# Patient Record
Sex: MALE | Race: WHITE | NOT HISPANIC OR LATINO | Employment: OTHER | URBAN - METROPOLITAN AREA
[De-identification: names, ages, dates, MRNs, and addresses within clinical notes are randomized per-mention and may not be internally consistent; named-entity substitution may affect disease eponyms.]

---

## 2017-08-09 ENCOUNTER — THERAPY VISIT (OUTPATIENT)
Dept: PHYSICAL THERAPY | Facility: CLINIC | Age: 52
End: 2017-08-09
Payer: COMMERCIAL

## 2017-08-09 DIAGNOSIS — E11.610 CHARCOT FOOT DUE TO DIABETES MELLITUS (H): Primary | ICD-10-CM

## 2017-08-09 PROCEDURE — 97110 THERAPEUTIC EXERCISES: CPT | Mod: GP | Performed by: PHYSICAL THERAPIST

## 2017-08-09 PROCEDURE — 97161 PT EVAL LOW COMPLEX 20 MIN: CPT | Mod: GP | Performed by: PHYSICAL THERAPIST

## 2017-08-09 NOTE — LETTER
[facility] ATHLETIC [facility] PHYSICAL THERAPY  [address]  [facility] [address]  [phone]    2017    Re: Nehemiah Magallon   :   1965  MRN:  [id number]   REFERRING PHYSICIAN:   Bryon Starr    [facility] ATHLETIC [facility] PHYSICAL THERAPY  Date of Initial Evaluation:  17  Visits:  Rxs Used: 1  Reason for Referral:  Charcot foot due to diabetes mellitus (H)    EVALUATION SUMMARY    [facility] Initial Evaluation      Subjective:    Patient is a 52 year old male presenting with rehab right ankle/foot hpi. The history is provided by the patient.   Nehemiah Magallon is a 52 year old male with a right foot condition.  Condition occurred with:  Insidious onset.  Condition occurred: at home.  This is a chronic condition  Patient report waking on 2016 when he woke up he had significant swelling of the right foot and the arch had fallen.  He went to the ER and was told his foot was not broken and he was given crutches for walking. He saw another MD 3 weeks later and he was diagnosed with Charcot foot and was in serial casting for 6 weeks with casts changed weekly and using a cane or crutches for short walks. He was then placed in a CAM boot x 3 months.  He was then placed in a custom CAM orthosis which he has been in since  and he walks with or without a cane. He describes significant spine issues since using it due to the height discrepancy. Chief complaints are of right foot pain with standing and walking and lower leg weakness and atrophy due to prolonged immobilization. Standing and walking limited to 5-10 minutes in custom CAM with or without cane and patient is unable to drive due to CAM.  Patient reports pain:  Longitudinal arch.  Pain is described as stabbing and is intermittent and reported as 6/10.  Associated symptoms:  Loss of strength, numbness and loss of motion/stiffness. Pain is the same all the  time.  Symptoms are exacerbated by walking, weight bearing, standing, descending stairs and ascending stairs and relieved by rest and bracing/immobilizing.  Since onset symptoms are unchanged.  Special tests:  X-ray.  General health as reported by patient is fair.  Pertinent medical history includes:  Rheumatoid arthritis, cancer, diabetes, high blood pressure, thyroid problems and sleep disorder/apnea.  Medical allergies: no.  Other surgeries include:  Cancer surgery (thryoid cancer).  Current medications:  Thyroid medication, sleep medication and pain medication.  Current occupation is disabled.  Primary job tasks include:  Prolonged sitting.                 Objective:    Standing Alignment:    Ankle/Foot:  Pes planus R    Gait:    Gait Type:  Antalgic   Assistive Devices:  None and CAM  Deviations:  General Deviations:  Emi decr and stride length decr    Re: Nehemiah Magallon   :   1965      Ankle/Foot Evaluation  ROM:    AROM:    Dorsiflexion:  Left:   15  Right:   5  Plantarflexion:  Left:  40    Right:  20  Inversion:  Left:  45     Right:  20  Eversion:  25     Right:  20  Strength:    Dorsiflexion:  Left: 5/5     Pain:   Right: 3+/5   Pain:  Plantarflexion: Left: 5/5   Pain:   Right: 3-/5  Pain:  Inversion:Left: 5/5  Pain:     Right: 3+/5  Pain:  Eversion:Left: 5/5  Pain:  Right: 3+/5  Pain:  LIGAMENT TESTING: not assessed  SPECIAL TESTS: not assessed  PALPATION:   Right ankle tenderness present at:   medial calcaneal  EDEMA: normal  MOBILITY TESTING:   Tib-Fib Distal Right: hypomobile  Talocrural Right: hypomobile  Subtalar Right: hypomobile  Midtarsal Right: hypomobile    Assessment/Plan:      Patient is a 52 year old male with right side ankle complaints.    Patient has the following significant findings with corresponding treatment plan.                Diagnosis 1:  Right Charcot foot with weakness    Pain -  home program  Decreased ROM/flexibility - therapeutic exercise  Decreased strength -  therapeutic exercise and therapeutic activities  Impaired gait - gait training  Impaired muscle performance - neuro re-education  Decreased function - therapeutic activities    Therapy Evaluation Codes:   1) History comprised of:   Personal factors that impact the plan of care:      None.    Comorbidity factors that impact the plan of care are:      Cancer, Diabetes, High blood pressure, Numbness/tingling, Rheumatoid arthritis, Sleep disorder/apnea and Weakness.     Medications impacting care: Pain, Sleep and thyroid.  2) Examination of Body Systems comprised of:   Body structures and functions that impact the plan of care:      Ankle.   Activity limitations that impact the plan of care are:      Stairs, Standing and Walking.  3) Clinical presentation characteristics are:   Stable/Uncomplicated.  4) Decision-Making    Low complexity using standardized patient assessment instrument and/or measureable assessment of functional outcome.  Cumulative Therapy Evaluation is: Low complexity.  Re: Nehemiah Magallon   :   1965          Previous and current functional limitations:  (See Goal Flow Sheet for this information)    Short term and Long term goals: (See Goal Flow Sheet for this information)     Communication ability:  Patient appears to be able to clearly communicate and understand verbal and written communication and follow directions correctly.  Treatment Explanation - The following has been discussed with the patient:   RX ordered/plan of care  Anticipated outcomes  Possible risks and side effects  This patient would benefit from PT intervention to resume normal activities.   Rehab potential is good.    Frequency:  1 X week, once daily  Duration:  for 8 weeks  Discharge Plan:  Achieve all LTG.  Independent in home treatment program.  Reach maximal therapeutic benefit.    Thank you for your referral.    INQUIRIES  Therapist: Manisha Camarillo, PT  [facility] [place] PHYSICAL THERAPY  [id number] [address]  Phone: [phone]  Fax: [phone]

## 2017-08-09 NOTE — PROGRESS NOTES
Addendum: Patient failed to schedule and return for any follow up appointments after 8/9/17 and will be discharged with a minimal home exercise program.       [facility] Initial Evaluation      Subjective:    Patient is a 52 year old male presenting with rehab right ankle/foot hpi. The history is provided by the patient.   Nehemiah Magallon is a 52 year old male with a right foot condition.  Condition occurred with:  Insidious onset.  Condition occurred: at home.  This is a chronic condition  Patient report waking on October 17th, 2016 when he woke up he had significant swelling of the right foot and the arch had fallen.  He went to the ER and was told his foot was not broken and he was given crutches for walking. He saw another MD 3 weeks later and he was diagnosed with Charcot foot and was in serial casting for 6 weeks with casts changed weekly and using a cane or crutches for short walks. He was then placed in a CAM boot x 3 months.  He was then placed in a custom CAM orthosis which he has been in since March 5th and he walks with or without a cane. He describes significant spine issues since using it due to the height discrepancy. Chief complaints are of right foot pain with standing and walking and lower leg weakness and atrophy due to prolonged immobilization. Standing and walking limited to 5-10 minutes in custom CAM with or without cane and patient is unable to drive due to CAM. .    Patient reports pain:  Longitudinal arch.    Pain is described as stabbing and is intermittent and reported as 6/10.  Associated symptoms:  Loss of strength, numbness and loss of motion/stiffness. Pain is the same all the time.  Symptoms are exacerbated by walking, weight bearing, standing, descending stairs and ascending stairs and relieved by rest and bracing/immobilizing.  Since onset symptoms are unchanged.  Special tests:  X-ray.      General health as reported by patient is fair.  Pertinent medical history  includes:  Rheumatoid arthritis, cancer, diabetes, high blood pressure, thyroid problems and sleep disorder/apnea.  Medical allergies: no.  Other surgeries include:  Cancer surgery (thryoid cancer).  Current medications:  Thyroid medication, sleep medication and pain medication.  Current occupation is disabled.    Primary job tasks include:  Prolonged sitting.                                Objective:    Standing Alignment:                Ankle/Foot:  Pes planus R    Gait:    Gait Type:  Antalgic   Assistive Devices:  None and CAM  Deviations:  General Deviations:  Emi decr and stride length decr          Ankle/Foot Evaluation  ROM:    AROM:    Dorsiflexion:  Left:   15  Right:   5  Plantarflexion:  Left:  40    Right:  20  Inversion:  Left:  45     Right:  20  Eversion:  25     Right:  20        Strength:    Dorsiflexion:  Left: 5/5     Pain:   Right: 3+/5   Pain:  Plantarflexion: Left: 5/5   Pain:   Right: 3-/5  Pain:  Inversion:Left: 5/5  Pain:     Right: 3+/5  Pain:  Eversion:Left: 5/5  Pain:  Right: 3+/5  Pain:                  LIGAMENT TESTING: not assessed              SPECIAL TESTS: not assessed    PALPATION:     Right ankle tenderness present at:   medial calcaneal  EDEMA: normal          MOBILITY TESTING:     Tib-Fib Distal Right: hypomobile  Talocrural Right: hypomobile  Subtalar Right: hypomobile  Midtarsal Right: hypomobile                                                      General     ROS    Assessment/Plan:      Patient is a 52 year old male with right side ankle complaints.    Patient has the following significant findings with corresponding treatment plan.                Diagnosis 1:  Right Charcot foot with weakness    Pain -  home program  Decreased ROM/flexibility - therapeutic exercise  Decreased strength - therapeutic exercise and therapeutic activities  Impaired gait - gait training  Impaired muscle performance - neuro re-education  Decreased function - therapeutic activities    Therapy  Evaluation Codes:   1) History comprised of:   Personal factors that impact the plan of care:      None.    Comorbidity factors that impact the plan of care are:      Cancer, Diabetes, High blood pressure, Numbness/tingling, Rheumatoid arthritis, Sleep disorder/apnea and Weakness.     Medications impacting care: Pain, Sleep and thyroid.  2) Examination of Body Systems comprised of:   Body structures and functions that impact the plan of care:      Ankle.   Activity limitations that impact the plan of care are:      Stairs, Standing and Walking.  3) Clinical presentation characteristics are:   Stable/Uncomplicated.  4) Decision-Making    Low complexity using standardized patient assessment instrument and/or measureable assessment of functional outcome.  Cumulative Therapy Evaluation is: Low complexity.    Previous and current functional limitations:  (See Goal Flow Sheet for this information)    Short term and Long term goals: (See Goal Flow Sheet for this information)     Communication ability:  Patient appears to be able to clearly communicate and understand verbal and written communication and follow directions correctly.  Treatment Explanation - The following has been discussed with the patient:   RX ordered/plan of care  Anticipated outcomes  Possible risks and side effects  This patient would benefit from PT intervention to resume normal activities.   Rehab potential is good.    Frequency:  1 X week, once daily  Duration:  for 8 weeks  Discharge Plan:  Achieve all LTG.  Independent in home treatment program.  Reach maximal therapeutic benefit.    Please refer to the daily flowsheet for treatment today, total treatment time and time spent performing 1:1 timed codes.

## 2017-08-09 NOTE — MR AVS SNAPSHOT
After Visit Summary   8/9/2017    Nehemiah Magallon    MRN: [id number]           Patient Information     Date Of Birth          1965        Visit Information        Provider Department      8/9/2017 12:10 PM Manisha Camarillo PT [facility] Athletic [facility] Physical [facility]        Today's Diagnoses     Charcot foot due to diabetes mellitus (H)    -  1       Follow-ups after your visit        Who to contact     If you have questions or need follow up information about today's clinic visit or your schedule please contact [facility] ATHLETIC [facility] PHYSICAL [facility] directly at [phone].  Normal or non-critical lab and imaging results will be communicated to you by [employer], letter or phone within 4 business days after the clinic has received the results. If you do not hear from us within 7 days, please contact the clinic through [employer] or phone. If you have a critical or abnormal lab result, we will notify you by phone as soon as possible.  Submit refill requests through [employer] or call your pharmacy and they will forward the refill request to us. Please allow 3 business days for your refill to be completed.          Additional Information About Your Visit        MyChart Information     [employer] gives you secure access to your electronic health record. If you see a primary care provider, you can also send messages to your care team and make appointments. If you have questions, please call your primary care clinic.  If you do not have a primary care provider, please call [phone] and they will assist you.        Care EveryWhere ID     This is your Care EveryWhere ID. This could be used by other organizations to access your [facility] medical records  [id number]         Blood Pressure from Last 3 Encounters:   03/06/15 (!) 135/96   10/14/14 138/89    Weight from Last 3 Encounters:   03/06/15 91.6 kg (202 lb)   10/14/14 94.8 kg (209 lb)              We Performed the  Following     HC PT EVAL, LOW COMPLEXITY     KALIE INITIAL EVAL REPORT     THERAPEUTIC EXERCISES        Primary Care Provider Office Phone # Fax #    Codie Grimm [phone] [id number]  [facility] [id number]        Equal Access to Services     COURTNEYSANTY TORY : Hadcalvin yarbrough eliseosofie Sochristaali, waaxda luqadaha, qaybta kaalmada adeegyada, waxwilfredo baylee julietteinez wiggins virgenjan shepherd. So [facility] [phone].    ATENCIÓN: Si habla español, tiene a sawyer disposición servicios gratuitos de asistencia lingüística. Llame al [phone].    We comply with applicable federal civil rights laws and [place] laws. We do not discriminate on the basis of race, color, national origin, age, disability sex, sexual orientation or gender identity.            Thank you!     Thank you for choosing [place] [facility] PHYSICAL THERAPY  for your care. Our goal is always to provide you with excellent care. Hearing back from our patients is one way we can continue to improve our services. Please take a few minutes to complete the written survey that you may receive in the mail after your visit with us. Thank you!             Your Updated Medication List - Protect others around you: Learn how to safely use, store and throw away your medicines at www.disposemymeds.org.          This list is accurate as of: 8/9/17  1:28 PM.  Always use your most recent med list.                   Brand Name Dispense Instructions for use Diagnosis    lidocaine 5 % Patch    LIDODERM    90 patch    Apply up to 3 patches to painful area at once for up to 12 h within a 24 h period.  Remove after 12 hours.    Chronic pain syndrome       nabumetone 500 MG tablet    RELAFEN    90 tablet    Take 1-2 tablets (500-1,000 mg) by mouth 2 times daily as needed for moderate pain    Rib pain on left side       NEURONTIN 300 MG capsule   Generic drug:  gabapentin      Take 300 mg by mouth 3 times daily        oxyCODONE-acetaminophen 5-325  MG per tablet    PERCOCET     Take by mouth every 4 hours as needed for moderate to severe pain        SYNTHROID 200 MCG tablet   Generic drug:  levothyroxine      Take by mouth daily

## 2017-08-09 NOTE — LETTER
[facility] ATHLETIC [facility] PHYSICAL THERAPY  [address]  [facility] [address]  [phone]    2017    Re: Nehemiah Magallon   :   1965  MRN:  [id number]   REFERRING PHYSICIAN:   Bryon Starr    [facility]  Date of Initial Evaluation:  17  Visits:  Rxs Used: 1  Reason for Referral:  Charcot foot due to diabetes mellitus (H)    EVALUATION SUMMARY    Addendum: Patient failed to schedule and return for any follow up appointments after 17 and will be discharged with a minimal home exercise program.     [facility]    Subjective:    Patient is a 52 year old male presenting with rehab right ankle/foot hpi. The history is provided by the patient.   Nehemiah Magallon is a 52 year old male with a right foot condition.  Condition occurred with:  Insidious onset.  Condition occurred: at home.  This is a chronic condition  Patient report waking on 2016 when he woke up he had significant swelling of the right foot and the arch had fallen.  He went to the ER and was told his foot was not broken and he was given crutches for walking. He saw another MD 3 weeks later and he was diagnosed with Charcot foot and was in serial casting for 6 weeks with casts changed weekly and using a cane or crutches for short walks. He was then placed in a CAM boot x 3 months.  He was then placed in a custom CAM orthosis which he has been in since  and he walks with or without a cane. He describes significant spine issues since using it due to the height discrepancy. Chief complaints are of right foot pain with standing and walking and lower leg weakness and atrophy due to prolonged immobilization. Standing and walking limited to 5-10 minutes in custom CAM with or without cane and patient is unable to drive due to CAM. .    Patient reports pain:  Longitudinal arch.    Pain is  described as stabbing and is intermittent and reported as 6/10.  Associated symptoms:  Loss of strength, numbness and loss of motion/stiffness. Pain is the same all the time.  Symptoms are exacerbated by walking, weight bearing, standing, descending stairs and ascending stairs and relieved by rest and bracing/immobilizing.  Since onset symptoms are unchanged.  Special tests:  X-ray.      General health as reported by patient is fair.  Pertinent medical history includes:  Rheumatoid arthritis, cancer, diabetes, high blood pressure, thyroid problems and sleep disorder/apnea.  Medical allergies: no.  Other surgeries include:  Cancer surgery (thryoid cancer).  Current medications:  Thyroid medication, sleep medication and pain medication.  Current occupation is disabled.    Primary job tasks include:  Prolonged sitting.    Objective:    Standing Alignment:  Ankle/Foot:  Pes planus R  Gait:    Gait Type:  Antalgic   Assistive Devices:  None and CAM  Deviations:  General Deviations:  Emi decr and stride length decr  Re: Nehemiah Magallon   :   1965    Ankle/Foot Evaluation  ROM:    AROM:    Dorsiflexion:  Left:   15  Right:   5  Plantarflexion:  Left:  40    Right:  20  Inversion:  Left:  45     Right:  20  Eversion:  25     Right:  20  Strength:    Dorsiflexion:  Left: 5/5     Pain:   Right: 3+/5   Pain:  Plantarflexion: Left: 5/5   Pain:   Right: 3-/5  Pain:  Inversion:Left: 5/5  Pain:     Right: 3+/5  Pain:  Eversion:Left: 5/5  Pain:  Right: 3+/5  Pain:  LIGAMENT TESTING: not assessed  SPECIAL TESTS: not assessed  PALPATION:   Right ankle tenderness present at:   medial calcaneal  EDEMA: normal  MOBILITY TESTING:   Tib-Fib Distal Right: hypomobile  Talocrural Right: hypomobile  Subtalar Right: hypomobile  Midtarsal Right: hypomobile    Assessment/Plan:      Patient is a 52 year old male with right side ankle complaints.    Patient has the following significant findings with corresponding treatment plan.                 Diagnosis 1:  Right Charcot foot with weakness    Pain -  home program  Decreased ROM/flexibility - therapeutic exercise  Decreased strength - therapeutic exercise and therapeutic activities  Impaired gait - gait training  Impaired muscle performance - neuro re-education  Decreased function - therapeutic activities    Therapy Evaluation Codes:   1) History comprised of:   Personal factors that impact the plan of care:      None.    Comorbidity factors that impact the plan of care are:      Cancer, Diabetes, High blood pressure, Numbness/tingling, Rheumatoid arthritis, Sleep disorder/apnea and Weakness.     Medications impacting care: Pain, Sleep and thyroid.  2) Examination of Body Systems comprised of:   Body structures and functions that impact the plan of care:      Ankle.   Activity limitations that impact the plan of care are:      Stairs, Standing and Walking.  3) Clinical presentation characteristics are:   Stable/Uncomplicated.  4) Decision-Making    Low complexity using standardized patient assessment instrument and/or measureable assessment of functional outcome.  Cumulative Therapy Evaluation is: Low complexity.    Re: Nehemiah Magallon   :   1965          Previous and current functional limitations:  (See Goal Flow Sheet for this information)    Short term and Long term goals: (See Goal Flow Sheet for this information)   Communication ability:  Patient appears to be able to clearly communicate and understand verbal and written communication and follow directions correctly.  Treatment Explanation - The following has been discussed with the patient:   RX ordered/plan of care  Anticipated outcomes  Possible risks and side effects  This patient would benefit from PT intervention to resume normal activities.   Rehab potential is good.    Frequency:  1 X week, once daily  Duration:  for 8 weeks  Discharge Plan:  Achieve all LTG.  Independent in home treatment program.  Reach maximal therapeutic  benefit.    Thank you for your referral.    INQUIRIES  Therapist: Manisha Camarillo PT  [facility] [place] PH[facility]ICAL [facility]ERAP[facility]  [address] [address] [address]  Phone: [phone]  Fax: [phone]

## 2017-09-15 PROBLEM — E11.610 CHARCOT FOOT DUE TO DIABETES MELLITUS (H): Status: RESOLVED | Noted: 2017-08-09 | Resolved: 2017-09-15

## 2019-03-07 ENCOUNTER — TRANSFERRED RECORDS (OUTPATIENT)
Dept: HEALTH INFORMATION MANAGEMENT | Facility: CLINIC | Age: 54
End: 2019-03-07

## 2019-03-07 LAB — EJECTION FRACTION: NORMAL %

## 2019-10-01 ENCOUNTER — HEALTH MAINTENANCE LETTER (OUTPATIENT)
Age: 54
End: 2019-10-01

## 2020-03-22 ENCOUNTER — HEALTH MAINTENANCE LETTER (OUTPATIENT)
Age: 55
End: 2020-03-22

## 2020-07-30 ENCOUNTER — TRANSFERRED RECORDS (OUTPATIENT)
Dept: HEALTH INFORMATION MANAGEMENT | Facility: CLINIC | Age: 55
End: 2020-07-30

## 2020-07-30 ENCOUNTER — MEDICAL CORRESPONDENCE (OUTPATIENT)
Dept: HEALTH INFORMATION MANAGEMENT | Facility: CLINIC | Age: 55
End: 2020-07-30

## 2020-08-04 ENCOUNTER — MEDICAL CORRESPONDENCE (OUTPATIENT)
Dept: HEALTH INFORMATION MANAGEMENT | Facility: CLINIC | Age: 55
End: 2020-08-04

## 2020-08-25 ENCOUNTER — TELEPHONE (OUTPATIENT)
Dept: OPHTHALMOLOGY | Facility: CLINIC | Age: 55
End: 2020-08-25

## 2020-08-25 DIAGNOSIS — M14.679: Primary | ICD-10-CM

## 2020-08-25 NOTE — TELEPHONE ENCOUNTER
[facility] Call Center    Phone Message    May a detailed message be left on voicemail: yes     Reason for Call: Other: Sybil from the [facility] called per the Pt regarding a referral that was sent over for the MFERG testing. I saw the messages about the forms in the chart and tried calling the back line to see if the Pt ever got clarification if he could get scheduled. Sybil stated that the Pt would like to be scheduled first available. Please advise, thank you     Action Taken: Message routed to:  [facility] EYE    Travel Screening: Not Applicable

## 2020-10-15 DIAGNOSIS — H35.54 OCCULT MACULAR DYSTROPHY: ICD-10-CM

## 2020-10-15 DIAGNOSIS — H35.89 OTHER SPECIFIED RETINAL DISORDERS: Primary | ICD-10-CM

## 2020-10-22 ENCOUNTER — APPOINTMENT (OUTPATIENT)
Dept: OPHTHALMOLOGY | Facility: CLINIC | Age: 55
End: 2020-10-22
Attending: OPHTHALMOLOGY
Payer: COMMERCIAL

## 2020-10-22 DIAGNOSIS — H35.54 OCCULT MACULAR DYSTROPHY: ICD-10-CM

## 2020-10-22 DIAGNOSIS — H35.89 OTHER SPECIFIED RETINAL DISORDERS: Primary | ICD-10-CM

## 2020-10-22 PROCEDURE — 92083 EXTENDED VISUAL FIELD XM: CPT

## 2020-10-22 PROCEDURE — 0509T PATTERN ERG W/I&R: CPT

## 2020-10-22 PROCEDURE — 999N000103 HC STATISTIC NO CHARGE FACILITY FEE

## 2020-10-22 PROCEDURE — 92274 MULTIFOCAL ERG W/I&R: CPT

## 2020-10-22 ASSESSMENT — VISUAL ACUITY
OD_SC: 20/400
OS_SC: 20/200
METHOD: SNELLEN - LINEAR
OD_PH_SC: 20/300
OS_PH_SC: 20/125

## 2020-10-22 NOTE — LETTER
"10/22/2020    STANDARD pERG REPORT    Referring:  Claudio Clifton MD/[facility]     RE: Nehemiah Magallon  MRN: [id number]  : 1965                 pERG Date:  10/22/2020    CLINICAL HISTORY: Referred by Dr. Claudio Clifton/[facility] for OVF+GVF+pERG+mfERG for macular ischemia vs diabetic retinopathy/neuropathy vs occult macular dystrophy.  Last eye exam with Dr. Clifton 20:  Pt reports vision continues to darken right eye.  Continued difficulty with transitioning from light and dark environments.  +Type II DM w/H/O PDR and recurrent VH right eye.  H/O papillary thyroid carcinoma, S/P total thyroidectomy 2017.   Patient reports significant history of neuropathy elsewhere in extremities and is concerned that visual changes may represent diabetic process similar to neuropathy.  Reviewed with patient pathophysiology of eye changes in diabetes including ischemic diabetic maculopathy.  Discussed benefits, drawbacks, and alternatives of electroretinogram testing, including potential for confounding from history of panretinal photocoagulation and baseline abnormality in the setting of diabetic eye disease.  If no actionable findings on ERG testing, may pursue CT Head/Orbit with and without contrast - no neurological findings or motility deficits at this time.   Pt states vision right eye is like \"looking through a dark veil\".  H/O hemorrhage right eye that took 1.5 yrs to clear and now has left a stain that affects vision.  +Limited mobility d/t to peripheral neruopathy but able to mobilize w/arm crutch.  Arrives w/.  Next f/u at [facility] next month -.    IMPRESSION:     Likely normal PERG.    Visual Acuity without glasses:     Right Eye: 20/400, PH 20/300       Left Eye: 20/200, PH 20/125                         ALL AVERAGED    Data for Pattern ERG Right Eye  Amplitude ( v) Left Eye    Patient Normal Patient   P50 Amplitude 0.8(3.17) not avail 1.09(3.62)   N95  Amplitude -3.33(-5.1) not avail -3.29(-6.33)   N95 : " P50 Ratio 4.16(1.61) not avail 3.02(1.75)   --- = residual to non-measurable  Parentheses = @50cm, otherwise data is @100cm        INTERPRETATION:  This pattern electroretinogram (PERG) was performed according to ISCEV standards using the [employer] E3 system and DTL fiber-recording electrodes. No dilation necessary for the PERG. The patient tolerated the testing well. The patient had good fixation and no difficulty with blinking. The waveforms are fairly reproducible and well formed. The responses were symmetric between both eyes    CONCLUSION: This represents a likely normal PERG. This pattern electroretinogram (PERG) provides an objective measure of central retinal function. The PERG contains two main components; a positive respose at approximately 50ms (P50), and a larger negative response at approximately 95ms (N95).  Since PERG is considered an intermediate step between ffERG and VEP, it should not be interpreted alone. Both macular dysfunction and optic nerve disease can give abnormalities in the VEP, and the PERG thus facilitates more meaningful VEP interpretation.  Clinical and electrophysiology correlation with visual evoked potential and ffERG is needed.            STANDARD OVF and GVF REPORT               OVF and GVF Date:  10/22/2020    IMPRESSION: 1. Abnormal visual field     2. Constriction of the visual field with mid peripheral scotomas    INTERPRETATION:  Goldmann visual field (GVF):    Right Eye:   Fixation: Good  Blind spot: There is enlargement of the blind spot to III4e  Findings: The peripheral isopters extended horizontally 70 degrees and vertically 90 degrees.  There are areas of decreased sensitivity paracentrally with mid-peripheral inferior and sup scotomas.    Left eye:  Fixation: Good  Blind spot: There is enlargement of the blind spot to III4e  Findings: The peripheral isopters extended horizontally 70 degrees and vertically 80 degrees.  There are areas of decreased sensitivity  paracentrally and mid-peripherally without  significant scotomas.    LVF also demonstrated significant constriction of the visual fields both eyes  right eye> left eye.               STANDARD mfERG REPORT                mfERG Date:  10/22/2020    IMPRESSION:  Likely normal multifocal electroretinogram (ERG)    INTERPRETATION:   A 103-hexagon stimulus pattern multifocal ERG was done in both eyes using the Veris system.  The multifocal ERG was essentially normal in both eyes.  In particular, in both eyes the waveform tracings showed normal morphology and good signal-to-noise ratio.  The amplitude plot showed a well-defined foveal peak.    The amplitudes themselves were within normal limits. The implicit times were essentially normal,  though slightly increased throughout the macula.  The ring ratios were normal in both eyes.        RECOMMENDATIONS:  Clinical correlation recommended.          Thank you for the opportunity to provide electrophysiologic services for this patient.  Please do not hesitate to call if there should be any questions regarding these results.    Sincerely,    Galina Saavedra MD     Retina Service   Department of Ophthalmology and Visual Neurosciences   [facility]  Phone: [phone]   Fax: [phone]

## 2020-10-22 NOTE — PROGRESS NOTES
10/22/2020    STANDARD OVF and GVF REPORT    Referring: Dr. Claudio Clifton/[facility]     RE: Nehemiah Magallon  MRN: [id number]  : 1965                 OVF and GVF Date:  10/22/2020    IMPRESSION: 1. abnormal visual field     2. Constriction of the visual field with mid peripheral scotomas      Visual Acuity Right Eye : 20/400, PH 20/300      w/o gls    Visual Acuity Left Eye : 20/200, PH 20/125      w/o gls    Interpretation  goldmann visual field (GVF):    Right eye:   Fixation: good  Blind spot: there is enlargement of the blind spot to III4e  Findings: The peripheral isopters extended horizontally 70 degrees and vertically 90 degrees.  There are areas of decreased sensitivity paracentrally with mid-peripheral inferior and sup scotomas.    Left eye:  Fixation: good  Blind spot: there is enlargement of the blind spot to III4e  Findings: The peripheral isopters extended horizontally 70 degrees and vertically 80 degrees.  There are areas of decreased sensitivity paracentrally and mid-peripherally without  significant scotomas.    LVF also demonstrated Significant constriction of the visual fields both eyes  right eye> left eye     Sincerely,    Galina Saavedra MD  .  Retina Service   Department of Ophthalmology and Visual Neurosciences   [facility]  Phone: [phone]   Fax: [phone]

## 2020-10-22 NOTE — PROGRESS NOTES
"pERG to be interpreted.  Dr. Saavedra to interpret all.    10/22/2020    STANDARD pERG REPORT    Referring: Dr. Claudio Clifton/[facility]     RE: Nehemiah Magallon  MRN: [id number]  : 1965                 pERG Date:  10/22/2020    CLINICAL HISTORY: Referred by Dr. Claudio Clifton/[facility] for OVF+GVF+pERG+mfERG for macular ischemia vs diabetic retinopathy/neuropathy vs occult macular dystrophy.  OLGA LIDIA with Dr. Clifton 20:  Pt reports vision continues to darken right eye.  Continued difficulty with transitioning from light and dark environments.  +Type II DM w/H/O PDR and recurrent VH right eye.  H/O papillary thyroid carcinoma, S/P total thyroidectomy .   Patient reports significant history of neuropathy elsewhere in extremities and is concerned that visual changes may represent diabetic process similar to neuropathy.  Reviewed with patient pathophysiology of eye changes in diabetes including ischemic diabetic maculopathy.  Discussed benefits, drawbacks, and alternatives of electroretinogram testing, including potential for confounding from history of panretinal photocoagulation and baseline abnormality in the setting of diabetic eye disease.  If no actionable findings on ERG testing, may pursue CT Head/Orbit with and without contrast - no neurological findings or motility deficits at this time.   Pt states vision right eye is like \"looking through a dark veil\".  H/O hemorrhage right eye that took 1.5 yrs to clear and now has left a stain that affects vision.  +Limited mobility d/t to peripheral neruopathy but able to mobilize w/arm crutch.  Arrives w/.  Next f/u at [facility] next month .    IMPRESSION:  likely normal PERG.    Visual Acuity Right Eye : 20/400, PH 20/300      w/o gls    Visual Acuity Left Eye : 20/200, PH 20/125      w/o gls                     ALL AVERAGED    Data for Pattern ERG Right Eye  Amplitude ( v) Left Eye    Patient Normal Patient   P50 Amplitude 0.8(3.17) not avail 1.09(3.62) "   N95  Amplitude -3.33(-5.1) not avail -3.29(-6.33)   N95 : P50 Ratio 4.16(1.61) not avail 3.02(1.75)   --- = residual to non-measurable  Parentheses = @50cm, otherwise data is @100cm        INTERPRETATION:    This  pattern electroretinogram (PERG) was performed according to ISCEV standards using [employer] E3 system and DTL fiber recording electrodes. No dilation necessary for the PERG. The patient tolerated the testing well. The patient had good fixation and no difficulty with blinking. The waveforms are fairly reproducible and well formed. The responses were symmetric between both eyes    In conclusion: this represents a likely normal PERG.  The pattern electroretinogram (PERG) provides an objective measure of central retinal function. The PERG contains two main components, a positive respose at approximately 50ms (P50) and a larger negative response at approximately 95ms (N95).   Since PERG is considered an intermediate step between ffERG and VEP, it should not be interpreted alone. Both macular dysfunction and optic nerve disease can give abnormalities in the VEP, and the PERG thus facilitates more meaningful VEP interpretation.  Clinical and electrophysiology correlation with visual evoked potential and ffERG is needed.    Dear Elodia,  thank you for the opportunity to provide electrophysiologic services for this patient.  Please do not hesitate to call if there should be any questions regarding these results.

## 2020-10-22 NOTE — PROGRESS NOTES
"10/22/2020    STANDARD mfERG REPORT    Referring: Dr. Claudio Clifton/[facility]     RE: Nehemiah Magallon  MRN: [id number]  : 1965                 mfERG Date:  10/22/2020    CLINICAL HISTORY: Referred by Dr. Claudio Clifton/[facility] for OVF+GVF+pERG+mfERG for macular ischemia vs diabetic retinopathy/neuropathy vs occult macular dystrophy.  OLGA LIDIA with Dr. Clifton 20:  Pt reports vision continues to darken right eye.  Continued difficulty with transitioning from light and dark environments.  +Type II DM w/H/O PDR and recurrent VH right eye.  H/O papillary thyroid carcinoma, S/P total thyroidectomy 2017.   Patient reports significant history of neuropathy elsewhere in extremities and is concerned that visual changes may represent diabetic process similar to neuropathy.  Reviewed with patient pathophysiology of eye changes in diabetes including ischemic diabetic maculopathy.  Discussed benefits, drawbacks, and alternatives of electroretinogram testing, including potential for confounding from history of panretinal photocoagulation and baseline abnormality in the setting of diabetic eye disease.  If no actionable findings on ERG testing, may pursue CT Head/Orbit with and without contrast - no neurological findings or motility deficits at this time.   Pt states vision right eye is like \"looking through a dark veil\".  H/O hemorrhage right eye that took 1.5 yrs to clear and now has left a stain that affects vision.  +Limited mobility d/t to peripheral neruopathy but able to mobilize w/arm crutch.  Arrives w/.  Next f/u at [facility] next month -.    IMPRESSION: 1.  Likely normal multifocal electroretinogram (ERG)    Visual Acuity Right Eye : 20/400, PH 20/300      w/o gls    Visual Acuity Left Eye : 20/200, PH 20/125      w/o gls    INTERPRETATION:     A 103-hexagon stimulus pattern multifocal ERG was done in both eyes using the Veris system.    The multifocal ERG was essentially normal in both eyes.    In particular, " in both eyes the waveform tracings showed normal morphology and good signal-to-noise ratio.  The amplitude plot showed a well-defined foveal peak.    The amplitudes themselves were within normal limits.   The implicit times were essentially normal,   though slightly increased throughout the macula.    The ring ratios were normal in both eyes.        RECOMMENDATIONS:  Clinical correlation recommended.          Galina Saavedra MD  .  Retina Service   Department of Ophthalmology and Visual Neurosciences   [facility]  Phone: [phone]   Fax: [phone]

## 2020-10-22 NOTE — NURSING NOTE
"Referred by Dr. Claudio Clifton/[facility] for OVF+GVF+pERG+mfERG for macular ischemia vs diabetic retinopathy/neuropathy vs occult macular dystrophy.  OLGA LIDIA w/Dr. Clifton 07-30-20:  Pt reports vision continues to darken right eye.  Continued difficulty with transitioning from light and dark environments.  +Type II DM w/H/O PDR and recurrent VH right eye.  H/O papillary thyroid carcinoma, S/P total thyroidectomy 2017.   Patient reports significant history of neuropathy elsewhere in extremities and is concerned that visual changes may represent diabetic process similar to neuropathy.  Reviewed with patient pathophysiology of eye changes in diabetes including ischemic diabetic maculopathy.  Discussed benefits, drawbacks, and alternatives of electroretinogram testing, including potential for confounding from history of panretinal photocoagulation and baseline abnormality in the setting of diabetic eye disease.  If no actionable findings on ERG testing, may pursue CT Head/Orbit with and without contrast - no neurological findings or motility deficits at this time.   Pt states vision right eye is like \"looking through a dark veil\".  H/O hemorrhage right eye that took 1.5 yrs to clear and now has left a stain that affects vision.  +Limited mobility d/t to peripheral neruopathy but able to mobilize w/arm crutch.  Arrives w/.  Next f/u at [facility] next month .    "

## 2021-01-15 ENCOUNTER — HEALTH MAINTENANCE LETTER (OUTPATIENT)
Age: 56
End: 2021-01-15

## 2021-02-19 ENCOUNTER — TRANSFERRED RECORDS (OUTPATIENT)
Dept: HEALTH INFORMATION MANAGEMENT | Facility: CLINIC | Age: 56
End: 2021-02-19

## 2021-02-19 LAB — RETINOPATHY: NORMAL

## 2021-02-22 ENCOUNTER — MEDICAL CORRESPONDENCE (OUTPATIENT)
Dept: HEALTH INFORMATION MANAGEMENT | Facility: CLINIC | Age: 56
End: 2021-02-22

## 2021-02-24 ENCOUNTER — TRANSCRIBE ORDERS (OUTPATIENT)
Dept: OTHER | Age: 56
End: 2021-02-24

## 2021-02-24 DIAGNOSIS — H53.30 UNSPECIFIED DISORDER OF BINOCULAR VISION: Primary | ICD-10-CM

## 2021-03-01 NOTE — TELEPHONE ENCOUNTER
FUTURE VISIT INFORMATION      FUTURE VISIT INFORMATION:    Date: 4.7.21    Time: 9:00 AM    Location: [facility]  REFERRAL INFORMATION:    Referring provider:  Dr Jonny Clifton    Referring providers clinic:  [facility]    Reason for visit/diagnosis: New, ref'd by Dr Jacqueline Lynch-[facility], binocular vision    RECORDS REQUESTED FROM:       Clinic name Comments Records Status Imaging Status   [facility] 2.19.21 Dr Jonny Clifton  2.11.21 Scanned                                    Action 3.15.21 8:35 AM [facility]   Action Taken Requested records from -[id number]

## 2021-04-07 ENCOUNTER — OFFICE VISIT (OUTPATIENT)
Dept: OPHTHALMOLOGY | Facility: CLINIC | Age: 56
End: 2021-04-07
Payer: COMMERCIAL

## 2021-04-07 ENCOUNTER — PRE VISIT (OUTPATIENT)
Dept: OPHTHALMOLOGY | Facility: CLINIC | Age: 56
End: 2021-04-07

## 2021-04-07 DIAGNOSIS — H35.373 EPIRETINAL MEMBRANE (ERM) OF BOTH EYES: ICD-10-CM

## 2021-04-07 DIAGNOSIS — H53.40 VISUAL FIELD DEFECT: Primary | ICD-10-CM

## 2021-04-07 DIAGNOSIS — H35.353 CYSTOID MACULAR EDEMA OF BOTH EYES: ICD-10-CM

## 2021-04-07 DIAGNOSIS — H53.10 SUBJECTIVE VISUAL DISTURBANCE: ICD-10-CM

## 2021-04-07 DIAGNOSIS — H53.30 UNSPECIFIED DISORDER OF BINOCULAR VISION: ICD-10-CM

## 2021-04-07 DIAGNOSIS — H53.10 SUBJECTIVE VISUAL DISTURBANCE: Primary | ICD-10-CM

## 2021-04-07 PROCEDURE — 92083 EXTENDED VISUAL FIELD XM: CPT | Performed by: OPHTHALMOLOGY

## 2021-04-07 PROCEDURE — 99204 OFFICE O/P NEW MOD 45 MIN: CPT | Performed by: OPHTHALMOLOGY

## 2021-04-07 PROCEDURE — 92133 CPTRZD OPH DX IMG PST SGM ON: CPT | Performed by: OPHTHALMOLOGY

## 2021-04-07 ASSESSMENT — TONOMETRY
OS_IOP_MMHG: 13
OD_IOP_MMHG: 15
IOP_METHOD: ICARE

## 2021-04-07 ASSESSMENT — CONF VISUAL FIELD
OD_SUPERIOR_NASAL_RESTRICTION: 3
OD_SUPERIOR_TEMPORAL_RESTRICTION: 3
OS_NORMAL: 1
OD_INFERIOR_TEMPORAL_RESTRICTION: 3
OD_INFERIOR_NASAL_RESTRICTION: 3
METHOD: COUNTING FINGERS

## 2021-04-07 ASSESSMENT — REFRACTION_MANIFEST
OS_AXIS: 051
OD_CYLINDER: +0.75
OD_AXIS: 021
OD_SPHERE: +1.25
OS_SPHERE: +1.50
OS_CYLINDER: +0.50
METHOD_AUTOREFRACTION: 1

## 2021-04-07 ASSESSMENT — REFRACTION
OD_CYLINDER: SPHERE
OS_CYLINDER: SPHERE
OD_SPHERE: +1.25
OS_SPHERE: +1.75

## 2021-04-07 ASSESSMENT — SLIT LAMP EXAM - LIDS
COMMENTS: NORMAL
COMMENTS: NORMAL

## 2021-04-07 ASSESSMENT — CUP TO DISC RATIO
OS_RATIO: 0.1
OD_RATIO: 0.1

## 2021-04-07 ASSESSMENT — VISUAL ACUITY
METHOD: SNELLEN - LINEAR
OD_SC: 20/600
OS_SC: 20/125

## 2021-04-07 ASSESSMENT — EXTERNAL EXAM - LEFT EYE: OS_EXAM: NORMAL

## 2021-04-07 ASSESSMENT — EXTERNAL EXAM - RIGHT EYE: OD_EXAM: NORMAL

## 2021-04-07 NOTE — LETTER
"2021         RE:  :  MRN: Nehemiah Magallon  1965  [id number]     Dear OSVALDO Lynch,    Thank you for asking me to see your very pleasant patient, Nehemiah Magallon, in neuro-ophthalmic consultation.  I would like to thank you for sending your records and I have summarized them in the history of present illness. My assessment and plan are below.  For further details, please see my attached clinic note.      Assessment & Plan     Nehemiah Magallon is a 55 year old male with the following diagnoses:   1. Unspecified disorder of binocular vision    2. Subjective visual disturbance         Patient was sent for consultation by Dr. Jacqueline Lynch for vision loss.      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  At some point, he developed a vitreous hemorrhage RIGHT eye. Since then, he has had multiple \"microbleeds\".  He describes multiple episodes of veils, but the records say that they were microbleeds.  He had a multifocal electroretinogram and perg at [facility] and these were both normal.       Independent historians:  Patient    Review of outside testing:  Multifocal electroretinogram      My interpretation performed today of outside testing:  Nonspecific changes in both eyes in ring 6       [facility] of [facility] clinical [facility]     Past medical history:        Family history / social history:  Mother and father Diabetes mellitus   Mother had brain cancer and breast cancer     Exam:  Visual acuity is 20/600 RIGHT eye and 20/125 LEFT eye.  Pupils were sluggish both eyes without afferent pupillary defect.  Color vision was abnormal both eyes.      Tests ordered and interpreted today:  Visual field: constriction RIGHT eye, normal LEFT eye   Optical coherence tomography: normal retinal nerve fiber layer both eyes     Discussion of management / interpretation with another provider: "   None    Assessment/Plan:   It is my impression that he has reduced vision right eye greater than left.  I do not see evidence of an optic neuropathy.  This is supported by the fact that his nerve fiber layer is normal and his pattern ERG was normal.  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  He also has some chronic looking cells in the anterior vitreous in both eyes.  I did a corneal topography and this showed relatively unremarkable results.  I did a refraction and this improved his vision to 20/25 -3 in the right eye and 20/20 -1 in the left eye.  I believe that most of his vision changes are refractive in nature.  He also has some epiretinal membranes and cystoid macular edema.  I do not see a neurologic cause of his vision loss.  Follow-up with me as needed.    Again, thank you for allowing me to participate in the care of your patient.      Sincerely,    Hugo Amaya MD  Professor  Ophthalmology Residency   Director of Neuro-Ophthalmology  [employer] [facility] [employer] Endowed Chair  Departments of Ophthalmology, Neurology, and Neurosurgery  [address]  [address]  T - [phone]  F - [phone]  [email or web address] [email or web address]      CC: Jacqueline Lynch NP  [facility]  [address] [place] [address]  Via Fax: [phone]     Codie Grimm [place]  [address] [facility] [id number]  Via In Basket

## 2021-04-07 NOTE — Clinical Note
"4/7/2021       RE: Nehemiah Magallon  [address] [facility] [address]     Dear Colleague,    Thank you for referring your patient, Nehemiah Magallon, to the [facility]PHTHALMOLOGY CLINIC [place] at [facility]. Please see a copy of my visit note below.         Assessment & Plan     Nehemiah Magallon is a 55 year old male with the following diagnoses:   1. Unspecified disorder of binocular vision    2. Subjective visual disturbance         Patient was sent for consultation by Dr. Jacqueline Lynch for vision loss.      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  At some point, he developed a vitreous hemorrhage RIGHT eye. Since then, he has had multiple \"microbleeds\".  He describes multiple episodes of veils, but the records say that they were microbleeds.  He had a multifocal electroretinogram and perg at [facility] and these were both normal.       Independent historians:  Patient    Review of outside testing:  Multifocal electroretinogram      My interpretation performed today of outside testing:  Nonspecific changes in both eyes in ring 6       [facility]     Past medical history:        Family history / social history:  Mother and father Diabetes mellitus   Mother had brain cancer and breast cancer     Exam:  Visual acuity is 20/600 RIGHT eye and 20/125 LEFT eye.  Pupils were sluggish both eyes without afferent pupillary defect.  Color vision was abnormal both eyes.      Tests ordered and interpreted today:  Visual field: constriction RIGHT eye, normal LEFT eye   Optical coherence tomography: normal retinal nerve fiber layer both eyes     Discussion of management / interpretation with another provider:   None    Assessment/Plan:     Glasses update               Attending Physician Attestation:  Complete " "documentation of historical and exam elements from today's encounter can be found in the full encounter summary report (not reduplicated in this progress note).  I personally obtained the chief complaint(s) and history of present illness.  I confirmed and edited as necessary the review of systems, past medical/surgical history, family history, social history, and examination findings as documented by others; and I examined the patient myself.  I personally reviewed the relevant tests, images, and reports as documented above.  I formulated and edited as necessary the assessment and plan and discussed the findings and management plan with the patient and family. - Hugo Amaya MD               Assessment & Plan     Nehemiah Magallon is a 55 year old male with the following diagnoses:   1. Unspecified disorder of binocular vision    2. Subjective visual disturbance         Patient was sent for consultation by Dr. Jacqueline Lynch for vision loss.      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  At some point, he developed a vitreous hemorrhage RIGHT eye. Since then, he has had multiple \"microbleeds\".  He describes multiple episodes of veils, but the records say that they were microbleeds.  He had a multifocal electroretinogram and perg at [facility] and these were both normal.       Independent historians:  Patient    Review of outside testing:  Multifocal electroretinogram      My interpretation performed today of outside testing:  Nonspecific changes in both eyes in ring 6       [facility]     Past medical history:        Family history / social history:  Mother and father Diabetes mellitus   Mother had brain cancer and breast cancer     Exam:  Visual acuity is 20/600 RIGHT eye and 20/125 LEFT eye.  Pupils were sluggish both eyes without afferent pupillary defect.  " Color vision was abnormal both eyes.      Tests ordered and interpreted today:  Visual field: constriction RIGHT eye, normal LEFT eye   Optical coherence tomography: normal retinal nerve fiber layer both eyes     Discussion of management / interpretation with another provider:   None    Assessment/Plan:   It is my impression that he has reduced vision right eye greater than left.  I do not see evidence of an optic neuropathy.  This is supported by the fact that his nerve fiber layer is normal and his pattern ERG was normal.  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  He also has some chronic looking cells in the anterior vitreous in both eyes.  I did a corneal topography and this showed relatively unremarkable results.  I did a refraction and this improved his vision to 20/25 -3 in the right eye and 20/20 -1 in the left eye.  I believe that most of his vision changes are refractive in nature.  He also has some epiretinal membranes and cystoid macular edema.  I do not see a neurologic cause of his vision loss.  Follow-up with me as needed.             Attending Physician Attestation:  Complete documentation of historical and exam elements from today's encounter can be found in the full encounter summary report (not reduplicated in this progress note).  I personally obtained the chief complaint(s) and history of present illness.  I confirmed and edited as necessary the review of systems, past medical/surgical history, family history, social history, and examination findings as documented by others; and I examined the patient myself.  I personally reviewed the relevant tests, images, and reports as documented above.  I formulated and edited as necessary the assessment and plan and discussed the findings and management plan with the patient and family. - Hugo Amaya MD            Again, thank you for allowing me to participate in the care of your patient.       Sincerely,    Hugo Amaya MD

## 2021-04-07 NOTE — PROGRESS NOTES
"     Assessment & Plan     Nehemiah Magallon is a 55 year old male with the following diagnoses:   1. Unspecified disorder of binocular vision    2. Subjective visual disturbance         Patient was sent for consultation by Dr. Jacqueline Lynch for vision loss.      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  At some point, he developed a vitreous hemorrhage RIGHT eye. Since then, he has had multiple \"microbleeds\".  He describes multiple episodes of veils, but the records say that they were microbleeds.  He had a multifocal electroretinogram and perg at [facility] and these were both normal.       Independent historians:  Patient    Review of outside testing:  Multifocal electroretinogram      My interpretation performed today of outside testing:  Nonspecific changes in both eyes in ring 6       [facility]     Past medical history:        Family history / social history:  Mother and father Diabetes mellitus   Mother had brain cancer and breast cancer     Exam:  Visual acuity is 20/600 RIGHT eye and 20/125 LEFT eye.  Pupils were sluggish both eyes without afferent pupillary defect.  Color vision was abnormal both eyes.      Tests ordered and interpreted today:  Visual field: constriction RIGHT eye, normal LEFT eye   Optical coherence tomography: normal retinal nerve fiber layer both eyes     Discussion of management / interpretation with another provider:   None    Assessment/Plan:   It is my impression that he has reduced vision right eye greater than left.  I do not see evidence of an optic neuropathy.  This is supported by the fact that his nerve fiber layer is normal and his pattern ERG was normal.  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  He also has some chronic looking cells in the anterior vitreous in both " eyes.  I did a corneal topography and this showed relatively unremarkable results.  I did a refraction and this improved his vision to 20/25 -3 in the right eye and 20/20 -1 in the left eye.  I believe that most of his vision changes are refractive in nature.  He also has some epiretinal membranes and cystoid macular edema.  I do not see a neurologic cause of his vision loss.  Follow-up with me as needed.             Attending Physician Attestation:  Complete documentation of historical and exam elements from today's encounter can be found in the full encounter summary report (not reduplicated in this progress note).  I personally obtained the chief complaint(s) and history of present illness.  I confirmed and edited as necessary the review of systems, past medical/surgical history, family history, social history, and examination findings as documented by others; and I examined the patient myself.  I personally reviewed the relevant tests, images, and reports as documented above.  I formulated and edited as necessary the assessment and plan and discussed the findings and management plan with the patient and family. - Hugo Amaya MD

## 2021-07-28 ENCOUNTER — TRANSFERRED RECORDS (OUTPATIENT)
Dept: HEALTH INFORMATION MANAGEMENT | Facility: CLINIC | Age: 56
End: 2021-07-28

## 2021-09-04 ENCOUNTER — HEALTH MAINTENANCE LETTER (OUTPATIENT)
Age: 56
End: 2021-09-04

## 2022-01-01 ENCOUNTER — OFFICE VISIT (OUTPATIENT)
Dept: ORTHOPEDICS | Facility: CLINIC | Age: 57
End: 2022-01-01
Payer: COMMERCIAL

## 2022-01-01 ENCOUNTER — TELEPHONE (OUTPATIENT)
Dept: OPHTHALMOLOGY | Facility: CLINIC | Age: 57
End: 2022-01-01

## 2022-01-01 ENCOUNTER — ANCILLARY PROCEDURE (OUTPATIENT)
Dept: GENERAL RADIOLOGY | Facility: CLINIC | Age: 57
End: 2022-01-01
Attending: ORTHOPAEDIC SURGERY
Payer: COMMERCIAL

## 2022-01-01 ENCOUNTER — TRANSFERRED RECORDS (OUTPATIENT)
Dept: HEALTH INFORMATION MANAGEMENT | Facility: CLINIC | Age: 57
End: 2022-01-01

## 2022-01-01 DIAGNOSIS — I97.89 NECROSIS OF SURGICAL WOUND (H): Primary | ICD-10-CM

## 2022-01-01 DIAGNOSIS — R23.4 ESCHAR OF FOOT: Primary | ICD-10-CM

## 2022-01-01 DIAGNOSIS — M14.679 CHARCOT ARTHROPATHY OF MIDFOOT: ICD-10-CM

## 2022-01-01 DIAGNOSIS — M14.679 CHARCOT ARTHROPATHY OF MIDFOOT: Primary | ICD-10-CM

## 2022-01-01 DIAGNOSIS — I96 NECROSIS OF SURGICAL WOUND (H): Primary | ICD-10-CM

## 2022-01-01 PROCEDURE — 99024 POSTOP FOLLOW-UP VISIT: CPT | Performed by: ORTHOPAEDIC SURGERY

## 2022-01-01 PROCEDURE — 73630 X-RAY EXAM OF FOOT: CPT | Mod: RT | Performed by: RADIOLOGY

## 2022-02-19 ENCOUNTER — HEALTH MAINTENANCE LETTER (OUTPATIENT)
Age: 57
End: 2022-02-19

## 2022-04-18 ENCOUNTER — TRANSFERRED RECORDS (OUTPATIENT)
Dept: HEALTH INFORMATION MANAGEMENT | Facility: CLINIC | Age: 57
End: 2022-04-18

## 2022-07-13 ENCOUNTER — TRANSFERRED RECORDS (OUTPATIENT)
Dept: HEALTH INFORMATION MANAGEMENT | Facility: CLINIC | Age: 57
End: 2022-07-13

## 2022-09-08 ENCOUNTER — TRANSCRIBE ORDERS (OUTPATIENT)
Dept: OTHER | Age: 57
End: 2022-09-08

## 2022-09-08 DIAGNOSIS — H47.293 OTHER OPTIC ATROPHY, BILATERAL: Primary | ICD-10-CM

## 2022-09-13 ENCOUNTER — TRANSFERRED RECORDS (OUTPATIENT)
Dept: HEALTH INFORMATION MANAGEMENT | Facility: CLINIC | Age: 57
End: 2022-09-13

## 2022-09-16 ENCOUNTER — TRANSCRIBE ORDERS (OUTPATIENT)
Dept: OTHER | Age: 57
End: 2022-09-16

## 2022-09-16 DIAGNOSIS — M79.671 RIGHT FOOT PAIN: Primary | ICD-10-CM

## 2022-09-19 ENCOUNTER — TELEPHONE (OUTPATIENT)
Dept: ORTHOPEDICS | Facility: CLINIC | Age: 57
End: 2022-09-19

## 2022-09-19 NOTE — TELEPHONE ENCOUNTER
[facility]. [facility], and wants to see Dr. Starr for his right foot. He stated that he and Dr Starr had discussed this prior to [facility] leaving [facility] for [facility]. Dr Starr does not have a schedule as of yet at the [facility].    Patient can be reached at [phone], and is asking for a detailed message if you get VM to see about his options, and maybe even speak to Dr Starr.    Thank you!

## 2022-09-19 NOTE — TELEPHONE ENCOUNTER
I spoke with Nehemiah and let him know that Dr. Starr is coming to [facility] but does not yet have a start date. I will see if there is a wait list he can be put on, but in the mean time he should just continue to call back periodically checking to see if he can be scheduled yet. I let him know he is welcome to see Dr. Quiroz if he can get a referral from the [facility] for him, but I understand the patient would like to stay with Dr. Starr as Dr. Starr did the patient's previous foot surgery. Nehemiah thanked me for the call.  Lilliam Salcedo ATC

## 2022-10-05 ENCOUNTER — TELEPHONE (OUTPATIENT)
Dept: OPHTHALMOLOGY | Facility: CLINIC | Age: 57
End: 2022-10-05

## 2022-10-05 NOTE — TELEPHONE ENCOUNTER
Waiting for a call back from [facility].     Pt was scheduled incorrectly     Janna Sebastian Communication Facilitator on 10/5/2022 at 12:40 PM

## 2022-10-05 NOTE — TELEPHONE ENCOUNTER
----- Message from Rosita Ibarra sent at 10/5/2022 12:02 PM CDT -----  Regarding: RE: Referral error?  I'm not sure - [facility] the [facility], I do not have any records. I would think that ERG and separate appointment with Quentin would work. There's no indication that this is urgent, but again I do not have records.     - Rosita     ----- Message -----  From: Janna Sebastian  Sent: 10/5/2022  12:01 PM CDT  To: Rosita Ibarra  Subject: RE: Referral error?                              Is he to return in a month to see Dr. Sal after the erg or same day?    V    ----- Message -----  From: Rosita Ibarra  Sent: 10/5/2022  11:58 AM CDT  To: Janna Sebastian  Subject: Referral error?                                  Hey,     So this one was scheduled with Dr. Delacruz by [place] [facility]. However, it looks like the referral is specifically for ERG and appointment with Dr. Saavedra. Are you able to assist in correcting this error?    Thanks,   Rosita

## 2022-10-05 NOTE — TELEPHONE ENCOUNTER
Called the [facility] in regards to the appointment that was made with the call center for Nehemiah @ [facility]    The referral sent to us by the [facility] wanted Nehemiah to see Dr. Saavedra and a ERG  We have no notes anywhere in the system and unsure how to schedule appointment . I asked the [facility] if they an clarify the orders. I am waiting for a call back     I called and [facility] for Nehemiah     His appointment with Dr. Delacruz is canceled due to it was scheduled incorrectly

## 2022-10-05 NOTE — TELEPHONE ENCOUNTER
[facility] Call Center    Phone Message    May a detailed message be left on voicemail: yes     Reason for Call: Appointment Intake    Referring Provider Name: Jacqueline Lynch NP   Diagnosis and/or Symptoms: Optic Atrophy & ERG appt.    Pt's referral states they'll like him to see  for Optic Atrophy but on protocols  doesn't see for that diagnosis. Please confirm with  if it's okay to schedule pt with her.     Also pt is to schedule and ERG appt. Due to protocol writer is to send te.    Thank You!    Action Taken: Message routed to:  [facility] eye    Travel Screening: Not Applicable

## 2022-10-12 ENCOUNTER — TRANSFERRED RECORDS (OUTPATIENT)
Dept: HEALTH INFORMATION MANAGEMENT | Facility: CLINIC | Age: 57
End: 2022-10-12

## 2022-10-16 ENCOUNTER — HEALTH MAINTENANCE LETTER (OUTPATIENT)
Age: 57
End: 2022-10-16

## 2022-10-20 NOTE — TELEPHONE ENCOUNTER
Called and LVM     Pt can make an appointment with Dr. Yost Communication Facilitator on 10/20/2022 at 2:45 PM

## 2022-10-31 NOTE — TELEPHONE ENCOUNTER
Action October 31, 2022 4:02 PM MT   Action Taken Sent a request to [facility] [phone] for records and [phone] imaging.      Action November 3, 2022 11:00 AM MT   Action Taken [facility] from [facility] received and sent to urgent scan. Images resolved to PACS by another user.      DIAGNOSIS: Right foot pain  [facility] Ref #: [id number]   APPOINTMENT DATE: 11/04/2022   NOTES STATUS DETAILS   OFFICE NOTE from referring provider [facility] Naveed Estevez MD  [facility]     OFFICE NOTE from other specialist [facility] Records   OP REPORT [facility] 08/02/2021 - LT Foot/Ankle Surgery   MEDICATION LIST Care Everywhere    LABS     XRAYS (IMAGES & REPORTS) PACS [facility]:  07/13/2022 to 03/09/2021 - RT Foot

## 2022-11-04 ENCOUNTER — TELEPHONE (OUTPATIENT)
Dept: ORTHOPEDICS | Facility: CLINIC | Age: 57
End: 2022-11-04

## 2022-11-04 ENCOUNTER — PRE VISIT (OUTPATIENT)
Dept: ORTHOPEDICS | Facility: CLINIC | Age: 57
End: 2022-11-04

## 2022-11-04 ENCOUNTER — OFFICE VISIT (OUTPATIENT)
Dept: ORTHOPEDICS | Facility: CLINIC | Age: 57
End: 2022-11-04
Payer: COMMERCIAL

## 2022-11-04 VITALS — WEIGHT: 210 LBS | HEIGHT: 70 IN | BODY MASS INDEX: 30.06 KG/M2

## 2022-11-04 DIAGNOSIS — M79.671 RIGHT FOOT PAIN: ICD-10-CM

## 2022-11-04 DIAGNOSIS — M19.079 ARTHROPATHY OF FOOT: ICD-10-CM

## 2022-11-04 DIAGNOSIS — M21.6X1 MIDFOOT COLLAPSE OF RIGHT LOWER EXTREMITY: Primary | ICD-10-CM

## 2022-11-04 PROCEDURE — 99214 OFFICE O/P EST MOD 30 MIN: CPT | Performed by: ORTHOPAEDIC SURGERY

## 2022-11-04 RX ORDER — ACETAMINOPHEN 500 MG
500-1000 TABLET ORAL EVERY 8 HOURS PRN
Status: ON HOLD | COMMUNITY
Start: 2022-08-09 | End: 2022-11-19

## 2022-11-04 RX ORDER — GABAPENTIN 600 MG/1
1200 TABLET ORAL 3 TIMES DAILY
Status: ON HOLD | COMMUNITY
Start: 2022-02-17 | End: 2023-01-01

## 2022-11-04 RX ORDER — NAPROXEN 500 MG/1
500 TABLET ORAL EVERY EVENING
Status: ON HOLD | COMMUNITY
Start: 2022-02-17 | End: 2022-11-21

## 2022-11-04 RX ORDER — AMMONIUM LACTATE 12 G/100G
LOTION TOPICAL EVERY EVENING
Status: ON HOLD | COMMUNITY
Start: 2022-08-09 | End: 2023-01-01

## 2022-11-04 RX ORDER — METFORMIN HCL 500 MG
1000 TABLET, EXTENDED RELEASE 24 HR ORAL 2 TIMES DAILY WITH MEALS
COMMUNITY
Start: 2022-02-01

## 2022-11-04 RX ORDER — PRAVASTATIN SODIUM 40 MG
40 TABLET ORAL EVERY EVENING
COMMUNITY
Start: 2021-11-02

## 2022-11-04 RX ORDER — ZOLPIDEM TARTRATE 12.5 MG/1
12.5 TABLET, FILM COATED, EXTENDED RELEASE ORAL AT BEDTIME
Status: ON HOLD | COMMUNITY
Start: 2022-10-13 | End: 2022-11-21

## 2022-11-04 RX ORDER — LEVOTHYROXINE SODIUM 125 UG/1
125 TABLET ORAL DAILY
Status: ON HOLD | COMMUNITY
Start: 2022-10-12 | End: 2023-01-01

## 2022-11-04 RX ORDER — SUMATRIPTAN 25 MG/1
25 TABLET, FILM COATED ORAL
COMMUNITY
Start: 2022-10-13 | End: 2023-01-01

## 2022-11-04 RX ORDER — TOPIRAMATE 100 MG/1
150 TABLET, FILM COATED ORAL EVERY MORNING
COMMUNITY
Start: 2021-12-01

## 2022-11-04 RX ORDER — LISINOPRIL 40 MG/1
40 TABLET ORAL DAILY
COMMUNITY
Start: 2022-04-28 | End: 2023-01-01

## 2022-11-04 RX ORDER — CETIRIZINE HYDROCHLORIDE 10 MG/1
10 TABLET ORAL EVERY MORNING
COMMUNITY
Start: 2022-08-15

## 2022-11-04 RX ORDER — FLUTICASONE PROPIONATE 50 MCG
2 SPRAY, SUSPENSION (ML) NASAL 2 TIMES DAILY
COMMUNITY
Start: 2022-05-02

## 2022-11-04 RX ORDER — SODIUM FLUORIDE 5 MG/G
GEL, DENTIFRICE DENTAL
Status: ON HOLD | COMMUNITY
Start: 2022-08-15 | End: 2023-01-01

## 2022-11-04 RX ORDER — FUROSEMIDE 40 MG
40 TABLET ORAL DAILY PRN
COMMUNITY
Start: 2022-05-02 | End: 2023-01-01

## 2022-11-04 NOTE — TELEPHONE ENCOUNTER
Called patient to schedule surgery with Dr. Starr    Date of Surgery: 11/17/2022    Location of surgery: [facility]    Pre-Op H&P: PAC-11/10/22    Pre/Post Imaging:  No    Discussed COVID-19 Testing: Yes    Post-Op Appt Date:12/2/22    Surgery Packet Mailed: Given in clinic      Additional comments: Called and spoke with pt to schedule surgery. Offered 11/23/22 (on waiting list) and pt agreed. Informed pt writer would give him a call back once wait list approved to lock in surgery date and schedule pre & post op. Pt understood and agreed.    Pt has questions regarding the room capacity at [facility]. Wants to know if the rooms are private or shared due to Covid concerns. Informed pt writer would have a nurse reach out to help answer his questions.     Meenakshi Ibanez on 11/4/2022 at 1:41 PM

## 2022-11-04 NOTE — PROGRESS NOTES
History:  Pleasant 58yo male patient well [facility], with R midfoot Charcot since approx  and L pes planovalgus with hallux valgus interphalangeus and associated ulcer s/p reconstrucion 2021 with good corection of deformity and healing of preop ulcer. Patient endorses he's happy with the L side, though he does have neuropathic pain in the left foot unchanged from preop as expected. Uses Lidoderm patches, diclofenac gel, gabapentin 3600/day - would like more diclofenac gel  R foot is his main symptomatic issue at this time - no pain/sensation/neuropathic pain on this side. Had a plantar midfoot ulcer on the R foot sole which healed in . No recurrence since then. Still with bony plantar prominence.  Wears AFO both sides. Doesn't abulate much outside the home. Uses a wheelchair usually when outside the home. Can ambulate OK within his home independently. Much improved diabetic control recently - lost weight, Hg A1c 5.4.    Exam:  R foot:  Skin intact  2/4 DP, PT pulses  Cap refill 2-3 seconds each toe  Nontender throughout R foot  Fixed rocker bottom & forefoot abduction deformity, with large bony appearing prominence plantar midfoot - overlying skin intact and dry, prior ulcer well healed, no findings suspicious for infection  Sagital plane motion 20 DF-20 PF, though most of the DF appears to be likely through midfoot rocker deformity not ankle  Approx 50% diminished ST motion  4/5 DF/PF, 2/5 PTT, 2/5 peroneals  Good hallux ROM, stiff 2nd toe (though straight) with the appearance suggesting prior hammertoe procedure    L foot:  Skin intact  Healed surgical incisions and prior hallux ulceration  Prior fusion sites all nontender, solid appearing  2/4 DP, PT pulses    Imagin2022 WB R foot xrays/report personally reviewed independently - rocker deformity, midfoot charcot, no findings suspicious for osteomyelitis  Approx 35 degree apex plantar Meary's angle  Prominent plantar  cuboid  Lateral subluxation of 2nd-5th TMT joints    Impression:  R foot midfoot charcot with fixed rocker bottom / abduction deformity, bony plantar midfoot prominence, and healed associated prior ulcer    Plan:  I discussed the diagnosis, prognosis, and tx options with the patient, including offering both operative and nonoperative options. Risks of continued nonop treatment also discussed. Patient politely declined further nonop tx on the right foot problems which he has been undergoing since 2016, and verbalized wish to pursue surgery. Given his prior ulceration at the bony prominence due to his fixed deformity, the risk of recurrent ulceration if left untreated, and the risk of subsequent infection/amputation, this is a reasonable plan. Weighed against this of course is the risk of infection, wound slough, and amputation with surgery as well. I do note that he has healed surgical incisions from his other side quite well. The nature of a right Charcot midfoot reconstruction with possible DOM, the postoperative plan, the risks/benefits/alternatives to surgery, and realistic expectations for outcome were all discussed in layman's terms. I discussed that I expect the surgery to involve an osteotomy through his midfoot with a biplanar medial/plantar closing wedge along with extended arthrodesis from the metatarsal level (likely at least the first) to the talus. I discussed that I expect that with the ensuing declination of the first ray his apparent ankle dorsiflexion will diminish and he may need a percutaneous or open Achilles lengthening as part of the surgery. The risks which were discussed included but were not limited to infection, neurovascular injury, wound slough, tendon/muscle injury, nonunion, symptomatic hardware, reoperation, DVT/PE, amputation, and even death. The expectation that the foot will likely be somewhat improved, but never normal in appearance or function, was discussed. No guarantees with  respect to outcome were expressed or implied. All questions he had at the time were answered appropriately. Mr. Magallon verbalized understanding of this discussion, which was all held in layman's terms, and his wish to proceed with the surgery as discussed above. We will plan for a postop inpatient stay for approximtely 2-3 days which may change given any clinical issues that arise. Given his medical complexity we will seek also preop anesthesia evaluation as well.    Addendum 11/05/2022  Diclofenac gel ordered  Pharmacy [facility] at patient request

## 2022-11-04 NOTE — LETTER
11/4/2022         RE: Nehemiah Magallon  [address]  [place] [address]        Dear Colleague,    Thank you for referring your patient, Nehemiah Magallon, to the [facility] ORTHOPEDIC CLINIC [place]. Please see a copy of my visit note below.    History:  Pleasant 56yo male patient [facility], with R midfoot Charcot since approx 2016 and L pes planovalgus with hallux valgus interphalangeus and associated ulcer s/p reconstrucion August 2021 with good corection of deformity and healing of preop ulcer. Patient endorses he's happy with the L side, though he does have neuropathic pain in the left foot unchanged from preop as expected. Uses Lidoderm patches, diclofenac gel, gabapentin 3600/day - would like more diclofenac gel  R foot is his main symptomatic issue at this time - no pain/sensation/neuropathic pain on this side. Had a plantar midfoot ulcer on the R foot sole which healed in June. No recurrence since then. Still with bony plantar prominence.  Wears AFO both sides. Doesn't abulate much outside the home. Uses a wheelchair usually when outside the home. Can ambulate OK within his home independently. Much improved diabetic control recently - lost weight, Hg A1c 5.4.    Exam:  R foot:  Skin intact  2/4 DP, PT pulses  Cap refill 2-3 seconds each toe  Nontender throughout R foot  Fixed rocker bottom & forefoot abduction deformity, with large bony appearing prominence plantar midfoot - overlying skin intact and dry, prior ulcer well healed, no findings suspicious for infection  Sagital plane motion 20 DF-20 PF, though most of the DF appears to be likely through midfoot rocker deformity not ankle  Approx 50% diminished ST motion  4/5 DF/PF, 2/5 PTT, 2/5 peroneals  Good hallux ROM, stiff 2nd toe (though straight) with the appearance suggesting prior hammertoe procedure    L foot:  Skin intact  Healed surgical incisions and prior hallux ulceration  Prior fusion sites all  nontender, solid appearing  2/4 DP, PT pulses    Imagin2022 WB R foot xrays/report personally reviewed independently - rocker deformity, midfoot charcot, no findings suspicious for osteomyelitis  Approx 35 degree apex plantar Meary's angle  Prominent plantar cuboid  Lateral subluxation of 2nd-5th TMT joints    Impression:  R foot midfoot charcot with fixed rocker bottom / abduction deformity, bony plantar midfoot prominence, and healed associated prior ulcer    Plan:  I discussed the diagnosis, prognosis, and tx options with the patient, including offering both operative and nonoperative options. Risks of continued nonop treatment also discussed. Patient politely declined further nonop tx on the right foot problems which he has been undergoing since 2016, and verbalized wish to pursue surgery. Given his prior ulceration at the bony prominence due to his fixed deformity, the risk of recurrent ulceration if left untreated, and the risk of subsequent infection/amputation, this is a reasonable plan. Weighed against this of course is the risk of infection, wound slough, and amputation with surgery as well. I do note that he has healed surgical incisions from his other side quite well. The nature of a right Charcot midfoot reconstruction with possible DOM, the postoperative plan, the risks/benefits/alternatives to surgery, and realistic expectations for outcome were all discussed in layman's terms. I discussed that I expect the surgery to involve an osteotomy through his midfoot with a biplanar medial/plantar closing wedge along with extended arthrodesis from the metatarsal level (likely at least the first) to the talus. I discussed that I expect that with the ensuing declination of the first ray his apparent ankle dorsiflexion will diminish and he may need a percutaneous or open Achilles lengthening as part of the surgery. The risks which were discussed included but were not limited to infection, neurovascular  injury, wound slough, tendon/muscle injury, nonunion, symptomatic hardware, reoperation, DVT/PE, amputation, and even death. The expectation that the foot will likely be somewhat improved, but never normal in appearance or function, was discussed. No guarantees with respect to outcome were expressed or implied. All questions he had at the time were answered appropriately. Mr. Magallon verbalized understanding of this discussion, which was all held in layman's terms, and his wish to proceed with the surgery as discussed above. We will plan for a postop inpatient stay for approximtely 2-3 days which may change given any clinical issues that arise. Given his medical complexity we will seek also preop anesthesia evaluation as well.    Addendum 11/05/2022  Diclofenac gel ordered  Pharmacy [facility] at patient request      Again, thank you for allowing me to participate in the care of your patient.        Sincerely,        Bryon Starr MD

## 2022-11-04 NOTE — NURSING NOTE
"Reason For Visit:   Chief Complaint   Patient presents with     Consult     Right charcoat foot.  L subtalar/talonavicular/1stMTP arthrodesis and Left achilles reconstruction       Ht 1.778 m (5' 10\")   Wt 95.3 kg (210 lb)   BMI 30.13 kg/m           Lilliam Salcedo ATC    "

## 2022-11-04 NOTE — NURSING NOTE
Teaching Flowsheet   Relevant Diagnosis: Right foot charcot reconstruction  Teaching Topic: Pre surgery teaching.     Person(s) involved in teaching:   Patient     Motivation Level:  Asks Questions: Yes  Eager to Learn: Yes  Cooperative: Yes  Receptive (willing/able to accept information): Yes  Any cultural factors/Bahai beliefs that may influence understanding or compliance? No       Patient demonstrates understanding of the following:  Reason for the appointment, diagnosis and treatment plan: Yes  Knowledge of proper use of medications and conditions for which they are ordered (with special attention to potential side effects or drug interactions): Yes  Which situations necessitate calling provider and whom to contact: Yes       Teaching Concerns Addressed: RN discussed all aspects of pre surgery with patient. Meenakshi will call patient with surgery date, PAC appt and Covid 19.   [facility]. Patient is use clear cab for transportation. 4hours surgery.       Proper use and care of meds (medical equip, care aids, etc.): Yes  Nutritional needs and diet plan: Yes  Pain management techniques: Yes  Wound Care: Yes  How and/when to access community resources: Yes     Instructional Materials Used/Given: Pre op packet and antibacterial soap.     Time spent with patient: 15 minutes.

## 2022-11-08 NOTE — TELEPHONE ENCOUNTER
Called and spoke with pt to let him know 11/23 was denied, but we can offer 11/17/22. Pt agreed and scheduled PAC, Covid PCR and 2 wk POP w/Dr. Starr.    Pt would like to speak with someone regarding the recovery period and rather or not he will have a private or shared room. Pt will need to have his C-pap machine and understands this may help spread Covid if he has a shared room. So pt would like a private room to be allowed to use C-pap, but wants to speak with someone about this    Meenakshi Ibanez on 11/8/2022 at 1:29 PM

## 2022-11-08 NOTE — TELEPHONE ENCOUNTER
FUTURE VISIT INFORMATION      SURGERY INFORMATION:    Date: 11/17/22    Location:  or    Surgeon:  Bryon Starr MD    Anesthesia Type:  Choice    Procedure: Right midfoot/talonavicular osteotomy and reduction of deformity Right midfoot/talonavicular arthrodesis LENGTHENING, TENDON, ACHILLES    Consult: ov 11/4    RECORDS REQUESTED FROM:       Primary Care Provider: Codie Grimm MD- [facility]

## 2022-11-10 ENCOUNTER — PRE VISIT (OUTPATIENT)
Dept: SURGERY | Facility: CLINIC | Age: 57
End: 2022-11-10

## 2022-11-10 ENCOUNTER — VIRTUAL VISIT (OUTPATIENT)
Dept: SURGERY | Facility: CLINIC | Age: 57
End: 2022-11-10
Payer: COMMERCIAL

## 2022-11-10 ENCOUNTER — ANESTHESIA EVENT (OUTPATIENT)
Dept: SURGERY | Facility: CLINIC | Age: 57
DRG: 983 | End: 2022-11-10
Payer: COMMERCIAL

## 2022-11-10 ENCOUNTER — TRANSFERRED RECORDS (OUTPATIENT)
Dept: HEALTH INFORMATION MANAGEMENT | Facility: CLINIC | Age: 57
End: 2022-11-10

## 2022-11-10 DIAGNOSIS — Z01.818 PREOP EXAMINATION: Primary | ICD-10-CM

## 2022-11-10 DIAGNOSIS — M21.6X1 MIDFOOT COLLAPSE OF RIGHT LOWER EXTREMITY: ICD-10-CM

## 2022-11-10 PROCEDURE — 99205 OFFICE O/P NEW HI 60 MIN: CPT | Mod: 95 | Performed by: CLINICAL NURSE SPECIALIST

## 2022-11-10 RX ORDER — VITAMIN B COMPLEX
50 TABLET ORAL EVERY MORNING
COMMUNITY

## 2022-11-10 ASSESSMENT — ENCOUNTER SYMPTOMS
DYSRHYTHMIAS: 0
SEIZURES: 0

## 2022-11-10 ASSESSMENT — LIFESTYLE VARIABLES: TOBACCO_USE: 0

## 2022-11-10 ASSESSMENT — PAIN SCALES - GENERAL: PAINLEVEL: MODERATE PAIN (5)

## 2022-11-10 NOTE — PATIENT INSTRUCTIONS
Preparing for Your Surgery      Name:  Nehemiah Magallon   MRN:  [id number]   :  1965   Today's Date:  11/10/2022       Arriving for surgery:  Surgery date:  22  Arrival time:  10:40 am     Surgeries and procedures: Adult patients can have 2 visitors all through the surgery process.     Visiting hours: 8 a.m. to 8:30 p.m.     Hospital: Adult patients and children under age 18 can have 4 visitor at a time     No visitors under the age of 5 are allowed for hospital patients.  Double occupancy rooms: Patients can have only two visitors at a time.     Patients with disabilities: Can have a support person with them (family member, service provider     Or someone well informed about their needs) plus the allowed number of visitors     Patients confirmed or suspected to have symptoms of COVID 19 or flu:     No visitors allowed for adult patients.   Children (under age 18) can have 1 named visitor.     People who are sick or showing symptoms of COVID 19 or flu:    Are not allowed to visit patients--we can only make exceptions in special situations.       Please follow these guidelines for your visit:   Arrive wearing a mask over your mouth and nose; we will give you a medical mask to wear    If you arrive wearing a cloth mask.   Keep it on during your entire visit, even when in patient's room.   If you don't wear a mask we'll ask you to leave.     Clean your hands with alcohol hand . Do this when you arrive at and leave the building and patient room,    And again after you touch your mask or anything in the room.     You can t visit if you have a fever, cough, shortness of breath, muscle aches, headaches, sore throat    Or diarrhea      Stay 6 feet away from others during your visit and between visits     Go directly to and from the room you are visiting.     Stay in the patient s room during your visit. Limit going to other places in the hospital as much as possible     Leave bags and jackets at home or  in the car.     For everyone s health, please don t come and go during your visit. That includes for smoking   during your visit.     Please come to:     [facility] [address]  [address] [place]  [address]    - parking is available in front of [facility] from 5:15AM to 8:00PM. If you prefer, park your car in the Green Lot.    -Proceed to the 3rd floor, check in at the Adult Surgery Waiting Lounge. [phone]    If an escort is needed stop at the Information Desk in the lobby. Inform the information person that you are here for surgery. An escort to the Adult Surgery Waiting Lounge will be provided.    What can I eat or drink?  -  You may eat and drink normally up to 8 hours prior to arrival time. (Until 02:40 am the morning of your surgery)  -  You may have clear liquids until 2 hours prior to arrival time. (Until 08:40 am)    Examples of clear liquids:  Water  Clear broth  Juices (apple, white grape, white cranberry  and cider) without pulp  Noncarbonated, powder based beverages  (lemonade and José Miguel-Aid)  Sodas (Sprite, 7-Up, ginger ale and seltzer)  Coffee or tea (without milk or cream)  Gatorade    -  No Alcohol for at least 24 hours before surgery.     Which medicines can I take?    Hold Aspirin for 7 days before surgery.   Hold Multivitamins for 7 days before surgery.  Hold Supplements for 7 days before surgery.  Hold Ibuprofen (Advil, Motrin) for 1 day before surgery--unless otherwise directed by surgeon.  Hold Naproxen (Aleve) for 4 days before surgery.    Hold all NSAIDS for 7 days before spine surgery. (Ibuprofen, Naproxen, Celebrex, Indocin, Diclofenac.)    PLEASE HOLD EMPAGLIFLOZIN (JARDIANCE) THREE DAYS PRIOR TO SURGERY    -  DO NOT take these medications the day of surgery:  Topiramate (Topamax), Linaclotide (Linzess), Lisinopril (Zestril),   Metformin (Glucophage), Sumatriptan (Imitrex), Lasix (Furosemide)    -   PLEASE TAKE these medications the day of surgery:  Tylenol if needed, Cetirizine (Zyrtec), Fluticasone (Flonase),   Gabapentin (Neurontin), Levothyroxine (Synthroid), Omeprazole (prilosec),      How do I prepare myself?  - Please take 2 showers before surgery using Scrubcare or Hibiclens soap.    Use this soap only from the neck to your toes.     Leave the soap on your skin for one minute--then rinse thoroughly.      You may use your own shampoo and conditioner. No other hair products.   - Please remove all jewelry and body piercings.  - No lotions, deodorants or fragrance.  - No makeup or fingernail polish.   - Bring your ID and insurance card.    -If you have a Deep Brain Stimulator, Spinal Cord Stimulator, or any Neuro Stimulator device---you must bring the remote control to the hospital.      ALL PATIENTS GOING HOME THE SAME DAY OF SURGERY ARE REQUIRED TO HAVE A RESPONSIBLE ADULT TO DRIVE AND BE IN ATTENDANCE WITH THEM FOR 24 HOURS FOLLOWING SURGERY.      Questions or Concerns:    - For any questions regarding the day of surgery or your hospital stay, please contact the Pre Admission Nursing Office at [phone].       - If you have health changes between today and your surgery, please call your surgeon.       - For questions after surgery, please call your surgeons office.

## 2022-11-10 NOTE — PROGRESS NOTES
Nehemiah is a 57 year old who is being evaluated via a billable video visit.      How would you like to obtain your AVS? MyChart  If the video visit is dropped, the invitation should be resent by: Text to cell phone: [phone]    HPI       Review of Systems         Objective    Vitals - Patient Reported  Pain Score: Moderate Pain (5)  Pain Loc:  (Left Leg)        Physical Exam

## 2022-11-10 NOTE — PHARMACY - PREOPERATIVE ASSESSMENT CENTER
"Preoperative Assessment Center Medication History Note    Medication history completed on November 10, 2022 by this writer. See Epic admission navigator for prior to admission medications. Operating room staff will still need to confirm medications and last dose information on day of surgery.     Medication history interview sources  Patient interview: Yes  Care Everywhere records: Yes  Surescripts pharmacy refill records: No    Changes made to PTA medication list  Added:   -vitamin D3  -calcium-vit D  -omeprazole  -semaglutide  -insulin U-500  Deleted:   -gabapentin 300 mg TID (outdated entry)  -levothyroxine 200 mcg (outdated entry)  -nabumetone (outdated entry)  -oxycodone-APAP (outdated entry)  Changed:   -furosemide 40 mg daily > 40 mg daily prn hand swelling, weight > 215 lbs  -diclofenac gel - marked as Not Taking - Pt ran out of this several months ago, plans to resume after surgery.    Additional medication history information (including reliability of information, actions taken by pharmacist):  -Pt manages his own meds and is a reliable historian   -pt often has days (~3 days/week) where he doesn't require insulin; he reports very good A1c control (reports 5.6-6%, no labs visible via Care Everywhere), and for this reason his [facility] providers permit him to manage his blood glucose levels with his own \"personal sliding scale.\" He estimates the range of typical doses are 5-10 units. He wears a Dexcom G6 continuous glucose monitoring device, and relies on this for his diabetes management.  -Allergies reviewed, no changes    -- No recent (within 30 days) course of antibiotics  -- No recent (within 30 days) course of systemic steroids  -- Reports not being on blood thinning medications    -- Declines being on any other prescription or over-the-counter medications    Prior to Admission medications    Medication Sig Last Dose Taking? Auth Provider Long Term End Date   acetaminophen (TYLENOL) 500 MG tablet Take " 500-1,000 mg by mouth every 8 hours as needed Taking Yes Reported, Patient     ammonium lactate (LAC-HYDRIN) 12 % external lotion APPLY THIN LAYER TOPICALLY TWICE A DAY AS NEEDED FOR DRY SKIN **EXTERNAL USE ONLY** Taking Yes Reported, Patient     bacitracin-neomycin-polymyxin (NEOSPORIN) 400-5-5000 external ointment Apply topically daily as needed (open sores on legs) Taking Yes Reported, Patient     Calcium-Vitamin D 500-3.125 MG-MCG TABS Take 2 tablets by mouth 2 times daily Taking Yes Unknown, Entered By History     cetirizine (ZYRTEC) 10 MG tablet Take 10 mg by mouth daily Taking Yes Reported, Patient     empagliflozin (JARDIANCE) 25 MG TABS tablet Take 0.5 tablets by mouth daily Taking Yes Reported, Patient     fluticasone (FLONASE) 50 MCG/ACT nasal spray SPRAY 2 SPRAYS IN EACH NOSTRIL TWICE A DAY FOR RELIEF OF ALLERGIES AND CONGESTION -USE REGULARLY FOR BEST RESULTS Taking Yes Reported, Patient     furosemide (LASIX) 40 MG tablet Take 40 mg by mouth daily as needed (hand swelling, weight >215 lbs) Taking Yes Reported, Patient Yes    gabapentin (NEURONTIN) 600 MG tablet Take 1,200 mg by mouth 3 times daily Taking Yes Reported, Patient Yes    insulin reg HIGH CONC (HUMULIN R U-500 HIGH CONC) 500 Units/mL injection Inject 5-10 Units Subcutaneous 3 times daily as needed (with meals) Personal sliding scale Taking Yes Unknown, Entered By History Yes    levothyroxine (SYNTHROID/LEVOTHROID) 125 MCG tablet Take 125 mcg by mouth daily Taking Yes Reported, Patient Yes    lidocaine (LIDODERM) 5 % patch Apply up to 3 patches to painful area at once for up to 12 h within a 24 h period.  Remove after 12 hours.  Patient taking differently: Apply up to 3 patches to painful area at once for up to 12 h within a 24 h period.  Remove after 12 hours. L foot. Taking Yes Cdoie Grimm MD     linaclotide (LINZESS) 145 MCG capsule Take 1 capsule by mouth daily Taking Yes Reported, Patient     Liniments (VAPORIZING CREAM) 4.7-1.2-2.6 %  CREA APPLY A THIN LAYER TOPICALLY FOUR TIMES A DAY AS NEEDED FOR PAIN Taking Yes Reported, Patient     lisinopril (ZESTRIL) 40 MG tablet Take 40 mg by mouth daily Taking Yes Reported, Patient Yes    metFORMIN (GLUCOPHAGE XR) 500 MG 24 hr tablet Take 500 mg by mouth 2 times daily (with meals) Taking Yes Reported, Patient Yes    naproxen (NAPROSYN) 500 MG tablet Take 500 mg by mouth every evening Taking Yes Reported, Patient     omeprazole (PRILOSEC) 20 MG DR capsule Take 20 mg by mouth daily Taking Yes Unknown, Entered By History     pravastatin (PRAVACHOL) 40 MG tablet Take 40 mg by mouth every evening Taking Yes Reported, Patient Yes    Semaglutide (OZEMPIC, 1 MG/DOSE, SC) Inject 1 mg Subcutaneous once a week Sundays Taking Yes Unknown, Entered By History     sodium fluoride dental gel (PREVIDENT) 1.1 % GEL topical gel BRUSH SMALL AMOUNT MOUTH EVERY MORNING AND AT BEDTIME ON TOOTHBRUSH, BRUSH FOR 2 MINUTES. Taking Yes Reported, Patient     SUMAtriptan (IMITREX) 25 MG tablet Take 25 mg by mouth at onset of headache Taking Yes Reported, Patient     topiramate (TOPAMAX) 100 MG tablet Take 100 mg by mouth 2 times daily Taking Yes Reported, Patient Yes    Vitamin D3 (CHOLECALCIFEROL) 25 mcg (1000 units) tablet Take 75 mcg by mouth daily Taking Yes Unknown, Entered By History     zolpidem ER (AMBIEN CR) 12.5 MG CR tablet Take 12.5 mg by mouth At Bedtime Taking Yes Reported, Patient     diclofenac (VOLTAREN) 1 % topical gel Apply 4 g topically 4 times daily for 60 days  Patient not taking: Reported on 11/10/2022 Not Taking  Bryon Starr MD  1/4/23        Medication history completed by:   Jb Mckeon, PharmD  PAC Pharmacist  [phone]

## 2022-11-10 NOTE — OR NURSING
Legally blind (due to retinopathy)  MARGARITO - will bring cpap  Dexcom in abdomen  Bilateral hearing aids and prescription glasses.    Pt has balance issues due to right foot issues.  Will come to hospital using manual wc.  Normally uses a four prong cane at home.

## 2022-11-10 NOTE — TELEPHONE ENCOUNTER
Records  November 10, 2022 9:31 AM  [id number]   Facility  [facility]   Outcome received : Sent to scan and copy emailed to provider:  7/13/22 internal med note   2022 labs     Requested 10:46AM - received   Sent a fax for cardiac testing :   7/28/21 EKG  3/7/19 ECHO

## 2022-11-15 ENCOUNTER — LAB (OUTPATIENT)
Dept: LAB | Facility: CLINIC | Age: 57
End: 2022-11-15
Payer: COMMERCIAL

## 2022-11-15 DIAGNOSIS — M14.679: ICD-10-CM

## 2022-11-15 LAB — SARS-COV-2 RNA RESP QL NAA+PROBE: NEGATIVE

## 2022-11-15 PROCEDURE — 99000 SPECIMEN HANDLING OFFICE-LAB: CPT | Performed by: PATHOLOGY

## 2022-11-15 PROCEDURE — U0003 INFECTIOUS AGENT DETECTION BY NUCLEIC ACID (DNA OR RNA); SEVERE ACUTE RESPIRATORY SYNDROME CORONAVIRUS 2 (SARS-COV-2) (CORONAVIRUS DISEASE [COVID-19]), AMPLIFIED PROBE TECHNIQUE, MAKING USE OF HIGH THROUGHPUT TECHNOLOGIES AS DESCRIBED BY CMS-2020-01-R: HCPCS | Mod: 90 | Performed by: PATHOLOGY

## 2022-11-15 PROCEDURE — U0005 INFEC AGEN DETEC AMPLI PROBE: HCPCS | Mod: 90 | Performed by: PATHOLOGY

## 2022-11-16 DIAGNOSIS — H35.50 RETINAL DYSTROPHY: Primary | ICD-10-CM

## 2022-11-16 RX ORDER — CEFAZOLIN SODIUM 2 G/50ML
2 SOLUTION INTRAVENOUS
Status: CANCELLED | OUTPATIENT
Start: 2022-11-16

## 2022-11-16 RX ORDER — CEFAZOLIN SODIUM 2 G/50ML
2 SOLUTION INTRAVENOUS SEE ADMIN INSTRUCTIONS
Status: CANCELLED | OUTPATIENT
Start: 2022-11-16

## 2022-11-16 ASSESSMENT — ENCOUNTER SYMPTOMS
SEIZURES: 0
DYSRHYTHMIAS: 0

## 2022-11-16 ASSESSMENT — LIFESTYLE VARIABLES: TOBACCO_USE: 0

## 2022-11-16 NOTE — ANESTHESIA PREPROCEDURE EVALUATION
Anesthesia Pre-Procedure Evaluation    Patient: Nehemiah Magallon   MRN: [id number] : 1965        Procedure : Procedure(s):  Right midfoot/talonavicular osteotomy and reduction of deformity Right midfoot/talonavicular arthrodesis  LENGTHENING, TENDON, ACHILLES          Past Medical History:   Diagnosis Date     Chronic pain syndrome 10/24/2014     Diabetes mellitus, type 2 (H)      Diabetic Charcot foot (H)      Diabetic retinopathy associated with diabetes mellitus due to underlying condition (H)      Diverticulitis of colon      Gastroesophageal reflux disease      Hepatitis C 2017    treated     History of skin cancer      Hypertension      Hypothyroidism      Legally blind      Midfoot collapse of right lower extremity      Migraine      NAFLD (nonalcoholic fatty liver disease)      Nephrolithiasis      Neuropathy      MARGARITO (obstructive sleep apnea)      Rib pain 10/24/2014     S/P colectomy 10/14/2014     Thyroid cancer (H) 10/14/2014      Past Surgical History:   Procedure Laterality Date     CHOLECYSTECTOMY  1986     COLECTOMY      @ 6 years ago, sigmoid     COLONOSCOPY  2016     FOOT SURGERY Left       Allergies   Allergen Reactions     Atorvastatin      Other reaction(s): Hyperglycemia     Celebrex [Celecoxib] Other (See Comments)     Dehydration per [facility] records      Social History     Tobacco Use     Smoking status: Never     Smokeless tobacco: Never   Substance Use Topics     Alcohol use: Not Currently     Comment: rarely      Wt Readings from Last 1 Encounters:   22 95.3 kg (210 lb)        Anesthesia Evaluation   Pt has had prior anesthetic. Type: General and MAC.    No history of anesthetic complications       ROS/MED HX  ENT/Pulmonary:     (+) sleep apnea, uses CPAP,  (-) tobacco use and recent URI   Neurologic:     (+) peripheral neuropathy, migraines,  (-) no seizures and no CVA   Cardiovascular:     (+) Dyslipidemia hypertension-range: Reported 110/70/ ----Previous cardiac testing   Echo:  Date: 2019 Results:  Normal left ventricular size and function. Visual EF 55-60%, biplane EF 60%.  Moderate concentric LVH  LV diastolic function is indeterminate   No significant valvular abnormalities  Normal right ventricular size and systolic function   IVC size is normal but difficult to visualize respirophasic variation.   RA pressure probably \R\5 mmHg  The estimated systolic pulmonary artery pressure is 6.2 mmHg above RAP.   No pericardial effusion.  Stress Test: Date: Results:    ECG Reviewed: Date: 7/28/21 Results:  NSR  Cath: Date: Results:   (-) taking anticoagulants/antiplatelets, INFANTE and arrhythmias   METS/Exercise Tolerance: 3 - Able to walk 1-2 blocks without stopping Comment: Very limited mobility due to Charcot foot. Can slowly walk up a flight of stairs with cane. Denies any cardiopulmonary symptoms.   Hematologic:    (-) history of blood clots and history of blood transfusion   Musculoskeletal: Comment: Right foot deformity  Surgery on both feet  AFOs  Uses Jamaican crutch or wheelchair      GI/Hepatic: Comment: History of diverticulitis s/p sigmoid colectomy 6 years ago  Liver fibrosis-NAFLD/Hep C-treated. LFTs 7/2022 wnl. INR today wnl.    (+) GERD, Asymptomatic on medication, hepatitis resolved hepatitis type C, liver disease,     Renal/Genitourinary: Comment: Hx nephrolithiasis    (+) Nephrolithiasis ,     Endo:     (+) type II DM, Last HgA1c: 5.6, Using insulin, - not using insulin pump. Normal glucose range: Dexcom, Diabetic complications: neuropathy retinopathy. thyroid problem, hypothyroidism,     Psychiatric/Substance Use:  - neg psychiatric ROS     Infectious Disease:  - neg infectious disease ROS     Malignancy:   (+) Malignancy, History of Skin and Other.Skin CA Remission status post Surgery.  Other CA papillary thyroid cancer Remission status post Surgery.    Other:      (+) , H/O Chronic Pain,other significant disability Disability list: Legally blind. Very limited mobility.          Physical Exam    Airway        Mallampati: III   TM distance: > 3 FB   Neck ROM: full   Mouth opening: > 3 cm    Respiratory Devices and Support         Dental  no notable dental history         Cardiovascular          Rhythm and rate: regular and normal     Pulmonary           breath sounds clear to auscultation           OUTSIDE LABS:  CBC: No results found for: WBC, HGB, HCT, PLT  BMP: No results found for: NA, POTASSIUM, CHLORIDE, CO2, BUN, CR, GLC  COAGS: No results found for: PTT, INR, FIBR  POC: No results found for: BGM, HCG, HCGS  HEPATIC: No results found for: ALBUMIN, PROTTOTAL, ALT, AST, GGT, ALKPHOS, BILITOTAL, BILIDIRECT, JAYE  OTHER: No results found for: PH, LACT, A1C, ALICE, PHOS, MAG, LIPASE, AMYLASE, TSH, T4, T3, CRP, SED    Anesthesia Plan    ASA Status:  3   NPO Status:  NPO Appropriate    Anesthesia Type: General.     - Airway: ETT   Induction: Intravenous.   Maintenance: Balanced.   Techniques and Equipment:     - Lines/Monitors: 2nd IV     Consents    Anesthesia Plan(s) and associated risks, benefits, and realistic alternatives discussed. Questions answered and patient/representative(s) expressed understanding.    - Discussed:     - Discussed with:  Patient         Postoperative Care    Pain management: IV analgesics, Oral pain medications, Multi-modal analgesia.   PONV prophylaxis: Ondansetron (or other 5HT-3), Background Propofol Infusion     Comments:    Other Comments: Patient decline peripheral nerve blocks - he has no sensation of the right foot at baseline.    Discussed risks of general anesthesia, including aspiration pneumonia, sore throat/hoarse voice, abrasions/damage to lips/tongue/teeth, nausea, rare complications (including medication reactions, cardiac, pulmonary, hypoxia/low oxygen, recall). Ensured understanding, invited questions and all questions were answered. Patient wishes to proceed.       H&P reviewed: Unable to attach H&P to encounter due to EHR limitations. H&P  Update: appropriate H&P reviewed, patient examined. No interval changes since H&P (within 30 days).         Hugo Elkins MD

## 2022-11-17 ENCOUNTER — APPOINTMENT (OUTPATIENT)
Dept: GENERAL RADIOLOGY | Facility: CLINIC | Age: 57
DRG: 983 | End: 2022-11-17
Attending: ORTHOPAEDIC SURGERY
Payer: COMMERCIAL

## 2022-11-17 ENCOUNTER — HOSPITAL ENCOUNTER (INPATIENT)
Facility: CLINIC | Age: 57
LOS: 4 days | Discharge: HOME | DRG: 983 | End: 2022-11-21
Attending: ORTHOPAEDIC SURGERY | Admitting: ORTHOPAEDIC SURGERY
Payer: COMMERCIAL

## 2022-11-17 ENCOUNTER — ANESTHESIA (OUTPATIENT)
Dept: SURGERY | Facility: CLINIC | Age: 57
DRG: 983 | End: 2022-11-17
Payer: COMMERCIAL

## 2022-11-17 DIAGNOSIS — M21.6X1 MIDFOOT COLLAPSE OF RIGHT LOWER EXTREMITY: ICD-10-CM

## 2022-11-17 LAB
ABO/RH(D): NORMAL
ANTIBODY SCREEN: NEGATIVE
APTT PPP: 29 SECONDS (ref 22–38)
CREAT SERPL-MCNC: 1.22 MG/DL (ref 0.66–1.25)
ERYTHROCYTE [DISTWIDTH] IN BLOOD BY AUTOMATED COUNT: 13 % (ref 10–15)
GFR SERPL CREATININE-BSD FRML MDRD: 69 ML/MIN/1.73M2
GLUCOSE BLDC GLUCOMTR-MCNC: 105 MG/DL (ref 70–99)
GLUCOSE BLDC GLUCOMTR-MCNC: 107 MG/DL (ref 70–99)
HCT VFR BLD AUTO: 41.7 % (ref 40–53)
HGB BLD-MCNC: 13.8 G/DL (ref 13.3–17.7)
INR PPP: 0.99 (ref 0.85–1.15)
MCH RBC QN AUTO: 28.4 PG (ref 26.5–33)
MCHC RBC AUTO-ENTMCNC: 33.1 G/DL (ref 31.5–36.5)
MCV RBC AUTO: 86 FL (ref 78–100)
PLATELET # BLD AUTO: 161 10E3/UL (ref 150–450)
POTASSIUM BLD-SCNC: 4.5 MMOL/L (ref 3.4–5.3)
RBC # BLD AUTO: 4.86 10E6/UL (ref 4.4–5.9)
SPECIMEN EXPIRATION DATE: NORMAL
WBC # BLD AUTO: 7.7 10E3/UL (ref 4–11)

## 2022-11-17 PROCEDURE — 28730 FUSION OF FOOT BONES: CPT | Mod: RT | Performed by: ORTHOPAEDIC SURGERY

## 2022-11-17 PROCEDURE — 0SGK04Z FUSION OF RIGHT TARSOMETATARSAL JOINT WITH INTERNAL FIXATION DEVICE, OPEN APPROACH: ICD-10-PCS | Performed by: ORTHOPAEDIC SURGERY

## 2022-11-17 PROCEDURE — C1769 GUIDE WIRE: HCPCS | Performed by: ORTHOPAEDIC SURGERY

## 2022-11-17 PROCEDURE — 82565 ASSAY OF CREATININE: CPT | Performed by: ORTHOPAEDIC SURGERY

## 2022-11-17 PROCEDURE — 85610 PROTHROMBIN TIME: CPT | Performed by: ORTHOPAEDIC SURGERY

## 2022-11-17 PROCEDURE — 0SGH07Z FUSION OF RIGHT TARSAL JOINT WITH AUTOLOGOUS TISSUE SUBSTITUTE, OPEN APPROACH: ICD-10-PCS | Performed by: ORTHOPAEDIC SURGERY

## 2022-11-17 PROCEDURE — 27606 INCISION OF ACHILLES TENDON: CPT | Mod: 51 | Performed by: ORTHOPAEDIC SURGERY

## 2022-11-17 PROCEDURE — 0L8N3ZZ DIVISION OF RIGHT LOWER LEG TENDON, PERCUTANEOUS APPROACH: ICD-10-PCS | Performed by: ORTHOPAEDIC SURGERY

## 2022-11-17 PROCEDURE — 250N000013 HC RX MED GY IP 250 OP 250 PS 637: Performed by: STUDENT IN AN ORGANIZED HEALTH CARE EDUCATION/TRAINING PROGRAM

## 2022-11-17 PROCEDURE — 84132 ASSAY OF SERUM POTASSIUM: CPT | Performed by: ORTHOPAEDIC SURGERY

## 2022-11-17 PROCEDURE — 120N000002 HC R&B MED SURG/OB UMMC

## 2022-11-17 PROCEDURE — 710N000010 HC RECOVERY PHASE 1, LEVEL 2, PER MIN: Performed by: ORTHOPAEDIC SURGERY

## 2022-11-17 PROCEDURE — C1713 ANCHOR/SCREW BN/BN,TIS/BN: HCPCS | Performed by: ORTHOPAEDIC SURGERY

## 2022-11-17 PROCEDURE — 0SGH0JZ FUSION OF RIGHT TARSAL JOINT WITH SYNTHETIC SUBSTITUTE, OPEN APPROACH: ICD-10-PCS | Performed by: ORTHOPAEDIC SURGERY

## 2022-11-17 PROCEDURE — 360N000076 HC SURGERY LEVEL 3, PER MIN: Performed by: ORTHOPAEDIC SURGERY

## 2022-11-17 PROCEDURE — 85027 COMPLETE CBC AUTOMATED: CPT | Performed by: ORTHOPAEDIC SURGERY

## 2022-11-17 PROCEDURE — 370N000017 HC ANESTHESIA TECHNICAL FEE, PER MIN: Performed by: ORTHOPAEDIC SURGERY

## 2022-11-17 PROCEDURE — 86850 RBC ANTIBODY SCREEN: CPT | Performed by: ORTHOPAEDIC SURGERY

## 2022-11-17 PROCEDURE — 250N000011 HC RX IP 250 OP 636: Performed by: STUDENT IN AN ORGANIZED HEALTH CARE EDUCATION/TRAINING PROGRAM

## 2022-11-17 PROCEDURE — 0QBL0ZZ EXCISION OF RIGHT TARSAL, OPEN APPROACH: ICD-10-PCS | Performed by: ORTHOPAEDIC SURGERY

## 2022-11-17 PROCEDURE — 258N000003 HC RX IP 258 OP 636: Performed by: STUDENT IN AN ORGANIZED HEALTH CARE EDUCATION/TRAINING PROGRAM

## 2022-11-17 PROCEDURE — 250N000009 HC RX 250: Performed by: REGISTERED NURSE

## 2022-11-17 PROCEDURE — 272N000001 HC OR GENERAL SUPPLY STERILE: Performed by: ORTHOPAEDIC SURGERY

## 2022-11-17 PROCEDURE — 278N000051 HC OR IMPLANT GENERAL: Performed by: ORTHOPAEDIC SURGERY

## 2022-11-17 PROCEDURE — 0SGH04Z FUSION OF RIGHT TARSAL JOINT WITH INTERNAL FIXATION DEVICE, OPEN APPROACH: ICD-10-PCS | Performed by: ORTHOPAEDIC SURGERY

## 2022-11-17 PROCEDURE — 250N000025 HC SEVOFLURANE, PER MIN: Performed by: ORTHOPAEDIC SURGERY

## 2022-11-17 PROCEDURE — 86901 BLOOD TYPING SEROLOGIC RH(D): CPT | Performed by: ORTHOPAEDIC SURGERY

## 2022-11-17 PROCEDURE — 250N000011 HC RX IP 250 OP 636: Performed by: ORTHOPAEDIC SURGERY

## 2022-11-17 PROCEDURE — 85730 THROMBOPLASTIN TIME PARTIAL: CPT | Performed by: ORTHOPAEDIC SURGERY

## 2022-11-17 PROCEDURE — 250N000011 HC RX IP 250 OP 636: Performed by: REGISTERED NURSE

## 2022-11-17 PROCEDURE — 999N000180 XR SURGERY CARM FLUORO LESS THAN 5 MIN

## 2022-11-17 PROCEDURE — 250N000009 HC RX 250: Performed by: STUDENT IN AN ORGANIZED HEALTH CARE EDUCATION/TRAINING PROGRAM

## 2022-11-17 PROCEDURE — 999N000141 HC STATISTIC PRE-PROCEDURE NURSING ASSESSMENT: Performed by: ORTHOPAEDIC SURGERY

## 2022-11-17 PROCEDURE — 0SGK0JZ FUSION OF RIGHT TARSOMETATARSAL JOINT WITH SYNTHETIC SUBSTITUTE, OPEN APPROACH: ICD-10-PCS | Performed by: ORTHOPAEDIC SURGERY

## 2022-11-17 PROCEDURE — 0SGK07Z FUSION OF RIGHT TARSOMETATARSAL JOINT WITH AUTOLOGOUS TISSUE SUBSTITUTE, OPEN APPROACH: ICD-10-PCS | Performed by: ORTHOPAEDIC SURGERY

## 2022-11-17 PROCEDURE — 99232 SBSQ HOSP IP/OBS MODERATE 35: CPT | Performed by: INTERNAL MEDICINE

## 2022-11-17 PROCEDURE — 272N000002 HC OR SUPPLY OTHER OPNP: Performed by: ORTHOPAEDIC SURGERY

## 2022-11-17 PROCEDURE — 258N000003 HC RX IP 258 OP 636: Performed by: REGISTERED NURSE

## 2022-11-17 PROCEDURE — 36415 COLL VENOUS BLD VENIPUNCTURE: CPT | Performed by: ORTHOPAEDIC SURGERY

## 2022-11-17 DEVICE — IMPLANTABLE DEVICE
Type: IMPLANTABLE DEVICE | Site: ANKLE | Status: NON-FUNCTIONAL
Removed: 2023-11-02

## 2022-11-17 RX ORDER — NALOXONE HYDROCHLORIDE 0.4 MG/ML
0.2 INJECTION, SOLUTION INTRAMUSCULAR; INTRAVENOUS; SUBCUTANEOUS
Status: DISCONTINUED | OUTPATIENT
Start: 2022-11-17 | End: 2022-11-21 | Stop reason: HOSPADM

## 2022-11-17 RX ORDER — LABETALOL HYDROCHLORIDE 5 MG/ML
10 INJECTION, SOLUTION INTRAVENOUS
Status: DISCONTINUED | OUTPATIENT
Start: 2022-11-17 | End: 2022-11-17 | Stop reason: HOSPADM

## 2022-11-17 RX ORDER — POLYETHYLENE GLYCOL 3350 17 G/17G
17 POWDER, FOR SOLUTION ORAL DAILY
Status: DISCONTINUED | OUTPATIENT
Start: 2022-11-18 | End: 2022-11-21 | Stop reason: HOSPADM

## 2022-11-17 RX ORDER — ACETAMINOPHEN 325 MG/1
975 TABLET ORAL EVERY 8 HOURS
Status: DISPENSED | OUTPATIENT
Start: 2022-11-17 | End: 2022-11-20

## 2022-11-17 RX ORDER — FLUMAZENIL 0.1 MG/ML
0.2 INJECTION, SOLUTION INTRAVENOUS
Status: DISCONTINUED | OUTPATIENT
Start: 2022-11-17 | End: 2022-11-17 | Stop reason: HOSPADM

## 2022-11-17 RX ORDER — HYDROMORPHONE HCL IN WATER/PF 6 MG/30 ML
0.2 PATIENT CONTROLLED ANALGESIA SYRINGE INTRAVENOUS
Status: DISCONTINUED | OUTPATIENT
Start: 2022-11-17 | End: 2022-11-21 | Stop reason: HOSPADM

## 2022-11-17 RX ORDER — LIDOCAINE 40 MG/G
CREAM TOPICAL
Status: DISCONTINUED | OUTPATIENT
Start: 2022-11-17 | End: 2022-11-17 | Stop reason: HOSPADM

## 2022-11-17 RX ORDER — FENTANYL CITRATE 50 UG/ML
50 INJECTION, SOLUTION INTRAMUSCULAR; INTRAVENOUS EVERY 5 MIN PRN
Status: DISCONTINUED | OUTPATIENT
Start: 2022-11-17 | End: 2022-11-17 | Stop reason: HOSPADM

## 2022-11-17 RX ORDER — FENTANYL CITRATE 50 UG/ML
25 INJECTION, SOLUTION INTRAMUSCULAR; INTRAVENOUS EVERY 5 MIN PRN
Status: DISCONTINUED | OUTPATIENT
Start: 2022-11-17 | End: 2022-11-17 | Stop reason: HOSPADM

## 2022-11-17 RX ORDER — HYDROMORPHONE HYDROCHLORIDE 1 MG/ML
0.2 INJECTION, SOLUTION INTRAMUSCULAR; INTRAVENOUS; SUBCUTANEOUS EVERY 5 MIN PRN
Status: DISCONTINUED | OUTPATIENT
Start: 2022-11-17 | End: 2022-11-17 | Stop reason: HOSPADM

## 2022-11-17 RX ORDER — ACETAMINOPHEN 325 MG/1
650 TABLET ORAL EVERY 4 HOURS PRN
Status: DISCONTINUED | OUTPATIENT
Start: 2022-11-20 | End: 2022-11-21 | Stop reason: HOSPADM

## 2022-11-17 RX ORDER — BISACODYL 10 MG
10 SUPPOSITORY, RECTAL RECTAL DAILY PRN
Status: DISCONTINUED | OUTPATIENT
Start: 2022-11-17 | End: 2022-11-21 | Stop reason: HOSPADM

## 2022-11-17 RX ORDER — CEFAZOLIN SODIUM 2 G/100ML
2 INJECTION, SOLUTION INTRAVENOUS EVERY 8 HOURS
Status: COMPLETED | OUTPATIENT
Start: 2022-11-18 | End: 2022-11-18

## 2022-11-17 RX ORDER — CEFAZOLIN SODIUM/WATER 2 G/20 ML
SYRINGE (ML) INTRAVENOUS PRN
Status: DISCONTINUED | OUTPATIENT
Start: 2022-11-17 | End: 2022-11-17

## 2022-11-17 RX ORDER — HYDROMORPHONE HYDROCHLORIDE 1 MG/ML
0.4 INJECTION, SOLUTION INTRAMUSCULAR; INTRAVENOUS; SUBCUTANEOUS EVERY 5 MIN PRN
Status: DISCONTINUED | OUTPATIENT
Start: 2022-11-17 | End: 2022-11-17 | Stop reason: HOSPADM

## 2022-11-17 RX ORDER — PROPOFOL 10 MG/ML
INJECTION, EMULSION INTRAVENOUS PRN
Status: DISCONTINUED | OUTPATIENT
Start: 2022-11-17 | End: 2022-11-17

## 2022-11-17 RX ORDER — SODIUM CHLORIDE, SODIUM LACTATE, POTASSIUM CHLORIDE, CALCIUM CHLORIDE 600; 310; 30; 20 MG/100ML; MG/100ML; MG/100ML; MG/100ML
INJECTION, SOLUTION INTRAVENOUS CONTINUOUS
Status: DISCONTINUED | OUTPATIENT
Start: 2022-11-17 | End: 2022-11-17 | Stop reason: HOSPADM

## 2022-11-17 RX ORDER — ONDANSETRON 2 MG/ML
INJECTION INTRAMUSCULAR; INTRAVENOUS PRN
Status: DISCONTINUED | OUTPATIENT
Start: 2022-11-17 | End: 2022-11-17

## 2022-11-17 RX ORDER — OXYCODONE HYDROCHLORIDE 5 MG/1
5 TABLET ORAL EVERY 4 HOURS PRN
Status: DISCONTINUED | OUTPATIENT
Start: 2022-11-17 | End: 2022-11-21 | Stop reason: HOSPADM

## 2022-11-17 RX ORDER — CEFAZOLIN SODIUM/WATER 2 G/20 ML
2 SYRINGE (ML) INTRAVENOUS
Status: DISCONTINUED | OUTPATIENT
Start: 2022-11-17 | End: 2022-11-17 | Stop reason: HOSPADM

## 2022-11-17 RX ORDER — SODIUM CHLORIDE, SODIUM LACTATE, POTASSIUM CHLORIDE, CALCIUM CHLORIDE 600; 310; 30; 20 MG/100ML; MG/100ML; MG/100ML; MG/100ML
INJECTION, SOLUTION INTRAVENOUS CONTINUOUS
Status: DISCONTINUED | OUTPATIENT
Start: 2022-11-17 | End: 2022-11-19

## 2022-11-17 RX ORDER — PROCHLORPERAZINE MALEATE 10 MG
10 TABLET ORAL EVERY 6 HOURS PRN
Status: DISCONTINUED | OUTPATIENT
Start: 2022-11-17 | End: 2022-11-21 | Stop reason: HOSPADM

## 2022-11-17 RX ORDER — ONDANSETRON 2 MG/ML
4 INJECTION INTRAMUSCULAR; INTRAVENOUS EVERY 6 HOURS PRN
Status: DISCONTINUED | OUTPATIENT
Start: 2022-11-17 | End: 2022-11-21 | Stop reason: HOSPADM

## 2022-11-17 RX ORDER — LIDOCAINE HYDROCHLORIDE 20 MG/ML
INJECTION, SOLUTION INFILTRATION; PERINEURAL PRN
Status: DISCONTINUED | OUTPATIENT
Start: 2022-11-17 | End: 2022-11-17

## 2022-11-17 RX ORDER — ONDANSETRON 4 MG/1
4 TABLET, ORALLY DISINTEGRATING ORAL EVERY 30 MIN PRN
Status: DISCONTINUED | OUTPATIENT
Start: 2022-11-17 | End: 2022-11-17 | Stop reason: HOSPADM

## 2022-11-17 RX ORDER — NALOXONE HYDROCHLORIDE 0.4 MG/ML
0.4 INJECTION, SOLUTION INTRAMUSCULAR; INTRAVENOUS; SUBCUTANEOUS
Status: DISCONTINUED | OUTPATIENT
Start: 2022-11-17 | End: 2022-11-21 | Stop reason: HOSPADM

## 2022-11-17 RX ORDER — ACETAMINOPHEN 325 MG/1
975 TABLET ORAL ONCE
Status: DISCONTINUED | OUTPATIENT
Start: 2022-11-17 | End: 2022-11-17 | Stop reason: HOSPADM

## 2022-11-17 RX ORDER — ONDANSETRON 4 MG/1
4 TABLET, ORALLY DISINTEGRATING ORAL EVERY 6 HOURS PRN
Status: DISCONTINUED | OUTPATIENT
Start: 2022-11-17 | End: 2022-11-21 | Stop reason: HOSPADM

## 2022-11-17 RX ORDER — METHOCARBAMOL 750 MG/1
750 TABLET, FILM COATED ORAL EVERY 6 HOURS PRN
Status: DISCONTINUED | OUTPATIENT
Start: 2022-11-17 | End: 2022-11-21 | Stop reason: HOSPADM

## 2022-11-17 RX ORDER — OXYCODONE HYDROCHLORIDE 10 MG/1
10 TABLET ORAL EVERY 4 HOURS PRN
Status: DISCONTINUED | OUTPATIENT
Start: 2022-11-17 | End: 2022-11-21 | Stop reason: HOSPADM

## 2022-11-17 RX ORDER — FENTANYL CITRATE 50 UG/ML
25-50 INJECTION, SOLUTION INTRAMUSCULAR; INTRAVENOUS
Status: DISCONTINUED | OUTPATIENT
Start: 2022-11-17 | End: 2022-11-17 | Stop reason: HOSPADM

## 2022-11-17 RX ORDER — FENTANYL CITRATE 50 UG/ML
INJECTION, SOLUTION INTRAMUSCULAR; INTRAVENOUS PRN
Status: DISCONTINUED | OUTPATIENT
Start: 2022-11-17 | End: 2022-11-17

## 2022-11-17 RX ORDER — LIDOCAINE 40 MG/G
CREAM TOPICAL
Status: DISCONTINUED | OUTPATIENT
Start: 2022-11-17 | End: 2022-11-21 | Stop reason: HOSPADM

## 2022-11-17 RX ORDER — ONDANSETRON 2 MG/ML
4 INJECTION INTRAMUSCULAR; INTRAVENOUS EVERY 30 MIN PRN
Status: DISCONTINUED | OUTPATIENT
Start: 2022-11-17 | End: 2022-11-17 | Stop reason: HOSPADM

## 2022-11-17 RX ORDER — HYDROXYZINE HYDROCHLORIDE 25 MG/1
25 TABLET, FILM COATED ORAL EVERY 6 HOURS PRN
Status: DISCONTINUED | OUTPATIENT
Start: 2022-11-17 | End: 2022-11-21 | Stop reason: HOSPADM

## 2022-11-17 RX ORDER — CEFAZOLIN SODIUM/WATER 2 G/20 ML
2 SYRINGE (ML) INTRAVENOUS SEE ADMIN INSTRUCTIONS
Status: DISCONTINUED | OUTPATIENT
Start: 2022-11-17 | End: 2022-11-17 | Stop reason: HOSPADM

## 2022-11-17 RX ORDER — AMOXICILLIN 250 MG
1 CAPSULE ORAL 2 TIMES DAILY
Status: DISCONTINUED | OUTPATIENT
Start: 2022-11-17 | End: 2022-11-21 | Stop reason: HOSPADM

## 2022-11-17 RX ORDER — HYDROMORPHONE HCL IN WATER/PF 6 MG/30 ML
0.4 PATIENT CONTROLLED ANALGESIA SYRINGE INTRAVENOUS
Status: DISCONTINUED | OUTPATIENT
Start: 2022-11-17 | End: 2022-11-21 | Stop reason: HOSPADM

## 2022-11-17 RX ORDER — SODIUM CHLORIDE, SODIUM LACTATE, POTASSIUM CHLORIDE, CALCIUM CHLORIDE 600; 310; 30; 20 MG/100ML; MG/100ML; MG/100ML; MG/100ML
INJECTION, SOLUTION INTRAVENOUS CONTINUOUS PRN
Status: DISCONTINUED | OUTPATIENT
Start: 2022-11-17 | End: 2022-11-17

## 2022-11-17 RX ADMIN — FENTANYL CITRATE 100 MCG: 50 INJECTION, SOLUTION INTRAMUSCULAR; INTRAVENOUS at 15:12

## 2022-11-17 RX ADMIN — ONDANSETRON 4 MG: 2 INJECTION INTRAMUSCULAR; INTRAVENOUS at 20:13

## 2022-11-17 RX ADMIN — LIDOCAINE HYDROCHLORIDE 60 MG: 20 INJECTION, SOLUTION INFILTRATION; PERINEURAL at 15:17

## 2022-11-17 RX ADMIN — ACETAMINOPHEN 325MG 975 MG: 325 TABLET ORAL at 21:31

## 2022-11-17 RX ADMIN — SODIUM CHLORIDE, POTASSIUM CHLORIDE, SODIUM LACTATE AND CALCIUM CHLORIDE: 600; 310; 30; 20 INJECTION, SOLUTION INTRAVENOUS at 22:10

## 2022-11-17 RX ADMIN — Medication 2 G: at 15:14

## 2022-11-17 RX ADMIN — HYDROMORPHONE HYDROCHLORIDE 0.3 MG: 1 INJECTION, SOLUTION INTRAMUSCULAR; INTRAVENOUS; SUBCUTANEOUS at 16:00

## 2022-11-17 RX ADMIN — PHENYLEPHRINE HYDROCHLORIDE 100 MCG: 10 INJECTION INTRAVENOUS at 18:08

## 2022-11-17 RX ADMIN — PROPOFOL 150 MG: 10 INJECTION, EMULSION INTRAVENOUS at 15:17

## 2022-11-17 RX ADMIN — PHENYLEPHRINE HYDROCHLORIDE 100 MCG: 10 INJECTION INTRAVENOUS at 15:53

## 2022-11-17 RX ADMIN — METHOCARBAMOL 750 MG: 750 TABLET ORAL at 23:01

## 2022-11-17 RX ADMIN — SUGAMMADEX 200 MG: 100 INJECTION, SOLUTION INTRAVENOUS at 20:27

## 2022-11-17 RX ADMIN — SODIUM CHLORIDE, POTASSIUM CHLORIDE, SODIUM LACTATE AND CALCIUM CHLORIDE: 600; 310; 30; 20 INJECTION, SOLUTION INTRAVENOUS at 15:08

## 2022-11-17 RX ADMIN — HYDROMORPHONE HYDROCHLORIDE 0.2 MG: 1 INJECTION, SOLUTION INTRAMUSCULAR; INTRAVENOUS; SUBCUTANEOUS at 16:56

## 2022-11-17 RX ADMIN — OXYCODONE HYDROCHLORIDE 5 MG: 5 TABLET ORAL at 23:01

## 2022-11-17 RX ADMIN — Medication 50 MG: at 15:18

## 2022-11-17 RX ADMIN — Medication 2 G: at 19:15

## 2022-11-17 RX ADMIN — PHENYLEPHRINE HYDROCHLORIDE 100 MCG: 10 INJECTION INTRAVENOUS at 16:06

## 2022-11-17 RX ADMIN — PHENYLEPHRINE HYDROCHLORIDE 0.4 MCG/KG/MIN: 10 INJECTION INTRAVENOUS at 16:11

## 2022-11-17 RX ADMIN — SODIUM CHLORIDE, POTASSIUM CHLORIDE, SODIUM LACTATE AND CALCIUM CHLORIDE: 600; 310; 30; 20 INJECTION, SOLUTION INTRAVENOUS at 18:47

## 2022-11-17 ASSESSMENT — ACTIVITIES OF DAILY LIVING (ADL)
ADLS_ACUITY_SCORE: 26
ADLS_ACUITY_SCORE: 24
ADLS_ACUITY_SCORE: 26
ADLS_ACUITY_SCORE: 22
ADLS_ACUITY_SCORE: 29

## 2022-11-17 NOTE — LETTER
Transition Communication Hand-off for Care Transitions to Next Level of Care Provider    Name: Nehemiah Magallon  : 1965  MRN #: [id number]  Primary Care Provider: Ochoa Echevarria     [facility] [address]     Reason for Hospitalization:  Right foot pain [M79.671]  Midfoot collapse of right lower extremity [M21.6X1]  Arthropathy of foot [M19.079]  Admit Date/Time: 2022  8:36 AM  Discharge Date: 2022  Payor Source: Payor: [facility] / Plan: [facility]N / Product Type: Indemnity /            Reason for Communication Hand-off Referral: Avoidable readmission within 30 days    Discharge Plan: Home with home care is recommended, however patient declined home care at this time.    Discharge Needs Assessment:  Needs    Flowsheet Row Most Recent Value   Equipment Currently Used at Home wheelchair, manual, cane, quad, shower chair  [Walker, crutch cane]        Future Appointments   Date Time Provider Department Center   2022  8:00 AM Pascale Ventura MD [facility]   2022  9:00 AM Bryon Starr MD [facility]             Supplies     Future Labs/Procedures    [facility] [employer] Walker DME     Process Instructions:    By signing this order, the Authorizing Provider is attesting that they have completed a face-to-face evaluation on the patient to determine their need for this equipment in the last 60 days. A new face-to-face evaluation is required each time  A new prescription for one of the specified items is ordered.   If an additional provider completed the evaluation, please indicate their name below.     **As of 2018, an order requisition and face sheet will print for all DME orders. Please give printed order and face sheet to patient if not obtaining product from [facility] DME closet.     Comments:    DME Documentation: Describe the reason for need to support medical necessity: Impaired gait due to Foot/Ankle surgery.  Anticipated length of need: 3 months    Walker DME     Process Instructions:    By signing this order, the Authorizing Provider is attesting that they have completed a face-to-face evaluation on the patient to determine their need for this equipment in the last 60 days. A new face-to-face evaluation is required each time  A new prescription for one of the specified items is ordered.   If an additional provider completed the evaluation, please indicate their name below.     **As of 2018, an order requisition and face sheet will print for all DME orders. Please give printed order and face sheet to patient if not obtaining product from [facility] DME closet.     Comments:    DME Documentation: Describe the reason for need to support medical necessity: Impaired gait due to Foot/Ankle surgery. Anticipated length of need: 3 months            Natalio Carrillo RN care coordinator    AVS/Discharge Summary is the source of truth; this is a helpful guide for improved communication of patient story

## 2022-11-17 NOTE — ANESTHESIA PROCEDURE NOTES
Airway       Patient location during procedure: OR       Procedure Start/Stop Times: 11/17/2022 3:21 PM  Staff -        CRNA: Dara Ward APRN CRNA       Performed By: CRNA  Consent for Airway        Urgency: elective  Indications and Patient Condition       Indications for airway management: alan-procedural       Induction type:intravenous       Mask difficulty assessment: 2 - vent by mask + OA or adjuvant +/- NMBA    Final Airway Details       Final airway type: endotracheal airway       Successful airway: ETT - single  Endotracheal Airway Details        ETT size (mm): 7.5       Cuffed: yes       Successful intubation technique: direct laryngoscopy       DL Blade Type: MAC 4       Grade View of Cords: 2       Adjucts: stylet       Position: Right       Measured from: lips       Secured at (cm): 26       Bite block used: None    Post intubation assessment        Placement verified by: capnometry, equal breath sounds and chest rise        Number of attempts at approach: 1       Secured with: silk tape       Ease of procedure: easy       Dentition: Unchanged    Medication(s) Administered   Medication Administration Time: 11/17/2022 3:21 PM

## 2022-11-18 ENCOUNTER — APPOINTMENT (OUTPATIENT)
Dept: PHYSICAL THERAPY | Facility: CLINIC | Age: 57
DRG: 983 | End: 2022-11-18
Attending: ORTHOPAEDIC SURGERY
Payer: COMMERCIAL

## 2022-11-18 LAB
GLUCOSE BLDC GLUCOMTR-MCNC: 126 MG/DL (ref 70–99)
GLUCOSE BLDC GLUCOMTR-MCNC: 162 MG/DL (ref 70–99)
GLUCOSE BLDC GLUCOMTR-MCNC: 170 MG/DL (ref 70–99)
GLUCOSE BLDC GLUCOMTR-MCNC: 171 MG/DL (ref 70–99)
GLUCOSE BLDC GLUCOMTR-MCNC: 173 MG/DL (ref 70–99)
HGB BLD-MCNC: 10.8 G/DL (ref 13.3–17.7)
HOLD SPECIMEN: NORMAL

## 2022-11-18 PROCEDURE — 99232 SBSQ HOSP IP/OBS MODERATE 35: CPT | Performed by: INTERNAL MEDICINE

## 2022-11-18 PROCEDURE — 85018 HEMOGLOBIN: CPT | Performed by: INTERNAL MEDICINE

## 2022-11-18 PROCEDURE — 97161 PT EVAL LOW COMPLEX 20 MIN: CPT | Mod: GP

## 2022-11-18 PROCEDURE — 250N000009 HC RX 250: Performed by: STUDENT IN AN ORGANIZED HEALTH CARE EDUCATION/TRAINING PROGRAM

## 2022-11-18 PROCEDURE — 250N000013 HC RX MED GY IP 250 OP 250 PS 637: Performed by: STUDENT IN AN ORGANIZED HEALTH CARE EDUCATION/TRAINING PROGRAM

## 2022-11-18 PROCEDURE — 250N000011 HC RX IP 250 OP 636: Performed by: STUDENT IN AN ORGANIZED HEALTH CARE EDUCATION/TRAINING PROGRAM

## 2022-11-18 PROCEDURE — 250N000013 HC RX MED GY IP 250 OP 250 PS 637: Performed by: INTERNAL MEDICINE

## 2022-11-18 PROCEDURE — 120N000002 HC R&B MED SURG/OB UMMC

## 2022-11-18 PROCEDURE — 97530 THERAPEUTIC ACTIVITIES: CPT | Mod: GP

## 2022-11-18 PROCEDURE — 36415 COLL VENOUS BLD VENIPUNCTURE: CPT | Performed by: INTERNAL MEDICINE

## 2022-11-18 PROCEDURE — 250N000012 HC RX MED GY IP 250 OP 636 PS 637: Performed by: INTERNAL MEDICINE

## 2022-11-18 RX ORDER — TOPIRAMATE 100 MG/1
100 TABLET, FILM COATED ORAL 2 TIMES DAILY
Status: DISCONTINUED | OUTPATIENT
Start: 2022-11-18 | End: 2022-11-21 | Stop reason: HOSPADM

## 2022-11-18 RX ORDER — LIDOCAINE 4 G/G
1 PATCH TOPICAL
Status: DISCONTINUED | OUTPATIENT
Start: 2022-11-18 | End: 2022-11-21 | Stop reason: HOSPADM

## 2022-11-18 RX ORDER — LIDOCAINE 4 G/G
1 PATCH TOPICAL
Status: DISCONTINUED | OUTPATIENT
Start: 2022-11-18 | End: 2022-11-18

## 2022-11-18 RX ORDER — NICOTINE POLACRILEX 4 MG
15-30 LOZENGE BUCCAL
Status: DISCONTINUED | OUTPATIENT
Start: 2022-11-18 | End: 2022-11-21 | Stop reason: HOSPADM

## 2022-11-18 RX ORDER — PRAVASTATIN SODIUM 20 MG
40 TABLET ORAL EVERY EVENING
Status: DISCONTINUED | OUTPATIENT
Start: 2022-11-18 | End: 2022-11-21 | Stop reason: HOSPADM

## 2022-11-18 RX ORDER — LEVOTHYROXINE SODIUM 125 UG/1
125 TABLET ORAL DAILY
Status: DISCONTINUED | OUTPATIENT
Start: 2022-11-18 | End: 2022-11-21 | Stop reason: HOSPADM

## 2022-11-18 RX ORDER — ASPIRIN 81 MG/1
162 TABLET ORAL DAILY
Status: DISCONTINUED | OUTPATIENT
Start: 2022-11-18 | End: 2022-11-21 | Stop reason: HOSPADM

## 2022-11-18 RX ORDER — DEXTROSE MONOHYDRATE 25 G/50ML
25-50 INJECTION, SOLUTION INTRAVENOUS
Status: DISCONTINUED | OUTPATIENT
Start: 2022-11-18 | End: 2022-11-21 | Stop reason: HOSPADM

## 2022-11-18 RX ORDER — PANTOPRAZOLE SODIUM 40 MG/1
40 TABLET, DELAYED RELEASE ORAL DAILY
Status: DISCONTINUED | OUTPATIENT
Start: 2022-11-18 | End: 2022-11-21 | Stop reason: HOSPADM

## 2022-11-18 RX ORDER — GABAPENTIN 600 MG/1
1200 TABLET ORAL 3 TIMES DAILY
Status: DISCONTINUED | OUTPATIENT
Start: 2022-11-18 | End: 2022-11-21 | Stop reason: HOSPADM

## 2022-11-18 RX ADMIN — OXYCODONE HYDROCHLORIDE 5 MG: 5 TABLET ORAL at 19:05

## 2022-11-18 RX ADMIN — OXYCODONE HYDROCHLORIDE 5 MG: 5 TABLET ORAL at 03:24

## 2022-11-18 RX ADMIN — LIDOCAINE PATCH 4% 1 PATCH: 40 PATCH TOPICAL at 19:06

## 2022-11-18 RX ADMIN — LEVOTHYROXINE SODIUM 125 MCG: 125 TABLET ORAL at 13:42

## 2022-11-18 RX ADMIN — GABAPENTIN 1200 MG: 600 TABLET, FILM COATED ORAL at 08:53

## 2022-11-18 RX ADMIN — METHOCARBAMOL 750 MG: 750 TABLET ORAL at 12:35

## 2022-11-18 RX ADMIN — METHOCARBAMOL 750 MG: 750 TABLET ORAL at 23:10

## 2022-11-18 RX ADMIN — PANTOPRAZOLE SODIUM 40 MG: 40 TABLET, DELAYED RELEASE ORAL at 13:42

## 2022-11-18 RX ADMIN — ACETAMINOPHEN 325MG 975 MG: 325 TABLET ORAL at 23:10

## 2022-11-18 RX ADMIN — ASPIRIN 162 MG: 81 TABLET ORAL at 08:53

## 2022-11-18 RX ADMIN — PRAVASTATIN SODIUM 40 MG: 20 TABLET ORAL at 19:05

## 2022-11-18 RX ADMIN — OXYCODONE HYDROCHLORIDE 5 MG: 5 TABLET ORAL at 12:35

## 2022-11-18 RX ADMIN — ACETAMINOPHEN 325MG 975 MG: 325 TABLET ORAL at 08:53

## 2022-11-18 RX ADMIN — TOPIRAMATE 100 MG: 100 TABLET, FILM COATED ORAL at 17:00

## 2022-11-18 RX ADMIN — LIDOCAINE: 40 CREAM TOPICAL at 19:05

## 2022-11-18 RX ADMIN — INSULIN ASPART 1 UNITS: 100 INJECTION, SOLUTION INTRAVENOUS; SUBCUTANEOUS at 17:21

## 2022-11-18 RX ADMIN — CEFAZOLIN SODIUM 2 G: 2 INJECTION, SOLUTION INTRAVENOUS at 03:24

## 2022-11-18 RX ADMIN — CEFAZOLIN SODIUM 2 G: 2 INJECTION, SOLUTION INTRAVENOUS at 10:46

## 2022-11-18 RX ADMIN — GABAPENTIN 1200 MG: 600 TABLET, FILM COATED ORAL at 19:05

## 2022-11-18 RX ADMIN — GABAPENTIN 1200 MG: 600 TABLET, FILM COATED ORAL at 13:41

## 2022-11-18 RX ADMIN — ACETAMINOPHEN 325MG 975 MG: 325 TABLET ORAL at 16:00

## 2022-11-18 RX ADMIN — INSULIN ASPART 1 UNITS: 100 INJECTION, SOLUTION INTRAVENOUS; SUBCUTANEOUS at 14:26

## 2022-11-18 ASSESSMENT — ACTIVITIES OF DAILY LIVING (ADL)
ADLS_ACUITY_SCORE: 29

## 2022-11-18 NOTE — ANESTHESIA POSTPROCEDURE EVALUATION
Patient: Nehemiah Magallon    Procedure: Procedure(s):  Right midfoot/talonavicular osteotomy and reduction of deformity Right midfoot/talonavicular arthrodesis, achilles lengthening  LENGTHENING, TENDON, ACHILLES       Anesthesia Type:  General    Note:  Disposition: Inpatient   Postop Pain Control: Uneventful            Sign Out: Well controlled pain   PONV: No   Neuro/Psych: Uneventful            Sign Out: Acceptable/Baseline neuro status   Airway/Respiratory: Uneventful            Sign Out: Acceptable/Baseline resp. status   CV/Hemodynamics: Uneventful            Sign Out: Acceptable CV status; No obvious hypovolemia; No obvious fluid overload   Other NRE: NONE   DID A NON-ROUTINE EVENT OCCUR? No           Last vitals:  Vitals Value Taken Time   /76 11/17/22 2130   Temp 37.3  C (99.1  F) 11/17/22 2049   Pulse 87 11/17/22 2138   Resp 26 11/17/22 2138   SpO2 95 % 11/17/22 2140   Vitals shown include unvalidated device data.    Electronically Signed By: Sharri Sebastian MD  November 17, 2022  10:13 PM

## 2022-11-18 NOTE — OR NURSING
PACU to Inpatient Nursing Handoff    Patient Nehemiah Magallon is a 57 year old male who speaks English.   Procedure Procedure(s):  Right midfoot/talonavicular osteotomy and reduction of deformity Right midfoot/talonavicular arthrodesis, achilles lengthening  LENGTHENING, TENDON, ACHILLES   Surgeon(s) Primary: Bryon Starr MD  Resident - Assisting: Maria C Ludwig MD     Allergies   Allergen Reactions     Atorvastatin      Other reaction(s): Hyperglycemia     Celebrex [Celecoxib] Other (See Comments)     [facility]       Isolation  [unfilled]     Past Medical History   has a past medical history of Chronic pain syndrome (10/24/2014), Diabetes mellitus, type 2 (H), Diabetic Charcot foot (H), Diabetic retinopathy associated with diabetes mellitus due to underlying condition (H), Diverticulitis of colon, Gastroesophageal reflux disease, Hepatitis C (2017), History of skin cancer, Hypertension, Hypothyroidism, Legally blind, Midfoot collapse of right lower extremity, Migraine, NAFLD (nonalcoholic fatty liver disease), Nephrolithiasis, Neuropathy, MARGARITO (obstructive sleep apnea), Rib pain (10/24/2014), S/P colectomy (10/14/2014), and Thyroid cancer (H) (10/14/2014).    Anesthesia General   Dermatome Level     Preop Meds Not applicable   Nerve block Not applicable   Intraop Meds fentanyl (Sublimaze): 100 mcg total  hydromorphone (Dilaudid): 0.5 mg total  ondansetron (Zofran): last given at 4mg @ 2013  neosynephrine intraop.   Local Meds No   Antibiotics cefazolin (Ancef) - last given at 1915     Pain Patient Currently in Pain: denies   PACU meds  Not applicable   PCA / epidural No   Capnography     Telemetry ECG Rhythm: Normal sinus rhythm   Inpatient Telemetry Monitor Ordered? No        Labs Glucose Lab Results   Component Value Date     11/17/2022       Hgb Lab Results   Component Value Date    HGB 13.8 11/17/2022       INR Lab Results   Component Value Date    INR 0.99 11/17/2022      PACU Imaging  Not applicable     Wound/Incision Incision/Surgical Site 11/17/22 Right Foot (Active)   Incision Assessment UTV 11/17/22 2100   Closure DOMINGO 11/17/22 2100   Incision Drainage Amount None 11/17/22 2100   Dressing Intervention Clean, dry, intact 11/17/22 2100   Number of days: 0      CMS        Equipment Not applicable   Other LDA       IV Access Peripheral IV 11/17/22 Anterior;Left Lower forearm (Active)   Site Assessment WDL 11/17/22 2100   Line Status Infusing 11/17/22 2100   Dressing Intervention New dressing  11/17/22 1200   Phlebitis Scale 0-->no symptoms 11/17/22 2100   Infiltration Scale 0 11/17/22 2100   Number of days: 0       Peripheral IV 11/17/22 Right Lower forearm (Active)   Site Assessment WDL 11/17/22 2100   Line Status Saline locked 11/17/22 2100   Phlebitis Scale 0-->no symptoms 11/17/22 2100   Infiltration Scale 0 11/17/22 2100   Number of days: 0      Blood Products Not applicable  mL   Intake/Output Date 11/17/22 0700 - 11/18/22 0659   Shift 7317-8089 7103-7699 2338-1036 24 Hour Total   INTAKE   I.V.  1500  1500   Shift Total(mL/kg)  1500(14.96)  1500(14.96)   OUTPUT   Urine  745  745   Blood  100  100   Shift Total(mL/kg)  845(8.43)  845(8.43)   Weight (kg) 100.24 100.24 100.24 100.24      Drains / Jones Urethral Catheter 11/17/22 Non-latex 16 fr (Active)   Tube Description [facility] 11/17/22 2049   Collection Container Standard 11/17/22 2100   Securement Method Securing device (Describe) 11/17/22 2100   Number of days: 0      Time of void PreOp Void Prior to Procedure: 1100 (11/17/22 1148)    PostOp      Diapered? No   Bladder Scan     PO    water     Vitals    B/P: (!) 146/81  T: 99.1  F (37.3  C)    Temp src: Oral  P:  Pulse: 85 (11/17/22 2100)          R: 9  O2:  SpO2: 99 %    O2 Device: None (Room air) (11/17/22 0955)    Oxygen Delivery: 6 LPM (11/17/22 2049)         Family/support present Wiliam/spouse   Patient belongings     Patient transported on cart   DC meds/scripts (obs/outpt) Not  applicable   Inpatient Pain Meds Released? Yes       Special needs/considerations NOTE: sensation absent bilateral lower feet/chronic neuropathy. NWB status. Also: patient had rigo dc'd in pacu.   Tasks needing completion None       Myrna Landis RN  ASCOM [id number]

## 2022-11-18 NOTE — UTILIZATION REVIEW
"  Admission Status; Secondary Review Determination         Under the authority of the Utilization Management Committee, the utilization review process indicated a secondary review on the above patient.  The review outcome is based on review of the medical records, discussions with staff, and applying clinical experience noted on the date of the review.        (xxx)      Inpatient Status Appropriate - This patient's medical care is consistent with medical management for inpatient care and reasonable inpatient medical practice.      () Observation Status Appropriate - This patient does not meet hospital inpatient criteria and is placed in observation status. If this patient's primary payer is Medicare and was admitted as an inpatient, Condition Code 44 should be used and patient status changed to \"observation\".   () Admission Status NOT Appropriate - This patient's medical care is not consistent with medical management for Inpatient or Observation Status.          RATIONALE FOR DETERMINATION   Nehemiah Magallon is a 57 year old male who underwent R charcot foot reconstruction surgery including extended medical column realignment and arthrodesis on 11/17/2022.  He was admitted following the procedure for close clinical monitoring and postoperative pain management and therapies.  He is NWB RLE. I spoke with the care team and they anticipate the patient will require at least 1-2 days further hospitalization for safe discharge.  IP status is appropriate.        The severity of illness, intensity of service provided, expected LOS and risk for adverse outcome make the care complex, high risk and appropriate for hospital admission.        The information on this document is developed by the utilization review team in order for the business office to ensure compliance.  This only denotes the appropriateness of proper admission status and does not reflect the quality of care rendered.         The definitions of Inpatient Status and " Observation Status used in making the determination above are those provided in the CMS Coverage Manual, Chapter 1 and Chapter 6, section 70.4.      Sincerely,     Yahaira Jansen MD  Physician Advisor   Utilization Review/ Case Management  [facility].

## 2022-11-18 NOTE — PROGRESS NOTES
"[facility], [place]   Internal Medicine Daily Note           Interval History/Events     Overnight events reviewed  Patient not placed on night list, so seeing late  Reports neuropathy pain on the hands,and leg due to not taking gabapentin  No nausea, vomiting, chest pain, shortness of breath  No fever, chills.            Review of Systems        4 point ROS including Respiratory, CV, GI and , other than that noted above is negative      Medications   I have reviewed current medications  in the \"current medication\" section of Epic.  Relevant changes include:     Physical Exam   General:       Vital signs:    Blood pressure 110/65, pulse 99, temperature 97.5  F (36.4  C), temperature source Oral, resp. rate 16, height 1.778 m (5' 10\"), weight 100.2 kg (221 lb), SpO2 93 %.  Estimated body mass index is 31.71 kg/m  as calculated from the following:    Height as of this encounter: 1.778 m (5' 10\").    Weight as of this encounter: 100.2 kg (221 lb).      Intake/Output Summary (Last 24 hours) at 11/18/2022 1437  Last data filed at 11/18/2022 0610  Gross per 24 hour   Intake 1500 ml   Output 1495 ml   Net 5 ml        Constitutional: Laying in bed in no acute distress  Eye: No icterus, no pallor  Mouth/ENT: Normal oral mucosa  Cardiovascular: S1, S2 normal.   Respiratory: B/L CTA  GI: Soft, NT, BS+  :   Neurology: Alert, awake, and oriented.   Psych:   MSK: Right foot dressing in place.   Integumentary:   Heme/Lymph/Imm:      Laboratory and Imaging Studies     I have reviewed  laboratory and imaging studies in the Epic. Pertinent findings are as below:    BMP  Recent Labs   Lab 11/18/22  1355 11/18/22  0717 11/18/22  0218 11/17/22  2126 11/17/22  1022   POTASSIUM  --   --   --   --  4.5   CR  --   --   --   --  1.22   * 171* 126* 107*  --      CBC  Recent Labs   Lab 11/17/22  1022   WBC 7.7   RBC 4.86   HGB 13.8   HCT 41.7   MCV 86   MCH 28.4   MCHC 33.1   RDW 13.0    "     INR  Recent Labs   Lab 11/17/22  1022   INR 0.99     LFTsNo lab results found in last 7 days.   PANCNo lab results found in last 7 days.        Impression/Plan        57 year old male patient with a past medical history significant for T2DM c/b charcot foot & diabetic retinopathy + neuropathy, legally blind, GERD, hepatitis C treated, HTN, HLD, papillary thyroid cancer in remission, hypothyroidism, NAFLD, MARGARITO, chronic pain, and h/o sigmoid colectomy for diverticulitis who was admitted to [facility] after undergoing right midfoot/talonavicular osteotomy and reduction of deformity right midfoot/talonavicular arthrodesis & achilles lengthening on 11/17/22.     # S/p Right midfoot/talonavicular osteotomy and reduction of deformity S/p Right midfoot/talonavicular arthrodesis & achilles lengthening  Patient with history of right midfoot Charcot since approx 2016, and left pes planovalgus with hallux valgus interphalangeus and associated ulcer s/p reconstruction August 2021 with good correction of deformity and healing of preop ulcer. Now s/p above procedure on 11/17/22.  ml.    - Management per primary orthopedics team including pain control, activity, antibiotics, DVT prophylaxis, wound cares. Please refer to their documentation for details.     # T2DM currently well controlled, history of diabetic retinopathy and diabetic neuropathy.  PTA on Jardiance, Humilin R U-500 5-10 units with meals, Metformin, Semaglutide.  Patient reports he has not taken Insulin recently, he takes prn only. He takes Semaglutide every Sunday.   - Monitor blood sugar before meals and at bedtime, carb controlled diet.  - Hold Oral agents today, likely restart in AM  - Start Insulin Aspart Medium intensity correction       # Hypothyroidism   - Continue PTA Synthroid     # MARGARITO on CPAP   - Continue home CPAP     # HTN and HLD   - Holding  PTA lisinopril as BP is low normal range   - Continue PTA statin     GERD   - Continue PTA  omeprazole     Migraines   - Continue PTA topamax and gabapentin  History of hepatitis C, treated  History of papillary thyroid cancer, in remission  History of nonalcoholic fatty liver disease  History of nephrolithiasis  Chronic pain syndrome  Legally blind  History of migraine headaches.  Continue PTA meds  DVT and PE prophylaxis: Aspirin 162 mg daily for 4 weeks, SCDs/per orthopedic service  Diet: Carb controlled diet  IV fluids: LR at 75 cc/h, I will discontinue once p.o. fluid intake is adequate                       Pt's care was discussed with bedside RN, patient and  during Care Team Rounds.               Velasquez Green MD  Hospitalist ( Internal medicine)  Pager: [phone]

## 2022-11-18 NOTE — OP NOTE
DATE OF SURGERY: 11/17/2022    PREOPERATIVE DIAGNOSIS: Right foot pain [M79.671]  Midfoot collapse of right lower extremity [M21.6X1]  Arthropathy of foot [M19.079]             POSTOPERATIVE DIAGNOSIS: Right foot pain [M79.671]  Midfoot collapse of right lower extremity [M21.6X1]  Arthropathy of foot [M19.079]     PROCEDURES:  1. Right foot Charcot reconstruction and deformity correction including extended medial column realignment and arthrodesis.  2. Right tendo Achilles lengthening.    PRIMARY SURGEON: Bryon Starr MD    FIRST ASSISTANT: Maria C Ludwig MD, PGY-4.    ANESTHESIA: General Endotracheal.    COMPLICATIONS: None apparent intraoperatively or immediately postoperatively.    IMPLANTS: [employer] medial column beaming screw, medial column locking plate/screw construct, VITOSS bone graft.    SIGNIFICANT FINDINGS: Sagittal and coronal plane deformity corrected to reduce rocker bottom and abduction deformity. This involved realignment and fusion of the talonavicular, naviculocuneiform, and first tarsometatarsal joints, ostectomy of the cuboid to reduce the plantar bony prominence, and tendo Achilles lengthening to correct the ensuing equinus deformity. Excellent foot alignment post procedure.    SPECIMENS: None.    DRAINS: None.    ESTIMATED BLOOD LOSS: 100 mL    INDICATIONS:                          Nehemiah Magallon is a 57 year old male patient with a history of diabetes mellitus, peripheral neuropathy, and symptomatic bilateral foot deformities among other medical problems. He has developed a right Charcot foot deformity with a rocker bottom foot and forefoot abduction. The prominent plantarly dislocated cuboid has caused a previous ulcer which has now healed, but remains a fixed bony prominence and poses an ongoing and future threat to skin integrity. He has undergone successful correction of a contralateral left foot deformity which also resolved previous ulceration on that side. The patient  now requests surgical correction of his right foot deformity. This was described to likely involve an extended medial column realignment and fusion, removal of prominent bone, and a tendo Achilles lengthening. The nature of the planned procedure, the risks, benefits, and alternatives, the postoperative plan, and reasonable expectations for short and long term outcome were discussed, all in layman's terms. No guarantees were expressed or implied as to outcome. Among the risks discussed we discussed the possibility the surgery may not achieve the intended effect, and that even if successful the foot would never be normal but at least expected to be improved from its current state. The risk of amputation was discussed as well. The patient had the opportunity to have all the questions they had at the time asked and answered appropriately, verbalized their understanding of this discussion, and verbalized his wish to proceed with the planned procedure. Written informed consent was obtained.    DESCRIPTION OF PROCEDURE:           Mr. Magallon was met in the preoperative holding area where the correct surgical site was identified with patient participation and marked by the primary surgeon. The patient was then brought to the operating room, where the Anesthesiology Service induced satisfactory general anesthesia.  Ancef was given IV.  Venous thromboembolic prophylaxis was performed with a sequential device on the contralateral left calf.  A Jones catheter was placed under standard sterile techniques.  The patient was placed supine on the operating room table with all bony prominences well padded and a safety strap across the patient's waist. A soft bump was placed under the right hip. A tourniquet was placed proximal to the surgical site on the operative extremity. The right foot was examined and noted to be free of ulceration or overt signs of infection. The patient's right lower extremity was then prepped and draped in  the usual sterile fashion. Prior to proceeding with the operation a timeout was held per hospital policy correctly identifying the patient, operative site, and procedure to be performed. All in the room agreed.    The right lower extremity was exsanguinated, and the tourniquet was inflated to 250 mm Hg. A longitudinal medial incision was made along the mid-sagittal aspect of the right foot from the anterior aspect of the medial malleolus until approximately the mid-first metatarsal. Meticulous dissection was performed and meticulous hemostasis was achieved. Care was taken to avoid injury to the deltoid ligament, posterior tibial tendon, and posterior neurovascular bundle. The distal aspect of the aponeurosis of the tibialis anterior insertion was carefully elevated off the medial aspect of the medial cuneiform as part of this approach. The medial capsules of the ankle, talonavicular, naviculocuneiform, and first tarsometatarsal joints were incised longitudinally and these joints with their respective adjacent bones were carefully elevated directly off of the surface of joint/bones. This process was complex given the nature of the patient's deformity in the right foot. The talus and navicular were quite plantarflexed, whereas the naviculocuneiform was quite dorsiflexed. The navicular had actually subluxated plantar to the cuneiform. Each of these joints were carefully distracted with a Hintermann retractor and denuded of cartilage. The bony surfaces were fish-scaled or shingled with a curved osteotome and mallet. Cartilage and soft tissue were excised from the joints and they were irrigated. With careful and meticulous mobilization of these joints and bones, the apex plantar rocker deformity and apex medial abduction deformity were able to be significantly, though not completely, realigned towards neutral. Moreover, the plantar cuboid was still palpable and prominent. Thus, additional correction was desired, and a  longitudinal lateral incision was made from the distal calcaneus, along the dorsolateral cuboid, and towards the fourth/fifth metatarsal bases. The incision was likewise meticulously dissected and meticulous hemostasis was achieved. Care was taken to avoid injury to the sural nerve and peroneal tendons with careful dissection and blunt retraction. The calcaneocuboid and cubometatarsal joints were exposed, and noted to be consistent with the appearance on preoperative imaging, with the cuboid subluxated/dislocated plantarly to such an extent that the calcaneus and 4th/5th metatarsal bases were in close proximity. The calcaneus and metatarsals were carefully distracted with a combination of a Hintermann retractor and a lamina . Knowing that the cuboid was posing an ongoing threat to skin integrity, risking future ulceration and amputation, and manipulating it to confirm it was fixed and not reducible, it was resected. This release of its tethering effect allowed improvement of the medial column into neutral AP and lateral plane view alignment. It also resulted in the previous plantar prominence now being significantly reduced. The medial column was provisionally stabilized with a pin. The hallux MTP joint was able to be plantarflexed much greater than dorsiflexed. It was plantarflexed and a small longitudinal incision was carefully made adjacent to and medial to the EHL tendon. Soft tissues were bluntly dissected and the EHL was retracted laterally. A guidepin was placed in the metatarsal head and advanced proximally through the medial cuneiform, navicular, and into the mid-talus. C-arm was checked in AP and lateral views to confirm it was within bone in both views. The pin was measured and drilled appropriately, and a [employer] beaming screw was advanced over the guidepin and countersunk below the articular surface of the metatarsal head. Excellent purchase was achieved and both lateral and AP C-arm  views confirmed appropriate intraosseous placement of the screw. The guidepin as well as the provisional fixation pin were removed, and a [facility] medial column Charcot plate was applied. It was secured in place to stabilize the extended medial column fusion with a combination of nonlocking screws (to lag the plate to bone) and locking screws. Direct visualization and C-arm views in AP and lateral confirmed appropriate plate and screw placement, as well a maintenance of an excellent reduction. Meary's angle appeared to be about neutral or zero degrees. The foot was now no longer in rocker deformity which as expected resulted in a compensatory equinus deformity. It appeared to be in the range to be appropriately corrected by a percutaneous tendo Achilles triple hemisection technique. The knee was flexed and the right lower extremity was externally rotated. Three small incisions were made in the midline of the posterior Achilles, and a triple hemisection was carefully performed in alternating directions medially/laterally. This improved dorsiflexion but not quite to neutral. A fourth hemisection was created, taking care to alternate direction with the adjacent next-most-proximal incision, and this resulted in the right ankle being able to be dorsiflexed to neutral. The tourniquet had been deflated at two hours. Meticulous hemostasis was achieved. There was no evidence for significant neurovascular injury. The wounds were thoroughly irrigated. The space in the lateral column left by resection of the plantarly prominent cuboid was filled with a combination of morcellized cuboid that had been removed from its pathologic location, as well as VITOSS bone graft. The tibialis anterior aponeurosis was reapproximated with direct apposition with Vicryl suture, as were the deep tissues in both the medial and lateral incisions, and the subcutaneous tissues. The skin was closed for all foot incisions with 3-0 nylon, and the  posterior Achilles incisions with Steri-Strips. The skin was cleansed off and dried, and then dressed sterilely. A well-padded splint was applied with care taken to avoid any major pressure points on skin. The patient was awakened from anesthesia, extubated in the OR, and taken to recovery room in stable condition.    All surgical counts were reported as correct at the end of the case.    I was present and scrubbed in for the entire case from skin incision until completion of wound closure.    POSTOPERATIVE PLAN:  Nonweightbearing on right lower extremity for at least 6 weeks depending on healing and any complications that may develop.  Admit for pain control, observation, therapy, and medical management.  Ice and elevate right lower extremity.  Keep right lower extremity splint clean, dry, and intact.  Follow-up in orthopaedic clinic in 2 weeks for splint removal, suture removal if the wounds are healed sufficiently, and placement of a removable medical walking boot.  Follow-up in 6 weeks for x-rays and consideration for possible weightbearing at that time.    Bryon Starr MD  Attending surgeon  Orthopaedic Surgery

## 2022-11-18 NOTE — CONSULTS
[facility]  Consult Note - Hospitalist Service  Date of Admission:  11/17/2022  Consult Requested by: Maria C Ludwig  Reason for Consult: Medical Comanagement    Assessment & Plan   Nehemiah Magallon is a 57 year old male patient with a past medical history significant for T2DM c/b charcot foot & diabetic retinopathy + neuropathy, legally blind, GERD, hepatitis C treated, HTN, HLD, papillary thyroid cancer in remission, hypothyroidism, NAFLD, MARGARITO, chronic pain, and h/o sigmoid colectomy for diverticulitis who was admitted to [facility] after undergoing right midfoot/talonavicular osteotomy and reduction of deformity right midfoot/talonavicular arthrodesis & achilles lengthening on 11/17/22.    S/p Right midfoot/talonavicular osteotomy and reduction of deformity S/p Right midfoot/talonavicular arthrodesis & achilles lengthening  Patient with history of right midfoot Charcot since approx 2016, and left pes planovalgus with hallux valgus interphalangeus and associated ulcer s/p reconstruction August 2021 with good correction of deformity and healing of preop ulcer. Now s/p above procedure on 11/17/22.  ml.    - Management per primary orthopedics team including pain control, activity, antibiotics, DVT prophylaxis, wound cares. Please refer to their documentation for details.    T2DM currently well controlled, history of diabetic retinopathy and diabetic neuropathy.  PTA on Jardiance, Humilin R U-500 5-10 units with meals, Metformin, Semaglutide.  Monitor blood sugar before meals and at bedtime, carb controlled diet.    Hypothyroidism   - Continue PTA Synthroid    MARGARITO on CPAP   - Continue home CPAP    HTN and HLD   - Hold PTA lisinopril in the immediate post-op setting. Consider resume tomorrow if BP stable.   - Continue PTA statin    GERD   - Continue PTA omeprazole    Migraines   - Continue PTA topamax and gabapentin  History of hepatitis C, treated  History of  "papillary thyroid cancer, in remission  History of nonalcoholic fatty liver disease  History of nephrolithiasis  Chronic pain syndrome  Legally blind  History of migraine headaches.  Continue PTA meds  DVT and PE prophylaxis: Aspirin 162 mg daily for 4 weeks, SCDs/per orthopedic service  Diet: Carb controlled diet  IV fluids: LR at 75 cc/h, I will discontinue once p.o. fluid intake is adequate  [facility]  Securely message with the Vocera Web Console (learn more here)  Text page via [facility] Paging/Directory       Hospitalist Service    Clinically Significant Risk Factors Present on Admission                  # Hypertension: home medication list includes antihypertensive(s)     # Obesity: Estimated body mass index is 31.71 kg/m  as calculated from the following:    Height as of this encounter: 1.778 m (5' 10\").    Weight as of this encounter: 100.2 kg (221 lb).           ______________________________________________________________________    Chief Complaint   S/p foot surgery    History is obtained from the patient and EMR    History of Present Illness   Nehemiah Magallon is a 57 year old male who had right reconstruction done by Dr. Starr today.  [facility]  He is satisfied with the result of the left foot reconstruction.  He has been having pain in the right foot and difficulty with ambulation.  Chronic pain syndrome.  He is diabetic, type II on metformin, Jardiance, Ozempic and Premeal short-acting insulin 5-10 3 times a day.  Patient states his last A1c below 6.  He does have history of diabetic retinopathy and peripheral neuropathy and right Charcot foot.  His past medical history is also significant for, lipidemia, obstructive sleep apnea, hypothyroidism and GERD.  He denies any history of emphysema, asthma, coronary artery disease congestive heart failure.  Denies any nausea or vomiting.  Denies any dizziness, " chest pain, shortness of breath.  He was eating to see him on the floor.    He did receive Dilaudid 1.5 mg, fentanyl 100 mcg Zofran intravenously intraoperatively.  He did receive total of 1500 mL of crystalloid fluids during surgery.  Is currently on room air and saturating okay      Review of Systems   The 10 point Review of Systems is negative other than noted in the HPI or here.  No dizziness, no chest pain shortness of breath.  No nausea or vomiting.  No rest of the 12 points review of systems negative except as stated in HPI.    Past Medical History    I have reviewed this patient's medical history and updated it with pertinent information if needed.   Past Medical History:   Diagnosis Date     Chronic pain syndrome 10/24/2014     Diabetes mellitus, type 2 (H)      Diabetic Charcot foot (H)      Diabetic retinopathy associated with diabetes mellitus due to underlying condition (H)      Diverticulitis of colon      Gastroesophageal reflux disease      Hepatitis C 2017    treated     History of skin cancer      Hypertension      Hypothyroidism      Legally blind      Midfoot collapse of right lower extremity      Migraine      NAFLD (nonalcoholic fatty liver disease)      Nephrolithiasis      Neuropathy      MARGARITO (obstructive sleep apnea)      Rib pain 10/24/2014     S/P colectomy 10/14/2014     Thyroid cancer (H) 10/14/2014       Past Surgical History   I have reviewed this patient's surgical history and updated it with pertinent information if needed.  Past Surgical History:   Procedure Laterality Date     CHOLECYSTECTOMY  1986     COLECTOMY      @ 6 years ago, sigmoid     COLONOSCOPY  2016     FOOT SURGERY Left 2021       Social History   I have reviewed this patient's social history and updated it with pertinent information if needed.  Social History     Tobacco Use     Smoking status: Never     Smokeless tobacco: Never   Substance Use Topics     Alcohol use: Not Currently     Comment: rarely     Drug use: No        Family History   I have reviewed this patient's family history and updated it with pertinent information if needed.  Family History   Problem Relation Age of Onset     Diabetes Mother      Cancer Mother      Diabetes Father      Heart Disease Father      Anesthesia Reaction No family hx of      Clotting Disorder No family hx of        Medications   I have reviewed this patient's current medications  Medications Prior to Admission   Medication Sig Dispense Refill Last Dose     acetaminophen (TYLENOL) 500 MG tablet Take 500-1,000 mg by mouth every 8 hours as needed   11/17/2022 at 0600     ammonium lactate (LAC-HYDRIN) 12 % external lotion APPLY THIN LAYER TOPICALLY TWICE A DAY AS NEEDED FOR DRY SKIN **EXTERNAL USE ONLY**   Past Week     bacitracin-neomycin-polymyxin (NEOSPORIN) 400-5-5000 external ointment Apply topically daily as needed (open sores on legs)   More than a month     Calcium-Vitamin D 500-3.125 MG-MCG TABS Take 2 tablets by mouth 2 times daily   11/16/2022 at 0600     cetirizine (ZYRTEC) 10 MG tablet Take 10 mg by mouth daily   11/17/2022 at 0600     diclofenac (VOLTAREN) 1 % topical gel Apply 4 g topically 4 times daily for 60 days 480 g 1 More than a month     empagliflozin (JARDIANCE) 25 MG TABS tablet Take 0.5 tablets by mouth daily   Past Week     fluticasone (FLONASE) 50 MCG/ACT nasal spray SPRAY 2 SPRAYS IN EACH NOSTRIL TWICE A DAY FOR RELIEF OF ALLERGIES AND CONGESTION -USE REGULARLY FOR BEST RESULTS   11/17/2022 at 0600     furosemide (LASIX) 40 MG tablet Take 40 mg by mouth daily as needed (hand swelling, weight >215 lbs)   Past Month     gabapentin (NEURONTIN) 600 MG tablet Take 1,200 mg by mouth 3 times daily   11/17/2022 at 0600     insulin reg HIGH CONC (HUMULIN R U-500 HIGH CONC) 500 Units/mL injection Inject 5-10 Units Subcutaneous 3 times daily as needed (with meals) Personal sliding scale   Past Month     levothyroxine (SYNTHROID/LEVOTHROID) 125 MCG tablet Take 125 mcg by mouth  daily   11/17/2022 at 0600     lidocaine (LIDODERM) 5 % patch Apply up to 3 patches to painful area at once for up to 12 h within a 24 h period.  Remove after 12 hours. (Patient taking differently: Apply up to 3 patches to painful area at once for up to 12 h within a 24 h period.  Remove after 12 hours. L foot.) 90 patch 0 11/16/2022 at 1800     linaclotide (LINZESS) 145 MCG capsule Take 1 capsule by mouth daily   Past Week     lisinopril (ZESTRIL) 40 MG tablet Take 40 mg by mouth daily   11/16/2022 at 0600     metFORMIN (GLUCOPHAGE XR) 500 MG 24 hr tablet Take 500 mg by mouth 2 times daily (with meals)   11/16/2022 at 1600     naproxen (NAPROSYN) 500 MG tablet Take 500 mg by mouth every evening   Past Week     omeprazole (PRILOSEC) 20 MG DR capsule Take 20 mg by mouth daily   11/17/2022 at 0600     pravastatin (PRAVACHOL) 40 MG tablet Take 40 mg by mouth every evening   11/16/2022 at 1600     Semaglutide (OZEMPIC, 1 MG/DOSE, SC) Inject 1 mg Subcutaneous once a week Sundays   Past Week     sodium fluoride dental gel (PREVIDENT) 1.1 % GEL topical gel BRUSH SMALL AMOUNT MOUTH EVERY MORNING AND AT BEDTIME ON TOOTHBRUSH, BRUSH FOR 2 MINUTES.   Past Week     SUMAtriptan (IMITREX) 25 MG tablet Take 25 mg by mouth at onset of headache   Past Week     topiramate (TOPAMAX) 100 MG tablet Take 100 mg by mouth 2 times daily   11/16/2022 at 1600     Vitamin D3 (CHOLECALCIFEROL) 25 mcg (1000 units) tablet Take 75 mcg by mouth daily   11/17/2022 at 0600     zolpidem ER (AMBIEN CR) 12.5 MG CR tablet Take 12.5 mg by mouth At Bedtime   Past Week     Liniments (VAPORIZING CREAM) 4.7-1.2-2.6 % CREA APPLY A THIN LAYER TOPICALLY FOUR TIMES A DAY AS NEEDED FOR PAIN          Allergies   Allergies   Allergen Reactions     Atorvastatin      Other reaction(s): Hyperglycemia     Celebrex [Celecoxib] Other (See Comments)     [facility]       Physical Exam   Vital Signs: Temp: 99.1  F (37.3  C) Temp src: Oral BP: 122/78 Pulse: 88    Resp: 10 SpO2: 96 % O2 Device: None (Room air) Oxygen Delivery: 6 LPM  Weight: 221 lbs 0 oz    General Appearance: Awake alert, in bed comfortably.  Eyes: No abnormalities detected  HEENT: Pink conjunctiva and anicteric sclera  Respiratory: Clear to auscultation bilaterally  Cardiovascular: No JVD, regular rate and rhythm, no murmur or gallop  GI: Nondistended, nontender, bowel sounds active  Lymph/Hematologic: No abnormalities detected  Genitourinary: Voids independently  Skin: No rashes no jaundice  Musculoskeletal: History of diabetic charcoaled foot bilaterally no any swollen or erythematous joints  Neurologic: Awake alert oriented brief neuro exam nonfocal.  Psychiatric: Normal affect, no agitation.    Data   I personally reviewed no images or EKG's today.  Results for orders placed or performed during the hospital encounter of 11/17/22 (from the past 24 hour(s))   Glucose by meter   Result Value Ref Range    GLUCOSE BY METER POCT 105 (H) 70 - 99 mg/dL   ABO/Rh type and screen    Narrative    The following orders were created for panel order ABO/Rh type and screen.  Procedure                               Abnormality         Status                     ---------                               -----------         ------                     Adult Type and Screen[[id number]]                            Final result                 Please view results for these tests on the individual orders.   CBC with platelets   Result Value Ref Range    WBC Count 7.7 4.0 - 11.0 10e3/uL    RBC Count 4.86 4.40 - 5.90 10e6/uL    Hemoglobin 13.8 13.3 - 17.7 g/dL    Hematocrit 41.7 40.0 - 53.0 %    MCV 86 78 - 100 fL    MCH 28.4 26.5 - 33.0 pg    MCHC 33.1 31.5 - 36.5 g/dL    RDW 13.0 10.0 - 15.0 %    Platelet Count 161 150 - 450 10e3/uL   Potassium   Result Value Ref Range    Potassium 4.5 3.4 - 5.3 mmol/L   Creatinine   Result Value Ref Range    Creatinine 1.22 0.66 - 1.25 mg/dL    GFR Estimate 69 >60 mL/min/1.73m2   INR   Result  Value Ref Range    INR 0.99 0.85 - 1.15   Partial thromboplastin time (PTT)   Result Value Ref Range    aPTT 29 22 - 38 Seconds   Adult Type and Screen   Result Value Ref Range    ABO/RH(D) A POS     Antibody Screen Negative Negative    SPECIMEN EXPIRATION DATE [id number]    XR Surgery DALILA L/T 5 Min Fluoro    Narrative    This exam was marked as non-reportable because it will not be read by a   radiologist or a [place] non-radiologist provider.         Glucose by meter   Result Value Ref Range    GLUCOSE BY METER POCT 107 (H) 70 - 99 mg/dL     Most Recent 3 CBC's:Recent Labs   Lab Test 11/17/22  1022   WBC 7.7   HGB 13.8   MCV 86        Most Recent 3 BMP's:Recent Labs   Lab Test 11/17/22 2126 11/17/22  1022 11/17/22  0959   POTASSIUM  --  4.5  --    CR  --  1.22  --    *  --  105*     Most Recent 2 LFT's:No lab results found.  Most Recent 3 INR's:Recent Labs   Lab Test 11/17/22  1022   INR 0.99

## 2022-11-18 NOTE — PROGRESS NOTES
11/18/22 1608   Appointment Info   Signing Clinician's Name / Credentials (PT) Herminia Price, KIRSTEN, DPT   Living Environment   People in Home spouse   Current Living Arrangements apartment   Home Accessibility   (elevator access)   Living Environment Comments Bathroom includes walk in shower with ~3 inch threshold, with shower chair and grab bars, standard height toilet with grab bars.   Self-Care   Usual Activity Tolerance moderate   Current Activity Tolerance fair   Equipment Currently Used at Home wheelchair, manual;cane, quad   Fall history within last six months no   Activity/Exercise/Self-Care Comment Pt used 1 forearm crutch for ambulation inside apartment & outside to take the dog out, uses manual WC to go to appointments. Pt does not drive due to charcot foot complications. Pt reports he rarely leaves home   General Information   Onset of Illness/Injury or Date of Surgery 11/17/22   Referring Physician Maria C Ludwig MD   Patient/Family Therapy Goals Statement (PT) to be able to do mobility and ADLs on my own   Pertinent History of Current Problem (include personal factors and/or comorbidities that impact the POC) per EMR: 57 year old male patient with a past medical history significant for T2DM c/b charcot foot & diabetic retinopathy + neuropathy, legally blind, GERD, hepatitis C treated, HTN, HLD, papillary thyroid cancer in remission, hypothyroidism, NAFLD, MARGARITO, chronic pain, and h/o sigmoid colectomy for diverticulitis who was admitted to [facility] after undergoing right midfoot/talonavicular osteotomy and reduction of deformity right midfoot/talonavicular arthrodesis & achilles lengthening on 11/17/22.   Existing Precautions/Restrictions fall;weight bearing   General Observations Patient is pleasant & agreeable to PT. Pt is legally blind- requests bright lights on in room- states he is able to see PT with the bright lights on but could not read the white board.   Cognition   Orientation Status  (Cognition) oriented x 4   Pain Assessment   Patient Currently in Pain   (did not state)   Integumentary/Edema   Integumentary/Edema Comments R foot & ankle splinted/wrapped   Range of Motion (ROM)   ROM Comment R ankle/foot ROM restricted with splint 2/2 post-op status. Other ROM appears WFL   Strength (Manual Muscle Testing)   Strength Comments Some L LE weakness displayed with difficulty with sit>stand transfer with NWB R LE; Displays some R LE weakness with some difficulty maintaining holding R foot sustained off floor for NWB in standing   Bed Mobility   Comment, (Bed Mobility) Came to sitting at EOB mod ind with HOB elevated and use of rail   Transfers   Comment, (Transfers) Sit>stand from elevated EOB with assist to support R LE extended for NWB R LE, pt tending to set R heel down with transition   Gait/Stairs (Locomotion)   Comment, (Gait/Stairs) not able to hop/ambulate with NWB R LE   Balance   Balance Comments Good sitting balance, needs B UE support for standing/transfers as decreased balance 2/2 NWB R LE. Pt does report he has baseline balance impairment & uses 1 forearm crutch due to this   Clinical Impression   Criteria for Skilled Therapeutic Intervention Yes, treatment indicated   PT Diagnosis (PT) decreased functional mobility   Influenced by the following impairments pain, decreased activity tolerance, NWB R LE, impaired balance 2/2 NWB R LE, functional weakness   Functional limitations due to impairments difficulty with bed mobility, transfers, ambulation   Clinical Presentation (PT Evaluation Complexity) Stable/Uncomplicated   Clinical Presentation Rationale clinical judgement   Clinical Decision Making (Complexity) low complexity   Planned Therapy Interventions (PT) balance training;bed mobility training;gait training;home exercise program;patient/family education;strengthening;transfer training;neuromuscular re-education;home program guidelines;progressive activity/exercise   Anticipated  Equipment Needs at Discharge (PT)   (needs elevating leg rest for WC for R LE; pt already has manual WC)   Risk & Benefits of therapy have been explained evaluation/treatment results reviewed;care plan/treatment goals reviewed;participants included;current/potential barriers reviewed;patient   Clinical Impression Comments Patient is below baseline mobility, needing assist of 1 to maintain NWB R LE with transfers. He will benefit from continued skilled PT to progress independence with transfers & mobility within NWB R LE   PT Total Evaluation Time   PT Eval, Low Complexity Minutes (96723) 9   Plan of Care Review   Plan of Care Reviewed With patient   Physical Therapy Goals   PT Frequency Daily   PT Predicted Duration/Target Date for Goal Attainment 11/25/22   PT Goals Bed Mobility;Transfers   PT: Bed Mobility Independent   PT: Transfers Supervision/stand-by assist;Within precautions;Bed to/from chair  (Squat or stand pivot without walker with NWB R LE maintained)   Interventions   Interventions Quick Adds Therapeutic Activity   Therapeutic Activity   Therapeutic Activities: dynamic activities to improve functional performance Minutes (61165) 23   Symptoms Noted During/After Treatment Fatigue   Treatment Detail/Skilled Intervention After eval, pt standing with walker at EOB with cues to keep R LE off floor- cued to flex knee to lift foot up off floor- pt able to barely keep foot off floor & sometimes rests foot on floor briefly- responds well to cues to lift it up. Cued pt to heel-toe L foot to move laterally at EOB- pt able to do so with walker and CGA. Able to progress to heel-toing to pivot to WC with walker and CGA; cued to extend R LE out for stand>sit to maitnain NWB- pt tending to set heel down with stand>sit transition. Pt reports needing to urinate; wheeled into bathroom ind'lly. Transferred WC>toilet with use of grab bar and CGA with R direction transfer, pt setting heel on floor and appears to bear some  weight through foot- edu that even this amount is too much and violates true NWB. With transfer to the L toilet>WC after cues for NWB, pt able to maintain R foot off floor with CGA for transfer- improved ease of transfer when going to the L. Pt also needed assist for pulling underwear up/down with transfer. pt wheeled self to EOB, lined up for transfer to bed and completed pivot transfer to the left with CGA. Sit>supine with SBA. Edu pt on plan to continue to work on consistently maintaining NWB with transfers. Edu pt that plan for mobilty at home to be WC based while WB precaution in place, pt voiced agreement. pt in bed upon PT departure, needs in reach.   PT Discharge Planning   PT Plan bed mobility per home set-up, pivot transfers to/from WC with NWB R LE   PT Discharge Recommendation (DC Rec) home with assist;home with home care physical therapy   PT Rationale for DC Rec Anticipate pt will progress with continued therapies in hospital in order for discharge home with plan for WC based mobility while NWB R LE. He will need an extendable leg rest for his R LE to attach to his manual wheelchair. Recommend home PT follow up to assess & promote safety with new WC use in home environment.   PT Brief overview of current status CGA + cues for NWB R LE with pivot transfers to the left, tends to set R foot down with R direction transfers   Total Session Time   Timed Code Treatment Minutes 23   Total Session Time (sum of timed and untimed services) 32

## 2022-11-18 NOTE — PHARMACY-ADMISSION MEDICATION HISTORY
Please see Admission Medication History completed on 11/10/22 under previous encounter at [facility] for information regarding prior to admission medications.    Last doses documented by OR staff.    Elva Rosen, PharmD, BCPS

## 2022-11-18 NOTE — PLAN OF CARE
Goal Outcome Evaluation:             VS: VSS and afebrile.   Capno:stable    O2: Sats > 92% on RA.  Denies SOB and chest pain    Output: Voids adequate amount   Last BM: 11/15 (Tuesday) per pt report   Activity: Non WB on surgical leg.   UP to BR with A1.    Skin: CDI except incision.    Pain: Managed with PRN oxy and robaxin    Neuro: Alert and oriented x4.  Baseline neuropathy in all extremities   Dressing: CDI    Diet: Regular   LDA: PIV: SL    Equipment: PCD, capno, cellphone, Hearing aide, and personal wheelchair belongings at bedside   Plan: COnt POC   Additional Info: Admitted in [facility] around 2200.     Legally blind in both eyes  Hard of hearing

## 2022-11-18 NOTE — PLAN OF CARE
"Goal Outcome Evaluation:           VS: /65 (BP Location: Right arm, Patient Position: Semi-Gonsalves's)   Pulse 99   Temp 97.5  F (36.4  C) (Oral)   Resp 16   Ht 1.778 m (5' 10\")   Wt 100.2 kg (221 lb)   SpO2 93%   BMI 31.71 kg/m     O2: Stable on RA. Wears  CPAP during night.   Output: Void spontaneously in the toilet.   Last BM: 11/18   Activity: Ax1 with walker and gait belt.   Skin: Old scars on Left leg, surgical splint to the right leg.   Pain: Gabapentin, Oxycodone, Atarax,  and Robaxin.   Neuro: Intact,    Dressing: Right leg splint clean dry and intact.   Diet: Regular diet. Tolerated well.   LDA: PIV x1 SL between abx.   Equipment: IV pump, call light with in reach, CPAP at bedside.   Plan: TBD.   Additional Info:          "

## 2022-11-18 NOTE — PROGRESS NOTES
SPIRITUAL HEALTH SERVICES  SPIRITUAL ASSESSMENT Progress Note  [facility] ([facility] 5 A ORTHO R 0526 11/18/22     REFERRAL SOURCE: Self Referral    Pt declined visit with Unit .    PLAN: No follow up necessary.    Severino Nunn MA, MPA  Associate   Pager: [phone]

## 2022-11-18 NOTE — PROGRESS NOTES
"Orthopaedic Surgery Progress Note     Subjective: No acute events overnight. Pain well controlled in operative extremity. Tolerating PO. Voiding spontaneously. +Flatus, no BM.     Objective: /56 (BP Location: Right arm)   Pulse 99   Temp 97.5  F (36.4  C) (Oral)   Resp 18   Ht 1.778 m (5' 10\")   Wt 100.2 kg (221 lb)   SpO2 96%   BMI 31.71 kg/m      General: NAD, alert and oriented, cooperative with exam.   Cardio: RRR, extremities wwp.   Respiratory: Non-labored breathing.  MSK:   - RLE: splint c/d/i. +EHL/FHL. Dense neuropathy limits sensory exam. Toes wwp.    Labs:  Hemoglobin   Date Value Ref Range Status   11/17/2022 13.8 13.3 - 17.7 g/dL Final   ]  All cultures:  No results for input(s): CULT in the last 168 hours.    Assessment and Plan: Nehemiah Magallon is a 57 year old male now s/p R charcot foot reconstruction on 11/18 with Dr. Starr. Doing well post-operatively.     Ortho Primary  Activity: Up with assist until independent.  Weight bearing status: NWB RLE.  Pain management: Transition from IV to PO as tolerated.    Antibiotics: Ancef x 24 hours.  Diet: Begin with clear fluids and progress diet as tolerated.   DVT prophylaxis: ASA 162mg daily and mechanical while in the hospital. ASA 162mg daily x 6 weeks   Bracing/Splinting: R short leg splint to be kept clean, dry, and intact until follow-up.   Elevation: Elevate RLE on pillows to keep above the level of the heart as much as possible.   Physical Therapy/Occupational Therapy: Eval and treat.  Consults: Medicine.  Follow-up: Clinic with Dr. Starr in 2 weeks   no x-rays needed.   Disposition: Pending progress with therapies, pain control on orals, and medical stability, anticipate discharge to home on POD #1-2.     Maria C Ludwig MD  Orthopaedic Surgery Resident, PGY-4  Pager: [phone]    For questions about this patient during the day, please attempt to CONTACT ME at my pager ([phone]) prior to contacting the Orthopaedic Surgery resident " on call. Thank you!

## 2022-11-18 NOTE — ANESTHESIA CARE TRANSFER NOTE
Patient: Nehemiah Magallon    Procedure: Procedure(s):  Right midfoot/talonavicular osteotomy and reduction of deformity Right midfoot/talonavicular arthrodesis, achilles lengthening  LENGTHENING, TENDON, ACHILLES       Diagnosis: Right foot pain [M79.671]  Midfoot collapse of right lower extremity [M21.6X1]  Arthropathy of foot [M19.079]  Diagnosis Additional Information: No value filed.    Anesthesia Type:   General     Note:    Oropharynx: oropharynx clear of all foreign objects and spontaneously breathing  Level of Consciousness: awake  Oxygen Supplementation: face mask  Level of Supplemental Oxygen (L/min / FiO2): 6  Independent Airway: airway patency satisfactory and stable  Dentition: dentition unchanged  Vital Signs Stable: post-procedure vital signs reviewed and stable  Report to RN Given: handoff report given  Patient transferred to: PACU    Handoff Report: Identifed the Patient, Identified the Reponsible Provider, Reviewed the pertinent medical history, Discussed the surgical course, Reviewed Intra-OP anesthesia mangement and issues during anesthesia, Set expectations for post-procedure period and Allowed opportunity for questions and acknowledgement of understanding      Vitals:  Vitals Value Taken Time   /81 11/17/22 2049   Temp     Pulse 86 11/17/22 2058   Resp 10 11/17/22 2058   SpO2 100 % 11/17/22 2058   Vitals shown include unvalidated device data.    Electronically Signed By: CARLITO Carbone CRNA  November 17, 2022  8:58 PM

## 2022-11-18 NOTE — BRIEF OP NOTE
[facility]    Brief Operative Note    Pre-operative diagnosis: Right foot pain [M79.671]  Midfoot collapse of right lower extremity [M21.6X1]  Arthropathy of foot [M19.079]  Post-operative diagnosis Same as pre-operative diagnosis    Procedure: Procedure(s):  Right midfoot/talonavicular osteotomy and reduction of deformity Right midfoot/talonavicular arthrodesis, achilles lengthening  LENGTHENING, TENDON, ACHILLES  Surgeon: Surgeon(s) and Role:     * Bryon Starr MD - Primary     * Maria C Ludwig MD - Resident - Assisting  Anesthesia: General   Estimated Blood Loss: 100 mL from 11/17/2022  3:05 PM to 11/17/2022  8:48 PM      Drains: None  Specimens: * No specimens in log *  Findings:   None.  Complications: None.  Implants:   Implant Name Type Inv. Item Serial No.  Lot No. LRB No. Used Action   BB trauma     [id number] Right 1 Implanted   PIN APEX S/D HALF [id number] THR - [id number] Wire PIN APEX S/D HALF [id number] THR  [employer]  Right 1 Implanted   IMP WIRE SHAWNA STRK SMOOTH [id number] - [id number]  IMP WIRE SHAWNA STRK SMOOTH [id number]  [employer]  Right 2 Implanted       Assessment and Plan: Nehemiah Magallon is a 57 year old male now s/p R charcot foot reconstruction on 11/17 with Dr. Starr. Doing well post-operatively.     Ortho Primary  Activity: Up with assist until independent.  Weight bearing status: NWB RLE.  Pain management: Transition from IV to PO as tolerated.    Antibiotics: Ancef x 24 hours.  Diet: Begin with clear fluids and progress diet as tolerated.   DVT prophylaxis: ASA 162mg and mechanical while in the hospital, discharge on ASA 162mg x 4 weeks.  Bracing/Splinting: R short leg splint to be kept clean, dry, and intact until follow-up.   Elevation: Elevate RLE on pillows to keep above the level of the heart as much as possible.   Physical Therapy/Occupational Therapy: Eval and treat.  Consults:  Medicine.  Follow-up: Clinic with Dr. Starr in 2 weeks   no x-rays needed.   Disposition: Pending progress with therapies, pain control on orals, and medical stability, anticipate discharge to home on POD #1-2.    Maria C Ludwig MD  Orthopaedic Surgery Resident, PGY-4  Pager: [phone]

## 2022-11-19 ENCOUNTER — APPOINTMENT (OUTPATIENT)
Dept: PHYSICAL THERAPY | Facility: CLINIC | Age: 57
DRG: 983 | End: 2022-11-19
Attending: ORTHOPAEDIC SURGERY
Payer: COMMERCIAL

## 2022-11-19 LAB
GLUCOSE BLDC GLUCOMTR-MCNC: 146 MG/DL (ref 70–99)
GLUCOSE BLDC GLUCOMTR-MCNC: 181 MG/DL (ref 70–99)
GLUCOSE BLDC GLUCOMTR-MCNC: 183 MG/DL (ref 70–99)
GLUCOSE BLDC GLUCOMTR-MCNC: 189 MG/DL (ref 70–99)
GLUCOSE BLDC GLUCOMTR-MCNC: 194 MG/DL (ref 70–99)
HCT VFR BLD AUTO: 32.7 % (ref 40–53)
HGB BLD-MCNC: 10.9 G/DL (ref 13.3–17.7)

## 2022-11-19 PROCEDURE — 250N000013 HC RX MED GY IP 250 OP 250 PS 637: Performed by: INTERNAL MEDICINE

## 2022-11-19 PROCEDURE — 250N000013 HC RX MED GY IP 250 OP 250 PS 637: Performed by: STUDENT IN AN ORGANIZED HEALTH CARE EDUCATION/TRAINING PROGRAM

## 2022-11-19 PROCEDURE — 99232 SBSQ HOSP IP/OBS MODERATE 35: CPT | Performed by: INTERNAL MEDICINE

## 2022-11-19 PROCEDURE — 97530 THERAPEUTIC ACTIVITIES: CPT | Mod: GP

## 2022-11-19 PROCEDURE — 97110 THERAPEUTIC EXERCISES: CPT | Mod: GP

## 2022-11-19 PROCEDURE — 85018 HEMOGLOBIN: CPT | Performed by: INTERNAL MEDICINE

## 2022-11-19 PROCEDURE — 120N000002 HC R&B MED SURG/OB UMMC

## 2022-11-19 PROCEDURE — 250N000009 HC RX 250: Performed by: STUDENT IN AN ORGANIZED HEALTH CARE EDUCATION/TRAINING PROGRAM

## 2022-11-19 PROCEDURE — 36415 COLL VENOUS BLD VENIPUNCTURE: CPT | Performed by: INTERNAL MEDICINE

## 2022-11-19 RX ORDER — METHOCARBAMOL 750 MG/1
750 TABLET, FILM COATED ORAL EVERY 6 HOURS PRN
Qty: 40 TABLET | Refills: 0 | Status: ON HOLD | OUTPATIENT
Start: 2022-11-19 | End: 2023-01-01

## 2022-11-19 RX ORDER — POLYETHYLENE GLYCOL 3350 17 G/17G
17 POWDER, FOR SOLUTION ORAL DAILY
Qty: 10 PACKET | Refills: 0 | Status: SHIPPED | OUTPATIENT
Start: 2022-11-19 | End: 2023-01-01

## 2022-11-19 RX ORDER — HYDROXYZINE HYDROCHLORIDE 25 MG/1
25 TABLET, FILM COATED ORAL EVERY 6 HOURS PRN
Qty: 30 TABLET | Refills: 0 | Status: ON HOLD | OUTPATIENT
Start: 2022-11-19 | End: 2023-01-01

## 2022-11-19 RX ORDER — AMOXICILLIN 250 MG
1 CAPSULE ORAL 2 TIMES DAILY
Qty: 30 TABLET | Refills: 0 | Status: ON HOLD | OUTPATIENT
Start: 2022-11-19 | End: 2023-01-01

## 2022-11-19 RX ORDER — OXYCODONE HYDROCHLORIDE 5 MG/1
5-10 TABLET ORAL EVERY 4 HOURS PRN
Qty: 26 TABLET | Refills: 0 | Status: ON HOLD | OUTPATIENT
Start: 2022-11-19 | End: 2023-01-01

## 2022-11-19 RX ORDER — ACETAMINOPHEN 325 MG/1
650 TABLET ORAL EVERY 4 HOURS PRN
Qty: 60 TABLET | Refills: 0 | Status: ON HOLD | OUTPATIENT
Start: 2022-11-20 | End: 2023-01-01

## 2022-11-19 RX ADMIN — TOPIRAMATE 100 MG: 100 TABLET, FILM COATED ORAL at 16:31

## 2022-11-19 RX ADMIN — LEVOTHYROXINE SODIUM 125 MCG: 125 TABLET ORAL at 08:51

## 2022-11-19 RX ADMIN — OXYCODONE HYDROCHLORIDE 5 MG: 5 TABLET ORAL at 12:01

## 2022-11-19 RX ADMIN — ACETAMINOPHEN 325MG 975 MG: 325 TABLET ORAL at 16:31

## 2022-11-19 RX ADMIN — INSULIN ASPART 1 UNITS: 100 INJECTION, SOLUTION INTRAVENOUS; SUBCUTANEOUS at 12:01

## 2022-11-19 RX ADMIN — LIDOCAINE PATCH 4% 1 PATCH: 40 PATCH TOPICAL at 21:05

## 2022-11-19 RX ADMIN — GABAPENTIN 1200 MG: 600 TABLET, FILM COATED ORAL at 08:51

## 2022-11-19 RX ADMIN — ACETAMINOPHEN 325MG 975 MG: 325 TABLET ORAL at 08:51

## 2022-11-19 RX ADMIN — PANTOPRAZOLE SODIUM 40 MG: 40 TABLET, DELAYED RELEASE ORAL at 08:51

## 2022-11-19 RX ADMIN — GABAPENTIN 1200 MG: 600 TABLET, FILM COATED ORAL at 20:12

## 2022-11-19 RX ADMIN — ASPIRIN 162 MG: 81 TABLET ORAL at 08:51

## 2022-11-19 RX ADMIN — PRAVASTATIN SODIUM 40 MG: 20 TABLET ORAL at 20:12

## 2022-11-19 RX ADMIN — LIDOCAINE: 40 CREAM TOPICAL at 21:34

## 2022-11-19 RX ADMIN — OXYCODONE HYDROCHLORIDE 5 MG: 5 TABLET ORAL at 21:04

## 2022-11-19 RX ADMIN — INSULIN ASPART 1 UNITS: 100 INJECTION, SOLUTION INTRAVENOUS; SUBCUTANEOUS at 16:51

## 2022-11-19 RX ADMIN — METHOCARBAMOL 750 MG: 750 TABLET ORAL at 12:01

## 2022-11-19 RX ADMIN — METHOCARBAMOL 750 MG: 750 TABLET ORAL at 21:07

## 2022-11-19 RX ADMIN — GABAPENTIN 1200 MG: 600 TABLET, FILM COATED ORAL at 13:55

## 2022-11-19 RX ADMIN — TOPIRAMATE 100 MG: 100 TABLET, FILM COATED ORAL at 08:51

## 2022-11-19 ASSESSMENT — ACTIVITIES OF DAILY LIVING (ADL)
ADLS_ACUITY_SCORE: 29
DEPENDENT_IADLS:: CLEANING;COOKING;LAUNDRY;SHOPPING;TRANSPORTATION
ADLS_ACUITY_SCORE: 29

## 2022-11-19 NOTE — PLAN OF CARE
"VS: /60 (BP Location: Right arm)   Pulse 105   Temp 97.1  F (36.2  C) (Oral)   Resp 16   Ht 1.778 m (5' 10\")   Wt 100.2 kg (221 lb)   SpO2 95%   BMI 31.71 kg/m     O2: O2> 95% ORA, denies feeling SOB  Lungs clear, equal bilateral  Uses CPAP overnight but declines using it citing being awake frequently - has it in his room    Output: Voids spontaneously in toilet   Last BM: 11/18    Activity: SBA w/ transfers to wheelchair   Needs reminding to use GB and walker to pivot transfer for safety    Up for meals? Pt is able to sit up for meals   Skin: Healed surgical scar on L foot,  Surgical splint to  R leg   Pain: Reports pain to lower extremities - managed w/ prn oxy (5mg), and robaxin,  scheduled tylenol, gabapentin, r   CMS: AOx4, reports having baseline neuropathy, no changes   Legally blind    Dressing: R leg splint, CDI   Diet: Reg, on glucose checks before meals, reports taking metformin at home   Denies nausea/vomiting    LDA: R forearm PIV SL   L forearm PIV SL   Plan: Pt spoke to surgeon w/ author present, plan to stay in unit over weekend to continue working with PT/OT   Pt \"hoping to leave on Monday\"   Additional Info:           "

## 2022-11-19 NOTE — PROGRESS NOTES
[facility]    Medicine Progress Note - Hospitalist Service, GOLD TEAM 17    Date of Admission:  11/17/2022    Assessment & Plan   NaunNehemiah is a 57 year old male patient with a past medical history significant for T2DM c/b charcot foot & diabetic retinopathy + neuropathy, legally blind, GERD, hepatitis C treated, HTN, HLD, papillary thyroid cancer in remission, hypothyroidism, NAFLD, MARGARITO, chronic pain, and h/o sigmoid colectomy for diverticulitis who was admitted to [facility] after undergoing right midfoot/talonavicular osteotomy and reduction of deformity right midfoot/talonavicular arthrodesis & achilles lengthening on 11/17/22.     # S/p Right midfoot/talonavicular osteotomy and reduction of deformity S/p Right midfoot/talonavicular arthrodesis & achilles lengthening  Patient with history of right midfoot Charcot since approx 2016, and left pes planovalgus with hallux valgus interphalangeus and associated ulcer s/p reconstruction August 2021 with good correction of deformity and healing of preop ulcer. Now s/p above procedure on 11/17/22.  ml.    - Management per primary orthopedics team including pain control, activity, antibiotics, DVT prophylaxis, wound cares. Please refer to their documentation for details.     # T2DM currently well controlled, history of diabetic retinopathy and diabetic neuropathy.  PTA on Jardiance, Humilin R U-500 5-10 units with meals, Metformin, Semaglutide.    -Patient reports he has not taken Insulin recently, he takes prn only.   -He takes Semaglutide every Sunday.     Monitor blood sugar before meals and at bedtime, carb controlled diet.    Resume Jardiance, Ozempic    Start Insulin Aspart Medium intensity correction     #Anemia  hgb on arrival 13.8; baseline unknown [facility]  - repeat H&H stable 10.9     monitor cbc      # Hypothyroidism   - Continue PTA Synthroid     # MARGARITO on CPAP   - Continue home CPAP     # HTN and  "HLD   - Holding  PTA lisinopril as BP is low normal range   - Continue PTA statin     GERD   - Continue PTA omeprazole     Migraines   - Continue PTA topamax and gabapentin  #History of hepatitis C, treated  History of papillary thyroid cancer, in remission  #History of nonalcoholic fatty liver disease  #History of nephrolithiasis  #Chronic pain syndrome  #Legally blind  #History of migraine headaches.      Continue PTA meds      IV fluids: stopping LR at 75 cc/h, p.o. fluid intake is adequate          Diet: Advance Diet as Tolerated: Regular Diet Adult    DVT Prophylaxis:  mg x4 weeks  Jones Catheter: Not present  Central Lines: None  Cardiac Monitoring: None  Code Status: Full Code      Disposition Plan     Expected Discharge Date: 11/20/2022      Destination: home          The patient's care was discussed with the Patient.    GABRIEL BARRAZA MD  Hospitalist Service, [address]  Securely message with the Vocera Web Console (learn more here)  Text page via [facility] Paging/Directory   Please see signed in provider for up to date coverage information      Clinically Significant Risk Factors                       # Obesity: Estimated body mass index is 31.71 kg/m  as calculated from the following:    Height as of this encounter: 1.778 m (5' 10\").    Weight as of this encounter: 100.2 kg (221 lb)., PRESENT ON ADMISSION         ______________________________________________________________________    Interval History   NAEON  Afebrile & HDS  Looking to discharge on Monday  No acute complaints    Data reviewed today: I reviewed all medications, new labs and imaging results over the last 24 hours.      Physical Exam   Vital Signs: Temp: 97.4  F (36.3  C) Temp src: Oral BP: 124/67 Pulse: 100   Resp: 16 SpO2: 96 % O2 Device: None (Room air)    Weight: 221 lbs 0 oz     General Appearance: Sitting comfortably in bed, on room air, in no acute distress of " discomfort  HEENT: PERRL: EOMI; moist mucous membrane w/o lesions  Neck: No JVD  Pulmonary: Clear to auscultation bilaterally, no wheezes or crackles  CVS: Regular rhythm, no murmurs, rubs or gallops  GI: BS (+), soft nontender, no rebound or guarding   Extremities: RLE distal foot in dressing  Skin: No rashes or lesions  Neurologic: A&O x3      Data   Recent Labs   Lab 11/19/22  0215 11/18/22  2146 11/18/22  1658 11/18/22  1541 11/17/22  2126 11/17/22  1022   WBC  --   --   --   --   --  7.7   HGB  --   --   --  10.8*  --  13.8   MCV  --   --   --   --   --  86   PLT  --   --   --   --   --  161   INR  --   --   --   --   --  0.99   POTASSIUM  --   --   --   --   --  4.5   CR  --   --   --   --   --  1.22   * 162* 173*  --    < >  --     < > = values in this interval not displayed.     No results found for this or any previous visit (from the past 24 hour(s)).  Medications     lactated ringers 75 mL/hr at 11/17/22 2210       acetaminophen  975 mg Oral Q8H     aspirin  162 mg Oral Daily     gabapentin  1,200 mg Oral TID     insulin aspart  1-7 Units Subcutaneous TID AC     insulin aspart  1-5 Units Subcutaneous At Bedtime     levothyroxine  125 mcg Oral Daily     lidocaine  1 patch Transdermal Q24h    And     lidocaine   Transdermal Q8H [facility]     linaclotide  145 mcg Oral Daily     pantoprazole  40 mg Oral Daily     polyethylene glycol  17 g Oral Daily     pravastatin  40 mg Oral QPM     senna-docusate  1 tablet Oral BID     sodium chloride (PF)  3 mL Intracatheter Q8H     topiramate  100 mg Oral BID

## 2022-11-19 NOTE — PLAN OF CARE
VS: Temp: 98.5  F (36.9  C) Temp src: Oral BP: 136/68 Pulse: 98   Resp: 16 SpO2: 98 % O2 Device: None (Room air)     O2: >95% RA   Output: Utilizes toilet, unmeasured   Last BM: 11/19/22 per pt   Activity: Independent w/ wheelchair   Skin: Intact, blanching redness to sacrum area, RLE w/ cast, dressing CDI   Pain: RLE pain managed w/ scheduled meds, PRN robaxin and oxycodone   CMS: Alert and oriented x4. Able to make needs known. Baseline numbness to all extremities d/t neuropathy   Dressing: RLE w/ cast, dressing CDI   Diet: Regular diet   LDA: Bilateral forearm PIVs, saline locked   Equipment: IV pole and pump, personal belongings   Plan: TBD. Call light is in reach, continue w/ POC   Additional Info:

## 2022-11-19 NOTE — PLAN OF CARE
Goal Outcome Evaluation:      Plan of Care Reviewed With: patient    Overall Patient Progress: improvingOverall Patient Progress: improving    Outcome Evaluation: VSS, denied pain this shift.    A/Ox4. VSS on Room Air. Denied SOB, CP, Cough, N/V, Dizziness, Headache. Baseline numbness in all extremities. Pt denied pain this shift. Regular diet, thin liquids, takes pills whole. Continent of B/B, LBM 11/18/22. Independent with wheel chair based. Skin is intact except R foot splint. R and L PIV SL, patent with flush. Call light within reach, able to make needs known. Appears to be sleeping during rounds. Continue POC with expected discharge on 11/20/22.    Bg of 181 at 0200

## 2022-11-19 NOTE — CONSULTS
Care Management Initial Consult    General Information  Assessment completed with: Patient,    Type of CM/SW Visit: Initial Assessment    Primary Care Provider verified and updated as needed: Yes   Readmission within the last 30 days: no previous admission in last 30 days      Reason for Consult: discharge planning  Advance Care Planning:            Communication Assessment  Patient's communication style: spoken language (English or Bilingual)    Hearing Difficulty or Deaf: yes   Wear Glasses or Blind: yes    Cognitive  Cognitive/Neuro/Behavioral: WDL  Level of Consciousness: alert  Arousal Level: opens eyes spontaneously     Mood/Behavior: cooperative, calm, behavior appropriate to situation          Living Environment:   People in home: spouse     Current living Arrangements:        Able to return to prior arrangements: yes       Family/Social Support:  Care provided by: spouse/significant other  Provides care for: no one  Marital Status:   Wife          Description of Support System: Supportive, Involved         Current Resources:   Patient receiving home care services: No     Community Resources: DME  Equipment currently used at home: wheelchair, manual, cane, quad, shower chair (Walker, crutch cane)  Supplies currently used at home:      Employment/Financial:  Employment Status: disabled        Financial Concerns: No concerns identified   Referral to Financial Worker: No       Lifestyle & Psychosocial Needs:  Social Determinants of Health     Tobacco Use: Low Risk      Smoking Tobacco Use: Never     Smokeless Tobacco Use: Never     Passive Exposure: Not on file   Alcohol Use: Not on file   Financial Resource Strain: Not on file   Food Insecurity: Not on file   Transportation Needs: Not on file   Physical Activity: Not on file   Stress: Not on file   Social Connections: Not on file   Intimate Partner Violence: Not on file   Depression: Not at risk     PHQ-2 Score: 0   Housing Stability: Not on file  "      Functional Status:  Prior to admission patient needed assistance:   Dependent ADLs:: Ambulation-cane, Ambulation-walker, Bathing, Dressing, Grooming  Dependent IADLs:: Cleaning, Cooking, Laundry, Shopping, Transportation       Mental Health Status:  Chemical Dependency Status:  Values/Beliefs:  Spiritual, Cultural Beliefs, Zoroastrianism Practices, Values that affect care:                 Additional Information:  Per notes, patient \" is a 57 year old male now s/p R charcot foot reconstruction on 11/18 with Dr. Starr\".     Introduced self/role and completed initial assessment with patient. Patient lives at home with his wife Wiliam who has been assisting him with cares (including dressing, grooming, bathing). He has a wheelchair, walker, shower chair and cane at home. [facility]. [facility] stated that he [facility] He sees Dr. Ochoa Echevarria. Therapy is recommending home care. Patient declined and stated \"this is not my first rodeo\". PT is also recommending \"an extendable leg rest for his R LE to attach to his manual wheelchair\". Patient agrees with the DME but it will need to [facility] the [facility]. [facility] to call the [facility] during business hours to request the leg rest.     Patient will need assist with setting up transportation at discharge. He uses [facility]- [phone]    Of note, patient verbalized that the surgeon told him he would be discharged on Monday.     [facility]   Dr. Echevarria  [phone]    [facility]- [phone]    MARCIA Alfaro RNCC  RN Care Coordinator   Office: [phone]   Pager: [phone]      "

## 2022-11-19 NOTE — PROGRESS NOTES
Occupational Therapy  Visit Type: treatment  Precautions:  Medical precautions:  fall risk;. PPE worn during session:  universal mask & eye protection  Lines:     Basic: capped IV  Safety Measures: none upon entry, pt getting up ad luci in room.    SUBJECTIVE  Patient agreed to participate in therapy this date.  \"I need to be able to get home and get back to doing what I need to do\"  Patient / Family Goal: return home and maximize function  No c/o pain    OBJECTIVE   Level of consciousness: alert    Oriented to person, place, time and situation     Affect/Behavior: cooperative and pleasant  Patient activity tolerance: 2 to 1 activity to rest  Functional Communication/Cognition    Overall status:  Within functional limits    Form of communication:  Verbal   Attention span:  Appears intact    Commands: follows all commands and directions consistently.    Transition between tasks: transitions tasks without difficulty.    Safety judgement: good awareness of safety precautions.    Awareness of deficits: fully aware of deficits.  Additional Details: Pt with good attention to safety and problem solving with mobility.  Aware of intermittent left foot drag and modification of step pattern.  Able to complete dual tasking cognition on motor tasks with no slowed gait pattern or increased instability.  Able to appropriately problem solve ways to maximize safety  Neurological Comments / Details: Reports continued numbness in thumb, 4th and 5th digit.    Transfers:    Assistive devices: gait belt    Sit to stand: independent    Stand to sit: independent    Training completed:      No overt loss of balance or buckling noted in LLE with transfers.  Pt with mild unsteadiness as session progresses, however, able to self correct mild balance impairments.  Good attention to safety throughout  Functional Ambulation:    Assistance:modified independent   Assistive device:none    Distance (ft): 350;500;350    Surface:  "Orthopaedic Surgery Progress Note     Subjective: No issues overnight. AF, HDS.      Objective: /67 (BP Location: Right arm)   Pulse 100   Temp 97.4  F (36.3  C)   Resp 16   Ht 1.778 m (5' 10\")   Wt 100.2 kg (221 lb)   SpO2 96%   BMI 31.71 kg/m      General: NAD, alert and oriented, cooperative with exam.   Cardio: RRR, extremities wwp.   Respiratory: Non-labored breathing.  MSK:   - RLE: splint c/d/i. +EHL/FHL. Sensory intact in all exposed skin    Labs:  Hemoglobin   Date Value Ref Range Status   11/18/2022 10.8 (L) 13.3 - 17.7 g/dL Final   ]  All cultures:  No results for input(s): CULT in the last 168 hours.    Assessment and Plan: Nehemiah Magallon is a 57 year old male now s/p R charcot foot reconstruction on 11/18 with Dr. Starr. Doing well post-operatively.     Ortho Primary  Activity: Up with assist until independent.  Weight bearing status: NWB RLE.  Pain management: Transition from IV to PO as tolerated.    Antibiotics: Ancef x 24 hours.  Diet: Begin with clear fluids and progress diet as tolerated.   DVT prophylaxis: ASA 162mg daily and mechanical while in the hospital. ASA 162mg daily x 6 weeks   Bracing/Splinting: R short leg splint to be kept clean, dry, and intact until follow-up.   Elevation: Elevate RLE on pillows to keep above the level of the heart as much as possible.   Physical Therapy/Occupational Therapy: Eval and treat.  Consults: Medicine.  Follow-up: Clinic with Dr. Starr in 2 weeks   no x-rays needed.   Disposition: Pending progress with therapies, pain control on orals, and medical stability, anticipate discharge to home on POD #1-2.     Cheko Castillo MD     " even    Details/Comments: Pt able to complete mobility with no device at modified independent level.  Mild LLE hip ER and intermittent foot drop.  Pt is aware and able to self correct.  Pt able to slow and speed up gait on demand as well as complete quick turns and stoop retrieval with mobility.  Able to complete dual tasking (cognition on motor) with no unsteadiness noted or overt loss of balance.  Able to verbalize strategies to maximize safety with limiting dial tasking  Activities of Daily Living (ADLs):  Grooming/Oral Hygiene:     Grooming assist: independent    Oral hygiene assist: independent    Position: standing at sink  Lower Body Dressing:     Assist: modified independent    Position: edge of bed    Footwear assistance: modified independent  Toilet transfer:     Assist: modified independent  Toileting:     Assist: modified independent  Tub Transfer:     Assist: modified independent    Pattern: side stepping  Activities of daily living training:   Pt is able to complete all self care tasks at modified independent level.  Mild increased time for fine motor components.  No overt loss of balance.  Pt with no concerns for ability to complete self cares at this time  Instrumental Activities of Daily Living:      Assist: modified independent    Pt is able to tolerate prolonged stance/mobility and self initiates rest breaks as needed.  Able to complete item retrieval (from multiple planes) and transport with no overt loss of balance.  Pt with periods of intermittent unsteadiness, but able to self correct balance.      Interventions     Hand grasp/release: reps sets, t-foam sponge- and flip  Training provided: ADL training, activity tolerance, balance retraining, compensatory techniques, functional ambulation, positioning, IADL training, safety training, transfer training, body mechanics, breathing/relaxation, energy conservation, manual therapy, neuromuscular reeducation and community reintegration  Skilled  input: verbal instruction/cues and tactile instruction/cues  Verbal Consent: Writer verbally educated and received verbal consent for hand placement, positioning of patient, and techniques to be performed today from patient for clothing adjustments for techniques and therapist position for techniques as described above and how they are pertinent to the patient's plan of care.        ASSESSMENT    Impairments: balance, sensation and strength  Functional Limitations: community reintegration    [facility] seen on [facility] nursing unit. Today's treatment focused on functional mobility and balance assessment including mobility with no device, stairs, floor transfers, item retrieval and transport, dual tasking cognition on motor tasks and stoop retrieval as pt wanting to d/c from hospital on this date versus transfer to [facility].  The patient is demonstrating good progress as evidenced by ability to complete mobility and tasks with no device with improves LE coordination and strength.  Pt with higher level balance and strength deficits and would continue to benefit from skilled PT/OT intervention to address higher level deficits.      For safe return to prior living situation the patient needs to be modified independent with self cares, functional mobility and IADL tasks. Patient reports that he can have friends/neighbors assist with grocery shopping/higher level IADL tasks if needed, however, pt with preference to remain self sufficient.  Pt has made progress since previous day.    Discharge Recommendations:  OT Identified Barriers to Discharge: lives alone, new onset LUE/LLE neuro deficits   Recommendations for Discharge: OT WI: Home, OP therapy(OP PT and OT, Referral Coordinators contacted on 2/23 by MADHAVI)      PT/OT Mobility Equipment for Discharge: may benefit from 2ww based on progress or discharge destination       Recommended Consults: OT: Physical Medicine and Rehabilitation Physician    Skilled therapy is required to address  these limitations in attempt to maximize the patient's independence.  Progress: improving as expected    End of Session:   Safety measures: call light within reach and equipment intact  Handoff to: nurse and therapist    PLAN  Suggestions for next session as indicated: Plan: higher level balance--work simulation (carrying equipment up/down stairs)    OT Frequency: 6 days/week  Frequency Comments: AI      Agreement to plan and goals: patient agrees with goals and treatment plan      GOALS  Review Date: 3/1/2021  Long Term Goals: (to be met by time of discharge from hospital)  Grooming: Patient will complete grooming tasks in standing modified independent.  Upper body dressing: Patient will complete upper body dressing modified independent.  Lower body dressing: Patient will complete lower body dressing modified independent.  Toileting: Patient will complete toileting modified independent.  Bathing: Patient will complete bathingmodified independent Toilet transfer: Patient will complete toilet transfer with modified independent.   Tub/shower transfer: Patient will complete tub/shower transfer with modified independent.   Home setting transfer: Patient will complete home setting transfers with modified independent.   Item retrieval: Patient will complete item retrieval modified independent.   Home program: patient independent with home program as instructed.         Documented in the chart in the following areas: Assessment. Plan.      Therapy proedure time and total treatment time can be found documented on the Time Entry flowsheet

## 2022-11-20 ENCOUNTER — APPOINTMENT (OUTPATIENT)
Dept: PHYSICAL THERAPY | Facility: CLINIC | Age: 57
DRG: 983 | End: 2022-11-20
Attending: ORTHOPAEDIC SURGERY
Payer: COMMERCIAL

## 2022-11-20 LAB
CREAT SERPL-MCNC: 0.99 MG/DL (ref 0.66–1.25)
GFR SERPL CREATININE-BSD FRML MDRD: 88 ML/MIN/1.73M2
GLUCOSE BLDC GLUCOMTR-MCNC: 158 MG/DL (ref 70–99)
GLUCOSE BLDC GLUCOMTR-MCNC: 173 MG/DL (ref 70–99)
GLUCOSE BLDC GLUCOMTR-MCNC: 174 MG/DL (ref 70–99)
GLUCOSE BLDC GLUCOMTR-MCNC: 181 MG/DL (ref 70–99)
GLUCOSE BLDC GLUCOMTR-MCNC: 186 MG/DL (ref 70–99)
HOLD SPECIMEN: NORMAL

## 2022-11-20 PROCEDURE — 99232 SBSQ HOSP IP/OBS MODERATE 35: CPT | Performed by: INTERNAL MEDICINE

## 2022-11-20 PROCEDURE — 36415 COLL VENOUS BLD VENIPUNCTURE: CPT | Performed by: ORTHOPAEDIC SURGERY

## 2022-11-20 PROCEDURE — 250N000009 HC RX 250: Performed by: STUDENT IN AN ORGANIZED HEALTH CARE EDUCATION/TRAINING PROGRAM

## 2022-11-20 PROCEDURE — 82565 ASSAY OF CREATININE: CPT | Performed by: ORTHOPAEDIC SURGERY

## 2022-11-20 PROCEDURE — 97530 THERAPEUTIC ACTIVITIES: CPT | Mod: GP

## 2022-11-20 PROCEDURE — 250N000013 HC RX MED GY IP 250 OP 250 PS 637: Performed by: INTERNAL MEDICINE

## 2022-11-20 PROCEDURE — 120N000002 HC R&B MED SURG/OB UMMC

## 2022-11-20 PROCEDURE — 97110 THERAPEUTIC EXERCISES: CPT | Mod: GP

## 2022-11-20 PROCEDURE — 250N000013 HC RX MED GY IP 250 OP 250 PS 637: Performed by: STUDENT IN AN ORGANIZED HEALTH CARE EDUCATION/TRAINING PROGRAM

## 2022-11-20 RX ORDER — VITAMIN B COMPLEX
75 TABLET ORAL DAILY
Status: DISCONTINUED | OUTPATIENT
Start: 2022-11-20 | End: 2022-11-21 | Stop reason: HOSPADM

## 2022-11-20 RX ORDER — CETIRIZINE HYDROCHLORIDE 10 MG/1
10 TABLET ORAL DAILY
Status: DISCONTINUED | OUTPATIENT
Start: 2022-11-20 | End: 2022-11-21 | Stop reason: HOSPADM

## 2022-11-20 RX ORDER — FLUTICASONE PROPIONATE 50 MCG
1 SPRAY, SUSPENSION (ML) NASAL DAILY
Status: DISCONTINUED | OUTPATIENT
Start: 2022-11-20 | End: 2022-11-21 | Stop reason: HOSPADM

## 2022-11-20 RX ORDER — SUMATRIPTAN 25 MG/1
25 TABLET, FILM COATED ORAL
Status: DISCONTINUED | OUTPATIENT
Start: 2022-11-20 | End: 2022-11-21 | Stop reason: HOSPADM

## 2022-11-20 RX ADMIN — INSULIN ASPART 1 UNITS: 100 INJECTION, SOLUTION INTRAVENOUS; SUBCUTANEOUS at 11:26

## 2022-11-20 RX ADMIN — TOPIRAMATE 100 MG: 100 TABLET, FILM COATED ORAL at 17:30

## 2022-11-20 RX ADMIN — Medication 75 MCG: at 08:46

## 2022-11-20 RX ADMIN — PANTOPRAZOLE SODIUM 40 MG: 40 TABLET, DELAYED RELEASE ORAL at 08:47

## 2022-11-20 RX ADMIN — ASPIRIN 162 MG: 81 TABLET ORAL at 08:46

## 2022-11-20 RX ADMIN — OXYCODONE HYDROCHLORIDE 5 MG: 5 TABLET ORAL at 21:11

## 2022-11-20 RX ADMIN — ACETAMINOPHEN 325MG 975 MG: 325 TABLET ORAL at 08:46

## 2022-11-20 RX ADMIN — INSULIN ASPART 1 UNITS: 100 INJECTION, SOLUTION INTRAVENOUS; SUBCUTANEOUS at 17:28

## 2022-11-20 RX ADMIN — METHOCARBAMOL 750 MG: 750 TABLET ORAL at 08:46

## 2022-11-20 RX ADMIN — GABAPENTIN 1200 MG: 600 TABLET, FILM COATED ORAL at 21:11

## 2022-11-20 RX ADMIN — METHOCARBAMOL 750 MG: 750 TABLET ORAL at 21:26

## 2022-11-20 RX ADMIN — PRAVASTATIN SODIUM 40 MG: 20 TABLET ORAL at 21:11

## 2022-11-20 RX ADMIN — LEVOTHYROXINE SODIUM 125 MCG: 125 TABLET ORAL at 08:47

## 2022-11-20 RX ADMIN — INSULIN ASPART 1 UNITS: 100 INJECTION, SOLUTION INTRAVENOUS; SUBCUTANEOUS at 08:51

## 2022-11-20 RX ADMIN — LIDOCAINE: 40 CREAM TOPICAL at 21:26

## 2022-11-20 RX ADMIN — GABAPENTIN 1200 MG: 600 TABLET, FILM COATED ORAL at 08:47

## 2022-11-20 RX ADMIN — EMPAGLIFLOZIN 10 MG: 10 TABLET, FILM COATED ORAL at 10:34

## 2022-11-20 RX ADMIN — Medication 2 TABLET: at 21:11

## 2022-11-20 RX ADMIN — CETIRIZINE HYDROCHLORIDE 10 MG: 10 TABLET, FILM COATED ORAL at 08:47

## 2022-11-20 RX ADMIN — METHOCARBAMOL 750 MG: 750 TABLET ORAL at 14:07

## 2022-11-20 RX ADMIN — Medication 2 TABLET: at 10:34

## 2022-11-20 RX ADMIN — LIDOCAINE PATCH 4% 1 PATCH: 40 PATCH TOPICAL at 21:13

## 2022-11-20 RX ADMIN — GABAPENTIN 1200 MG: 600 TABLET, FILM COATED ORAL at 14:07

## 2022-11-20 RX ADMIN — TOPIRAMATE 100 MG: 100 TABLET, FILM COATED ORAL at 08:47

## 2022-11-20 ASSESSMENT — ACTIVITIES OF DAILY LIVING (ADL)
ADLS_ACUITY_SCORE: 29

## 2022-11-20 NOTE — PROGRESS NOTES
Orthopaedic Surgery Progress Note 11/20/2022    S: No acute events overnight.  Pain controlled on IV/Oral meds.    O:  Temp: 97.3  F (36.3  C) Temp src: Oral BP: 122/57 Pulse: 85   Resp: 16 SpO2: 94 % O2 Device: None (Room air)      Exam:  Gen: No acute distress, resting comfortably in bed.  Resp: Non-labored breathing  MSK:  Exam:  Gen: No acute distress, resting comfortably in bed.  Resp: Non-labored breathing  MSK:  - RLE: splint c/d/i. +EHL/FHL. Decreased sensation, at baseline.    Recent Labs   Lab 11/19/22  0944 11/18/22  1541 11/17/22  1022   WBC  --   --  7.7   HGB 10.9* 10.8* 13.8   PLT  --   --  161     No results found for: SED      Assessment and Plan: Nehemiah Magallon is a 57 year old male now s/p R charcot foot reconstruction on 11/18 with Dr. Starr. Doing well post-operatively.      Ortho Primary  Activity: Up with assist until independent.  Weight bearing status: NWB RLE.  Pain management: Transition from IV to PO as tolerated.    Antibiotics: Ancef x 24 hours.  Diet: Begin with clear fluids and progress diet as tolerated.   DVT prophylaxis: ASA 162mg daily and mechanical while in the hospital. ASA 162mg daily x 6 weeks   Bracing/Splinting: R short leg splint to be kept clean, dry, and intact until follow-up.   Elevation: Elevate RLE on pillows to keep above the level of the heart as much as possible.   Physical Therapy/Occupational Therapy: Eval and treat.  Consults: Medicine.  Follow-up: Clinic with Dr. Starr in 2 weeks   no x-rays needed.   Disposition: Pending progress with therapies, pain control on orals, and medical stability, anticipate discharge to home on POD #1-2.    Future Appointments   Date Time Provider Department Center   11/20/2022  9:45 AM Harry Guerrero, PT [facility] [place]   12/1/2022  8:00 AM Pascale Ventura MD [facility]   12/2/2022  9:00 AM Bryon Starr MD [facility]       Patient discussed with .       Lobito Landaverde MD  PGY-1  Orthopaedic  Surgery

## 2022-11-20 NOTE — PLAN OF CARE
Pt is A&Ox4. VSS. LS clear, on RA. BS active, LBM on 11/19/2022. Voiding well. Pain managed with oxycodone and robaxin. RLE surgical dressing is c/d/I. Pt up independently with wheelchair. R and L PIV is patent and SL. Continue to monitor.

## 2022-11-20 NOTE — PROGRESS NOTES
[facility]    Medicine Progress Note - Hospitalist Service, GOLD TEAM 17    Date of Admission:  11/17/2022    Assessment & Plan   NaunNehemiah is a 57 year old male patient with a past medical history significant for T2DM c/b charcot foot & diabetic retinopathy + neuropathy, legally blind, GERD, hepatitis C treated, HTN, HLD, papillary thyroid cancer in remission, hypothyroidism, NAFLD, MARGARITO, chronic pain, and h/o sigmoid colectomy for diverticulitis who was admitted to [facility] after undergoing right midfoot/talonavicular osteotomy and reduction of deformity right midfoot/talonavicular arthrodesis & achilles lengthening on 11/17/22.     # S/p Right midfoot/talonavicular osteotomy and reduction of deformity S/p Right midfoot/talonavicular arthrodesis & achilles lengthening  Patient with history of right midfoot Charcot since approx 2016, and left pes planovalgus with hallux valgus interphalangeus and associated ulcer s/p reconstruction August 2021 with good correction of deformity and healing of preop ulcer. Now s/p above procedure on 11/17/22.  ml.    - Management per primary orthopedics team including pain control, activity, antibiotics, DVT prophylaxis, wound cares. Please refer to their documentation for details.     # T2DM currently well controlled, history of diabetic retinopathy and diabetic neuropathy.  PTA on Jardiance, Humilin R U-500 5-10 units with meals, Metformin, Semaglutide.    -Patient reports he has not taken Insulin recently, he takes prn only.   -He takes Semaglutide every Sunday.     Monitor blood sugar before meals and at bedtime, carb controlled diet.    Resume Jardiance    Ozempic following discharge; medication currently nonformulary.    Start Insulin Aspart Medium intensity correction     #Anemia  hgb on arrival 13.8; baseline unknown [facility]  - repeat H&H stable 10.9     monitor cbc      # Hypothyroidism   - Continue PTA  "Synthroid     # MARGARITO on CPAP   - Continue home CPAP     # HTN and HLD   - Holding  PTA lisinopril as BP is low normal range   - Continue PTA statin     GERD   - Continue PTA omeprazole     Migraines   - Continue PTA topamax and gabapentin  #History of hepatitis C, treated  History of papillary thyroid cancer, in remission  #History of nonalcoholic fatty liver disease  #History of nephrolithiasis  #Chronic pain syndrome  #Legally blind  #History of migraine headaches.      Continue PTA meds      IV fluids: stopping LR at 75 cc/h, p.o. fluid intake is adequate          Diet: Advance Diet as Tolerated: Regular Diet Adult  Discharge Instruction - Regular Diet Adult    DVT Prophylaxis:  mg x4 weeks  Jones Catheter: Not present  Central Lines: None  Cardiac Monitoring: None  Code Status: Full Code      Disposition Plan      Expected Discharge Date: 11/21/2022      Destination: home with family          The patient's care was discussed with the Patient.    GABRIEL BARRAZA MD  Hospitalist Service, [address]  Securely message with the Vocera Web Console (learn more here)  Text page via [facility] Paging/Directory   Please see signed in provider for up to date coverage information      Clinically Significant Risk Factors                       # Obesity: Estimated body mass index is 31.71 kg/m  as calculated from the following:    Height as of this encounter: 1.778 m (5' 10\").    Weight as of this encounter: 100.2 kg (221 lb)., PRESENT ON ADMISSION         ______________________________________________________________________    Interval History   No acute events overnight  Patient denies any complaints today.  Patient looking to be discharged tomorrow.    Data reviewed today: I reviewed all medications, new labs and imaging results over the last 24 hours.      Physical Exam   Vital Signs: Temp: 97.3  F (36.3  C) Temp src: Oral BP: 122/57 Pulse: 85   Resp: 16 SpO2: " 94 % O2 Device: None (Room air)    Weight: 221 lbs 0 oz     General Appearance: Sitting comfortably in bed, on room air, in no acute distress of discomfort  HEENT: PERRL: EOMI; moist mucous membrane w/o lesions  Neck: No JVD  Pulmonary: Clear to auscultation bilaterally, no wheezes or crackles  CVS: Regular rhythm, no murmurs, rubs or gallops  GI: BS (+), soft nontender, no rebound or guarding   Extremities: RLE distal foot in dressing  Skin: No rashes or lesions  Neurologic: A&O x3      Data   Recent Labs   Lab 11/20/22  1113 11/20/22  0843 11/20/22  0725 11/20/22  0146 11/19/22  1145 11/19/22  0944 11/18/22  1658 11/18/22  1541 11/17/22  2126 11/17/22  1022   WBC  --   --   --   --   --   --   --   --   --  7.7   HGB  --   --   --   --   --  10.9*  --  10.8*  --  13.8   MCV  --   --   --   --   --   --   --   --   --  86   PLT  --   --   --   --   --   --   --   --   --  161   INR  --   --   --   --   --   --   --   --   --  0.99   POTASSIUM  --   --   --   --   --   --   --   --   --  4.5   CR  --  0.99  --   --   --   --   --   --   --  1.22   *  --  158* 174*   < >  --    < >  --    < >  --     < > = values in this interval not displayed.     No results found for this or any previous visit (from the past 24 hour(s)).  Medications       acetaminophen  975 mg Oral Q8H     aspirin  162 mg Oral Daily     calcium carbonate-vitamin D  2 tablet Oral BID     cetirizine  10 mg Oral Daily     empagliflozin  10 mg Oral Daily     fluticasone  1 spray Both Nostrils Daily     gabapentin  1,200 mg Oral TID     insulin aspart  1-7 Units Subcutaneous TID AC     insulin aspart  1-5 Units Subcutaneous At Bedtime     levothyroxine  125 mcg Oral Daily     lidocaine  1 patch Transdermal Q24h    And     lidocaine   Transdermal Q8H SOFY     linaclotide  145 mcg Oral Daily     pantoprazole  40 mg Oral Daily     polyethylene glycol  17 g Oral Daily     pravastatin  40 mg Oral QPM     senna-docusate  1 tablet Oral BID     sodium  chloride (PF)  3 mL Intracatheter Q8H     topiramate  100 mg Oral BID     Vitamin D3  75 mcg Oral Daily

## 2022-11-20 NOTE — PLAN OF CARE
"VS: /57   Pulse 85   Temp 97.3  F (36.3  C) (Oral)   Resp 16   Ht 1.778 m (5' 10\")   Wt 100.2 kg (221 lb)   SpO2 94%   BMI 31.71 kg/m     O2: >90% on room air. Denies SOB/N/V/chest pain    Output: Voiding spontaneously without difficulties    Last BM: 11/19/22. Bowel sounds active in all quad    Activity: NWB to RLE. Independent with w/c   Skin: Healed surgical scar on L foot,  Surgical splint to  R leg   Pain: Managed with schedule Tylenol and PRN robaxin    CMS: Baseline neuropathy, unchanged    Dressing: R short leg splint CDI    Diet: Regular diet, BG before each meal    LDA: PIV to BUE, saline locked    Equipment: Personal belongings, wheelchair, call light within reach   Plan: TBD    Additional Info:         "

## 2022-11-20 NOTE — PLAN OF CARE
Physical Therapy Discharge Summary    Reason for therapy discharge:    All goals and outcomes met, no further needs identified.    Progress towards therapy goal(s). See goals on Care Plan in [facility] electronic health record for goal details.  Goals met    Therapy recommendation(s):    Continued therapy is recommended.  Rationale/Recommendations:  home care therapy to promote strengthening and WB precautions in the home..

## 2022-11-21 VITALS
HEART RATE: 102 BPM | OXYGEN SATURATION: 97 % | HEIGHT: 70 IN | DIASTOLIC BLOOD PRESSURE: 61 MMHG | SYSTOLIC BLOOD PRESSURE: 114 MMHG | BODY MASS INDEX: 31.64 KG/M2 | TEMPERATURE: 96.7 F | RESPIRATION RATE: 18 BRPM | WEIGHT: 221 LBS

## 2022-11-21 LAB — GLUCOSE BLDC GLUCOMTR-MCNC: 189 MG/DL (ref 70–99)

## 2022-11-21 PROCEDURE — 99232 SBSQ HOSP IP/OBS MODERATE 35: CPT | Performed by: INTERNAL MEDICINE

## 2022-11-21 PROCEDURE — 250N000013 HC RX MED GY IP 250 OP 250 PS 637: Performed by: STUDENT IN AN ORGANIZED HEALTH CARE EDUCATION/TRAINING PROGRAM

## 2022-11-21 PROCEDURE — 250N000013 HC RX MED GY IP 250 OP 250 PS 637: Performed by: INTERNAL MEDICINE

## 2022-11-21 PROCEDURE — 999N000111 HC STATISTIC OT IP EVAL DEFER

## 2022-11-21 RX ADMIN — EMPAGLIFLOZIN 10 MG: 10 TABLET, FILM COATED ORAL at 08:29

## 2022-11-21 RX ADMIN — PANTOPRAZOLE SODIUM 40 MG: 40 TABLET, DELAYED RELEASE ORAL at 08:29

## 2022-11-21 RX ADMIN — TOPIRAMATE 100 MG: 100 TABLET, FILM COATED ORAL at 08:29

## 2022-11-21 RX ADMIN — ASPIRIN 162 MG: 81 TABLET ORAL at 08:29

## 2022-11-21 RX ADMIN — METHOCARBAMOL 750 MG: 750 TABLET ORAL at 08:43

## 2022-11-21 RX ADMIN — CETIRIZINE HYDROCHLORIDE 10 MG: 10 TABLET, FILM COATED ORAL at 08:29

## 2022-11-21 RX ADMIN — LEVOTHYROXINE SODIUM 125 MCG: 125 TABLET ORAL at 08:29

## 2022-11-21 RX ADMIN — OXYCODONE HYDROCHLORIDE 5 MG: 5 TABLET ORAL at 08:41

## 2022-11-21 RX ADMIN — Medication 75 MCG: at 08:29

## 2022-11-21 RX ADMIN — GABAPENTIN 1200 MG: 600 TABLET, FILM COATED ORAL at 08:29

## 2022-11-21 RX ADMIN — Medication 2 TABLET: at 08:29

## 2022-11-21 RX ADMIN — ACETAMINOPHEN 650 MG: 325 TABLET, FILM COATED ORAL at 09:58

## 2022-11-21 ASSESSMENT — ACTIVITIES OF DAILY LIVING (ADL)
ADLS_ACUITY_SCORE: 29

## 2022-11-21 NOTE — PROGRESS NOTES
Orthopaedic Surgery Progress Note 11/21/2022    S: No acute events overnight.  Pain controlled. Plan to discharge today.     O:  Temp: 97.6  F (36.4  C) Temp src: Oral BP: 139/81 Pulse: 101   Resp: 18 SpO2: 96 % O2 Device: None (Room air)      Exam:  Gen: No acute distress, resting comfortably in bed.  Resp: Non-labored breathing  MSK:  Exam:  Gen: No acute distress, resting comfortably in bed.  Resp: Non-labored breathing  MSK:  - RLE: splint c/d/i. +EHL/FHL. Decreased sensation, at baseline.    Recent Labs   Lab 11/19/22  0944 11/18/22  1541 11/17/22  1022   WBC  --   --  7.7   HGB 10.9* 10.8* 13.8   PLT  --   --  161     No results found for: SED      Assessment and Plan: Nehemiah Magallon is a 57 year old male now s/p R charcot foot reconstruction on 11/18 with Dr. Starr. Doing well post-operatively.    Discharging today       Ortho Primary  Activity: Up with assist until independent.  Weight bearing status: NWB RLE.  Pain management: Transition from IV to PO as tolerated.    Antibiotics: Ancef x 24 hours.  Diet: Begin with clear fluids and progress diet as tolerated.   DVT prophylaxis: ASA 162mg daily and mechanical while in the hospital. ASA 162mg daily x 6 weeks   Bracing/Splinting: R short leg splint to be kept clean, dry, and intact until follow-up.   Elevation: Elevate RLE on pillows to keep above the level of the heart as much as possible.   Physical Therapy/Occupational Therapy: Eval and treat.  Consults: Medicine.  Follow-up: Clinic with Dr. Starr in 2 weeks   no x-rays needed.   Disposition: Pending progress with therapies, pain control on orals, and medical stability, anticipate discharge to home on POD #1-2.    Future Appointments   Date Time Provider Department Center   11/20/2022  9:45 AM Harry Guerrero, PT [facility]   12/1/2022  8:00 AM Pascale Ventura MD [facility]   12/2/2022  9:00 AM Bryon Starr MD [facility]       Patient discussed with Dr.Yoon. Lombardi  MD Oziel  Orthopedic Surgery PGY4  [phone]    Please page me directly with any questions/concerns during regular weekday hours before 5 pm. If there is no response, if it is a weekend, or if it is during evening hours then please page the orthopedic surgery resident on call.

## 2022-11-21 NOTE — PROGRESS NOTES
[facility]    Medicine Progress Note - Hospitalist Service, GOLD TEAM 17    Date of Admission:  11/17/2022    Assessment & Plan    NaunNehemiah is a 57 year old male patient with a past medical history significant for T2DM c/b charcot foot & diabetic retinopathy + neuropathy, legally blind, GERD, hepatitis C treated, HTN, HLD, papillary thyroid cancer in remission, hypothyroidism, NAFLD, MARGARITO, chronic pain, and h/o sigmoid colectomy for diverticulitis who was admitted to [facility] after undergoing right midfoot/talonavicular osteotomy and reduction of deformity right midfoot/talonavicular arthrodesis & achilles lengthening on 11/17/22.     # S/p Right midfoot/talonavicular osteotomy and reduction of deformity S/p Right midfoot/talonavicular arthrodesis & achilles lengthening  Patient with history of right midfoot Charcot since approx 2016, and left pes planovalgus with hallux valgus interphalangeus and associated ulcer s/p reconstruction August 2021 with good correction of deformity and healing of preop ulcer. Now s/p above procedure on 11/17/22.  ml.    - Management per primary orthopedics team including pain control, activity, antibiotics, DVT prophylaxis, wound cares. Please refer to their documentation for details.     # T2DM currently well controlled, history of diabetic retinopathy and diabetic neuropathy.  PTA on Jardiance, Humilin R U-500 5-10 units with meals, Metformin ( restart on discharge  ), Semaglutide.    -Patient reports he has not taken Insulin recently, he takes prn only.   -He takes Semaglutide every Sunday.,      #Anemia  hgb on arrival 13.8; baseline unknown [facility]  - repeat H&H stable 10.9       # Hypothyroidism   - Continue PTA Synthroid     # MARGARITO on CPAP   - Continue home CPAP     # HTN and HLD   - Holding  PTA lisinopril as BP is low normal range   - Continue PTA statin     GERD   - Continue PTA omeprazole     Migraines   -  "Continue PTA topamax and gabapentin  #History of hepatitis C, treated  History of papillary thyroid cancer, in remission  #History of nonalcoholic fatty liver disease  #History of nephrolithiasis  #Chronic pain syndrome  #Legally blind  #History of migraine headaches.      Continue PTA meds            Diet: Advance Diet as Tolerated: Regular Diet Adult  Discharge Instruction - Regular Diet Adult    DVT Prophylaxis: Defer to primary service  Jones Catheter: Not present  Central Lines: None  Cardiac Monitoring: None  Code Status: Full Code      Disposition Plan           The patient's care was discussed with the care team  .    Torie Darby MD  Hospitalist Service, GOLD TEAM [address] [place] [address]  Securely message with the Vocera Web Console (learn more here)  Text page via [facility] Paging/Directory   Please see signed in provider for up to date coverage information      Clinically Significant Risk Factors                       # Obesity: Estimated body mass index is 31.71 kg/m  as calculated from the following:    Height as of this encounter: 1.778 m (5' 10\").    Weight as of this encounter: 100.2 kg (221 lb)., PRESENT ON ADMISSION         ______________________________________________________________________    Interval History      He is doing well, ready to go home, no chest pain  No shortness of breath , states he can manage his BS better than anyone       Data reviewed today: I reviewed all medications, new labs and imaging results over the last 24 hours.    Physical Exam   Vital Signs: Temp: 97.6  F (36.4  C) Temp src: Oral BP: 139/81 Pulse: 101   Resp: 18 SpO2: 96 % O2 Device: None (Room air)    Weight: 221 lbs 0 oz   General appearence: awake alert  In no apparent distress    HEENT: EOMI, PEARLA, sclera nonicteric,  moist,  mucus membranes,   NECK : supple  RESPIRATORY: lungs clear to auscultation bilateral,    CARDIOVASCULAR:S1 S2 regular rate and rhythm, no rubs " gallops or murmurs appreciated  GASTROINTESTINAL:soft, non-distended , non-tender , + bowel sounds,   SKIN: warm and dry, no mottling noted   NEUROLOGIC; awake alert and oriented, no focal deficits found    MUSCULOSKELETAL: right leg wrapped        Data   Recent Labs   Lab 11/20/22 2127 11/20/22  1643 11/20/22  1113 11/20/22  0843 11/19/22  1145 11/19/22  0944 11/18/22  1658 11/18/22  1541 11/17/22 2126 11/17/22  1022   WBC  --   --   --   --   --   --   --   --   --  7.7   HGB  --   --   --   --   --  10.9*  --  10.8*  --  13.8   MCV  --   --   --   --   --   --   --   --   --  86   PLT  --   --   --   --   --   --   --   --   --  161   INR  --   --   --   --   --   --   --   --   --  0.99   POTASSIUM  --   --   --   --   --   --   --   --   --  4.5   CR  --   --   --  0.99  --   --   --   --   --  1.22   * 173* 186*  --    < >  --    < >  --    < >  --     < > = values in this interval not displayed.     No results found for this or any previous visit (from the past 24 hour(s)).

## 2022-11-21 NOTE — PROGRESS NOTES
End of Shift Summary. See flowsheets for vital signs and detailed assessment.     Changes this shift: No events overnight vitals stable, pain tolerable, no PRNs provided.       Plan:  Possible discharge today          Goal Outcome Evaluation:     Plan of Care Reviewed With: Patient     Overall Patient Progress: improving     Outcome Evaluation: stable for discharge.

## 2022-11-21 NOTE — PLAN OF CARE
"Goal Outcome Evaluation:    VS: /62 (BP Location: Right arm)   Pulse 102   Temp 98.6  F (37  C) (Oral)   Resp 16   Ht 1.778 m (5' 10\")   Wt 100.2 kg (221 lb)   SpO2 95%   BMI 31.71 kg/m       O2: Sats >90% on RA.   Output: Voiding without difficulty in BR.   Last BM: LBM 11/20 per pt report.   Activity: Up independently/pivoting to wheelchair.    Skin: Intact except RLE incision.   Pain: Pain managed with prn oxycodone and robaxin.     Neuro: A&O X4. CMS intact.    Dressing: RLW soft cast dressing CDI. Leg elevated on foam pillow.   Diet: Tolerating regular diet.  and 181.   LDA: PIV SL into R/L forearm.   Equipment: IV pole. Patient belongings.   Plan: Possible discharge home tomorrow. Will continue to monitor.    Additional Info:                            "

## 2022-11-21 NOTE — PLAN OF CARE
Occupational Therapy: Orders received. Chart reviewed and discussed with care team.? Occupational Therapy not indicated due to pt close to baseline, has had same surgery prior and is familiar with recovery. Pt has all needed DME and is able to complete basic ADL within precautions.? Defer discharge recommendations to physical therapy.? Will complete orders.

## 2022-11-21 NOTE — PLAN OF CARE
"Goal Outcome Evaluation:      Plan of Care Reviewed With: patient    Overall Patient Progress: improvingOverall Patient Progress: improving    Outcome Evaluation: Pt discharged home this shift. MD signed off.    /61   Pulse 102   Temp (!) 96.7  F (35.9  C)   Resp 18   Ht 1.778 m (5' 10\")   Wt 100.2 kg (221 lb)   SpO2 97%   BMI 31.71 kg/m      Pt. discharged at 1100 to home, and left with personal belongings. Pt. received complete discharge paperwork and all medications as filled by discharge pharmacy. Pt received and signed for the narcotic medication oxycodone. Pt. was given times of last dose for all discharge medications in writing on discharge medication sheets. Discharge teaching included all medication, pain management, activity restrictions, dressing changes, and signs and symptoms of infection. Dressing supplies sent home. Pt. to follow up with Dr. Thompson in two weeks. Pt. had no further questions at the time of discharge and no unmet needs were identified.    "

## 2022-11-21 NOTE — PROGRESS NOTES
Care Management Discharge Note    Discharge Date: 11/21/2022       Discharge Disposition: Home    Discharge Services: declined the recommended home care services    Discharge DME: Other (see comment) (Leg rest for wheelchair)    Discharge Transportation: health plan transportation     Patient/family educated on Medicare website which has current facility and service quality ratings:  ([facility])    Handoff Referral Completed: Yes    Additional Information:  This writer contacted [facility] to set up a ride home for the patient.  [facility] advised this writer to call the travel department of the [facility], as the [facility] set [facility] transport previously.  This writer called the number [facility] provided ([phone]) and was unable to get ahold of a representative - a voicemail was left.  Veronica HANDLEY called the [facility] and received a number for [employer] ([phone]).  This writer called that number and spoke with a representative who then called [facility] transportation [facility], as [employer] claimed they did not provide the initial ride to the patient to get to the hospital, and therefore another transport entity would be the appropriate party to speak to.  This writer was then transferred to [facility] travel department, who then facilitated setting up a ride with [employer] to pick the patient up.    10:30: Bedside RN Syeda Edmonds was notified that [employer] will pick patient up within the next hour or so.  Met with patient at bedside and informed him of his arranged transport and explained that a referral for the recommended DME will be sent to his PCP, which he can then elect to follow through on acquiring or not.    Veronica HANDLEY routed PT documentation recommending right leg extender to patient's PCP with notation that the patient is declining the DME at this time.    RNCC available as needed.        Natalio Carrillo RNCC

## 2022-11-23 NOTE — DISCHARGE SUMMARY
ORTHOPAEDIC SURGERY DISCHARGE SUMMARY     Date of Admission: 11/17/2022  Date of Discharge: 11/21/2022 11:26 AM  Disposition: Home  Staff Physician: No att. providers found  Primary Care Provider: Ochoa Echevarria    DISCHARGE DIAGNOSIS:  Right foot pain   Midfoot collapse of right lower extremity  Arthropathy of foot    PROCEDURES: Procedure(s):  Right midfoot/talonavicular osteotomy and reduction of deformity Right midfoot/talonavicular arthrodesis, achilles lengthening  LENGTHENING, TENDON, ACHILLES on 11/17/2022    BRIEF HISTORY:  Nehemiah Magallon is a 57 year old male patient with a history of diabetes mellitus, peripheral neuropathy, and symptomatic bilateral foot deformities among other medical problems. He has developed a right Charcot foot deformity with a rocker bottom foot and forefoot abduction. The prominent plantarly dislocated cuboid has caused a previous ulcer which has now healed, but remains a fixed bony prominence and poses an ongoing and future threat to skin integrity. He has undergone successful correction of a contralateral left foot deformity which also resolved previous ulceration on that side. The patient now requests surgical correction of his right foot deformity. This was described to likely involve an extended medial column realignment and fusion, removal of prominent bone, and a tendo Achilles lengthening. The nature of the planned procedure, the risks, benefits, and alternatives, the postoperative plan, and reasonable expectations for short and long term outcome were discussed, all in layman's terms. No guarantees were expressed or implied as to outcome. Among the risks discussed we discussed the possibility the surgery may not achieve the intended effect, and that even if successful the foot would never be normal but at least expected to be improved from its current state. The risk of amputation was discussed as well. The patient had the opportunity to have all the questions they had at  the time asked and answered appropriately, verbalized their understanding of this discussion, and verbalized his wish to proceed with the planned procedure. Written informed consent was obtained.    HOSPITAL COURSE:    The patient was admitted following the above listed procedures for pain control and rehabilitation. Nehemiah Magallon did well post-operatively. Medicine was consulted post operatively to aid in management of medical co-morbidities. The patient received routine nursing cares and at the time of discharge was medically stable. Vital signs were stable throughout admission. The patient is tolerating a regular diet and is voiding spontaneously. All PT/OT goals have been met for safe mobility. Pain is now controlled on oral medications which will be available on discharge. Stool softeners have been used while taking pain medications to help prevent constipation. Nehemiah Magallon is deemed medically safe to discharge.     Antibiotics:  Ancef given periop and 24 hours postop.  DVT prophylaxis:  ASA 162mg every day initiated after surgery and will be continued for 6 weeks.   PT Progress:  Has met PT/OT goals for safe mobility.    Pain Meds:  Weaned off all IV pain meds by discharge.  Inpatient Events: No significant events or complications.     PHYSICAL EXAM:    Gen: No acute distress, resting comfortably in bed.  Resp: Non-labored breathing  MSK:  Exam:  Gen: No acute distress, resting comfortably in bed.  Resp: Non-labored breathing  MSK:  - RLE: splint c/d/i. +EHL/FHL. Decreased sensation, at baseline.    FOLLOWUP:      Future Appointments   Date Time Provider Department Center   12/1/2022  8:00 AM Pascale Ventura MD [facility]   12/2/2022  9:00 AM Bryon Starr MD [facility]       Orthopaedic Surgery appointments are at the [facility] ([address] [place] [id number]). Call [phone] to schedule a follow-up appointment at this location with your  provider.     PLANNED DISCHARGE ORDERS:     DVT Prophylaxis: ASA 162mg every day x 6 weeks     Activity: WBAT      Wound Care: see Below      Discharge Medication List as of 11/21/2022 10:51 AM      START taking these medications    Details   aspirin (ASA) 81 MG EC tablet Take 2 tablets (162 mg) by mouth daily, Disp-60 tablet, R-0, E-Prescribe      hydrOXYzine (ATARAX) 25 MG tablet Take 1 tablet (25 mg) by mouth every 6 hours as needed for other (adjuvant pain), Disp-30 tablet, R-0, E-Prescribe      methocarbamol (ROBAXIN) 750 MG tablet Take 1 tablet (750 mg) by mouth every 6 hours as needed for muscle spasms, Disp-40 tablet, R-0, E-Prescribe      oxyCODONE (ROXICODONE) 5 MG tablet Take 1-2 tablets (5-10 mg) by mouth every 4 hours as needed for moderate pain (4-6), Disp-26 tablet, R-0, E-Prescribe      polyethylene glycol (MIRALAX) 17 g packet Take 17 g by mouth daily, Disp-10 packet, R-0, E-Prescribe      senna-docusate (SENOKOT-S/PERICOLACE) 8.6-50 MG tablet Take 1 tablet by mouth 2 times daily, Disp-30 tablet, R-0, E-Prescribe         CONTINUE these medications which have CHANGED    Details   acetaminophen (TYLENOL) 325 MG tablet Take 2 tablets (650 mg) by mouth every 4 hours as needed for other, Disp-60 tablet, R-0, E-Prescribe         CONTINUE these medications which have NOT CHANGED    Details   ammonium lactate (LAC-HYDRIN) 12 % external lotion APPLY THIN LAYER TOPICALLY TWICE A DAY AS NEEDED FOR DRY SKIN **EXTERNAL USE ONLY**Historical      bacitracin-neomycin-polymyxin (NEOSPORIN) 400-5-5000 external ointment Apply topically daily as needed (open sores on legs)Historical      Calcium-Vitamin D 500-3.125 MG-MCG TABS Take 2 tablets by mouth 2 times daily, Historical      cetirizine (ZYRTEC) 10 MG tablet Take 10 mg by mouth daily, Historical      diclofenac (VOLTAREN) 1 % topical gel Apply 4 g topically 4 times daily for 60 days, Disp-480 g, R-1, E-Prescribe      empagliflozin (JARDIANCE) 25 MG TABS tablet  Take 0.5 tablets by mouth daily, Historical      fluticasone (FLONASE) 50 MCG/ACT nasal spray SPRAY 2 SPRAYS IN EACH NOSTRIL TWICE A DAY FOR RELIEF OF ALLERGIES AND CONGESTION -USE REGULARLY FOR BEST RESULTS, Historical      furosemide (LASIX) 40 MG tablet Take 40 mg by mouth daily as needed (hand swelling, weight >215 lbs), Historical      gabapentin (NEURONTIN) 600 MG tablet Take 1,200 mg by mouth 3 times daily, Historical      insulin reg HIGH CONC (HUMULIN R U-500 HIGH CONC) 500 Units/mL injection Inject 5-10 Units Subcutaneous 3 times daily as needed (with meals) Personal sliding scale, Historical      levothyroxine (SYNTHROID/LEVOTHROID) 125 MCG tablet Take 125 mcg by mouth daily, Historical      lidocaine (LIDODERM) 5 % patch Apply up to 3 patches to painful area at once for up to 12 h within a 24 h period.  Remove after 12 hours.Disp-90 patch, [id number]      linaclotide (LINZESS) 145 MCG capsule Take 1 capsule by mouth daily, Historical      lisinopril (ZESTRIL) 40 MG tablet Take 40 mg by mouth daily, Historical      metFORMIN (GLUCOPHAGE XR) 500 MG 24 hr tablet Take 500 mg by mouth 2 times daily (with meals), Historical      omeprazole (PRILOSEC) 20 MG DR capsule Take 20 mg by mouth daily, Historical      pravastatin (PRAVACHOL) 40 MG tablet Take 40 mg by mouth every evening, Historical      Semaglutide (OZEMPIC, 1 MG/DOSE, SC) Inject 1 mg Subcutaneous once a week Sundays, Historical      sodium fluoride dental gel (PREVIDENT) 1.1 % GEL topical gel BRUSH SMALL AMOUNT MOUTH EVERY MORNING AND AT BEDTIME ON TOOTHBRUSH, BRUSH FOR 2 MINUTES.Historical      SUMAtriptan (IMITREX) 25 MG tablet Take 25 mg by mouth at onset of headache, Historical      topiramate (TOPAMAX) 100 MG tablet Take 100 mg by mouth 2 times daily, Historical      Vitamin D3 (CHOLECALCIFEROL) 25 mcg (1000 units) tablet Take 75 mcg by mouth daily, Historical      Liniments (VAPORIZING CREAM) 4.7-1.2-2.6 % CREA APPLY A THIN LAYER TOPICALLY  "FOUR TIMES A DAY AS NEEDED FOR PAINHistorical         STOP taking these medications       naproxen (NAPROSYN) 500 MG tablet Comments:   Reason for Stopping:         zolpidem ER (AMBIEN CR) 12.5 MG CR tablet Comments:   Reason for Stopping:                 Discharge Procedure Orders   Reason for your hospital stay   Order Comments: Postoperative care from complete foot reconstruction     When to call - Contact Surgeon Team   Order Comments: You may experience symptoms that require follow-up before your scheduled appointment. Refer to the \"Stoplight Tool\" for instructions on when to contact your Surgeon Team if you are concerned about pain control, blood clots, constipation, or if you are unable to urinate.     When to call - Reach out to Urgent Care   Order Comments: If you are not able to reach your Surgeon Team and you need immediate care, go to the Orthopedic Walk-in Clinic or Urgent Care at your Surgeon's office.  Do NOT go to the Emergency Room unless you have shortness of breath, chest pain, or other signs of a medical emergency.     When to call - Reasons to Call 911   Order Comments: Call 911 immediately if you experience sudden-onset chest pain, arm weakness/numbness, slurred speech, or shortness of breath     Discharge Instruction - Breathing exercises   Order Comments: Perform breathing exercises using your Incentive Spirometer 10 times per hour while awake for 2 weeks.     Symptoms - Fever Management   Order Comments: A low grade fever can be expected after surgery.  Use acetaminophen (TYLENOL) as needed for fever management.  Contact your Surgeon Team if you have a fever greater than 101.5 F, chills, and/or night sweats.     Symptoms - Constipation management   Order Comments: Constipation (hard, dry bowel movements) is expected after surgery due to the combination of being less active, the anesthetic, and the opioid pain medication.  You can do the following to help reduce constipation:  ~  FLUIDS:  " Drink clear liquids (water or Gatorade), or juice (apple/prune).  ~  DIET:  Eat a fiber rich diet.    ~  ACTIVITY:  Get up and move around several times a day.  Increase your activity as you are able.  MEDICATIONS:  Reduce the risk of constipation by starting medications before you are constipated.  You can take Miralax   (1 packet as directed) and/or a stool softener (Senokot 1-2 tablets 1-2 times a day).  If you already have constipation and these medications are not working, you can get magnesium citrate and use as directed.  If you continue to have constipation you can try an over the counter suppository or enema.  Call your Surgeon Team if it has been greater than 3 days since your last bowel movement.     Symptoms - Reduced Urine Output   Order Comments: Changes in the amount of fluids you drank before and after surgery may result in problems urinating.  It is important to stay well-hydrated after surgery and drink plenty of water. If it has been greater than 8 hours since you have urinated despite drinking plenty of water, call your Surgeon Team.     Activity - Exercises to prevent blood clots   Order Comments: Unless otherwise directed by your Surgeon team, perform the following exercises at least three times per day for the first four weeks after surgery to prevent blood clots in your legs: 1) Point and flex your feet (Ankle Pumps), 2) Move your ankle around in big circles, 3) Wiggle your toes, 4) Walk, even for short distances, several times a day, will help decrease the risk of blood clots.     Order Specific Question Answer Comments   Is discharge order? Yes      Comfort and Pain Management - Pain after Surgery   Order Comments: Pain after surgery is normal and expected.  You will have some amount of pain for several weeks after surgery.  Your pain will improve with time.  There are several things you can do to help reduce your pain including: rest, ice, elevation, and using pain medications as needed.  Contact your Surgeon Team if you have pain that persists or worsens after surgery despite rest, ice, elevation, and taking your medication(s) as prescribed. Contact your Surgeon Team if you have new numbness, tingling, or weakness in your operative extremity.     Comfort and Pain Management - Swelling after Surgery   Order Comments: Swelling and/or bruising of the surgical extremity is common and may persist for several months after surgery. In addition to frequent icing and elevation, gentle compressive support with an ACE wrap or tubigrip may help with swelling. Apply compression regularly, removing at least twice daily to perform skin checks. Contact your Surgeon Team if your swelling increases and is NOT associated with an increase in your activity level, or if your swelling increases and is associated with redness and pain.     Comfort and Pain Management - Cold therapy   Order Comments: Ice can be used to control swelling and discomfort after surgery. Place a thin towel over your operative site and apply the ice pack overtop. Leave ice pack in place for 20 minutes, then remove for 20 minutes. Repeat this 20 minutes on/20 minutes off routine as often as tolerated.     Medication Instructions - Acetaminophen (TYLENOL) Instructions   Order Comments: You were discharged with acetaminophen (TYLENOL) for pain management after surgery. Acetaminophen most effectively manages pain symptoms when it is taken on a schedule without missing doses (every four, six, or eight hours). Your Provider will prescribe a safe daily dose between 3000 - 4000 mg.  Do NOT exceed this daily dose. Most patients use acetaminophen for pain control for the first four weeks after surgery.  You can wean from this medication as your pain decreases.     Medication Instructions - NSAID Instructions   Order Comments: You were discharged with an anti-inflammatory medication for pain management to use in combination with acetaminophen (TYLENOL) and  "the narcotic pain medication.  Take this medication exactly as directed.  You should only take one anti-inflammatory at a time.  Some common anti-inflammatories include: ibuprofen (ADVIL, MOTRIN), naproxen (ALEVE, NAPROSYN), celecoxib (CELEBREX), meloxicam (MOBIC), ketorolac (TORADOL).  Take this medication with food and water.     Medication Instructions - Opioids - Tapering Instructions   Order Comments: In the first three days following surgery, your symptoms may warrant use of the narcotic pain medication every four to six hours as prescribed. This is normal. As your pain symptoms improve, focus your efforts on decreasing (tapering) use of narcotic medications. The most successful tapering strategy is to first, decrease the number of tablets you take every 4-6 hours to the minimum prescribed. Then, increase the amount of time between doses.  For example:  First, taper to   or 1 tablet every 4-6 hours.  Then, taper to   or 1 tablet every 6-8 hours.  Then, taper to   or 1 tablet every 8-10 hours.  Then, taper to   or 1 tablet every 10-12 hours.  Then, taper to   or 1 tablet at bedtime.  The bedtime dose can help with comfort during sleep and is typically the last dose to be discontinued after surgery.     Follow Up Care   Order Comments: Follow-up with your Surgeon Team in 2-3 for wound check.     Medication instructions -  Anticoagulation - aspirin   Order Comments: Take the aspirin as prescribed for a total of six weeks after surgery.  This is given to help minimize your risk of blood clot.     Comfort and Pain Management - LOWER Extremity Elevation   Order Comments: Swelling is expected for several months after surgery. This type of swelling is usually associated with gravity and activity, and can be improved with elevation.   The best way to do this is to get your \"toes above your nose\" by laying down and placing several pillows lengthwise under your calf and heel. When elevating your leg keep your knee " completely straight. Perform this elevation as often as possible especially for the first two weeks after surgery.     Medication Instructions - Opioid Instructions (1 - 2 tablets Q 4-6 hours, MAX 6 tablets)   Order Comments: You were discharged with an opioid medication (hydromorphone, oxycodone, hydrocodone, or tramadol). This medication should only be taken for breakthrough pain that is not controlled with acetaminophen (TYLENOL). If you rate your pain less than 3 you do not need this medication.  Pain rating 0-3:  You do not need this medication.  Pain rating 4-6:  Take 1 tablet every 4-6 hours as needed  Pain rating 7-10:  Take 2 tablets every 4-6 hours as needed.  Do not exceed 6 tablets per day     Dressing / Wound Care - Wound   Order Comments: You have a clean dressing on your surgical wound. Dressing change instructions as follows: dressing will be removed at your follow-up appointment. Contact your Surgeon Team if you have increased redness, warmth around the surgical wound, and/or drainage from the surgical wound.     NO weight bearing   Order Comments: No weight bearing on your operative extremity.     Order Specific Question Answer Comments   Is discharge order? Yes      Splint / Cast   Order Comments: Do NOT remove your splint/cast.  Keep your splint/cast clean and dry.  If your splint/cast gets wet, contact your surgeon team.     Dressing / Wound care - Shower with wound/dressing covered   Order Comments: You must COVER your dressing or incision with saran wrap (or any other non-permeable covering) to allow the incision to remain dry while showering.  You may shower as long as the surgical wound stays dry. Continue to cover your dressing or incision for showering until your first office visit.  You are strictly prohibited from soaking   or submerging the surgical wound underwater.    If you are unable to shower without getting your splint wet recommendation is to sponge bathe.     Dressing / Wound  Care - NO Tub Bathing   Order Comments: Tub bathing, swimming, or any other activities that will cause your incision to be submerged in water should be avoided until allowed by your Surgeon.     Return to Driving   Order Comments: Return to driving - Driving is NOT permitted until directed by your provider. Under no circumstance are you permitted to drive while using narcotic pain medications.     Order Specific Question Answer Comments   Is discharge order? Yes      Rolling Knee Walker DME   Order Comments: DME Documentation: Describe the reason for need to support medical necessity: Impaired gait due to Foot/Ankle surgery. Anticipated length of need: 3 months     Order Specific Question Answer Comments   DME Provider: [facility]    Type: Rolling Knee Walker    Length of Need: 3 Months      Walker DME   Order Comments: DME Documentation: Describe the reason for need to support medical necessity: Impaired gait due to Foot/Ankle surgery. Anticipated length of need: 3 months     Order Specific Question Answer Comments   DME Provider: [facility]    Walker Type: Standard (2 Wheel)    Accessories: N/A      Discharge Instruction - Regular Diet Adult   Order Comments: Return to your pre-surgery diet unless instructed otherwise     Order Specific Question Answer Comments   Is discharge order? Yes            Maria C Ludwig MD  Orthopaedic Surgery, PGY4

## 2022-11-28 ENCOUNTER — TELEPHONE (OUTPATIENT)
Dept: ORTHOPEDICS | Facility: CLINIC | Age: 57
End: 2022-11-28

## 2022-11-28 NOTE — TELEPHONE ENCOUNTER
A nurse from the [facility] called to inform us that she is faxing over a form that is to be completed by Dr. Starr's team so that Nehemiah may obtain his rolling knee scooter from them. I will was for the fax and return it completed today.  Lilliam Salcedo ATC

## 2022-12-01 ENCOUNTER — OFFICE VISIT (OUTPATIENT)
Dept: OPHTHALMOLOGY | Facility: CLINIC | Age: 57
End: 2022-12-01
Attending: OPHTHALMOLOGY
Payer: COMMERCIAL

## 2022-12-01 DIAGNOSIS — H47.293 OTHER OPTIC ATROPHY, BILATERAL: ICD-10-CM

## 2022-12-01 DIAGNOSIS — H35.50 RETINAL DYSTROPHY: ICD-10-CM

## 2022-12-01 DIAGNOSIS — H25.13 SENILE NUCLEAR SCLEROSIS, BILATERAL: ICD-10-CM

## 2022-12-01 DIAGNOSIS — E11.3593 PROLIFERATIVE DIABETIC RETINOPATHY OF BOTH EYES WITHOUT MACULAR EDEMA ASSOCIATED WITH TYPE 2 DIABETES MELLITUS (H): Primary | ICD-10-CM

## 2022-12-01 PROCEDURE — 92025 CPTRIZED CORNEAL TOPOGRAPHY: CPT | Performed by: OPHTHALMOLOGY

## 2022-12-01 PROCEDURE — 92250 FUNDUS PHOTOGRAPHY W/I&R: CPT | Performed by: OPHTHALMOLOGY

## 2022-12-01 PROCEDURE — 99207 FUNDUS PHOTOS OU (BOTH EYES): CPT | Mod: 26 | Performed by: OPHTHALMOLOGY

## 2022-12-01 PROCEDURE — 92134 CPTRZ OPH DX IMG PST SGM RTA: CPT | Performed by: OPHTHALMOLOGY

## 2022-12-01 PROCEDURE — G0463 HOSPITAL OUTPT CLINIC VISIT: HCPCS | Mod: 25

## 2022-12-01 PROCEDURE — 99204 OFFICE O/P NEW MOD 45 MIN: CPT | Mod: GC | Performed by: OPHTHALMOLOGY

## 2022-12-01 ASSESSMENT — REFRACTION_WEARINGRX
OS_CYLINDER: SPHERE
OD_CYLINDER: SPHERE
OD_SPHERE: +3.50
OS_SPHERE: +4.75

## 2022-12-01 ASSESSMENT — CONF VISUAL FIELD
OS_SUPERIOR_NASAL_RESTRICTION: 3
OD_INFERIOR_NASAL_RESTRICTION: 1
OS_INFERIOR_TEMPORAL_RESTRICTION: 3
OD_SUPERIOR_TEMPORAL_RESTRICTION: 3
OS_INFERIOR_NASAL_RESTRICTION: 3
OD_INFERIOR_TEMPORAL_RESTRICTION: 1
OS_SUPERIOR_TEMPORAL_RESTRICTION: 3
OD_SUPERIOR_NASAL_RESTRICTION: 3

## 2022-12-01 ASSESSMENT — VISUAL ACUITY
METHOD: SNELLEN - LINEAR
OD_SC: 20/500
OS_SC: 20/400

## 2022-12-01 ASSESSMENT — CUP TO DISC RATIO
OD_RATIO: 0.1
OS_RATIO: 0.1

## 2022-12-01 ASSESSMENT — EXTERNAL EXAM - LEFT EYE: OS_EXAM: NORMAL

## 2022-12-01 ASSESSMENT — TONOMETRY
OS_IOP_MMHG: 14
OD_IOP_MMHG: 21
IOP_METHOD: TONOPEN

## 2022-12-01 ASSESSMENT — EXTERNAL EXAM - RIGHT EYE: OD_EXAM: NORMAL

## 2022-12-01 ASSESSMENT — SLIT LAMP EXAM - LIDS
COMMENTS: NORMAL
COMMENTS: NORMAL

## 2022-12-01 NOTE — NURSING NOTE
"Chief Complaints and History of Present Illnesses   Patient presents with     Retinal Evaluation     Chief Complaint(s) and History of Present Illness(es)     Retinal Evaluation           Comments    Pt states vision is not good in either eye. Pt reports that it is \"like looking thru a smoked veil\".  No eye pain today. Occasional floaters in each eye daily. No flashes.    DM2 BS: 170 this morning per pt  No results found for: A1C    SUNITA Drew December 1, 2022 7:21 AM                       "

## 2022-12-01 NOTE — PROGRESS NOTES
"I have confirmed the patient's and reviewed Past Medical History, Past Surgical History, Social History, Family History, Problem List, Medication List and agree with Tech note.    [facility]\" [facility] that has gradually worsened over 10 year period    HPI: Reports delayed photorecovery when moving from inside to outside taking 30-40 minutes for him to recover. He has a history of diabetes mellitus with history of vitreous hemorrhagte of the right eye. Previous ERG and PERG on 10/22/2020 at [facility] that were normal. Reports noticing an intensifying of symptoms after laser and injections. Reports worsening glare with lights. Denies any major increase in floaters, flashing lights, curtains.     He denies ocular trauma or surgical history.    He s/p PRP laser 3-4 sessions each eye (last around 2019).   He has had 2 injections into each eye (last 3-4 years prior). He had significant pain and vitreous hemorrhage OD after the last and has refused any further injections. He reports that the vision has gotten worse OD since this time.    Imaging:  OCT 12/01/22  OD-ERM with slight disruption of foveal contour; no fluid or disruption of IS/OS junction  OS-ERM with preserved contour, exudates, no fluid or disruption of IS/OS junction    Assessment/plan:  1.  PDR OU   #Hx of Vitreous Hemorrhage OU  -Good BP/Blood sugar control discussed  -A1C 5.6 11/15/2022  -Good PRP laser OU with no evidence of NVE or DME    [place] shows no macular ischemia    2. Cataracts OU  -Visually significant  -Will refer to cataract surgeon      3. ERM OD>OS  -Monitor at this time with serial OCTs    4. Nevus OD  -No fluid, orange pigment, flat, ? [place]  -Monitor      RTC   1) Low Vision Refraction with Dr. Simmons in 3 months       Link Gambino MD MPH  Vitreoretinal Fellow PGY-5  [facility]     Attestation:  I have seen and examined the patient with Dr. Link Gambino MD and agree with the " findings in this note, as well as the interpretations of the diagnostic tests.      Pascale Boyd MD PhD.  Professor & Chair    Pascale Boyd MD PhD.  Professor & Chair

## 2022-12-02 ENCOUNTER — OFFICE VISIT (OUTPATIENT)
Dept: ORTHOPEDICS | Facility: CLINIC | Age: 57
End: 2022-12-02
Payer: COMMERCIAL

## 2022-12-02 DIAGNOSIS — M14.679 CHARCOT ARTHROPATHY OF MIDFOOT: Primary | ICD-10-CM

## 2022-12-02 PROCEDURE — 99024 POSTOP FOLLOW-UP VISIT: CPT | Performed by: ORTHOPAEDIC SURGERY

## 2022-12-02 NOTE — LETTER
12/2/2022         RE: Nehemiah Magallon  [address]  [place] [address]        Dear Colleague,    Thank you for referring your patient, Nehemiah Magallon, to the [facility] [place]. Please see a copy of my visit note below.    I saw Mr. Magallon today for follow-up after his recent right foot reconstruction on 11/17/2022.  He endorses that he is doing well and denies any major problems to report at this time.  He denies any fevers or chills.  He endorses keeping his weight off the right lower extremity as instructed.  He endorses good pain control and denies any pain in the right foot/ankle at this time.  He presents in a manual wheelchair with the right lower extremity appropriately elevated.  The splint has been removed.  His incisions appear to all be well approximated without any evidence for dehiscence or active drainage at this time except for a spot of blood at the proximal end of the medial incision.  There is a small area of skin necrosis in this area that I discussed we will observe for now. There is no malodor or any clear evidence for infection.  His Steri-Strips were removed from the percutaneous DOM incisions.  They appear to be healing appropriately. The ankle appears to be in neutral alignment without equinus. The foot appears to be appropriately aligned with good correction of the previous abduction/rocker bottom deformity. There is no ulceration visible.  The skin outside of these aforementioned areas appears to all be intact.  The right foot appears to be well perfused with warm temperature and appropriate color for ethnicity.  The incisions do not appear to be fully healed enough yet for suture removal at this time. I discussed the plan: Continue nonweightbearing on the right foot.  Sterile dry gauze dressings and an Ace wrap were carefully applied.  A walking boot was applied.  Follow-up in 1 week's time for repeat wound check and possible suture removal  at that time.  If things look appropriate clinically at that time to do so, we will have the next follow-up after that visit to be at the 6-week postoperative time with nonweightbearing x-rays and exam, and possible institution of weightbearing in the boot at that time.  The above findings and plan were discussed with the patient.  He verbalized understanding of our discussion and agreement with the plan as well as appreciation of the visit.    DME FITTING    Relevant Diagnosis: Right foot arthrodesis  Tall CAM boot brace was fit on patient's Right Lower Leg.     Person(s) involved in teaching:   Patient    Brace was applied in standard Manner:  Yes  Brace fit well:  Yes  Patient reports brace to fit comfortably:  Yes    Education:   Patient shown self application and removal of brace: Yes  Patient shown how to adjust brace fit, if necessary: Yes  Patient educated on billing and return policy: Yes  Patient confirmed understanding when and how to contact clinic with concerns: Yes    Lilliam Starr MD

## 2022-12-02 NOTE — PROGRESS NOTES
I saw Mr. Magallon today for follow-up after his recent right foot reconstruction on 11/17/2022.  He endorses that he is doing well and denies any major problems to report at this time.  He denies any fevers or chills.  He endorses keeping his weight off the right lower extremity as instructed.  He endorses good pain control and denies any pain in the right foot/ankle at this time.  He presents in a manual wheelchair with the right lower extremity appropriately elevated.  The splint has been removed.  His incisions appear to all be well approximated without any evidence for dehiscence or active drainage at this time except for a spot of blood at the proximal end of the medial incision.  There is a small area of skin necrosis in this area that I discussed we will observe for now. There is no malodor or any clear evidence for infection.  His Steri-Strips were removed from the percutaneous DOM incisions.  They appear to be healing appropriately. The ankle appears to be in neutral alignment without equinus. The foot appears to be appropriately aligned with good correction of the previous abduction/rocker bottom deformity. There is no ulceration visible.  The skin outside of these aforementioned areas appears to all be intact.  The right foot appears to be well perfused with warm temperature and appropriate color for ethnicity.  The incisions do not appear to be fully healed enough yet for suture removal at this time. I discussed the plan: Continue nonweightbearing on the right foot.  Sterile dry gauze dressings and an Ace wrap were carefully applied.  A walking boot was applied.  Follow-up in 1 week's time for repeat wound check and possible suture removal at that time.  If things look appropriate clinically at that time to do so, we will have the next follow-up after that visit to be at the 6-week postoperative time with nonweightbearing x-rays and exam, and possible institution of weightbearing in the boot at that  time.  The above findings and plan were discussed with the patient.  He verbalized understanding of our discussion and agreement with the plan as well as appreciation of the visit.

## 2022-12-02 NOTE — NURSING NOTE
Reason For Visit:   Chief Complaint   Patient presents with     Surgical Followup     Right midfoot/TN osteotomy and reduction of deformity. Right midfoot/TN arthodesis, achilles lengthening. DOS 11/17/22       There were no vitals taken for this visit.         Lilliam Salcedo, ATC

## 2022-12-02 NOTE — PROGRESS NOTES
DME FITTING    Relevant Diagnosis: Right foot arthrodesis  Tall CAM boot brace was fit on patient's Right Lower Leg.     Person(s) involved in teaching:   Patient    Brace was applied in standard Manner:  Yes  Brace fit well:  Yes  Patient reports brace to fit comfortably:  Yes    Education:   Patient shown self application and removal of brace: Yes  Patient shown how to adjust brace fit, if necessary: Yes  Patient educated on billing and return policy: Yes  Patient confirmed understanding when and how to contact clinic with concerns: Yes    Lilliam Salcedo ATC

## 2022-12-03 ENCOUNTER — HEALTH MAINTENANCE LETTER (OUTPATIENT)
Age: 57
End: 2022-12-03

## 2022-12-04 ENCOUNTER — NURSE TRIAGE (OUTPATIENT)
Dept: NURSING | Facility: CLINIC | Age: 57
End: 2022-12-04

## 2022-12-04 RX ORDER — CEPHALEXIN 500 MG/1
500 CAPSULE ORAL 2 TIMES DAILY
Qty: 20 CAPSULE | Refills: 0 | Status: SHIPPED | OUTPATIENT
Start: 2022-12-04 | End: 2022-12-14

## 2022-12-04 NOTE — TELEPHONE ENCOUNTER
"Nurse Triage SBAR    Is this a 2nd Level Triage? Yes    Situation: Spouse, Wiliam, is calling with a concern of \"massive infection\" after surgery on right foot.   CTC on file.    Background: Cast was removed, Boot placed on Friday, 12/2/22      Assessment:   Foot has angry red incisions, one incision is oozing, was not oozing yesterday  Drainage is not visualized  No thermometer available, does not feel warm to touch  Entire foot is red and swollen; more swollen than yesterday  Denies pain/neuropathy  Denies nausea or feeling ill    Preferred pharmacy today is: [facility] on [address] [place] [address] in [place], [place]    Recommendation: Per disposition, Paged provider.    Orthopedic Surgeon is Bryon Starr MD, located at [facility]  Paged On-Call Provider at: 11:24AM    Provider, Dr. Abhishek Hemphill, returning page to Nurse Advisors at 11:33 AM    Provider Recommendations/Plan:  Start oral antibiotics today. Provider will send to pharmacy. Keflex 500mg, 1 capsule PO BID X 10 days.  Send a message to Care Team.   Patient should have office visit tomorrow to be seen and check wounds.         Provider Recommendation Follow Up:   Reached patient/caregiver. Informed of provider's recommendations. Advised spouse to call the clinic Monday morning to follow up on recommended appointment. Spouse advised to call back with any new or worsening symptoms. Spouse verbalized understanding and agrees with plan.    Protocol Recommended Disposition: Paged/Called Provider    Luz Benjamin RN on 12/4/2022 at 11:00 AM  [facility] Nurse Advisors    Reason for Disposition    [1] Incision looks infected (spreading redness, pain) AND [2] large red area (> 2 in. or 5 cm)    Additional Information    Negative: [1] Major abdominal surgical incision AND [2] wound gaping open AND [3] visible internal organs    Negative: Sounds like a life-threatening emergency to the triager    Negative: Patient has a Negative Pressure Wound Therapy " device    Negative: Patient is followed by a wound clinic or wound specialist for this wound    Negative: [1] Bleeding from incision AND [2] won't stop after 10 minutes of direct pressure    Negative: [1] Bleeding (more than a few drops) from incision AND [2] blood vessel surgery (e.g., carotid endarterectomy, femoral bypass graft, kidney dialysis fistula, tracheostomy)    Negative: [1] Widespread rash AND [2] bright red, sunburn-like    Negative: Severe pain in the incision     Patient has neuropathy    Negative: [1] Incision gaping open AND [2] < 48 hours since wound re-opened    Negative: [1] Incision gaping open AND [2] length of opening > 2 inches (5 cm)    Negative: Patient sounds very sick or weak to the triager    Negative: Sounds like a serious complication to the triager    Negative: Fever > 100.4 F (38.0 C)    Negative: [1] Incision looks infected (spreading redness, pain) AND [2] fever > 99.5 F (37.5 C)    Protocols used: POST-OP INCISION SYMPTOMS AND [id number]

## 2022-12-07 ENCOUNTER — DOCUMENTATION ONLY (OUTPATIENT)
Dept: ORTHOPEDICS | Facility: CLINIC | Age: 57
End: 2022-12-07

## 2022-12-07 ENCOUNTER — OFFICE VISIT (OUTPATIENT)
Dept: ORTHOPEDICS | Facility: CLINIC | Age: 57
End: 2022-12-07
Payer: COMMERCIAL

## 2022-12-07 DIAGNOSIS — I97.89 NECROSIS OF SURGICAL WOUND (H): Primary | ICD-10-CM

## 2022-12-07 DIAGNOSIS — I96 NECROSIS OF SURGICAL WOUND (H): Primary | ICD-10-CM

## 2022-12-07 PROCEDURE — 99024 POSTOP FOLLOW-UP VISIT: CPT | Performed by: ORTHOPAEDIC SURGERY

## 2022-12-07 RX ORDER — GAUZE BANDAGE 4.5"X147"
1 BANDAGE TOPICAL DAILY
Qty: 45 EACH | Refills: 1 | Status: SHIPPED | OUTPATIENT
Start: 2022-12-07 | End: 2022-12-12

## 2022-12-07 RX ORDER — GAUZE BANDAGE 4" X 4"
1 SPONGE TOPICAL DAILY
Qty: 45 EACH | Refills: 1 | Status: SHIPPED | OUTPATIENT
Start: 2022-12-07 | End: 2023-01-01

## 2022-12-07 NOTE — PROGRESS NOTES
Type of Form Received: request for service    Form Received (Date) 12/2/22   Form Filled out Yes, 12/9/22   Placed in provider folder Yes      Received Completed forms Yes   Faxed Forms Faxed To: [facility]  Fax Number: [phone]   Sent to HIM (Date) 12/14/22

## 2022-12-07 NOTE — LETTER
12/7/2022         RE: Nehemiah Magallon  [address]  [place] [address]        Dear Colleague,    Thank you for referring your patient, Nehemiah Magallon, to the [facility] [place]. Please see a copy of my visit note below.    Chief Complaint:  Right foot postop wound check.    History:  I saw Mr. Magallon today for a wound check after his recent Charcot reconstruction. He was seen last Friday with one tiny spot drainage and apparently developed some worsening over the weekend. This was reported to the on-call team and oral antibiotics (Keflex) were called in. He's here today to have the wound assessed. He reports that things seem to be somewhat better on the antibiotics. He had a blister on the medial incision, near or at the spot of drainage last Friday, that popped leaving some exposed dead skin. There may also have been one at the lateral incision as well. He endorses keeping the weight off the right foot. He denies any recent injuries, fevers, chills, or feelings of malaise. He's been doing daily dressing changes on the wounds consisting of vaseline gauze, 4x4s, and Kerlix rolls. He has his older black walking boot [facility] as it appears to be better tolerated than the black walking boot he received here. He reports good HgA1c control at or around 5.6. He denies any adverse reactions to his oral antibiotics. He did stop the robaxin due to side effects.     Exam:  Gen - awake, alert, pleasant, cooperative, appropriately conversant, sitting upright in a manual wheelchair in no acute distress. Doesn't look septic from a general clinical standpoint.  Resp - nonlabored breathing pattern on room air, regular.  Derm - Skin on right foot examined - medial wound with approx 2x2cm area of necrosis, depth uncertain, at the superior margin of the proximal end of the incision where pt states a blister popped. No current drainage. No loss of incision  coaptation. Lateral wound with an area of partial thickness appearing skin necrosis, also without signs of drainage or loss of coaptation of the incision. Hallux wound healing well, dry, no drainage/necrosis, no loss of coaptation. Small Achilles percutaneous incisions also healing well, dry, no drainage/necrosis, no loss of coaptation (these were small and did not require sutures). All sutures left in place. No current active blisters. Blanching erythema over medial foot. No fluctuance or expressible drainage.  Neuro - able to wiggle all toes and dorsi-/plantarflex R foot actively without difficulty.    All wounds re-dressed sterilely with 4x4 gauze and kerlix. Boot re-donned.     Imaging:  No imaging studies were obtained at this visit.     Impression:  Focal areas of wound necrosis at medial and lateral incisions after R foot Charcot reconstruction 11/17/2022 for rocker bottom / abduction deformity.     Plan:  Diagnosis and treatment plan were discussed in layman's terms. I discussed that the depth of skin necrosis is uncertain, and although the lateral one appears superficial, the medial one may be deep and full thickness; neither one appears to be infected, though this is also uncertain. I offered options of continuing close observation, dressing changes, and antibiotics, versus a surgical debridement. [place] decision at this time for the former. This may change depending on his clinical condition going forward.  - Continue NWB R LE in boot, elevate  - Continue oral abx (Keflex)  - Continue daily dry dressing changes.  - Continue close follow-up. Keep regular scheduled next appointment this Friday.  - Signs/symptoms that should warrant immediate medical attention discussed.  - Potential for future need for wound debridement, VAC, healing by secondary intention, and even flaps/skin grafting discussed.  - All questions this pleasant gentleman had at the time were answered to the best of my ability. I ordered more  4x4s, Telfa pads, and Kerlix rolls as requested.      Sincerely,    Bryon Starr MD

## 2022-12-08 ENCOUNTER — TELEPHONE (OUTPATIENT)
Dept: OPHTHALMOLOGY | Facility: CLINIC | Age: 57
End: 2022-12-08

## 2022-12-08 NOTE — TELEPHONE ENCOUNTER
Hey,     I don't see a JONO for records to be released. Would you like me to send one out to Nehemiah?     Janna Sebastian Communication Facilitator on 12/8/2022 at 10:36 AM

## 2022-12-08 NOTE — PROGRESS NOTES
Chief Complaint:  Right foot postop wound check.    History:  I saw Mr. Magallon today for a wound check after his recent Charcot reconstruction. He was seen last Friday with one tiny spot drainage and apparently developed some worsening over the weekend. This was reported to the on-call team and oral antibiotics (Keflex) were called in. He's here today to have the wound assessed. He reports that things seem to be somewhat better on the antibiotics. He had a blister on the medial incision, near or at the spot of drainage last Friday, that popped leaving some exposed dead skin. There may also have been one at the lateral incision as well. He endorses keeping the weight off the right foot. He denies any recent injuries, fevers, chills, or feelings of malaise. He's been doing daily dressing changes on the wounds consisting of vaseline gauze, 4x4s, and Kerlix rolls. He has his older black walking boot in [facility] as it appears to be better tolerated than the black walking boot he received here. He reports good HgA1c control at or around 5.6. He denies any adverse reactions to his oral antibiotics. He did stop the robaxin due to side effects.     Exam:  Gen - awake, alert, pleasant, cooperative, appropriately conversant, sitting upright in a manual wheelchair in no acute distress. Doesn't look septic from a general clinical standpoint.  Resp - nonlabored breathing pattern on room air, regular.  Derm - Skin on right foot examined - medial wound with approx 2x2cm area of necrosis, depth uncertain, at the superior margin of the proximal end of the incision where pt states a blister popped. No current drainage. No loss of incision coaptation. Lateral wound with an area of partial thickness appearing skin necrosis, also without signs of drainage or loss of coaptation of the incision. Hallux wound healing well, dry, no drainage/necrosis, no loss of coaptation. Small Achilles percutaneous incisions  also healing well, dry, no drainage/necrosis, no loss of coaptation (these were small and did not require sutures). All sutures left in place. No current active blisters. Blanching erythema over medial foot. No fluctuance or expressible drainage.  Neuro - able to wiggle all toes and dorsi-/plantarflex R foot actively without difficulty.    All wounds re-dressed sterilely with 4x4 gauze and kerlix. Boot re-donned.     Imaging:  No imaging studies were obtained at this visit.     Impression:  Focal areas of wound necrosis at medial and lateral incisions after R foot Charcot reconstruction 11/17/2022 for rocker bottom / abduction deformity.     Plan:  Diagnosis and treatment plan were discussed in layman's terms. I discussed that the depth of skin necrosis is uncertain, and although the lateral one appears superficial, the medial one may be deep and full thickness; neither one appears to be infected, though this is also uncertain. I offered options of continuing close observation, dressing changes, and antibiotics, versus a surgical debridement. [employer] decision at this time for the former. This may change depending on his clinical condition going forward.  - Continue NWB R LE in boot, elevate  - Continue oral abx (Keflex)  - Continue daily dry dressing changes.  - Continue close follow-up. Keep regular scheduled next appointment this Friday.  - Signs/symptoms that should warrant immediate medical attention discussed.  - Potential for future need for wound debridement, VAC, healing by secondary intention, and even flaps/skin grafting discussed.  - All questions this pleasant gentleman had at the time were answered to the best of my ability. I ordered more 4x4s, Telfa pads, and Kerlix rolls as requested.

## 2022-12-09 ENCOUNTER — OFFICE VISIT (OUTPATIENT)
Dept: ORTHOPEDICS | Facility: CLINIC | Age: 57
End: 2022-12-09
Payer: COMMERCIAL

## 2022-12-09 ENCOUNTER — MEDICAL CORRESPONDENCE (OUTPATIENT)
Dept: HEALTH INFORMATION MANAGEMENT | Facility: CLINIC | Age: 57
End: 2022-12-09

## 2022-12-09 DIAGNOSIS — M14.679 CHARCOT ARTHROPATHY OF MIDFOOT: Primary | ICD-10-CM

## 2022-12-09 DIAGNOSIS — I97.89 NECROSIS OF SURGICAL WOUND (H): ICD-10-CM

## 2022-12-09 DIAGNOSIS — I96 NECROSIS OF SURGICAL WOUND (H): ICD-10-CM

## 2022-12-09 PROCEDURE — 99024 POSTOP FOLLOW-UP VISIT: CPT | Performed by: ORTHOPAEDIC SURGERY

## 2022-12-09 NOTE — NURSING NOTE
Reason For Visit:   Chief Complaint   Patient presents with     RECHECK     Right foot incision check. Right charcoat foot reconstruction DOS 11/17/2022       There were no vitals taken for this visit.         Lilliam Salcedo, ATC

## 2022-12-09 NOTE — LETTER
12/9/2022         RE: Nehemiah Magallon  [address]  [place] [address]        Dear Colleague,    Thank you for referring your patient, Nehemiah Magallon, to the [facility] [place]. Please see a copy of my visit note below.    I saw Mr. Magallon today in follow-up after his 11/17/2022 right Charcot foot reconstruction.  He was seen recently 2 days ago here for wound check due to a blister popping and skin necrosis developing over the medial ankle, adjacent to the proximal edge of his incision.  He reports that he has been doing dressing changes, and that Vaseline gauze appears to be debriding the wound appropriately, and that appears to be improved compared with when I saw him 2 days ago.  He denies any fevers, chills, or feelings of malaise.    Examination shows him to be awake, alert, and in no acute distress.  He is pleasant, cooperative, and sitting upright in a manual wheelchair with a regular and nonlabored breathing pattern on room air.  The incisions on the right foot are examined.  The medial incision has, as before, an area of eschar/necrosis along the dorsal margin at the proximal end of his incision.  There are no areas that appear to have lost coaptation, and there is no visible or expressible drainage.  There are no new blisters noted.  The remainder of this incision appears to be healthy and healing appropriately.  His hallux incision is well-healed and did not require sutures. His lateral foot incision appears to be fully coapted, without expressible or visible drainage.  There is a small area of skin along the distal edge of that incision that is also consistent with the appearance of a previous blister that has popped, but without new blisters and without obvious skin necrosis.  His percutaneous DOM incisions on the posterior right heel are all fully healed.  Sutures are removed today from all incisions where they were present except for the proximal end of  his medial incision, adjacent to the area of skin necrosis.  Steri-Strips were applied.  New sterile dressings are applied.    Continue nonweightbearing right lower extremity with the boot.  The boot is redonned post suture removal today.  Follow-up in 1 week for another wound check.  Continue dry dressing changes on daily basis.  Signs and symptoms watch out for that should prompt immediate medical attention were discussed.  I explained it is important for him to get back to see us ASAP should that be worsening of his wounds.  He verbalized understanding of her discussion and agreement with the treatment plan.  All questions that this very pleasant gentleman had today were answered appropriately to the best my ability.      Again, thank you for allowing me to participate in the care of your patient.        Sincerely,        Bryon Starr MD

## 2022-12-16 NOTE — PROGRESS NOTES
I saw Mr. Magallon today in follow-up for his right foot wound.  He denies any interim fevers, chills, wound drainage, or feelings of malaise.  He completed his antibiotic regimen Tuesday without any noticeable changes.  He has been doing daily dry dressing changes.  Sutures were removed at the last visit except for several adjacent to the area of wound necrosis.    Exam at this time shows the patient to be a pleasant, cooperative, awake, alert, sitting upright in a manual wheelchair.  Breathing pattern is regular and nonlabored on room air.  Dressings and boot on the right lower extremity were removed for examination.  The posterior Achilles incisions, lateral foot wound, and hallux wound appear to be all well-healed, dry, and fully coapted.  The medial foot wound has a stable appearing eschar that has now continued to demarcate between viable and necrotic skin.  There is no loss of coaptation or drainage at this time.  The 3 remaining sutures on the medial incision adjacent to the eschar are removed.  There is no lymphangitic streaking up the right leg.  There are no blisters or bullae.  There are no ulcerations.  New sterile dressings were applied, and the boot is redonned.    Plan:  Continue NWB R LE with removable boot  F/u 2 weeks with WB R foot xrays, 3 views  Cont daily dry dressing changes R foot medial eschar. Remaining wounds are healed and do not need dressings at this time.  Tegaderm occlusive dressings for the eschar so [facility] can [facility]

## 2022-12-16 NOTE — LETTER
12/16/2022         RE: Nehemiah Magallon  [address]  [place] [address]        Dear Colleague,    Thank you for referring your patient, Nehemiah Magallon, to the [facility] [place]. Please see a copy of my visit note below.    I saw Mr. Magallon today in follow-up for his right foot wound.  He denies any interim fevers, chills, wound drainage, or feelings of malaise.  He completed his antibiotic regimen Tuesday without any noticeable changes.  He has been doing daily dry dressing changes.  Sutures were removed at the last visit except for several adjacent to the area of wound necrosis.    Exam at this time shows the patient to be a pleasant, cooperative, awake, alert, sitting upright in a manual wheelchair.  Breathing pattern is regular and nonlabored on room air.  Dressings and boot on the right lower extremity were removed for examination.  The posterior Achilles incisions, lateral foot wound, and hallux wound appear to be all well-healed, dry, and fully coapted.  The medial foot wound has a stable appearing eschar that has now continued to demarcate between viable and necrotic skin.  There is no loss of coaptation or drainage at this time.  The 3 remaining sutures on the medial incision adjacent to the eschar are removed.  There is no lymphangitic streaking up the right leg.  There are no blisters or bullae.  There are no ulcerations.  New sterile dressings were applied, and the boot is redonned.    Plan:  Continue NWB R LE with removable boot  F/u 2 weeks with WB R foot xrays, 3 views  Cont daily dry dressing changes R foot medial eschar. Remaining wounds are healed and do not need dressings at this time.  Tegaderm occlusive dressings for the eschar so he can shower [facility].      Bryon Starr MD

## 2022-12-20 NOTE — TELEPHONE ENCOUNTER
[facility] Call Center    Phone Message    May a detailed message be left on voicemail: yes     Reason for Call: Other: Danielle from [facility] states the was to have an ERG, and they are still waiting for the report.  Please call patient to schedule.  Thank you.    Action Taken: Message routed to:  [facility] ([facility] Ophthalmology    Travel Screening: Not Applicable

## 2022-12-20 NOTE — TELEPHONE ENCOUNTER
The call back number the call center provided for the [facility] is incorrect    If the [facility] calls back     Nehemiah mailed the JONO a week ago and it takes medical record a couple weeks to process       Janna Sebastian Communication Facilitator on 12/20/2022 at 9:12 AM

## 2022-12-27 NOTE — TELEPHONE ENCOUNTER
Per TT-  I could not locate an order or request form of any kind from the [facility]/Jacqueline Lynch.  There was a request signed by Dr. Starr but nothing was checked off on the form and I don't even know if this was eye-related.  We need to know what kind of ERG is being requested--pattern or full-field or multifocal.  Once that is determined, I can make the call to the patient.     Called and LVM for Danielle to clarify the requests TT is asking for. I provided my number and the pts name     Janna Sebastian Communication Facilitator on 12/27/2022 at 4:16 PM

## 2022-12-27 NOTE — TELEPHONE ENCOUNTER
[facility] Call Center    Phone Message    May a detailed message be left on voicemail: yes     Reason for Call: Appointment Intake    Referring Provider Name: Reid Avila  Diagnosis and/or Symptoms: [facility] to inquire about getting pt scheduled for ERG that was determined to be needed by referring provider Jacqueline Lynch with [facility]. There was mention of an JONO but no follow up was done with patient to schedule the ERG. Please follow up at Danielle's request with the patient to schedule the ERG.     Action Taken: Message routed to:  [facility] ([facility] EYE    Travel Screening: Not Applicable

## 2022-12-29 NOTE — TELEPHONE ENCOUNTER
Called and LVM    Unsure if Danielle had questions in regards to the ERG order    Janna Sebastian Communication Facilitator on 12/29/2022 at 12:52 PM

## 2022-12-30 NOTE — NURSING NOTE
Reason For Visit:   Chief Complaint   Patient presents with     RECHECK     Right foot recheck DOS 11/17/2022       There were no vitals taken for this visit.         Lilliam Salcedo, ATC

## 2022-12-30 NOTE — PROGRESS NOTES
History:  Pleasant 58yo diabetic patient with history of R charcot foot s/p reconstruction / DOM 11/17/2022. Here for wound check for medial R foot necrosis at surgical site. Denies fevers/chills/trauma. Reports scant drainage, no purulence. Has been NWB in a boot.    Exam:  R LE in boot, in good repair - removed for exam  2 residual sutures removed  Medial foot eschar stable, no active drainage, no exposed bone/tendon/hardware  Appears improved compared with previously, margins more demarcated, no surrounding fluctuance  Nontender throughout surgical sites  Lat foot wound with smaller area of eschar/fibrinous as well, no drainage, no exposed bone/tendon/hardware  DOM incisions healed, dry  Hallux incision healed, dry  Foot appears to be in rectus alignment    Imaging:  Independent review of imaging was performed.  Xrays 3 weightbearing views of R foot obtained today, images shown to and discussed with patient, alignment improved, possible early fusion at fusion sites ocurring, no obvious new fractures, hardware appears to be intact and stable    Impression:  58yo male patient with diabetes, peripheral neuropathy, ~ 6 weeks s/p R Charcot foot reconstruction and DOM for rockerbottom/abduction deformity, with improving medial wound necrosis  Also with history of L foot arthrodesis, Achilles reconstruction, and hallux IP arthrodesis with deformity correction for arthritis/pes planovalgus/Achilles tendinopathy    Plan:  Option for debridement of necrotic skin with future VAC vs soft tissue coverage discussed; mutual agreement given improvement of wound to continue to observe closely for now  F/u 2-3 weeks for wound check  Cont dry dressing changes, NWB R LE in boot  Plan xrays B feet WB in 6 weeks, possibly start WB at that time depending on any interval healing  Signs and symptoms that should prompt immediate medical attention, and the importance of this, were discussed  All questions answered

## 2022-12-30 NOTE — LETTER
12/30/2022         RE: Nehemiah Magallon  [address]  [place] [address]        Dear Colleague,    Thank you for referring your patient, Nehemiah Magallon, to the [facility]. Please see a copy of my visit note below.    History:  Pleasant 56yo diabetic patient with history of R charcot foot s/p reconstruction / DOM 11/17/2022. Here for wound check for medial R foot necrosis at surgical site. Denies fevers/chills/trauma. Reports scant drainage, no purulence. Has been NWB in a boot.    Exam:  R LE in boot, in good repair - removed for exam  2 residual sutures removed  Medial foot eschar stable, no active drainage, no exposed bone/tendon/hardware  Appears improved compared with previously, margins more demarcated, no surrounding fluctuance  Nontender throughout surgical sites  Lat foot wound with smaller area of eschar/fibrinous as well, no drainage, no exposed bone/tendon/hardware  DOM incisions healed, dry  Hallux incision healed, dry  Foot appears to be in rectus alignment    Imaging:  Independent review of imaging was performed.  Xrays 3 weightbearing views of R foot obtained today, images shown to and discussed with patient, alignment improved, possible early fusion at fusion sites ocurring, no obvious new fractures, hardware appears to be intact and stable    Impression:  56yo male patient with diabetes, peripheral neuropathy, ~ 6 weeks s/p R Charcot foot reconstruction and DOM for rockerbottom/abduction deformity, with improving medial wound necrosis  Also with history of L foot arthrodesis, Achilles reconstruction, and hallux IP arthrodesis with deformity correction for arthritis/pes planovalgus/Achilles tendinopathy    Plan:  Option for debridement of necrotic skin with future VAC vs soft tissue coverage discussed; mutual agreement given improvement of wound to continue to observe closely for now  F/u 2-3 weeks for wound check  Cont dry dressing changes, NWB R  LE in boot  Plan xrays B feet WB in 6 weeks, possibly start WB at that time depending on any interval healing  Signs and symptoms that should prompt immediate medical attention, and the importance of this, were discussed  All questions answered        Bryon Starr MD

## 2023-01-01 ENCOUNTER — APPOINTMENT (OUTPATIENT)
Dept: CARDIOLOGY | Facility: CLINIC | Age: 58
DRG: 856 | End: 2023-01-01
Attending: INTERNAL MEDICINE
Payer: COMMERCIAL

## 2023-01-01 ENCOUNTER — MYC MEDICAL ADVICE (OUTPATIENT)
Dept: ORTHOPEDICS | Facility: CLINIC | Age: 58
End: 2023-01-01

## 2023-01-01 ENCOUNTER — MYC MEDICAL ADVICE (OUTPATIENT)
Dept: INFECTIOUS DISEASES | Facility: CLINIC | Age: 58
End: 2023-01-01

## 2023-01-01 ENCOUNTER — ANESTHESIA (OUTPATIENT)
Dept: SURGERY | Facility: CLINIC | Age: 58
DRG: 856 | End: 2023-01-01
Payer: COMMERCIAL

## 2023-01-01 ENCOUNTER — ANESTHESIA EVENT (OUTPATIENT)
Dept: SURGERY | Facility: CLINIC | Age: 58
DRG: 857 | End: 2023-01-01
Payer: COMMERCIAL

## 2023-01-01 ENCOUNTER — MEDICAL CORRESPONDENCE (OUTPATIENT)
Dept: HEALTH INFORMATION MANAGEMENT | Facility: CLINIC | Age: 58
End: 2023-01-01

## 2023-01-01 ENCOUNTER — ANCILLARY PROCEDURE (OUTPATIENT)
Dept: GENERAL RADIOLOGY | Facility: CLINIC | Age: 58
End: 2023-01-01
Attending: ORTHOPAEDIC SURGERY
Payer: COMMERCIAL

## 2023-01-01 ENCOUNTER — APPOINTMENT (OUTPATIENT)
Dept: CARDIOLOGY | Facility: CLINIC | Age: 58
DRG: 856 | End: 2023-01-01
Payer: COMMERCIAL

## 2023-01-01 ENCOUNTER — ANESTHESIA EVENT (OUTPATIENT)
Dept: SURGERY | Facility: CLINIC | Age: 58
DRG: 856 | End: 2023-01-01
Payer: COMMERCIAL

## 2023-01-01 ENCOUNTER — OFFICE VISIT (OUTPATIENT)
Dept: ORTHOPEDICS | Facility: CLINIC | Age: 58
End: 2023-01-01
Payer: COMMERCIAL

## 2023-01-01 ENCOUNTER — APPOINTMENT (OUTPATIENT)
Dept: GENERAL RADIOLOGY | Facility: CLINIC | Age: 58
DRG: 856 | End: 2023-01-01
Payer: COMMERCIAL

## 2023-01-01 ENCOUNTER — DOCUMENTATION ONLY (OUTPATIENT)
Dept: ORTHOPEDICS | Facility: CLINIC | Age: 58
End: 2023-01-01

## 2023-01-01 ENCOUNTER — APPOINTMENT (OUTPATIENT)
Dept: CT IMAGING | Facility: CLINIC | Age: 58
DRG: 856 | End: 2023-01-01
Payer: COMMERCIAL

## 2023-01-01 ENCOUNTER — APPOINTMENT (OUTPATIENT)
Dept: PHYSICAL THERAPY | Facility: CLINIC | Age: 58
DRG: 857 | End: 2023-01-01
Attending: ORTHOPAEDIC SURGERY
Payer: COMMERCIAL

## 2023-01-01 ENCOUNTER — HEALTH MAINTENANCE LETTER (OUTPATIENT)
Age: 58
End: 2023-01-01

## 2023-01-01 ENCOUNTER — APPOINTMENT (OUTPATIENT)
Dept: GENERAL RADIOLOGY | Facility: CLINIC | Age: 58
DRG: 856 | End: 2023-01-01
Attending: EMERGENCY MEDICINE
Payer: COMMERCIAL

## 2023-01-01 ENCOUNTER — DOCUMENTATION ONLY (OUTPATIENT)
Dept: INFECTIOUS DISEASES | Facility: CLINIC | Age: 58
End: 2023-01-01

## 2023-01-01 ENCOUNTER — LAB REQUISITION (OUTPATIENT)
Dept: LAB | Facility: CLINIC | Age: 58
End: 2023-01-01
Payer: COMMERCIAL

## 2023-01-01 ENCOUNTER — APPOINTMENT (OUTPATIENT)
Dept: MRI IMAGING | Facility: CLINIC | Age: 58
DRG: 856 | End: 2023-01-01
Payer: COMMERCIAL

## 2023-01-01 ENCOUNTER — TELEPHONE (OUTPATIENT)
Dept: ORTHOPEDICS | Facility: CLINIC | Age: 58
End: 2023-01-01

## 2023-01-01 ENCOUNTER — HOSPITAL ENCOUNTER (INPATIENT)
Facility: CLINIC | Age: 58
LOS: 11 days | Discharge: HOME | DRG: 857 | End: 2023-11-13
Attending: ORTHOPAEDIC SURGERY | Admitting: ORTHOPAEDIC SURGERY
Payer: COMMERCIAL

## 2023-01-01 ENCOUNTER — ANESTHESIA (OUTPATIENT)
Dept: SURGERY | Facility: CLINIC | Age: 58
DRG: 857 | End: 2023-01-01
Payer: COMMERCIAL

## 2023-01-01 ENCOUNTER — LAB (OUTPATIENT)
Dept: LAB | Facility: CLINIC | Age: 58
End: 2023-01-01
Payer: COMMERCIAL

## 2023-01-01 ENCOUNTER — OFFICE VISIT (OUTPATIENT)
Dept: INFECTIOUS DISEASES | Facility: CLINIC | Age: 58
End: 2023-01-01
Attending: INTERNAL MEDICINE
Payer: COMMERCIAL

## 2023-01-01 ENCOUNTER — TRANSCRIBE ORDERS (OUTPATIENT)
Dept: OTHER | Age: 58
End: 2023-01-01

## 2023-01-01 ENCOUNTER — HOSPITAL ENCOUNTER (INPATIENT)
Facility: CLINIC | Age: 58
LOS: 7 days | Discharge: HOME HEALTH | DRG: 857 | End: 2023-01-27
Attending: EMERGENCY MEDICINE | Admitting: INTERNAL MEDICINE
Payer: COMMERCIAL

## 2023-01-01 ENCOUNTER — APPOINTMENT (OUTPATIENT)
Dept: GENERAL RADIOLOGY | Facility: CLINIC | Age: 58
DRG: 857 | End: 2023-01-01
Attending: ORTHOPAEDIC SURGERY
Payer: COMMERCIAL

## 2023-01-01 ENCOUNTER — TELEPHONE (OUTPATIENT)
Dept: OPHTHALMOLOGY | Facility: CLINIC | Age: 58
End: 2023-01-01

## 2023-01-01 ENCOUNTER — HOSPITAL ENCOUNTER (INPATIENT)
Facility: CLINIC | Age: 58
LOS: 23 days | DRG: 856 | End: 2023-12-16
Attending: EMERGENCY MEDICINE | Admitting: STUDENT IN AN ORGANIZED HEALTH CARE EDUCATION/TRAINING PROGRAM
Payer: COMMERCIAL

## 2023-01-01 ENCOUNTER — OFFICE VISIT (OUTPATIENT)
Dept: SURGERY | Facility: CLINIC | Age: 58
End: 2023-01-01
Payer: COMMERCIAL

## 2023-01-01 ENCOUNTER — TELEPHONE (OUTPATIENT)
Dept: INFECTIOUS DISEASES | Facility: CLINIC | Age: 58
End: 2023-01-01

## 2023-01-01 ENCOUNTER — APPOINTMENT (OUTPATIENT)
Dept: INTERVENTIONAL RADIOLOGY/VASCULAR | Facility: CLINIC | Age: 58
DRG: 856 | End: 2023-01-01
Attending: RADIOLOGY
Payer: COMMERCIAL

## 2023-01-01 ENCOUNTER — PATIENT OUTREACH (OUTPATIENT)
Dept: CARE COORDINATION | Facility: CLINIC | Age: 58
End: 2023-01-01

## 2023-01-01 ENCOUNTER — APPOINTMENT (OUTPATIENT)
Dept: PHYSICAL THERAPY | Facility: CLINIC | Age: 58
DRG: 857 | End: 2023-01-01
Payer: COMMERCIAL

## 2023-01-01 VITALS
WEIGHT: 195.9 LBS | SYSTOLIC BLOOD PRESSURE: 92 MMHG | OXYGEN SATURATION: 100 % | TEMPERATURE: 98.2 F | DIASTOLIC BLOOD PRESSURE: 17 MMHG | HEIGHT: 70 IN | BODY MASS INDEX: 28.05 KG/M2

## 2023-01-01 VITALS
HEIGHT: 70 IN | RESPIRATION RATE: 16 BRPM | SYSTOLIC BLOOD PRESSURE: 132 MMHG | BODY MASS INDEX: 28 KG/M2 | TEMPERATURE: 98.6 F | DIASTOLIC BLOOD PRESSURE: 68 MMHG | OXYGEN SATURATION: 96 % | WEIGHT: 195.6 LBS | HEART RATE: 94 BPM

## 2023-01-01 VITALS
WEIGHT: 190 LBS | BODY MASS INDEX: 27.2 KG/M2 | TEMPERATURE: 98 F | RESPIRATION RATE: 16 BRPM | HEART RATE: 96 BPM | OXYGEN SATURATION: 96 % | HEIGHT: 70 IN | SYSTOLIC BLOOD PRESSURE: 108 MMHG | DIASTOLIC BLOOD PRESSURE: 67 MMHG

## 2023-01-01 VITALS
BODY MASS INDEX: 31.28 KG/M2 | OXYGEN SATURATION: 97 % | WEIGHT: 218 LBS | HEART RATE: 102 BPM | DIASTOLIC BLOOD PRESSURE: 71 MMHG | SYSTOLIC BLOOD PRESSURE: 128 MMHG

## 2023-01-01 VITALS — BODY MASS INDEX: 30.35 KG/M2 | HEIGHT: 70 IN | WEIGHT: 212 LBS

## 2023-01-01 VITALS
TEMPERATURE: 98.2 F | SYSTOLIC BLOOD PRESSURE: 130 MMHG | OXYGEN SATURATION: 98 % | DIASTOLIC BLOOD PRESSURE: 77 MMHG | HEART RATE: 90 BPM

## 2023-01-01 VITALS
WEIGHT: 210 LBS | OXYGEN SATURATION: 97 % | HEIGHT: 70 IN | SYSTOLIC BLOOD PRESSURE: 104 MMHG | DIASTOLIC BLOOD PRESSURE: 59 MMHG | TEMPERATURE: 96.8 F | RESPIRATION RATE: 16 BRPM | BODY MASS INDEX: 30.06 KG/M2 | HEART RATE: 95 BPM

## 2023-01-01 VITALS
OXYGEN SATURATION: 99 % | SYSTOLIC BLOOD PRESSURE: 138 MMHG | TEMPERATURE: 98.3 F | HEART RATE: 95 BPM | DIASTOLIC BLOOD PRESSURE: 85 MMHG

## 2023-01-01 VITALS
HEART RATE: 86 BPM | OXYGEN SATURATION: 98 % | SYSTOLIC BLOOD PRESSURE: 124 MMHG | TEMPERATURE: 98.2 F | DIASTOLIC BLOOD PRESSURE: 80 MMHG

## 2023-01-01 DIAGNOSIS — M86.171 OSTEOMYELITIS OF ANKLE OR FOOT, RIGHT, ACUTE (H): ICD-10-CM

## 2023-01-01 DIAGNOSIS — Z01.818 PRE-OP EVALUATION: ICD-10-CM

## 2023-01-01 DIAGNOSIS — I96 NECROSIS OF SURGICAL WOUND (H): ICD-10-CM

## 2023-01-01 DIAGNOSIS — M14.679 CHARCOT ARTHROPATHY OF MIDFOOT: ICD-10-CM

## 2023-01-01 DIAGNOSIS — M14.60 CHARCOT'S JOINT: ICD-10-CM

## 2023-01-01 DIAGNOSIS — T81.42XD INFECTION OF DEEP INCISIONAL SURGICAL SITE AFTER PROCEDURE, SUBSEQUENT ENCOUNTER: Primary | ICD-10-CM

## 2023-01-01 DIAGNOSIS — L08.9 LOCAL SKIN INFECTION: ICD-10-CM

## 2023-01-01 DIAGNOSIS — L08.9 WOUND INFECTION: ICD-10-CM

## 2023-01-01 DIAGNOSIS — M79.671 RIGHT FOOT PAIN: ICD-10-CM

## 2023-01-01 DIAGNOSIS — T14.8XXA OTHER INJURY OF UNSPECIFIED BODY REGION, INITIAL ENCOUNTER: ICD-10-CM

## 2023-01-01 DIAGNOSIS — T14.8XXA WOUND INFECTION: Primary | ICD-10-CM

## 2023-01-01 DIAGNOSIS — M14.679 CHARCOT ARTHROPATHY OF MIDFOOT: Primary | ICD-10-CM

## 2023-01-01 DIAGNOSIS — M14.60 CHARCOT'S JOINT: Primary | ICD-10-CM

## 2023-01-01 DIAGNOSIS — T81.42XD INFECTION OF DEEP INCISIONAL SURGICAL SITE AFTER PROCEDURE, SUBSEQUENT ENCOUNTER: ICD-10-CM

## 2023-01-01 DIAGNOSIS — T14.8XXA WOUND INFECTION: ICD-10-CM

## 2023-01-01 DIAGNOSIS — M86.671 OTHER CHRONIC OSTEOMYELITIS OF RIGHT FOOT (H): Primary | ICD-10-CM

## 2023-01-01 DIAGNOSIS — M14.60 CHARCOT'S JOINT, UNSPECIFIED SITE: Primary | ICD-10-CM

## 2023-01-01 DIAGNOSIS — M21.42 PES PLANOVALGUS, ACQUIRED, LEFT: ICD-10-CM

## 2023-01-01 DIAGNOSIS — A41.9 SEPSIS, DUE TO UNSPECIFIED ORGANISM, UNSPECIFIED WHETHER ACUTE ORGAN DYSFUNCTION PRESENT (H): ICD-10-CM

## 2023-01-01 DIAGNOSIS — L08.9 WOUND INFECTION: Primary | ICD-10-CM

## 2023-01-01 DIAGNOSIS — M19.079 ARTHROPATHY OF FOOT: ICD-10-CM

## 2023-01-01 DIAGNOSIS — I97.89 NECROSIS OF SURGICAL WOUND (H): ICD-10-CM

## 2023-01-01 DIAGNOSIS — M14.60 CHARCOT'S JOINT, UNSPECIFIED SITE: ICD-10-CM

## 2023-01-01 DIAGNOSIS — M86.9 OSTEOMYELITIS OF ANKLE AND FOOT (H): Primary | ICD-10-CM

## 2023-01-01 DIAGNOSIS — M86.671 OTHER CHRONIC OSTEOMYELITIS OF RIGHT FOOT (H): ICD-10-CM

## 2023-01-01 DIAGNOSIS — I97.89 NECROSIS OF SURGICAL WOUND (H): Primary | ICD-10-CM

## 2023-01-01 DIAGNOSIS — Z11.52 ENCOUNTER FOR SCREENING LABORATORY TESTING FOR SEVERE ACUTE RESPIRATORY SYNDROME CORONAVIRUS 2 (SARS-COV-2): ICD-10-CM

## 2023-01-01 DIAGNOSIS — I96 NECROSIS OF SURGICAL WOUND (H): Primary | ICD-10-CM

## 2023-01-01 DIAGNOSIS — M86.671 CHRONIC OSTEOMYELITIS OF RIGHT FOOT (H): ICD-10-CM

## 2023-01-01 DIAGNOSIS — M79.671 RIGHT FOOT PAIN: Primary | ICD-10-CM

## 2023-01-01 DIAGNOSIS — E11.9 TYPE 2 DIABETES MELLITUS WITHOUT COMPLICATION, WITHOUT LONG-TERM CURRENT USE OF INSULIN (H): ICD-10-CM

## 2023-01-01 DIAGNOSIS — M86.60 CHRONIC OSTEOMYELITIS (H): Primary | ICD-10-CM

## 2023-01-01 DIAGNOSIS — Z01.818 PRE-OP EVALUATION: Primary | ICD-10-CM

## 2023-01-01 DIAGNOSIS — M21.6X1 MIDFOOT COLLAPSE OF RIGHT LOWER EXTREMITY: ICD-10-CM

## 2023-01-01 DIAGNOSIS — M86.9 OSTEOMYELITIS OF ANKLE AND FOOT (H): ICD-10-CM

## 2023-01-01 DIAGNOSIS — Z46.89 ENCOUNTER FOR MANAGEMENT OF VACUUM-ASSISTED CLOSURE (VAC) OF WOUND: ICD-10-CM

## 2023-01-01 DIAGNOSIS — M14.671 CHARCOT'S JOINT OF RIGHT FOOT: ICD-10-CM

## 2023-01-01 LAB
ABO/RH(D): NORMAL
ACANTHOCYTES BLD QL SMEAR: ABNORMAL
ACT BLD: 569 SECONDS (ref 74–150)
ALBUMIN SERPL BCG-MCNC: 2.2 G/DL (ref 3.5–5.2)
ALBUMIN SERPL BCG-MCNC: 2.6 G/DL (ref 3.5–5.2)
ALBUMIN SERPL BCG-MCNC: 3.2 G/DL (ref 3.5–5.2)
ALBUMIN SERPL BCG-MCNC: 3.4 G/DL (ref 3.5–5.2)
ALBUMIN SERPL BCG-MCNC: 3.8 G/DL (ref 3.5–5.2)
ALBUMIN SERPL BCG-MCNC: 4 G/DL (ref 3.5–5.2)
ALBUMIN SERPL BCG-MCNC: 4.1 G/DL (ref 3.5–5.2)
ALBUMIN SERPL BCG-MCNC: 4.4 G/DL (ref 3.5–5.2)
ALBUMIN SERPL-MCNC: 2.9 G/DL (ref 3.4–5)
ALBUMIN SERPL-MCNC: 3.4 G/DL (ref 3.4–5)
ALBUMIN UR-MCNC: 50 MG/DL
ALLEN'S TEST: ABNORMAL
ALP SERPL-CCNC: 171 U/L (ref 40–150)
ALP SERPL-CCNC: 189 U/L (ref 40–150)
ALP SERPL-CCNC: 55 U/L (ref 40–150)
ALP SERPL-CCNC: 58 U/L (ref 40–129)
ALP SERPL-CCNC: 61 U/L (ref 40–129)
ALP SERPL-CCNC: 71 U/L (ref 40–129)
ALP SERPL-CCNC: 71 U/L (ref 40–150)
ALP SERPL-CCNC: 78 U/L (ref 40–150)
ALP SERPL-CCNC: 81 U/L (ref 40–150)
ALP SERPL-CCNC: 86 U/L (ref 40–129)
ALP SERPL-CCNC: 93 U/L (ref 40–150)
ALP SERPL-CCNC: 93 U/L (ref 40–150)
ALT SERPL W P-5'-P-CCNC: 10 U/L (ref 0–70)
ALT SERPL W P-5'-P-CCNC: 10 U/L (ref 0–70)
ALT SERPL W P-5'-P-CCNC: 103 U/L (ref 0–70)
ALT SERPL W P-5'-P-CCNC: 103 U/L (ref 0–70)
ALT SERPL W P-5'-P-CCNC: 13 U/L (ref 10–50)
ALT SERPL W P-5'-P-CCNC: 17 U/L (ref 10–50)
ALT SERPL W P-5'-P-CCNC: 21 U/L (ref 0–70)
ALT SERPL W P-5'-P-CCNC: 246 U/L (ref 0–70)
ALT SERPL W P-5'-P-CCNC: 260 U/L (ref 0–70)
ALT SERPL W P-5'-P-CCNC: 36 U/L (ref 10–50)
ALT SERPL W P-5'-P-CCNC: 5 U/L (ref 0–70)
ALT SERPL W P-5'-P-CCNC: <5 U/L (ref 0–70)
ANION GAP SERPL CALCULATED.3IONS-SCNC: 10 MMOL/L (ref 7–15)
ANION GAP SERPL CALCULATED.3IONS-SCNC: 12 MMOL/L (ref 7–15)
ANION GAP SERPL CALCULATED.3IONS-SCNC: 13 MMOL/L (ref 7–15)
ANION GAP SERPL CALCULATED.3IONS-SCNC: 13 MMOL/L (ref 7–15)
ANION GAP SERPL CALCULATED.3IONS-SCNC: 14 MMOL/L (ref 7–15)
ANION GAP SERPL CALCULATED.3IONS-SCNC: 15 MMOL/L (ref 7–15)
ANION GAP SERPL CALCULATED.3IONS-SCNC: 16 MMOL/L (ref 7–15)
ANION GAP SERPL CALCULATED.3IONS-SCNC: 32 MMOL/L (ref 7–15)
ANION GAP SERPL CALCULATED.3IONS-SCNC: 33 MMOL/L (ref 7–15)
ANION GAP SERPL CALCULATED.3IONS-SCNC: 36 MMOL/L (ref 7–15)
ANION GAP SERPL CALCULATED.3IONS-SCNC: 4 MMOL/L (ref 3–14)
ANION GAP SERPL CALCULATED.3IONS-SCNC: 4 MMOL/L (ref 7–15)
ANION GAP SERPL CALCULATED.3IONS-SCNC: 5 MMOL/L (ref 3–14)
ANION GAP SERPL CALCULATED.3IONS-SCNC: 5 MMOL/L (ref 7–15)
ANION GAP SERPL CALCULATED.3IONS-SCNC: 6 MMOL/L (ref 3–14)
ANION GAP SERPL CALCULATED.3IONS-SCNC: 6 MMOL/L (ref 7–15)
ANION GAP SERPL CALCULATED.3IONS-SCNC: 7 MMOL/L (ref 3–14)
ANION GAP SERPL CALCULATED.3IONS-SCNC: 7 MMOL/L (ref 7–15)
ANION GAP SERPL CALCULATED.3IONS-SCNC: 8 MMOL/L (ref 3–14)
ANION GAP SERPL CALCULATED.3IONS-SCNC: 8 MMOL/L (ref 7–15)
ANION GAP SERPL CALCULATED.3IONS-SCNC: 9 MMOL/L (ref 3–14)
ANION GAP SERPL CALCULATED.3IONS-SCNC: 9 MMOL/L (ref 7–15)
ANTIBODY SCREEN: NEGATIVE
APPEARANCE UR: ABNORMAL
APTT PPP: 233 SECONDS (ref 22–38)
APTT PPP: 30 SECONDS (ref 22–38)
APTT PPP: 31 SECONDS (ref 22–38)
APTT PPP: 31 SECONDS (ref 22–38)
APTT PPP: 34 SECONDS (ref 22–38)
APTT PPP: 35 SECONDS (ref 22–38)
APTT PPP: 49 SECONDS (ref 22–38)
APTT PPP: >240 SECONDS (ref 22–38)
APTT PPP: >240 SECONDS (ref 22–38)
AST SERPL W P-5'-P-CCNC: 13 U/L (ref 0–45)
AST SERPL W P-5'-P-CCNC: 13 U/L (ref 10–50)
AST SERPL W P-5'-P-CCNC: 23 U/L (ref 10–50)
AST SERPL W P-5'-P-CCNC: 256 U/L (ref 0–45)
AST SERPL W P-5'-P-CCNC: 256 U/L (ref 0–45)
AST SERPL W P-5'-P-CCNC: 28 U/L (ref 10–50)
AST SERPL W P-5'-P-CCNC: 7 U/L (ref 0–45)
AST SERPL W P-5'-P-CCNC: 8 U/L (ref 0–45)
AST SERPL W P-5'-P-CCNC: ABNORMAL U/L
AST SERPL W P-5'-P-CCNC: ABNORMAL U/L
ATRIAL RATE - MUSE: 108 BPM
ATRIAL RATE - MUSE: 93 BPM
ATRIAL RATE - MUSE: NORMAL BPM
AUER BODIES BLD QL SMEAR: ABNORMAL
B-OH-BUTYR SERPL-SCNC: 0.9 MMOL/L
BACTERIA ABSC ANAEROBE+AEROBE CULT: ABNORMAL
BACTERIA ABSC ANAEROBE+AEROBE CULT: ABNORMAL
BACTERIA ABSC ANAEROBE+AEROBE CULT: NORMAL
BACTERIA ASPIRATE CULT: ABNORMAL
BACTERIA ASPIRATE CULT: NORMAL
BACTERIA ASPIRATE CULT: NORMAL
BACTERIA BLD CULT: ABNORMAL
BACTERIA BLD CULT: ABNORMAL
BACTERIA BLD CULT: NO GROWTH
BACTERIA TISS BX CULT: ABNORMAL
BACTERIA TISS BX CULT: NORMAL
BACTERIA UR CULT: NO GROWTH
BACTERIA WND CULT: ABNORMAL
BASE EXCESS BLDA CALC-SCNC: -16.3 MMOL/L (ref -9–1.8)
BASE EXCESS BLDA CALC-SCNC: -16.9 MMOL/L (ref -9–1.8)
BASE EXCESS BLDA CALC-SCNC: -22.2 MMOL/L (ref -9–1.8)
BASE EXCESS BLDA CALC-SCNC: -23.4 MMOL/L (ref -9–1.8)
BASE EXCESS BLDV CALC-SCNC: -16.9 MMOL/L (ref -7.7–1.9)
BASO STIPL BLD QL SMEAR: ABNORMAL
BASOPHILS # BLD AUTO: 0 10E3/UL (ref 0–0.2)
BASOPHILS # BLD AUTO: 0.1 10E3/UL (ref 0–0.2)
BASOPHILS # BLD AUTO: ABNORMAL 10*3/UL
BASOPHILS # BLD MANUAL: 0 10E3/UL (ref 0–0.2)
BASOPHILS NFR BLD AUTO: 0 %
BASOPHILS NFR BLD AUTO: 1 %
BASOPHILS NFR BLD AUTO: ABNORMAL %
BASOPHILS NFR BLD MANUAL: 0 %
BILIRUB DIRECT SERPL-MCNC: <0.2 MG/DL (ref 0–0.3)
BILIRUB SERPL-MCNC: 0.2 MG/DL
BILIRUB SERPL-MCNC: 0.2 MG/DL
BILIRUB SERPL-MCNC: 0.3 MG/DL (ref 0.2–1.3)
BILIRUB SERPL-MCNC: 0.4 MG/DL
BILIRUB SERPL-MCNC: 0.4 MG/DL (ref 0.2–1.3)
BILIRUB SERPL-MCNC: 0.5 MG/DL
BILIRUB SERPL-MCNC: 0.7 MG/DL
BILIRUB SERPL-MCNC: <0.2 MG/DL
BILIRUB UR QL STRIP: NEGATIVE
BITE CELLS BLD QL SMEAR: ABNORMAL
BLD PROD TYP BPU: NORMAL
BLISTER CELLS BLD QL SMEAR: ABNORMAL
BLOOD COMPONENT TYPE: NORMAL
BUN SERPL-MCNC: 11.6 MG/DL (ref 8–23)
BUN SERPL-MCNC: 12 MG/DL (ref 8–23)
BUN SERPL-MCNC: 12.8 MG/DL (ref 8–23)
BUN SERPL-MCNC: 13.3 MG/DL (ref 8–23)
BUN SERPL-MCNC: 13.3 MG/DL (ref 8–23)
BUN SERPL-MCNC: 13.5 MG/DL (ref 8–23)
BUN SERPL-MCNC: 15 MG/DL (ref 8–23)
BUN SERPL-MCNC: 15.8 MG/DL (ref 8–23)
BUN SERPL-MCNC: 16 MG/DL (ref 7–30)
BUN SERPL-MCNC: 17.1 MG/DL (ref 8–23)
BUN SERPL-MCNC: 17.4 MG/DL (ref 8–23)
BUN SERPL-MCNC: 18 MG/DL (ref 7–30)
BUN SERPL-MCNC: 19 MG/DL (ref 7–30)
BUN SERPL-MCNC: 19.1 MG/DL (ref 8–23)
BUN SERPL-MCNC: 19.2 MG/DL (ref 8–23)
BUN SERPL-MCNC: 19.2 MG/DL (ref 8–23)
BUN SERPL-MCNC: 19.8 MG/DL (ref 8–23)
BUN SERPL-MCNC: 20 MG/DL (ref 8–23)
BUN SERPL-MCNC: 20.1 MG/DL (ref 8–23)
BUN SERPL-MCNC: 20.3 MG/DL (ref 8–23)
BUN SERPL-MCNC: 20.6 MG/DL (ref 8–23)
BUN SERPL-MCNC: 20.9 MG/DL (ref 8–23)
BUN SERPL-MCNC: 21.3 MG/DL (ref 8–23)
BUN SERPL-MCNC: 21.9 MG/DL (ref 8–23)
BUN SERPL-MCNC: 22.6 MG/DL (ref 8–23)
BUN SERPL-MCNC: 22.8 MG/DL (ref 6–20)
BUN SERPL-MCNC: 23 MG/DL (ref 7–30)
BUN SERPL-MCNC: 23 MG/DL (ref 8–23)
BUN SERPL-MCNC: 23.4 MG/DL (ref 8–23)
BUN SERPL-MCNC: 24 MG/DL (ref 7–30)
BUN SERPL-MCNC: 24.5 MG/DL (ref 8–23)
BUN SERPL-MCNC: 25.9 MG/DL (ref 8–23)
BUN SERPL-MCNC: 26 MG/DL (ref 8–23)
BUN SERPL-MCNC: 27.1 MG/DL (ref 8–23)
BUN SERPL-MCNC: 27.3 MG/DL (ref 6–20)
BUN SERPL-MCNC: 33 MG/DL (ref 7–30)
BUN SERPL-MCNC: 36.2 MG/DL (ref 6–20)
BURR CELLS BLD QL SMEAR: ABNORMAL
BURR CELLS BLD QL SMEAR: ABNORMAL
CA-I BLD-MCNC: 5 MG/DL (ref 4.4–5.2)
CA-I BLD-MCNC: 5.1 MG/DL (ref 4.4–5.2)
CA-I BLD-MCNC: 5.3 MG/DL (ref 4.4–5.2)
CA-I BLD-MCNC: 7.8 MG/DL (ref 4.4–5.2)
CA-I BLD-MCNC: 8.2 MG/DL (ref 4.4–5.2)
CALCIUM SERPL-MCNC: 10.1 MG/DL (ref 8.6–10)
CALCIUM SERPL-MCNC: 8.4 MG/DL (ref 8.5–10.1)
CALCIUM SERPL-MCNC: 8.7 MG/DL (ref 8.6–10)
CALCIUM SERPL-MCNC: 8.8 MG/DL (ref 8.6–10)
CALCIUM SERPL-MCNC: 8.9 MG/DL (ref 8.6–10)
CALCIUM SERPL-MCNC: 9 MG/DL (ref 8.5–10.1)
CALCIUM SERPL-MCNC: 9 MG/DL (ref 8.6–10)
CALCIUM SERPL-MCNC: 9.1 MG/DL (ref 8.6–10)
CALCIUM SERPL-MCNC: 9.2 MG/DL (ref 8.6–10)
CALCIUM SERPL-MCNC: 9.3 MG/DL (ref 8.5–10.1)
CALCIUM SERPL-MCNC: 9.3 MG/DL (ref 8.6–10)
CALCIUM SERPL-MCNC: 9.4 MG/DL (ref 8.6–10)
CALCIUM SERPL-MCNC: 9.5 MG/DL (ref 8.6–10)
CALCIUM SERPL-MCNC: 9.6 MG/DL (ref 8.5–10.1)
CALCIUM SERPL-MCNC: 9.6 MG/DL (ref 8.6–10)
CALCIUM SERPL-MCNC: 9.8 MG/DL (ref 8.6–10)
CALCIUM SERPL-MCNC: 9.9 MG/DL (ref 8.6–10)
CHLORIDE BLD-SCNC: 109 MMOL/L (ref 94–109)
CHLORIDE BLD-SCNC: 110 MMOL/L (ref 94–109)
CHLORIDE BLD-SCNC: 110 MMOL/L (ref 94–109)
CHLORIDE BLD-SCNC: 111 MMOL/L (ref 94–109)
CHLORIDE BLD-SCNC: 113 MMOL/L (ref 94–109)
CHLORIDE BLD-SCNC: 113 MMOL/L (ref 94–109)
CHLORIDE SERPL-SCNC: 100 MMOL/L (ref 98–107)
CHLORIDE SERPL-SCNC: 100 MMOL/L (ref 98–107)
CHLORIDE SERPL-SCNC: 101 MMOL/L (ref 98–107)
CHLORIDE SERPL-SCNC: 102 MMOL/L (ref 98–107)
CHLORIDE SERPL-SCNC: 103 MMOL/L (ref 98–107)
CHLORIDE SERPL-SCNC: 104 MMOL/L (ref 98–107)
CHLORIDE SERPL-SCNC: 105 MMOL/L (ref 98–107)
CHLORIDE SERPL-SCNC: 107 MMOL/L (ref 98–107)
CHLORIDE SERPL-SCNC: 93 MMOL/L (ref 98–107)
CHLORIDE SERPL-SCNC: 98 MMOL/L (ref 98–107)
CHLORIDE SERPL-SCNC: 98 MMOL/L (ref 98–107)
CHLORIDE SERPL-SCNC: 99 MMOL/L (ref 98–107)
CO2 SERPL-SCNC: 20 MMOL/L (ref 20–32)
CO2 SERPL-SCNC: 23 MMOL/L (ref 20–32)
CO2 SERPL-SCNC: 23 MMOL/L (ref 20–32)
CO2 SERPL-SCNC: 24 MMOL/L (ref 20–32)
CO2 SERPL-SCNC: 25 MMOL/L (ref 20–32)
CO2 SERPL-SCNC: 27 MMOL/L (ref 20–32)
CODING SYSTEM: NORMAL
COHGB MFR BLD: 100 % (ref 92–100)
COLOR UR AUTO: ABNORMAL
CPB POCT: NO
CREAT SERPL-MCNC: 0.82 MG/DL (ref 0.67–1.17)
CREAT SERPL-MCNC: 0.89 MG/DL (ref 0.67–1.17)
CREAT SERPL-MCNC: 0.91 MG/DL (ref 0.67–1.17)
CREAT SERPL-MCNC: 0.91 MG/DL (ref 0.67–1.17)
CREAT SERPL-MCNC: 0.92 MG/DL (ref 0.67–1.17)
CREAT SERPL-MCNC: 0.93 MG/DL (ref 0.67–1.17)
CREAT SERPL-MCNC: 0.96 MG/DL (ref 0.67–1.17)
CREAT SERPL-MCNC: 0.97 MG/DL (ref 0.66–1.25)
CREAT SERPL-MCNC: 0.97 MG/DL (ref 0.67–1.17)
CREAT SERPL-MCNC: 0.97 MG/DL (ref 0.67–1.17)
CREAT SERPL-MCNC: 0.99 MG/DL (ref 0.67–1.17)
CREAT SERPL-MCNC: 1.01 MG/DL (ref 0.67–1.17)
CREAT SERPL-MCNC: 1.04 MG/DL (ref 0.67–1.17)
CREAT SERPL-MCNC: 1.06 MG/DL (ref 0.67–1.17)
CREAT SERPL-MCNC: 1.07 MG/DL (ref 0.66–1.25)
CREAT SERPL-MCNC: 1.09 MG/DL (ref 0.67–1.17)
CREAT SERPL-MCNC: 1.11 MG/DL (ref 0.66–1.25)
CREAT SERPL-MCNC: 1.12 MG/DL (ref 0.67–1.17)
CREAT SERPL-MCNC: 1.13 MG/DL (ref 0.66–1.25)
CREAT SERPL-MCNC: 1.13 MG/DL (ref 0.66–1.25)
CREAT SERPL-MCNC: 1.16 MG/DL (ref 0.66–1.25)
CREAT SERPL-MCNC: 1.16 MG/DL (ref 0.67–1.17)
CREAT SERPL-MCNC: 1.16 MG/DL (ref 0.67–1.17)
CREAT SERPL-MCNC: 1.17 MG/DL (ref 0.67–1.17)
CREAT SERPL-MCNC: 1.21 MG/DL (ref 0.66–1.25)
CREAT SERPL-MCNC: 1.23 MG/DL (ref 0.67–1.17)
CREAT SERPL-MCNC: 1.24 MG/DL (ref 0.66–1.25)
CREAT SERPL-MCNC: 1.26 MG/DL (ref 0.67–1.17)
CREAT SERPL-MCNC: 1.27 MG/DL (ref 0.67–1.17)
CREAT SERPL-MCNC: 1.28 MG/DL (ref 0.66–1.25)
CREAT SERPL-MCNC: 1.3 MG/DL (ref 0.67–1.17)
CREAT SERPL-MCNC: 1.31 MG/DL (ref 0.66–1.25)
CREAT SERPL-MCNC: 1.32 MG/DL (ref 0.67–1.17)
CREAT SERPL-MCNC: 1.35 MG/DL (ref 0.67–1.17)
CREAT SERPL-MCNC: 1.39 MG/DL (ref 0.67–1.17)
CREAT SERPL-MCNC: 1.42 MG/DL (ref 0.67–1.17)
CREAT SERPL-MCNC: 1.57 MG/DL (ref 0.67–1.17)
CREAT SERPL-MCNC: 1.77 MG/DL (ref 0.67–1.17)
CREAT SERPL-MCNC: 1.79 MG/DL (ref 0.67–1.17)
CRP SERPL-MCNC: 105.79 MG/L
CRP SERPL-MCNC: 122.1 MG/L
CRP SERPL-MCNC: 13.49 MG/L
CRP SERPL-MCNC: 14 MG/L (ref 0–8)
CRP SERPL-MCNC: 18.62 MG/L
CRP SERPL-MCNC: 18.88 MG/L
CRP SERPL-MCNC: 21.38 MG/L
CRP SERPL-MCNC: 22.28 MG/L
CRP SERPL-MCNC: 22.51 MG/L
CRP SERPL-MCNC: 22.83 MG/L
CRP SERPL-MCNC: 224.4 MG/L
CRP SERPL-MCNC: 23.58 MG/L
CRP SERPL-MCNC: 23.86 MG/L
CRP SERPL-MCNC: 24 MG/L (ref 0–8)
CRP SERPL-MCNC: 268.66 MG/L
CRP SERPL-MCNC: 3 MG/L
CRP SERPL-MCNC: 3.66 MG/L
CRP SERPL-MCNC: 30.94 MG/L
CRP SERPL-MCNC: 339 MG/L
CRP SERPL-MCNC: 34.8 MG/L
CRP SERPL-MCNC: 37.33 MG/L
CRP SERPL-MCNC: 42.27 MG/L
CRP SERPL-MCNC: 5.45 MG/L
CRP SERPL-MCNC: 53.4 MG/L
CRP SERPL-MCNC: 6.12 MG/L
CRP SERPL-MCNC: 6.32 MG/L
CRP SERPL-MCNC: 6.37 MG/L
CRP SERPL-MCNC: 69 MG/L (ref 0–8)
CRP SERPL-MCNC: 7.4 MG/L (ref 0–8)
CRP SERPL-MCNC: 7.54 MG/L
CRP SERPL-MCNC: 7.87 MG/L
CRP SERPL-MCNC: 71.83 MG/L
CRP SERPL-MCNC: 74.5 MG/L
CRP SERPL-MCNC: <2.9 MG/L (ref 0–8)
CRP SERPL-MCNC: <3 MG/L
DACRYOCYTES BLD QL SMEAR: ABNORMAL
DEPRECATED HCO3 PLAS-SCNC: 10 MMOL/L (ref 22–29)
DEPRECATED HCO3 PLAS-SCNC: 11 MMOL/L (ref 22–29)
DEPRECATED HCO3 PLAS-SCNC: 11 MMOL/L (ref 22–29)
DEPRECATED HCO3 PLAS-SCNC: 21 MMOL/L (ref 22–29)
DEPRECATED HCO3 PLAS-SCNC: 22 MMOL/L (ref 22–29)
DEPRECATED HCO3 PLAS-SCNC: 23 MMOL/L (ref 22–29)
DEPRECATED HCO3 PLAS-SCNC: 23 MMOL/L (ref 22–29)
DEPRECATED HCO3 PLAS-SCNC: 24 MMOL/L (ref 22–29)
DEPRECATED HCO3 PLAS-SCNC: 25 MMOL/L (ref 22–29)
DEPRECATED HCO3 PLAS-SCNC: 26 MMOL/L (ref 22–29)
DEPRECATED HCO3 PLAS-SCNC: 27 MMOL/L (ref 22–29)
DEPRECATED HCO3 PLAS-SCNC: 27 MMOL/L (ref 22–29)
DEPRECATED HCO3 PLAS-SCNC: 28 MMOL/L (ref 22–29)
DEPRECATED HCO3 PLAS-SCNC: 29 MMOL/L (ref 22–29)
DEPRECATED HCO3 PLAS-SCNC: 29 MMOL/L (ref 22–29)
DIASTOLIC BLOOD PRESSURE - MUSE: NORMAL MMHG
EGFRCR SERPLBLD CKD-EPI 2021: 43 ML/MIN/1.73M2
EGFRCR SERPLBLD CKD-EPI 2021: 44 ML/MIN/1.73M2
EGFRCR SERPLBLD CKD-EPI 2021: 50 ML/MIN/1.73M2
EGFRCR SERPLBLD CKD-EPI 2021: 58 ML/MIN/1.73M2
EGFRCR SERPLBLD CKD-EPI 2021: 60 ML/MIN/1.73M2
EGFRCR SERPLBLD CKD-EPI 2021: 62 ML/MIN/1.73M2
EGFRCR SERPLBLD CKD-EPI 2021: 65 ML/MIN/1.73M2
EGFRCR SERPLBLD CKD-EPI 2021: 66 ML/MIN/1.73M2
EGFRCR SERPLBLD CKD-EPI 2021: 68 ML/MIN/1.73M2
EGFRCR SERPLBLD CKD-EPI 2021: 72 ML/MIN/1.73M2
EGFRCR SERPLBLD CKD-EPI 2021: 73 ML/MIN/1.73M2
EGFRCR SERPLBLD CKD-EPI 2021: 76 ML/MIN/1.73M2
EGFRCR SERPLBLD CKD-EPI 2021: 78 ML/MIN/1.73M2
EGFRCR SERPLBLD CKD-EPI 2021: 81 ML/MIN/1.73M2
EGFRCR SERPLBLD CKD-EPI 2021: 83 ML/MIN/1.73M2
EGFRCR SERPLBLD CKD-EPI 2021: 86 ML/MIN/1.73M2
EGFRCR SERPLBLD CKD-EPI 2021: 88 ML/MIN/1.73M2
EGFRCR SERPLBLD CKD-EPI 2021: 90 ML/MIN/1.73M2
EGFRCR SERPLBLD CKD-EPI 2021: 90 ML/MIN/1.73M2
EGFRCR SERPLBLD CKD-EPI 2021: >90 ML/MIN/1.73M2
ELLIPTOCYTES BLD QL SMEAR: SLIGHT
ENTEROCOCCUS FAECALIS: NOT DETECTED
ENTEROCOCCUS FAECIUM: NOT DETECTED
EOSINOPHIL # BLD AUTO: 0 10E3/UL (ref 0–0.7)
EOSINOPHIL # BLD AUTO: 0 10E3/UL (ref 0–0.7)
EOSINOPHIL # BLD AUTO: 0.1 10E3/UL (ref 0–0.7)
EOSINOPHIL # BLD AUTO: 0.2 10E3/UL (ref 0–0.7)
EOSINOPHIL # BLD AUTO: 0.3 10E3/UL (ref 0–0.7)
EOSINOPHIL # BLD AUTO: 0.4 10E3/UL (ref 0–0.7)
EOSINOPHIL # BLD AUTO: ABNORMAL 10*3/UL
EOSINOPHIL # BLD MANUAL: 0 10E3/UL (ref 0–0.7)
EOSINOPHIL # BLD MANUAL: 0 10E3/UL (ref 0–0.7)
EOSINOPHIL # BLD MANUAL: 0.1 10E3/UL (ref 0–0.7)
EOSINOPHIL NFR BLD AUTO: 0 %
EOSINOPHIL NFR BLD AUTO: 0 %
EOSINOPHIL NFR BLD AUTO: 1 %
EOSINOPHIL NFR BLD AUTO: 2 %
EOSINOPHIL NFR BLD AUTO: 3 %
EOSINOPHIL NFR BLD AUTO: 4 %
EOSINOPHIL NFR BLD AUTO: ABNORMAL %
EOSINOPHIL NFR BLD MANUAL: 0 %
EOSINOPHIL NFR BLD MANUAL: 0 %
EOSINOPHIL NFR BLD MANUAL: 1 %
ERYTHROCYTE [DISTWIDTH] IN BLOOD BY AUTOMATED COUNT: 12.9 % (ref 10–15)
ERYTHROCYTE [DISTWIDTH] IN BLOOD BY AUTOMATED COUNT: 12.9 % (ref 10–15)
ERYTHROCYTE [DISTWIDTH] IN BLOOD BY AUTOMATED COUNT: 13 % (ref 10–15)
ERYTHROCYTE [DISTWIDTH] IN BLOOD BY AUTOMATED COUNT: 13 % (ref 10–15)
ERYTHROCYTE [DISTWIDTH] IN BLOOD BY AUTOMATED COUNT: 13.2 % (ref 10–15)
ERYTHROCYTE [DISTWIDTH] IN BLOOD BY AUTOMATED COUNT: 13.2 % (ref 10–15)
ERYTHROCYTE [DISTWIDTH] IN BLOOD BY AUTOMATED COUNT: 13.5 % (ref 10–15)
ERYTHROCYTE [DISTWIDTH] IN BLOOD BY AUTOMATED COUNT: 13.5 % (ref 10–15)
ERYTHROCYTE [DISTWIDTH] IN BLOOD BY AUTOMATED COUNT: 13.9 % (ref 10–15)
ERYTHROCYTE [DISTWIDTH] IN BLOOD BY AUTOMATED COUNT: 14.1 % (ref 10–15)
ERYTHROCYTE [DISTWIDTH] IN BLOOD BY AUTOMATED COUNT: 14.2 % (ref 10–15)
ERYTHROCYTE [DISTWIDTH] IN BLOOD BY AUTOMATED COUNT: 14.5 % (ref 10–15)
ERYTHROCYTE [DISTWIDTH] IN BLOOD BY AUTOMATED COUNT: 14.5 % (ref 10–15)
ERYTHROCYTE [DISTWIDTH] IN BLOOD BY AUTOMATED COUNT: 14.8 % (ref 10–15)
ERYTHROCYTE [DISTWIDTH] IN BLOOD BY AUTOMATED COUNT: 14.9 % (ref 10–15)
ERYTHROCYTE [DISTWIDTH] IN BLOOD BY AUTOMATED COUNT: 15.1 % (ref 10–15)
ERYTHROCYTE [DISTWIDTH] IN BLOOD BY AUTOMATED COUNT: 15.1 % (ref 10–15)
ERYTHROCYTE [DISTWIDTH] IN BLOOD BY AUTOMATED COUNT: 15.4 % (ref 10–15)
ERYTHROCYTE [DISTWIDTH] IN BLOOD BY AUTOMATED COUNT: 15.5 % (ref 10–15)
ERYTHROCYTE [DISTWIDTH] IN BLOOD BY AUTOMATED COUNT: 15.5 % (ref 10–15)
ERYTHROCYTE [DISTWIDTH] IN BLOOD BY AUTOMATED COUNT: 15.6 % (ref 10–15)
ERYTHROCYTE [DISTWIDTH] IN BLOOD BY AUTOMATED COUNT: 15.9 % (ref 10–15)
ERYTHROCYTE [DISTWIDTH] IN BLOOD BY AUTOMATED COUNT: 16.1 % (ref 10–15)
ERYTHROCYTE [DISTWIDTH] IN BLOOD BY AUTOMATED COUNT: 16.3 % (ref 10–15)
ERYTHROCYTE [DISTWIDTH] IN BLOOD BY AUTOMATED COUNT: 16.8 % (ref 10–15)
ERYTHROCYTE [DISTWIDTH] IN BLOOD BY AUTOMATED COUNT: 16.8 % (ref 10–15)
ERYTHROCYTE [DISTWIDTH] IN BLOOD BY AUTOMATED COUNT: 17 % (ref 10–15)
ERYTHROCYTE [DISTWIDTH] IN BLOOD BY AUTOMATED COUNT: 17.1 % (ref 10–15)
ERYTHROCYTE [SEDIMENTATION RATE] IN BLOOD BY WESTERGREN METHOD: 44 MM/HR (ref 0–20)
ERYTHROCYTE [SEDIMENTATION RATE] IN BLOOD BY WESTERGREN METHOD: 59 MM/HR (ref 0–20)
ERYTHROCYTE [SEDIMENTATION RATE] IN BLOOD BY WESTERGREN METHOD: 7 MM/HR (ref 0–20)
ERYTHROCYTE [SEDIMENTATION RATE] IN BLOOD BY WESTERGREN METHOD: 71 MM/HR (ref 0–20)
FERRITIN SERPL-MCNC: 182 NG/ML (ref 31–409)
FIBRINOGEN PPP-MCNC: <61 MG/DL (ref 170–490)
FLUAV RNA SPEC QL NAA+PROBE: NEGATIVE
FLUBV RNA RESP QL NAA+PROBE: NEGATIVE
FOLATE SERPL-MCNC: >40 NG/ML (ref 4.6–34.8)
FRAGMENTS BLD QL SMEAR: ABNORMAL
GFR SERPL CREATININE-BSD FRML MDRD: 57 ML/MIN/1.73M2
GFR SERPL CREATININE-BSD FRML MDRD: 63 ML/MIN/1.73M2
GFR SERPL CREATININE-BSD FRML MDRD: 64 ML/MIN/1.73M2
GFR SERPL CREATININE-BSD FRML MDRD: 65 ML/MIN/1.73M2
GFR SERPL CREATININE-BSD FRML MDRD: 67 ML/MIN/1.73M2
GFR SERPL CREATININE-BSD FRML MDRD: 69 ML/MIN/1.73M2
GFR SERPL CREATININE-BSD FRML MDRD: 73 ML/MIN/1.73M2
GFR SERPL CREATININE-BSD FRML MDRD: 73 ML/MIN/1.73M2
GFR SERPL CREATININE-BSD FRML MDRD: 75 ML/MIN/1.73M2
GFR SERPL CREATININE-BSD FRML MDRD: 75 ML/MIN/1.73M2
GFR SERPL CREATININE-BSD FRML MDRD: 77 ML/MIN/1.73M2
GFR SERPL CREATININE-BSD FRML MDRD: 80 ML/MIN/1.73M2
GFR SERPL CREATININE-BSD FRML MDRD: 90 ML/MIN/1.73M2
GLUCOSE BLD-MCNC: 154 MG/DL (ref 70–99)
GLUCOSE BLD-MCNC: 157 MG/DL (ref 70–99)
GLUCOSE BLD-MCNC: 165 MG/DL (ref 70–99)
GLUCOSE BLD-MCNC: 166 MG/DL (ref 70–99)
GLUCOSE BLD-MCNC: 220 MG/DL (ref 70–99)
GLUCOSE BLD-MCNC: 238 MG/DL (ref 70–99)
GLUCOSE BLD-MCNC: 254 MG/DL (ref 70–99)
GLUCOSE BLD-MCNC: 513 MG/DL (ref 70–99)
GLUCOSE BLD-MCNC: 550 MG/DL (ref 70–99)
GLUCOSE BLD-MCNC: 98 MG/DL (ref 70–99)
GLUCOSE BLDC GLUCOMTR-MCNC: 101 MG/DL (ref 70–99)
GLUCOSE BLDC GLUCOMTR-MCNC: 101 MG/DL (ref 70–99)
GLUCOSE BLDC GLUCOMTR-MCNC: 102 MG/DL (ref 70–99)
GLUCOSE BLDC GLUCOMTR-MCNC: 104 MG/DL (ref 70–99)
GLUCOSE BLDC GLUCOMTR-MCNC: 106 MG/DL (ref 70–99)
GLUCOSE BLDC GLUCOMTR-MCNC: 107 MG/DL (ref 70–99)
GLUCOSE BLDC GLUCOMTR-MCNC: 110 MG/DL (ref 70–99)
GLUCOSE BLDC GLUCOMTR-MCNC: 110 MG/DL (ref 70–99)
GLUCOSE BLDC GLUCOMTR-MCNC: 111 MG/DL (ref 70–99)
GLUCOSE BLDC GLUCOMTR-MCNC: 111 MG/DL (ref 70–99)
GLUCOSE BLDC GLUCOMTR-MCNC: 112 MG/DL (ref 70–99)
GLUCOSE BLDC GLUCOMTR-MCNC: 114 MG/DL (ref 70–99)
GLUCOSE BLDC GLUCOMTR-MCNC: 115 MG/DL (ref 70–99)
GLUCOSE BLDC GLUCOMTR-MCNC: 115 MG/DL (ref 70–99)
GLUCOSE BLDC GLUCOMTR-MCNC: 118 MG/DL (ref 70–99)
GLUCOSE BLDC GLUCOMTR-MCNC: 118 MG/DL (ref 70–99)
GLUCOSE BLDC GLUCOMTR-MCNC: 120 MG/DL (ref 70–99)
GLUCOSE BLDC GLUCOMTR-MCNC: 121 MG/DL (ref 70–99)
GLUCOSE BLDC GLUCOMTR-MCNC: 122 MG/DL (ref 70–99)
GLUCOSE BLDC GLUCOMTR-MCNC: 122 MG/DL (ref 70–99)
GLUCOSE BLDC GLUCOMTR-MCNC: 123 MG/DL (ref 70–99)
GLUCOSE BLDC GLUCOMTR-MCNC: 124 MG/DL (ref 70–99)
GLUCOSE BLDC GLUCOMTR-MCNC: 126 MG/DL (ref 70–99)
GLUCOSE BLDC GLUCOMTR-MCNC: 126 MG/DL (ref 70–99)
GLUCOSE BLDC GLUCOMTR-MCNC: 127 MG/DL (ref 70–99)
GLUCOSE BLDC GLUCOMTR-MCNC: 128 MG/DL (ref 70–99)
GLUCOSE BLDC GLUCOMTR-MCNC: 128 MG/DL (ref 70–99)
GLUCOSE BLDC GLUCOMTR-MCNC: 129 MG/DL (ref 70–99)
GLUCOSE BLDC GLUCOMTR-MCNC: 129 MG/DL (ref 70–99)
GLUCOSE BLDC GLUCOMTR-MCNC: 130 MG/DL (ref 70–99)
GLUCOSE BLDC GLUCOMTR-MCNC: 131 MG/DL (ref 70–99)
GLUCOSE BLDC GLUCOMTR-MCNC: 132 MG/DL (ref 70–99)
GLUCOSE BLDC GLUCOMTR-MCNC: 132 MG/DL (ref 70–99)
GLUCOSE BLDC GLUCOMTR-MCNC: 133 MG/DL (ref 70–99)
GLUCOSE BLDC GLUCOMTR-MCNC: 133 MG/DL (ref 70–99)
GLUCOSE BLDC GLUCOMTR-MCNC: 134 MG/DL (ref 70–99)
GLUCOSE BLDC GLUCOMTR-MCNC: 135 MG/DL (ref 70–99)
GLUCOSE BLDC GLUCOMTR-MCNC: 136 MG/DL (ref 70–99)
GLUCOSE BLDC GLUCOMTR-MCNC: 136 MG/DL (ref 70–99)
GLUCOSE BLDC GLUCOMTR-MCNC: 137 MG/DL (ref 70–99)
GLUCOSE BLDC GLUCOMTR-MCNC: 138 MG/DL (ref 70–99)
GLUCOSE BLDC GLUCOMTR-MCNC: 139 MG/DL (ref 70–99)
GLUCOSE BLDC GLUCOMTR-MCNC: 140 MG/DL (ref 70–99)
GLUCOSE BLDC GLUCOMTR-MCNC: 141 MG/DL (ref 70–99)
GLUCOSE BLDC GLUCOMTR-MCNC: 141 MG/DL (ref 70–99)
GLUCOSE BLDC GLUCOMTR-MCNC: 142 MG/DL (ref 70–99)
GLUCOSE BLDC GLUCOMTR-MCNC: 142 MG/DL (ref 70–99)
GLUCOSE BLDC GLUCOMTR-MCNC: 143 MG/DL (ref 70–99)
GLUCOSE BLDC GLUCOMTR-MCNC: 143 MG/DL (ref 70–99)
GLUCOSE BLDC GLUCOMTR-MCNC: 144 MG/DL (ref 70–99)
GLUCOSE BLDC GLUCOMTR-MCNC: 144 MG/DL (ref 70–99)
GLUCOSE BLDC GLUCOMTR-MCNC: 145 MG/DL (ref 70–99)
GLUCOSE BLDC GLUCOMTR-MCNC: 145 MG/DL (ref 70–99)
GLUCOSE BLDC GLUCOMTR-MCNC: 146 MG/DL (ref 70–99)
GLUCOSE BLDC GLUCOMTR-MCNC: 146 MG/DL (ref 70–99)
GLUCOSE BLDC GLUCOMTR-MCNC: 148 MG/DL (ref 70–99)
GLUCOSE BLDC GLUCOMTR-MCNC: 149 MG/DL (ref 70–99)
GLUCOSE BLDC GLUCOMTR-MCNC: 149 MG/DL (ref 70–99)
GLUCOSE BLDC GLUCOMTR-MCNC: 150 MG/DL (ref 70–99)
GLUCOSE BLDC GLUCOMTR-MCNC: 151 MG/DL (ref 70–99)
GLUCOSE BLDC GLUCOMTR-MCNC: 151 MG/DL (ref 70–99)
GLUCOSE BLDC GLUCOMTR-MCNC: 152 MG/DL (ref 70–99)
GLUCOSE BLDC GLUCOMTR-MCNC: 154 MG/DL (ref 70–99)
GLUCOSE BLDC GLUCOMTR-MCNC: 155 MG/DL (ref 70–99)
GLUCOSE BLDC GLUCOMTR-MCNC: 156 MG/DL (ref 70–99)
GLUCOSE BLDC GLUCOMTR-MCNC: 157 MG/DL (ref 70–99)
GLUCOSE BLDC GLUCOMTR-MCNC: 158 MG/DL (ref 70–99)
GLUCOSE BLDC GLUCOMTR-MCNC: 160 MG/DL (ref 70–99)
GLUCOSE BLDC GLUCOMTR-MCNC: 160 MG/DL (ref 70–99)
GLUCOSE BLDC GLUCOMTR-MCNC: 161 MG/DL (ref 70–99)
GLUCOSE BLDC GLUCOMTR-MCNC: 162 MG/DL (ref 70–99)
GLUCOSE BLDC GLUCOMTR-MCNC: 163 MG/DL (ref 70–99)
GLUCOSE BLDC GLUCOMTR-MCNC: 164 MG/DL (ref 70–99)
GLUCOSE BLDC GLUCOMTR-MCNC: 165 MG/DL (ref 70–99)
GLUCOSE BLDC GLUCOMTR-MCNC: 166 MG/DL (ref 70–99)
GLUCOSE BLDC GLUCOMTR-MCNC: 167 MG/DL (ref 70–99)
GLUCOSE BLDC GLUCOMTR-MCNC: 168 MG/DL (ref 70–99)
GLUCOSE BLDC GLUCOMTR-MCNC: 168 MG/DL (ref 70–99)
GLUCOSE BLDC GLUCOMTR-MCNC: 169 MG/DL (ref 70–99)
GLUCOSE BLDC GLUCOMTR-MCNC: 169 MG/DL (ref 70–99)
GLUCOSE BLDC GLUCOMTR-MCNC: 170 MG/DL (ref 70–99)
GLUCOSE BLDC GLUCOMTR-MCNC: 170 MG/DL (ref 70–99)
GLUCOSE BLDC GLUCOMTR-MCNC: 171 MG/DL (ref 70–99)
GLUCOSE BLDC GLUCOMTR-MCNC: 171 MG/DL (ref 70–99)
GLUCOSE BLDC GLUCOMTR-MCNC: 172 MG/DL (ref 70–99)
GLUCOSE BLDC GLUCOMTR-MCNC: 173 MG/DL (ref 70–99)
GLUCOSE BLDC GLUCOMTR-MCNC: 173 MG/DL (ref 70–99)
GLUCOSE BLDC GLUCOMTR-MCNC: 174 MG/DL (ref 70–99)
GLUCOSE BLDC GLUCOMTR-MCNC: 174 MG/DL (ref 70–99)
GLUCOSE BLDC GLUCOMTR-MCNC: 175 MG/DL (ref 70–99)
GLUCOSE BLDC GLUCOMTR-MCNC: 175 MG/DL (ref 70–99)
GLUCOSE BLDC GLUCOMTR-MCNC: 178 MG/DL (ref 70–99)
GLUCOSE BLDC GLUCOMTR-MCNC: 179 MG/DL (ref 70–99)
GLUCOSE BLDC GLUCOMTR-MCNC: 179 MG/DL (ref 70–99)
GLUCOSE BLDC GLUCOMTR-MCNC: 180 MG/DL (ref 70–99)
GLUCOSE BLDC GLUCOMTR-MCNC: 180 MG/DL (ref 70–99)
GLUCOSE BLDC GLUCOMTR-MCNC: 185 MG/DL (ref 70–99)
GLUCOSE BLDC GLUCOMTR-MCNC: 189 MG/DL (ref 70–99)
GLUCOSE BLDC GLUCOMTR-MCNC: 190 MG/DL (ref 70–99)
GLUCOSE BLDC GLUCOMTR-MCNC: 191 MG/DL (ref 70–99)
GLUCOSE BLDC GLUCOMTR-MCNC: 191 MG/DL (ref 70–99)
GLUCOSE BLDC GLUCOMTR-MCNC: 192 MG/DL (ref 70–99)
GLUCOSE BLDC GLUCOMTR-MCNC: 192 MG/DL (ref 70–99)
GLUCOSE BLDC GLUCOMTR-MCNC: 194 MG/DL (ref 70–99)
GLUCOSE BLDC GLUCOMTR-MCNC: 195 MG/DL (ref 70–99)
GLUCOSE BLDC GLUCOMTR-MCNC: 196 MG/DL (ref 70–99)
GLUCOSE BLDC GLUCOMTR-MCNC: 197 MG/DL (ref 70–99)
GLUCOSE BLDC GLUCOMTR-MCNC: 197 MG/DL (ref 70–99)
GLUCOSE BLDC GLUCOMTR-MCNC: 198 MG/DL (ref 70–99)
GLUCOSE BLDC GLUCOMTR-MCNC: 198 MG/DL (ref 70–99)
GLUCOSE BLDC GLUCOMTR-MCNC: 199 MG/DL (ref 70–99)
GLUCOSE BLDC GLUCOMTR-MCNC: 199 MG/DL (ref 70–99)
GLUCOSE BLDC GLUCOMTR-MCNC: 200 MG/DL (ref 70–99)
GLUCOSE BLDC GLUCOMTR-MCNC: 201 MG/DL (ref 70–99)
GLUCOSE BLDC GLUCOMTR-MCNC: 201 MG/DL (ref 70–99)
GLUCOSE BLDC GLUCOMTR-MCNC: 203 MG/DL (ref 70–99)
GLUCOSE BLDC GLUCOMTR-MCNC: 205 MG/DL (ref 70–99)
GLUCOSE BLDC GLUCOMTR-MCNC: 206 MG/DL (ref 70–99)
GLUCOSE BLDC GLUCOMTR-MCNC: 208 MG/DL (ref 70–99)
GLUCOSE BLDC GLUCOMTR-MCNC: 209 MG/DL (ref 70–99)
GLUCOSE BLDC GLUCOMTR-MCNC: 211 MG/DL (ref 70–99)
GLUCOSE BLDC GLUCOMTR-MCNC: 211 MG/DL (ref 70–99)
GLUCOSE BLDC GLUCOMTR-MCNC: 212 MG/DL (ref 70–99)
GLUCOSE BLDC GLUCOMTR-MCNC: 214 MG/DL (ref 70–99)
GLUCOSE BLDC GLUCOMTR-MCNC: 216 MG/DL (ref 70–99)
GLUCOSE BLDC GLUCOMTR-MCNC: 221 MG/DL (ref 70–99)
GLUCOSE BLDC GLUCOMTR-MCNC: 222 MG/DL (ref 70–99)
GLUCOSE BLDC GLUCOMTR-MCNC: 222 MG/DL (ref 70–99)
GLUCOSE BLDC GLUCOMTR-MCNC: 223 MG/DL (ref 70–99)
GLUCOSE BLDC GLUCOMTR-MCNC: 224 MG/DL (ref 70–99)
GLUCOSE BLDC GLUCOMTR-MCNC: 229 MG/DL (ref 70–99)
GLUCOSE BLDC GLUCOMTR-MCNC: 230 MG/DL (ref 70–99)
GLUCOSE BLDC GLUCOMTR-MCNC: 233 MG/DL (ref 70–99)
GLUCOSE BLDC GLUCOMTR-MCNC: 235 MG/DL (ref 70–99)
GLUCOSE BLDC GLUCOMTR-MCNC: 235 MG/DL (ref 70–99)
GLUCOSE BLDC GLUCOMTR-MCNC: 237 MG/DL (ref 70–99)
GLUCOSE BLDC GLUCOMTR-MCNC: 241 MG/DL (ref 70–99)
GLUCOSE BLDC GLUCOMTR-MCNC: 243 MG/DL (ref 70–99)
GLUCOSE BLDC GLUCOMTR-MCNC: 246 MG/DL (ref 70–99)
GLUCOSE BLDC GLUCOMTR-MCNC: 247 MG/DL (ref 70–99)
GLUCOSE BLDC GLUCOMTR-MCNC: 252 MG/DL (ref 70–99)
GLUCOSE BLDC GLUCOMTR-MCNC: 254 MG/DL (ref 70–99)
GLUCOSE BLDC GLUCOMTR-MCNC: 255 MG/DL (ref 70–99)
GLUCOSE BLDC GLUCOMTR-MCNC: 257 MG/DL (ref 70–99)
GLUCOSE BLDC GLUCOMTR-MCNC: 259 MG/DL (ref 70–99)
GLUCOSE BLDC GLUCOMTR-MCNC: 263 MG/DL (ref 70–99)
GLUCOSE BLDC GLUCOMTR-MCNC: 268 MG/DL (ref 70–99)
GLUCOSE BLDC GLUCOMTR-MCNC: 277 MG/DL (ref 70–99)
GLUCOSE BLDC GLUCOMTR-MCNC: 283 MG/DL (ref 70–99)
GLUCOSE BLDC GLUCOMTR-MCNC: 284 MG/DL (ref 70–99)
GLUCOSE BLDC GLUCOMTR-MCNC: 285 MG/DL (ref 70–99)
GLUCOSE BLDC GLUCOMTR-MCNC: 296 MG/DL (ref 70–99)
GLUCOSE BLDC GLUCOMTR-MCNC: 298 MG/DL (ref 70–99)
GLUCOSE BLDC GLUCOMTR-MCNC: 305 MG/DL (ref 70–99)
GLUCOSE BLDC GLUCOMTR-MCNC: 316 MG/DL (ref 70–99)
GLUCOSE BLDC GLUCOMTR-MCNC: 324 MG/DL (ref 70–99)
GLUCOSE BLDC GLUCOMTR-MCNC: 327 MG/DL (ref 70–99)
GLUCOSE BLDC GLUCOMTR-MCNC: 328 MG/DL (ref 70–99)
GLUCOSE BLDC GLUCOMTR-MCNC: 339 MG/DL (ref 70–99)
GLUCOSE BLDC GLUCOMTR-MCNC: 340 MG/DL (ref 70–99)
GLUCOSE BLDC GLUCOMTR-MCNC: 342 MG/DL (ref 70–99)
GLUCOSE BLDC GLUCOMTR-MCNC: 345 MG/DL (ref 70–99)
GLUCOSE BLDC GLUCOMTR-MCNC: 373 MG/DL (ref 70–99)
GLUCOSE BLDC GLUCOMTR-MCNC: 396 MG/DL (ref 70–99)
GLUCOSE BLDC GLUCOMTR-MCNC: 86 MG/DL (ref 70–99)
GLUCOSE BLDC GLUCOMTR-MCNC: 89 MG/DL (ref 70–99)
GLUCOSE BLDC GLUCOMTR-MCNC: 90 MG/DL (ref 70–99)
GLUCOSE BLDC GLUCOMTR-MCNC: 97 MG/DL (ref 70–99)
GLUCOSE SERPL-MCNC: 103 MG/DL (ref 70–99)
GLUCOSE SERPL-MCNC: 112 MG/DL (ref 70–99)
GLUCOSE SERPL-MCNC: 114 MG/DL (ref 70–99)
GLUCOSE SERPL-MCNC: 114 MG/DL (ref 70–99)
GLUCOSE SERPL-MCNC: 122 MG/DL (ref 70–99)
GLUCOSE SERPL-MCNC: 123 MG/DL (ref 70–99)
GLUCOSE SERPL-MCNC: 127 MG/DL (ref 70–99)
GLUCOSE SERPL-MCNC: 128 MG/DL (ref 70–99)
GLUCOSE SERPL-MCNC: 129 MG/DL (ref 70–99)
GLUCOSE SERPL-MCNC: 133 MG/DL (ref 70–99)
GLUCOSE SERPL-MCNC: 133 MG/DL (ref 70–99)
GLUCOSE SERPL-MCNC: 135 MG/DL (ref 70–99)
GLUCOSE SERPL-MCNC: 142 MG/DL (ref 70–99)
GLUCOSE SERPL-MCNC: 152 MG/DL (ref 70–99)
GLUCOSE SERPL-MCNC: 154 MG/DL (ref 70–99)
GLUCOSE SERPL-MCNC: 156 MG/DL (ref 70–99)
GLUCOSE SERPL-MCNC: 156 MG/DL (ref 70–99)
GLUCOSE SERPL-MCNC: 161 MG/DL (ref 70–99)
GLUCOSE SERPL-MCNC: 162 MG/DL (ref 70–99)
GLUCOSE SERPL-MCNC: 193 MG/DL (ref 70–99)
GLUCOSE SERPL-MCNC: 194 MG/DL (ref 70–99)
GLUCOSE SERPL-MCNC: 206 MG/DL (ref 70–99)
GLUCOSE SERPL-MCNC: 207 MG/DL (ref 70–99)
GLUCOSE SERPL-MCNC: 209 MG/DL (ref 70–99)
GLUCOSE SERPL-MCNC: 210 MG/DL (ref 70–99)
GLUCOSE SERPL-MCNC: 211 MG/DL (ref 70–99)
GLUCOSE SERPL-MCNC: 233 MG/DL (ref 70–99)
GLUCOSE SERPL-MCNC: 241 MG/DL (ref 70–99)
GLUCOSE SERPL-MCNC: 314 MG/DL (ref 70–99)
GLUCOSE SERPL-MCNC: 375 MG/DL (ref 70–99)
GLUCOSE SERPL-MCNC: 543 MG/DL (ref 70–99)
GLUCOSE UR STRIP-MCNC: >=1000 MG/DL
GRAM STAIN RESULT: ABNORMAL
GRAM STAIN RESULT: NORMAL
HBA1C MFR BLD: 7.3 %
HCO3 BLD-SCNC: 10 MMOL/L (ref 21–28)
HCO3 BLD-SCNC: 11 MMOL/L (ref 21–28)
HCO3 BLD-SCNC: 11 MMOL/L (ref 21–28)
HCO3 BLD-SCNC: 12 MMOL/L (ref 21–28)
HCO3 BLDA-SCNC: 13 MMOL/L (ref 21–28)
HCO3 BLDV-SCNC: 11 MMOL/L (ref 21–28)
HCT VFR BLD AUTO: 25.4 % (ref 40–53)
HCT VFR BLD AUTO: 28.1 % (ref 40–53)
HCT VFR BLD AUTO: 28.3 % (ref 40–53)
HCT VFR BLD AUTO: 28.7 % (ref 40–53)
HCT VFR BLD AUTO: 28.8 % (ref 40–53)
HCT VFR BLD AUTO: 28.9 % (ref 40–53)
HCT VFR BLD AUTO: 29 % (ref 40–53)
HCT VFR BLD AUTO: 29 % (ref 40–53)
HCT VFR BLD AUTO: 29.5 % (ref 40–53)
HCT VFR BLD AUTO: 29.9 % (ref 40–53)
HCT VFR BLD AUTO: 30.4 % (ref 40–53)
HCT VFR BLD AUTO: 30.9 % (ref 40–53)
HCT VFR BLD AUTO: 31.8 % (ref 40–53)
HCT VFR BLD AUTO: 32.1 % (ref 40–53)
HCT VFR BLD AUTO: 32.5 % (ref 40–53)
HCT VFR BLD AUTO: 32.6 % (ref 40–53)
HCT VFR BLD AUTO: 33.3 % (ref 40–53)
HCT VFR BLD AUTO: 33.7 % (ref 40–53)
HCT VFR BLD AUTO: 34 % (ref 40–53)
HCT VFR BLD AUTO: 34.4 % (ref 40–53)
HCT VFR BLD AUTO: 34.7 % (ref 40–53)
HCT VFR BLD AUTO: 34.9 % (ref 40–53)
HCT VFR BLD AUTO: 35.3 % (ref 40–53)
HCT VFR BLD AUTO: 35.3 % (ref 40–53)
HCT VFR BLD AUTO: 35.5 % (ref 40–53)
HCT VFR BLD AUTO: 36.2 % (ref 40–53)
HCT VFR BLD AUTO: 36.3 % (ref 40–53)
HCT VFR BLD AUTO: 36.5 % (ref 40–53)
HCT VFR BLD CALC: 20 % (ref 40–53)
HGB BLD-MCNC: 10.1 G/DL (ref 13.3–17.7)
HGB BLD-MCNC: 10.2 G/DL (ref 13.3–17.7)
HGB BLD-MCNC: 10.3 G/DL (ref 13.3–17.7)
HGB BLD-MCNC: 10.5 G/DL (ref 13.3–17.7)
HGB BLD-MCNC: 10.6 G/DL (ref 13.3–17.7)
HGB BLD-MCNC: 11 G/DL (ref 13.3–17.7)
HGB BLD-MCNC: 11.1 G/DL (ref 13.3–17.7)
HGB BLD-MCNC: 11.2 G/DL (ref 13.3–17.7)
HGB BLD-MCNC: 11.2 G/DL (ref 13.3–17.7)
HGB BLD-MCNC: 11.5 G/DL (ref 13.3–17.7)
HGB BLD-MCNC: 11.8 G/DL (ref 13.3–17.7)
HGB BLD-MCNC: 6.8 G/DL (ref 13.3–17.7)
HGB BLD-MCNC: 7 G/DL (ref 13.3–17.7)
HGB BLD-MCNC: 8.7 G/DL (ref 13.3–17.7)
HGB BLD-MCNC: 8.7 G/DL (ref 13.3–17.7)
HGB BLD-MCNC: 8.8 G/DL (ref 13.3–17.7)
HGB BLD-MCNC: 8.9 G/DL (ref 13.3–17.7)
HGB BLD-MCNC: 9 G/DL (ref 13.3–17.7)
HGB BLD-MCNC: 9.2 G/DL (ref 13.3–17.7)
HGB BLD-MCNC: 9.4 G/DL (ref 13.3–17.7)
HGB BLD-MCNC: 9.5 G/DL (ref 13.3–17.7)
HGB BLD-MCNC: 9.5 G/DL (ref 13.3–17.7)
HGB BLD-MCNC: 9.7 G/DL (ref 13.3–17.7)
HGB BLD-MCNC: 9.8 G/DL (ref 13.3–17.7)
HGB BLD-MCNC: 9.8 G/DL (ref 13.3–17.7)
HGB BLD-MCNC: 9.9 G/DL (ref 13.3–17.7)
HGB C CRYSTALS: ABNORMAL
HGB UR QL STRIP: NEGATIVE
HOLD SPECIMEN: NORMAL
HOWELL-JOLLY BOD BLD QL SMEAR: ABNORMAL
IMM GRANULOCYTES # BLD: 0 10E3/UL
IMM GRANULOCYTES # BLD: 0.1 10E3/UL
IMM GRANULOCYTES # BLD: 0.2 10E3/UL
IMM GRANULOCYTES # BLD: 0.2 10E3/UL
IMM GRANULOCYTES # BLD: 0.3 10E3/UL
IMM GRANULOCYTES # BLD: ABNORMAL 10*3/UL
IMM GRANULOCYTES NFR BLD: 0 %
IMM GRANULOCYTES NFR BLD: 1 %
IMM GRANULOCYTES NFR BLD: 2 %
IMM GRANULOCYTES NFR BLD: 3 %
IMM GRANULOCYTES NFR BLD: ABNORMAL %
INR PPP: 1.05 (ref 0.85–1.15)
INR PPP: 1.06 (ref 0.85–1.15)
INR PPP: 1.09 (ref 0.85–1.15)
INR PPP: 1.09 (ref 0.85–1.15)
INR PPP: 1.1 (ref 0.85–1.15)
INR PPP: 1.19 (ref 0.85–1.15)
INR PPP: 1.29 (ref 0.85–1.15)
INR PPP: 1.7 (ref 0.85–1.15)
INR PPP: 5 (ref 0.85–1.15)
INR PPP: 6.12 (ref 0.85–1.15)
INR PPP: 8.71 (ref 0.85–1.15)
INTERPRETATION ECG - MUSE: NORMAL
IRON BINDING CAPACITY (ROCHE): 213 UG/DL (ref 240–430)
IRON SATN MFR SERPL: 5 % (ref 15–46)
IRON SERPL-MCNC: 10 UG/DL (ref 61–157)
ISSUE DATE AND TIME: NORMAL
KETONES UR STRIP-MCNC: 10 MG/DL
LACTATE BLD-SCNC: 19 MMOL/L
LACTATE SERPL-SCNC: 0.8 MMOL/L (ref 0.7–2)
LACTATE SERPL-SCNC: 1.7 MMOL/L (ref 0.7–2)
LACTATE SERPL-SCNC: 12.3 MMOL/L (ref 0.7–2)
LACTATE SERPL-SCNC: 13.7 MMOL/L (ref 0.7–2)
LACTATE SERPL-SCNC: 17 MMOL/L (ref 0.7–2)
LACTATE SERPL-SCNC: 18 MMOL/L (ref 0.7–2)
LACTATE SERPL-SCNC: 21 MMOL/L (ref 0.7–2)
LACTATE SERPL-SCNC: 25 MMOL/L (ref 0.7–2)
LEUKOCYTE ESTERASE UR QL STRIP: NEGATIVE
LISTERIA SPECIES (DETECTED/NOT DETECTED): NOT DETECTED
LVEF ECHO: NORMAL
LVEF ECHO: NORMAL
LYMPHOCYTES # BLD AUTO: 1 10E3/UL (ref 0.8–5.3)
LYMPHOCYTES # BLD AUTO: 1.3 10E3/UL (ref 0.8–5.3)
LYMPHOCYTES # BLD AUTO: 1.3 10E3/UL (ref 0.8–5.3)
LYMPHOCYTES # BLD AUTO: 1.4 10E3/UL (ref 0.8–5.3)
LYMPHOCYTES # BLD AUTO: 1.5 10E3/UL (ref 0.8–5.3)
LYMPHOCYTES # BLD AUTO: 1.7 10E3/UL (ref 0.8–5.3)
LYMPHOCYTES # BLD AUTO: 1.8 10E3/UL (ref 0.8–5.3)
LYMPHOCYTES # BLD AUTO: 1.9 10E3/UL (ref 0.8–5.3)
LYMPHOCYTES # BLD AUTO: 1.9 10E3/UL (ref 0.8–5.3)
LYMPHOCYTES # BLD AUTO: 2 10E3/UL (ref 0.8–5.3)
LYMPHOCYTES # BLD AUTO: 2 10E3/UL (ref 0.8–5.3)
LYMPHOCYTES # BLD AUTO: 2.1 10E3/UL (ref 0.8–5.3)
LYMPHOCYTES # BLD AUTO: 2.6 10E3/UL (ref 0.8–5.3)
LYMPHOCYTES # BLD AUTO: ABNORMAL 10*3/UL
LYMPHOCYTES # BLD MANUAL: 1.4 10E3/UL (ref 0.8–5.3)
LYMPHOCYTES # BLD MANUAL: 2.4 10E3/UL (ref 0.8–5.3)
LYMPHOCYTES # BLD MANUAL: 2.9 10E3/UL (ref 0.8–5.3)
LYMPHOCYTES NFR BLD AUTO: 14 %
LYMPHOCYTES NFR BLD AUTO: 15 %
LYMPHOCYTES NFR BLD AUTO: 16 %
LYMPHOCYTES NFR BLD AUTO: 18 %
LYMPHOCYTES NFR BLD AUTO: 19 %
LYMPHOCYTES NFR BLD AUTO: 19 %
LYMPHOCYTES NFR BLD AUTO: 22 %
LYMPHOCYTES NFR BLD AUTO: 24 %
LYMPHOCYTES NFR BLD AUTO: 26 %
LYMPHOCYTES NFR BLD AUTO: 28 %
LYMPHOCYTES NFR BLD AUTO: 7 %
LYMPHOCYTES NFR BLD AUTO: ABNORMAL %
LYMPHOCYTES NFR BLD MANUAL: 10 %
LYMPHOCYTES NFR BLD MANUAL: 18 %
LYMPHOCYTES NFR BLD MANUAL: 24 %
MAGNESIUM SERPL-MCNC: 2 MG/DL (ref 1.6–2.3)
MAGNESIUM SERPL-MCNC: 2.4 MG/DL (ref 1.7–2.3)
MAGNESIUM SERPL-MCNC: 3.1 MG/DL (ref 1.7–2.3)
MAGNESIUM SERPL-MCNC: 3.1 MG/DL (ref 1.7–2.3)
MAGNESIUM SERPL-MCNC: 3.7 MG/DL (ref 1.7–2.3)
MCH RBC QN AUTO: 22.9 PG (ref 26.5–33)
MCH RBC QN AUTO: 23.2 PG (ref 26.5–33)
MCH RBC QN AUTO: 23.2 PG (ref 26.5–33)
MCH RBC QN AUTO: 23.3 PG (ref 26.5–33)
MCH RBC QN AUTO: 23.4 PG (ref 26.5–33)
MCH RBC QN AUTO: 23.5 PG (ref 26.5–33)
MCH RBC QN AUTO: 23.7 PG (ref 26.5–33)
MCH RBC QN AUTO: 23.8 PG (ref 26.5–33)
MCH RBC QN AUTO: 23.8 PG (ref 26.5–33)
MCH RBC QN AUTO: 23.9 PG (ref 26.5–33)
MCH RBC QN AUTO: 23.9 PG (ref 26.5–33)
MCH RBC QN AUTO: 24 PG (ref 26.5–33)
MCH RBC QN AUTO: 24 PG (ref 26.5–33)
MCH RBC QN AUTO: 24.7 PG (ref 26.5–33)
MCH RBC QN AUTO: 24.9 PG (ref 26.5–33)
MCH RBC QN AUTO: 26.2 PG (ref 26.5–33)
MCH RBC QN AUTO: 26.4 PG (ref 26.5–33)
MCH RBC QN AUTO: 26.6 PG (ref 26.5–33)
MCH RBC QN AUTO: 26.7 PG (ref 26.5–33)
MCH RBC QN AUTO: 26.7 PG (ref 26.5–33)
MCH RBC QN AUTO: 26.8 PG (ref 26.5–33)
MCHC RBC AUTO-ENTMCNC: 25.9 G/DL (ref 31.5–36.5)
MCHC RBC AUTO-ENTMCNC: 27.6 G/DL (ref 31.5–36.5)
MCHC RBC AUTO-ENTMCNC: 29.1 G/DL (ref 31.5–36.5)
MCHC RBC AUTO-ENTMCNC: 29.4 G/DL (ref 31.5–36.5)
MCHC RBC AUTO-ENTMCNC: 30 G/DL (ref 31.5–36.5)
MCHC RBC AUTO-ENTMCNC: 30.1 G/DL (ref 31.5–36.5)
MCHC RBC AUTO-ENTMCNC: 30.3 G/DL (ref 31.5–36.5)
MCHC RBC AUTO-ENTMCNC: 30.3 G/DL (ref 31.5–36.5)
MCHC RBC AUTO-ENTMCNC: 30.5 G/DL (ref 31.5–36.5)
MCHC RBC AUTO-ENTMCNC: 30.6 G/DL (ref 31.5–36.5)
MCHC RBC AUTO-ENTMCNC: 30.7 G/DL (ref 31.5–36.5)
MCHC RBC AUTO-ENTMCNC: 30.8 G/DL (ref 31.5–36.5)
MCHC RBC AUTO-ENTMCNC: 30.8 G/DL (ref 31.5–36.5)
MCHC RBC AUTO-ENTMCNC: 30.9 G/DL (ref 31.5–36.5)
MCHC RBC AUTO-ENTMCNC: 30.9 G/DL (ref 31.5–36.5)
MCHC RBC AUTO-ENTMCNC: 31 G/DL (ref 31.5–36.5)
MCHC RBC AUTO-ENTMCNC: 31 G/DL (ref 31.5–36.5)
MCHC RBC AUTO-ENTMCNC: 31.1 G/DL (ref 31.5–36.5)
MCHC RBC AUTO-ENTMCNC: 31.2 G/DL (ref 31.5–36.5)
MCHC RBC AUTO-ENTMCNC: 31.4 G/DL (ref 31.5–36.5)
MCHC RBC AUTO-ENTMCNC: 31.4 G/DL (ref 31.5–36.5)
MCHC RBC AUTO-ENTMCNC: 31.5 G/DL (ref 31.5–36.5)
MCHC RBC AUTO-ENTMCNC: 31.5 G/DL (ref 31.5–36.5)
MCHC RBC AUTO-ENTMCNC: 31.7 G/DL (ref 31.5–36.5)
MCHC RBC AUTO-ENTMCNC: 32 G/DL (ref 31.5–36.5)
MCHC RBC AUTO-ENTMCNC: 32 G/DL (ref 31.5–36.5)
MCHC RBC AUTO-ENTMCNC: 32.1 G/DL (ref 31.5–36.5)
MCHC RBC AUTO-ENTMCNC: 32.6 G/DL (ref 31.5–36.5)
MCV RBC AUTO: 75 FL (ref 78–100)
MCV RBC AUTO: 76 FL (ref 78–100)
MCV RBC AUTO: 77 FL (ref 78–100)
MCV RBC AUTO: 78 FL (ref 78–100)
MCV RBC AUTO: 81 FL (ref 78–100)
MCV RBC AUTO: 82 FL (ref 78–100)
MCV RBC AUTO: 82 FL (ref 78–100)
MCV RBC AUTO: 83 FL (ref 78–100)
MCV RBC AUTO: 84 FL (ref 78–100)
MCV RBC AUTO: 84 FL (ref 78–100)
MCV RBC AUTO: 85 FL (ref 78–100)
MCV RBC AUTO: 92 FL (ref 78–100)
METAMYELOCYTES # BLD MANUAL: 0.1 10E3/UL
METAMYELOCYTES # BLD MANUAL: 0.2 10E3/UL
METAMYELOCYTES NFR BLD MANUAL: 1 %
METAMYELOCYTES NFR BLD MANUAL: 2 %
MONOCYTES # BLD AUTO: 0.2 10E3/UL (ref 0–1.3)
MONOCYTES # BLD AUTO: 0.5 10E3/UL (ref 0–1.3)
MONOCYTES # BLD AUTO: 0.6 10E3/UL (ref 0–1.3)
MONOCYTES # BLD AUTO: 0.7 10E3/UL (ref 0–1.3)
MONOCYTES # BLD AUTO: 1.3 10E3/UL (ref 0–1.3)
MONOCYTES # BLD AUTO: ABNORMAL 10*3/UL
MONOCYTES # BLD MANUAL: 0.3 10E3/UL (ref 0–1.3)
MONOCYTES # BLD MANUAL: 0.4 10E3/UL (ref 0–1.3)
MONOCYTES # BLD MANUAL: 0.6 10E3/UL (ref 0–1.3)
MONOCYTES NFR BLD AUTO: 2 %
MONOCYTES NFR BLD AUTO: 5 %
MONOCYTES NFR BLD AUTO: 5 %
MONOCYTES NFR BLD AUTO: 6 %
MONOCYTES NFR BLD AUTO: 7 %
MONOCYTES NFR BLD AUTO: 8 %
MONOCYTES NFR BLD AUTO: 8 %
MONOCYTES NFR BLD AUTO: ABNORMAL %
MONOCYTES NFR BLD MANUAL: 2 %
MONOCYTES NFR BLD MANUAL: 4 %
MONOCYTES NFR BLD MANUAL: 4 %
MRSA DNA SPEC QL NAA+PROBE: NEGATIVE
MRSA DNA SPEC QL NAA+PROBE: NEGATIVE
MUCOUS THREADS #/AREA URNS LPF: PRESENT /LPF
MYELOCYTES # BLD MANUAL: 0.1 10E3/UL
MYELOCYTES # BLD MANUAL: 0.1 10E3/UL
MYELOCYTES # BLD MANUAL: 0.9 10E3/UL
MYELOCYTES NFR BLD MANUAL: 1 %
MYELOCYTES NFR BLD MANUAL: 1 %
MYELOCYTES NFR BLD MANUAL: 3 %
NEUTROPHILS # BLD AUTO: 16.9 10E3/UL (ref 1.6–8.3)
NEUTROPHILS # BLD AUTO: 4.6 10E3/UL (ref 1.6–8.3)
NEUTROPHILS # BLD AUTO: 4.8 10E3/UL (ref 1.6–8.3)
NEUTROPHILS # BLD AUTO: 5.2 10E3/UL (ref 1.6–8.3)
NEUTROPHILS # BLD AUTO: 5.4 10E3/UL (ref 1.6–8.3)
NEUTROPHILS # BLD AUTO: 5.5 10E3/UL (ref 1.6–8.3)
NEUTROPHILS # BLD AUTO: 5.7 10E3/UL (ref 1.6–8.3)
NEUTROPHILS # BLD AUTO: 6.3 10E3/UL (ref 1.6–8.3)
NEUTROPHILS # BLD AUTO: 6.4 10E3/UL (ref 1.6–8.3)
NEUTROPHILS # BLD AUTO: 7.2 10E3/UL (ref 1.6–8.3)
NEUTROPHILS # BLD AUTO: 7.4 10E3/UL (ref 1.6–8.3)
NEUTROPHILS # BLD AUTO: 7.6 10E3/UL (ref 1.6–8.3)
NEUTROPHILS # BLD AUTO: 8.9 10E3/UL (ref 1.6–8.3)
NEUTROPHILS # BLD AUTO: ABNORMAL 10*3/UL
NEUTROPHILS # BLD MANUAL: 24.7 10E3/UL (ref 1.6–8.3)
NEUTROPHILS # BLD MANUAL: 6 10E3/UL (ref 1.6–8.3)
NEUTROPHILS # BLD MANUAL: 6.9 10E3/UL (ref 1.6–8.3)
NEUTROPHILS NFR BLD AUTO: 62 %
NEUTROPHILS NFR BLD AUTO: 62 %
NEUTROPHILS NFR BLD AUTO: 65 %
NEUTROPHILS NFR BLD AUTO: 66 %
NEUTROPHILS NFR BLD AUTO: 67 %
NEUTROPHILS NFR BLD AUTO: 68 %
NEUTROPHILS NFR BLD AUTO: 70 %
NEUTROPHILS NFR BLD AUTO: 71 %
NEUTROPHILS NFR BLD AUTO: 72 %
NEUTROPHILS NFR BLD AUTO: 74 %
NEUTROPHILS NFR BLD AUTO: 76 %
NEUTROPHILS NFR BLD AUTO: 77 %
NEUTROPHILS NFR BLD AUTO: 79 %
NEUTROPHILS NFR BLD AUTO: 83 %
NEUTROPHILS NFR BLD AUTO: 85 %
NEUTROPHILS NFR BLD AUTO: ABNORMAL %
NEUTROPHILS NFR BLD MANUAL: 68 %
NEUTROPHILS NFR BLD MANUAL: 76 %
NEUTROPHILS NFR BLD MANUAL: 85 %
NEUTS HYPERSEG BLD QL SMEAR: ABNORMAL
NITRATE UR QL: NEGATIVE
NRBC # BLD AUTO: 0 10E3/UL
NRBC # BLD AUTO: 0.1 10E3/UL
NRBC # BLD AUTO: 0.3 10E3/UL
NRBC BLD AUTO-RTO: 0 /100
NRBC BLD MANUAL-RTO: 1 %
NT-PROBNP SERPL-MCNC: 608 PG/ML (ref 0–900)
O2/TOTAL GAS SETTING VFR VENT: 100 %
O2/TOTAL GAS SETTING VFR VENT: 21 %
O2/TOTAL GAS SETTING VFR VENT: 40 %
O2/TOTAL GAS SETTING VFR VENT: 65 %
OXYHGB MFR BLD: 98 % (ref 92–100)
OXYHGB MFR BLD: 99 % (ref 92–100)
OXYHGB MFR BLDV: 53 % (ref 92–100)
OXYHGB MFR BLDV: 95 % (ref 70–75)
P AXIS - MUSE: 22 DEGREES
P AXIS - MUSE: NORMAL DEGREES
P AXIS - MUSE: NORMAL DEGREES
PATH REPORT.COMMENTS IMP SPEC: NORMAL
PATH REPORT.COMMENTS IMP SPEC: NORMAL
PATH REPORT.FINAL DX SPEC: NORMAL
PATH REPORT.GROSS SPEC: NORMAL
PATH REPORT.MICROSCOPIC SPEC OTHER STN: NORMAL
PATH REPORT.RELEVANT HX SPEC: NORMAL
PCO2 BLD: 28 MM HG (ref 35–45)
PCO2 BLD: 35 MM HG (ref 35–45)
PCO2 BLD: 61 MM HG (ref 35–45)
PCO2 BLD: 79 MM HG (ref 35–45)
PCO2 BLDA: 49 MM HG (ref 35–45)
PCO2 BLDV: 35 MM HG (ref 40–50)
PCO2 BLDV: 95 MM HG (ref 40–50)
PH BLD: 6.77 [PH] (ref 7.35–7.45)
PH BLD: 6.86 [PH] (ref 7.35–7.45)
PH BLD: 7.13 [PH] (ref 7.35–7.45)
PH BLD: 7.17 [PH] (ref 7.35–7.45)
PH BLDA: 7.03 [PH] (ref 7.35–7.45)
PH BLDV: 7.12 [PH] (ref 7.32–7.43)
PH BLDV: <6.82 [PH] (ref 7.32–7.43)
PH UR STRIP: 5 [PH] (ref 5–7)
PHOSPHATE SERPL-MCNC: 11.2 MG/DL (ref 2.5–4.5)
PHOSPHATE SERPL-MCNC: 12.4 MG/DL (ref 2.5–4.5)
PHOSPHATE SERPL-MCNC: 14.1 MG/DL (ref 2.5–4.5)
PHOTO IMAGE: NORMAL
PLAT MORPH BLD: ABNORMAL
PLATELET # BLD AUTO: 135 10E3/UL (ref 150–450)
PLATELET # BLD AUTO: 145 10E3/UL (ref 150–450)
PLATELET # BLD AUTO: 154 10E3/UL (ref 150–450)
PLATELET # BLD AUTO: 162 10E3/UL (ref 150–450)
PLATELET # BLD AUTO: 195 10E3/UL (ref 150–450)
PLATELET # BLD AUTO: 196 10E3/UL (ref 150–450)
PLATELET # BLD AUTO: 199 10E3/UL (ref 150–450)
PLATELET # BLD AUTO: 210 10E3/UL (ref 150–450)
PLATELET # BLD AUTO: 216 10E3/UL (ref 150–450)
PLATELET # BLD AUTO: 232 10E3/UL (ref 150–450)
PLATELET # BLD AUTO: 240 10E3/UL (ref 150–450)
PLATELET # BLD AUTO: 242 10E3/UL (ref 150–450)
PLATELET # BLD AUTO: 246 10E3/UL (ref 150–450)
PLATELET # BLD AUTO: 257 10E3/UL (ref 150–450)
PLATELET # BLD AUTO: 267 10E3/UL (ref 150–450)
PLATELET # BLD AUTO: 281 10E3/UL (ref 150–450)
PLATELET # BLD AUTO: 307 10E3/UL (ref 150–450)
PLATELET # BLD AUTO: 317 10E3/UL (ref 150–450)
PLATELET # BLD AUTO: 322 10E3/UL (ref 150–450)
PLATELET # BLD AUTO: 324 10E3/UL (ref 150–450)
PLATELET # BLD AUTO: 327 10E3/UL (ref 150–450)
PLATELET # BLD AUTO: 337 10E3/UL (ref 150–450)
PLATELET # BLD AUTO: 373 10E3/UL (ref 150–450)
PLATELET # BLD AUTO: 388 10E3/UL (ref 150–450)
PLATELET # BLD AUTO: 408 10E3/UL (ref 150–450)
PLATELET # BLD AUTO: 415 10E3/UL (ref 150–450)
PLATELET # BLD AUTO: 423 10E3/UL (ref 150–450)
PLATELET # BLD AUTO: 426 10E3/UL (ref 150–450)
PO2 BLD: 198 MM HG (ref 80–105)
PO2 BLD: 269 MM HG (ref 80–105)
PO2 BLD: 450 MM HG (ref 80–105)
PO2 BLD: 456 MM HG (ref 80–105)
PO2 BLDA: 356 MM HG (ref 80–105)
PO2 BLDV: 62 MM HG (ref 25–47)
PO2 BLDV: 99 MM HG (ref 25–47)
POLYCHROMASIA BLD QL SMEAR: ABNORMAL
POTASSIUM BLD-SCNC: 3.7 MMOL/L (ref 3.4–5.3)
POTASSIUM BLD-SCNC: 3.9 MMOL/L (ref 3.4–5.3)
POTASSIUM BLD-SCNC: 4.1 MMOL/L (ref 3.4–5.3)
POTASSIUM BLD-SCNC: 4.2 MMOL/L (ref 3.4–5.3)
POTASSIUM BLD-SCNC: 4.2 MMOL/L (ref 3.4–5.3)
POTASSIUM BLD-SCNC: 4.5 MMOL/L (ref 3.5–5)
POTASSIUM BLD-SCNC: 4.6 MMOL/L (ref 3.4–5.3)
POTASSIUM BLD-SCNC: 4.6 MMOL/L (ref 3.4–5.3)
POTASSIUM BLD-SCNC: 4.7 MMOL/L (ref 3.4–5.3)
POTASSIUM SERPL-SCNC: 3.5 MMOL/L (ref 3.4–5.3)
POTASSIUM SERPL-SCNC: 3.9 MMOL/L (ref 3.4–5.3)
POTASSIUM SERPL-SCNC: 3.9 MMOL/L (ref 3.4–5.3)
POTASSIUM SERPL-SCNC: 4 MMOL/L (ref 3.4–5.3)
POTASSIUM SERPL-SCNC: 4 MMOL/L (ref 3.4–5.3)
POTASSIUM SERPL-SCNC: 4.1 MMOL/L (ref 3.4–5.3)
POTASSIUM SERPL-SCNC: 4.2 MMOL/L (ref 3.4–5.3)
POTASSIUM SERPL-SCNC: 4.3 MMOL/L (ref 3.4–5.3)
POTASSIUM SERPL-SCNC: 4.4 MMOL/L (ref 3.4–5.3)
POTASSIUM SERPL-SCNC: 4.5 MMOL/L (ref 3.4–5.3)
POTASSIUM SERPL-SCNC: 4.6 MMOL/L (ref 3.4–5.3)
POTASSIUM SERPL-SCNC: 4.7 MMOL/L (ref 3.4–5.3)
POTASSIUM SERPL-SCNC: 4.9 MMOL/L (ref 3.4–5.3)
POTASSIUM SERPL-SCNC: 5.1 MMOL/L (ref 3.4–5.3)
POTASSIUM SERPL-SCNC: 5.2 MMOL/L (ref 3.4–5.3)
PR INTERVAL - MUSE: 176 MS
PR INTERVAL - MUSE: 182 MS
PR INTERVAL - MUSE: NORMAL MS
PROT SERPL-MCNC: 4 G/DL (ref 6.4–8.3)
PROT SERPL-MCNC: 4.8 G/DL (ref 6.4–8.3)
PROT SERPL-MCNC: 6.3 G/DL (ref 6.4–8.3)
PROT SERPL-MCNC: 6.5 G/DL (ref 6.4–8.3)
PROT SERPL-MCNC: 6.5 G/DL (ref 6.4–8.3)
PROT SERPL-MCNC: 6.6 G/DL (ref 6.4–8.3)
PROT SERPL-MCNC: 6.7 G/DL (ref 6.4–8.3)
PROT SERPL-MCNC: 7 G/DL (ref 6.4–8.3)
PROT SERPL-MCNC: 7 G/DL (ref 6.8–8.8)
PROT SERPL-MCNC: 7.2 G/DL (ref 6.4–8.3)
PROT SERPL-MCNC: 7.3 G/DL (ref 6.8–8.8)
PROT SERPL-MCNC: 8 G/DL (ref 6.4–8.3)
PYRIDOXAL PHOS SERPL-SCNC: 69.8 NMOL/L
QRS DURATION - MUSE: 158 MS
QRS DURATION - MUSE: 84 MS
QRS DURATION - MUSE: 90 MS
QT - MUSE: 364 MS
QT - MUSE: 372 MS
QT - MUSE: 464 MS
QTC - MUSE: 452 MS
QTC - MUSE: 498 MS
QTC - MUSE: 633 MS
R AXIS - MUSE: 146 DEGREES
R AXIS - MUSE: 29 DEGREES
R AXIS - MUSE: 71 DEGREES
RADIOLOGIST FLAGS: ABNORMAL
RBC # BLD AUTO: 2.99 10E6/UL (ref 4.4–5.9)
RBC # BLD AUTO: 3.53 10E6/UL (ref 4.4–5.9)
RBC # BLD AUTO: 3.64 10E6/UL (ref 4.4–5.9)
RBC # BLD AUTO: 3.72 10E6/UL (ref 4.4–5.9)
RBC # BLD AUTO: 3.74 10E6/UL (ref 4.4–5.9)
RBC # BLD AUTO: 3.75 10E6/UL (ref 4.4–5.9)
RBC # BLD AUTO: 3.78 10E6/UL (ref 4.4–5.9)
RBC # BLD AUTO: 3.79 10E6/UL (ref 4.4–5.9)
RBC # BLD AUTO: 3.81 10E6/UL (ref 4.4–5.9)
RBC # BLD AUTO: 3.84 10E6/UL (ref 4.4–5.9)
RBC # BLD AUTO: 3.85 10E6/UL (ref 4.4–5.9)
RBC # BLD AUTO: 3.86 10E6/UL (ref 4.4–5.9)
RBC # BLD AUTO: 3.87 10E6/UL (ref 4.4–5.9)
RBC # BLD AUTO: 3.93 10E6/UL (ref 4.4–5.9)
RBC # BLD AUTO: 3.95 10E6/UL (ref 4.4–5.9)
RBC # BLD AUTO: 3.96 10E6/UL (ref 4.4–5.9)
RBC # BLD AUTO: 3.98 10E6/UL (ref 4.4–5.9)
RBC # BLD AUTO: 4.1 10E6/UL (ref 4.4–5.9)
RBC # BLD AUTO: 4.14 10E6/UL (ref 4.4–5.9)
RBC # BLD AUTO: 4.14 10E6/UL (ref 4.4–5.9)
RBC # BLD AUTO: 4.17 10E6/UL (ref 4.4–5.9)
RBC # BLD AUTO: 4.17 10E6/UL (ref 4.4–5.9)
RBC # BLD AUTO: 4.23 10E6/UL (ref 4.4–5.9)
RBC # BLD AUTO: 4.27 10E6/UL (ref 4.4–5.9)
RBC # BLD AUTO: 4.36 10E6/UL (ref 4.4–5.9)
RBC # BLD AUTO: 4.47 10E6/UL (ref 4.4–5.9)
RBC # BLD AUTO: 4.72 10E6/UL (ref 4.4–5.9)
RBC # BLD AUTO: 4.8 10E6/UL (ref 4.4–5.9)
RBC AGGLUT BLD QL: ABNORMAL
RBC MORPH BLD: ABNORMAL
RBC URINE: 2 /HPF
ROULEAUX BLD QL SMEAR: ABNORMAL
RSV RNA SPEC NAA+PROBE: NEGATIVE
SA TARGET DNA: NEGATIVE
SA TARGET DNA: NEGATIVE
SARS-COV-2 RNA RESP QL NAA+PROBE: NEGATIVE
SICKLE CELLS BLD QL SMEAR: ABNORMAL
SMUDGE CELLS BLD QL SMEAR: ABNORMAL
SODIUM BLD-SCNC: 139 MMOL/L (ref 135–145)
SODIUM BLD-SCNC: 144 MMOL/L (ref 133–144)
SODIUM SERPL-SCNC: 132 MMOL/L (ref 135–145)
SODIUM SERPL-SCNC: 133 MMOL/L (ref 135–145)
SODIUM SERPL-SCNC: 134 MMOL/L (ref 135–145)
SODIUM SERPL-SCNC: 135 MMOL/L (ref 135–145)
SODIUM SERPL-SCNC: 136 MMOL/L (ref 135–145)
SODIUM SERPL-SCNC: 137 MMOL/L (ref 135–145)
SODIUM SERPL-SCNC: 138 MMOL/L (ref 135–145)
SODIUM SERPL-SCNC: 139 MMOL/L (ref 133–144)
SODIUM SERPL-SCNC: 139 MMOL/L (ref 135–145)
SODIUM SERPL-SCNC: 139 MMOL/L (ref 136–145)
SODIUM SERPL-SCNC: 139 MMOL/L (ref 136–145)
SODIUM SERPL-SCNC: 140 MMOL/L (ref 133–144)
SODIUM SERPL-SCNC: 140 MMOL/L (ref 135–145)
SODIUM SERPL-SCNC: 140 MMOL/L (ref 135–145)
SODIUM SERPL-SCNC: 140 MMOL/L (ref 136–145)
SODIUM SERPL-SCNC: 141 MMOL/L (ref 133–144)
SODIUM SERPL-SCNC: 142 MMOL/L (ref 133–144)
SODIUM SERPL-SCNC: 142 MMOL/L (ref 133–144)
SODIUM SERPL-SCNC: 143 MMOL/L (ref 133–144)
SODIUM SERPL-SCNC: 145 MMOL/L (ref 135–145)
SODIUM SERPL-SCNC: 150 MMOL/L (ref 135–145)
SP GR UR STRIP: 1.03 (ref 1–1.03)
SPECIMEN EXPIRATION DATE: NORMAL
SPHEROCYTES BLD QL SMEAR: ABNORMAL
STAPHYLOCOCCUS AUREUS: DETECTED
STAPHYLOCOCCUS EPIDERMIDIS: NOT DETECTED
STAPHYLOCOCCUS LUGDUNENSIS: NOT DETECTED
STOMATOCYTES BLD QL SMEAR: ABNORMAL
STREPTOCOCCUS AGALACTIAE: NOT DETECTED
STREPTOCOCCUS ANGINOSUS GROUP: NOT DETECTED
STREPTOCOCCUS PNEUMONIAE: NOT DETECTED
STREPTOCOCCUS PYOGENES: NOT DETECTED
STREPTOCOCCUS SPECIES: NOT DETECTED
SYSTOLIC BLOOD PRESSURE - MUSE: NORMAL MMHG
T AXIS - MUSE: -33 DEGREES
T AXIS - MUSE: 27 DEGREES
T AXIS - MUSE: 57 DEGREES
TARGETS BLD QL SMEAR: ABNORMAL
TOXIC GRANULES BLD QL SMEAR: ABNORMAL
TROPONIN T SERPL HS-MCNC: 721 NG/L
TROPONIN T SERPL HS-MCNC: 74 NG/L
TSH SERPL DL<=0.005 MIU/L-ACNC: 1.8 MU/L (ref 0.4–4)
UFH PPP CHRO-ACNC: 0.11 IU/ML
UNIT ABO/RH: NORMAL
UNIT NUMBER: NORMAL
UNIT STATUS: NORMAL
UNIT TYPE ISBT: 5100
UROBILINOGEN UR STRIP-MCNC: NORMAL MG/DL
VANCOMYCIN SERPL-MCNC: 14.7 MG/L
VANCOMYCIN SERPL-MCNC: 15 UG/ML
VANCOMYCIN SERPL-MCNC: 15.3 UG/ML
VANCOMYCIN SERPL-MCNC: 15.3 UG/ML
VANCOMYCIN SERPL-MCNC: 15.6 UG/ML
VANCOMYCIN SERPL-MCNC: 16 MG/L
VANCOMYCIN SERPL-MCNC: 18.9 MG/L
VARIANT LYMPHS BLD QL SMEAR: ABNORMAL
VENTRICULAR RATE- MUSE: 108 BPM
VENTRICULAR RATE- MUSE: 112 BPM
VENTRICULAR RATE- MUSE: 93 BPM
VIT B12 SERPL-MCNC: 532 PG/ML (ref 232–1245)
VIT D+METAB SERPL-MCNC: 41 NG/ML (ref 20–50)
WBC # BLD AUTO: 10.2 10E3/UL (ref 4–11)
WBC # BLD AUTO: 10.2 10E3/UL (ref 4–11)
WBC # BLD AUTO: 10.3 10E3/UL (ref 4–11)
WBC # BLD AUTO: 10.6 10E3/UL (ref 4–11)
WBC # BLD AUTO: 11.7 10E3/UL (ref 4–11)
WBC # BLD AUTO: 15 10E3/UL (ref 4–11)
WBC # BLD AUTO: 19.8 10E3/UL (ref 4–11)
WBC # BLD AUTO: 28.8 10E3/UL (ref 4–11)
WBC # BLD AUTO: 29 10E3/UL (ref 4–11)
WBC # BLD AUTO: 29.4 10E3/UL (ref 4–11)
WBC # BLD AUTO: 6.4 10E3/UL (ref 4–11)
WBC # BLD AUTO: 7 10E3/UL (ref 4–11)
WBC # BLD AUTO: 7.4 10E3/UL (ref 4–11)
WBC # BLD AUTO: 7.6 10E3/UL (ref 4–11)
WBC # BLD AUTO: 7.7 10E3/UL (ref 4–11)
WBC # BLD AUTO: 7.8 10E3/UL (ref 4–11)
WBC # BLD AUTO: 7.8 10E3/UL (ref 4–11)
WBC # BLD AUTO: 7.9 10E3/UL (ref 4–11)
WBC # BLD AUTO: 8.2 10E3/UL (ref 4–11)
WBC # BLD AUTO: 8.3 10E3/UL (ref 4–11)
WBC # BLD AUTO: 8.4 10E3/UL (ref 4–11)
WBC # BLD AUTO: 9.2 10E3/UL (ref 4–11)
WBC # BLD AUTO: 9.3 10E3/UL (ref 4–11)
WBC # BLD AUTO: 9.3 10E3/UL (ref 4–11)
WBC # BLD AUTO: 9.4 10E3/UL (ref 4–11)
WBC # BLD AUTO: 9.4 10E3/UL (ref 4–11)
WBC # BLD AUTO: 9.5 10E3/UL (ref 4–11)
WBC # BLD AUTO: 9.9 10E3/UL (ref 4–11)
WBC URINE: 5 /HPF

## 2023-01-01 PROCEDURE — 250N000013 HC RX MED GY IP 250 OP 250 PS 637: Performed by: STUDENT IN AN ORGANIZED HEALTH CARE EDUCATION/TRAINING PROGRAM

## 2023-01-01 PROCEDURE — 85384 FIBRINOGEN ACTIVITY: CPT | Performed by: STUDENT IN AN ORGANIZED HEALTH CARE EDUCATION/TRAINING PROGRAM

## 2023-01-01 PROCEDURE — 250N000013 HC RX MED GY IP 250 OP 250 PS 637: Performed by: INTERNAL MEDICINE

## 2023-01-01 PROCEDURE — 258N000003 HC RX IP 258 OP 636: Performed by: ORTHOPAEDIC SURGERY

## 2023-01-01 PROCEDURE — 80048 BASIC METABOLIC PNL TOTAL CA: CPT | Performed by: INTERNAL MEDICINE

## 2023-01-01 PROCEDURE — 83735 ASSAY OF MAGNESIUM: CPT | Performed by: PHYSICIAN ASSISTANT

## 2023-01-01 PROCEDURE — 86140 C-REACTIVE PROTEIN: CPT

## 2023-01-01 PROCEDURE — 73630 X-RAY EXAM OF FOOT: CPT | Mod: GC | Performed by: RADIOLOGY

## 2023-01-01 PROCEDURE — 250N000011 HC RX IP 250 OP 636: Mod: JZ | Performed by: STUDENT IN AN ORGANIZED HEALTH CARE EDUCATION/TRAINING PROGRAM

## 2023-01-01 PROCEDURE — 99214 OFFICE O/P EST MOD 30 MIN: CPT | Performed by: INTERNAL MEDICINE

## 2023-01-01 PROCEDURE — 99213 OFFICE O/P EST LOW 20 MIN: CPT | Performed by: ORTHOPAEDIC SURGERY

## 2023-01-01 PROCEDURE — 85610 PROTHROMBIN TIME: CPT | Performed by: STUDENT IN AN ORGANIZED HEALTH CARE EDUCATION/TRAINING PROGRAM

## 2023-01-01 PROCEDURE — 99232 SBSQ HOSP IP/OBS MODERATE 35: CPT | Mod: 24 | Performed by: INTERNAL MEDICINE

## 2023-01-01 PROCEDURE — 250N000025 HC SEVOFLURANE, PER MIN: Performed by: ORTHOPAEDIC SURGERY

## 2023-01-01 PROCEDURE — 250N000012 HC RX MED GY IP 250 OP 636 PS 637

## 2023-01-01 PROCEDURE — 73630 X-RAY EXAM OF FOOT: CPT | Mod: 26 | Performed by: RADIOLOGY

## 2023-01-01 PROCEDURE — 250N000011 HC RX IP 250 OP 636: Performed by: NURSE ANESTHETIST, CERTIFIED REGISTERED

## 2023-01-01 PROCEDURE — C1769 GUIDE WIRE: HCPCS

## 2023-01-01 PROCEDURE — 200N000002 HC R&B ICU UMMC

## 2023-01-01 PROCEDURE — 85652 RBC SED RATE AUTOMATED: CPT | Performed by: EMERGENCY MEDICINE

## 2023-01-01 PROCEDURE — 82565 ASSAY OF CREATININE: CPT | Performed by: STUDENT IN AN ORGANIZED HEALTH CARE EDUCATION/TRAINING PROGRAM

## 2023-01-01 PROCEDURE — 80202 ASSAY OF VANCOMYCIN: CPT | Performed by: INTERNAL MEDICINE

## 2023-01-01 PROCEDURE — 120N000002 HC R&B MED SURG/OB UMMC

## 2023-01-01 PROCEDURE — 99232 SBSQ HOSP IP/OBS MODERATE 35: CPT | Performed by: INTERNAL MEDICINE

## 2023-01-01 PROCEDURE — 86900 BLOOD TYPING SEROLOGIC ABO: CPT | Performed by: EMERGENCY MEDICINE

## 2023-01-01 PROCEDURE — 86140 C-REACTIVE PROTEIN: CPT | Performed by: STUDENT IN AN ORGANIZED HEALTH CARE EDUCATION/TRAINING PROGRAM

## 2023-01-01 PROCEDURE — 250N000013 HC RX MED GY IP 250 OP 250 PS 637

## 2023-01-01 PROCEDURE — 272N000276 HC DEVICE 3FR SECURACATH

## 2023-01-01 PROCEDURE — 999N000001 HC CANCELLED SURGERY UP TO 15 MINS: Performed by: ORTHOPAEDIC SURGERY

## 2023-01-01 PROCEDURE — 87070 CULTURE OTHR SPECIMN AEROBIC: CPT | Performed by: ORTHOPAEDIC SURGERY

## 2023-01-01 PROCEDURE — 73610 X-RAY EXAM OF ANKLE: CPT | Mod: RT

## 2023-01-01 PROCEDURE — 85025 COMPLETE CBC W/AUTO DIFF WBC: CPT | Performed by: PATHOLOGY

## 2023-01-01 PROCEDURE — 258N000001 HC RX 258: Performed by: ORTHOPAEDIC SURGERY

## 2023-01-01 PROCEDURE — 999N000185 HC STATISTIC TRANSPORT TIME EA 15 MIN

## 2023-01-01 PROCEDURE — 36592 COLLECT BLOOD FROM PICC: CPT

## 2023-01-01 PROCEDURE — 255N000002 HC RX 255 OP 636: Mod: JZ | Performed by: STUDENT IN AN ORGANIZED HEALTH CARE EDUCATION/TRAINING PROGRAM

## 2023-01-01 PROCEDURE — 250N000013 HC RX MED GY IP 250 OP 250 PS 637: Performed by: PHYSICIAN ASSISTANT

## 2023-01-01 PROCEDURE — 87106 FUNGI IDENTIFICATION YEAST: CPT | Performed by: ORTHOPAEDIC SURGERY

## 2023-01-01 PROCEDURE — 82803 BLOOD GASES ANY COMBINATION: CPT

## 2023-01-01 PROCEDURE — 370N000017 HC ANESTHESIA TECHNICAL FEE, PER MIN: Performed by: ORTHOPAEDIC SURGERY

## 2023-01-01 PROCEDURE — 87102 FUNGUS ISOLATION CULTURE: CPT | Performed by: ORTHOPAEDIC SURGERY

## 2023-01-01 PROCEDURE — 82805 BLOOD GASES W/O2 SATURATION: CPT | Performed by: STUDENT IN AN ORGANIZED HEALTH CARE EDUCATION/TRAINING PROGRAM

## 2023-01-01 PROCEDURE — 87075 CULTR BACTERIA EXCEPT BLOOD: CPT | Performed by: ORTHOPAEDIC SURGERY

## 2023-01-01 PROCEDURE — 82310 ASSAY OF CALCIUM: CPT

## 2023-01-01 PROCEDURE — 84460 ALANINE AMINO (ALT) (SGPT): CPT | Performed by: STUDENT IN AN ORGANIZED HEALTH CARE EDUCATION/TRAINING PROGRAM

## 2023-01-01 PROCEDURE — 250N000011 HC RX IP 250 OP 636: Performed by: ORTHOPAEDIC SURGERY

## 2023-01-01 PROCEDURE — 360N000075 HC SURGERY LEVEL 2, PER MIN: Performed by: ORTHOPAEDIC SURGERY

## 2023-01-01 PROCEDURE — 83605 ASSAY OF LACTIC ACID: CPT

## 2023-01-01 PROCEDURE — 0QBJ0ZZ EXCISION OF RIGHT FIBULA, OPEN APPROACH: ICD-10-PCS | Performed by: ORTHOPAEDIC SURGERY

## 2023-01-01 PROCEDURE — 99233 SBSQ HOSP IP/OBS HIGH 50: CPT | Performed by: INTERNAL MEDICINE

## 2023-01-01 PROCEDURE — 80048 BASIC METABOLIC PNL TOTAL CA: CPT

## 2023-01-01 PROCEDURE — 250N000011 HC RX IP 250 OP 636: Mod: JZ

## 2023-01-01 PROCEDURE — 250N000011 HC RX IP 250 OP 636: Mod: JZ | Performed by: NURSE ANESTHETIST, CERTIFIED REGISTERED

## 2023-01-01 PROCEDURE — 272N000566 HC SHEATH CR3

## 2023-01-01 PROCEDURE — 86901 BLOOD TYPING SEROLOGIC RH(D): CPT

## 2023-01-01 PROCEDURE — 99024 POSTOP FOLLOW-UP VISIT: CPT | Performed by: ORTHOPAEDIC SURGERY

## 2023-01-01 PROCEDURE — 36415 COLL VENOUS BLD VENIPUNCTURE: CPT | Performed by: INTERNAL MEDICINE

## 2023-01-01 PROCEDURE — 99285 EMERGENCY DEPT VISIT HI MDM: CPT | Mod: 25 | Performed by: EMERGENCY MEDICINE

## 2023-01-01 PROCEDURE — 258N000003 HC RX IP 258 OP 636

## 2023-01-01 PROCEDURE — 272N000001 HC OR GENERAL SUPPLY STERILE: Performed by: ORTHOPAEDIC SURGERY

## 2023-01-01 PROCEDURE — 83605 ASSAY OF LACTIC ACID: CPT | Performed by: EMERGENCY MEDICINE

## 2023-01-01 PROCEDURE — C1713 ANCHOR/SCREW BN/BN,TIS/BN: HCPCS | Performed by: ORTHOPAEDIC SURGERY

## 2023-01-01 PROCEDURE — 258N000003 HC RX IP 258 OP 636: Performed by: NURSE ANESTHETIST, CERTIFIED REGISTERED

## 2023-01-01 PROCEDURE — 0SBF3ZX EXCISION OF RIGHT ANKLE JOINT, PERCUTANEOUS APPROACH, DIAGNOSTIC: ICD-10-PCS | Performed by: ORTHOPAEDIC SURGERY

## 2023-01-01 PROCEDURE — 99285 EMERGENCY DEPT VISIT HI MDM: CPT | Performed by: EMERGENCY MEDICINE

## 2023-01-01 PROCEDURE — 86140 C-REACTIVE PROTEIN: CPT | Performed by: INTERNAL MEDICINE

## 2023-01-01 PROCEDURE — 87176 TISSUE HOMOGENIZATION CULTR: CPT | Performed by: ORTHOPAEDIC SURGERY

## 2023-01-01 PROCEDURE — 250N000011 HC RX IP 250 OP 636

## 2023-01-01 PROCEDURE — 710N000010 HC RECOVERY PHASE 1, LEVEL 2, PER MIN: Performed by: ORTHOPAEDIC SURGERY

## 2023-01-01 PROCEDURE — 85027 COMPLETE CBC AUTOMATED: CPT | Performed by: PHYSICIAN ASSISTANT

## 2023-01-01 PROCEDURE — 73590 X-RAY EXAM OF LOWER LEG: CPT | Mod: RT

## 2023-01-01 PROCEDURE — 80048 BASIC METABOLIC PNL TOTAL CA: CPT | Performed by: PHYSICIAN ASSISTANT

## 2023-01-01 PROCEDURE — 77001 FLUOROGUIDE FOR VEIN DEVICE: CPT

## 2023-01-01 PROCEDURE — 999N000065 XR CHEST PORT 1 VIEW

## 2023-01-01 PROCEDURE — 250N000009 HC RX 250

## 2023-01-01 PROCEDURE — 97606 NEG PRS WND THER DME>50 SQCM: CPT

## 2023-01-01 PROCEDURE — 76937 US GUIDE VASCULAR ACCESS: CPT | Mod: 26 | Performed by: RADIOLOGY

## 2023-01-01 PROCEDURE — 0QBG0ZZ EXCISION OF RIGHT TIBIA, OPEN APPROACH: ICD-10-PCS | Performed by: ORTHOPAEDIC SURGERY

## 2023-01-01 PROCEDURE — 250N000011 HC RX IP 250 OP 636: Performed by: EMERGENCY MEDICINE

## 2023-01-01 PROCEDURE — 87040 BLOOD CULTURE FOR BACTERIA: CPT | Performed by: EMERGENCY MEDICINE

## 2023-01-01 PROCEDURE — 999N000040 HC STATISTIC CONSULT NO CHARGE VASC ACCESS

## 2023-01-01 PROCEDURE — 85610 PROTHROMBIN TIME: CPT | Performed by: INTERNAL MEDICINE

## 2023-01-01 PROCEDURE — 70450 CT HEAD/BRAIN W/O DYE: CPT | Mod: 26 | Performed by: RADIOLOGY

## 2023-01-01 PROCEDURE — 82565 ASSAY OF CREATININE: CPT | Performed by: EMERGENCY MEDICINE

## 2023-01-01 PROCEDURE — 99292 CRITICAL CARE ADDL 30 MIN: CPT | Mod: 25 | Performed by: INTERNAL MEDICINE

## 2023-01-01 PROCEDURE — 250N000013 HC RX MED GY IP 250 OP 250 PS 637: Performed by: PEDIATRICS

## 2023-01-01 PROCEDURE — 250N000009 HC RX 250: Performed by: NURSE ANESTHETIST, CERTIFIED REGISTERED

## 2023-01-01 PROCEDURE — 87070 CULTURE OTHR SPECIMN AEROBIC: CPT | Performed by: INTERNAL MEDICINE

## 2023-01-01 PROCEDURE — 99232 SBSQ HOSP IP/OBS MODERATE 35: CPT | Performed by: STUDENT IN AN ORGANIZED HEALTH CARE EDUCATION/TRAINING PROGRAM

## 2023-01-01 PROCEDURE — 85007 BL SMEAR W/DIFF WBC COUNT: CPT

## 2023-01-01 PROCEDURE — 85027 COMPLETE CBC AUTOMATED: CPT | Performed by: INTERNAL MEDICINE

## 2023-01-01 PROCEDURE — 99231 SBSQ HOSP IP/OBS SF/LOW 25: CPT | Mod: 24 | Performed by: INTERNAL MEDICINE

## 2023-01-01 PROCEDURE — 999N000127 HC STATISTIC PERIPHERAL IV START W US GUIDANCE

## 2023-01-01 PROCEDURE — 250N000012 HC RX MED GY IP 250 OP 636 PS 637: Performed by: PHYSICIAN ASSISTANT

## 2023-01-01 PROCEDURE — 999N000026 HC STATISTIC CARDIOPULM RESUSCITATION

## 2023-01-01 PROCEDURE — 999N000141 HC STATISTIC PRE-PROCEDURE NURSING ASSESSMENT: Performed by: ORTHOPAEDIC SURGERY

## 2023-01-01 PROCEDURE — 73630 X-RAY EXAM OF FOOT: CPT | Mod: RT | Performed by: RADIOLOGY

## 2023-01-01 PROCEDURE — 93005 ELECTROCARDIOGRAM TRACING: CPT

## 2023-01-01 PROCEDURE — 36415 COLL VENOUS BLD VENIPUNCTURE: CPT | Performed by: EMERGENCY MEDICINE

## 2023-01-01 PROCEDURE — 97530 THERAPEUTIC ACTIVITIES: CPT | Mod: GP

## 2023-01-01 PROCEDURE — G0463 HOSPITAL OUTPT CLINIC VISIT: HCPCS | Mod: 25

## 2023-01-01 PROCEDURE — 87077 CULTURE AEROBIC IDENTIFY: CPT | Performed by: INTERNAL MEDICINE

## 2023-01-01 PROCEDURE — 82565 ASSAY OF CREATININE: CPT | Performed by: ORTHOPAEDIC SURGERY

## 2023-01-01 PROCEDURE — 86140 C-REACTIVE PROTEIN: CPT | Performed by: EMERGENCY MEDICINE

## 2023-01-01 PROCEDURE — 93321 DOPPLER ECHO F-UP/LMTD STD: CPT

## 2023-01-01 PROCEDURE — 87205 SMEAR GRAM STAIN: CPT | Performed by: INTERNAL MEDICINE

## 2023-01-01 PROCEDURE — 80048 BASIC METABOLIC PNL TOTAL CA: CPT | Performed by: PATHOLOGY

## 2023-01-01 PROCEDURE — 272N000002 HC OR SUPPLY OTHER OPNP: Performed by: ORTHOPAEDIC SURGERY

## 2023-01-01 PROCEDURE — 93325 DOPPLER ECHO COLOR FLOW MAPG: CPT | Mod: 26 | Performed by: INTERNAL MEDICINE

## 2023-01-01 PROCEDURE — 999N000111 HC STATISTIC OT IP EVAL DEFER

## 2023-01-01 PROCEDURE — 20700 MNL PREP&INSJ DP RX DLVR DEV: CPT | Mod: 58 | Performed by: ORTHOPAEDIC SURGERY

## 2023-01-01 PROCEDURE — 85014 HEMATOCRIT: CPT | Performed by: INTERNAL MEDICINE

## 2023-01-01 PROCEDURE — 80053 COMPREHEN METABOLIC PANEL: CPT | Performed by: PATHOLOGY

## 2023-01-01 PROCEDURE — 999N000007 HC SITE CHECK

## 2023-01-01 PROCEDURE — 99233 SBSQ HOSP IP/OBS HIGH 50: CPT | Mod: 24 | Performed by: INTERNAL MEDICINE

## 2023-01-01 PROCEDURE — 85730 THROMBOPLASTIN TIME PARTIAL: CPT

## 2023-01-01 PROCEDURE — 36415 COLL VENOUS BLD VENIPUNCTURE: CPT

## 2023-01-01 PROCEDURE — 85025 COMPLETE CBC W/AUTO DIFF WBC: CPT | Performed by: STUDENT IN AN ORGANIZED HEALTH CARE EDUCATION/TRAINING PROGRAM

## 2023-01-01 PROCEDURE — 250N000012 HC RX MED GY IP 250 OP 636 PS 637: Performed by: STUDENT IN AN ORGANIZED HEALTH CARE EDUCATION/TRAINING PROGRAM

## 2023-01-01 PROCEDURE — 27610 EXPLORE/TREAT ANKLE JOINT: CPT | Mod: GC | Performed by: ORTHOPAEDIC SURGERY

## 2023-01-01 PROCEDURE — 83735 ASSAY OF MAGNESIUM: CPT

## 2023-01-01 PROCEDURE — 97605 NEG PRS WND THER DME<=50SQCM: CPT

## 2023-01-01 PROCEDURE — 93306 TTE W/DOPPLER COMPLETE: CPT | Mod: 26 | Performed by: INTERNAL MEDICINE

## 2023-01-01 PROCEDURE — 99214 OFFICE O/P EST MOD 30 MIN: CPT | Performed by: ORTHOPAEDIC SURGERY

## 2023-01-01 PROCEDURE — 99291 CRITICAL CARE FIRST HOUR: CPT | Mod: 24 | Performed by: INTERNAL MEDICINE

## 2023-01-01 PROCEDURE — 99223 1ST HOSP IP/OBS HIGH 75: CPT | Performed by: INTERNAL MEDICINE

## 2023-01-01 PROCEDURE — 36556 INSERT NON-TUNNEL CV CATH: CPT | Mod: GC | Performed by: RADIOLOGY

## 2023-01-01 PROCEDURE — 85025 COMPLETE CBC W/AUTO DIFF WBC: CPT | Performed by: INTERNAL MEDICINE

## 2023-01-01 PROCEDURE — 73610 X-RAY EXAM OF ANKLE: CPT | Mod: 26 | Performed by: RADIOLOGY

## 2023-01-01 PROCEDURE — 28022 EXPLORATION OF FOOT JOINT: CPT | Mod: 58 | Performed by: ORTHOPAEDIC SURGERY

## 2023-01-01 PROCEDURE — 250N000011 HC RX IP 250 OP 636: Mod: JZ | Performed by: ORTHOPAEDIC SURGERY

## 2023-01-01 PROCEDURE — 36592 COLLECT BLOOD FROM PICC: CPT | Performed by: STUDENT IN AN ORGANIZED HEALTH CARE EDUCATION/TRAINING PROGRAM

## 2023-01-01 PROCEDURE — 250N000009 HC RX 250: Performed by: ORTHOPAEDIC SURGERY

## 2023-01-01 PROCEDURE — 99233 SBSQ HOSP IP/OBS HIGH 50: CPT | Performed by: STUDENT IN AN ORGANIZED HEALTH CARE EDUCATION/TRAINING PROGRAM

## 2023-01-01 PROCEDURE — 250N000011 HC RX IP 250 OP 636: Mod: JZ | Performed by: EMERGENCY MEDICINE

## 2023-01-01 PROCEDURE — 36569 INSJ PICC 5 YR+ W/O IMAGING: CPT

## 2023-01-01 PROCEDURE — 250N000011 HC RX IP 250 OP 636: Performed by: PHYSICIAN ASSISTANT

## 2023-01-01 PROCEDURE — 81001 URINALYSIS AUTO W/SCOPE: CPT | Performed by: EMERGENCY MEDICINE

## 2023-01-01 PROCEDURE — 87040 BLOOD CULTURE FOR BACTERIA: CPT | Performed by: INTERNAL MEDICINE

## 2023-01-01 PROCEDURE — 258N000003 HC RX IP 258 OP 636: Performed by: EMERGENCY MEDICINE

## 2023-01-01 PROCEDURE — 86140 C-REACTIVE PROTEIN: CPT | Performed by: PATHOLOGY

## 2023-01-01 PROCEDURE — 85018 HEMOGLOBIN: CPT | Performed by: INTERNAL MEDICINE

## 2023-01-01 PROCEDURE — 36415 COLL VENOUS BLD VENIPUNCTURE: CPT | Performed by: STUDENT IN AN ORGANIZED HEALTH CARE EDUCATION/TRAINING PROGRAM

## 2023-01-01 PROCEDURE — 99214 OFFICE O/P EST MOD 30 MIN: CPT | Mod: VID | Performed by: INTERNAL MEDICINE

## 2023-01-01 PROCEDURE — 82330 ASSAY OF CALCIUM: CPT

## 2023-01-01 PROCEDURE — 99215 OFFICE O/P EST HI 40 MIN: CPT | Performed by: PHYSICIAN ASSISTANT

## 2023-01-01 PROCEDURE — 84484 ASSAY OF TROPONIN QUANT: CPT

## 2023-01-01 PROCEDURE — 02CR3ZZ EXTIRPATION OF MATTER FROM LEFT PULMONARY ARTERY, PERCUTANEOUS APPROACH: ICD-10-PCS | Performed by: RADIOLOGY

## 2023-01-01 PROCEDURE — 37184 PRIM ART M-THRMBC 1ST VSL: CPT | Mod: 50 | Performed by: RADIOLOGY

## 2023-01-01 PROCEDURE — 73723 MRI JOINT LWR EXTR W/O&W/DYE: CPT | Mod: RT

## 2023-01-01 PROCEDURE — 99292 CRITICAL CARE ADDL 30 MIN: CPT | Mod: 25

## 2023-01-01 PROCEDURE — 85025 COMPLETE CBC W/AUTO DIFF WBC: CPT

## 2023-01-01 PROCEDURE — 99207 PR APP CREDIT; MD BILLING SHARED VISIT: CPT | Mod: FS | Performed by: STUDENT IN AN ORGANIZED HEALTH CARE EDUCATION/TRAINING PROGRAM

## 2023-01-01 PROCEDURE — 0QBL0ZZ EXCISION OF RIGHT TARSAL, OPEN APPROACH: ICD-10-PCS | Performed by: ORTHOPAEDIC SURGERY

## 2023-01-01 PROCEDURE — 85610 PROTHROMBIN TIME: CPT

## 2023-01-01 PROCEDURE — 97161 PT EVAL LOW COMPLEX 20 MIN: CPT | Mod: GP

## 2023-01-01 PROCEDURE — 0QBN0ZZ EXCISION OF RIGHT METATARSAL, OPEN APPROACH: ICD-10-PCS | Performed by: ORTHOPAEDIC SURGERY

## 2023-01-01 PROCEDURE — A9585 GADOBUTROL INJECTION: HCPCS | Mod: JZ | Performed by: STUDENT IN AN ORGANIZED HEALTH CARE EDUCATION/TRAINING PROGRAM

## 2023-01-01 PROCEDURE — 0QBG0ZX EXCISION OF RIGHT TIBIA, OPEN APPROACH, DIAGNOSTIC: ICD-10-PCS | Performed by: ORTHOPAEDIC SURGERY

## 2023-01-01 PROCEDURE — 73630 X-RAY EXAM OF FOOT: CPT | Mod: RT

## 2023-01-01 PROCEDURE — 0SBF0ZZ EXCISION OF RIGHT ANKLE JOINT, OPEN APPROACH: ICD-10-PCS | Performed by: ORTHOPAEDIC SURGERY

## 2023-01-01 PROCEDURE — 88305 TISSUE EXAM BY PATHOLOGIST: CPT | Mod: TC | Performed by: ORTHOPAEDIC SURGERY

## 2023-01-01 PROCEDURE — 71045 X-RAY EXAM CHEST 1 VIEW: CPT | Mod: 26 | Performed by: RADIOLOGY

## 2023-01-01 PROCEDURE — 20680 REMOVAL OF IMPLANT DEEP: CPT | Mod: 51 | Performed by: ORTHOPAEDIC SURGERY

## 2023-01-01 PROCEDURE — 27610 EXPLORE/TREAT ANKLE JOINT: CPT | Mod: 58 | Performed by: ORTHOPAEDIC SURGERY

## 2023-01-01 PROCEDURE — 99238 HOSP IP/OBS DSCHRG MGMT 30/<: CPT | Mod: 24 | Performed by: INTERNAL MEDICINE

## 2023-01-01 PROCEDURE — 99000 SPECIMEN HANDLING OFFICE-LAB: CPT | Performed by: PATHOLOGY

## 2023-01-01 PROCEDURE — 84132 ASSAY OF SERUM POTASSIUM: CPT

## 2023-01-01 PROCEDURE — 84207 ASSAY OF VITAMIN B-6: CPT | Performed by: INTERNAL MEDICINE

## 2023-01-01 PROCEDURE — 87077 CULTURE AEROBIC IDENTIFY: CPT | Performed by: ORTHOPAEDIC SURGERY

## 2023-01-01 PROCEDURE — 84155 ASSAY OF PROTEIN SERUM: CPT | Performed by: INTERNAL MEDICINE

## 2023-01-01 PROCEDURE — 73590 X-RAY EXAM OF LOWER LEG: CPT | Mod: 26 | Performed by: RADIOLOGY

## 2023-01-01 PROCEDURE — 80053 COMPREHEN METABOLIC PANEL: CPT | Performed by: PHYSICIAN ASSISTANT

## 2023-01-01 PROCEDURE — 99239 HOSP IP/OBS DSCHRG MGMT >30: CPT | Performed by: INTERNAL MEDICINE

## 2023-01-01 PROCEDURE — 85730 THROMBOPLASTIN TIME PARTIAL: CPT | Performed by: INTERNAL MEDICINE

## 2023-01-01 PROCEDURE — 93308 TTE F-UP OR LMTD: CPT | Mod: 26 | Performed by: INTERNAL MEDICINE

## 2023-01-01 PROCEDURE — 82565 ASSAY OF CREATININE: CPT

## 2023-01-01 PROCEDURE — 87637 SARSCOV2&INF A&B&RSV AMP PRB: CPT | Performed by: EMERGENCY MEDICINE

## 2023-01-01 PROCEDURE — 36415 COLL VENOUS BLD VENIPUNCTURE: CPT | Performed by: PHYSICIAN ASSISTANT

## 2023-01-01 PROCEDURE — 80053 COMPREHEN METABOLIC PANEL: CPT | Performed by: EMERGENCY MEDICINE

## 2023-01-01 PROCEDURE — 999N000197 HC STATISTIC WOC PT EDUCATION, 0-15 MIN

## 2023-01-01 PROCEDURE — 3E0U029 INTRODUCTION OF OTHER ANTI-INFECTIVE INTO JOINTS, OPEN APPROACH: ICD-10-PCS | Performed by: ORTHOPAEDIC SURGERY

## 2023-01-01 PROCEDURE — 272N000506 HC NEEDLE CR6

## 2023-01-01 PROCEDURE — 87070 CULTURE OTHR SPECIMN AEROBIC: CPT | Performed by: EMERGENCY MEDICINE

## 2023-01-01 PROCEDURE — 258N000003 HC RX IP 258 OP 636: Performed by: REGISTERED NURSE

## 2023-01-01 PROCEDURE — 82962 GLUCOSE BLOOD TEST: CPT

## 2023-01-01 PROCEDURE — 70450 CT HEAD/BRAIN W/O DYE: CPT

## 2023-01-01 PROCEDURE — 93321 DOPPLER ECHO F-UP/LMTD STD: CPT | Mod: 26 | Performed by: INTERNAL MEDICINE

## 2023-01-01 PROCEDURE — 99222 1ST HOSP IP/OBS MODERATE 55: CPT | Performed by: INTERNAL MEDICINE

## 2023-01-01 PROCEDURE — 99418 PROLNG IP/OBS E/M EA 15 MIN: CPT | Performed by: PHYSICIAN ASSISTANT

## 2023-01-01 PROCEDURE — 80048 BASIC METABOLIC PNL TOTAL CA: CPT | Performed by: EMERGENCY MEDICINE

## 2023-01-01 PROCEDURE — 96365 THER/PROPH/DIAG IV INF INIT: CPT | Performed by: EMERGENCY MEDICINE

## 2023-01-01 PROCEDURE — 250N000011 HC RX IP 250 OP 636: Performed by: REGISTERED NURSE

## 2023-01-01 PROCEDURE — 93010 ELECTROCARDIOGRAM REPORT: CPT | Performed by: INTERNAL MEDICINE

## 2023-01-01 PROCEDURE — 85384 FIBRINOGEN ACTIVITY: CPT | Performed by: INTERNAL MEDICINE

## 2023-01-01 PROCEDURE — G0463 HOSPITAL OUTPT CLINIC VISIT: HCPCS

## 2023-01-01 PROCEDURE — 250N000009 HC RX 250: Performed by: REGISTERED NURSE

## 2023-01-01 PROCEDURE — 74177 CT ABD & PELVIS W/CONTRAST: CPT | Mod: 26 | Performed by: RADIOLOGY

## 2023-01-01 PROCEDURE — 82248 BILIRUBIN DIRECT: CPT

## 2023-01-01 PROCEDURE — 83036 HEMOGLOBIN GLYCOSYLATED A1C: CPT | Performed by: PHYSICIAN ASSISTANT

## 2023-01-01 PROCEDURE — 80202 ASSAY OF VANCOMYCIN: CPT | Performed by: STUDENT IN AN ORGANIZED HEALTH CARE EDUCATION/TRAINING PROGRAM

## 2023-01-01 PROCEDURE — 82947 ASSAY GLUCOSE BLOOD QUANT: CPT | Performed by: INTERNAL MEDICINE

## 2023-01-01 PROCEDURE — P9016 RBC LEUKOCYTES REDUCED: HCPCS

## 2023-01-01 PROCEDURE — 3E0V329 INTRODUCTION OF OTHER ANTI-INFECTIVE INTO BONES, PERCUTANEOUS APPROACH: ICD-10-PCS | Performed by: ORTHOPAEDIC SURGERY

## 2023-01-01 PROCEDURE — 80069 RENAL FUNCTION PANEL: CPT | Performed by: STUDENT IN AN ORGANIZED HEALTH CARE EDUCATION/TRAINING PROGRAM

## 2023-01-01 PROCEDURE — 999N000129 HC STATISTIC PICC/MID LINE PROC CANCELLED SUBQ WORKUP

## 2023-01-01 PROCEDURE — 82374 ASSAY BLOOD CARBON DIOXIDE: CPT | Performed by: STUDENT IN AN ORGANIZED HEALTH CARE EDUCATION/TRAINING PROGRAM

## 2023-01-01 PROCEDURE — 97605 NEG PRS WND THER DME<=50SQCM: CPT | Mod: GC | Performed by: ORTHOPAEDIC SURGERY

## 2023-01-01 PROCEDURE — 99232 SBSQ HOSP IP/OBS MODERATE 35: CPT | Mod: GC | Performed by: STUDENT IN AN ORGANIZED HEALTH CARE EDUCATION/TRAINING PROGRAM

## 2023-01-01 PROCEDURE — 83605 ASSAY OF LACTIC ACID: CPT | Performed by: STUDENT IN AN ORGANIZED HEALTH CARE EDUCATION/TRAINING PROGRAM

## 2023-01-01 PROCEDURE — 84295 ASSAY OF SERUM SODIUM: CPT

## 2023-01-01 PROCEDURE — 99213 OFFICE O/P EST LOW 20 MIN: CPT | Performed by: INTERNAL MEDICINE

## 2023-01-01 PROCEDURE — 99223 1ST HOSP IP/OBS HIGH 75: CPT | Mod: GC | Performed by: INTERNAL MEDICINE

## 2023-01-01 PROCEDURE — 99207 PR NO BILLABLE SERVICE THIS VISIT: CPT | Performed by: EMERGENCY MEDICINE

## 2023-01-01 PROCEDURE — 85027 COMPLETE CBC AUTOMATED: CPT

## 2023-01-01 PROCEDURE — 250N000009 HC RX 250: Performed by: EMERGENCY MEDICINE

## 2023-01-01 PROCEDURE — 85025 COMPLETE CBC W/AUTO DIFF WBC: CPT | Performed by: EMERGENCY MEDICINE

## 2023-01-01 PROCEDURE — 82746 ASSAY OF FOLIC ACID SERUM: CPT | Performed by: INTERNAL MEDICINE

## 2023-01-01 PROCEDURE — 99291 CRITICAL CARE FIRST HOUR: CPT | Mod: 25

## 2023-01-01 PROCEDURE — 99214 OFFICE O/P EST MOD 30 MIN: CPT | Mod: 95 | Performed by: INTERNAL MEDICINE

## 2023-01-01 PROCEDURE — 87149 DNA/RNA DIRECT PROBE: CPT | Performed by: EMERGENCY MEDICINE

## 2023-01-01 PROCEDURE — 82374 ASSAY BLOOD CARBON DIOXIDE: CPT

## 2023-01-01 PROCEDURE — 80053 COMPREHEN METABOLIC PANEL: CPT | Performed by: INTERNAL MEDICINE

## 2023-01-01 PROCEDURE — 80048 BASIC METABOLIC PNL TOTAL CA: CPT | Performed by: STUDENT IN AN ORGANIZED HEALTH CARE EDUCATION/TRAINING PROGRAM

## 2023-01-01 PROCEDURE — 86900 BLOOD TYPING SEROLOGIC ABO: CPT

## 2023-01-01 PROCEDURE — 74177 CT ABD & PELVIS W/CONTRAST: CPT

## 2023-01-01 PROCEDURE — 82306 VITAMIN D 25 HYDROXY: CPT | Performed by: INTERNAL MEDICINE

## 2023-01-01 PROCEDURE — 258N000003 HC RX IP 258 OP 636: Performed by: STUDENT IN AN ORGANIZED HEALTH CARE EDUCATION/TRAINING PROGRAM

## 2023-01-01 PROCEDURE — 82040 ASSAY OF SERUM ALBUMIN: CPT | Performed by: STUDENT IN AN ORGANIZED HEALTH CARE EDUCATION/TRAINING PROGRAM

## 2023-01-01 PROCEDURE — 93306 TTE W/DOPPLER COMPLETE: CPT

## 2023-01-01 PROCEDURE — 80202 ASSAY OF VANCOMYCIN: CPT | Performed by: ORTHOPAEDIC SURGERY

## 2023-01-01 PROCEDURE — 84443 ASSAY THYROID STIM HORMONE: CPT | Performed by: PHYSICIAN ASSISTANT

## 2023-01-01 PROCEDURE — 84460 ALANINE AMINO (ALT) (SGPT): CPT | Performed by: INTERNAL MEDICINE

## 2023-01-01 PROCEDURE — 82330 ASSAY OF CALCIUM: CPT | Performed by: EMERGENCY MEDICINE

## 2023-01-01 PROCEDURE — 250N000012 HC RX MED GY IP 250 OP 636 PS 637: Performed by: INTERNAL MEDICINE

## 2023-01-01 PROCEDURE — 02H633Z INSERTION OF INFUSION DEVICE INTO RIGHT ATRIUM, PERCUTANEOUS APPROACH: ICD-10-PCS | Performed by: RADIOLOGY

## 2023-01-01 PROCEDURE — 88305 TISSUE EXAM BY PATHOLOGIST: CPT | Mod: 26 | Performed by: PATHOLOGY

## 2023-01-01 PROCEDURE — 02CQ3ZZ EXTIRPATION OF MATTER FROM RIGHT PULMONARY ARTERY, PERCUTANEOUS APPROACH: ICD-10-PCS | Performed by: RADIOLOGY

## 2023-01-01 PROCEDURE — 85018 HEMOGLOBIN: CPT

## 2023-01-01 PROCEDURE — 84484 ASSAY OF TROPONIN QUANT: CPT | Performed by: EMERGENCY MEDICINE

## 2023-01-01 PROCEDURE — 84100 ASSAY OF PHOSPHORUS: CPT

## 2023-01-01 PROCEDURE — 87641 MR-STAPH DNA AMP PROBE: CPT | Performed by: INTERNAL MEDICINE

## 2023-01-01 PROCEDURE — 99223 1ST HOSP IP/OBS HIGH 75: CPT | Mod: 24 | Performed by: INTERNAL MEDICINE

## 2023-01-01 PROCEDURE — 36014 PLACE CATHETER IN ARTERY: CPT | Mod: 50 | Performed by: RADIOLOGY

## 2023-01-01 PROCEDURE — 96375 TX/PRO/DX INJ NEW DRUG ADDON: CPT | Performed by: EMERGENCY MEDICINE

## 2023-01-01 PROCEDURE — 85610 PROTHROMBIN TIME: CPT | Performed by: EMERGENCY MEDICINE

## 2023-01-01 PROCEDURE — 70450 CT HEAD/BRAIN W/O DYE: CPT | Mod: 77

## 2023-01-01 PROCEDURE — 94002 VENT MGMT INPAT INIT DAY: CPT

## 2023-01-01 PROCEDURE — 84100 ASSAY OF PHOSPHORUS: CPT | Performed by: STUDENT IN AN ORGANIZED HEALTH CARE EDUCATION/TRAINING PROGRAM

## 2023-01-01 PROCEDURE — 85730 THROMBOPLASTIN TIME PARTIAL: CPT | Performed by: EMERGENCY MEDICINE

## 2023-01-01 PROCEDURE — 85652 RBC SED RATE AUTOMATED: CPT

## 2023-01-01 PROCEDURE — 82310 ASSAY OF CALCIUM: CPT | Performed by: INTERNAL MEDICINE

## 2023-01-01 PROCEDURE — 99223 1ST HOSP IP/OBS HIGH 75: CPT | Performed by: PHYSICIAN ASSISTANT

## 2023-01-01 PROCEDURE — 86140 C-REACTIVE PROTEIN: CPT | Performed by: PHYSICIAN ASSISTANT

## 2023-01-01 PROCEDURE — 255N000002 HC RX 255 OP 636: Performed by: RADIOLOGY

## 2023-01-01 PROCEDURE — 86900 BLOOD TYPING SEROLOGIC ABO: CPT | Performed by: STUDENT IN AN ORGANIZED HEALTH CARE EDUCATION/TRAINING PROGRAM

## 2023-01-01 PROCEDURE — 85027 COMPLETE CBC AUTOMATED: CPT | Performed by: STUDENT IN AN ORGANIZED HEALTH CARE EDUCATION/TRAINING PROGRAM

## 2023-01-01 PROCEDURE — 83735 ASSAY OF MAGNESIUM: CPT | Performed by: STUDENT IN AN ORGANIZED HEALTH CARE EDUCATION/TRAINING PROGRAM

## 2023-01-01 PROCEDURE — 85652 RBC SED RATE AUTOMATED: CPT | Performed by: PATHOLOGY

## 2023-01-01 PROCEDURE — 82607 VITAMIN B-12: CPT | Performed by: INTERNAL MEDICINE

## 2023-01-01 PROCEDURE — 97605 NEG PRS WND THER DME<=50SQCM: CPT | Performed by: ORTHOPAEDIC SURGERY

## 2023-01-01 PROCEDURE — 85520 HEPARIN ASSAY: CPT | Performed by: INTERNAL MEDICINE

## 2023-01-01 PROCEDURE — 0QPJ04Z REMOVAL OF INTERNAL FIXATION DEVICE FROM RIGHT FIBULA, OPEN APPROACH: ICD-10-PCS | Performed by: ORTHOPAEDIC SURGERY

## 2023-01-01 PROCEDURE — 36415 COLL VENOUS BLD VENIPUNCTURE: CPT | Performed by: ORTHOPAEDIC SURGERY

## 2023-01-01 PROCEDURE — 28020 EXPLORATION OF FOOT JOINT: CPT | Mod: 58 | Performed by: ORTHOPAEDIC SURGERY

## 2023-01-01 PROCEDURE — 75743 ARTERY X-RAYS LUNGS: CPT

## 2023-01-01 PROCEDURE — 85347 COAGULATION TIME ACTIVATED: CPT

## 2023-01-01 PROCEDURE — 36014 PLACE CATHETER IN ARTERY: CPT | Mod: 50

## 2023-01-01 PROCEDURE — 94003 VENT MGMT INPAT SUBQ DAY: CPT

## 2023-01-01 PROCEDURE — 87205 SMEAR GRAM STAIN: CPT | Performed by: ORTHOPAEDIC SURGERY

## 2023-01-01 PROCEDURE — 96361 HYDRATE IV INFUSION ADD-ON: CPT | Performed by: EMERGENCY MEDICINE

## 2023-01-01 PROCEDURE — 85049 AUTOMATED PLATELET COUNT: CPT

## 2023-01-01 PROCEDURE — 20704 MNL PREP&INSJ I-ARTIC RX DEV: CPT | Performed by: ORTHOPAEDIC SURGERY

## 2023-01-01 PROCEDURE — C9803 HOPD COVID-19 SPEC COLLECT: HCPCS | Performed by: EMERGENCY MEDICINE

## 2023-01-01 PROCEDURE — 999N000147 HC STATISTIC PT IP EVAL DEFER: Performed by: PHYSICAL THERAPIST

## 2023-01-01 PROCEDURE — 82728 ASSAY OF FERRITIN: CPT | Performed by: INTERNAL MEDICINE

## 2023-01-01 PROCEDURE — 71275 CT ANGIOGRAPHY CHEST: CPT | Mod: 26 | Performed by: RADIOLOGY

## 2023-01-01 PROCEDURE — 85018 HEMOGLOBIN: CPT | Performed by: STUDENT IN AN ORGANIZED HEALTH CARE EDUCATION/TRAINING PROGRAM

## 2023-01-01 PROCEDURE — G0463 HOSPITAL OUTPT CLINIC VISIT: HCPCS | Performed by: INTERNAL MEDICINE

## 2023-01-01 PROCEDURE — 11981 INSERTION DRUG DLVR IMPLANT: CPT | Mod: GC | Performed by: ORTHOPAEDIC SURGERY

## 2023-01-01 PROCEDURE — 82805 BLOOD GASES W/O2 SATURATION: CPT

## 2023-01-01 PROCEDURE — 87186 SC STD MICRODIL/AGAR DIL: CPT | Performed by: EMERGENCY MEDICINE

## 2023-01-01 PROCEDURE — 999N000180 XR SURGERY CARM FLUORO LESS THAN 5 MIN: Mod: TC

## 2023-01-01 PROCEDURE — 36415 COLL VENOUS BLD VENIPUNCTURE: CPT | Performed by: PATHOLOGY

## 2023-01-01 PROCEDURE — 20700 MNL PREP&INSJ DP RX DLVR DEV: CPT | Mod: GC | Performed by: ORTHOPAEDIC SURGERY

## 2023-01-01 PROCEDURE — 258N000003 HC RX IP 258 OP 636: Performed by: PHYSICIAN ASSISTANT

## 2023-01-01 PROCEDURE — 71045 X-RAY EXAM CHEST 1 VIEW: CPT

## 2023-01-01 PROCEDURE — 36592 COLLECT BLOOD FROM PICC: CPT | Performed by: INTERNAL MEDICINE

## 2023-01-01 PROCEDURE — 250N000013 HC RX MED GY IP 250 OP 250 PS 637: Performed by: EMERGENCY MEDICINE

## 2023-01-01 PROCEDURE — 20705 RMVL I-ARTIC RX DELIVERY DEV: CPT | Mod: GC | Performed by: ORTHOPAEDIC SURGERY

## 2023-01-01 PROCEDURE — 93325 DOPPLER ECHO COLOR FLOW MAPG: CPT

## 2023-01-01 PROCEDURE — 36620 INSERTION CATHETER ARTERY: CPT

## 2023-01-01 PROCEDURE — 37184 PRIM ART M-THRMBC 1ST VSL: CPT | Mod: 50

## 2023-01-01 PROCEDURE — 85049 AUTOMATED PLATELET COUNT: CPT | Performed by: INTERNAL MEDICINE

## 2023-01-01 PROCEDURE — 87086 URINE CULTURE/COLONY COUNT: CPT | Performed by: EMERGENCY MEDICINE

## 2023-01-01 PROCEDURE — 999N000044 HC STATISTIC CVC DRESSING CHANGE

## 2023-01-01 PROCEDURE — 250N000011 HC RX IP 250 OP 636: Mod: JZ | Performed by: PHYSICIAN ASSISTANT

## 2023-01-01 PROCEDURE — 85007 BL SMEAR W/DIFF WBC COUNT: CPT | Performed by: INTERNAL MEDICINE

## 2023-01-01 PROCEDURE — 82010 KETONE BODYS QUAN: CPT

## 2023-01-01 PROCEDURE — 999N000157 HC STATISTIC RCP TIME EA 10 MIN

## 2023-01-01 PROCEDURE — 99222 1ST HOSP IP/OBS MODERATE 55: CPT | Mod: FS | Performed by: PHYSICIAN ASSISTANT

## 2023-01-01 PROCEDURE — 97605 NEG PRS WND THER DME<=50SQCM: CPT | Mod: 58 | Performed by: ORTHOPAEDIC SURGERY

## 2023-01-01 PROCEDURE — 77001 FLUOROGUIDE FOR VEIN DEVICE: CPT | Mod: 26 | Performed by: RADIOLOGY

## 2023-01-01 PROCEDURE — A7035 POS AIRWAY PRESS HEADGEAR: HCPCS

## 2023-01-01 PROCEDURE — 83880 ASSAY OF NATRIURETIC PEPTIDE: CPT | Performed by: STUDENT IN AN ORGANIZED HEALTH CARE EDUCATION/TRAINING PROGRAM

## 2023-01-01 PROCEDURE — 3E043XZ INTRODUCTION OF VASOPRESSOR INTO CENTRAL VEIN, PERCUTANEOUS APPROACH: ICD-10-PCS | Performed by: EMERGENCY MEDICINE

## 2023-01-01 PROCEDURE — 80069 RENAL FUNCTION PANEL: CPT

## 2023-01-01 PROCEDURE — 360N000082 HC SURGERY LEVEL 2 W/ FLUORO, PER MIN: Performed by: ORTHOPAEDIC SURGERY

## 2023-01-01 PROCEDURE — 99207 PR NO CHARGE NURSE ONLY: CPT

## 2023-01-01 PROCEDURE — 272N000454 HC KIT, 3FR SV SINGLE LUMEN POWERPICC

## 2023-01-01 PROCEDURE — C1763 CONN TISS, NON-HUMAN: HCPCS | Performed by: ORTHOPAEDIC SURGERY

## 2023-01-01 PROCEDURE — 5A1935Z RESPIRATORY VENTILATION, LESS THAN 24 CONSECUTIVE HOURS: ICD-10-PCS | Performed by: INTERNAL MEDICINE

## 2023-01-01 PROCEDURE — 84132 ASSAY OF SERUM POTASSIUM: CPT | Performed by: STUDENT IN AN ORGANIZED HEALTH CARE EDUCATION/TRAINING PROGRAM

## 2023-01-01 PROCEDURE — 250N000013 HC RX MED GY IP 250 OP 250 PS 637: Performed by: ORTHOPAEDIC SURGERY

## 2023-01-01 PROCEDURE — 83550 IRON BINDING TEST: CPT | Performed by: INTERNAL MEDICINE

## 2023-01-01 PROCEDURE — 73610 X-RAY EXAM OF ANKLE: CPT | Mod: RT | Performed by: RADIOLOGY

## 2023-01-01 PROCEDURE — 85384 FIBRINOGEN ACTIVITY: CPT

## 2023-01-01 PROCEDURE — 11982 REMOVE DRUG IMPLANT DEVICE: CPT | Mod: 58 | Performed by: ORTHOPAEDIC SURGERY

## 2023-01-01 PROCEDURE — 76937 US GUIDE VASCULAR ACCESS: CPT

## 2023-01-01 PROCEDURE — 0S9F0ZX DRAINAGE OF RIGHT ANKLE JOINT, OPEN APPROACH, DIAGNOSTIC: ICD-10-PCS | Performed by: ORTHOPAEDIC SURGERY

## 2023-01-01 PROCEDURE — 20700 MNL PREP&INSJ DP RX DLVR DEV: CPT | Performed by: ORTHOPAEDIC SURGERY

## 2023-01-01 DEVICE — GRAFT BONE OSTEOSET KIT FAST CURE 25ML 8400-0311: Type: IMPLANTABLE DEVICE | Site: FOOT | Status: FUNCTIONAL

## 2023-01-01 DEVICE — TOBRA FULL DOSE ANTIBIOTIC BONE CEMENT, 10 PACK CATALOG NUMBER IS 6197-9-010
Type: IMPLANTABLE DEVICE | Site: ANKLE | Status: FUNCTIONAL
Brand: SIMPLEX

## 2023-01-01 DEVICE — TOBRA FULL DOSE ANTIBIOTIC BONE CEMENT, 10 PACK CATALOG NUMBER IS 6197-9-010
Type: IMPLANTABLE DEVICE | Site: FOOT | Status: NON-FUNCTIONAL
Brand: SIMPLEX
Removed: 2023-12-07

## 2023-01-01 RX ORDER — ACETAMINOPHEN 325 MG/1
650 TABLET ORAL EVERY 4 HOURS PRN
Status: DISCONTINUED | OUTPATIENT
Start: 2023-01-01 | End: 2023-01-01

## 2023-01-01 RX ORDER — NICOTINE POLACRILEX 4 MG
15-30 LOZENGE BUCCAL
Status: DISCONTINUED | OUTPATIENT
Start: 2023-01-01 | End: 2023-01-01

## 2023-01-01 RX ORDER — SODIUM CHLORIDE, SODIUM LACTATE, POTASSIUM CHLORIDE, CALCIUM CHLORIDE 600; 310; 30; 20 MG/100ML; MG/100ML; MG/100ML; MG/100ML
INJECTION, SOLUTION INTRAVENOUS CONTINUOUS
Status: DISCONTINUED | OUTPATIENT
Start: 2023-01-01 | End: 2023-01-01 | Stop reason: HOSPADM

## 2023-01-01 RX ORDER — METHOCARBAMOL 750 MG/1
750 TABLET, FILM COATED ORAL EVERY 6 HOURS PRN
Qty: 15 TABLET | Refills: 0 | Status: SHIPPED | OUTPATIENT
Start: 2023-01-01 | End: 2023-01-01

## 2023-01-01 RX ORDER — ALBUTEROL SULFATE 0.83 MG/ML
2.5 SOLUTION RESPIRATORY (INHALATION) EVERY 4 HOURS PRN
Status: DISCONTINUED | OUTPATIENT
Start: 2023-01-01 | End: 2023-01-01 | Stop reason: HOSPADM

## 2023-01-01 RX ORDER — NEOMYCIN/BACITRACIN/POLYMYXINB 3.5-400-5K
OINTMENT (GRAM) TOPICAL DAILY
COMMUNITY

## 2023-01-01 RX ORDER — PANTOPRAZOLE SODIUM 40 MG/1
40 TABLET, DELAYED RELEASE ORAL
Status: DISCONTINUED | OUTPATIENT
Start: 2023-01-01 | End: 2023-01-01

## 2023-01-01 RX ORDER — PROPOFOL 10 MG/ML
INJECTION, EMULSION INTRAVENOUS PRN
Status: DISCONTINUED | OUTPATIENT
Start: 2023-01-01 | End: 2023-01-01

## 2023-01-01 RX ORDER — LIDOCAINE 40 MG/G
CREAM TOPICAL
Status: DISCONTINUED | OUTPATIENT
Start: 2023-01-01 | End: 2023-01-01 | Stop reason: HOSPADM

## 2023-01-01 RX ORDER — AMMONIUM LACTATE 12 G/100G
LOTION TOPICAL
Status: ON HOLD | COMMUNITY
Start: 2023-01-01 | End: 2023-01-01

## 2023-01-01 RX ORDER — CARBOXYMETHYLCELLULOSE SODIUM 5 MG/ML
1 SOLUTION/ DROPS OPHTHALMIC 3 TIMES DAILY PRN
COMMUNITY
End: 2023-01-01

## 2023-01-01 RX ORDER — PANTOPRAZOLE SODIUM 40 MG/1
40 TABLET, DELAYED RELEASE ORAL DAILY
Status: DISCONTINUED | OUTPATIENT
Start: 2023-01-01 | End: 2023-01-01 | Stop reason: HOSPADM

## 2023-01-01 RX ORDER — IOPAMIDOL 755 MG/ML
100 INJECTION, SOLUTION INTRAVASCULAR ONCE
Status: COMPLETED | OUTPATIENT
Start: 2023-01-01 | End: 2023-01-01

## 2023-01-01 RX ORDER — LIDOCAINE HYDROCHLORIDE 20 MG/ML
INJECTION, SOLUTION INFILTRATION; PERINEURAL PRN
Status: DISCONTINUED | OUTPATIENT
Start: 2023-01-01 | End: 2023-01-01

## 2023-01-01 RX ORDER — HYDROMORPHONE HCL IN WATER/PF 6 MG/30 ML
0.2 PATIENT CONTROLLED ANALGESIA SYRINGE INTRAVENOUS
Status: DISCONTINUED | OUTPATIENT
Start: 2023-01-01 | End: 2023-01-01 | Stop reason: HOSPADM

## 2023-01-01 RX ORDER — NALOXONE HYDROCHLORIDE 0.4 MG/ML
0.4 INJECTION, SOLUTION INTRAMUSCULAR; INTRAVENOUS; SUBCUTANEOUS
Status: DISCONTINUED | OUTPATIENT
Start: 2023-01-01 | End: 2023-01-01 | Stop reason: HOSPADM

## 2023-01-01 RX ORDER — ZOLPIDEM TARTRATE 5 MG/1
5 TABLET ORAL
Status: DISCONTINUED | OUTPATIENT
Start: 2023-01-01 | End: 2023-01-01 | Stop reason: HOSPADM

## 2023-01-01 RX ORDER — HYDROMORPHONE HYDROCHLORIDE 1 MG/ML
0.2 INJECTION, SOLUTION INTRAMUSCULAR; INTRAVENOUS; SUBCUTANEOUS EVERY 5 MIN PRN
Status: DISCONTINUED | OUTPATIENT
Start: 2023-01-01 | End: 2023-01-01

## 2023-01-01 RX ORDER — CEFAZOLIN SODIUM 1 G/3ML
INJECTION, POWDER, FOR SOLUTION INTRAMUSCULAR; INTRAVENOUS PRN
Status: DISCONTINUED | OUTPATIENT
Start: 2023-01-01 | End: 2023-01-01

## 2023-01-01 RX ORDER — AMOXICILLIN 250 MG
1 CAPSULE ORAL 2 TIMES DAILY
Status: DISCONTINUED | OUTPATIENT
Start: 2023-01-01 | End: 2023-01-01 | Stop reason: HOSPADM

## 2023-01-01 RX ORDER — ACETAMINOPHEN 325 MG/1
975 TABLET ORAL ONCE
Status: COMPLETED | OUTPATIENT
Start: 2023-01-01 | End: 2023-01-01

## 2023-01-01 RX ORDER — DEXAMETHASONE SODIUM PHOSPHATE 4 MG/ML
INJECTION, SOLUTION INTRA-ARTICULAR; INTRALESIONAL; INTRAMUSCULAR; INTRAVENOUS; SOFT TISSUE PRN
Status: DISCONTINUED | OUTPATIENT
Start: 2023-01-01 | End: 2023-01-01

## 2023-01-01 RX ORDER — AMOXICILLIN 250 MG
2 CAPSULE ORAL 2 TIMES DAILY PRN
Status: DISCONTINUED | OUTPATIENT
Start: 2023-01-01 | End: 2023-01-01

## 2023-01-01 RX ORDER — AMOXICILLIN 250 MG
1 CAPSULE ORAL 2 TIMES DAILY PRN
Status: DISCONTINUED | OUTPATIENT
Start: 2023-01-01 | End: 2023-01-01

## 2023-01-01 RX ORDER — ONDANSETRON 4 MG/1
4 TABLET, ORALLY DISINTEGRATING ORAL EVERY 6 HOURS PRN
Status: DISCONTINUED | OUTPATIENT
Start: 2023-01-01 | End: 2023-01-01

## 2023-01-01 RX ORDER — NICOTINE POLACRILEX 4 MG
15-30 LOZENGE BUCCAL
Status: DISCONTINUED | OUTPATIENT
Start: 2023-01-01 | End: 2023-01-01 | Stop reason: HOSPADM

## 2023-01-01 RX ORDER — ACETAMINOPHEN 325 MG/1
975 TABLET ORAL
Status: DISCONTINUED | OUTPATIENT
Start: 2023-01-01 | End: 2023-01-01 | Stop reason: HOSPADM

## 2023-01-01 RX ORDER — ONDANSETRON 2 MG/ML
4 INJECTION INTRAMUSCULAR; INTRAVENOUS EVERY 6 HOURS PRN
Status: DISCONTINUED | OUTPATIENT
Start: 2023-01-01 | End: 2023-01-01

## 2023-01-01 RX ORDER — ACETAMINOPHEN 325 MG/1
650 TABLET ORAL EVERY 4 HOURS PRN
Status: DISCONTINUED | OUTPATIENT
Start: 2023-01-01 | End: 2023-01-01 | Stop reason: HOSPADM

## 2023-01-01 RX ORDER — ONDANSETRON 2 MG/ML
INJECTION INTRAMUSCULAR; INTRAVENOUS PRN
Status: DISCONTINUED | OUTPATIENT
Start: 2023-01-01 | End: 2023-01-01

## 2023-01-01 RX ORDER — CARBOXYMETHYLCELLULOSE SODIUM 5 MG/ML
1 SOLUTION/ DROPS OPHTHALMIC 3 TIMES DAILY PRN
Status: DISCONTINUED | OUTPATIENT
Start: 2023-01-01 | End: 2023-01-01

## 2023-01-01 RX ORDER — ONDANSETRON 2 MG/ML
4 INJECTION INTRAMUSCULAR; INTRAVENOUS EVERY 30 MIN PRN
Status: CANCELLED | OUTPATIENT
Start: 2023-01-01

## 2023-01-01 RX ORDER — NALOXONE HYDROCHLORIDE 0.4 MG/ML
0.2 INJECTION, SOLUTION INTRAMUSCULAR; INTRAVENOUS; SUBCUTANEOUS
Status: DISCONTINUED | OUTPATIENT
Start: 2023-01-01 | End: 2023-01-01 | Stop reason: HOSPADM

## 2023-01-01 RX ORDER — ACETAMINOPHEN 500 MG
1000 TABLET ORAL 3 TIMES DAILY
COMMUNITY

## 2023-01-01 RX ORDER — ONDANSETRON 4 MG/1
4 TABLET, ORALLY DISINTEGRATING ORAL EVERY 30 MIN PRN
Status: DISCONTINUED | OUTPATIENT
Start: 2023-01-01 | End: 2023-01-01 | Stop reason: HOSPADM

## 2023-01-01 RX ORDER — FLUCONAZOLE 200 MG/1
400 TABLET ORAL ONCE
Status: COMPLETED | OUTPATIENT
Start: 2023-01-01 | End: 2023-01-01

## 2023-01-01 RX ORDER — PRAVASTATIN SODIUM 20 MG
40 TABLET ORAL ONCE
Status: COMPLETED | OUTPATIENT
Start: 2023-01-01 | End: 2023-01-01

## 2023-01-01 RX ORDER — ZINC SULFATE 50(220)MG
220 CAPSULE ORAL ONCE
Status: COMPLETED | OUTPATIENT
Start: 2023-01-01 | End: 2023-01-01

## 2023-01-01 RX ORDER — PIPERACILLIN SODIUM, TAZOBACTAM SODIUM 3; .375 G/15ML; G/15ML
3.38 INJECTION, POWDER, LYOPHILIZED, FOR SOLUTION INTRAVENOUS EVERY 6 HOURS
Status: DISCONTINUED | OUTPATIENT
Start: 2023-01-01 | End: 2023-01-01

## 2023-01-01 RX ORDER — LORAZEPAM 2 MG/ML
.5-1 INJECTION INTRAMUSCULAR
Status: DISCONTINUED | OUTPATIENT
Start: 2023-01-01 | End: 2023-01-01

## 2023-01-01 RX ORDER — LIDOCAINE 40 MG/G
CREAM TOPICAL
Status: DISCONTINUED | OUTPATIENT
Start: 2023-01-01 | End: 2023-01-01

## 2023-01-01 RX ORDER — OXYCODONE HYDROCHLORIDE 5 MG/1
10 TABLET ORAL
Status: CANCELLED | OUTPATIENT
Start: 2023-01-01

## 2023-01-01 RX ORDER — HALOPERIDOL 5 MG/ML
1 INJECTION INTRAMUSCULAR
Status: DISCONTINUED | OUTPATIENT
Start: 2023-01-01 | End: 2023-01-01 | Stop reason: HOSPADM

## 2023-01-01 RX ORDER — ACETAMINOPHEN 325 MG/1
650 TABLET ORAL EVERY 8 HOURS PRN
Status: DISCONTINUED | OUTPATIENT
Start: 2023-01-01 | End: 2023-01-01 | Stop reason: HOSPADM

## 2023-01-01 RX ORDER — DEXMEDETOMIDINE HYDROCHLORIDE 4 UG/ML
INJECTION, SOLUTION INTRAVENOUS PRN
Status: DISCONTINUED | OUTPATIENT
Start: 2023-01-01 | End: 2023-01-01

## 2023-01-01 RX ORDER — HYDROMORPHONE HYDROCHLORIDE 1 MG/ML
0.2 INJECTION, SOLUTION INTRAMUSCULAR; INTRAVENOUS; SUBCUTANEOUS EVERY 5 MIN PRN
Status: CANCELLED | OUTPATIENT
Start: 2023-01-01

## 2023-01-01 RX ORDER — GABAPENTIN 400 MG/1
1200 CAPSULE ORAL 3 TIMES DAILY
Qty: 90 CAPSULE | Refills: 0 | Status: ON HOLD | OUTPATIENT
Start: 2023-01-01 | End: 2023-01-01

## 2023-01-01 RX ORDER — GABAPENTIN 600 MG/1
2 TABLET ORAL 3 TIMES DAILY
Status: ON HOLD | COMMUNITY
Start: 2022-02-17 | End: 2023-01-01

## 2023-01-01 RX ORDER — OXYCODONE HYDROCHLORIDE 10 MG/1
10 TABLET ORAL EVERY 4 HOURS PRN
Status: DISCONTINUED | OUTPATIENT
Start: 2023-01-01 | End: 2023-01-01 | Stop reason: HOSPADM

## 2023-01-01 RX ORDER — OXYCODONE HYDROCHLORIDE 5 MG/1
5 TABLET ORAL EVERY 4 HOURS PRN
Qty: 30 TABLET | Refills: 0 | Status: SHIPPED | OUTPATIENT
Start: 2023-01-01 | End: 2023-01-01

## 2023-01-01 RX ORDER — PIPERACILLIN SODIUM, TAZOBACTAM SODIUM 4; .5 G/20ML; G/20ML
4.5 INJECTION, POWDER, LYOPHILIZED, FOR SOLUTION INTRAVENOUS EVERY 6 HOURS
Status: DISCONTINUED | OUTPATIENT
Start: 2023-01-01 | End: 2023-01-01

## 2023-01-01 RX ORDER — LIDOCAINE 50 MG/G
PATCH TOPICAL
Status: ON HOLD | COMMUNITY
Start: 2023-01-01 | End: 2023-01-01

## 2023-01-01 RX ORDER — ALBUTEROL SULFATE 0.83 MG/ML
2.5 SOLUTION RESPIRATORY (INHALATION) EVERY 4 HOURS PRN
Status: DISCONTINUED | OUTPATIENT
Start: 2023-01-01 | End: 2023-01-01

## 2023-01-01 RX ORDER — FENTANYL CITRATE 50 UG/ML
25-50 INJECTION, SOLUTION INTRAMUSCULAR; INTRAVENOUS EVERY 5 MIN PRN
Status: DISCONTINUED | OUTPATIENT
Start: 2023-01-01 | End: 2023-01-01

## 2023-01-01 RX ORDER — SODIUM CHLORIDE, SODIUM LACTATE, POTASSIUM CHLORIDE, CALCIUM CHLORIDE 600; 310; 30; 20 MG/100ML; MG/100ML; MG/100ML; MG/100ML
INJECTION, SOLUTION INTRAVENOUS CONTINUOUS
Status: DISCONTINUED | OUTPATIENT
Start: 2023-01-01 | End: 2023-01-01

## 2023-01-01 RX ORDER — CEFAZOLIN SODIUM/WATER 2 G/20 ML
2 SYRINGE (ML) INTRAVENOUS SEE ADMIN INSTRUCTIONS
Status: DISCONTINUED | OUTPATIENT
Start: 2023-01-01 | End: 2023-01-01 | Stop reason: HOSPADM

## 2023-01-01 RX ORDER — SODIUM CHLORIDE 9 MG/ML
INJECTION, SOLUTION INTRAVENOUS
Status: COMPLETED
Start: 2023-01-01 | End: 2023-01-01

## 2023-01-01 RX ORDER — HYDROMORPHONE HYDROCHLORIDE 1 MG/ML
0.2 INJECTION, SOLUTION INTRAMUSCULAR; INTRAVENOUS; SUBCUTANEOUS EVERY 5 MIN PRN
Status: DISCONTINUED | OUTPATIENT
Start: 2023-01-01 | End: 2023-01-01 | Stop reason: HOSPADM

## 2023-01-01 RX ORDER — DEXTROSE MONOHYDRATE 25 G/50ML
25-50 INJECTION, SOLUTION INTRAVENOUS
Status: DISCONTINUED | OUTPATIENT
Start: 2023-01-01 | End: 2023-01-01 | Stop reason: HOSPADM

## 2023-01-01 RX ORDER — FENTANYL CITRATE-0.9 % NACL/PF 10 MCG/ML
PLASTIC BAG, INJECTION (ML) INTRAVENOUS PRN
Status: DISCONTINUED | OUTPATIENT
Start: 2023-01-01 | End: 2023-01-01

## 2023-01-01 RX ORDER — FENTANYL CITRATE 50 UG/ML
25 INJECTION, SOLUTION INTRAMUSCULAR; INTRAVENOUS EVERY 5 MIN PRN
Status: CANCELLED | OUTPATIENT
Start: 2023-01-01

## 2023-01-01 RX ORDER — MULTIPLE VITAMINS W/ MINERALS TAB 9MG-400MCG
1 TAB ORAL DAILY
Status: DISCONTINUED | OUTPATIENT
Start: 2023-01-01 | End: 2023-01-01

## 2023-01-01 RX ORDER — CARBOXYMETHYLCELLULOSE SODIUM 5 MG/ML
1 SOLUTION/ DROPS OPHTHALMIC 3 TIMES DAILY PRN
Status: DISCONTINUED | OUTPATIENT
Start: 2023-01-01 | End: 2023-01-01 | Stop reason: HOSPADM

## 2023-01-01 RX ORDER — RIFAMPIN 300 MG/1
300 CAPSULE ORAL EVERY 12 HOURS SCHEDULED
Status: DISCONTINUED | OUTPATIENT
Start: 2023-01-01 | End: 2023-01-01 | Stop reason: HOSPADM

## 2023-01-01 RX ORDER — OXYCODONE HYDROCHLORIDE 5 MG/1
5 TABLET ORAL
Status: DISCONTINUED | OUTPATIENT
Start: 2023-01-01 | End: 2023-01-01 | Stop reason: HOSPADM

## 2023-01-01 RX ORDER — CEFAZOLIN SODIUM/WATER 2 G/20 ML
2 SYRINGE (ML) INTRAVENOUS
Status: DISCONTINUED | OUTPATIENT
Start: 2023-01-01 | End: 2023-01-01 | Stop reason: HOSPADM

## 2023-01-01 RX ORDER — FENTANYL CITRATE 50 UG/ML
50 INJECTION, SOLUTION INTRAMUSCULAR; INTRAVENOUS EVERY 5 MIN PRN
Status: DISCONTINUED | OUTPATIENT
Start: 2023-01-01 | End: 2023-01-01

## 2023-01-01 RX ORDER — LANOLIN ALCOHOL/MO/W.PET/CERES
2000 CREAM (GRAM) TOPICAL EVERY MORNING
Status: DISCONTINUED | OUTPATIENT
Start: 2023-01-01 | End: 2023-01-01

## 2023-01-01 RX ORDER — HYDROMORPHONE HYDROCHLORIDE 1 MG/ML
0.5 INJECTION, SOLUTION INTRAMUSCULAR; INTRAVENOUS; SUBCUTANEOUS
Status: DISCONTINUED | OUTPATIENT
Start: 2023-01-01 | End: 2023-01-01 | Stop reason: HOSPADM

## 2023-01-01 RX ORDER — ONDANSETRON 4 MG/1
4 TABLET, ORALLY DISINTEGRATING ORAL EVERY 30 MIN PRN
Status: CANCELLED | OUTPATIENT
Start: 2023-01-01

## 2023-01-01 RX ORDER — EPHEDRINE SULFATE 50 MG/ML
INJECTION, SOLUTION INTRAMUSCULAR; INTRAVENOUS; SUBCUTANEOUS PRN
Status: DISCONTINUED | OUTPATIENT
Start: 2023-01-01 | End: 2023-01-01

## 2023-01-01 RX ORDER — FLUMAZENIL 0.1 MG/ML
0.2 INJECTION, SOLUTION INTRAVENOUS
Status: DISCONTINUED | OUTPATIENT
Start: 2023-01-01 | End: 2023-01-01

## 2023-01-01 RX ORDER — NALOXONE HYDROCHLORIDE 0.4 MG/ML
0.4 INJECTION, SOLUTION INTRAMUSCULAR; INTRAVENOUS; SUBCUTANEOUS
Status: DISCONTINUED | OUTPATIENT
Start: 2023-01-01 | End: 2023-01-01

## 2023-01-01 RX ORDER — PIPERACILLIN SODIUM, TAZOBACTAM SODIUM 4; .5 G/20ML; G/20ML
4.5 INJECTION, POWDER, LYOPHILIZED, FOR SOLUTION INTRAVENOUS ONCE
Status: COMPLETED | OUTPATIENT
Start: 2023-01-01 | End: 2023-01-01

## 2023-01-01 RX ORDER — LIDOCAINE 50 MG/G
1 PATCH TOPICAL EVERY 24 HOURS
Status: DISCONTINUED | OUTPATIENT
Start: 2023-01-01 | End: 2023-01-01

## 2023-01-01 RX ORDER — ONDANSETRON 4 MG/1
4 TABLET, ORALLY DISINTEGRATING ORAL EVERY 30 MIN PRN
Status: DISCONTINUED | OUTPATIENT
Start: 2023-01-01 | End: 2023-01-01

## 2023-01-01 RX ORDER — FENTANYL CITRATE 50 UG/ML
INJECTION, SOLUTION INTRAMUSCULAR; INTRAVENOUS PRN
Status: DISCONTINUED | OUTPATIENT
Start: 2023-01-01 | End: 2023-01-01

## 2023-01-01 RX ORDER — NALOXONE HYDROCHLORIDE 0.4 MG/ML
0.2 INJECTION, SOLUTION INTRAMUSCULAR; INTRAVENOUS; SUBCUTANEOUS
Status: DISCONTINUED | OUTPATIENT
Start: 2023-01-01 | End: 2023-01-01

## 2023-01-01 RX ORDER — HALOPERIDOL 5 MG/ML
1 INJECTION INTRAMUSCULAR
Status: DISCONTINUED | OUTPATIENT
Start: 2023-01-01 | End: 2023-01-01

## 2023-01-01 RX ORDER — VANCOMYCIN HYDROCHLORIDE 1 G/20ML
INJECTION, POWDER, LYOPHILIZED, FOR SOLUTION INTRAVENOUS PRN
Status: DISCONTINUED | OUTPATIENT
Start: 2023-01-01 | End: 2023-01-01

## 2023-01-01 RX ORDER — FENTANYL CITRATE 50 UG/ML
50 INJECTION, SOLUTION INTRAMUSCULAR; INTRAVENOUS EVERY 5 MIN PRN
Status: DISCONTINUED | OUTPATIENT
Start: 2023-01-01 | End: 2023-01-01 | Stop reason: HOSPADM

## 2023-01-01 RX ORDER — FLUTICASONE PROPIONATE 50 MCG
2 SPRAY, SUSPENSION (ML) NASAL EVERY MORNING
Status: DISCONTINUED | OUTPATIENT
Start: 2023-01-01 | End: 2023-01-01 | Stop reason: HOSPADM

## 2023-01-01 RX ORDER — FLUCONAZOLE 200 MG/1
400 TABLET ORAL DAILY
Qty: 60 TABLET | Refills: 1 | Status: SHIPPED | OUTPATIENT
Start: 2023-01-01

## 2023-01-01 RX ORDER — METFORMIN HCL 500 MG
1000 TABLET, EXTENDED RELEASE 24 HR ORAL 2 TIMES DAILY WITH MEALS
Status: ON HOLD | COMMUNITY
Start: 2023-01-01 | End: 2023-01-01

## 2023-01-01 RX ORDER — ONDANSETRON 2 MG/ML
4 INJECTION INTRAMUSCULAR; INTRAVENOUS EVERY 30 MIN PRN
Status: DISCONTINUED | OUTPATIENT
Start: 2023-01-01 | End: 2023-01-01 | Stop reason: HOSPADM

## 2023-01-01 RX ORDER — LACTOBACILLUS RHAMNOSUS GG 10B CELL
1 CAPSULE ORAL 2 TIMES DAILY
Status: DISCONTINUED | OUTPATIENT
Start: 2023-01-01 | End: 2023-01-01

## 2023-01-01 RX ORDER — GABAPENTIN 600 MG/1
600 TABLET ORAL 3 TIMES DAILY
Status: DISCONTINUED | OUTPATIENT
Start: 2023-01-01 | End: 2023-01-01

## 2023-01-01 RX ORDER — CEFAZOLIN SODIUM 1 G/50ML
1750 SOLUTION INTRAVENOUS EVERY 24 HOURS
Status: DISCONTINUED | OUTPATIENT
Start: 2023-01-01 | End: 2023-01-01 | Stop reason: HOSPADM

## 2023-01-01 RX ORDER — LORAZEPAM 2 MG/ML
1 INJECTION INTRAMUSCULAR
Status: DISCONTINUED | OUTPATIENT
Start: 2023-01-01 | End: 2023-01-01 | Stop reason: HOSPADM

## 2023-01-01 RX ORDER — PRAVASTATIN SODIUM 40 MG
40 TABLET ORAL EVERY EVENING
Status: DISCONTINUED | OUTPATIENT
Start: 2023-01-01 | End: 2023-01-01

## 2023-01-01 RX ORDER — PROCHLORPERAZINE MALEATE 10 MG
10 TABLET ORAL EVERY 6 HOURS PRN
Status: DISCONTINUED | OUTPATIENT
Start: 2023-01-01 | End: 2023-01-01 | Stop reason: HOSPADM

## 2023-01-01 RX ORDER — DEXTROSE MONOHYDRATE 25 G/50ML
25-50 INJECTION, SOLUTION INTRAVENOUS
Status: DISCONTINUED | OUTPATIENT
Start: 2023-01-01 | End: 2023-01-01

## 2023-01-01 RX ORDER — LIDOCAINE 40 MG/G
CREAM TOPICAL
Status: ACTIVE | OUTPATIENT
Start: 2023-01-01 | End: 2023-01-01

## 2023-01-01 RX ORDER — FENTANYL CITRATE 50 UG/ML
50 INJECTION, SOLUTION INTRAMUSCULAR; INTRAVENOUS EVERY 5 MIN PRN
Status: CANCELLED | OUTPATIENT
Start: 2023-01-01

## 2023-01-01 RX ORDER — LORAZEPAM 0.5 MG/1
1 TABLET ORAL
Status: DISCONTINUED | OUTPATIENT
Start: 2023-01-01 | End: 2023-01-01 | Stop reason: HOSPADM

## 2023-01-01 RX ORDER — LEVOTHYROXINE SODIUM 150 UG/1
150 TABLET ORAL EVERY MORNING
Status: DISCONTINUED | OUTPATIENT
Start: 2023-01-01 | End: 2023-01-01

## 2023-01-01 RX ORDER — ONDANSETRON 2 MG/ML
4 INJECTION INTRAMUSCULAR; INTRAVENOUS EVERY 30 MIN PRN
Status: DISCONTINUED | OUTPATIENT
Start: 2023-01-01 | End: 2023-01-01

## 2023-01-01 RX ORDER — HYDROMORPHONE HYDROCHLORIDE 1 MG/ML
0.4 INJECTION, SOLUTION INTRAMUSCULAR; INTRAVENOUS; SUBCUTANEOUS EVERY 5 MIN PRN
Status: DISCONTINUED | OUTPATIENT
Start: 2023-01-01 | End: 2023-01-01 | Stop reason: HOSPADM

## 2023-01-01 RX ORDER — GLYCOPYRROLATE 0.2 MG/ML
0.1 INJECTION, SOLUTION INTRAMUSCULAR; INTRAVENOUS EVERY 4 HOURS PRN
Status: DISCONTINUED | OUTPATIENT
Start: 2023-01-01 | End: 2023-01-01

## 2023-01-01 RX ORDER — GABAPENTIN 400 MG/1
1200 CAPSULE ORAL 3 TIMES DAILY
Status: DISCONTINUED | OUTPATIENT
Start: 2023-01-01 | End: 2023-01-01 | Stop reason: HOSPADM

## 2023-01-01 RX ORDER — ONDANSETRON 2 MG/ML
4 INJECTION INTRAMUSCULAR; INTRAVENOUS EVERY 6 HOURS PRN
Status: DISCONTINUED | OUTPATIENT
Start: 2023-01-01 | End: 2023-01-01 | Stop reason: HOSPADM

## 2023-01-01 RX ORDER — RIFAMPIN 300 MG/1
300 CAPSULE ORAL EVERY 12 HOURS
Qty: 52 CAPSULE | Refills: 0 | Status: SHIPPED | OUTPATIENT
Start: 2023-01-01 | End: 2023-01-01

## 2023-01-01 RX ORDER — MEPERIDINE HYDROCHLORIDE 25 MG/ML
12.5 INJECTION INTRAMUSCULAR; INTRAVENOUS; SUBCUTANEOUS EVERY 5 MIN PRN
Status: DISCONTINUED | OUTPATIENT
Start: 2023-01-01 | End: 2023-01-01

## 2023-01-01 RX ORDER — THIAMINE HYDROCHLORIDE 100 MG/ML
500 INJECTION, SOLUTION INTRAMUSCULAR; INTRAVENOUS DAILY
Status: DISCONTINUED | OUTPATIENT
Start: 2023-01-01 | End: 2023-01-01

## 2023-01-01 RX ORDER — LORAZEPAM 2 MG/ML
.5-1 INJECTION INTRAMUSCULAR
Status: DISCONTINUED | OUTPATIENT
Start: 2023-01-01 | End: 2023-01-01 | Stop reason: HOSPADM

## 2023-01-01 RX ORDER — ACETAMINOPHEN 650 MG/1
650 SUPPOSITORY RECTAL EVERY 4 HOURS PRN
Status: DISCONTINUED | OUTPATIENT
Start: 2023-01-01 | End: 2023-01-01

## 2023-01-01 RX ORDER — HYDROMORPHONE HCL IN WATER/PF 6 MG/30 ML
0.4 PATIENT CONTROLLED ANALGESIA SYRINGE INTRAVENOUS
Status: DISCONTINUED | OUTPATIENT
Start: 2023-01-01 | End: 2023-01-01 | Stop reason: HOSPADM

## 2023-01-01 RX ORDER — SODIUM FLUORIDE 5 MG/G
GEL, DENTIFRICE DENTAL
COMMUNITY
Start: 2022-08-15

## 2023-01-01 RX ORDER — SODIUM CHLORIDE, SODIUM LACTATE, POTASSIUM CHLORIDE, CALCIUM CHLORIDE 600; 310; 30; 20 MG/100ML; MG/100ML; MG/100ML; MG/100ML
INJECTION, SOLUTION INTRAVENOUS CONTINUOUS
Status: CANCELLED | OUTPATIENT
Start: 2023-01-01

## 2023-01-01 RX ORDER — CEFAZOLIN SODIUM/WATER 2 G/20 ML
2 SYRINGE (ML) INTRAVENOUS
Status: COMPLETED | OUTPATIENT
Start: 2023-01-01 | End: 2023-01-01

## 2023-01-01 RX ORDER — GABAPENTIN 600 MG/1
1200 TABLET ORAL 3 TIMES DAILY
Status: DISCONTINUED | OUTPATIENT
Start: 2023-01-01 | End: 2023-01-01

## 2023-01-01 RX ORDER — FENTANYL CITRATE 50 UG/ML
25 INJECTION, SOLUTION INTRAMUSCULAR; INTRAVENOUS EVERY 5 MIN PRN
Status: DISCONTINUED | OUTPATIENT
Start: 2023-01-01 | End: 2023-01-01 | Stop reason: HOSPADM

## 2023-01-01 RX ORDER — SUMATRIPTAN 25 MG/1
25 TABLET, FILM COATED ORAL
Status: DISCONTINUED | OUTPATIENT
Start: 2023-01-01 | End: 2023-01-01

## 2023-01-01 RX ORDER — MORPHINE SULFATE 4 MG/ML
4 INJECTION, SOLUTION INTRAMUSCULAR; INTRAVENOUS
Status: COMPLETED | OUTPATIENT
Start: 2023-01-01 | End: 2023-01-01

## 2023-01-01 RX ORDER — CEFAZOLIN SODIUM 2 G/100ML
2 INJECTION, SOLUTION INTRAVENOUS SEE ADMIN INSTRUCTIONS
Status: DISCONTINUED | OUTPATIENT
Start: 2023-01-01 | End: 2023-01-01 | Stop reason: HOSPADM

## 2023-01-01 RX ORDER — MINERAL OIL/PETROLATUM,WHITE 3 %-94 %
0.5 OINTMENT (GRAM) OPHTHALMIC (EYE) AT BEDTIME
COMMUNITY

## 2023-01-01 RX ORDER — PROCHLORPERAZINE MALEATE 5 MG
10 TABLET ORAL EVERY 6 HOURS PRN
Status: DISCONTINUED | OUTPATIENT
Start: 2023-01-01 | End: 2023-01-01 | Stop reason: HOSPADM

## 2023-01-01 RX ORDER — VANCOMYCIN HYDROCHLORIDE 1 G/20ML
INJECTION, POWDER, LYOPHILIZED, FOR SOLUTION INTRAVENOUS PRN
Status: DISCONTINUED | OUTPATIENT
Start: 2023-01-01 | End: 2023-01-01 | Stop reason: HOSPADM

## 2023-01-01 RX ORDER — ACETAMINOPHEN 325 MG/1
650 TABLET ORAL EVERY 8 HOURS
Status: DISCONTINUED | OUTPATIENT
Start: 2023-01-01 | End: 2023-01-01

## 2023-01-01 RX ORDER — POLYETHYLENE GLYCOL 3350 17 G/17G
17 POWDER, FOR SOLUTION ORAL DAILY
Status: DISCONTINUED | OUTPATIENT
Start: 2023-01-01 | End: 2023-01-01

## 2023-01-01 RX ORDER — SODIUM CHLORIDE, SODIUM LACTATE, POTASSIUM CHLORIDE, CALCIUM CHLORIDE 600; 310; 30; 20 MG/100ML; MG/100ML; MG/100ML; MG/100ML
INJECTION, SOLUTION INTRAVENOUS CONTINUOUS PRN
Status: DISCONTINUED | OUTPATIENT
Start: 2023-01-01 | End: 2023-01-01

## 2023-01-01 RX ORDER — DIPHENHYDRAMINE HCL 25 MG
25 CAPSULE ORAL EVERY 6 HOURS PRN
Status: DISCONTINUED | OUTPATIENT
Start: 2023-01-01 | End: 2023-01-01 | Stop reason: HOSPADM

## 2023-01-01 RX ORDER — BETAMETHASONE DIPROPIONATE 0.5 MG/G
CREAM TOPICAL 2 TIMES DAILY
COMMUNITY

## 2023-01-01 RX ORDER — HEPARIN SODIUM,PORCINE 10 UNIT/ML
5-20 VIAL (ML) INTRAVENOUS
Status: DISCONTINUED | OUTPATIENT
Start: 2023-01-01 | End: 2023-01-01 | Stop reason: HOSPADM

## 2023-01-01 RX ORDER — AMOXICILLIN 250 MG
1 CAPSULE ORAL 2 TIMES DAILY
Qty: 60 TABLET | Refills: 0 | Status: SHIPPED | OUTPATIENT
Start: 2023-01-01

## 2023-01-01 RX ORDER — ACETAMINOPHEN 325 MG/1
650 TABLET ORAL EVERY 6 HOURS PRN
Status: DISCONTINUED | OUTPATIENT
Start: 2023-01-01 | End: 2023-01-01

## 2023-01-01 RX ORDER — OXYCODONE HYDROCHLORIDE 5 MG/1
5 TABLET ORAL
Status: CANCELLED | OUTPATIENT
Start: 2023-01-01

## 2023-01-01 RX ORDER — LEVOTHYROXINE SODIUM 137 UG/1
137 TABLET ORAL DAILY
Status: ON HOLD | COMMUNITY
Start: 2023-01-01 | End: 2023-01-01

## 2023-01-01 RX ORDER — ASCORBIC ACID 500 MG
500 TABLET ORAL DAILY
Status: DISCONTINUED | OUTPATIENT
Start: 2023-01-01 | End: 2023-01-01

## 2023-01-01 RX ORDER — FLUTICASONE PROPIONATE 50 MCG
2 SPRAY, SUSPENSION (ML) NASAL ONCE
Status: COMPLETED | OUTPATIENT
Start: 2023-01-01 | End: 2023-01-01

## 2023-01-01 RX ORDER — ACETAMINOPHEN 500 MG
500-1000 TABLET ORAL EVERY 6 HOURS PRN
Status: ON HOLD | COMMUNITY
End: 2023-01-01

## 2023-01-01 RX ORDER — VANCOMYCIN HYDROCHLORIDE 1 G/200ML
1000 INJECTION, SOLUTION INTRAVENOUS EVERY 12 HOURS
Status: DISCONTINUED | OUTPATIENT
Start: 2023-01-01 | End: 2023-01-01

## 2023-01-01 RX ORDER — FENTANYL CITRATE 50 UG/ML
25 INJECTION, SOLUTION INTRAMUSCULAR; INTRAVENOUS
Status: DISCONTINUED | OUTPATIENT
Start: 2023-01-01 | End: 2023-01-01 | Stop reason: HOSPADM

## 2023-01-01 RX ORDER — OXYCODONE HYDROCHLORIDE 10 MG/1
10 TABLET ORAL
Status: DISCONTINUED | OUTPATIENT
Start: 2023-01-01 | End: 2023-01-01 | Stop reason: HOSPADM

## 2023-01-01 RX ORDER — HYDROMORPHONE HYDROCHLORIDE 1 MG/ML
0.4 INJECTION, SOLUTION INTRAMUSCULAR; INTRAVENOUS; SUBCUTANEOUS EVERY 5 MIN PRN
Status: CANCELLED | OUTPATIENT
Start: 2023-01-01

## 2023-01-01 RX ORDER — LEVOTHYROXINE SODIUM 150 UG/1
150 TABLET ORAL EVERY MORNING
COMMUNITY
Start: 2023-01-01

## 2023-01-01 RX ORDER — FLUCONAZOLE 200 MG/1
400 TABLET ORAL EVERY EVENING
Status: DISCONTINUED | OUTPATIENT
Start: 2023-01-01 | End: 2023-01-01

## 2023-01-01 RX ORDER — LIDOCAINE 4 G/G
3 PATCH TOPICAL
Status: DISCONTINUED | OUTPATIENT
Start: 2023-01-01 | End: 2023-01-01 | Stop reason: HOSPADM

## 2023-01-01 RX ORDER — GABAPENTIN 600 MG/1
1 TABLET ORAL 3 TIMES DAILY
COMMUNITY
Start: 2023-01-01

## 2023-01-01 RX ORDER — DIPHENHYDRAMINE HCL 25 MG
25 CAPSULE ORAL
COMMUNITY
Start: 2023-01-01 | End: 2023-01-01

## 2023-01-01 RX ORDER — ACETAMINOPHEN 325 MG/1
975 TABLET ORAL EVERY 8 HOURS
Status: DISCONTINUED | OUTPATIENT
Start: 2023-01-01 | End: 2023-01-01

## 2023-01-01 RX ORDER — ZOLPIDEM TARTRATE 12.5 MG/1
12.5 TABLET, FILM COATED, EXTENDED RELEASE ORAL
COMMUNITY

## 2023-01-01 RX ORDER — PRAVASTATIN SODIUM 20 MG
40 TABLET ORAL EVERY EVENING
Status: DISCONTINUED | OUTPATIENT
Start: 2023-01-01 | End: 2023-01-01 | Stop reason: HOSPADM

## 2023-01-01 RX ORDER — GADOBUTROL 604.72 MG/ML
0.1 INJECTION INTRAVENOUS ONCE
Status: COMPLETED | OUTPATIENT
Start: 2023-01-01 | End: 2023-01-01

## 2023-01-01 RX ORDER — BISACODYL 10 MG
10 SUPPOSITORY, RECTAL RECTAL DAILY PRN
Status: DISCONTINUED | OUTPATIENT
Start: 2023-01-01 | End: 2023-01-01 | Stop reason: HOSPADM

## 2023-01-01 RX ORDER — CEFAZOLIN SODIUM/WATER 2 G/20 ML
SYRINGE (ML) INTRAVENOUS PRN
Status: DISCONTINUED | OUTPATIENT
Start: 2023-01-01 | End: 2023-01-01

## 2023-01-01 RX ORDER — LEVOTHYROXINE SODIUM 75 UG/1
150 TABLET ORAL
Status: DISCONTINUED | OUTPATIENT
Start: 2023-01-01 | End: 2023-01-01 | Stop reason: HOSPADM

## 2023-01-01 RX ORDER — ZINC SULFATE 50(220)MG
220 CAPSULE ORAL DAILY
Qty: 10 CAPSULE | Refills: 0 | Status: DISPENSED | OUTPATIENT
Start: 2023-01-01 | End: 2023-01-01

## 2023-01-01 RX ORDER — SELENIUM SULFIDE 2.5 MG/100ML
LOTION TOPICAL EVERY OTHER DAY
COMMUNITY

## 2023-01-01 RX ORDER — FOLIC ACID 1 MG/1
1 TABLET ORAL EVERY MORNING
COMMUNITY
Start: 2023-01-01

## 2023-01-01 RX ORDER — FENTANYL CITRATE 50 UG/ML
25 INJECTION, SOLUTION INTRAMUSCULAR; INTRAVENOUS EVERY 5 MIN PRN
Status: DISCONTINUED | OUTPATIENT
Start: 2023-01-01 | End: 2023-01-01

## 2023-01-01 RX ORDER — LABETALOL HYDROCHLORIDE 5 MG/ML
10 INJECTION, SOLUTION INTRAVENOUS
Status: DISCONTINUED | OUTPATIENT
Start: 2023-01-01 | End: 2023-01-01

## 2023-01-01 RX ORDER — METFORMIN HCL 500 MG
1000 TABLET, EXTENDED RELEASE 24 HR ORAL 2 TIMES DAILY WITH MEALS
Status: DISCONTINUED | OUTPATIENT
Start: 2023-01-01 | End: 2023-01-01 | Stop reason: HOSPADM

## 2023-01-01 RX ORDER — HYDROMORPHONE HYDROCHLORIDE 2 MG/1
2 TABLET ORAL
Status: DISCONTINUED | OUTPATIENT
Start: 2023-01-01 | End: 2023-01-01

## 2023-01-01 RX ORDER — FLUCONAZOLE 200 MG/1
400 TABLET ORAL DAILY
Status: DISCONTINUED | OUTPATIENT
Start: 2023-01-01 | End: 2023-01-01 | Stop reason: HOSPADM

## 2023-01-01 RX ORDER — LANOLIN ALCOHOL/MO/W.PET/CERES
2000 CREAM (GRAM) TOPICAL DAILY
COMMUNITY

## 2023-01-01 RX ORDER — RIFAMPIN 300 MG/1
300 CAPSULE ORAL EVERY 12 HOURS
Qty: 72 CAPSULE | Refills: 0 | Status: SHIPPED | OUTPATIENT
Start: 2023-01-01 | End: 2023-01-01

## 2023-01-01 RX ORDER — AMMONIUM LACTATE 12 G/100G
CREAM TOPICAL 2 TIMES DAILY PRN
COMMUNITY

## 2023-01-01 RX ORDER — FLUTICASONE PROPIONATE 50 MCG
2 SPRAY, SUSPENSION (ML) NASAL DAILY
Status: DISCONTINUED | OUTPATIENT
Start: 2023-01-01 | End: 2023-01-01 | Stop reason: HOSPADM

## 2023-01-01 RX ORDER — ONDANSETRON 4 MG/1
4 TABLET, ORALLY DISINTEGRATING ORAL EVERY 6 HOURS PRN
Status: DISCONTINUED | OUTPATIENT
Start: 2023-01-01 | End: 2023-01-01 | Stop reason: HOSPADM

## 2023-01-01 RX ORDER — IODIXANOL 320 MG/ML
100 INJECTION, SOLUTION INTRAVASCULAR ONCE
Status: COMPLETED | OUTPATIENT
Start: 2023-01-01 | End: 2023-01-01

## 2023-01-01 RX ORDER — MAGNESIUM HYDROXIDE 1200 MG/15ML
LIQUID ORAL PRN
Status: DISCONTINUED | OUTPATIENT
Start: 2023-01-01 | End: 2023-01-01 | Stop reason: HOSPADM

## 2023-01-01 RX ORDER — DIMENHYDRINATE 50 MG/ML
25 INJECTION, SOLUTION INTRAMUSCULAR; INTRAVENOUS
Status: DISCONTINUED | OUTPATIENT
Start: 2023-01-01 | End: 2023-01-01

## 2023-01-01 RX ORDER — ACETAMINOPHEN 325 MG/1
650 TABLET ORAL EVERY 4 HOURS PRN
COMMUNITY
Start: 2023-01-01

## 2023-01-01 RX ORDER — CALCIUM CARBONATE 500 MG/1
1000 TABLET, CHEWABLE ORAL 4 TIMES DAILY PRN
Status: DISCONTINUED | OUTPATIENT
Start: 2023-01-01 | End: 2023-01-01

## 2023-01-01 RX ORDER — FLUTICASONE PROPIONATE 50 MCG
SPRAY, SUSPENSION (ML) NASAL
Status: ON HOLD | COMMUNITY
Start: 2023-01-01 | End: 2023-01-01

## 2023-01-01 RX ORDER — GLYCOPYRROLATE 0.2 MG/ML
0.2 INJECTION, SOLUTION INTRAMUSCULAR; INTRAVENOUS EVERY 4 HOURS PRN
Status: DISCONTINUED | OUTPATIENT
Start: 2023-01-01 | End: 2023-01-01 | Stop reason: HOSPADM

## 2023-01-01 RX ORDER — GABAPENTIN 600 MG/1
600 TABLET ORAL 3 TIMES DAILY
Status: DISCONTINUED | OUTPATIENT
Start: 2023-01-01 | End: 2023-01-01 | Stop reason: HOSPADM

## 2023-01-01 RX ORDER — CEPHALEXIN 500 MG/1
500 CAPSULE ORAL 4 TIMES DAILY
Qty: 40 CAPSULE | Refills: 0 | Status: SHIPPED | OUTPATIENT
Start: 2023-01-01 | End: 2023-01-01

## 2023-01-01 RX ORDER — HYDROMORPHONE HYDROCHLORIDE 1 MG/ML
0.2 INJECTION, SOLUTION INTRAMUSCULAR; INTRAVENOUS; SUBCUTANEOUS
Status: DISCONTINUED | OUTPATIENT
Start: 2023-01-01 | End: 2023-01-01 | Stop reason: HOSPADM

## 2023-01-01 RX ORDER — LACTOBACILLUS RHAMNOSUS GG 10B CELL
1 CAPSULE ORAL 2 TIMES DAILY
Qty: 60 CAPSULE | Refills: 0 | Status: SHIPPED | OUTPATIENT
Start: 2023-01-01

## 2023-01-01 RX ORDER — VITAMIN B COMPLEX
50 TABLET ORAL DAILY
Status: DISCONTINUED | OUTPATIENT
Start: 2023-01-01 | End: 2023-01-01 | Stop reason: HOSPADM

## 2023-01-01 RX ORDER — POLYETHYLENE GLYCOL 3350 17 G/17G
17 POWDER, FOR SOLUTION ORAL DAILY
Status: DISCONTINUED | OUTPATIENT
Start: 2023-01-01 | End: 2023-01-01 | Stop reason: HOSPADM

## 2023-01-01 RX ORDER — HYDROXYZINE HYDROCHLORIDE 25 MG/1
25 TABLET, FILM COATED ORAL EVERY 6 HOURS PRN
Status: DISCONTINUED | OUTPATIENT
Start: 2023-01-01 | End: 2023-01-01 | Stop reason: HOSPADM

## 2023-01-01 RX ORDER — LIDOCAINE HYDROCHLORIDE 20 MG/ML
5 JELLY TOPICAL
Status: DISCONTINUED | OUTPATIENT
Start: 2023-01-01 | End: 2023-01-01 | Stop reason: HOSPADM

## 2023-01-01 RX ORDER — LACTOBACILLUS RHAMNOSUS GG 10B CELL
1 CAPSULE ORAL 2 TIMES DAILY
Status: DISCONTINUED | OUTPATIENT
Start: 2023-01-01 | End: 2023-01-01 | Stop reason: HOSPADM

## 2023-01-01 RX ORDER — HYDROMORPHONE HYDROCHLORIDE 1 MG/ML
0.4 INJECTION, SOLUTION INTRAMUSCULAR; INTRAVENOUS; SUBCUTANEOUS EVERY 5 MIN PRN
Status: DISCONTINUED | OUTPATIENT
Start: 2023-01-01 | End: 2023-01-01

## 2023-01-01 RX ORDER — HYDROMORPHONE HYDROCHLORIDE 5 MG/5ML
2 SOLUTION ORAL
Status: DISCONTINUED | OUTPATIENT
Start: 2023-01-01 | End: 2023-01-01

## 2023-01-01 RX ORDER — NOREPINEPHRINE BITARTRATE 0.06 MG/ML
.01-1 INJECTION, SOLUTION INTRAVENOUS CONTINUOUS
Status: DISCONTINUED | OUTPATIENT
Start: 2023-01-01 | End: 2023-01-01

## 2023-01-01 RX ORDER — HYDROMORPHONE HYDROCHLORIDE 1 MG/ML
0.4 INJECTION, SOLUTION INTRAMUSCULAR; INTRAVENOUS; SUBCUTANEOUS
Status: DISCONTINUED | OUTPATIENT
Start: 2023-01-01 | End: 2023-01-01 | Stop reason: HOSPADM

## 2023-01-01 RX ORDER — METFORMIN HCL 500 MG
1000 TABLET, EXTENDED RELEASE 24 HR ORAL
Status: ON HOLD | COMMUNITY
Start: 2022-02-01 | End: 2023-01-01

## 2023-01-01 RX ORDER — ACETAMINOPHEN 325 MG/1
975 TABLET ORAL EVERY 8 HOURS
Qty: 27 TABLET | Refills: 0 | Status: COMPLETED | OUTPATIENT
Start: 2023-01-01 | End: 2023-01-01

## 2023-01-01 RX ORDER — ACETAMINOPHEN 500 MG
500-1000 TABLET ORAL EVERY 6 HOURS PRN
Status: DISCONTINUED | OUTPATIENT
Start: 2023-01-01 | End: 2023-01-01

## 2023-01-01 RX ORDER — CEFEPIME HYDROCHLORIDE 2 G/1
2 INJECTION, POWDER, FOR SOLUTION INTRAVENOUS EVERY 12 HOURS
Status: DISCONTINUED | OUTPATIENT
Start: 2023-01-01 | End: 2023-01-01

## 2023-01-01 RX ORDER — ACETAMINOPHEN 325 MG/1
650 TABLET ORAL EVERY 8 HOURS PRN
Qty: 60 TABLET | Refills: 0 | Status: ON HOLD | OUTPATIENT
Start: 2023-01-01 | End: 2023-01-01

## 2023-01-01 RX ORDER — ACETAMINOPHEN 325 MG/1
975 TABLET ORAL ONCE
Status: DISCONTINUED | OUTPATIENT
Start: 2023-01-01 | End: 2023-01-01 | Stop reason: HOSPADM

## 2023-01-01 RX ORDER — LIDOCAINE 4 G/G
3 PATCH TOPICAL
Status: DISCONTINUED | OUTPATIENT
Start: 2023-01-01 | End: 2023-01-01

## 2023-01-01 RX ORDER — ACETAMINOPHEN 325 MG/1
650 TABLET ORAL EVERY 6 HOURS PRN
Status: DISCONTINUED | OUTPATIENT
Start: 2023-01-01 | End: 2023-01-01 | Stop reason: HOSPADM

## 2023-01-01 RX ORDER — ASPIRIN 81 MG/1
81 TABLET ORAL 2 TIMES DAILY
Status: DISCONTINUED | OUTPATIENT
Start: 2023-01-01 | End: 2023-01-01 | Stop reason: HOSPADM

## 2023-01-01 RX ORDER — PETROLATUM 0.61 G/G
CREAM TOPICAL
Qty: 92 G | Refills: 6 | Status: ON HOLD | OUTPATIENT
Start: 2023-01-01 | End: 2023-01-01

## 2023-01-01 RX ORDER — ATROPINE SULFATE 10 MG/ML
1 SOLUTION/ DROPS OPHTHALMIC
Status: DISCONTINUED | OUTPATIENT
Start: 2023-01-01 | End: 2023-01-01

## 2023-01-01 RX ORDER — DEXAMETHASONE SODIUM PHOSPHATE 4 MG/ML
4 INJECTION, SOLUTION INTRA-ARTICULAR; INTRALESIONAL; INTRAMUSCULAR; INTRAVENOUS; SOFT TISSUE
Status: DISCONTINUED | OUTPATIENT
Start: 2023-01-01 | End: 2023-01-01

## 2023-01-01 RX ORDER — ATROPINE SULFATE 10 MG/ML
2 SOLUTION/ DROPS OPHTHALMIC EVERY 4 HOURS PRN
Status: DISCONTINUED | OUTPATIENT
Start: 2023-01-01 | End: 2023-01-01 | Stop reason: HOSPADM

## 2023-01-01 RX ORDER — GABAPENTIN 600 MG/1
600 TABLET ORAL ONCE
Status: COMPLETED | OUTPATIENT
Start: 2023-01-01 | End: 2023-01-01

## 2023-01-01 RX ORDER — HYDROMORPHONE HYDROCHLORIDE 2 MG/1
2 TABLET ORAL
Status: DISCONTINUED | OUTPATIENT
Start: 2023-01-01 | End: 2023-01-01 | Stop reason: HOSPADM

## 2023-01-01 RX ORDER — CEFAZOLIN SODIUM 1 G/50ML
1750 SOLUTION INTRAVENOUS EVERY 24 HOURS
Refills: 0 | Status: ACTIVE
Start: 2023-01-01 | End: 2023-01-01

## 2023-01-01 RX ORDER — ACETAMINOPHEN 650 MG/1
650 SUPPOSITORY RECTAL EVERY 6 HOURS PRN
Status: DISCONTINUED | OUTPATIENT
Start: 2023-01-01 | End: 2023-01-01 | Stop reason: DRUGHIGH

## 2023-01-01 RX ORDER — CEFAZOLIN SODIUM 2 G/100ML
2 INJECTION, SOLUTION INTRAVENOUS EVERY 8 HOURS
Status: DISCONTINUED | OUTPATIENT
Start: 2023-01-01 | End: 2023-01-01

## 2023-01-01 RX ORDER — CARBOXYMETHYLCELLULOSE SODIUM 5 MG/ML
1-2 SOLUTION/ DROPS OPHTHALMIC
Status: DISCONTINUED | OUTPATIENT
Start: 2023-01-01 | End: 2023-01-01 | Stop reason: HOSPADM

## 2023-01-01 RX ORDER — CETIRIZINE HYDROCHLORIDE 10 MG/1
10 TABLET ORAL DAILY
Status: DISCONTINUED | OUTPATIENT
Start: 2023-01-01 | End: 2023-01-01 | Stop reason: HOSPADM

## 2023-01-01 RX ORDER — CEFAZOLIN SODIUM 1 G/3ML
1 INJECTION, POWDER, FOR SOLUTION INTRAMUSCULAR; INTRAVENOUS EVERY 8 HOURS
Status: DISCONTINUED | OUTPATIENT
Start: 2023-01-01 | End: 2023-01-01

## 2023-01-01 RX ORDER — PIPERACILLIN SODIUM, TAZOBACTAM SODIUM 3; .375 G/15ML; G/15ML
3.38 INJECTION, POWDER, LYOPHILIZED, FOR SOLUTION INTRAVENOUS EVERY 8 HOURS
Status: DISCONTINUED | OUTPATIENT
Start: 2023-01-01 | End: 2023-01-01

## 2023-01-01 RX ORDER — FLUTICASONE PROPIONATE 50 MCG
2 SPRAY, SUSPENSION (ML) NASAL 2 TIMES DAILY
Status: DISCONTINUED | OUTPATIENT
Start: 2023-01-01 | End: 2023-01-01

## 2023-01-01 RX ORDER — CETIRIZINE HYDROCHLORIDE 10 MG/1
10 TABLET ORAL EVERY MORNING
Status: DISCONTINUED | OUTPATIENT
Start: 2023-01-01 | End: 2023-01-01

## 2023-01-01 RX ORDER — KETOROLAC TROMETHAMINE 30 MG/ML
15 INJECTION, SOLUTION INTRAMUSCULAR; INTRAVENOUS
Status: DISCONTINUED | OUTPATIENT
Start: 2023-01-01 | End: 2023-01-01

## 2023-01-01 RX ORDER — HYDRALAZINE HYDROCHLORIDE 20 MG/ML
2.5-5 INJECTION INTRAMUSCULAR; INTRAVENOUS EVERY 10 MIN PRN
Status: DISCONTINUED | OUTPATIENT
Start: 2023-01-01 | End: 2023-01-01

## 2023-01-01 RX ORDER — METFORMIN HCL 500 MG
1000 TABLET, EXTENDED RELEASE 24 HR ORAL 2 TIMES DAILY WITH MEALS
Status: DISCONTINUED | OUTPATIENT
Start: 2023-01-01 | End: 2023-01-01

## 2023-01-01 RX ORDER — FOLIC ACID 1 MG/1
1 TABLET ORAL EVERY MORNING
Status: DISCONTINUED | OUTPATIENT
Start: 2023-01-01 | End: 2023-01-01 | Stop reason: HOSPADM

## 2023-01-01 RX ORDER — HEPARIN SODIUM 10000 [USP'U]/100ML
0-5000 INJECTION, SOLUTION INTRAVENOUS CONTINUOUS
Status: DISCONTINUED | OUTPATIENT
Start: 2023-01-01 | End: 2023-01-01

## 2023-01-01 RX ORDER — CEFEPIME HYDROCHLORIDE 2 G/1
2 INJECTION, POWDER, FOR SOLUTION INTRAVENOUS EVERY 8 HOURS
Status: DISCONTINUED | OUTPATIENT
Start: 2023-01-01 | End: 2023-01-01

## 2023-01-01 RX ORDER — HYDROMORPHONE HYDROCHLORIDE 5 MG/5ML
2 SOLUTION ORAL
Status: DISCONTINUED | OUTPATIENT
Start: 2023-01-01 | End: 2023-01-01 | Stop reason: HOSPADM

## 2023-01-01 RX ORDER — NAPROXEN 500 MG/1
500 TABLET ORAL
COMMUNITY
Start: 2023-01-01 | End: 2023-01-01

## 2023-01-01 RX ORDER — CHLORHEXIDINE GLUCONATE ORAL RINSE 1.2 MG/ML
15 SOLUTION DENTAL EVERY 12 HOURS
Status: DISCONTINUED | OUTPATIENT
Start: 2023-01-01 | End: 2023-01-01

## 2023-01-01 RX ORDER — PHENYLEPHRINE HCL IN 0.9% NACL 50MG/250ML
.1-6 PLASTIC BAG, INJECTION (ML) INTRAVENOUS CONTINUOUS
Status: DISCONTINUED | OUTPATIENT
Start: 2023-01-01 | End: 2023-01-01

## 2023-01-01 RX ORDER — FOLIC ACID 1 MG/1
1 TABLET ORAL EVERY MORNING
Status: DISCONTINUED | OUTPATIENT
Start: 2023-01-01 | End: 2023-01-01

## 2023-01-01 RX ORDER — METHOCARBAMOL 750 MG/1
750 TABLET, FILM COATED ORAL EVERY 6 HOURS PRN
Status: DISCONTINUED | OUTPATIENT
Start: 2023-01-01 | End: 2023-01-01 | Stop reason: HOSPADM

## 2023-01-01 RX ORDER — NOREPINEPHRINE BITARTRATE 0.06 MG/ML
INJECTION, SOLUTION INTRAVENOUS
Status: COMPLETED
Start: 2023-01-01 | End: 2023-01-01

## 2023-01-01 RX ORDER — PANTOPRAZOLE SODIUM 40 MG/1
40 TABLET, DELAYED RELEASE ORAL EVERY MORNING
Status: DISCONTINUED | OUTPATIENT
Start: 2023-01-01 | End: 2023-01-01 | Stop reason: HOSPADM

## 2023-01-01 RX ORDER — CEFAZOLIN SODIUM 2 G/100ML
2 INJECTION, SOLUTION INTRAVENOUS
Status: DISCONTINUED | OUTPATIENT
Start: 2023-01-01 | End: 2023-01-01 | Stop reason: HOSPADM

## 2023-01-01 RX ORDER — CEFAZOLIN SODIUM 1 G/50ML
1750 SOLUTION INTRAVENOUS ONCE
Status: COMPLETED | OUTPATIENT
Start: 2023-01-01 | End: 2023-01-01

## 2023-01-01 RX ORDER — MEPERIDINE HYDROCHLORIDE 25 MG/ML
12.5 INJECTION INTRAMUSCULAR; INTRAVENOUS; SUBCUTANEOUS EVERY 5 MIN PRN
Status: DISCONTINUED | OUTPATIENT
Start: 2023-01-01 | End: 2023-01-01 | Stop reason: HOSPADM

## 2023-01-01 RX ORDER — SODIUM CHLORIDE 9 MG/ML
INJECTION, SOLUTION INTRAVENOUS CONTINUOUS
Status: ACTIVE | OUTPATIENT
Start: 2023-01-01 | End: 2023-01-01

## 2023-01-01 RX ORDER — FLUCONAZOLE 2 MG/ML
400 INJECTION, SOLUTION INTRAVENOUS EVERY 24 HOURS
Status: DISCONTINUED | OUTPATIENT
Start: 2023-01-01 | End: 2023-01-01

## 2023-01-01 RX ORDER — LISINOPRIL 40 MG/1
1 TABLET ORAL DAILY
COMMUNITY
Start: 2023-01-01 | End: 2023-01-01

## 2023-01-01 RX ORDER — DOXYCYCLINE 100 MG/1
100 CAPSULE ORAL 2 TIMES DAILY
Qty: 28 CAPSULE | Refills: 0 | Status: SHIPPED | OUTPATIENT
Start: 2023-01-01 | End: 2023-01-01

## 2023-01-01 RX ORDER — SOD CHLORID/B6/ZINC/CITRIC ACD
SPRAY, NON-AEROSOL (ML) IRRIGATION DAILY
Qty: 236 ML | Refills: 1 | Status: SHIPPED | OUTPATIENT
Start: 2023-01-01 | End: 2023-01-01

## 2023-01-01 RX ORDER — SALIVA STIMULANT COMB. NO.3
1 SPRAY, NON-AEROSOL (ML) MUCOUS MEMBRANE
Status: DISCONTINUED | OUTPATIENT
Start: 2023-01-01 | End: 2023-01-01 | Stop reason: HOSPADM

## 2023-01-01 RX ORDER — OXYCODONE HYDROCHLORIDE 5 MG/1
5 TABLET ORAL EVERY 4 HOURS PRN
Status: DISCONTINUED | OUTPATIENT
Start: 2023-01-01 | End: 2023-01-01 | Stop reason: HOSPADM

## 2023-01-01 RX ORDER — DOBUTAMINE HYDROCHLORIDE 200 MG/100ML
5 INJECTION INTRAVENOUS CONTINUOUS
Status: DISCONTINUED | OUTPATIENT
Start: 2023-01-01 | End: 2023-01-01

## 2023-01-01 RX ORDER — PRAVASTATIN SODIUM 20 MG
20 TABLET ORAL EVERY EVENING
Status: DISCONTINUED | OUTPATIENT
Start: 2023-01-01 | End: 2023-01-01 | Stop reason: HOSPADM

## 2023-01-01 RX ORDER — CETIRIZINE HYDROCHLORIDE 10 MG/1
10 TABLET ORAL EVERY MORNING
Status: DISCONTINUED | OUTPATIENT
Start: 2023-01-01 | End: 2023-01-01 | Stop reason: HOSPADM

## 2023-01-01 RX ORDER — HYDROXYZINE HYDROCHLORIDE 25 MG/1
25 TABLET, FILM COATED ORAL EVERY 6 HOURS PRN
Status: DISCONTINUED | OUTPATIENT
Start: 2023-01-01 | End: 2023-01-01

## 2023-01-01 RX ORDER — NALOXONE HYDROCHLORIDE 0.4 MG/ML
0.1 INJECTION, SOLUTION INTRAMUSCULAR; INTRAVENOUS; SUBCUTANEOUS
Status: DISCONTINUED | OUTPATIENT
Start: 2023-01-01 | End: 2023-01-01 | Stop reason: HOSPADM

## 2023-01-01 RX ORDER — SUMATRIPTAN 25 MG/1
25 TABLET, FILM COATED ORAL
Status: ON HOLD | COMMUNITY
Start: 2022-10-13 | End: 2023-01-01

## 2023-01-01 RX ORDER — VITAMIN B COMPLEX
50 TABLET ORAL EVERY MORNING
Status: DISCONTINUED | OUTPATIENT
Start: 2023-01-01 | End: 2023-01-01

## 2023-01-01 RX ORDER — ACETAMINOPHEN 325 MG/1
975 TABLET ORAL EVERY 6 HOURS PRN
Status: DISCONTINUED | OUTPATIENT
Start: 2023-01-01 | End: 2023-01-01 | Stop reason: DRUGHIGH

## 2023-01-01 RX ADMIN — FLUCONAZOLE 400 MG: 200 TABLET ORAL at 20:18

## 2023-01-01 RX ADMIN — GADOBUTROL 8.72 ML: 604.72 INJECTION INTRAVENOUS at 16:02

## 2023-01-01 RX ADMIN — VANCOMYCIN HYDROCHLORIDE 1750 MG: 10 INJECTION, POWDER, LYOPHILIZED, FOR SOLUTION INTRAVENOUS at 15:04

## 2023-01-01 RX ADMIN — TOPIRAMATE 150 MG: 100 TABLET, FILM COATED ORAL at 08:17

## 2023-01-01 RX ADMIN — EMPAGLIFLOZIN 25 MG: 25 TABLET, FILM COATED ORAL at 10:03

## 2023-01-01 RX ADMIN — GABAPENTIN 600 MG: 600 TABLET, FILM COATED ORAL at 20:20

## 2023-01-01 RX ADMIN — LEVOTHYROXINE SODIUM 150 MCG: 75 TABLET ORAL at 09:30

## 2023-01-01 RX ADMIN — GABAPENTIN 600 MG: 600 TABLET, FILM COATED ORAL at 08:14

## 2023-01-01 RX ADMIN — PIPERACILLIN AND TAZOBACTAM 4.5 G: 4; .5 INJECTION, POWDER, FOR SOLUTION INTRAVENOUS at 14:29

## 2023-01-01 RX ADMIN — ASPIRIN 81 MG: 81 TABLET, COATED ORAL at 20:36

## 2023-01-01 RX ADMIN — PIPERACILLIN AND TAZOBACTAM 4.5 G: 4; .5 INJECTION, POWDER, FOR SOLUTION INTRAVENOUS at 08:15

## 2023-01-01 RX ADMIN — TOPIRAMATE 150 MG: 100 TABLET, FILM COATED ORAL at 07:41

## 2023-01-01 RX ADMIN — OXYCODONE HYDROCHLORIDE AND ACETAMINOPHEN 500 MG: 500 TABLET ORAL at 08:53

## 2023-01-01 RX ADMIN — FENTANYL CITRATE 50 MCG: 50 INJECTION INTRAMUSCULAR; INTRAVENOUS at 17:46

## 2023-01-01 RX ADMIN — PIPERACILLIN AND TAZOBACTAM 4.5 G: 4; .5 INJECTION, POWDER, FOR SOLUTION INTRAVENOUS at 14:27

## 2023-01-01 RX ADMIN — FLUTICASONE PROPIONATE 2 SPRAY: 50 SPRAY, METERED NASAL at 09:07

## 2023-01-01 RX ADMIN — METFORMIN HYDROCHLORIDE 1000 MG: 500 TABLET, EXTENDED RELEASE ORAL at 17:25

## 2023-01-01 RX ADMIN — THIAMINE HYDROCHLORIDE 500 MG: 100 INJECTION, SOLUTION INTRAMUSCULAR; INTRAVENOUS at 13:51

## 2023-01-01 RX ADMIN — MIDAZOLAM 2 MG: 1 INJECTION INTRAMUSCULAR; INTRAVENOUS at 14:28

## 2023-01-01 RX ADMIN — TOPIRAMATE 150 MG: 100 TABLET, FILM COATED ORAL at 08:04

## 2023-01-01 RX ADMIN — LEVOTHYROXINE SODIUM 150 MCG: 75 TABLET ORAL at 08:20

## 2023-01-01 RX ADMIN — GABAPENTIN 1200 MG: 400 CAPSULE ORAL at 12:39

## 2023-01-01 RX ADMIN — CEFAZOLIN 2 G: 1 INJECTION, POWDER, FOR SOLUTION INTRAMUSCULAR; INTRAVENOUS at 15:14

## 2023-01-01 RX ADMIN — TOPIRAMATE 150 MG: 100 TABLET, FILM COATED ORAL at 08:34

## 2023-01-01 RX ADMIN — TOPIRAMATE 150 MG: 50 TABLET ORAL at 20:34

## 2023-01-01 RX ADMIN — INSULIN HUMAN 18 UNITS: 100 INJECTION, SUSPENSION SUBCUTANEOUS at 20:34

## 2023-01-01 RX ADMIN — GABAPENTIN 600 MG: 600 TABLET, FILM COATED ORAL at 21:24

## 2023-01-01 RX ADMIN — CYANOCOBALAMIN TAB 1000 MCG 2000 MCG: 1000 TAB at 08:29

## 2023-01-01 RX ADMIN — METFORMIN HYDROCHLORIDE 1000 MG: 500 TABLET, EXTENDED RELEASE ORAL at 07:47

## 2023-01-01 RX ADMIN — ACETAMINOPHEN 650 MG: 325 TABLET, FILM COATED ORAL at 01:32

## 2023-01-01 RX ADMIN — SODIUM CHLORIDE, SODIUM LACTATE, POTASSIUM CHLORIDE, CALCIUM CHLORIDE: 600; 310; 30; 20 INJECTION, SOLUTION INTRAVENOUS at 17:09

## 2023-01-01 RX ADMIN — INSULIN ASPART 3 UNITS: 100 INJECTION, SOLUTION INTRAVENOUS; SUBCUTANEOUS at 18:24

## 2023-01-01 RX ADMIN — Medication 1 CAPSULE: at 20:25

## 2023-01-01 RX ADMIN — Medication 2 G: at 11:00

## 2023-01-01 RX ADMIN — CYANOCOBALAMIN TAB 1000 MCG 2000 MCG: 1000 TAB at 09:27

## 2023-01-01 RX ADMIN — GABAPENTIN 1200 MG: 400 CAPSULE ORAL at 17:33

## 2023-01-01 RX ADMIN — INSULIN ASPART 2 UNITS: 100 INJECTION, SOLUTION INTRAVENOUS; SUBCUTANEOUS at 12:08

## 2023-01-01 RX ADMIN — VANCOMYCIN HYDROCHLORIDE 1750 MG: 1 INJECTION, POWDER, LYOPHILIZED, FOR SOLUTION INTRAVENOUS at 21:10

## 2023-01-01 RX ADMIN — FLUTICASONE PROPIONATE 2 SPRAY: 50 SPRAY, METERED NASAL at 08:33

## 2023-01-01 RX ADMIN — ASPIRIN 81 MG: 81 TABLET, COATED ORAL at 19:18

## 2023-01-01 RX ADMIN — METFORMIN HYDROCHLORIDE 1000 MG: 500 TABLET, EXTENDED RELEASE ORAL at 08:45

## 2023-01-01 RX ADMIN — FOLIC ACID 1 MG: 1 TABLET ORAL at 08:29

## 2023-01-01 RX ADMIN — PHENYLEPHRINE HYDROCHLORIDE 100 MCG: 10 INJECTION INTRAVENOUS at 15:12

## 2023-01-01 RX ADMIN — Medication 50 MG: at 14:12

## 2023-01-01 RX ADMIN — ONDANSETRON 4 MG: 2 INJECTION INTRAMUSCULAR; INTRAVENOUS at 17:48

## 2023-01-01 RX ADMIN — METFORMIN HYDROCHLORIDE 1000 MG: 500 TABLET, EXTENDED RELEASE ORAL at 09:44

## 2023-01-01 RX ADMIN — ACETAMINOPHEN 975 MG: 325 TABLET, FILM COATED ORAL at 11:17

## 2023-01-01 RX ADMIN — FLUTICASONE PROPIONATE 2 SPRAY: 50 SPRAY, METERED NASAL at 08:47

## 2023-01-01 RX ADMIN — Medication 50 MCG: at 07:47

## 2023-01-01 RX ADMIN — DICLOFENAC SODIUM 4 G: 10 GEL TOPICAL at 20:43

## 2023-01-01 RX ADMIN — SODIUM CHLORIDE, POTASSIUM CHLORIDE, SODIUM LACTATE AND CALCIUM CHLORIDE: 600; 310; 30; 20 INJECTION, SOLUTION INTRAVENOUS at 07:43

## 2023-01-01 RX ADMIN — GABAPENTIN 600 MG: 600 TABLET, FILM COATED ORAL at 14:40

## 2023-01-01 RX ADMIN — Medication 2 G: at 01:41

## 2023-01-01 RX ADMIN — Medication 2 G: at 09:25

## 2023-01-01 RX ADMIN — INSULIN ASPART 3 UNITS: 100 INJECTION, SOLUTION INTRAVENOUS; SUBCUTANEOUS at 12:17

## 2023-01-01 RX ADMIN — PIPERACILLIN AND TAZOBACTAM 3.38 G: 3; .375 INJECTION, POWDER, LYOPHILIZED, FOR SOLUTION INTRAVENOUS at 01:54

## 2023-01-01 RX ADMIN — EMPAGLIFLOZIN 25 MG: 25 TABLET, FILM COATED ORAL at 09:03

## 2023-01-01 RX ADMIN — VANCOMYCIN HYDROCHLORIDE 1750 MG: 10 INJECTION, POWDER, LYOPHILIZED, FOR SOLUTION INTRAVENOUS at 16:05

## 2023-01-01 RX ADMIN — LEVOTHYROXINE SODIUM 150 MCG: 75 TABLET ORAL at 08:07

## 2023-01-01 RX ADMIN — FLUTICASONE PROPIONATE 2 SPRAY: 50 SPRAY, METERED NASAL at 08:10

## 2023-01-01 RX ADMIN — FLUTICASONE PROPIONATE 2 SPRAY: 50 SPRAY, METERED NASAL at 07:41

## 2023-01-01 RX ADMIN — INSULIN ASPART 2 UNITS: 100 INJECTION, SOLUTION INTRAVENOUS; SUBCUTANEOUS at 18:18

## 2023-01-01 RX ADMIN — FOLIC ACID 1 MG: 1 TABLET ORAL at 07:33

## 2023-01-01 RX ADMIN — TOPIRAMATE 150 MG: 100 TABLET, FILM COATED ORAL at 09:31

## 2023-01-01 RX ADMIN — ACETAMINOPHEN 650 MG: 325 TABLET, FILM COATED ORAL at 22:33

## 2023-01-01 RX ADMIN — FOLIC ACID 1 MG: 1 TABLET ORAL at 08:34

## 2023-01-01 RX ADMIN — FOLIC ACID 1 MG: 1 TABLET ORAL at 07:47

## 2023-01-01 RX ADMIN — FLUTICASONE PROPIONATE 2 SPRAY: 50 SPRAY, METERED NASAL at 08:32

## 2023-01-01 RX ADMIN — PRAVASTATIN SODIUM 40 MG: 20 TABLET ORAL at 20:55

## 2023-01-01 RX ADMIN — METFORMIN HYDROCHLORIDE 1000 MG: 500 TABLET, EXTENDED RELEASE ORAL at 17:35

## 2023-01-01 RX ADMIN — ASPIRIN 81 MG: 81 TABLET, COATED ORAL at 20:35

## 2023-01-01 RX ADMIN — OXYCODONE HYDROCHLORIDE AND ACETAMINOPHEN 500 MG: 500 TABLET ORAL at 07:34

## 2023-01-01 RX ADMIN — ACETAMINOPHEN 975 MG: 325 TABLET, FILM COATED ORAL at 21:02

## 2023-01-01 RX ADMIN — PIPERACILLIN AND TAZOBACTAM 3.38 G: 3; .375 INJECTION, POWDER, LYOPHILIZED, FOR SOLUTION INTRAVENOUS at 20:25

## 2023-01-01 RX ADMIN — PHENYLEPHRINE HYDROCHLORIDE 100 MCG: 10 INJECTION INTRAVENOUS at 14:36

## 2023-01-01 RX ADMIN — GABAPENTIN 600 MG: 600 TABLET, FILM COATED ORAL at 13:42

## 2023-01-01 RX ADMIN — PHENYLEPHRINE HYDROCHLORIDE 100 MCG: 10 INJECTION INTRAVENOUS at 09:20

## 2023-01-01 RX ADMIN — Medication 1 CAPSULE: at 20:19

## 2023-01-01 RX ADMIN — CEFEPIME HYDROCHLORIDE 2 G: 2 INJECTION, POWDER, FOR SOLUTION INTRAVENOUS at 21:30

## 2023-01-01 RX ADMIN — PIPERACILLIN AND TAZOBACTAM 4.5 G: 4; .5 INJECTION, POWDER, FOR SOLUTION INTRAVENOUS at 02:55

## 2023-01-01 RX ADMIN — PRAVASTATIN SODIUM 40 MG: 20 TABLET ORAL at 19:57

## 2023-01-01 RX ADMIN — GABAPENTIN 600 MG: 600 TABLET, FILM COATED ORAL at 14:16

## 2023-01-01 RX ADMIN — SODIUM CHLORIDE, PRESERVATIVE FREE: 5 INJECTION INTRAVENOUS at 19:07

## 2023-01-01 RX ADMIN — FLUTICASONE PROPIONATE 2 SPRAY: 50 SPRAY, METERED NASAL at 07:50

## 2023-01-01 RX ADMIN — SODIUM CHLORIDE 500 ML: 9 INJECTION, SOLUTION INTRAVENOUS at 01:57

## 2023-01-01 RX ADMIN — PIPERACILLIN AND TAZOBACTAM 3.38 G: 3; .375 INJECTION, POWDER, LYOPHILIZED, FOR SOLUTION INTRAVENOUS at 08:49

## 2023-01-01 RX ADMIN — Medication 100 MCG: at 21:55

## 2023-01-01 RX ADMIN — MIDAZOLAM 2 MG: 1 INJECTION INTRAMUSCULAR; INTRAVENOUS at 07:39

## 2023-01-01 RX ADMIN — EMPAGLIFLOZIN 25 MG: 25 TABLET, FILM COATED ORAL at 08:03

## 2023-01-01 RX ADMIN — METFORMIN ER 500 MG 1000 MG: 500 TABLET ORAL at 09:03

## 2023-01-01 RX ADMIN — GABAPENTIN 600 MG: 600 TABLET, FILM COATED ORAL at 08:55

## 2023-01-01 RX ADMIN — LIDOCAINE HYDROCHLORIDE 100 MG: 20 INJECTION, SOLUTION INFILTRATION; PERINEURAL at 17:16

## 2023-01-01 RX ADMIN — FLUCONAZOLE 400 MG: 200 TABLET ORAL at 21:25

## 2023-01-01 RX ADMIN — GABAPENTIN 600 MG: 600 TABLET, FILM COATED ORAL at 13:46

## 2023-01-01 RX ADMIN — PANTOPRAZOLE SODIUM 40 MG: 40 TABLET, DELAYED RELEASE ORAL at 08:10

## 2023-01-01 RX ADMIN — DICLOFENAC SODIUM TOPICAL GEL, 1% 4 G: 10 GEL TOPICAL at 21:08

## 2023-01-01 RX ADMIN — CETIRIZINE HYDROCHLORIDE 10 MG: 10 TABLET, FILM COATED ORAL at 07:47

## 2023-01-01 RX ADMIN — Medication 50 MCG: at 07:42

## 2023-01-01 RX ADMIN — SODIUM CHLORIDE: 9 INJECTION, SOLUTION INTRAVENOUS at 20:16

## 2023-01-01 RX ADMIN — PIPERACILLIN AND TAZOBACTAM 3.38 G: 3; .375 INJECTION, POWDER, LYOPHILIZED, FOR SOLUTION INTRAVENOUS at 14:25

## 2023-01-01 RX ADMIN — ACETAMINOPHEN 975 MG: 325 TABLET, FILM COATED ORAL at 07:08

## 2023-01-01 RX ADMIN — HEPARIN, PORCINE (PF) 10 UNIT/ML INTRAVENOUS SYRINGE 5 ML: at 18:40

## 2023-01-01 RX ADMIN — FOLIC ACID 1 MG: 1 TABLET ORAL at 08:04

## 2023-01-01 RX ADMIN — PRAVASTATIN SODIUM 20 MG: 20 TABLET ORAL at 19:54

## 2023-01-01 RX ADMIN — GABAPENTIN 600 MG: 600 TABLET, FILM COATED ORAL at 09:47

## 2023-01-01 RX ADMIN — MIDAZOLAM 1 MG: 1 INJECTION INTRAMUSCULAR; INTRAVENOUS at 02:20

## 2023-01-01 RX ADMIN — METFORMIN HYDROCHLORIDE 1000 MG: 500 TABLET, EXTENDED RELEASE ORAL at 09:29

## 2023-01-01 RX ADMIN — METFORMIN HYDROCHLORIDE 1000 MG: 500 TABLET, EXTENDED RELEASE ORAL at 08:02

## 2023-01-01 RX ADMIN — METFORMIN ER 500 MG 1000 MG: 500 TABLET ORAL at 16:04

## 2023-01-01 RX ADMIN — PANTOPRAZOLE SODIUM 40 MG: 40 TABLET, DELAYED RELEASE ORAL at 07:56

## 2023-01-01 RX ADMIN — DICLOFENAC SODIUM TOPICAL GEL, 1% 4 G: 10 GEL TOPICAL at 21:34

## 2023-01-01 RX ADMIN — DICLOFENAC SODIUM TOPICAL GEL, 1% 4 G: 10 GEL TOPICAL at 21:10

## 2023-01-01 RX ADMIN — ZINC SULFATE 220 MG (50 MG) CAPSULE 220 MG: CAPSULE at 21:24

## 2023-01-01 RX ADMIN — INSULIN ASPART 8 UNITS: 100 INJECTION, SOLUTION INTRAVENOUS; SUBCUTANEOUS at 09:33

## 2023-01-01 RX ADMIN — GABAPENTIN 600 MG: 600 TABLET, FILM COATED ORAL at 08:47

## 2023-01-01 RX ADMIN — GABAPENTIN 600 MG: 600 TABLET, FILM COATED ORAL at 08:50

## 2023-01-01 RX ADMIN — PROPOFOL 200 MG: 10 INJECTION, EMULSION INTRAVENOUS at 14:11

## 2023-01-01 RX ADMIN — PRAVASTATIN SODIUM 40 MG: 20 TABLET ORAL at 21:24

## 2023-01-01 RX ADMIN — DICLOFENAC SODIUM 4 G: 10 GEL TOPICAL at 20:37

## 2023-01-01 RX ADMIN — ZINC SULFATE 220 MG (50 MG) CAPSULE 220 MG: CAPSULE at 20:07

## 2023-01-01 RX ADMIN — GABAPENTIN 600 MG: 600 TABLET, FILM COATED ORAL at 08:49

## 2023-01-01 RX ADMIN — PROPOFOL 150 MG: 10 INJECTION, EMULSION INTRAVENOUS at 12:01

## 2023-01-01 RX ADMIN — PIPERACILLIN AND TAZOBACTAM 4.5 G: 4; .5 INJECTION, POWDER, FOR SOLUTION INTRAVENOUS at 01:11

## 2023-01-01 RX ADMIN — PANTOPRAZOLE SODIUM 40 MG: 40 TABLET, DELAYED RELEASE ORAL at 08:58

## 2023-01-01 RX ADMIN — OXYCODONE HYDROCHLORIDE AND ACETAMINOPHEN 500 MG: 500 TABLET ORAL at 21:25

## 2023-01-01 RX ADMIN — PIPERACILLIN AND TAZOBACTAM 4.5 G: 4; .5 INJECTION, POWDER, FOR SOLUTION INTRAVENOUS at 13:11

## 2023-01-01 RX ADMIN — Medication 50 MCG: at 08:34

## 2023-01-01 RX ADMIN — PANTOPRAZOLE SODIUM 40 MG: 40 TABLET, DELAYED RELEASE ORAL at 07:50

## 2023-01-01 RX ADMIN — INSULIN ASPART 1 UNITS: 100 INJECTION, SOLUTION INTRAVENOUS; SUBCUTANEOUS at 20:31

## 2023-01-01 RX ADMIN — METFORMIN HYDROCHLORIDE 1000 MG: 500 TABLET, EXTENDED RELEASE ORAL at 17:23

## 2023-01-01 RX ADMIN — Medication 2 G: at 00:57

## 2023-01-01 RX ADMIN — Medication 2 G: at 17:09

## 2023-01-01 RX ADMIN — GABAPENTIN 600 MG: 600 TABLET, FILM COATED ORAL at 20:31

## 2023-01-01 RX ADMIN — Medication 50 MCG: at 07:33

## 2023-01-01 RX ADMIN — PHENYLEPHRINE HYDROCHLORIDE 100 MCG: 10 INJECTION INTRAVENOUS at 17:32

## 2023-01-01 RX ADMIN — CETIRIZINE HYDROCHLORIDE 10 MG: 10 TABLET, FILM COATED ORAL at 09:34

## 2023-01-01 RX ADMIN — CETIRIZINE HYDROCHLORIDE 10 MG: 10 TABLET, FILM COATED ORAL at 08:14

## 2023-01-01 RX ADMIN — PRAVASTATIN SODIUM 20 MG: 20 TABLET ORAL at 20:15

## 2023-01-01 RX ADMIN — GABAPENTIN 600 MG: 600 TABLET, FILM COATED ORAL at 08:11

## 2023-01-01 RX ADMIN — PHENYLEPHRINE HYDROCHLORIDE 100 MCG: 10 INJECTION INTRAVENOUS at 15:28

## 2023-01-01 RX ADMIN — PIPERACILLIN AND TAZOBACTAM 4.5 G: 4; .5 INJECTION, POWDER, FOR SOLUTION INTRAVENOUS at 06:44

## 2023-01-01 RX ADMIN — ZINC SULFATE 220 MG (50 MG) CAPSULE 220 MG: CAPSULE at 20:13

## 2023-01-01 RX ADMIN — CETIRIZINE HYDROCHLORIDE 10 MG: 10 TABLET, FILM COATED ORAL at 10:50

## 2023-01-01 RX ADMIN — Medication 2 G: at 00:46

## 2023-01-01 RX ADMIN — TOPIRAMATE 150 MG: 100 TABLET, FILM COATED ORAL at 07:49

## 2023-01-01 RX ADMIN — CYANOCOBALAMIN TAB 1000 MCG 2000 MCG: 1000 TAB at 08:04

## 2023-01-01 RX ADMIN — INSULIN ASPART 4 UNITS: 100 INJECTION, SOLUTION INTRAVENOUS; SUBCUTANEOUS at 15:59

## 2023-01-01 RX ADMIN — DICLOFENAC SODIUM TOPICAL GEL, 1% 4 G: 10 GEL TOPICAL at 20:06

## 2023-01-01 RX ADMIN — NOREPINEPHRINE BITARTRATE 1 MCG/KG/MIN: 0.06 INJECTION, SOLUTION INTRAVENOUS at 12:30

## 2023-01-01 RX ADMIN — PIPERACILLIN AND TAZOBACTAM 4.5 G: 4; .5 INJECTION, POWDER, FOR SOLUTION INTRAVENOUS at 20:37

## 2023-01-01 RX ADMIN — INSULIN ASPART 7 UNITS: 100 INJECTION, SOLUTION INTRAVENOUS; SUBCUTANEOUS at 18:19

## 2023-01-01 RX ADMIN — SODIUM CHLORIDE, POTASSIUM CHLORIDE, SODIUM LACTATE AND CALCIUM CHLORIDE: 600; 310; 30; 20 INJECTION, SOLUTION INTRAVENOUS at 21:04

## 2023-01-01 RX ADMIN — INSULIN ASPART 2 UNITS: 100 INJECTION, SOLUTION INTRAVENOUS; SUBCUTANEOUS at 11:59

## 2023-01-01 RX ADMIN — Medication 1 CAPSULE: at 08:28

## 2023-01-01 RX ADMIN — METFORMIN HYDROCHLORIDE 1000 MG: 500 TABLET, EXTENDED RELEASE ORAL at 09:45

## 2023-01-01 RX ADMIN — METFORMIN ER 500 MG 1000 MG: 500 TABLET ORAL at 10:49

## 2023-01-01 RX ADMIN — DICLOFENAC SODIUM 4 G: 10 GEL TOPICAL at 20:16

## 2023-01-01 RX ADMIN — FLUCONAZOLE 400 MG: 200 TABLET ORAL at 19:57

## 2023-01-01 RX ADMIN — OXYCODONE HYDROCHLORIDE AND ACETAMINOPHEN 500 MG: 500 TABLET ORAL at 08:46

## 2023-01-01 RX ADMIN — SODIUM CHLORIDE, PRESERVATIVE FREE: 5 INJECTION INTRAVENOUS at 00:46

## 2023-01-01 RX ADMIN — GABAPENTIN 600 MG: 600 TABLET, FILM COATED ORAL at 18:08

## 2023-01-01 RX ADMIN — METFORMIN HYDROCHLORIDE 1000 MG: 500 TABLET, EXTENDED RELEASE ORAL at 17:26

## 2023-01-01 RX ADMIN — CETIRIZINE HYDROCHLORIDE 10 MG: 10 TABLET, FILM COATED ORAL at 08:52

## 2023-01-01 RX ADMIN — DICLOFENAC SODIUM 4 G: 10 GEL TOPICAL at 17:23

## 2023-01-01 RX ADMIN — FOLIC ACID 1 MG: 1 TABLET ORAL at 10:21

## 2023-01-01 RX ADMIN — GABAPENTIN 600 MG: 600 TABLET, FILM COATED ORAL at 13:28

## 2023-01-01 RX ADMIN — Medication 1 CAPSULE: at 07:55

## 2023-01-01 RX ADMIN — GABAPENTIN 600 MG: 600 TABLET, FILM COATED ORAL at 08:54

## 2023-01-01 RX ADMIN — Medication 2 G: at 16:55

## 2023-01-01 RX ADMIN — CYANOCOBALAMIN TAB 1000 MCG 2000 MCG: 1000 TAB at 08:30

## 2023-01-01 RX ADMIN — Medication 1 CAPSULE: at 09:06

## 2023-01-01 RX ADMIN — FENTANYL CITRATE 50 MCG: 50 INJECTION INTRAMUSCULAR; INTRAVENOUS at 14:10

## 2023-01-01 RX ADMIN — OXYCODONE HYDROCHLORIDE AND ACETAMINOPHEN 500 MG: 500 TABLET ORAL at 07:50

## 2023-01-01 RX ADMIN — Medication 2 G: at 02:02

## 2023-01-01 RX ADMIN — INSULIN HUMAN 18 UNITS: 100 INJECTION, SUSPENSION SUBCUTANEOUS at 21:02

## 2023-01-01 RX ADMIN — METFORMIN HYDROCHLORIDE 1000 MG: 500 TABLET, EXTENDED RELEASE ORAL at 17:05

## 2023-01-01 RX ADMIN — INSULIN ASPART 1 UNITS: 100 INJECTION, SOLUTION INTRAVENOUS; SUBCUTANEOUS at 08:47

## 2023-01-01 RX ADMIN — METFORMIN HYDROCHLORIDE 1000 MG: 500 TABLET, EXTENDED RELEASE ORAL at 08:50

## 2023-01-01 RX ADMIN — GABAPENTIN 600 MG: 600 TABLET, FILM COATED ORAL at 20:14

## 2023-01-01 RX ADMIN — GABAPENTIN 600 MG: 600 TABLET, FILM COATED ORAL at 20:25

## 2023-01-01 RX ADMIN — INSULIN ASPART 5 UNITS: 100 INJECTION, SOLUTION INTRAVENOUS; SUBCUTANEOUS at 18:41

## 2023-01-01 RX ADMIN — VANCOMYCIN HYDROCHLORIDE 1000 MG: 1 INJECTION, SOLUTION INTRAVENOUS at 15:02

## 2023-01-01 RX ADMIN — FLUCONAZOLE 400 MG: 200 TABLET ORAL at 20:40

## 2023-01-01 RX ADMIN — PANTOPRAZOLE SODIUM 40 MG: 40 TABLET, DELAYED RELEASE ORAL at 09:47

## 2023-01-01 RX ADMIN — METFORMIN HYDROCHLORIDE 1000 MG: 500 TABLET, EXTENDED RELEASE ORAL at 18:31

## 2023-01-01 RX ADMIN — INSULIN HUMAN 18 UNITS: 100 INJECTION, SUSPENSION SUBCUTANEOUS at 07:42

## 2023-01-01 RX ADMIN — GABAPENTIN 600 MG: 600 TABLET, FILM COATED ORAL at 07:42

## 2023-01-01 RX ADMIN — GABAPENTIN 600 MG: 600 TABLET, FILM COATED ORAL at 20:41

## 2023-01-01 RX ADMIN — DEXAMETHASONE SODIUM PHOSPHATE 4 MG: 4 INJECTION, SOLUTION INTRA-ARTICULAR; INTRALESIONAL; INTRAMUSCULAR; INTRAVENOUS; SOFT TISSUE at 21:28

## 2023-01-01 RX ADMIN — PIPERACILLIN AND TAZOBACTAM 4.5 G: 4; .5 INJECTION, POWDER, FOR SOLUTION INTRAVENOUS at 19:46

## 2023-01-01 RX ADMIN — Medication 1 CAPSULE: at 07:47

## 2023-01-01 RX ADMIN — METFORMIN ER 500 MG 1000 MG: 500 TABLET ORAL at 17:33

## 2023-01-01 RX ADMIN — INSULIN ASPART 1 UNITS: 100 INJECTION, SOLUTION INTRAVENOUS; SUBCUTANEOUS at 09:08

## 2023-01-01 RX ADMIN — GABAPENTIN 600 MG: 600 TABLET, FILM COATED ORAL at 14:09

## 2023-01-01 RX ADMIN — Medication 1 CAPSULE: at 10:20

## 2023-01-01 RX ADMIN — INSULIN ASPART 4 UNITS: 100 INJECTION, SOLUTION INTRAVENOUS; SUBCUTANEOUS at 18:40

## 2023-01-01 RX ADMIN — Medication 50 MG: at 14:39

## 2023-01-01 RX ADMIN — CETIRIZINE HYDROCHLORIDE 10 MG: 10 TABLET, FILM COATED ORAL at 08:27

## 2023-01-01 RX ADMIN — PANTOPRAZOLE SODIUM 40 MG: 40 TABLET, DELAYED RELEASE ORAL at 07:32

## 2023-01-01 RX ADMIN — PRAVASTATIN SODIUM 20 MG: 20 TABLET ORAL at 20:29

## 2023-01-01 RX ADMIN — Medication 2 G: at 17:27

## 2023-01-01 RX ADMIN — Medication 1 CAPSULE: at 20:43

## 2023-01-01 RX ADMIN — LEVOTHYROXINE SODIUM 150 MCG: 75 TABLET ORAL at 08:05

## 2023-01-01 RX ADMIN — CYANOCOBALAMIN TAB 1000 MCG 2000 MCG: 1000 TAB at 08:34

## 2023-01-01 RX ADMIN — PHENYLEPHRINE HYDROCHLORIDE 100 MCG: 10 INJECTION INTRAVENOUS at 15:19

## 2023-01-01 RX ADMIN — LEVOTHYROXINE SODIUM 150 MCG: 75 TABLET ORAL at 08:34

## 2023-01-01 RX ADMIN — FOLIC ACID 1 MG: 1 TABLET ORAL at 08:15

## 2023-01-01 RX ADMIN — GABAPENTIN 600 MG: 600 TABLET, FILM COATED ORAL at 08:07

## 2023-01-01 RX ADMIN — METFORMIN ER 500 MG 1000 MG: 500 TABLET ORAL at 16:27

## 2023-01-01 RX ADMIN — METFORMIN HYDROCHLORIDE 1000 MG: 500 TABLET, EXTENDED RELEASE ORAL at 17:10

## 2023-01-01 RX ADMIN — GABAPENTIN 600 MG: 600 TABLET, FILM COATED ORAL at 14:51

## 2023-01-01 RX ADMIN — Medication 2 G: at 01:38

## 2023-01-01 RX ADMIN — PHENYLEPHRINE HYDROCHLORIDE 100 MCG: 10 INJECTION INTRAVENOUS at 09:05

## 2023-01-01 RX ADMIN — Medication 1 CAPSULE: at 07:50

## 2023-01-01 RX ADMIN — TOPIRAMATE 150 MG: 100 TABLET, FILM COATED ORAL at 07:56

## 2023-01-01 RX ADMIN — GABAPENTIN 600 MG: 600 TABLET, FILM COATED ORAL at 15:01

## 2023-01-01 RX ADMIN — GABAPENTIN 600 MG: 600 TABLET, FILM COATED ORAL at 08:28

## 2023-01-01 RX ADMIN — LEVOTHYROXINE SODIUM 150 MCG: 75 TABLET ORAL at 07:39

## 2023-01-01 RX ADMIN — INSULIN ASPART 2 UNITS: 100 INJECTION, SOLUTION INTRAVENOUS; SUBCUTANEOUS at 12:07

## 2023-01-01 RX ADMIN — PANTOPRAZOLE SODIUM 40 MG: 40 TABLET, DELAYED RELEASE ORAL at 08:30

## 2023-01-01 RX ADMIN — SODIUM BICARBONATE 75 ML/HR: 84 INJECTION, SOLUTION INTRAVENOUS at 21:07

## 2023-01-01 RX ADMIN — PIPERACILLIN AND TAZOBACTAM 4.5 G: 4; .5 INJECTION, POWDER, FOR SOLUTION INTRAVENOUS at 20:34

## 2023-01-01 RX ADMIN — GABAPENTIN 600 MG: 600 TABLET, FILM COATED ORAL at 11:51

## 2023-01-01 RX ADMIN — METFORMIN HYDROCHLORIDE 1000 MG: 500 TABLET, EXTENDED RELEASE ORAL at 17:18

## 2023-01-01 RX ADMIN — LEVOTHYROXINE SODIUM 150 MCG: 25 TABLET ORAL at 08:28

## 2023-01-01 RX ADMIN — PHENYLEPHRINE HYDROCHLORIDE 0.4 MCG/KG/MIN: 10 INJECTION INTRAVENOUS at 08:05

## 2023-01-01 RX ADMIN — FLUTICASONE PROPIONATE 2 SPRAY: 50 SPRAY, METERED NASAL at 09:55

## 2023-01-01 RX ADMIN — METFORMIN ER 500 MG 1000 MG: 500 TABLET ORAL at 09:10

## 2023-01-01 RX ADMIN — VANCOMYCIN HYDROCHLORIDE 2250 MG: 1 INJECTION, POWDER, LYOPHILIZED, FOR SOLUTION INTRAVENOUS at 16:29

## 2023-01-01 RX ADMIN — PANTOPRAZOLE SODIUM 40 MG: 40 TABLET, DELAYED RELEASE ORAL at 08:18

## 2023-01-01 RX ADMIN — Medication 50 MCG: at 08:06

## 2023-01-01 RX ADMIN — METFORMIN HYDROCHLORIDE 1000 MG: 500 TABLET, EXTENDED RELEASE ORAL at 15:03

## 2023-01-01 RX ADMIN — ONDANSETRON 4 MG: 4 TABLET, ORALLY DISINTEGRATING ORAL at 21:17

## 2023-01-01 RX ADMIN — ACETAMINOPHEN 650 MG: 325 TABLET, FILM COATED ORAL at 18:21

## 2023-01-01 RX ADMIN — DEXAMETHASONE SODIUM PHOSPHATE 4 MG: 4 INJECTION, SOLUTION INTRA-ARTICULAR; INTRALESIONAL; INTRAMUSCULAR; INTRAVENOUS; SOFT TISSUE at 17:23

## 2023-01-01 RX ADMIN — METFORMIN HYDROCHLORIDE 1000 MG: 500 TABLET, EXTENDED RELEASE ORAL at 08:04

## 2023-01-01 RX ADMIN — Medication 2 G: at 08:36

## 2023-01-01 RX ADMIN — Medication 1 CAPSULE: at 20:36

## 2023-01-01 RX ADMIN — FOLIC ACID 1 MG: 1 TABLET ORAL at 07:40

## 2023-01-01 RX ADMIN — METFORMIN HYDROCHLORIDE 1000 MG: 500 TABLET, EXTENDED RELEASE ORAL at 07:32

## 2023-01-01 RX ADMIN — EMPAGLIFLOZIN 25 MG: 25 TABLET, FILM COATED ORAL at 07:55

## 2023-01-01 RX ADMIN — PHENYLEPHRINE HYDROCHLORIDE 100 MCG: 10 INJECTION INTRAVENOUS at 15:35

## 2023-01-01 RX ADMIN — PRAVASTATIN SODIUM 40 MG: 20 TABLET ORAL at 20:26

## 2023-01-01 RX ADMIN — Medication 1 CAPSULE: at 20:07

## 2023-01-01 RX ADMIN — PRAVASTATIN SODIUM 40 MG: 20 TABLET ORAL at 20:06

## 2023-01-01 RX ADMIN — PANTOPRAZOLE SODIUM 40 MG: 40 TABLET, DELAYED RELEASE ORAL at 08:13

## 2023-01-01 RX ADMIN — GABAPENTIN 600 MG: 600 TABLET, FILM COATED ORAL at 14:33

## 2023-01-01 RX ADMIN — FOLIC ACID 1 MG: 1 TABLET ORAL at 08:36

## 2023-01-01 RX ADMIN — LIDOCAINE HYDROCHLORIDE ANHYDROUS 4 ML: 10 INJECTION, SOLUTION INFILTRATION at 11:56

## 2023-01-01 RX ADMIN — PANTOPRAZOLE SODIUM 40 MG: 40 TABLET, DELAYED RELEASE ORAL at 08:50

## 2023-01-01 RX ADMIN — Medication 2 G: at 17:32

## 2023-01-01 RX ADMIN — Medication 1 CAPSULE: at 09:29

## 2023-01-01 RX ADMIN — METFORMIN HYDROCHLORIDE 1000 MG: 500 TABLET, EXTENDED RELEASE ORAL at 17:45

## 2023-01-01 RX ADMIN — FLUTICASONE PROPIONATE 2 SPRAY: 50 SPRAY, METERED NASAL at 19:55

## 2023-01-01 RX ADMIN — SODIUM CHLORIDE, POTASSIUM CHLORIDE, SODIUM LACTATE AND CALCIUM CHLORIDE: 600; 310; 30; 20 INJECTION, SOLUTION INTRAVENOUS at 15:35

## 2023-01-01 RX ADMIN — FENTANYL CITRATE 50 MCG: 50 INJECTION INTRAMUSCULAR; INTRAVENOUS at 08:27

## 2023-01-01 RX ADMIN — PIPERACILLIN AND TAZOBACTAM 4.5 G: 4; .5 INJECTION, POWDER, FOR SOLUTION INTRAVENOUS at 15:19

## 2023-01-01 RX ADMIN — PIPERACILLIN AND TAZOBACTAM 4.5 G: 4; .5 INJECTION, POWDER, FOR SOLUTION INTRAVENOUS at 21:20

## 2023-01-01 RX ADMIN — PANTOPRAZOLE SODIUM 40 MG: 40 TABLET, DELAYED RELEASE ORAL at 07:48

## 2023-01-01 RX ADMIN — INSULIN HUMAN 18 UNITS: 100 INJECTION, SUSPENSION SUBCUTANEOUS at 09:39

## 2023-01-01 RX ADMIN — Medication 1 TABLET: at 12:45

## 2023-01-01 RX ADMIN — METFORMIN HYDROCHLORIDE 1000 MG: 500 TABLET, EXTENDED RELEASE ORAL at 07:57

## 2023-01-01 RX ADMIN — INSULIN ASPART 15 UNITS: 100 INJECTION, SOLUTION INTRAVENOUS; SUBCUTANEOUS at 17:30

## 2023-01-01 RX ADMIN — SODIUM CHLORIDE 500 ML: 9 INJECTION, SOLUTION INTRAVENOUS at 14:30

## 2023-01-01 RX ADMIN — GABAPENTIN 600 MG: 600 TABLET, FILM COATED ORAL at 20:26

## 2023-01-01 RX ADMIN — FLUCONAZOLE 400 MG: 200 TABLET ORAL at 20:03

## 2023-01-01 RX ADMIN — INSULIN ASPART 9 UNITS: 100 INJECTION, SOLUTION INTRAVENOUS; SUBCUTANEOUS at 17:51

## 2023-01-01 RX ADMIN — EMPAGLIFLOZIN 25 MG: 25 TABLET, FILM COATED ORAL at 10:50

## 2023-01-01 RX ADMIN — ACETAMINOPHEN 975 MG: 325 TABLET, FILM COATED ORAL at 06:59

## 2023-01-01 RX ADMIN — SODIUM BICARBONATE 100 MEQ: 84 INJECTION, SOLUTION INTRAVENOUS at 01:19

## 2023-01-01 RX ADMIN — PIPERACILLIN AND TAZOBACTAM 4.5 G: 4; .5 INJECTION, POWDER, FOR SOLUTION INTRAVENOUS at 08:55

## 2023-01-01 RX ADMIN — Medication 1 CAPSULE: at 20:32

## 2023-01-01 RX ADMIN — PROPOFOL 150 MG: 10 INJECTION, EMULSION INTRAVENOUS at 08:05

## 2023-01-01 RX ADMIN — INSULIN ASPART 2 UNITS: 100 INJECTION, SOLUTION INTRAVENOUS; SUBCUTANEOUS at 17:43

## 2023-01-01 RX ADMIN — PIPERACILLIN AND TAZOBACTAM 3.38 G: 3; .375 INJECTION, POWDER, LYOPHILIZED, FOR SOLUTION INTRAVENOUS at 08:51

## 2023-01-01 RX ADMIN — DICLOFENAC SODIUM TOPICAL GEL, 1% 4 G: 10 GEL TOPICAL at 20:03

## 2023-01-01 RX ADMIN — PANTOPRAZOLE SODIUM 40 MG: 40 TABLET, DELAYED RELEASE ORAL at 08:52

## 2023-01-01 RX ADMIN — CETIRIZINE HYDROCHLORIDE 10 MG: 10 TABLET, FILM COATED ORAL at 08:49

## 2023-01-01 RX ADMIN — Medication 2 G: at 02:12

## 2023-01-01 RX ADMIN — PIPERACILLIN AND TAZOBACTAM 3.38 G: 3; .375 INJECTION, POWDER, LYOPHILIZED, FOR SOLUTION INTRAVENOUS at 20:17

## 2023-01-01 RX ADMIN — GABAPENTIN 1200 MG: 400 CAPSULE ORAL at 13:08

## 2023-01-01 RX ADMIN — SODIUM CHLORIDE, POTASSIUM CHLORIDE, SODIUM LACTATE AND CALCIUM CHLORIDE: 600; 310; 30; 20 INJECTION, SOLUTION INTRAVENOUS at 11:50

## 2023-01-01 RX ADMIN — Medication 2 G: at 17:21

## 2023-01-01 RX ADMIN — INSULIN ASPART 1 UNITS: 100 INJECTION, SOLUTION INTRAVENOUS; SUBCUTANEOUS at 12:54

## 2023-01-01 RX ADMIN — DICLOFENAC SODIUM TOPICAL GEL, 1% 4 G: 10 GEL TOPICAL at 20:33

## 2023-01-01 RX ADMIN — Medication 4 G: at 01:21

## 2023-01-01 RX ADMIN — SUGAMMADEX 200 MG: 100 INJECTION, SOLUTION INTRAVENOUS at 09:26

## 2023-01-01 RX ADMIN — FENTANYL CITRATE 50 MCG: 50 INJECTION INTRAMUSCULAR; INTRAVENOUS at 17:16

## 2023-01-01 RX ADMIN — METFORMIN ER 500 MG 1000 MG: 500 TABLET ORAL at 17:35

## 2023-01-01 RX ADMIN — GABAPENTIN 600 MG: 600 TABLET, FILM COATED ORAL at 13:14

## 2023-01-01 RX ADMIN — VANCOMYCIN HYDROCHLORIDE 1750 MG: 10 INJECTION, POWDER, LYOPHILIZED, FOR SOLUTION INTRAVENOUS at 15:09

## 2023-01-01 RX ADMIN — INSULIN ASPART 1 UNITS: 100 INJECTION, SOLUTION INTRAVENOUS; SUBCUTANEOUS at 10:24

## 2023-01-01 RX ADMIN — Medication 50 MG: at 12:02

## 2023-01-01 RX ADMIN — FLUTICASONE PROPIONATE 2 SPRAY: 50 SPRAY, METERED NASAL at 09:47

## 2023-01-01 RX ADMIN — CETIRIZINE HYDROCHLORIDE 10 MG: 10 TABLET, FILM COATED ORAL at 08:54

## 2023-01-01 RX ADMIN — Medication 1 CAPSULE: at 08:54

## 2023-01-01 RX ADMIN — INSULIN ASPART 3 UNITS: 100 INJECTION, SOLUTION INTRAVENOUS; SUBCUTANEOUS at 13:29

## 2023-01-01 RX ADMIN — FLUTICASONE PROPIONATE 2 SPRAY: 50 SPRAY, METERED NASAL at 09:44

## 2023-01-01 RX ADMIN — INSULIN ASPART 1 UNITS: 100 INJECTION, SOLUTION INTRAVENOUS; SUBCUTANEOUS at 19:11

## 2023-01-01 RX ADMIN — TOPIRAMATE 150 MG: 50 TABLET ORAL at 20:35

## 2023-01-01 RX ADMIN — VANCOMYCIN HYDROCHLORIDE 1000 MG: 1 INJECTION, SOLUTION INTRAVENOUS at 11:05

## 2023-01-01 RX ADMIN — SUGAMMADEX 200 MG: 100 INJECTION, SOLUTION INTRAVENOUS at 15:36

## 2023-01-01 RX ADMIN — GABAPENTIN 600 MG: 600 TABLET, FILM COATED ORAL at 12:34

## 2023-01-01 RX ADMIN — METFORMIN HYDROCHLORIDE 1000 MG: 500 TABLET, EXTENDED RELEASE ORAL at 18:21

## 2023-01-01 RX ADMIN — Medication 1 CAPSULE: at 09:46

## 2023-01-01 RX ADMIN — FLUTICASONE PROPIONATE 2 SPRAY: 50 SPRAY, METERED NASAL at 08:56

## 2023-01-01 RX ADMIN — ASPIRIN 81 MG: 81 TABLET, COATED ORAL at 08:58

## 2023-01-01 RX ADMIN — INSULIN ASPART 2 UNITS: 100 INJECTION, SOLUTION INTRAVENOUS; SUBCUTANEOUS at 18:24

## 2023-01-01 RX ADMIN — SODIUM CHLORIDE, POTASSIUM CHLORIDE, SODIUM LACTATE AND CALCIUM CHLORIDE: 600; 310; 30; 20 INJECTION, SOLUTION INTRAVENOUS at 07:40

## 2023-01-01 RX ADMIN — GABAPENTIN 600 MG: 600 TABLET, FILM COATED ORAL at 11:59

## 2023-01-01 RX ADMIN — Medication 1 TABLET: at 11:58

## 2023-01-01 RX ADMIN — GABAPENTIN 600 MG: 600 TABLET, FILM COATED ORAL at 18:04

## 2023-01-01 RX ADMIN — PANTOPRAZOLE SODIUM 40 MG: 40 TABLET, DELAYED RELEASE ORAL at 08:24

## 2023-01-01 RX ADMIN — FLUCONAZOLE 400 MG: 200 TABLET ORAL at 07:55

## 2023-01-01 RX ADMIN — GABAPENTIN 600 MG: 600 TABLET, FILM COATED ORAL at 18:09

## 2023-01-01 RX ADMIN — TOPIRAMATE 150 MG: 100 TABLET, FILM COATED ORAL at 08:43

## 2023-01-01 RX ADMIN — CETIRIZINE HYDROCHLORIDE 10 MG: 10 TABLET, FILM COATED ORAL at 09:03

## 2023-01-01 RX ADMIN — PIPERACILLIN AND TAZOBACTAM 4.5 G: 4; .5 INJECTION, POWDER, FOR SOLUTION INTRAVENOUS at 09:05

## 2023-01-01 RX ADMIN — PHENYLEPHRINE HYDROCHLORIDE 100 MCG: 10 INJECTION INTRAVENOUS at 12:23

## 2023-01-01 RX ADMIN — Medication 1 CAPSULE: at 20:15

## 2023-01-01 RX ADMIN — Medication 2 G: at 09:01

## 2023-01-01 RX ADMIN — INSULIN ASPART 2 UNITS: 100 INJECTION, SOLUTION INTRAVENOUS; SUBCUTANEOUS at 12:52

## 2023-01-01 RX ADMIN — INSULIN ASPART 2 UNITS: 100 INJECTION, SOLUTION INTRAVENOUS; SUBCUTANEOUS at 12:05

## 2023-01-01 RX ADMIN — INSULIN HUMAN 18 UNITS: 100 INJECTION, SUSPENSION SUBCUTANEOUS at 08:25

## 2023-01-01 RX ADMIN — LEVOTHYROXINE SODIUM 150 MCG: 75 TABLET ORAL at 08:46

## 2023-01-01 RX ADMIN — GABAPENTIN 1200 MG: 400 CAPSULE ORAL at 12:16

## 2023-01-01 RX ADMIN — MORPHINE SULFATE 4 MG: 4 INJECTION INTRAVENOUS at 14:38

## 2023-01-01 RX ADMIN — TOPIRAMATE 150 MG: 50 TABLET ORAL at 08:04

## 2023-01-01 RX ADMIN — PRAVASTATIN SODIUM 40 MG: 20 TABLET ORAL at 20:41

## 2023-01-01 RX ADMIN — Medication 100 MCG: at 08:20

## 2023-01-01 RX ADMIN — GABAPENTIN 600 MG: 600 TABLET, FILM COATED ORAL at 14:08

## 2023-01-01 RX ADMIN — Medication 2 G: at 17:19

## 2023-01-01 RX ADMIN — Medication 2 G: at 17:26

## 2023-01-01 RX ADMIN — PIPERACILLIN AND TAZOBACTAM 4.5 G: 4; .5 INJECTION, POWDER, FOR SOLUTION INTRAVENOUS at 21:17

## 2023-01-01 RX ADMIN — Medication 50 MCG: at 07:49

## 2023-01-01 RX ADMIN — PRAVASTATIN SODIUM 40 MG: 20 TABLET ORAL at 19:46

## 2023-01-01 RX ADMIN — Medication 2 G: at 14:01

## 2023-01-01 RX ADMIN — TOPIRAMATE 150 MG: 100 TABLET, FILM COATED ORAL at 07:32

## 2023-01-01 RX ADMIN — METFORMIN HYDROCHLORIDE 1000 MG: 500 TABLET, EXTENDED RELEASE ORAL at 17:30

## 2023-01-01 RX ADMIN — PANTOPRAZOLE SODIUM 40 MG: 40 TABLET, DELAYED RELEASE ORAL at 08:12

## 2023-01-01 RX ADMIN — INSULIN ASPART 3 UNITS: 100 INJECTION, SOLUTION INTRAVENOUS; SUBCUTANEOUS at 09:34

## 2023-01-01 RX ADMIN — INSULIN HUMAN 18 UNITS: 100 INJECTION, SUSPENSION SUBCUTANEOUS at 20:33

## 2023-01-01 RX ADMIN — FLUCONAZOLE 400 MG: 200 TABLET ORAL at 20:53

## 2023-01-01 RX ADMIN — Medication 1 CAPSULE: at 08:35

## 2023-01-01 RX ADMIN — TOPIRAMATE 150 MG: 100 TABLET, FILM COATED ORAL at 08:29

## 2023-01-01 RX ADMIN — PANTOPRAZOLE SODIUM 40 MG: 40 TABLET, DELAYED RELEASE ORAL at 08:35

## 2023-01-01 RX ADMIN — Medication 50 MCG: at 08:24

## 2023-01-01 RX ADMIN — PIPERACILLIN AND TAZOBACTAM 4.5 G: 4; .5 INJECTION, POWDER, FOR SOLUTION INTRAVENOUS at 03:01

## 2023-01-01 RX ADMIN — FOLIC ACID 1 MG: 1 TABLET ORAL at 09:37

## 2023-01-01 RX ADMIN — METFORMIN ER 500 MG 1000 MG: 500 TABLET ORAL at 10:03

## 2023-01-01 RX ADMIN — TOPIRAMATE 150 MG: 100 TABLET, FILM COATED ORAL at 08:11

## 2023-01-01 RX ADMIN — LEVOTHYROXINE SODIUM 137 MCG: 25 TABLET ORAL at 08:05

## 2023-01-01 RX ADMIN — LEVOTHYROXINE SODIUM 137 MCG: 25 TABLET ORAL at 08:57

## 2023-01-01 RX ADMIN — Medication 1 TABLET: at 12:28

## 2023-01-01 RX ADMIN — CETIRIZINE HYDROCHLORIDE 10 MG: 10 TABLET, FILM COATED ORAL at 08:07

## 2023-01-01 RX ADMIN — VANCOMYCIN HYDROCHLORIDE 1000 MG: 1 INJECTION, POWDER, LYOPHILIZED, FOR SOLUTION INTRAVENOUS at 12:07

## 2023-01-01 RX ADMIN — GABAPENTIN 600 MG: 600 TABLET, FILM COATED ORAL at 08:05

## 2023-01-01 RX ADMIN — GABAPENTIN 600 MG: 600 TABLET, FILM COATED ORAL at 01:18

## 2023-01-01 RX ADMIN — GABAPENTIN 600 MG: 600 TABLET, FILM COATED ORAL at 12:08

## 2023-01-01 RX ADMIN — SODIUM CHLORIDE: 9 INJECTION, SOLUTION INTRAVENOUS at 14:00

## 2023-01-01 RX ADMIN — PIPERACILLIN AND TAZOBACTAM 3.38 G: 3; .375 INJECTION, POWDER, LYOPHILIZED, FOR SOLUTION INTRAVENOUS at 14:09

## 2023-01-01 RX ADMIN — Medication 2 G: at 09:46

## 2023-01-01 RX ADMIN — INSULIN HUMAN 18 UNITS: 100 INJECTION, SUSPENSION SUBCUTANEOUS at 20:18

## 2023-01-01 RX ADMIN — PRAVASTATIN SODIUM 40 MG: 20 TABLET ORAL at 21:07

## 2023-01-01 RX ADMIN — Medication 1 CAPSULE: at 08:11

## 2023-01-01 RX ADMIN — PRAVASTATIN SODIUM 20 MG: 20 TABLET ORAL at 21:10

## 2023-01-01 RX ADMIN — HEPARIN SODIUM AND DEXTROSE 1600 UNITS/HR: 10000; 5 INJECTION INTRAVENOUS at 16:18

## 2023-01-01 RX ADMIN — Medication 2 G: at 02:10

## 2023-01-01 RX ADMIN — CYANOCOBALAMIN TAB 1000 MCG 2000 MCG: 1000 TAB at 08:07

## 2023-01-01 RX ADMIN — DICLOFENAC SODIUM TOPICAL GEL, 1% 4 G: 10 GEL TOPICAL at 20:40

## 2023-01-01 RX ADMIN — ONDANSETRON 4 MG: 2 INJECTION INTRAMUSCULAR; INTRAVENOUS at 08:20

## 2023-01-01 RX ADMIN — EMPAGLIFLOZIN 25 MG: 25 TABLET, FILM COATED ORAL at 10:23

## 2023-01-01 RX ADMIN — PANTOPRAZOLE SODIUM 40 MG: 40 TABLET, DELAYED RELEASE ORAL at 08:05

## 2023-01-01 RX ADMIN — Medication 200 MCG: at 08:10

## 2023-01-01 RX ADMIN — PIPERACILLIN AND TAZOBACTAM 4.5 G: 4; .5 INJECTION, POWDER, FOR SOLUTION INTRAVENOUS at 03:04

## 2023-01-01 RX ADMIN — CETIRIZINE HYDROCHLORIDE 10 MG: 10 TABLET, FILM COATED ORAL at 08:11

## 2023-01-01 RX ADMIN — CYANOCOBALAMIN TAB 1000 MCG 2000 MCG: 1000 TAB at 08:54

## 2023-01-01 RX ADMIN — PANTOPRAZOLE SODIUM 40 MG: 40 TABLET, DELAYED RELEASE ORAL at 08:51

## 2023-01-01 RX ADMIN — PANTOPRAZOLE SODIUM 40 MG: 40 TABLET, DELAYED RELEASE ORAL at 08:45

## 2023-01-01 RX ADMIN — FLUCONAZOLE 400 MG: 200 TABLET ORAL at 21:07

## 2023-01-01 RX ADMIN — METFORMIN HYDROCHLORIDE 1000 MG: 500 TABLET, EXTENDED RELEASE ORAL at 17:47

## 2023-01-01 RX ADMIN — TOPIRAMATE 150 MG: 100 TABLET, FILM COATED ORAL at 09:34

## 2023-01-01 RX ADMIN — METFORMIN HYDROCHLORIDE 1000 MG: 500 TABLET, EXTENDED RELEASE ORAL at 07:50

## 2023-01-01 RX ADMIN — PRAVASTATIN SODIUM 40 MG: 20 TABLET ORAL at 20:17

## 2023-01-01 RX ADMIN — INSULIN ASPART 1 UNITS: 100 INJECTION, SOLUTION INTRAVENOUS; SUBCUTANEOUS at 18:02

## 2023-01-01 RX ADMIN — TOPIRAMATE 150 MG: 100 TABLET, FILM COATED ORAL at 08:54

## 2023-01-01 RX ADMIN — GABAPENTIN 600 MG: 600 TABLET, FILM COATED ORAL at 17:45

## 2023-01-01 RX ADMIN — CETIRIZINE HYDROCHLORIDE 10 MG: 10 TABLET, FILM COATED ORAL at 08:30

## 2023-01-01 RX ADMIN — INSULIN ASPART 3 UNITS: 100 INJECTION, SOLUTION INTRAVENOUS; SUBCUTANEOUS at 08:01

## 2023-01-01 RX ADMIN — INSULIN ASPART 2 UNITS: 100 INJECTION, SOLUTION INTRAVENOUS; SUBCUTANEOUS at 07:58

## 2023-01-01 RX ADMIN — PHENYLEPHRINE HYDROCHLORIDE 100 MCG: 10 INJECTION INTRAVENOUS at 08:36

## 2023-01-01 RX ADMIN — GABAPENTIN 600 MG: 600 TABLET, FILM COATED ORAL at 14:24

## 2023-01-01 RX ADMIN — DICLOFENAC SODIUM TOPICAL GEL, 1% 4 G: 10 GEL TOPICAL at 20:15

## 2023-01-01 RX ADMIN — LEVOTHYROXINE SODIUM 150 MCG: 25 TABLET ORAL at 07:00

## 2023-01-01 RX ADMIN — LIDOCAINE HYDROCHLORIDE 100 MG: 20 INJECTION, SOLUTION INFILTRATION; PERINEURAL at 14:10

## 2023-01-01 RX ADMIN — GABAPENTIN 600 MG: 600 TABLET, FILM COATED ORAL at 20:16

## 2023-01-01 RX ADMIN — SODIUM BICARBONATE 75 ML/HR: 84 INJECTION, SOLUTION INTRAVENOUS at 16:38

## 2023-01-01 RX ADMIN — PRAVASTATIN SODIUM 40 MG: 20 TABLET ORAL at 20:31

## 2023-01-01 RX ADMIN — GABAPENTIN 600 MG: 600 TABLET, FILM COATED ORAL at 20:13

## 2023-01-01 RX ADMIN — Medication 1 CAPSULE: at 20:13

## 2023-01-01 RX ADMIN — LIDOCAINE HYDROCHLORIDE 100 MG: 20 INJECTION, SOLUTION INFILTRATION; PERINEURAL at 14:37

## 2023-01-01 RX ADMIN — TOPIRAMATE 150 MG: 100 TABLET, FILM COATED ORAL at 08:48

## 2023-01-01 RX ADMIN — CETIRIZINE HYDROCHLORIDE 10 MG: 10 TABLET, FILM COATED ORAL at 08:58

## 2023-01-01 RX ADMIN — FLUTICASONE PROPIONATE 2 SPRAY: 50 SPRAY, METERED NASAL at 08:35

## 2023-01-01 RX ADMIN — Medication 2 G: at 18:32

## 2023-01-01 RX ADMIN — INSULIN ASPART 5 UNITS: 100 INJECTION, SOLUTION INTRAVENOUS; SUBCUTANEOUS at 12:09

## 2023-01-01 RX ADMIN — CETIRIZINE HYDROCHLORIDE 10 MG: 10 TABLET, FILM COATED ORAL at 09:44

## 2023-01-01 RX ADMIN — ACETAMINOPHEN 975 MG: 325 TABLET ORAL at 15:33

## 2023-01-01 RX ADMIN — METFORMIN ER 500 MG 1000 MG: 500 TABLET ORAL at 16:05

## 2023-01-01 RX ADMIN — LEVOTHYROXINE SODIUM 150 MCG: 25 TABLET ORAL at 08:48

## 2023-01-01 RX ADMIN — DEXAMETHASONE SODIUM PHOSPHATE 4 MG: 4 INJECTION, SOLUTION INTRA-ARTICULAR; INTRALESIONAL; INTRAMUSCULAR; INTRAVENOUS; SOFT TISSUE at 08:10

## 2023-01-01 RX ADMIN — PANTOPRAZOLE SODIUM 40 MG: 40 TABLET, DELAYED RELEASE ORAL at 09:31

## 2023-01-01 RX ADMIN — FOLIC ACID 1 MG: 1 TABLET ORAL at 08:24

## 2023-01-01 RX ADMIN — FLUCONAZOLE 400 MG: 200 TABLET ORAL at 20:42

## 2023-01-01 RX ADMIN — DICLOFENAC SODIUM TOPICAL GEL, 1% 4 G: 10 GEL TOPICAL at 20:54

## 2023-01-01 RX ADMIN — CYANOCOBALAMIN TAB 1000 MCG 2000 MCG: 1000 TAB at 09:44

## 2023-01-01 RX ADMIN — ASPIRIN 81 MG: 81 TABLET, COATED ORAL at 09:10

## 2023-01-01 RX ADMIN — PANTOPRAZOLE SODIUM 40 MG: 40 TABLET, DELAYED RELEASE ORAL at 08:48

## 2023-01-01 RX ADMIN — Medication 1 CAPSULE: at 20:24

## 2023-01-01 RX ADMIN — FLUCONAZOLE 400 MG: 200 TABLET ORAL at 10:21

## 2023-01-01 RX ADMIN — INSULIN ASPART 1 UNITS: 100 INJECTION, SOLUTION INTRAVENOUS; SUBCUTANEOUS at 12:36

## 2023-01-01 RX ADMIN — PIPERACILLIN AND TAZOBACTAM 4.5 G: 4; .5 INJECTION, POWDER, FOR SOLUTION INTRAVENOUS at 14:31

## 2023-01-01 RX ADMIN — Medication 2 G: at 01:37

## 2023-01-01 RX ADMIN — PHENYLEPHRINE HYDROCHLORIDE 100 MCG: 10 INJECTION INTRAVENOUS at 09:12

## 2023-01-01 RX ADMIN — PIPERACILLIN AND TAZOBACTAM 3.38 G: 3; .375 INJECTION, POWDER, LYOPHILIZED, FOR SOLUTION INTRAVENOUS at 02:39

## 2023-01-01 RX ADMIN — GABAPENTIN 600 MG: 600 TABLET, FILM COATED ORAL at 12:55

## 2023-01-01 RX ADMIN — ACETAMINOPHEN 650 MG: 325 TABLET, FILM COATED ORAL at 08:02

## 2023-01-01 RX ADMIN — ONDANSETRON 4 MG: 2 INJECTION INTRAMUSCULAR; INTRAVENOUS at 14:09

## 2023-01-01 RX ADMIN — DICLOFENAC SODIUM TOPICAL GEL, 1% 4 G: 10 GEL TOPICAL at 20:42

## 2023-01-01 RX ADMIN — FLUTICASONE PROPIONATE 2 SPRAY: 50 SPRAY, METERED NASAL at 08:12

## 2023-01-01 RX ADMIN — INSULIN ASPART 2 UNITS: 100 INJECTION, SOLUTION INTRAVENOUS; SUBCUTANEOUS at 12:55

## 2023-01-01 RX ADMIN — TOPIRAMATE 150 MG: 50 TABLET ORAL at 20:36

## 2023-01-01 RX ADMIN — INSULIN HUMAN 18 UNITS: 100 INJECTION, SUSPENSION SUBCUTANEOUS at 21:07

## 2023-01-01 RX ADMIN — INSULIN ASPART 12 UNITS: 100 INJECTION, SOLUTION INTRAVENOUS; SUBCUTANEOUS at 17:25

## 2023-01-01 RX ADMIN — FENTANYL CITRATE 100 MCG: 50 INJECTION, SOLUTION INTRAMUSCULAR; INTRAVENOUS at 11:59

## 2023-01-01 RX ADMIN — Medication 1 TABLET: at 12:47

## 2023-01-01 RX ADMIN — INSULIN HUMAN 18 UNITS: 100 INJECTION, SUSPENSION SUBCUTANEOUS at 21:45

## 2023-01-01 RX ADMIN — TIZANIDINE 4 MG: 4 TABLET ORAL at 17:23

## 2023-01-01 RX ADMIN — CETIRIZINE HYDROCHLORIDE 10 MG: 10 TABLET, FILM COATED ORAL at 08:02

## 2023-01-01 RX ADMIN — DICLOFENAC SODIUM 4 G: 10 GEL TOPICAL at 20:08

## 2023-01-01 RX ADMIN — CETIRIZINE HYDROCHLORIDE 10 MG: 10 TABLET, FILM COATED ORAL at 10:22

## 2023-01-01 RX ADMIN — Medication 50 MCG: at 08:04

## 2023-01-01 RX ADMIN — FLUCONAZOLE 400 MG: 200 TABLET ORAL at 08:54

## 2023-01-01 RX ADMIN — Medication 5.5 MCG/KG/MIN: at 00:48

## 2023-01-01 RX ADMIN — TOPIRAMATE 150 MG: 100 TABLET, FILM COATED ORAL at 08:24

## 2023-01-01 RX ADMIN — INSULIN ASPART 4 UNITS: 100 INJECTION, SOLUTION INTRAVENOUS; SUBCUTANEOUS at 15:04

## 2023-01-01 RX ADMIN — LEVOTHYROXINE SODIUM 150 MCG: 25 TABLET ORAL at 06:51

## 2023-01-01 RX ADMIN — OXYCODONE HYDROCHLORIDE AND ACETAMINOPHEN 500 MG: 500 TABLET ORAL at 07:39

## 2023-01-01 RX ADMIN — PHENYLEPHRINE HYDROCHLORIDE 100 MCG: 10 INJECTION INTRAVENOUS at 08:20

## 2023-01-01 RX ADMIN — INSULIN HUMAN 18 UNITS: 100 INJECTION, SUSPENSION SUBCUTANEOUS at 08:56

## 2023-01-01 RX ADMIN — GABAPENTIN 1200 MG: 400 CAPSULE ORAL at 09:02

## 2023-01-01 RX ADMIN — GABAPENTIN 1200 MG: 400 CAPSULE ORAL at 16:28

## 2023-01-01 RX ADMIN — GABAPENTIN 600 MG: 600 TABLET, FILM COATED ORAL at 12:05

## 2023-01-01 RX ADMIN — ACETAMINOPHEN 975 MG: 325 TABLET, FILM COATED ORAL at 14:08

## 2023-01-01 RX ADMIN — FLUTICASONE PROPIONATE 2 SPRAY: 50 SPRAY, METERED NASAL at 07:52

## 2023-01-01 RX ADMIN — PRAVASTATIN SODIUM 40 MG: 20 TABLET ORAL at 20:35

## 2023-01-01 RX ADMIN — ASPIRIN 81 MG: 81 TABLET, COATED ORAL at 20:34

## 2023-01-01 RX ADMIN — GABAPENTIN 600 MG: 600 TABLET, FILM COATED ORAL at 12:15

## 2023-01-01 RX ADMIN — SODIUM CHLORIDE, POTASSIUM CHLORIDE, SODIUM LACTATE AND CALCIUM CHLORIDE: 600; 310; 30; 20 INJECTION, SOLUTION INTRAVENOUS at 11:22

## 2023-01-01 RX ADMIN — ZINC SULFATE 220 MG (50 MG) CAPSULE 220 MG: CAPSULE at 20:25

## 2023-01-01 RX ADMIN — LEVOTHYROXINE SODIUM 150 MCG: 25 TABLET ORAL at 07:56

## 2023-01-01 RX ADMIN — CYANOCOBALAMIN TAB 1000 MCG 2000 MCG: 1000 TAB at 07:50

## 2023-01-01 RX ADMIN — TOPIRAMATE 150 MG: 100 TABLET, FILM COATED ORAL at 08:13

## 2023-01-01 RX ADMIN — TOPIRAMATE 150 MG: 100 TABLET, FILM COATED ORAL at 08:36

## 2023-01-01 RX ADMIN — Medication 2 G: at 09:43

## 2023-01-01 RX ADMIN — Medication 1 TABLET: at 12:09

## 2023-01-01 RX ADMIN — LEVOTHYROXINE SODIUM 150 MCG: 75 TABLET ORAL at 08:14

## 2023-01-01 RX ADMIN — LEVOTHYROXINE SODIUM 150 MCG: 75 TABLET ORAL at 08:52

## 2023-01-01 RX ADMIN — FOLIC ACID 1 MG: 1 TABLET ORAL at 08:14

## 2023-01-01 RX ADMIN — ACETAMINOPHEN 650 MG: 325 TABLET, FILM COATED ORAL at 17:48

## 2023-01-01 RX ADMIN — PANTOPRAZOLE SODIUM 40 MG: 40 TABLET, DELAYED RELEASE ORAL at 08:17

## 2023-01-01 RX ADMIN — ASPIRIN 81 MG: 81 TABLET, COATED ORAL at 10:50

## 2023-01-01 RX ADMIN — INSULIN HUMAN 18 UNITS: 100 INJECTION, SUSPENSION SUBCUTANEOUS at 10:04

## 2023-01-01 RX ADMIN — DICLOFENAC SODIUM TOPICAL GEL, 1% 4 G: 10 GEL TOPICAL at 20:19

## 2023-01-01 RX ADMIN — FOLIC ACID 1 MG: 1 TABLET ORAL at 08:07

## 2023-01-01 RX ADMIN — INSULIN ASPART 2 UNITS: 100 INJECTION, SOLUTION INTRAVENOUS; SUBCUTANEOUS at 16:40

## 2023-01-01 RX ADMIN — DEXAMETHASONE SODIUM PHOSPHATE 4 MG: 4 INJECTION, SOLUTION INTRA-ARTICULAR; INTRALESIONAL; INTRAMUSCULAR; INTRAVENOUS; SOFT TISSUE at 14:50

## 2023-01-01 RX ADMIN — GABAPENTIN 600 MG: 600 TABLET, FILM COATED ORAL at 08:32

## 2023-01-01 RX ADMIN — LIDOCAINE HYDROCHLORIDE 200 MG: 20 INJECTION, SOLUTION INFILTRATION; PERINEURAL at 14:39

## 2023-01-01 RX ADMIN — FLUCONAZOLE 400 MG: 200 TABLET ORAL at 20:26

## 2023-01-01 RX ADMIN — PIPERACILLIN AND TAZOBACTAM 4.5 G: 4; .5 INJECTION, POWDER, FOR SOLUTION INTRAVENOUS at 01:39

## 2023-01-01 RX ADMIN — Medication 2 G: at 17:29

## 2023-01-01 RX ADMIN — DICLOFENAC SODIUM TOPICAL GEL, 1% 4 G: 10 GEL TOPICAL at 20:18

## 2023-01-01 RX ADMIN — GABAPENTIN 600 MG: 600 TABLET, FILM COATED ORAL at 19:57

## 2023-01-01 RX ADMIN — Medication 2 G: at 09:11

## 2023-01-01 RX ADMIN — GABAPENTIN 1200 MG: 400 CAPSULE ORAL at 08:50

## 2023-01-01 RX ADMIN — TOPIRAMATE 150 MG: 100 TABLET, FILM COATED ORAL at 07:47

## 2023-01-01 RX ADMIN — Medication 2 G: at 00:55

## 2023-01-01 RX ADMIN — ASPIRIN 81 MG: 81 TABLET, COATED ORAL at 09:03

## 2023-01-01 RX ADMIN — GABAPENTIN 600 MG: 600 TABLET, FILM COATED ORAL at 21:07

## 2023-01-01 RX ADMIN — METFORMIN HYDROCHLORIDE 1000 MG: 500 TABLET, EXTENDED RELEASE ORAL at 18:20

## 2023-01-01 RX ADMIN — INSULIN HUMAN 18 UNITS: 100 INJECTION, SUSPENSION SUBCUTANEOUS at 09:36

## 2023-01-01 RX ADMIN — FLUTICASONE PROPIONATE 2 SPRAY: 50 SPRAY, METERED NASAL at 07:59

## 2023-01-01 RX ADMIN — PIPERACILLIN AND TAZOBACTAM 4.5 G: 4; .5 INJECTION, POWDER, FOR SOLUTION INTRAVENOUS at 14:46

## 2023-01-01 RX ADMIN — MIDAZOLAM 2 MG: 1 INJECTION INTRAMUSCULAR; INTRAVENOUS at 21:04

## 2023-01-01 RX ADMIN — PIPERACILLIN AND TAZOBACTAM 4.5 G: 4; .5 INJECTION, POWDER, FOR SOLUTION INTRAVENOUS at 06:59

## 2023-01-01 RX ADMIN — PRAVASTATIN SODIUM 40 MG: 20 TABLET ORAL at 20:25

## 2023-01-01 RX ADMIN — EMPAGLIFLOZIN 25 MG: 25 TABLET, FILM COATED ORAL at 08:54

## 2023-01-01 RX ADMIN — TOPIRAMATE 150 MG: 100 TABLET, FILM COATED ORAL at 09:44

## 2023-01-01 RX ADMIN — GABAPENTIN 600 MG: 600 TABLET, FILM COATED ORAL at 14:31

## 2023-01-01 RX ADMIN — INSULIN ASPART 11 UNITS: 100 INJECTION, SOLUTION INTRAVENOUS; SUBCUTANEOUS at 17:38

## 2023-01-01 RX ADMIN — FLUCONAZOLE 400 MG: 200 TABLET ORAL at 08:12

## 2023-01-01 RX ADMIN — METFORMIN HYDROCHLORIDE 1000 MG: 500 TABLET, EXTENDED RELEASE ORAL at 21:09

## 2023-01-01 RX ADMIN — LEVOTHYROXINE SODIUM 150 MCG: 75 TABLET ORAL at 09:44

## 2023-01-01 RX ADMIN — INSULIN ASPART 3 UNITS: 100 INJECTION, SOLUTION INTRAVENOUS; SUBCUTANEOUS at 18:49

## 2023-01-01 RX ADMIN — GABAPENTIN 600 MG: 600 TABLET, FILM COATED ORAL at 08:36

## 2023-01-01 RX ADMIN — CYANOCOBALAMIN TAB 1000 MCG 2000 MCG: 1000 TAB at 08:36

## 2023-01-01 RX ADMIN — SODIUM CHLORIDE, POTASSIUM CHLORIDE, SODIUM LACTATE AND CALCIUM CHLORIDE: 600; 310; 30; 20 INJECTION, SOLUTION INTRAVENOUS at 15:33

## 2023-01-01 RX ADMIN — INSULIN ASPART 2 UNITS: 100 INJECTION, SOLUTION INTRAVENOUS; SUBCUTANEOUS at 12:29

## 2023-01-01 RX ADMIN — CYANOCOBALAMIN TAB 1000 MCG 2000 MCG: 1000 TAB at 07:48

## 2023-01-01 RX ADMIN — PANTOPRAZOLE SODIUM 40 MG: 40 TABLET, DELAYED RELEASE ORAL at 08:31

## 2023-01-01 RX ADMIN — INSULIN HUMAN 18 UNITS: 100 INJECTION, SUSPENSION SUBCUTANEOUS at 09:45

## 2023-01-01 RX ADMIN — LEVOTHYROXINE SODIUM 150 MCG: 25 TABLET ORAL at 08:16

## 2023-01-01 RX ADMIN — CETIRIZINE HYDROCHLORIDE 10 MG: 10 TABLET, FILM COATED ORAL at 09:10

## 2023-01-01 RX ADMIN — GABAPENTIN 600 MG: 600 TABLET, FILM COATED ORAL at 14:20

## 2023-01-01 RX ADMIN — PRAVASTATIN SODIUM 40 MG: 20 TABLET ORAL at 20:40

## 2023-01-01 RX ADMIN — FLUTICASONE PROPIONATE 2 SPRAY: 50 SPRAY, METERED NASAL at 08:02

## 2023-01-01 RX ADMIN — Medication 2 G: at 01:40

## 2023-01-01 RX ADMIN — INSULIN ASPART 2 UNITS: 100 INJECTION, SOLUTION INTRAVENOUS; SUBCUTANEOUS at 18:08

## 2023-01-01 RX ADMIN — ACETAMINOPHEN 975 MG: 325 TABLET, FILM COATED ORAL at 21:09

## 2023-01-01 RX ADMIN — Medication 1 CAPSULE: at 08:02

## 2023-01-01 RX ADMIN — Medication 50 MCG: at 08:47

## 2023-01-01 RX ADMIN — INSULIN ASPART 6 UNITS: 100 INJECTION, SOLUTION INTRAVENOUS; SUBCUTANEOUS at 19:05

## 2023-01-01 RX ADMIN — Medication 2 G: at 17:20

## 2023-01-01 RX ADMIN — FLUCONAZOLE 400 MG: 200 TABLET ORAL at 08:28

## 2023-01-01 RX ADMIN — VANCOMYCIN HYDROCHLORIDE 1000 MG: 1 INJECTION, SOLUTION INTRAVENOUS at 09:53

## 2023-01-01 RX ADMIN — INSULIN HUMAN 18 UNITS: 100 INJECTION, SUSPENSION SUBCUTANEOUS at 20:16

## 2023-01-01 RX ADMIN — Medication 1 CAPSULE: at 08:52

## 2023-01-01 RX ADMIN — Medication 2 G: at 08:00

## 2023-01-01 RX ADMIN — FOLIC ACID 1 MG: 1 TABLET ORAL at 08:18

## 2023-01-01 RX ADMIN — TOPIRAMATE 150 MG: 100 TABLET, FILM COATED ORAL at 08:02

## 2023-01-01 RX ADMIN — PIPERACILLIN AND TAZOBACTAM 3.38 G: 3; .375 INJECTION, POWDER, LYOPHILIZED, FOR SOLUTION INTRAVENOUS at 08:01

## 2023-01-01 RX ADMIN — EMPAGLIFLOZIN 25 MG: 25 TABLET, FILM COATED ORAL at 08:48

## 2023-01-01 RX ADMIN — GABAPENTIN 600 MG: 600 TABLET, FILM COATED ORAL at 08:18

## 2023-01-01 RX ADMIN — PHENYLEPHRINE HYDROCHLORIDE 100 MCG: 10 INJECTION INTRAVENOUS at 12:01

## 2023-01-01 RX ADMIN — INSULIN HUMAN 18 UNITS: 100 INJECTION, SUSPENSION SUBCUTANEOUS at 20:42

## 2023-01-01 RX ADMIN — FOLIC ACID 1 MG: 1 TABLET ORAL at 08:32

## 2023-01-01 RX ADMIN — GABAPENTIN 600 MG: 600 TABLET, FILM COATED ORAL at 20:12

## 2023-01-01 RX ADMIN — FOLIC ACID 1 MG: 1 TABLET ORAL at 08:54

## 2023-01-01 RX ADMIN — Medication 2 G: at 08:37

## 2023-01-01 RX ADMIN — PIPERACILLIN AND TAZOBACTAM 3.38 G: 3; .375 INJECTION, POWDER, LYOPHILIZED, FOR SOLUTION INTRAVENOUS at 02:08

## 2023-01-01 RX ADMIN — GABAPENTIN 600 MG: 600 TABLET, FILM COATED ORAL at 07:55

## 2023-01-01 RX ADMIN — GABAPENTIN 1200 MG: 400 CAPSULE ORAL at 15:33

## 2023-01-01 RX ADMIN — GABAPENTIN 1200 MG: 400 CAPSULE ORAL at 20:58

## 2023-01-01 RX ADMIN — PANTOPRAZOLE SODIUM 40 MG: 40 TABLET, DELAYED RELEASE ORAL at 09:02

## 2023-01-01 RX ADMIN — FLUTICASONE PROPIONATE 2 SPRAY: 50 SPRAY, METERED NASAL at 10:54

## 2023-01-01 RX ADMIN — FOLIC ACID 1 MG: 1 TABLET ORAL at 08:13

## 2023-01-01 RX ADMIN — LEVOTHYROXINE SODIUM 150 MCG: 75 TABLET ORAL at 08:30

## 2023-01-01 RX ADMIN — VANCOMYCIN HYDROCHLORIDE 1500 MG: 10 INJECTION, POWDER, LYOPHILIZED, FOR SOLUTION INTRAVENOUS at 17:08

## 2023-01-01 RX ADMIN — PROPOFOL 140 MG: 10 INJECTION, EMULSION INTRAVENOUS at 07:47

## 2023-01-01 RX ADMIN — FLUTICASONE PROPIONATE 2 SPRAY: 50 SPRAY, METERED NASAL at 08:04

## 2023-01-01 RX ADMIN — ACETAMINOPHEN 975 MG: 325 TABLET, FILM COATED ORAL at 11:22

## 2023-01-01 RX ADMIN — PANTOPRAZOLE SODIUM 40 MG: 40 TABLET, DELAYED RELEASE ORAL at 08:27

## 2023-01-01 RX ADMIN — DICLOFENAC SODIUM TOPICAL GEL, 1% 4 G: 10 GEL TOPICAL at 19:55

## 2023-01-01 RX ADMIN — INSULIN HUMAN 14 UNITS: 100 INJECTION, SUSPENSION SUBCUTANEOUS at 09:38

## 2023-01-01 RX ADMIN — DICLOFENAC SODIUM TOPICAL GEL, 1% 4 G: 10 GEL TOPICAL at 21:06

## 2023-01-01 RX ADMIN — PIPERACILLIN AND TAZOBACTAM 3.38 G: 3; .375 INJECTION, POWDER, LYOPHILIZED, FOR SOLUTION INTRAVENOUS at 14:44

## 2023-01-01 RX ADMIN — NOREPINEPHRINE BITARTRATE 2.2 MCG/KG/MIN: 0.06 INJECTION, SOLUTION INTRAVENOUS at 19:02

## 2023-01-01 RX ADMIN — Medication 2 G: at 02:36

## 2023-01-01 RX ADMIN — Medication 2 G: at 08:05

## 2023-01-01 RX ADMIN — METFORMIN HYDROCHLORIDE 1000 MG: 500 TABLET, EXTENDED RELEASE ORAL at 17:14

## 2023-01-01 RX ADMIN — GABAPENTIN 600 MG: 600 TABLET, FILM COATED ORAL at 12:09

## 2023-01-01 RX ADMIN — TOPIRAMATE 150 MG: 100 TABLET, FILM COATED ORAL at 10:21

## 2023-01-01 RX ADMIN — GABAPENTIN 1200 MG: 400 CAPSULE ORAL at 08:40

## 2023-01-01 RX ADMIN — Medication 100 MCG: at 21:33

## 2023-01-01 RX ADMIN — PHENYLEPHRINE HYDROCHLORIDE 100 MCG: 10 INJECTION INTRAVENOUS at 15:04

## 2023-01-01 RX ADMIN — FLUTICASONE PROPIONATE 2 SPRAY: 50 SPRAY, METERED NASAL at 19:58

## 2023-01-01 RX ADMIN — FLUCONAZOLE 400 MG: 200 TABLET ORAL at 01:18

## 2023-01-01 RX ADMIN — FOLIC ACID 1 MG: 1 TABLET ORAL at 08:03

## 2023-01-01 RX ADMIN — GABAPENTIN 1200 MG: 400 CAPSULE ORAL at 08:56

## 2023-01-01 RX ADMIN — PANTOPRAZOLE SODIUM 40 MG: 40 TABLET, DELAYED RELEASE ORAL at 08:14

## 2023-01-01 RX ADMIN — PRAVASTATIN SODIUM 40 MG: 20 TABLET ORAL at 20:14

## 2023-01-01 RX ADMIN — CHOLECALCIFEROL TAB 25 MCG (1000 UNIT) 50 MCG: 25 TAB at 08:07

## 2023-01-01 RX ADMIN — FLUCONAZOLE 400 MG: 200 TABLET ORAL at 20:32

## 2023-01-01 RX ADMIN — INSULIN ASPART 1 UNITS: 100 INJECTION, SOLUTION INTRAVENOUS; SUBCUTANEOUS at 12:15

## 2023-01-01 RX ADMIN — DICLOFENAC SODIUM TOPICAL GEL, 1% 4 G: 10 GEL TOPICAL at 21:56

## 2023-01-01 RX ADMIN — Medication 100 MCG: at 21:22

## 2023-01-01 RX ADMIN — LIDOCAINE HYDROCHLORIDE 100 MG: 20 INJECTION, SOLUTION INFILTRATION; PERINEURAL at 08:05

## 2023-01-01 RX ADMIN — CYANOCOBALAMIN TAB 1000 MCG 2000 MCG: 1000 TAB at 08:45

## 2023-01-01 RX ADMIN — FLUCONAZOLE 400 MG: 200 TABLET ORAL at 16:16

## 2023-01-01 RX ADMIN — PIPERACILLIN AND TAZOBACTAM 4.5 G: 4; .5 INJECTION, POWDER, FOR SOLUTION INTRAVENOUS at 06:37

## 2023-01-01 RX ADMIN — LEVOTHYROXINE SODIUM 150 MCG: 75 TABLET ORAL at 08:24

## 2023-01-01 RX ADMIN — PANTOPRAZOLE SODIUM 40 MG: 40 TABLET, DELAYED RELEASE ORAL at 08:07

## 2023-01-01 RX ADMIN — METFORMIN HYDROCHLORIDE 1000 MG: 500 TABLET, EXTENDED RELEASE ORAL at 08:54

## 2023-01-01 RX ADMIN — Medication 50 MCG: at 09:48

## 2023-01-01 RX ADMIN — GABAPENTIN 600 MG: 600 TABLET, FILM COATED ORAL at 08:34

## 2023-01-01 RX ADMIN — LEVOTHYROXINE SODIUM 150 MCG: 25 TABLET ORAL at 06:37

## 2023-01-01 RX ADMIN — CHOLECALCIFEROL TAB 25 MCG (1000 UNIT) 50 MCG: 25 TAB at 10:50

## 2023-01-01 RX ADMIN — FLUTICASONE PROPIONATE 2 SPRAY: 50 SPRAY, METERED NASAL at 08:52

## 2023-01-01 RX ADMIN — Medication 2 G: at 17:15

## 2023-01-01 RX ADMIN — Medication 2 G: at 16:40

## 2023-01-01 RX ADMIN — Medication 2 G: at 00:39

## 2023-01-01 RX ADMIN — FLUCONAZOLE 400 MG: 200 TABLET ORAL at 20:06

## 2023-01-01 RX ADMIN — Medication 1 TABLET: at 21:24

## 2023-01-01 RX ADMIN — PHENYLEPHRINE HYDROCHLORIDE 100 MCG: 10 INJECTION INTRAVENOUS at 08:49

## 2023-01-01 RX ADMIN — LEVOTHYROXINE SODIUM 150 MCG: 25 TABLET ORAL at 07:50

## 2023-01-01 RX ADMIN — TOPIRAMATE 150 MG: 100 TABLET, FILM COATED ORAL at 08:31

## 2023-01-01 RX ADMIN — PHENYLEPHRINE HYDROCHLORIDE 100 MCG: 10 INJECTION INTRAVENOUS at 08:29

## 2023-01-01 RX ADMIN — PIPERACILLIN AND TAZOBACTAM 3.38 G: 3; .375 INJECTION, POWDER, LYOPHILIZED, FOR SOLUTION INTRAVENOUS at 08:19

## 2023-01-01 RX ADMIN — FLUTICASONE PROPIONATE 2 SPRAY: 50 SPRAY, METERED NASAL at 09:15

## 2023-01-01 RX ADMIN — VANCOMYCIN HYDROCHLORIDE 1750 MG: 10 INJECTION, POWDER, LYOPHILIZED, FOR SOLUTION INTRAVENOUS at 16:04

## 2023-01-01 RX ADMIN — PHENYLEPHRINE HYDROCHLORIDE 100 MCG: 10 INJECTION INTRAVENOUS at 08:44

## 2023-01-01 RX ADMIN — PRAVASTATIN SODIUM 40 MG: 20 TABLET ORAL at 20:33

## 2023-01-01 RX ADMIN — INSULIN ASPART 2 UNITS: 100 INJECTION, SOLUTION INTRAVENOUS; SUBCUTANEOUS at 17:05

## 2023-01-01 RX ADMIN — FLUCONAZOLE 400 MG: 200 TABLET ORAL at 20:13

## 2023-01-01 RX ADMIN — Medication 2 G: at 09:36

## 2023-01-01 RX ADMIN — OXYCODONE HYDROCHLORIDE AND ACETAMINOPHEN 500 MG: 500 TABLET ORAL at 09:47

## 2023-01-01 RX ADMIN — FOLIC ACID 1 MG: 1 TABLET ORAL at 07:57

## 2023-01-01 RX ADMIN — GABAPENTIN 600 MG: 600 TABLET, FILM COATED ORAL at 20:55

## 2023-01-01 RX ADMIN — Medication 2 G: at 02:19

## 2023-01-01 RX ADMIN — GABAPENTIN 1200 MG: 400 CAPSULE ORAL at 12:22

## 2023-01-01 RX ADMIN — Medication 1 CAPSULE: at 20:55

## 2023-01-01 RX ADMIN — ONDANSETRON 4 MG: 2 INJECTION INTRAMUSCULAR; INTRAVENOUS at 14:57

## 2023-01-01 RX ADMIN — METFORMIN HYDROCHLORIDE 1000 MG: 500 TABLET, EXTENDED RELEASE ORAL at 08:35

## 2023-01-01 RX ADMIN — GABAPENTIN 1200 MG: 400 CAPSULE ORAL at 16:05

## 2023-01-01 RX ADMIN — PHENYLEPHRINE HYDROCHLORIDE 0.2 MCG/KG/MIN: 10 INJECTION INTRAVENOUS at 17:48

## 2023-01-01 RX ADMIN — TOPIRAMATE 150 MG: 50 TABLET ORAL at 09:02

## 2023-01-01 RX ADMIN — Medication 1 CAPSULE: at 08:55

## 2023-01-01 RX ADMIN — NOREPINEPHRINE BITARTRATE 1 MCG/KG/MIN: 0.06 INJECTION, SOLUTION INTRAVENOUS at 02:30

## 2023-01-01 RX ADMIN — PRAVASTATIN SODIUM 40 MG: 20 TABLET ORAL at 20:13

## 2023-01-01 RX ADMIN — EPINEPHRINE 0.12 MCG/KG/MIN: 1 INJECTION INTRAMUSCULAR; INTRAVENOUS; SUBCUTANEOUS at 21:50

## 2023-01-01 RX ADMIN — ACETAMINOPHEN 975 MG: 325 TABLET, FILM COATED ORAL at 22:08

## 2023-01-01 RX ADMIN — ONDANSETRON 4 MG: 2 INJECTION INTRAMUSCULAR; INTRAVENOUS at 21:56

## 2023-01-01 RX ADMIN — DICLOFENAC SODIUM TOPICAL GEL, 1% 4 G: 10 GEL TOPICAL at 20:12

## 2023-01-01 RX ADMIN — CHOLECALCIFEROL TAB 25 MCG (1000 UNIT) 50 MCG: 25 TAB at 08:57

## 2023-01-01 RX ADMIN — PRAVASTATIN SODIUM 40 MG: 20 TABLET ORAL at 20:53

## 2023-01-01 RX ADMIN — INSULIN ASPART 1 UNITS: 100 INJECTION, SOLUTION INTRAVENOUS; SUBCUTANEOUS at 18:24

## 2023-01-01 RX ADMIN — PIPERACILLIN AND TAZOBACTAM 4.5 G: 4; .5 INJECTION, POWDER, FOR SOLUTION INTRAVENOUS at 18:39

## 2023-01-01 RX ADMIN — CETIRIZINE HYDROCHLORIDE 10 MG: 10 TABLET, FILM COATED ORAL at 08:05

## 2023-01-01 RX ADMIN — PHENYLEPHRINE HYDROCHLORIDE 100 MCG: 10 INJECTION INTRAVENOUS at 14:45

## 2023-01-01 RX ADMIN — Medication 1 CAPSULE: at 07:49

## 2023-01-01 RX ADMIN — ASPIRIN 81 MG: 81 TABLET, COATED ORAL at 19:46

## 2023-01-01 RX ADMIN — Medication 50 MCG: at 09:35

## 2023-01-01 RX ADMIN — FOLIC ACID 1 MG: 1 TABLET ORAL at 08:49

## 2023-01-01 RX ADMIN — PIPERACILLIN AND TAZOBACTAM 4.5 G: 4; .5 INJECTION, POWDER, FOR SOLUTION INTRAVENOUS at 13:08

## 2023-01-01 RX ADMIN — METFORMIN HYDROCHLORIDE 1000 MG: 500 TABLET, EXTENDED RELEASE ORAL at 08:49

## 2023-01-01 RX ADMIN — GABAPENTIN 600 MG: 600 TABLET, FILM COATED ORAL at 23:38

## 2023-01-01 RX ADMIN — ZINC SULFATE 220 MG (50 MG) CAPSULE 220 MG: CAPSULE at 21:07

## 2023-01-01 RX ADMIN — PHENYLEPHRINE HYDROCHLORIDE 0.3 MCG/KG/MIN: 10 INJECTION INTRAVENOUS at 15:09

## 2023-01-01 RX ADMIN — INSULIN ASPART 1 UNITS: 100 INJECTION, SOLUTION INTRAVENOUS; SUBCUTANEOUS at 16:48

## 2023-01-01 RX ADMIN — DEXAMETHASONE SODIUM PHOSPHATE 4 MG: 4 INJECTION, SOLUTION INTRA-ARTICULAR; INTRALESIONAL; INTRAMUSCULAR; INTRAVENOUS; SOFT TISSUE at 08:04

## 2023-01-01 RX ADMIN — FOLIC ACID 1 MG: 1 TABLET ORAL at 08:12

## 2023-01-01 RX ADMIN — Medication 3 MCG/KG/MIN: at 22:40

## 2023-01-01 RX ADMIN — ACETAMINOPHEN 975 MG: 325 TABLET, FILM COATED ORAL at 20:53

## 2023-01-01 RX ADMIN — GABAPENTIN 600 MG: 600 TABLET, FILM COATED ORAL at 10:22

## 2023-01-01 RX ADMIN — GABAPENTIN 600 MG: 600 TABLET, FILM COATED ORAL at 20:32

## 2023-01-01 RX ADMIN — PIPERACILLIN AND TAZOBACTAM 4.5 G: 4; .5 INJECTION, POWDER, FOR SOLUTION INTRAVENOUS at 02:07

## 2023-01-01 RX ADMIN — GABAPENTIN 600 MG: 600 TABLET, FILM COATED ORAL at 18:59

## 2023-01-01 RX ADMIN — Medication 1 CAPSULE: at 08:06

## 2023-01-01 RX ADMIN — Medication 50 MCG: at 09:30

## 2023-01-01 RX ADMIN — ONDANSETRON 4 MG: 2 INJECTION INTRAMUSCULAR; INTRAVENOUS at 09:26

## 2023-01-01 RX ADMIN — METFORMIN HYDROCHLORIDE 1000 MG: 500 TABLET, EXTENDED RELEASE ORAL at 08:13

## 2023-01-01 RX ADMIN — VANCOMYCIN HYDROCHLORIDE 1000 MG: 1 INJECTION, SOLUTION INTRAVENOUS at 21:12

## 2023-01-01 RX ADMIN — GABAPENTIN 600 MG: 600 TABLET, FILM COATED ORAL at 18:31

## 2023-01-01 RX ADMIN — PANTOPRAZOLE SODIUM 40 MG: 40 TABLET, DELAYED RELEASE ORAL at 10:50

## 2023-01-01 RX ADMIN — FLUTICASONE PROPIONATE 2 SPRAY: 50 SPRAY, METERED NASAL at 08:37

## 2023-01-01 RX ADMIN — GABAPENTIN 600 MG: 600 TABLET, FILM COATED ORAL at 11:29

## 2023-01-01 RX ADMIN — Medication 1 CAPSULE: at 20:42

## 2023-01-01 RX ADMIN — PIPERACILLIN AND TAZOBACTAM 3.38 G: 3; .375 INJECTION, POWDER, LYOPHILIZED, FOR SOLUTION INTRAVENOUS at 02:48

## 2023-01-01 RX ADMIN — PANTOPRAZOLE SODIUM 40 MG: 40 TABLET, DELAYED RELEASE ORAL at 08:34

## 2023-01-01 RX ADMIN — PIPERACILLIN AND TAZOBACTAM 4.5 G: 4; .5 INJECTION, POWDER, FOR SOLUTION INTRAVENOUS at 18:19

## 2023-01-01 RX ADMIN — LEVOTHYROXINE SODIUM 150 MCG: 75 TABLET ORAL at 09:46

## 2023-01-01 RX ADMIN — FLUCONAZOLE 400 MG: 200 TABLET ORAL at 19:41

## 2023-01-01 RX ADMIN — ZINC SULFATE 220 MG (50 MG) CAPSULE 220 MG: CAPSULE at 20:53

## 2023-01-01 RX ADMIN — Medication 2 G: at 02:20

## 2023-01-01 RX ADMIN — INSULIN HUMAN 18 UNITS: 100 INJECTION, SUSPENSION SUBCUTANEOUS at 09:54

## 2023-01-01 RX ADMIN — Medication 100 MCG: at 21:45

## 2023-01-01 RX ADMIN — Medication 2 G: at 01:27

## 2023-01-01 RX ADMIN — PANTOPRAZOLE SODIUM 40 MG: 40 TABLET, DELAYED RELEASE ORAL at 08:55

## 2023-01-01 RX ADMIN — INSULIN ASPART 1 UNITS: 100 INJECTION, SOLUTION INTRAVENOUS; SUBCUTANEOUS at 08:48

## 2023-01-01 RX ADMIN — SODIUM CHLORIDE, POTASSIUM CHLORIDE, SODIUM LACTATE AND CALCIUM CHLORIDE: 600; 310; 30; 20 INJECTION, SOLUTION INTRAVENOUS at 07:26

## 2023-01-01 RX ADMIN — GABAPENTIN 600 MG: 600 TABLET, FILM COATED ORAL at 17:24

## 2023-01-01 RX ADMIN — EMPAGLIFLOZIN 25 MG: 25 TABLET, FILM COATED ORAL at 08:52

## 2023-01-01 RX ADMIN — PHENYLEPHRINE HYDROCHLORIDE 50 MCG: 10 INJECTION INTRAVENOUS at 12:02

## 2023-01-01 RX ADMIN — TOPIRAMATE 150 MG: 100 TABLET, FILM COATED ORAL at 08:05

## 2023-01-01 RX ADMIN — Medication 2 G: at 01:56

## 2023-01-01 RX ADMIN — Medication 2 G: at 10:04

## 2023-01-01 RX ADMIN — TOPIRAMATE 150 MG: 50 TABLET ORAL at 09:09

## 2023-01-01 RX ADMIN — Medication 2 G: at 01:11

## 2023-01-01 RX ADMIN — PRAVASTATIN SODIUM 40 MG: 20 TABLET ORAL at 19:41

## 2023-01-01 RX ADMIN — VANCOMYCIN HYDROCHLORIDE 2250 MG: 10 INJECTION, POWDER, LYOPHILIZED, FOR SOLUTION INTRAVENOUS at 23:34

## 2023-01-01 RX ADMIN — Medication 1 TABLET: at 14:09

## 2023-01-01 RX ADMIN — FENTANYL CITRATE 100 MCG: 50 INJECTION INTRAMUSCULAR; INTRAVENOUS at 14:38

## 2023-01-01 RX ADMIN — INSULIN ASPART 2 UNITS: 100 INJECTION, SOLUTION INTRAVENOUS; SUBCUTANEOUS at 12:47

## 2023-01-01 RX ADMIN — LIDOCAINE 3 PATCH: 4 PATCH TOPICAL at 20:29

## 2023-01-01 RX ADMIN — Medication 1 CAPSULE: at 20:40

## 2023-01-01 RX ADMIN — Medication 2 G: at 08:10

## 2023-01-01 RX ADMIN — ZINC SULFATE 220 MG (50 MG) CAPSULE 220 MG: CAPSULE at 01:17

## 2023-01-01 RX ADMIN — INSULIN HUMAN 18 UNITS: 100 INJECTION, SUSPENSION SUBCUTANEOUS at 09:42

## 2023-01-01 RX ADMIN — CYANOCOBALAMIN TAB 1000 MCG 2000 MCG: 1000 TAB at 09:34

## 2023-01-01 RX ADMIN — GABAPENTIN 1200 MG: 400 CAPSULE ORAL at 11:31

## 2023-01-01 RX ADMIN — PRAVASTATIN SODIUM 40 MG: 20 TABLET ORAL at 21:06

## 2023-01-01 RX ADMIN — METFORMIN HYDROCHLORIDE 1000 MG: 500 TABLET, EXTENDED RELEASE ORAL at 08:28

## 2023-01-01 RX ADMIN — INSULIN ASPART 3 UNITS: 100 INJECTION, SOLUTION INTRAVENOUS; SUBCUTANEOUS at 17:45

## 2023-01-01 RX ADMIN — PIPERACILLIN AND TAZOBACTAM 4.5 G: 4; .5 INJECTION, POWDER, FOR SOLUTION INTRAVENOUS at 00:14

## 2023-01-01 RX ADMIN — GABAPENTIN 600 MG: 600 TABLET, FILM COATED ORAL at 08:13

## 2023-01-01 RX ADMIN — DICLOFENAC SODIUM 4 G: 10 GEL TOPICAL at 20:21

## 2023-01-01 RX ADMIN — CETIRIZINE HYDROCHLORIDE 10 MG: 10 TABLET, FILM COATED ORAL at 07:50

## 2023-01-01 RX ADMIN — LEVOTHYROXINE SODIUM 150 MCG: 75 TABLET ORAL at 08:31

## 2023-01-01 RX ADMIN — CETIRIZINE HYDROCHLORIDE 10 MG: 10 TABLET, FILM COATED ORAL at 07:57

## 2023-01-01 RX ADMIN — PRAVASTATIN SODIUM 40 MG: 20 TABLET ORAL at 20:03

## 2023-01-01 RX ADMIN — FOLIC ACID 1 MG: 1 TABLET ORAL at 09:45

## 2023-01-01 RX ADMIN — SODIUM CHLORIDE 84 ML: 9 INJECTION, SOLUTION INTRAVENOUS at 14:47

## 2023-01-01 RX ADMIN — INSULIN ASPART 2 UNITS: 100 INJECTION, SOLUTION INTRAVENOUS; SUBCUTANEOUS at 09:39

## 2023-01-01 RX ADMIN — PHENYLEPHRINE HYDROCHLORIDE 100 MCG: 10 INJECTION INTRAVENOUS at 14:27

## 2023-01-01 RX ADMIN — DICLOFENAC SODIUM 4 G: 10 GEL TOPICAL at 20:25

## 2023-01-01 RX ADMIN — PROPOFOL 200 MG: 10 INJECTION, EMULSION INTRAVENOUS at 17:16

## 2023-01-01 RX ADMIN — CHOLECALCIFEROL TAB 25 MCG (1000 UNIT) 50 MCG: 25 TAB at 09:11

## 2023-01-01 RX ADMIN — FLUTICASONE PROPIONATE 2 SPRAY: 50 SPRAY, METERED NASAL at 10:26

## 2023-01-01 RX ADMIN — ACETAMINOPHEN 975 MG: 325 TABLET, FILM COATED ORAL at 22:04

## 2023-01-01 RX ADMIN — INSULIN ASPART 2 UNITS: 100 INJECTION, SOLUTION INTRAVENOUS; SUBCUTANEOUS at 12:11

## 2023-01-01 RX ADMIN — CETIRIZINE HYDROCHLORIDE 10 MG: 10 TABLET, FILM COATED ORAL at 07:33

## 2023-01-01 RX ADMIN — PROPOFOL 240 MG: 10 INJECTION, EMULSION INTRAVENOUS at 21:16

## 2023-01-01 RX ADMIN — FLUTICASONE PROPIONATE 2 SPRAY: 50 SPRAY, METERED NASAL at 08:19

## 2023-01-01 RX ADMIN — Medication 50 MCG: at 08:36

## 2023-01-01 RX ADMIN — Medication 1 TABLET: at 13:17

## 2023-01-01 RX ADMIN — METFORMIN HYDROCHLORIDE 1000 MG: 500 TABLET, EXTENDED RELEASE ORAL at 08:51

## 2023-01-01 RX ADMIN — PRAVASTATIN SODIUM 40 MG: 20 TABLET ORAL at 19:18

## 2023-01-01 RX ADMIN — INSULIN ASPART 1 UNITS: 100 INJECTION, SOLUTION INTRAVENOUS; SUBCUTANEOUS at 13:02

## 2023-01-01 RX ADMIN — PRAVASTATIN SODIUM 40 MG: 20 TABLET ORAL at 20:12

## 2023-01-01 RX ADMIN — Medication 2 G: at 16:30

## 2023-01-01 RX ADMIN — ASPIRIN 81 MG: 81 TABLET, COATED ORAL at 08:05

## 2023-01-01 RX ADMIN — FLUTICASONE PROPIONATE 2 SPRAY: 50 SPRAY, METERED NASAL at 08:23

## 2023-01-01 RX ADMIN — ONDANSETRON 4 MG: 2 INJECTION INTRAMUSCULAR; INTRAVENOUS at 12:54

## 2023-01-01 RX ADMIN — Medication 1 CAPSULE: at 09:34

## 2023-01-01 RX ADMIN — TOPIRAMATE 150 MG: 50 TABLET ORAL at 19:18

## 2023-01-01 RX ADMIN — IOPAMIDOL 95 ML: 755 INJECTION, SOLUTION INTRAVENOUS at 14:47

## 2023-01-01 RX ADMIN — GABAPENTIN 1200 MG: 400 CAPSULE ORAL at 16:04

## 2023-01-01 RX ADMIN — TOPIRAMATE 150 MG: 50 TABLET ORAL at 21:35

## 2023-01-01 RX ADMIN — HYDROMORPHONE HYDROCHLORIDE 0.5 MG: 1 INJECTION, SOLUTION INTRAMUSCULAR; INTRAVENOUS; SUBCUTANEOUS at 02:22

## 2023-01-01 RX ADMIN — EMPAGLIFLOZIN 25 MG: 25 TABLET, FILM COATED ORAL at 08:58

## 2023-01-01 RX ADMIN — GABAPENTIN 600 MG: 600 TABLET, FILM COATED ORAL at 20:03

## 2023-01-01 RX ADMIN — GABAPENTIN 600 MG: 600 TABLET, FILM COATED ORAL at 07:47

## 2023-01-01 RX ADMIN — FLUCONAZOLE 400 MG: 200 TABLET ORAL at 20:30

## 2023-01-01 RX ADMIN — TOPIRAMATE 150 MG: 100 TABLET, FILM COATED ORAL at 08:14

## 2023-01-01 RX ADMIN — VANCOMYCIN HYDROCHLORIDE 1000 MG: 1 INJECTION, SOLUTION INTRAVENOUS at 02:08

## 2023-01-01 RX ADMIN — TOPIRAMATE 150 MG: 100 TABLET, FILM COATED ORAL at 08:16

## 2023-01-01 RX ADMIN — LEVOTHYROXINE SODIUM 137 MCG: 25 TABLET ORAL at 08:51

## 2023-01-01 RX ADMIN — LEVOTHYROXINE SODIUM 150 MCG: 75 TABLET ORAL at 08:16

## 2023-01-01 RX ADMIN — SODIUM CHLORIDE 1000 ML: 9 INJECTION, SOLUTION INTRAVENOUS at 14:09

## 2023-01-01 RX ADMIN — METFORMIN ER 500 MG 1000 MG: 500 TABLET ORAL at 08:51

## 2023-01-01 RX ADMIN — LEVOTHYROXINE SODIUM 150 MCG: 75 TABLET ORAL at 07:49

## 2023-01-01 RX ADMIN — FENTANYL CITRATE 100 MCG: 50 INJECTION INTRAMUSCULAR; INTRAVENOUS at 07:43

## 2023-01-01 RX ADMIN — TOPIRAMATE 150 MG: 100 TABLET, FILM COATED ORAL at 08:49

## 2023-01-01 RX ADMIN — METFORMIN ER 500 MG 1000 MG: 500 TABLET ORAL at 08:56

## 2023-01-01 RX ADMIN — PRAVASTATIN SODIUM 40 MG: 20 TABLET ORAL at 20:36

## 2023-01-01 RX ADMIN — LEVOTHYROXINE SODIUM 150 MCG: 25 TABLET ORAL at 08:13

## 2023-01-01 RX ADMIN — TOPIRAMATE 150 MG: 50 TABLET ORAL at 08:43

## 2023-01-01 RX ADMIN — CYANOCOBALAMIN TAB 1000 MCG 2000 MCG: 1000 TAB at 07:42

## 2023-01-01 RX ADMIN — INSULIN ASPART 1 UNITS: 100 INJECTION, SOLUTION INTRAVENOUS; SUBCUTANEOUS at 12:23

## 2023-01-01 RX ADMIN — INSULIN ASPART 8 UNITS: 100 INJECTION, SOLUTION INTRAVENOUS; SUBCUTANEOUS at 17:42

## 2023-01-01 RX ADMIN — Medication 1 TABLET: at 12:34

## 2023-01-01 RX ADMIN — Medication 50 MCG: at 08:53

## 2023-01-01 RX ADMIN — OXYCODONE HYDROCHLORIDE AND ACETAMINOPHEN 500 MG: 500 TABLET ORAL at 09:36

## 2023-01-01 RX ADMIN — SODIUM CHLORIDE, POTASSIUM CHLORIDE, SODIUM LACTATE AND CALCIUM CHLORIDE: 600; 310; 30; 20 INJECTION, SOLUTION INTRAVENOUS at 14:01

## 2023-01-01 RX ADMIN — CETIRIZINE HYDROCHLORIDE 10 MG: 10 TABLET, FILM COATED ORAL at 08:47

## 2023-01-01 RX ADMIN — INSULIN HUMAN 14 UNITS: 100 INJECTION, SUSPENSION SUBCUTANEOUS at 20:26

## 2023-01-01 RX ADMIN — GABAPENTIN 1200 MG: 400 CAPSULE ORAL at 17:35

## 2023-01-01 RX ADMIN — LEVOTHYROXINE SODIUM 150 MCG: 25 TABLET ORAL at 08:12

## 2023-01-01 RX ADMIN — SODIUM CHLORIDE 500 ML: 9 INJECTION, SOLUTION INTRAVENOUS at 18:19

## 2023-01-01 RX ADMIN — SODIUM CHLORIDE, POTASSIUM CHLORIDE, SODIUM LACTATE AND CALCIUM CHLORIDE: 600; 310; 30; 20 INJECTION, SOLUTION INTRAVENOUS at 23:41

## 2023-01-01 RX ADMIN — CYANOCOBALAMIN TAB 1000 MCG 2000 MCG: 1000 TAB at 08:15

## 2023-01-01 RX ADMIN — GABAPENTIN 1200 MG: 400 CAPSULE ORAL at 12:56

## 2023-01-01 RX ADMIN — GABAPENTIN 1200 MG: 400 CAPSULE ORAL at 09:09

## 2023-01-01 RX ADMIN — INSULIN ASPART 2 UNITS: 100 INJECTION, SOLUTION INTRAVENOUS; SUBCUTANEOUS at 08:34

## 2023-01-01 RX ADMIN — ZINC SULFATE 220 MG (50 MG) CAPSULE 220 MG: CAPSULE at 20:40

## 2023-01-01 RX ADMIN — EMPAGLIFLOZIN 25 MG: 25 TABLET, FILM COATED ORAL at 08:12

## 2023-01-01 RX ADMIN — METFORMIN HYDROCHLORIDE 1000 MG: 500 TABLET, EXTENDED RELEASE ORAL at 17:27

## 2023-01-01 RX ADMIN — PIPERACILLIN AND TAZOBACTAM 4.5 G: 4; .5 INJECTION, POWDER, FOR SOLUTION INTRAVENOUS at 08:50

## 2023-01-01 RX ADMIN — Medication 1 CAPSULE: at 21:10

## 2023-01-01 RX ADMIN — INSULIN HUMAN 18 UNITS: 100 INJECTION, SUSPENSION SUBCUTANEOUS at 07:53

## 2023-01-01 RX ADMIN — FLUCONAZOLE 400 MG: 200 TABLET ORAL at 08:49

## 2023-01-01 RX ADMIN — INSULIN ASPART 1 UNITS: 100 INJECTION, SOLUTION INTRAVENOUS; SUBCUTANEOUS at 22:02

## 2023-01-01 RX ADMIN — Medication 100 MCG: at 22:01

## 2023-01-01 RX ADMIN — Medication 1 TABLET: at 12:20

## 2023-01-01 RX ADMIN — OXYCODONE HYDROCHLORIDE AND ACETAMINOPHEN 500 MG: 500 TABLET ORAL at 07:48

## 2023-01-01 RX ADMIN — EMPAGLIFLOZIN 25 MG: 25 TABLET, FILM COATED ORAL at 09:10

## 2023-01-01 RX ADMIN — SODIUM CHLORIDE 500 ML: 9 INJECTION, SOLUTION INTRAVENOUS at 17:27

## 2023-01-01 RX ADMIN — INSULIN HUMAN 18 UNITS: 100 INJECTION, SUSPENSION SUBCUTANEOUS at 20:28

## 2023-01-01 RX ADMIN — INSULIN ASPART 9 UNITS: 100 INJECTION, SOLUTION INTRAVENOUS; SUBCUTANEOUS at 17:27

## 2023-01-01 RX ADMIN — KETAMINE HYDROCHLORIDE 5 MG/HR: 10 INJECTION INTRAMUSCULAR; INTRAVENOUS at 23:05

## 2023-01-01 RX ADMIN — CYANOCOBALAMIN TAB 1000 MCG 2000 MCG: 1000 TAB at 08:24

## 2023-01-01 RX ADMIN — ACETAMINOPHEN 975 MG: 325 TABLET, FILM COATED ORAL at 05:49

## 2023-01-01 RX ADMIN — INSULIN ASPART 1 UNITS: 100 INJECTION, SOLUTION INTRAVENOUS; SUBCUTANEOUS at 17:24

## 2023-01-01 RX ADMIN — GABAPENTIN 600 MG: 600 TABLET, FILM COATED ORAL at 09:36

## 2023-01-01 RX ADMIN — INSULIN ASPART 1 UNITS: 100 INJECTION, SOLUTION INTRAVENOUS; SUBCUTANEOUS at 12:20

## 2023-01-01 RX ADMIN — GABAPENTIN 600 MG: 600 TABLET, FILM COATED ORAL at 07:49

## 2023-01-01 RX ADMIN — LEVOTHYROXINE SODIUM 137 MCG: 25 TABLET ORAL at 06:44

## 2023-01-01 RX ADMIN — CHOLECALCIFEROL TAB 25 MCG (1000 UNIT) 50 MCG: 25 TAB at 09:02

## 2023-01-01 RX ADMIN — FOLIC ACID 1 MG: 1 TABLET ORAL at 08:46

## 2023-01-01 RX ADMIN — Medication 2 G: at 08:33

## 2023-01-01 RX ADMIN — SODIUM CHLORIDE, POTASSIUM CHLORIDE, SODIUM LACTATE AND CALCIUM CHLORIDE: 600; 310; 30; 20 INJECTION, SOLUTION INTRAVENOUS at 14:28

## 2023-01-01 RX ADMIN — METFORMIN HYDROCHLORIDE 1000 MG: 500 TABLET, EXTENDED RELEASE ORAL at 07:43

## 2023-01-01 RX ADMIN — SUGAMMADEX 200 MG: 100 INJECTION, SOLUTION INTRAVENOUS at 17:07

## 2023-01-01 RX ADMIN — Medication 2 G: at 08:16

## 2023-01-01 RX ADMIN — Medication 2 G: at 01:35

## 2023-01-01 RX ADMIN — PHENYLEPHRINE HYDROCHLORIDE 100 MCG: 10 INJECTION INTRAVENOUS at 14:54

## 2023-01-01 RX ADMIN — GABAPENTIN 600 MG: 600 TABLET, FILM COATED ORAL at 14:01

## 2023-01-01 RX ADMIN — METFORMIN HYDROCHLORIDE 1000 MG: 500 TABLET, EXTENDED RELEASE ORAL at 18:04

## 2023-01-01 RX ADMIN — CETIRIZINE HYDROCHLORIDE 10 MG: 10 TABLET, FILM COATED ORAL at 09:31

## 2023-01-01 RX ADMIN — CETIRIZINE HYDROCHLORIDE 10 MG: 10 TABLET, FILM COATED ORAL at 08:34

## 2023-01-01 RX ADMIN — FOLIC ACID 1 MG: 1 TABLET ORAL at 08:11

## 2023-01-01 RX ADMIN — PHENYLEPHRINE HYDROCHLORIDE 100 MCG: 10 INJECTION INTRAVENOUS at 07:56

## 2023-01-01 RX ADMIN — PRAVASTATIN SODIUM 20 MG: 20 TABLET ORAL at 20:56

## 2023-01-01 RX ADMIN — HYDROCORTISONE SODIUM SUCCINATE 100 MG: 100 INJECTION, POWDER, FOR SOLUTION INTRAMUSCULAR; INTRAVENOUS at 22:33

## 2023-01-01 RX ADMIN — PHENYLEPHRINE HYDROCHLORIDE 100 MCG: 10 INJECTION INTRAVENOUS at 13:14

## 2023-01-01 RX ADMIN — DEXMEDETOMIDINE 8 MCG: 100 INJECTION, SOLUTION, CONCENTRATE INTRAVENOUS at 17:01

## 2023-01-01 RX ADMIN — Medication 50 MCG: at 08:17

## 2023-01-01 RX ADMIN — DICLOFENAC SODIUM TOPICAL GEL, 1% 4 G: 10 GEL TOPICAL at 20:34

## 2023-01-01 RX ADMIN — LIDOCAINE HYDROCHLORIDE 100 MG: 20 INJECTION, SOLUTION INFILTRATION; PERINEURAL at 07:46

## 2023-01-01 RX ADMIN — GABAPENTIN 600 MG: 600 TABLET, FILM COATED ORAL at 13:17

## 2023-01-01 RX ADMIN — CETIRIZINE HYDROCHLORIDE 10 MG: 10 TABLET, FILM COATED ORAL at 08:37

## 2023-01-01 RX ADMIN — Medication 1 TABLET: at 12:23

## 2023-01-01 RX ADMIN — GABAPENTIN 1200 MG: 400 CAPSULE ORAL at 09:57

## 2023-01-01 RX ADMIN — PRAVASTATIN SODIUM 20 MG: 20 TABLET ORAL at 20:25

## 2023-01-01 RX ADMIN — METFORMIN HYDROCHLORIDE 1000 MG: 500 TABLET, EXTENDED RELEASE ORAL at 08:06

## 2023-01-01 RX ADMIN — METFORMIN HYDROCHLORIDE 1000 MG: 500 TABLET, EXTENDED RELEASE ORAL at 08:12

## 2023-01-01 RX ADMIN — MIDAZOLAM 2 MG: 1 INJECTION INTRAMUSCULAR; INTRAVENOUS at 17:09

## 2023-01-01 RX ADMIN — FLUCONAZOLE 400 MG: 200 TABLET ORAL at 08:02

## 2023-01-01 RX ADMIN — GABAPENTIN 1200 MG: 400 CAPSULE ORAL at 08:06

## 2023-01-01 RX ADMIN — Medication 2 G: at 08:56

## 2023-01-01 RX ADMIN — FOLIC ACID 1 MG: 1 TABLET ORAL at 07:49

## 2023-01-01 RX ADMIN — GABAPENTIN 600 MG: 600 TABLET, FILM COATED ORAL at 19:41

## 2023-01-01 RX ADMIN — FLUTICASONE PROPIONATE 2 SPRAY: 50 SPRAY, METERED NASAL at 09:01

## 2023-01-01 RX ADMIN — INSULIN HUMAN 18 UNITS: 100 INJECTION, SUSPENSION SUBCUTANEOUS at 08:33

## 2023-01-01 RX ADMIN — OXYCODONE HYDROCHLORIDE AND ACETAMINOPHEN 500 MG: 500 TABLET ORAL at 08:07

## 2023-01-01 RX ADMIN — Medication 1 CAPSULE: at 08:48

## 2023-01-01 RX ADMIN — FLUTICASONE PROPIONATE 2 SPRAY: 50 SPRAY, METERED NASAL at 08:43

## 2023-01-01 RX ADMIN — CETIRIZINE HYDROCHLORIDE 10 MG: 10 TABLET, FILM COATED ORAL at 08:15

## 2023-01-01 RX ADMIN — INSULIN HUMAN 18 UNITS: 100 INJECTION, SUSPENSION SUBCUTANEOUS at 07:47

## 2023-01-01 RX ADMIN — PIPERACILLIN AND TAZOBACTAM 4.5 G: 4; .5 INJECTION, POWDER, FOR SOLUTION INTRAVENOUS at 03:13

## 2023-01-01 RX ADMIN — GABAPENTIN 600 MG: 600 TABLET, FILM COATED ORAL at 17:05

## 2023-01-01 RX ADMIN — Medication 1 CAPSULE: at 20:17

## 2023-01-01 RX ADMIN — EMPAGLIFLOZIN 25 MG: 25 TABLET, FILM COATED ORAL at 12:08

## 2023-01-01 RX ADMIN — OXYCODONE HYDROCHLORIDE AND ACETAMINOPHEN 500 MG: 500 TABLET ORAL at 08:21

## 2023-01-01 RX ADMIN — INSULIN ASPART 9 UNITS: 100 INJECTION, SOLUTION INTRAVENOUS; SUBCUTANEOUS at 18:19

## 2023-01-01 RX ADMIN — INSULIN HUMAN 18 UNITS: 100 INJECTION, SUSPENSION SUBCUTANEOUS at 08:08

## 2023-01-01 RX ADMIN — Medication 2 G: at 16:53

## 2023-01-01 RX ADMIN — DICLOFENAC SODIUM TOPICAL GEL, 1% 4 G: 10 GEL TOPICAL at 20:14

## 2023-01-01 RX ADMIN — ONDANSETRON 4 MG: 2 INJECTION INTRAMUSCULAR; INTRAVENOUS at 09:20

## 2023-01-01 RX ADMIN — RIFAMPIN 300 MG: 300 CAPSULE ORAL at 13:40

## 2023-01-01 RX ADMIN — OXYCODONE HYDROCHLORIDE AND ACETAMINOPHEN 500 MG: 500 TABLET ORAL at 09:29

## 2023-01-01 RX ADMIN — GABAPENTIN 600 MG: 600 TABLET, FILM COATED ORAL at 20:07

## 2023-01-01 RX ADMIN — DICLOFENAC SODIUM 4 G: 10 GEL TOPICAL at 21:35

## 2023-01-01 RX ADMIN — PHENYLEPHRINE HYDROCHLORIDE 100 MCG: 10 INJECTION INTRAVENOUS at 17:41

## 2023-01-01 RX ADMIN — INSULIN HUMAN 18 UNITS: 100 INJECTION, SUSPENSION SUBCUTANEOUS at 21:27

## 2023-01-01 RX ADMIN — PHYTONADIONE 10 MG: 10 INJECTION, EMULSION INTRAMUSCULAR; INTRAVENOUS; SUBCUTANEOUS at 23:56

## 2023-01-01 RX ADMIN — Medication 100 MCG: at 21:28

## 2023-01-01 RX ADMIN — FLUTICASONE PROPIONATE 2 SPRAY: 50 SPRAY, METERED NASAL at 07:45

## 2023-01-01 RX ADMIN — PANTOPRAZOLE SODIUM 40 MG: 40 TABLET, DELAYED RELEASE ORAL at 08:06

## 2023-01-01 RX ADMIN — FOLIC ACID 1 MG: 1 TABLET ORAL at 09:44

## 2023-01-01 RX ADMIN — GABAPENTIN 600 MG: 600 TABLET, FILM COATED ORAL at 08:24

## 2023-01-01 RX ADMIN — METFORMIN HYDROCHLORIDE 1000 MG: 500 TABLET, EXTENDED RELEASE ORAL at 08:11

## 2023-01-01 RX ADMIN — GABAPENTIN 600 MG: 600 TABLET, FILM COATED ORAL at 11:58

## 2023-01-01 RX ADMIN — CYANOCOBALAMIN TAB 1000 MCG 2000 MCG: 1000 TAB at 08:20

## 2023-01-01 RX ADMIN — DEXAMETHASONE SODIUM PHOSPHATE 4 MG: 4 INJECTION, SOLUTION INTRA-ARTICULAR; INTRALESIONAL; INTRAMUSCULAR; INTRAVENOUS; SOFT TISSUE at 14:20

## 2023-01-01 RX ADMIN — PIPERACILLIN AND TAZOBACTAM 4.5 G: 4; .5 INJECTION, POWDER, FOR SOLUTION INTRAVENOUS at 00:52

## 2023-01-01 RX ADMIN — INSULIN ASPART 3 UNITS: 100 INJECTION, SOLUTION INTRAVENOUS; SUBCUTANEOUS at 09:35

## 2023-01-01 RX ADMIN — VANCOMYCIN HYDROCHLORIDE 1500 MG: 10 INJECTION, POWDER, LYOPHILIZED, FOR SOLUTION INTRAVENOUS at 16:40

## 2023-01-01 RX ADMIN — DICLOFENAC SODIUM TOPICAL GEL, 1% 4 G: 10 GEL TOPICAL at 20:29

## 2023-01-01 RX ADMIN — LIDOCAINE HYDROCHLORIDE 90 MG: 20 INJECTION, SOLUTION INFILTRATION; PERINEURAL at 21:16

## 2023-01-01 RX ADMIN — INSULIN HUMAN 18 UNITS: 100 INJECTION, SUSPENSION SUBCUTANEOUS at 20:40

## 2023-01-01 RX ADMIN — PANTOPRAZOLE SODIUM 40 MG: 40 TABLET, DELAYED RELEASE ORAL at 10:23

## 2023-01-01 RX ADMIN — Medication 50 MCG: at 08:14

## 2023-01-01 RX ADMIN — ONDANSETRON 4 MG: 2 INJECTION INTRAMUSCULAR; INTRAVENOUS at 17:01

## 2023-01-01 RX ADMIN — TOPIRAMATE 150 MG: 100 TABLET, FILM COATED ORAL at 08:28

## 2023-01-01 RX ADMIN — FLUCONAZOLE 400 MG: 200 TABLET ORAL at 20:56

## 2023-01-01 RX ADMIN — GABAPENTIN 600 MG: 600 TABLET, FILM COATED ORAL at 18:20

## 2023-01-01 RX ADMIN — PROPOFOL 30 MG: 10 INJECTION, EMULSION INTRAVENOUS at 17:04

## 2023-01-01 RX ADMIN — VANCOMYCIN HYDROCHLORIDE 1000 MG: 1 INJECTION, SOLUTION INTRAVENOUS at 01:29

## 2023-01-01 RX ADMIN — ACETAMINOPHEN 975 MG: 325 TABLET, FILM COATED ORAL at 14:43

## 2023-01-01 RX ADMIN — PHENYLEPHRINE HYDROCHLORIDE 150 MCG: 10 INJECTION INTRAVENOUS at 08:05

## 2023-01-01 RX ADMIN — INSULIN ASPART 10 UNITS: 100 INJECTION, SOLUTION INTRAVENOUS; SUBCUTANEOUS at 17:19

## 2023-01-01 RX ADMIN — PHENYLEPHRINE HYDROCHLORIDE 100 MCG: 10 INJECTION INTRAVENOUS at 14:51

## 2023-01-01 RX ADMIN — PANTOPRAZOLE SODIUM 40 MG: 40 TABLET, DELAYED RELEASE ORAL at 09:36

## 2023-01-01 RX ADMIN — IODIXANOL 100 ML: 320 INJECTION, SOLUTION INTRAVASCULAR at 20:16

## 2023-01-01 RX ADMIN — SODIUM BICARBONATE 75 ML/HR: 84 INJECTION, SOLUTION INTRAVENOUS at 21:27

## 2023-01-01 RX ADMIN — CETIRIZINE HYDROCHLORIDE 10 MG: 10 TABLET, FILM COATED ORAL at 07:49

## 2023-01-01 RX ADMIN — ACETAMINOPHEN 650 MG: 325 TABLET, FILM COATED ORAL at 17:37

## 2023-01-01 RX ADMIN — TOPIRAMATE 150 MG: 100 TABLET, FILM COATED ORAL at 08:20

## 2023-01-01 RX ADMIN — CETIRIZINE HYDROCHLORIDE 10 MG: 10 TABLET, FILM COATED ORAL at 08:19

## 2023-01-01 RX ADMIN — DICLOFENAC SODIUM 4 G: 10 GEL TOPICAL at 20:20

## 2023-01-01 RX ADMIN — Medication 2 G: at 18:47

## 2023-01-01 RX ADMIN — NOREPINEPHRINE BITARTRATE 1.5 MCG/KG/MIN: 0.06 INJECTION, SOLUTION INTRAVENOUS at 22:14

## 2023-01-01 RX ADMIN — CEFEPIME 2 G: 2 INJECTION, POWDER, FOR SOLUTION INTRAVENOUS at 22:26

## 2023-01-01 RX ADMIN — INSULIN HUMAN 18 UNITS: 100 INJECTION, SUSPENSION SUBCUTANEOUS at 20:14

## 2023-01-01 RX ADMIN — LIDOCAINE HYDROCHLORIDE 100 MG: 20 INJECTION, SOLUTION INFILTRATION; PERINEURAL at 07:45

## 2023-01-01 RX ADMIN — INSULIN ASPART 4 UNITS: 100 INJECTION, SOLUTION INTRAVENOUS; SUBCUTANEOUS at 12:31

## 2023-01-01 RX ADMIN — PIPERACILLIN AND TAZOBACTAM 4.5 G: 4; .5 INJECTION, POWDER, FOR SOLUTION INTRAVENOUS at 13:58

## 2023-01-01 RX ADMIN — GABAPENTIN 600 MG: 600 TABLET, FILM COATED ORAL at 20:40

## 2023-01-01 RX ADMIN — PROPOFOL 200 MG: 10 INJECTION, EMULSION INTRAVENOUS at 07:48

## 2023-01-01 RX ADMIN — SODIUM CHLORIDE, POTASSIUM CHLORIDE, SODIUM LACTATE AND CALCIUM CHLORIDE 1000 ML: 600; 310; 30; 20 INJECTION, SOLUTION INTRAVENOUS at 23:00

## 2023-01-01 RX ADMIN — LEVOTHYROXINE SODIUM 150 MCG: 75 TABLET ORAL at 08:36

## 2023-01-01 RX ADMIN — METFORMIN HYDROCHLORIDE 1000 MG: 500 TABLET, EXTENDED RELEASE ORAL at 08:19

## 2023-01-01 RX ADMIN — PRAVASTATIN SODIUM 20 MG: 20 TABLET ORAL at 20:19

## 2023-01-01 RX ADMIN — METFORMIN HYDROCHLORIDE 1000 MG: 500 TABLET, EXTENDED RELEASE ORAL at 17:24

## 2023-01-01 RX ADMIN — DICLOFENAC SODIUM 4 G: 10 GEL TOPICAL at 20:32

## 2023-01-01 RX ADMIN — PIPERACILLIN AND TAZOBACTAM 4.5 G: 4; .5 INJECTION, POWDER, FOR SOLUTION INTRAVENOUS at 08:23

## 2023-01-01 RX ADMIN — PANTOPRAZOLE SODIUM 40 MG: 40 TABLET, DELAYED RELEASE ORAL at 07:38

## 2023-01-01 RX ADMIN — PRAVASTATIN SODIUM 40 MG: 20 TABLET ORAL at 01:18

## 2023-01-01 RX ADMIN — Medication 50 MCG: at 08:32

## 2023-01-01 RX ADMIN — Medication 100 MCG: at 21:37

## 2023-01-01 RX ADMIN — METFORMIN HYDROCHLORIDE 1000 MG: 500 TABLET, EXTENDED RELEASE ORAL at 17:16

## 2023-01-01 RX ADMIN — Medication 50 MCG: at 08:11

## 2023-01-01 RX ADMIN — GABAPENTIN 600 MG: 600 TABLET, FILM COATED ORAL at 14:41

## 2023-01-01 RX ADMIN — PANTOPRAZOLE SODIUM 40 MG: 40 TABLET, DELAYED RELEASE ORAL at 09:11

## 2023-01-01 RX ADMIN — ACETAMINOPHEN 975 MG: 325 TABLET, FILM COATED ORAL at 14:25

## 2023-01-01 RX ADMIN — TOPIRAMATE 150 MG: 50 TABLET ORAL at 08:52

## 2023-01-01 RX ADMIN — INSULIN HUMAN 18 UNITS: 100 INJECTION, SUSPENSION SUBCUTANEOUS at 21:08

## 2023-01-01 RX ADMIN — METFORMIN ER 500 MG 1000 MG: 500 TABLET ORAL at 16:20

## 2023-01-01 RX ADMIN — METFORMIN HYDROCHLORIDE 1000 MG: 500 TABLET, EXTENDED RELEASE ORAL at 10:22

## 2023-01-01 RX ADMIN — SENNOSIDES AND DOCUSATE SODIUM 1 TABLET: 50; 8.6 TABLET ORAL at 20:36

## 2023-01-01 RX ADMIN — METFORMIN HYDROCHLORIDE 1000 MG: 500 TABLET, EXTENDED RELEASE ORAL at 17:22

## 2023-01-01 RX ADMIN — INSULIN HUMAN 18 UNITS: 100 INJECTION, SUSPENSION SUBCUTANEOUS at 20:07

## 2023-01-01 RX ADMIN — CETIRIZINE HYDROCHLORIDE 10 MG: 10 TABLET, FILM COATED ORAL at 08:24

## 2023-01-01 RX ADMIN — GABAPENTIN 600 MG: 600 TABLET, FILM COATED ORAL at 20:53

## 2023-01-01 RX ADMIN — Medication 1 CAPSULE: at 20:31

## 2023-01-01 RX ADMIN — MIDAZOLAM 2 MG: 1 INJECTION INTRAMUSCULAR; INTRAVENOUS at 07:44

## 2023-01-01 RX ADMIN — GABAPENTIN 600 MG: 600 TABLET, FILM COATED ORAL at 07:33

## 2023-01-01 RX ADMIN — INSULIN HUMAN 14 UNITS: 100 INJECTION, SUSPENSION SUBCUTANEOUS at 20:03

## 2023-01-01 RX ADMIN — ACETAMINOPHEN 975 MG: 325 TABLET, FILM COATED ORAL at 14:37

## 2023-01-01 RX ADMIN — FLUCONAZOLE 400 MG: 200 TABLET ORAL at 20:14

## 2023-01-01 RX ADMIN — LEVOTHYROXINE SODIUM 137 MCG: 25 TABLET ORAL at 06:37

## 2023-01-01 RX ADMIN — PRAVASTATIN SODIUM 20 MG: 20 TABLET ORAL at 20:07

## 2023-01-01 RX ADMIN — VANCOMYCIN HYDROCHLORIDE 1750 MG: 10 INJECTION, POWDER, LYOPHILIZED, FOR SOLUTION INTRAVENOUS at 14:42

## 2023-01-01 RX ADMIN — FLUTICASONE PROPIONATE 2 SPRAY: 50 SPRAY, METERED NASAL at 21:10

## 2023-01-01 RX ADMIN — PRAVASTATIN SODIUM 40 MG: 20 TABLET ORAL at 21:35

## 2023-01-01 RX ADMIN — INSULIN ASPART 1 UNITS: 100 INJECTION, SOLUTION INTRAVENOUS; SUBCUTANEOUS at 17:04

## 2023-01-01 RX ADMIN — GABAPENTIN 600 MG: 600 TABLET, FILM COATED ORAL at 09:29

## 2023-01-01 RX ADMIN — Medication 1 CAPSULE: at 21:06

## 2023-01-01 RX ADMIN — INSULIN ASPART 5 UNITS: 100 INJECTION, SOLUTION INTRAVENOUS; SUBCUTANEOUS at 13:35

## 2023-01-01 RX ADMIN — PHENYLEPHRINE HYDROCHLORIDE 200 MCG: 10 INJECTION INTRAVENOUS at 17:21

## 2023-01-01 RX ADMIN — GABAPENTIN 1200 MG: 400 CAPSULE ORAL at 16:20

## 2023-01-01 RX ADMIN — FLUCONAZOLE 400 MG: 200 TABLET ORAL at 20:19

## 2023-01-01 RX ADMIN — Medication 1 CAPSULE: at 08:13

## 2023-01-01 RX ADMIN — PIPERACILLIN AND TAZOBACTAM 4.5 G: 4; .5 INJECTION, POWDER, FOR SOLUTION INTRAVENOUS at 21:01

## 2023-01-01 RX ADMIN — METFORMIN HYDROCHLORIDE 1000 MG: 500 TABLET, EXTENDED RELEASE ORAL at 08:29

## 2023-01-01 RX ADMIN — Medication 1 TABLET: at 12:08

## 2023-01-01 RX ADMIN — INSULIN ASPART 11 UNITS: 100 INJECTION, SOLUTION INTRAVENOUS; SUBCUTANEOUS at 18:49

## 2023-01-01 RX ADMIN — FLUTICASONE PROPIONATE 2 SPRAY: 50 SPRAY, METERED NASAL at 09:53

## 2023-01-01 RX ADMIN — FLUCONAZOLE 400 MG: 200 TABLET ORAL at 07:49

## 2023-01-01 RX ADMIN — FLUCONAZOLE 400 MG: 200 TABLET ORAL at 20:12

## 2023-01-01 RX ADMIN — EMPAGLIFLOZIN 25 MG: 25 TABLET, FILM COATED ORAL at 08:13

## 2023-01-01 RX ADMIN — PRAVASTATIN SODIUM 40 MG: 20 TABLET ORAL at 20:32

## 2023-01-01 RX ADMIN — ASPIRIN 81 MG: 81 TABLET, COATED ORAL at 08:51

## 2023-01-01 RX ADMIN — TOPIRAMATE 150 MG: 50 TABLET ORAL at 08:56

## 2023-01-01 RX ADMIN — PANTOPRAZOLE SODIUM 40 MG: 40 TABLET, DELAYED RELEASE ORAL at 08:02

## 2023-01-01 RX ADMIN — INSULIN ASPART 3 UNITS: 100 INJECTION, SOLUTION INTRAVENOUS; SUBCUTANEOUS at 11:52

## 2023-01-01 RX ADMIN — Medication 5 MG: at 17:32

## 2023-01-01 RX ADMIN — GABAPENTIN 600 MG: 600 TABLET, FILM COATED ORAL at 08:02

## 2023-01-01 RX ADMIN — DEXMEDETOMIDINE 8 MCG: 100 INJECTION, SOLUTION, CONCENTRATE INTRAVENOUS at 17:05

## 2023-01-01 RX ADMIN — LEVOTHYROXINE SODIUM 150 MCG: 75 TABLET ORAL at 07:48

## 2023-01-01 RX ADMIN — GABAPENTIN 600 MG: 600 TABLET, FILM COATED ORAL at 18:21

## 2023-01-01 RX ADMIN — GABAPENTIN 600 MG: 600 TABLET, FILM COATED ORAL at 14:27

## 2023-01-01 RX ADMIN — Medication 100 MCG: at 21:52

## 2023-01-01 RX ADMIN — LEVOTHYROXINE SODIUM 137 MCG: 25 TABLET ORAL at 07:13

## 2023-01-01 RX ADMIN — HYDROMORPHONE HYDROCHLORIDE 0.5 MG: 1 INJECTION, SOLUTION INTRAMUSCULAR; INTRAVENOUS; SUBCUTANEOUS at 02:29

## 2023-01-01 RX ADMIN — INSULIN ASPART 3 UNITS: 100 INJECTION, SOLUTION INTRAVENOUS; SUBCUTANEOUS at 08:33

## 2023-01-01 RX ADMIN — INSULIN ASPART 1 UNITS: 100 INJECTION, SOLUTION INTRAVENOUS; SUBCUTANEOUS at 12:25

## 2023-01-01 RX ADMIN — FLUTICASONE PROPIONATE 2 SPRAY: 50 SPRAY, METERED NASAL at 08:53

## 2023-01-01 RX ADMIN — PHENYLEPHRINE HYDROCHLORIDE 100 MCG: 10 INJECTION INTRAVENOUS at 15:01

## 2023-01-01 RX ADMIN — LEVOTHYROXINE SODIUM 150 MCG: 75 TABLET ORAL at 07:33

## 2023-01-01 RX ADMIN — PRAVASTATIN SODIUM 20 MG: 20 TABLET ORAL at 20:32

## 2023-01-01 RX ADMIN — FLUCONAZOLE 400 MG: 200 TABLET ORAL at 20:25

## 2023-01-01 RX ADMIN — PRAVASTATIN SODIUM 20 MG: 20 TABLET ORAL at 20:43

## 2023-01-01 RX ADMIN — PRAVASTATIN SODIUM 40 MG: 20 TABLET ORAL at 20:20

## 2023-01-01 RX ADMIN — TOPIRAMATE 150 MG: 50 TABLET ORAL at 19:46

## 2023-01-01 RX ADMIN — INSULIN ASPART 2 UNITS: 100 INJECTION, SOLUTION INTRAVENOUS; SUBCUTANEOUS at 18:58

## 2023-01-01 RX ADMIN — CETIRIZINE HYDROCHLORIDE 10 MG: 10 TABLET, FILM COATED ORAL at 07:44

## 2023-01-01 RX ADMIN — SUGAMMADEX 200 MG: 100 INJECTION, SOLUTION INTRAVENOUS at 13:17

## 2023-01-01 RX ADMIN — FOLIC ACID 1 MG: 1 TABLET ORAL at 08:53

## 2023-01-01 RX ADMIN — FENTANYL CITRATE 75 MCG: 50 INJECTION INTRAMUSCULAR; INTRAVENOUS at 07:46

## 2023-01-01 RX ADMIN — LEVOTHYROXINE SODIUM 150 MCG: 25 TABLET ORAL at 07:08

## 2023-01-01 RX ADMIN — LIDOCAINE HYDROCHLORIDE 100 MG: 20 INJECTION, SOLUTION INFILTRATION; PERINEURAL at 11:59

## 2023-01-01 RX ADMIN — Medication 100 MCG: at 21:41

## 2023-01-01 RX ADMIN — ACETAMINOPHEN 975 MG: 325 TABLET, FILM COATED ORAL at 22:03

## 2023-01-01 RX ADMIN — PROPOFOL 50 MG: 10 INJECTION, EMULSION INTRAVENOUS at 07:49

## 2023-01-01 RX ADMIN — FLUCONAZOLE 400 MG: 200 TABLET ORAL at 08:13

## 2023-01-01 RX ADMIN — LEVOTHYROXINE SODIUM 137 MCG: 25 TABLET ORAL at 09:10

## 2023-01-01 RX ADMIN — FLUTICASONE PROPIONATE 2 SPRAY: 50 SPRAY, METERED NASAL at 08:09

## 2023-01-01 RX ADMIN — CYANOCOBALAMIN TAB 1000 MCG 2000 MCG: 1000 TAB at 07:33

## 2023-01-01 RX ADMIN — Medication 2 G: at 08:58

## 2023-01-01 RX ADMIN — OXYCODONE HYDROCHLORIDE AND ACETAMINOPHEN 500 MG: 500 TABLET ORAL at 08:32

## 2023-01-01 RX ADMIN — FENTANYL CITRATE 50 MCG: 50 INJECTION INTRAMUSCULAR; INTRAVENOUS at 17:11

## 2023-01-01 RX ADMIN — EPINEPHRINE 0.2 MCG/KG/MIN: 1 INJECTION INTRAMUSCULAR; INTRAVENOUS; SUBCUTANEOUS at 16:28

## 2023-01-01 RX ADMIN — Medication 10 MG: at 17:42

## 2023-01-01 RX ADMIN — INSULIN ASPART 16 UNITS: 100 INJECTION, SOLUTION INTRAVENOUS; SUBCUTANEOUS at 18:00

## 2023-01-01 RX ADMIN — INSULIN ASPART 4 UNITS: 100 INJECTION, SOLUTION INTRAVENOUS; SUBCUTANEOUS at 11:44

## 2023-01-01 RX ADMIN — Medication 2 G: at 17:35

## 2023-01-01 RX ADMIN — Medication 2 G: at 08:02

## 2023-01-01 RX ADMIN — FLUTICASONE PROPIONATE 2 SPRAY: 50 SPRAY, METERED NASAL at 08:01

## 2023-01-01 RX ADMIN — CHOLECALCIFEROL TAB 25 MCG (1000 UNIT) 50 MCG: 25 TAB at 08:51

## 2023-01-01 RX ADMIN — Medication 50 MCG: at 08:29

## 2023-01-01 RX ADMIN — INSULIN ASPART 12 UNITS: 100 INJECTION, SOLUTION INTRAVENOUS; SUBCUTANEOUS at 17:18

## 2023-01-01 RX ADMIN — INSULIN ASPART 4 UNITS: 100 INJECTION, SOLUTION INTRAVENOUS; SUBCUTANEOUS at 09:32

## 2023-01-01 RX ADMIN — CYANOCOBALAMIN TAB 1000 MCG 2000 MCG: 1000 TAB at 08:11

## 2023-01-01 RX ADMIN — VANCOMYCIN HYDROCHLORIDE 1750 MG: 10 INJECTION, POWDER, LYOPHILIZED, FOR SOLUTION INTRAVENOUS at 14:52

## 2023-01-01 RX ADMIN — INSULIN ASPART 1 UNITS: 100 INJECTION, SOLUTION INTRAVENOUS; SUBCUTANEOUS at 08:12

## 2023-01-01 RX ADMIN — PIPERACILLIN AND TAZOBACTAM 4.5 G: 4; .5 INJECTION, POWDER, FOR SOLUTION INTRAVENOUS at 09:04

## 2023-01-01 RX ADMIN — Medication 1 CAPSULE: at 08:32

## 2023-01-01 RX ADMIN — DICLOFENAC SODIUM 4 G: 10 GEL TOPICAL at 20:56

## 2023-01-01 RX ADMIN — FOLIC ACID 1 MG: 1 TABLET ORAL at 08:27

## 2023-01-01 RX ADMIN — PHENYLEPHRINE HYDROCHLORIDE 100 MCG: 10 INJECTION INTRAVENOUS at 12:14

## 2023-01-01 RX ADMIN — Medication 2 G: at 08:24

## 2023-01-01 RX ADMIN — Medication 1 TABLET: at 12:55

## 2023-01-01 RX ADMIN — PHENYLEPHRINE HYDROCHLORIDE 0.4 MCG/KG/MIN: 10 INJECTION INTRAVENOUS at 12:25

## 2023-01-01 RX ADMIN — Medication 1 CAPSULE: at 20:56

## 2023-01-01 RX ADMIN — Medication 2 G: at 17:30

## 2023-01-01 ASSESSMENT — ACTIVITIES OF DAILY LIVING (ADL)
ADLS_ACUITY_SCORE: 38
ADLS_ACUITY_SCORE: 36
ADLS_ACUITY_SCORE: 22
ADLS_ACUITY_SCORE: 27
ADLS_ACUITY_SCORE: 38
ADLS_ACUITY_SCORE: 22
ADLS_ACUITY_SCORE: 38
PATIENT'S_PREFERRED_MEANS_OF_COMMUNICATION: VERBAL
ADLS_ACUITY_SCORE: 36
ADLS_ACUITY_SCORE: 35
ADLS_ACUITY_SCORE: 23
ADLS_ACUITY_SCORE: 34
ADLS_ACUITY_SCORE: 35
ADLS_ACUITY_SCORE: 36
ADLS_ACUITY_SCORE: 29
ADLS_ACUITY_SCORE: 36
ADLS_ACUITY_SCORE: 22
ADLS_ACUITY_SCORE: 27
ADLS_ACUITY_SCORE: 23
ADLS_ACUITY_SCORE: 22
ADLS_ACUITY_SCORE: 35
ADLS_ACUITY_SCORE: 36
ADLS_ACUITY_SCORE: 33
ADLS_ACUITY_SCORE: 32
ADLS_ACUITY_SCORE: 36
ADLS_ACUITY_SCORE: 22
ADLS_ACUITY_SCORE: 35
ADLS_ACUITY_SCORE: 23
ADLS_ACUITY_SCORE: 32
ADLS_ACUITY_SCORE: 22
ADLS_ACUITY_SCORE: 22
ADLS_ACUITY_SCORE: 36
ADLS_ACUITY_SCORE: 36
ADLS_ACUITY_SCORE: 23
ADLS_ACUITY_SCORE: 22
ADLS_ACUITY_SCORE: 34
ADLS_ACUITY_SCORE: 35
ADLS_ACUITY_SCORE: 34
ADLS_ACUITY_SCORE: 22
ADLS_ACUITY_SCORE: 34
ADLS_ACUITY_SCORE: 22
ADLS_ACUITY_SCORE: 22
ADLS_ACUITY_SCORE: 27
ADLS_ACUITY_SCORE: 36
ADLS_ACUITY_SCORE: 35
ADLS_ACUITY_SCORE: 34
ADLS_ACUITY_SCORE: 41
ADLS_ACUITY_SCORE: 22
ADLS_ACUITY_SCORE: 36
ADLS_ACUITY_SCORE: 38
ADLS_ACUITY_SCORE: 35
ADLS_ACUITY_SCORE: 36
ADLS_ACUITY_SCORE: 22
ADLS_ACUITY_SCORE: 35
ADLS_ACUITY_SCORE: 35
ADLS_ACUITY_SCORE: 37
ADLS_ACUITY_SCORE: 36
ADLS_ACUITY_SCORE: 36
ADLS_ACUITY_SCORE: 22
ADLS_ACUITY_SCORE: 36
ADLS_ACUITY_SCORE: 36
ADLS_ACUITY_SCORE: 41
ADLS_ACUITY_SCORE: 38
USE_OF_HEARING_ASSISTIVE_DEVICES: BILATERAL HEARING AIDS
ADLS_ACUITY_SCORE: 37
ADLS_ACUITY_SCORE: 37
ADLS_ACUITY_SCORE: 34
ADLS_ACUITY_SCORE: 34
ADLS_ACUITY_SCORE: 23
ADLS_ACUITY_SCORE: 22
ADLS_ACUITY_SCORE: 36
ADLS_ACUITY_SCORE: 37
ADLS_ACUITY_SCORE: 35
ADLS_ACUITY_SCORE: 22
ADLS_ACUITY_SCORE: 34
ADLS_ACUITY_SCORE: 38
ADLS_ACUITY_SCORE: 34
ADLS_ACUITY_SCORE: 36
ADLS_ACUITY_SCORE: 37
ADLS_ACUITY_SCORE: 32
ADLS_ACUITY_SCORE: 22
ADLS_ACUITY_SCORE: 36
ADLS_ACUITY_SCORE: 34
ADLS_ACUITY_SCORE: 38
ADLS_ACUITY_SCORE: 38
ADLS_ACUITY_SCORE: 22
ADLS_ACUITY_SCORE: 22
ADLS_ACUITY_SCORE: 32
ADLS_ACUITY_SCORE: 36
ADLS_ACUITY_SCORE: 36
ADLS_ACUITY_SCORE: 23
ADLS_ACUITY_SCORE: 35
ADLS_ACUITY_SCORE: 36
ADLS_ACUITY_SCORE: 40
ADLS_ACUITY_SCORE: 37
ADLS_ACUITY_SCORE: 26
ADLS_ACUITY_SCORE: 36
ADLS_ACUITY_SCORE: 34
ADLS_ACUITY_SCORE: 22
ADLS_ACUITY_SCORE: 36
ADLS_ACUITY_SCORE: 33
EQUIPMENT_CURRENTLY_USED_AT_HOME: WALKER, ROLLING;WHEELCHAIR, MANUAL
ADLS_ACUITY_SCORE: 35
ADLS_ACUITY_SCORE: 22
ADLS_ACUITY_SCORE: 37
ADLS_ACUITY_SCORE: 22
ADLS_ACUITY_SCORE: 22
ADLS_ACUITY_SCORE: 27
ADLS_ACUITY_SCORE: 22
ADLS_ACUITY_SCORE: 22
ADLS_ACUITY_SCORE: 34
ADLS_ACUITY_SCORE: 38
ADLS_ACUITY_SCORE: 34
ADLS_ACUITY_SCORE: 34
ADLS_ACUITY_SCORE: 36
ADLS_ACUITY_SCORE: 22
ADLS_ACUITY_SCORE: 41
ADLS_ACUITY_SCORE: 37
ADLS_ACUITY_SCORE: 39
DESCRIBE_HEARING_LOSS: BILATERAL HEARING LOSS
ADLS_ACUITY_SCORE: 22
ADLS_ACUITY_SCORE: 36
ADLS_ACUITY_SCORE: 40
ADLS_ACUITY_SCORE: 37
ADLS_ACUITY_SCORE: 41
ADLS_ACUITY_SCORE: 36
ADLS_ACUITY_SCORE: 38
ADLS_ACUITY_SCORE: 38
ADLS_ACUITY_SCORE: 34
ADLS_ACUITY_SCORE: 36
ADLS_ACUITY_SCORE: 22
ADLS_ACUITY_SCORE: 22
ADLS_ACUITY_SCORE: 36
ADLS_ACUITY_SCORE: 26
ADLS_ACUITY_SCORE: 36
DOING_ERRANDS_INDEPENDENTLY_DIFFICULTY: YES
ADLS_ACUITY_SCORE: 40
ADLS_ACUITY_SCORE: 34
ADLS_ACUITY_SCORE: 22
ADLS_ACUITY_SCORE: 36
ADLS_ACUITY_SCORE: 37
ADLS_ACUITY_SCORE: 40
ADLS_ACUITY_SCORE: 26
ADLS_ACUITY_SCORE: 27
ADLS_ACUITY_SCORE: 22
ADLS_ACUITY_SCORE: 36
ADLS_ACUITY_SCORE: 38
ADLS_ACUITY_SCORE: 36
ADLS_ACUITY_SCORE: 23
ADLS_ACUITY_SCORE: 36
ADLS_ACUITY_SCORE: 38
ADLS_ACUITY_SCORE: 36
DRESSING/BATHING_DIFFICULTY: NO
ADLS_ACUITY_SCORE: 22
ADLS_ACUITY_SCORE: 37
ADLS_ACUITY_SCORE: 22
ADLS_ACUITY_SCORE: 36
ADLS_ACUITY_SCORE: 34
ADLS_ACUITY_SCORE: 22
ADLS_ACUITY_SCORE: 34
ADLS_ACUITY_SCORE: 37
ADLS_ACUITY_SCORE: 22
WERE_AUXILIARY_AIDS_OFFERED?: NO
ADLS_ACUITY_SCORE: 36
ADLS_ACUITY_SCORE: 35
ADLS_ACUITY_SCORE: 37
ADLS_ACUITY_SCORE: 34
ADLS_ACUITY_SCORE: 36
ADLS_ACUITY_SCORE: 40
ADLS_ACUITY_SCORE: 22
ADLS_ACUITY_SCORE: 40
ADLS_ACUITY_SCORE: 27
ADLS_ACUITY_SCORE: 35
DIFFICULTY_EATING/SWALLOWING: NO
ADLS_ACUITY_SCORE: 40
ADLS_ACUITY_SCORE: 22
ADLS_ACUITY_SCORE: 34
ADLS_ACUITY_SCORE: 35
ADLS_ACUITY_SCORE: 34
ADLS_ACUITY_SCORE: 36
ADLS_ACUITY_SCORE: 36
ADLS_ACUITY_SCORE: 26
ADLS_ACUITY_SCORE: 35
ADLS_ACUITY_SCORE: 35
ADLS_ACUITY_SCORE: 34
ADLS_ACUITY_SCORE: 37
ADLS_ACUITY_SCORE: 35
ADLS_ACUITY_SCORE: 36
ADLS_ACUITY_SCORE: 27
ADLS_ACUITY_SCORE: 22
ADLS_ACUITY_SCORE: 29
ADLS_ACUITY_SCORE: 34
ADLS_ACUITY_SCORE: 37
ADLS_ACUITY_SCORE: 22
ADLS_ACUITY_SCORE: 36
THE_FOLLOWING_AIDS_WERE_PROVIDED;: PATIENT DECLINED OFFER OF AUXILIARY AIDS
ADLS_ACUITY_SCORE: 36
ADLS_ACUITY_SCORE: 38
ADLS_ACUITY_SCORE: 23
ADLS_ACUITY_SCORE: 34
ADLS_ACUITY_SCORE: 22
FALL_HISTORY_WITHIN_LAST_SIX_MONTHS: NO
ADLS_ACUITY_SCORE: 22
ADLS_ACUITY_SCORE: 36
ADLS_ACUITY_SCORE: 27
ADLS_ACUITY_SCORE: 22
ADLS_ACUITY_SCORE: 22
ADLS_ACUITY_SCORE: 35
ADLS_ACUITY_SCORE: 36
ADLS_ACUITY_SCORE: 38
ADLS_ACUITY_SCORE: 29
ADLS_ACUITY_SCORE: 34
ADLS_ACUITY_SCORE: 41
ADLS_ACUITY_SCORE: 23
ADLS_ACUITY_SCORE: 39
ADLS_ACUITY_SCORE: 22
ADLS_ACUITY_SCORE: 39
ADLS_ACUITY_SCORE: 22
ADLS_ACUITY_SCORE: 27
ADLS_ACUITY_SCORE: 22
ADLS_ACUITY_SCORE: 27
ADLS_ACUITY_SCORE: 36
ADLS_ACUITY_SCORE: 34
ADLS_ACUITY_SCORE: 37
ADLS_ACUITY_SCORE: 36
ADLS_ACUITY_SCORE: 37
ADLS_ACUITY_SCORE: 38
ADLS_ACUITY_SCORE: 41
ADLS_ACUITY_SCORE: 36
ADLS_ACUITY_SCORE: 36
ADLS_ACUITY_SCORE: 22
CONCENTRATING,_REMEMBERING_OR_MAKING_DECISIONS_DIFFICULTY: NO
ADLS_ACUITY_SCORE: 34
ADLS_ACUITY_SCORE: 22
ADLS_ACUITY_SCORE: 36
ADLS_ACUITY_SCORE: 36
ADLS_ACUITY_SCORE: 38
WEAR_GLASSES_OR_BLIND: YES
ADLS_ACUITY_SCORE: 32
ADLS_ACUITY_SCORE: 22
ADLS_ACUITY_SCORE: 27
ADLS_ACUITY_SCORE: 36
ADLS_ACUITY_SCORE: 22
ADLS_ACUITY_SCORE: 22
TRANSFERRING: 1-->ASSISTANCE (EQUIPMENT/PERSON) NEEDED
ADLS_ACUITY_SCORE: 40
ADLS_ACUITY_SCORE: 36
DIFFICULTY_COMMUNICATING: NO
ADLS_ACUITY_SCORE: 23
ADLS_ACUITY_SCORE: 35
ADLS_ACUITY_SCORE: 22
ADLS_ACUITY_SCORE: 36
ADLS_ACUITY_SCORE: 41
ADLS_ACUITY_SCORE: 37
ADLS_ACUITY_SCORE: 34
ADLS_ACUITY_SCORE: 38
ADLS_ACUITY_SCORE: 34
ADLS_ACUITY_SCORE: 38
ADLS_ACUITY_SCORE: 22
ADLS_ACUITY_SCORE: 36
ADLS_ACUITY_SCORE: 35
ADLS_ACUITY_SCORE: 22
ADLS_ACUITY_SCORE: 23
ADLS_ACUITY_SCORE: 22
ADLS_ACUITY_SCORE: 34
ADLS_ACUITY_SCORE: 22
ADLS_ACUITY_SCORE: 38
ADLS_ACUITY_SCORE: 35
ADLS_ACUITY_SCORE: 22
ADLS_ACUITY_SCORE: 41
ADLS_ACUITY_SCORE: 36
ADLS_ACUITY_SCORE: 22
ADLS_ACUITY_SCORE: 35
ADLS_ACUITY_SCORE: 38
ADLS_ACUITY_SCORE: 22
ADLS_ACUITY_SCORE: 22
ADLS_ACUITY_SCORE: 36
ADLS_ACUITY_SCORE: 22
ADLS_ACUITY_SCORE: 41
ADLS_ACUITY_SCORE: 36
ADLS_ACUITY_SCORE: 37
ADLS_ACUITY_SCORE: 34
ADLS_ACUITY_SCORE: 34
ADLS_ACUITY_SCORE: 36
ADLS_ACUITY_SCORE: 34
ADLS_ACUITY_SCORE: 36
ADLS_ACUITY_SCORE: 22
ADLS_ACUITY_SCORE: 35
ADLS_ACUITY_SCORE: 22
ADLS_ACUITY_SCORE: 36
ADLS_ACUITY_SCORE: 29
ADLS_ACUITY_SCORE: 22
ADLS_ACUITY_SCORE: 36
ADLS_ACUITY_SCORE: 34
DEPENDENT_IADLS:: INDEPENDENT
ADLS_ACUITY_SCORE: 34
ADLS_ACUITY_SCORE: 37
ADLS_ACUITY_SCORE: 38
ADLS_ACUITY_SCORE: 22
ADLS_ACUITY_SCORE: 22
ADLS_ACUITY_SCORE: 37
ADLS_ACUITY_SCORE: 36
ADLS_ACUITY_SCORE: 34
ADLS_ACUITY_SCORE: 36
TOILETING_ISSUES: NO
ADLS_ACUITY_SCORE: 27
ADLS_ACUITY_SCORE: 37
ADLS_ACUITY_SCORE: 33
ADLS_ACUITY_SCORE: 22
ADLS_ACUITY_SCORE: 36
ADLS_ACUITY_SCORE: 36
ADLS_ACUITY_SCORE: 41
ADLS_ACUITY_SCORE: 34
ADLS_ACUITY_SCORE: 34
ADLS_ACUITY_SCORE: 38
ADLS_ACUITY_SCORE: 36
ADLS_ACUITY_SCORE: 37
ADLS_ACUITY_SCORE: 36
ADLS_ACUITY_SCORE: 35
ADLS_ACUITY_SCORE: 41
ADLS_ACUITY_SCORE: 38
ADLS_ACUITY_SCORE: 34
ADLS_ACUITY_SCORE: 34
ADLS_ACUITY_SCORE: 41
ADLS_ACUITY_SCORE: 22
ADLS_ACUITY_SCORE: 38
ADLS_ACUITY_SCORE: 38
ADLS_ACUITY_SCORE: 22
ADLS_ACUITY_SCORE: 34
CHANGE_IN_FUNCTIONAL_STATUS_SINCE_ONSET_OF_CURRENT_ILLNESS/INJURY: NO
ADLS_ACUITY_SCORE: 26
ADLS_ACUITY_SCORE: 34
ADLS_ACUITY_SCORE: 35
ADLS_ACUITY_SCORE: 22
ADLS_ACUITY_SCORE: 36
ADLS_ACUITY_SCORE: 36
ADLS_ACUITY_SCORE: 38
ADLS_ACUITY_SCORE: 36
ADLS_ACUITY_SCORE: 22
ADLS_ACUITY_SCORE: 36
ADLS_ACUITY_SCORE: 22
ADLS_ACUITY_SCORE: 22
ADLS_ACUITY_SCORE: 36
ADLS_ACUITY_SCORE: 34
ADLS_ACUITY_SCORE: 23
ADLS_ACUITY_SCORE: 41
ADLS_ACUITY_SCORE: 38
ADLS_ACUITY_SCORE: 34
ADLS_ACUITY_SCORE: 22
ADLS_ACUITY_SCORE: 35
ADLS_ACUITY_SCORE: 37
ADLS_ACUITY_SCORE: 34
ADLS_ACUITY_SCORE: 35
ADLS_ACUITY_SCORE: 22
ADLS_ACUITY_SCORE: 33
ADLS_ACUITY_SCORE: 22
ADLS_ACUITY_SCORE: 49
WALKING_OR_CLIMBING_STAIRS_DIFFICULTY: YES
ADLS_ACUITY_SCORE: 49
ADLS_ACUITY_SCORE: 22
ADLS_ACUITY_SCORE: 37
ADLS_ACUITY_SCORE: 37
ADLS_ACUITY_SCORE: 22
ADLS_ACUITY_SCORE: 37
HEARING_DIFFICULTY_OR_DEAF: YES
ADLS_ACUITY_SCORE: 38
ADLS_ACUITY_SCORE: 41
ADLS_ACUITY_SCORE: 40
ADLS_ACUITY_SCORE: 35
ADLS_ACUITY_SCORE: 38
ADLS_ACUITY_SCORE: 27
ADLS_ACUITY_SCORE: 23
ADLS_ACUITY_SCORE: 35
ADLS_ACUITY_SCORE: 36
CONCENTRATING,_REMEMBERING_OR_MAKING_DECISIONS_DIFFICULTY: YES
ADLS_ACUITY_SCORE: 36
ADLS_ACUITY_SCORE: 36
ADLS_ACUITY_SCORE: 23
ADLS_ACUITY_SCORE: 34
ADLS_ACUITY_SCORE: 36
ADLS_ACUITY_SCORE: 34
ADLS_ACUITY_SCORE: 37
ADLS_ACUITY_SCORE: 35
ADLS_ACUITY_SCORE: 37
ADLS_ACUITY_SCORE: 34
ADLS_ACUITY_SCORE: 36
ADLS_ACUITY_SCORE: 22
ADLS_ACUITY_SCORE: 22
ADLS_ACUITY_SCORE: 36
ADLS_ACUITY_SCORE: 33
ADLS_ACUITY_SCORE: 36
ADLS_ACUITY_SCORE: 22
ADLS_ACUITY_SCORE: 40
ADLS_ACUITY_SCORE: 34
ADLS_ACUITY_SCORE: 22
ADLS_ACUITY_SCORE: 22
ADLS_ACUITY_SCORE: 36
ADLS_ACUITY_SCORE: 35
ADLS_ACUITY_SCORE: 34
ADLS_ACUITY_SCORE: 22
ADLS_ACUITY_SCORE: 49
ADLS_ACUITY_SCORE: 40
ADLS_ACUITY_SCORE: 37
ADLS_ACUITY_SCORE: 22
ADLS_ACUITY_SCORE: 41
ADLS_ACUITY_SCORE: 22
ADLS_ACUITY_SCORE: 36
ADLS_ACUITY_SCORE: 35
ADLS_ACUITY_SCORE: 33
ADLS_ACUITY_SCORE: 22
ADLS_ACUITY_SCORE: 36
ADLS_ACUITY_SCORE: 34
ADLS_ACUITY_SCORE: 36
ADLS_ACUITY_SCORE: 40
ADLS_ACUITY_SCORE: 27
ADLS_ACUITY_SCORE: 36
ADLS_ACUITY_SCORE: 36
ADLS_ACUITY_SCORE: 26
ADLS_ACUITY_SCORE: 36
ADLS_ACUITY_SCORE: 23
ADLS_ACUITY_SCORE: 41
ADLS_ACUITY_SCORE: 38
ADLS_ACUITY_SCORE: 36
WALKING_OR_CLIMBING_STAIRS: AMBULATION DIFFICULTY, REQUIRES EQUIPMENT
ADLS_ACUITY_SCORE: 22
ADLS_ACUITY_SCORE: 22
ADLS_ACUITY_SCORE: 38
ADLS_ACUITY_SCORE: 27
ADLS_ACUITY_SCORE: 35
ADLS_ACUITY_SCORE: 22
ADLS_ACUITY_SCORE: 37
ADLS_ACUITY_SCORE: 34
ADLS_ACUITY_SCORE: 36
ADLS_ACUITY_SCORE: 34
ADLS_ACUITY_SCORE: 22
ADLS_ACUITY_SCORE: 22
ADLS_ACUITY_SCORE: 23
ADLS_ACUITY_SCORE: 36
ADLS_ACUITY_SCORE: 27
ADLS_ACUITY_SCORE: 36
ADLS_ACUITY_SCORE: 36
ADLS_ACUITY_SCORE: 27
ADLS_ACUITY_SCORE: 22
ADLS_ACUITY_SCORE: 36
TRANSFERRING: 0-->ASSISTANCE NEEDED (DEVELOPMENTALLY APPROPRIATE)
ADLS_ACUITY_SCORE: 22
ADLS_ACUITY_SCORE: 27
ADLS_ACUITY_SCORE: 27
ADLS_ACUITY_SCORE: 38
ADLS_ACUITY_SCORE: 22
ADLS_ACUITY_SCORE: 36
ADLS_ACUITY_SCORE: 35
ADLS_ACUITY_SCORE: 38
ADLS_ACUITY_SCORE: 34
ADLS_ACUITY_SCORE: 22
ADLS_ACUITY_SCORE: 34
ADLS_ACUITY_SCORE: 34
ADLS_ACUITY_SCORE: 22
ADLS_ACUITY_SCORE: 37
ADLS_ACUITY_SCORE: 38
ADLS_ACUITY_SCORE: 38
ADLS_ACUITY_SCORE: 36
ADLS_ACUITY_SCORE: 36
ADLS_ACUITY_SCORE: 37
ADLS_ACUITY_SCORE: 22
ADLS_ACUITY_SCORE: 34
ADLS_ACUITY_SCORE: 22
ADLS_ACUITY_SCORE: 40
ADLS_ACUITY_SCORE: 34
ADLS_ACUITY_SCORE: 38
ADLS_ACUITY_SCORE: 27
ADLS_ACUITY_SCORE: 34
ADLS_ACUITY_SCORE: 36
ADLS_ACUITY_SCORE: 35
ADLS_ACUITY_SCORE: 38
ADLS_ACUITY_SCORE: 37
ADLS_ACUITY_SCORE: 22
ADLS_ACUITY_SCORE: 36
ADLS_ACUITY_SCORE: 34
ADLS_ACUITY_SCORE: 22
ADLS_ACUITY_SCORE: 36
ADLS_ACUITY_SCORE: 36
ADLS_ACUITY_SCORE: 38
ADLS_ACUITY_SCORE: 22
ADLS_ACUITY_SCORE: 34
ADLS_ACUITY_SCORE: 23
ADLS_ACUITY_SCORE: 34
ADLS_ACUITY_SCORE: 41
ADLS_ACUITY_SCORE: 22
ADLS_ACUITY_SCORE: 34
ADLS_ACUITY_SCORE: 36
ADLS_ACUITY_SCORE: 36
ADLS_ACUITY_SCORE: 22
ADLS_ACUITY_SCORE: 34
ADLS_ACUITY_SCORE: 22
ADLS_ACUITY_SCORE: 22
ADLS_ACUITY_SCORE: 35

## 2023-01-01 ASSESSMENT — ENCOUNTER SYMPTOMS
POOR WOUND HEALING: 1
SKIN CHANGES: 0
NAIL CHANGES: 0
NAIL CHANGES: 0
SEIZURES: 0
POOR WOUND HEALING: 1
SEIZURES: 0
SKIN CHANGES: 0
SEIZURES: 0

## 2023-01-01 ASSESSMENT — VISUAL ACUITY
OD: WITH CORRECTIVE LENSES
OS: WITHOUT CORRECTIVE LENSES
OS: WITHOUT CORRECTIVE LENSES
OD: WITHOUT CORRECTIVE LENSES
OS: WITH CORRECTIVE LENSES
OD: WITHOUT CORRECTIVE LENSES

## 2023-01-01 ASSESSMENT — PAIN SCALES - GENERAL
PAINLEVEL: EXTREME PAIN (8)
PAINLEVEL: SEVERE PAIN (6)

## 2023-01-01 ASSESSMENT — COPD QUESTIONNAIRES
COPD: 0

## 2023-01-04 NOTE — TELEPHONE ENCOUNTER
I am still not able to find any mention of who is requesting this ffERG test and there are no current [facility].  Jacqueline Lynch NP at [facility] has sent requests in the past but there is usually an Ophtho MD who has seen the patient.  If she is indeed the staff requesting testing, then okay.  However, we still need current eye notes.  There is a summary document in Care Everywhere w/diagnoses but that does not show any recent or pertinent clinical eye notes.  Patient has been seen before for ERG testing in  but we still need current documentation.  Also, Visit Info and Prog Notes from last visit w/[facility] 12-01-22 do not mention repeat ERG testing (and there is no ERG Referral if he decided to do so).  We can select a date/time for ERG testing but I believe we need more documentation than this to justify scheduling.  I am here all week if questions.  Thx.       TT is requesting any current eye notes and a anyone who is referring this pt besides Jacqueline Lynch NP at [facility]     I have tried calling Danielle many times, but always get her VM and response is a day or two later .     Janna Sebastian Communication Facilitator on 1/4/2023 at 9:01 AM

## 2023-01-05 NOTE — TELEPHONE ENCOUNTER
01-05-23  2:15  Retrieved RightFax received today from Danielle Camp, RN,  Community Care Coordinator at [facility].  Per clinic notes of Dr. Jame Nunn (resident) and Dr. Ashton (staff), ffERG is being requested.  These notes will be sent to HIM.    Called and spoke w/pt.  There is an appt w/Dr. Simmons scheduled at [facility] for Jan 26 @8:30.  Agreed that ffERG appt could follow this appt on the same day.  Expected duration is 2 hours and eyes will be dilated.     Will route to  to schedule:    Thur Jan 26 @10:00  Eye, Electrodiagnostic  Attending = Quentin  Referring = Jame Nunn/[facility]-resident MD  Appt Notes = ffERG (sm, TT to do), appt w/[place] first  Duration = 2 hours  Message = chart to Dr. Simmons first then to  for TT

## 2023-01-16 NOTE — TELEPHONE ENCOUNTER
RN reached out to Dr. Starr.   Dr. Starr is prescribing Augmentin 875 for 14 days.  Also to reach out to patient and have him come in on Weds to see Dr. Starr to evaluate patient's wound.

## 2023-01-16 NOTE — TELEPHONE ENCOUNTER
RN called and spoke with Wiliam, patient's spouse.  Told per Dr. Starr prescribed another round of ABX. Sent it to [facility] pharmacy.  RN then Wiliam if she feels patient needs to be seen sooner as Dr. Starr is willing to accommodate and see patient on Weds. Per Wiliam, she feels this ABX will help and will just come on Friday on patient's originally scheduled appt. RN told Wiliam that is absolutely fine.   RN then reported back to Dr. Starr.    Herlinda Nicolas RN

## 2023-01-20 PROBLEM — T14.8XXA WOUND INFECTION: Status: ACTIVE | Noted: 2023-01-01

## 2023-01-20 PROBLEM — L08.9 WOUND INFECTION: Status: ACTIVE | Noted: 2023-01-01

## 2023-01-20 PROBLEM — M14.671 CHARCOT'S JOINT OF RIGHT FOOT: Status: ACTIVE | Noted: 2023-01-01

## 2023-01-20 NOTE — PHARMACY-VANCOMYCIN DOSING SERVICE
"Pharmacy Vancomycin Initial Note  Date of Service 2023  Patient's  1965  57 year old, male    Indication: Skin and Soft Tissue Infection    Current estimated CrCl = Estimated Creatinine Clearance: 75.2 mL/min (based on SCr of 1.24 mg/dL).    Creatinine for last 3 days  2023: 12:11 PM Creatinine 1.24 mg/dL    Recent Vancomycin Level(s) for last 3 days  No results found for requested labs within last 72 hours.      Vancomycin IV Administrations (past 72 hours)      No vancomycin orders with administrations in past 72 hours.                Nephrotoxins and other renal medications (From now, onward)    Start     Dose/Rate Route Frequency Ordered Stop    23 1215  piperacillin-tazobactam (ZOSYN) 4.5 g vial to attach to  mL bag        Note to Pharmacy: For [facility], [facility] and [facility]: For Zosyn-naive patients, use the \"Zosyn initial dose + extended infusion\" order panel.    4.5 g  over 30 Minutes Intravenous ONCE 23 1212            Contrast Orders - past 72 hours (72h ago, onward)    None          [employer] Prediction of Planned Initial Vancomycin Regimen  Loading dose: N/A  Regimen: 1000 mg IV every 12 hours.  Start time: 13:37 on 2023  Exposure target: AUC24 (range)400-600 mg/L.hr   AUC24,ss: 537 mg/L.hr  Probability of AUC24 > 400: 81 %  Ctrough,ss: 18 mg/L  Probability of Ctrough,ss > 20: 39 %  Probability of nephrotoxicity (Lodise CANELO ): 14 %          Plan:  1. Start vancomycin  1000 mg IV q12h.   2. Vancomycin monitoring method: AUC  3. Vancomycin therapeutic monitoring goal: 400-600 mg*h/L  4. Pharmacy will check vancomycin levels as appropriate in 1-3 Days.    5. Serum creatinine levels should be ordered daily for the first week of therapy and at least twice weekly for subsequent weeks.      Dara Song [facility]    "

## 2023-01-20 NOTE — ED NOTES
Report called to Saniya TOVAR   Transfused blood. History of iron deficiency anemia so prescribe iron supplement. Could be a slow gastrointestinal bleed. Prescribe famotidine. Stop clopidrogel. Repeat CBC in a few days. Follow up with Gastroenterology outpatient if endoscopy is desired.

## 2023-01-20 NOTE — ED PROVIDER NOTES
"    [facility] [place] ([facility])   ED PROVIDER NOTE  January 20, 2023  ED 20 11:38 AM   History     Chief Complaint   Patient presents with     Foot Injury     Infection post surgery     The history is provided by the patient and medical records.     Nehemiah Magallon is a 57 year old male with history of type II diabetes complicated by peripheral neuropathy, bilateral foot deformities, right Charcot foot who presents to the Emergency Department with increased purulent drainage from his right foot wounds.  On 11/17/2022 he underwent orthopedic surgery with Dr. Bryon Starr for Charcot foot and his right foot, with hardware placed.  His foot was casted, and he developed a necrotic sore on the side of his foot that kept getting more and more infected.  He has wounds on both the medial and lateral aspect of the foot, the medial aspect wound is larger in size.  He states that his wounds became markedly worse around Sunday, 5 days ago, on 1/15/2023.  He states that \"it got hugely infected and was eating into the skin, pus started flying out and then it was green pus, then liquid pus and then blood and kept draining massive amounts.\" Wife sent several pictures through [employer] including one on Sunday with his foot looking bright red and she requested antibiotics. He was prescribed a 14 day course of Augmentin. He had an orthopedic clinic visit today and was seen by his surgeon Dr. Starr who sent him to the Emergency Department with plans for admission to debride the wounds, remove hardware and place a wound vac. Wound was wrapped by orthopedic clinic nurse prior to arrival.  Patient notes that if you press on the dorsal aspect of his right foot pus will squirt out of the wound on the medial aspect of his foot. He has no other symptoms other than feeling a bit anxious about what is going on, is worried about losing his foot. He notes that this diabetes is very well controlled with A1c of 5.6.  He has " "peripheral neuropathy and denies any pain at the site, declines any pain medication.  Never smoker.  No fevers or chills.        Past Medical History  Past Medical History:   Diagnosis Date     Chronic pain syndrome 10/24/2014     Diabetes mellitus, type 2 (H)      Diabetic Charcot foot (H)      Diabetic retinopathy associated with diabetes mellitus due to underlying condition (H)      Diverticulitis of colon      Gastroesophageal reflux disease      Hepatitis C 2017    treated     History of skin cancer      Hypertension      Hypothyroidism      Legally blind      Midfoot collapse of right lower extremity      Migraine      NAFLD (nonalcoholic fatty liver disease)      Nephrolithiasis      Neuropathy      MARGARITO (obstructive sleep apnea)      Rib pain 10/24/2014     S/P colectomy 10/14/2014     Thyroid cancer (H) 10/14/2014     Past Surgical History:   Procedure Laterality Date     ARTHRODESIS FOOT Right 11/17/2022    Procedure: Right midfoot/talonavicular osteotomy and reduction of deformity Right midfoot/talonavicular arthrodesis, achilles lengthening;  Surgeon: Bryon Starr MD;  Location: [facility] OR     CHOLECYSTECTOMY  1986     COLECTOMY      @ 6 years ago, sigmoid     COLONOSCOPY  2016     FOOT SURGERY Left 2021     LENGTHEN TENDON ACHILLES Right 11/17/2022    Procedure: LENGTHENING, TENDON, ACHILLES;  Surgeon: Bryon Starr MD;  Location: [facility] OR     acetaminophen (TYLENOL) 325 MG tablet  ammonium lactate (LAC-HYDRIN) 12 % external lotion  amoxicillin-clavulanate (AUGMENTIN) 875-125 MG tablet  aspirin (ASA) 81 MG EC tablet  bacitracin-neomycin-polymyxin (NEOSPORIN) 400-5-5000 external ointment  Calcium-Vitamin D 500-3.125 MG-MCG TABS  cetirizine (ZYRTEC) 10 MG tablet  empagliflozin (JARDIANCE) 25 MG TABS tablet  fluticasone (FLONASE) 50 MCG/ACT nasal spray  furosemide (LASIX) 40 MG tablet  gabapentin (NEURONTIN) 600 MG tablet  gabapentin (NEURONTIN) 600 MG tablet  Gauze Pads & Dressings (KERLIX SPONGES) 4\"X4\" " "PADS  Gauze Pads & Dressings (TELFA [place] DRESSING) 4\"X8\" PADS  hydrOXYzine (ATARAX) 25 MG tablet  insulin reg HIGH CONC (HUMULIN R U-500 HIGH CONC) 500 Units/mL injection  levothyroxine (SYNTHROID) 137 MCG tablet  levothyroxine (SYNTHROID/LEVOTHROID) 125 MCG tablet  lidocaine (LIDODERM) 5 % patch  linaclotide (LINZESS) 145 MCG capsule  Liniments (VAPORIZING CREAM) 4.7-1.2-2.6 % CREA  lisinopril (ZESTRIL) 40 MG tablet  metFORMIN (GLUCOPHAGE XR) 500 MG 24 hr tablet  metFORMIN (GLUCOPHAGE XR) 500 MG 24 hr tablet  methocarbamol (ROBAXIN) 750 MG tablet  omeprazole (PRILOSEC) 20 MG DR capsule  oxyCODONE (ROXICODONE) 5 MG tablet  polyethylene glycol (MIRALAX) 17 g packet  pravastatin (PRAVACHOL) 40 MG tablet  Semaglutide (OZEMPIC, 1 MG/DOSE, SC)  senna-docusate (SENOKOT-S/PERICOLACE) 8.6-50 MG tablet  sodium fluoride dental gel (PREVIDENT) 1.1 % GEL topical gel  SUMAtriptan (IMITREX) 25 MG tablet  topiramate (TOPAMAX) 100 MG tablet  Vitamin D3 (CHOLECALCIFEROL) 25 mcg (1000 units) tablet      Allergies   Allergen Reactions     Atorvastatin      Other reaction(s): Hyperglycemia     Celebrex [Celecoxib] Other (See Comments)     [facility]     Family History  Family History   Problem Relation Age of Onset     Diabetes Mother      Cancer Mother      Diabetes Father      Heart Disease Father      Anesthesia Reaction No family hx of      Clotting Disorder No family hx of      Glaucoma No family hx of      Macular Degeneration No family hx of      Social History   Social History     Tobacco Use     Smoking status: Never     Smokeless tobacco: Never   Substance Use Topics     Alcohol use: Not Currently     Comment: rarely     Drug use: No         A medically appropriate review of systems was performed with pertinent positives and negatives noted in the HPI, and all other systems negative.      Physical Exam   /62   Pulse 95   Temp 98.2  F (36.8  C) (Oral)   Resp 16   Ht 1.778 m (5' 10\")   Wt 95.3 " kg (210 lb)   SpO2 96%   BMI 30.13 kg/m       Physical Exam  Physical Exam   Constitutional:   well nourished, well developed, resting comfortably   HENT:   Head: Normocephalic and atraumatic.   Eyes: Conjunctivae are normal. Pupils are equal, round, and reactive to light.   Cardiovascular: regular rate and rhythm without murmurs or gallops  Pulmonary/Chest: Clear to auscultation bilaterally, with no wheezes or retractions. No respiratory distress.  GI: Soft with good bowel sounds.  Non-tender, non-distended  Back:  No bony or CVA tenderness   Musculoskeletal: Right foot with significant swelling and purulent drainage, necrotic area to medial foot.--see photographs below, decreased sensation of right foot  Skin: Skin is warm and dry. No rash noted.   Neurological: alert and oriented to person, place, and time. Nonfocal exam  Psychiatric:  normal mood and affect.     Right foot        ED Course     ED Course as of 01/20/23 1331   Fri Jan 20, 2023   1204 Case discussed with Ortho     Procedures           Results for orders placed or performed during the hospital encounter of 01/20/23 (from the past 24 hour(s))   CBC with platelets differential    Narrative    The following orders were created for panel order CBC with platelets differential.  Procedure                               Abnormality         Status                     ---------                               -----------         ------                     CBC with platelets and d...[[id number]]  Abnormal            Final result                 Please view results for these tests on the individual orders.   Basic metabolic panel   Result Value Ref Range    Sodium 141 133 - 144 mmol/L    Potassium 4.2 3.4 - 5.3 mmol/L    Chloride 113 (H) 94 - 109 mmol/L    Carbon Dioxide (CO2) 23 20 - 32 mmol/L    Anion Gap 5 3 - 14 mmol/L    Urea Nitrogen 33 (H) 7 - 30 mg/dL    Creatinine 1.24 0.66 - 1.25 mg/dL    Calcium 9.6 8.5 - 10.1 mg/dL    Glucose 98 70 - 99 mg/dL    GFR  Estimate 67 >60 mL/min/1.73m2   CRP inflammation   Result Value Ref Range    CRP Inflammation 69.0 (H) 0.0 - 8.0 mg/L   Erythrocyte sedimentation rate auto   Result Value Ref Range    Erythrocyte Sedimentation Rate 59 (H) 0 - 20 mm/hr   CBC with platelets and differential   Result Value Ref Range    WBC Count 7.8 4.0 - 11.0 10e3/uL    RBC Count 4.14 (L) 4.40 - 5.90 10e6/uL    Hemoglobin 11.1 (L) 13.3 - 17.7 g/dL    Hematocrit 34.7 (L) 40.0 - 53.0 %    MCV 84 78 - 100 fL    MCH 26.8 26.5 - 33.0 pg    MCHC 32.0 31.5 - 36.5 g/dL    RDW 13.0 10.0 - 15.0 %    Platelet Count 232 150 - 450 10e3/uL    % Neutrophils 67 %    % Lymphocytes 22 %    % Monocytes 7 %    % Eosinophils 3 %    % Basophils 0 %    % Immature Granulocytes 1 %    NRBCs per 100 WBC 0 <1 /100    Absolute Neutrophils 5.2 1.6 - 8.3 10e3/uL    Absolute Lymphocytes 1.7 0.8 - 5.3 10e3/uL    Absolute Monocytes 0.5 0.0 - 1.3 10e3/uL    Absolute Eosinophils 0.2 0.0 - 0.7 10e3/uL    Absolute Basophils 0.0 0.0 - 0.2 10e3/uL    Absolute Immature Granulocytes 0.1 <=0.4 10e3/uL    Absolute NRBCs 0.0 10e3/uL   Asymptomatic Influenza A/B & SARS-CoV2 (COVID-19) Virus PCR Multiplex Nasopharyngeal    Specimen: Nasopharyngeal; Swab   Result Value Ref Range    Influenza A PCR Negative Negative    Influenza B PCR Negative Negative    RSV PCR Negative Negative    SARS CoV2 PCR Negative Negative    Narrative    Testing was performed using the Xpert Xpress CoV2/Flu/RSV Assay on the [employer] GeneXpert Instrument. This test should be ordered for the detection of SARS-CoV-2 and influenza viruses in individuals who meet clinical and/or epidemiological criteria. Test performance is unknown in asymptomatic patients. This test is for in vitro diagnostic use under the FDA EUA for laboratories certified under CLIA to perform high or moderate complexity testing. This test has not been FDA cleared or approved. A negative result does not rule out the presence of PCR inhibitors in the specimen  or target RNA in concentration below the limit of detection for the assay. If only one viral target is positive but coinfection with multiple targets is suspected, the sample should be re-tested with another FDA cleared, approved, or authorized test, if coinfection would change clinical management. This test was validated by the [facility] [employer]. These laboratories are certified under the Clinical Laboratory Improvement Amendments of 1988 (CLIA-88) as qualified to perform high complexity laboratory testing.     Medications   piperacillin-tazobactam (ZOSYN) 4.5 g vial to attach to  mL bag (4.5 g Intravenous New Bag 1/20/23 1311)             Medical Decision Making  The patient presented with a problem that is a chronic illness severe exacerbation, progression, or side effect of treatment.    The patient's evaluation involved:  review of 2 prior external note(s) (see separate area of note for details)  ordering and review of 3+ test(s) (see separate area of note for details)  review of 2 test result(s) ordered prior to this encounter (see separate area of note for details)  discussion of management or test interpretation with another health professional (orthopedics)    The patient's management involved a decision regarding hospitalization.     Assessments & Plan (with Medical Decision Making)       I have reviewed the nursing notes.    Emergency Department course:  The patient was seen and examined at 1135 am in room 20.  Right foot x-ray from clinic today shows Impression:  1. Postsurgical changes of medial column with new fracturing of the third most proximal plate fixation screw.  2. Changes of Charcot arthropathy.  3. Medial sided soft tissue swelling, increased compared to 12/30/2022.      Laboratory studies today show elevated inflammatory markers, with an CRP of 69 and ESR of 59.  WBC is normal at 7.8.  The patient is anemic, with a hemoglobin of 11.2.  Basic metabolic panel shows a  slightly elevated BUN of 33 and is otherwise unremarkable  Blood cultures x2 were drawn and are in process.  Wound culture is in process.  Influenza A and B are negative.  COVID-19 is negative.    I consulted orthopedics , who recommended treating the patient with vancomycin IV and Zosyn IV and admitting him to the orthopedic surgery service under the care of Dr. Starr.     Nehemiah Magallon is a 57 year old male with a history of diabetes and right foot surgery on November 17 2022 who presents with worsening infection and necrosis of his right foot surgical site as well as new fracturing of the third proximal plate fixation screw.  I have ordered vancomycin and Zosyn IV for postsurgical wound infection.  Wound culture is pending.  The patient will be admitted to the orthopedic service for operative intervention under the care of Dr. Starr.     I have reviewed the findings, diagnosis, plan and need for follow up with the patient.    New Prescriptions    No medications on file       Final diagnoses:   Wound infection   Charcot's joint of right foot     I, Marah Samuel, am serving as a trained medical scribe to document services personally performed by Sunita Eaton MD based on the provider's statements to me on January 20, 2023.  This document has been checked and approved by the attending provider.    I, Sunita Eaton MD, was physically present and have reviewed and verified the accuracy of this note documented by Marah Samuel, medical scribe.    This note was created in part by the use of Dragon voice recognition dictation system. Inadvertent grammatical errors and typographical errors may still exist.  MD Sunita Diaz MD       1/20/2023   [facility] EMERGENCY DEPARTMENT     Sunita Eaton MD  01/20/23 1548

## 2023-01-20 NOTE — PROGRESS NOTES
RN called patient placement and spoke with Clover. Per Clover, there is absolutely no beds for days and per her advise, patient should go to the ED to be admitted that way. Of note, patient was seen in clinic today with Dr. Starr. The plan is to do a I and I with wound vac. Dr. Starr informed the resident on call about this so patient's admission can be expedited.   Dr. Starr will place a case request and inform surgery scheduler about this.   [place] expressed understanding [place] [facility] [place]    Herlinda Nicolas RN

## 2023-01-20 NOTE — NURSING NOTE
Reason For Visit:   Chief Complaint   Patient presents with     RECHECK     Right foot wound check. Less red but still draining.       There were no vitals taken for this visit.         Lilliam Salcedo, ATC

## 2023-01-20 NOTE — ED TRIAGE NOTES
pt had surgery on right foot.  Pt states he has developed a post op infection.  Sent in from clinic for debridement and wound vac

## 2023-01-20 NOTE — LETTER
1/20/2023         RE: Nehemiah Magallon  [address]  [place] [address]        Dear Colleague,    Thank you for referring your patient, Nehemiah Magallon, to the [facility] [place]. Please see a copy of my visit note below.    RN called patient placement and spoke with Clover. Per Clover, there is absolutely no beds for days and per her advise, patient should go to the ED to be admitted that way. Of note, patient was seen in clinic today with Dr. Starr. The plan is to do a I and I with wound vac. Dr. Starr informed the resident on call about this so patient's admission can be expedited.   Dr. Starr will place a case request and inform surgery scheduler about this.   Patient expressed understanding and will go to the [facility] ED.    Herlinda Nicolas RN      I saw Mr. Magallon today in follow-up after his right foot Charcot reconstruction, date of surgery 11/17/2022.  For his last several appointments we have been following him for a surgical wound necrosis on the medial and lateral sides of the right foot, worst medially.  He denies any fevers, but he did have an episode of chills prior to this past weekend.  Then, this past weekend on Sunday he started noticing increasing drainage from the medial wound and redness on the dorsum of the right foot.  The drainage was from the proximal aspect of the medial skin necrosis.  Augmentin was called in which did appear to receive the redness but the drainage persisted.  At times it has been clear, and at times has been thick and purulent appearing.    Examination shows the patient to be awake, alert, and in no acute distress.  He is nonseptic appearing from a general clinical standpoint.  Breathing pattern is regular and nonlabored on room air.  He is right foot and ankle and leg are inspected carefully.  There does not appear to be any erythema proximal to his ankle.  He does have blanchable erythema on the dorsum of the right foot.  His  medial area skin necrosis appears to be stable to somewhat smaller, but does appear to have expressible drainage from its proximal aspect at its interface with the healthy skin around it.  The drainage at this time appears to be clear but does appear to be rather copious.  The lateral foot wound does have a dry eschar as well, narrow, but near full length.  There is no expressible drainage or surrounding fluctuance from the lateral incision.  His dorsal hallux wound appears well-healed, as do his tendo Achilles tendon incisions.    Independent review of imaging studies was performed including weightbearing AP, oblique, and lateral right foot radiographs dated today, 1/20/2023.  Image findings were discussed with and shown to the patient, and comparison is made with previous radiographs.  Alignment appears to be stable.  No overt signs for obvious bony infection.  1 screw in the talus appears to be broken compared with the last x-rays, and I discussed this new finding with the patient.  However, the foot alignment appears to be stable and the hardware is otherwise stable appearing and intact.    Impression:  Surgical wound necrosis and infection, status post right foot Charcot reconstruction 11/17/2022.    Plan:  Admit to hospital for antibiotic therapy and surgical debridement of his wound with placement of the VAC and possible antibiotic beads.  Risks, benefits, and alternatives to surgery were discussed.  Postop plan was discussed.  All questions were answered.  We we will obtain baseline acute phase reactants upon mission to hospital.  Need for potential multiple surgeries was discussed.  The need for eventual amputation was discussed, and he did verbalize understanding of this and PT has has come to terms with that eventual possibility, but mutual agreement on try and avoid this for the time being.  Mr. Magallon would like his Mepilex refilled [facility]  He would like a new  supply of 60, 5 per box, 12 boxes, with a refill.  He also requested astronger antibiotic.          Again, thank you for allowing me to participate in the care of your patient.      Sincerely,    Bryon Starr MD

## 2023-01-20 NOTE — PROGRESS NOTES
I saw Mr. Magallon today in follow-up after his right foot Charcot reconstruction, date of surgery 11/17/2022.  For his last several appointments we have been following him for a surgical wound necrosis on the medial and lateral sides of the right foot, worst medially.  He denies any fevers, but he did have an episode of chills prior to this past weekend.  Then, this past weekend on Sunday he started noticing increasing drainage from the medial wound and redness on the dorsum of the right foot.  The drainage was from the proximal aspect of the medial skin necrosis.  Augmentin was called in which did appear to receive the redness but the drainage persisted.  At times it has been clear, and at times has been thick and purulent appearing.    Examination shows the patient to be awake, alert, and in no acute distress.  He is nonseptic appearing from a general clinical standpoint.  Breathing pattern is regular and nonlabored on room air.  He is right foot and ankle and leg are inspected carefully.  There does not appear to be any erythema proximal to his ankle.  He does have blanchable erythema on the dorsum of the right foot.  His medial area skin necrosis appears to be stable to somewhat smaller, but does appear to have expressible drainage from its proximal aspect at its interface with the healthy skin around it.  The drainage at this time appears to be clear but does appear to be rather copious.  The lateral foot wound does have a dry eschar as well, narrow, but near full length.  There is no expressible drainage or surrounding fluctuance from the lateral incision.  His dorsal hallux wound appears well-healed, as do his tendo Achilles tendon incisions.    Independent review of imaging studies was performed including weightbearing AP, oblique, and lateral right foot radiographs dated today, 1/20/2023.  Image findings were discussed with and shown to the patient, and comparison is made with previous radiographs.   Alignment appears to be stable.  No overt signs for obvious bony infection.  1 screw in the talus appears to be broken compared with the last x-rays, and I discussed this new finding with the patient.  However, the foot alignment appears to be stable and the hardware is otherwise stable appearing and intact.    Impression:  Surgical wound necrosis and infection, status post right foot Charcot reconstruction 11/17/2022.    Plan:  Admit to hospital for antibiotic therapy and surgical debridement of his wound with placement of the VAC and possible antibiotic beads.  Risks, benefits, and alternatives to surgery were discussed.  Postop plan was discussed.  All questions were answered.  We we will obtain baseline acute phase reactants upon mission to hospital.  Need for potential multiple surgeries was discussed.  The need for eventual amputation was discussed, and he did verbalize understanding of this and PT has has come to terms with that eventual possibility, but mutual agreement on try and avoid this for the time being.  Mr. Magallon would like his Mepilex refilled [facility]  He would like a new supply of 60, 5 per box, 12 boxes, with a refill.  He also requested astronger antibiotic.

## 2023-01-21 NOTE — PROGRESS NOTES
"Ortho staff  Pt seen and examined this morning preoperatively    History:  56yo male with T2DM & other comorbidities, s/p R Charcot foot reconstruction 11/17/2022 complicated by wound necrosis of medial surgical wound and to a lesser extent the lateral wound. 6d history of increased serosang drainage from medial wound.Has been on oral Augmentin, changed to Zosyn & Vanco upon admission yesterday. Indicated for I&D and VAC placement with possible antibiotic bead placement - plan for this in the OR today here at [facility] and admitted from clinic yesterday via the [facility] ED to effect this plan. Currently without new complaints, not having any pain.    Vitals:  /57 (BP Location: Right arm)   Pulse 89   Temp 97.2  F (36.2  C) (Oral)   Resp 16   Ht 1.778 m (5' 10\")   Wt 95.3 kg (210 lb)   SpO2 98%   BMI 30.13 kg/m      Exam:  Awake, alert, pleasant, cooperative, resting comfortably in bed in NAD, appropriately conversant.  Nonlabored breathing pattern on room air  R foot dressings in place, medial side with clear-serosang drainage - wounds inspected yesterday  R forefoot warm and well perfused  L foot with intact skin throughout, well healed prior surgical scars and prior ulcer sites, no overt signs for infection on this side.    Labs yesterday:  INR 1.06, PTT 30, K 3.9, WBC 7.8, 67% neutrophils    Impression:  R foot surgical wound necrosis/infection s/p Charcot reconstruction    Plan:  I&D today, VAC placement, possible antibiotic beads  Possibility of multiple additional further procedures discussed  Possibility of eventual failure to eradicate infection and heal wounds which may lead to amputation discussed  Risks, benefits, and alternatives to surgery discussed  Surgical site marked with patient participation  All questions answered; Mr. Magallon verbalized understanding of and agreement with plan  "

## 2023-01-21 NOTE — CONSULTS
[facility]  Consult Note - Hospitalist Service, GOLD TEAM   Date of Admission:  1/20/2023  Consult Requested by: Orthopedic surgery  Reason for Consult: Medical comanagement    Assessment & Plan   Nehemiah Magallon is a 57 year old male patient with a past medical history significant for T2DM c/b charcot foot & diabetic retinopathy + neuropathy, legally blind, GERD, hepatitis C treated, HTN, HLD, papillary thyroid cancer in remission, hypothyroidism, NAFLD, MARGARITO, chronic pain, and h/o sigmoid colectomy for diverticulitis who is presenting from Ortho clinic for R-foot surgery with Dr. Starr tomorrow.      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 No indication for further diagnostic test at this time.     Blood cx obtained  -elevated inflammatory markers    Agree with IV abx for now; okay for Zosyn, can discontinue IV vancomycin if MRSA nares negative.    Will recommend ID consult following OR tomorrow to guide abx therapy     Hold pharmacologic dvt ppx tonight    Defer pain mgt to primary team    Hold PTA ASA    Pain control per primary team    Cont gabapentin 1200 mg tid       # T2DM currently well controlled, history of diabetic retinopathy and diabetic neuropathy.  -Patient on Jardiance, Semiglutide, Metformin.  -Per patient, Humilin R U-500 [facility]  -A1c 5.6% in Nov/2022    Hold PTA diabetic medications     Continue to monitor blood sugar for now    Can consider short-acting aspart with meals post-op     #Anemia  - hgb at time of discharge during last hospital stay 10.9  -hgb 11.1     monitor cbc      #Hypothyroidism   - Continue PTA  "Synthroid 137 mcg      #MARGARITO on CPAP   - Continue home CPAP     #HTN and HLD    Hold PTA lisinopril 40 mg     Continue PTA Pravastatin 40 mg at bedtime      #GERD   - Continue Protonix in place of PTA omeprazole     #Migraines   - Continue PTA topamax; imitrex prior to onset  #Allergic rhinitis - PTA flonase, Cetrizine  #History of hepatitis C, treated  #History of papillary thyroid cancer, in remission  #History of nonalcoholic fatty liver disease  #History of nephrolithiasis  #Chronic pain syndrome  #Legally blind  #History of migraine headaches.        Clinically Significant Risk Factors Present on Admission                  # Hypertension: home medication list includes antihypertensive(s)      # Obesity: Estimated body mass index is 30.13 kg/m  as calculated from the following:    Height as of this encounter: 1.778 m (5' 10\").    Weight as of this encounter: 95.3 kg (210 lb).           GABRIEL BARRAZA MD  Hospitalist Service, GOLD TEAM   Securely message with Vocera (more info)  Text page via [facility] Paging/Directory   See signed in provider for up to date coverage information  ______________________________________________________________________    Chief Complaint   Worsening right foot wound    History is obtained from the patient    History of Present Illness   Nehemiah Magallon is a 57 year old male patient with a past medical history significant for T2DM c/b charcot foot & diabetic retinopathy + neuropathy, legally blind, GERD, hepatitis C treated, HTN, HLD, papillary thyroid cancer in remission, hypothyroidism, NAFLD, MARGARITO, chronic pain, and h/o sigmoid colectomy for diverticulitis who is presenting from Ortho clinic for R-foot surgery with Dr. Starr tomorrow.     Per patient he has been having increasing drainage from his R-foor for the past week. No fever, chills, sob. He denies any R-foot pain which he attributes to his neuropathy.       Past Medical History    Past Medical History:   Diagnosis Date     Chronic " pain syndrome 10/24/2014     Diabetes mellitus, type 2 (H)      Diabetic Charcot foot (H)      Diabetic retinopathy associated with diabetes mellitus due to underlying condition (H)      Diverticulitis of colon      Gastroesophageal reflux disease      Hepatitis C 2017    treated     History of skin cancer      Hypertension      Hypothyroidism      Legally blind      Midfoot collapse of right lower extremity      Migraine      NAFLD (nonalcoholic fatty liver disease)      Nephrolithiasis      Neuropathy      MARGARITO (obstructive sleep apnea)      Rib pain 10/24/2014     S/P colectomy 10/14/2014     Thyroid cancer (H) 10/14/2014       Past Surgical History   Past Surgical History:   Procedure Laterality Date     ARTHRODESIS FOOT Right 11/17/2022    Procedure: Right midfoot/talonavicular osteotomy and reduction of deformity Right midfoot/talonavicular arthrodesis, achilles lengthening;  Surgeon: Bryon Starr MD;  Location: [facility] OR     CHOLECYSTECTOMY  1986     COLECTOMY      @ 6 years ago, sigmoid     COLONOSCOPY  2016     FOOT SURGERY Left 2021     LENGTHEN TENDON ACHILLES Right 11/17/2022    Procedure: LENGTHENING, TENDON, ACHILLES;  Surgeon: Bryon Starr MD;  Location: [facility] OR       Medications   I have reviewed this patient's current medications  Medications Prior to Admission   Medication Sig Dispense Refill Last Dose     acetaminophen (TYLENOL) 325 MG tablet Take 2 tablets (650 mg) by mouth every 4 hours as needed for other 60 tablet 0      ammonium lactate (LAC-HYDRIN) 12 % external lotion APPLY THIN LAYER TOPICALLY TWICE A DAY AS NEEDED FOR DRY SKIN **EXTERNAL USE ONLY**        amoxicillin-clavulanate (AUGMENTIN) 875-125 MG tablet Take 1 tablet by mouth 2 times daily for 14 days 28 tablet 0      aspirin (ASA) 81 MG EC tablet Take 2 tablets (162 mg) by mouth daily 60 tablet 0      bacitracin-neomycin-polymyxin (NEOSPORIN) 400-5-5000 external ointment Apply topically daily as needed (open sores on legs)         "Calcium-Vitamin D 500-3.125 MG-MCG TABS Take 2 tablets by mouth 2 times daily        cetirizine (ZYRTEC) 10 MG tablet Take 10 mg by mouth daily        empagliflozin (JARDIANCE) 25 MG TABS tablet Take 0.5 tablets by mouth daily        fluticasone (FLONASE) 50 MCG/ACT nasal spray SPRAY 2 SPRAYS IN EACH NOSTRIL TWICE A DAY FOR RELIEF OF ALLERGIES AND CONGESTION -USE REGULARLY FOR BEST RESULTS        furosemide (LASIX) 40 MG tablet Take 40 mg by mouth daily as needed (hand swelling, weight >215 lbs)        gabapentin (NEURONTIN) 600 MG tablet Take 2 tablets by mouth 3 times daily        gabapentin (NEURONTIN) 600 MG tablet Take 1,200 mg by mouth 3 times daily        Gauze Pads & Dressings (KERLIX SPONGES) 4\"X4\" PADS 1 Units daily for 45 days 45 each 1      Gauze Pads & Dressings (TELFA AMD ISLAND DRESSING) 4\"X8\" PADS 1 Units daily 45 each 1      hydrOXYzine (ATARAX) 25 MG tablet Take 1 tablet (25 mg) by mouth every 6 hours as needed for other (adjuvant pain) 30 tablet 0      levothyroxine (SYNTHROID) 137 MCG tablet 137 mcg        levothyroxine (SYNTHROID/LEVOTHROID) 125 MCG tablet Take 125 mcg by mouth daily        lidocaine (LIDODERM) 5 % patch Apply up to 3 patches to painful area at once for up to 12 h within a 24 h period.  Remove after 12 hours. (Patient taking differently: Apply up to 3 patches to painful area at once for up to 12 h within a 24 h period.  Remove after 12 hours. L foot.) 90 patch 0      linaclotide (LINZESS) 145 MCG capsule Take 1 capsule by mouth daily        Liniments (VAPORIZING CREAM) 4.7-1.2-2.6 % CREA APPLY A THIN LAYER TOPICALLY FOUR TIMES A DAY AS NEEDED FOR PAIN        lisinopril (ZESTRIL) 40 MG tablet Take 40 mg by mouth daily        metFORMIN (GLUCOPHAGE XR) 500 MG 24 hr tablet 1,000 mg        metFORMIN (GLUCOPHAGE XR) 500 MG 24 hr tablet Take 500 mg by mouth 2 times daily (with meals)        methocarbamol (ROBAXIN) 750 MG tablet Take 1 tablet (750 mg) by mouth every 6 hours as needed " for muscle spasms 40 tablet 0      omeprazole (PRILOSEC) 20 MG DR capsule Take 20 mg by mouth daily        oxyCODONE (ROXICODONE) 5 MG tablet Take 1-2 tablets (5-10 mg) by mouth every 4 hours as needed for moderate pain (4-6) 26 tablet 0      polyethylene glycol (MIRALAX) 17 g packet Take 17 g by mouth daily 10 packet 0      pravastatin (PRAVACHOL) 40 MG tablet Take 40 mg by mouth every evening        Semaglutide (OZEMPIC, 1 MG/DOSE, SC) Inject 1 mg Subcutaneous once a week Sundays        senna-docusate (SENOKOT-S/PERICOLACE) 8.6-50 MG tablet Take 1 tablet by mouth 2 times daily 30 tablet 0      sodium fluoride dental gel (PREVIDENT) 1.1 % GEL topical gel BRUSH SMALL AMOUNT MOUTH EVERY MORNING AND AT BEDTIME ON TOOTHBRUSH, BRUSH FOR 2 MINUTES.        SUMAtriptan (IMITREX) 25 MG tablet Take 25 mg by mouth at onset of headache        topiramate (TOPAMAX) 100 MG tablet Take 100 mg by mouth 2 times daily        Vitamin D3 (CHOLECALCIFEROL) 25 mcg (1000 units) tablet Take 75 mcg by mouth daily             Review of Systems    Complete ROS was obtained with the patient at bedside with pertinent positives and negatives as detailed in the HPI. All other systems reviewed and otherwise negative.     Physical Exam   Vital Signs: Temp: 97  F (36.1  C) Temp src: Oral BP: 119/73 Pulse: 89   Resp: 16 SpO2: 96 %      Weight: 210 lbs 0 oz      General appearance: Alert, in no acute distress and Ox3   HEENT: Normocephalic, atraumatic; Pupils are equal, round and react to light; Extraocular movements intact; Conjuctivae are Normal; Mucous membranes moist and without lesions   Pulm: clear to ausculation bilaterally, no wheeze, rales or rhonchi   CVS: RRR, no m/r/g   GI: Abdomen soft, non-tender throughout, normoactive bowel sounds and No rebound or guarding   Extremities: R-foot in dressing soaked with drainage  Neurologic:   - Mental Status: Alert, relaxed, and cooperative. Thought process coherent. Oriented to   person, place, and  time.       Medical Decision Making       65 MINUTES SPENT BY ME on the date of service doing chart review, history, exam, documentation & further activities per the note.      Data   ------------------------- PAST 24 HR DATA REVIEWED -----------------------------------------------    I have personally reviewed the following data over the past 24 hrs:    7.8  \   11.1 (L)   / 232     141 113 (H) 33 (H) /  98   3.9 23 1.28 (H) \       Procal: N/A CRP: 69.0 (H) Lactic Acid: N/A       INR:  1.06 PTT:  30   D-dimer:  N/A Fibrinogen:  N/A       Imaging results reviewed over the past 24 hrs:   Recent Results (from the past 24 hour(s))   XR Foot Right G/E 3 Views    Narrative    3 views right foot radiographs 1/20/2023 9:45 AM    History: Charcot arthropathy of midfoot    Additional History from EMR: Right foot wound. Medial right foot  necrosis.    Comparison: Right foot x-ray 12/30/2022    Findings:    Standing AP, oblique, and lateral  views of the right foot were  obtained.     Extensive postsurgical changes of fixation of the right medial column  with long screw traversing across the first metatarsal, medial  cuneiform, navicular to talus, dorsomedial plate and screw fixation  across first metatarsal to  talus with interim fracture of the third  most proximal screw, new compared to 12/30/2022.    Marked fragmentation, disorganization, and dislocation of the midfoot,  consistent with Charcot arthropathy. Bones appear osteopenic.    Vascular calcifications. Achilles tendon insertional and plantar  calcaneal enthesopathy.     Medial dressing with apparent underlying soft tissue irregularity and  associated more diffuse medial sided soft tissue swelling, increased  compared to 12/30/2022.      Impression    Impression:  1. Postsurgical changes of medial column with new fracturing of the  third most proximal plate fixation screw.  2. Changes of Charcot arthropathy.  3. Medial sided soft tissue swelling, increased compared  to  12/30/2022.    I have personally reviewed the examination and initial interpretation  and I agree with the findings.    ORLANDO [facility]         SYSTEM ID:  [id number]

## 2023-01-21 NOTE — PLAN OF CARE
Pt came up to the unit from PACU about 02:30 pm via cart, pt transfer to bed with SBA, denied pain or discomfort, wound vac on at 125 continues, IV infusing, pt resting in bed comfortable, with call light in reach, will continue to monitor.

## 2023-01-21 NOTE — PHARMACY-ADMISSION MEDICATION HISTORY
Admission Medication History Completed by Pharmacy    See [facility] Admission Navigator for allergy information, preferred outpatient pharmacy, prior to admission medications and immunization status.     Medication History Sources:     Patient    [facility] Everywhere Medication List    Changes made to PTA medication list (reason):    Added: zolpidem, Refresh, diclofenac gel (per [facility] and [facility])    Deleted:   o oxycodone, aspirin, hydroxyzine (per patient, from last procedure),   o Humulin (per patient, was discontinued),   o liniments cream (per patient, only using diclofenac),   o sodium fluoride (per patient)  o duplicate metformin, gabapentin, levothryoxinee    Changed:   o Acetaminophen 325 mg PRN --> acetaminophen 500 mg PRN (per patient)  o empagliflozin 25 mg; 12.5 mg daily --> 25 mg daily ([facility] and [facility])  o Fluticasone nasal spray; 2 sprays twice daily --> 2 sprays daily (per patient)  o Levothyroxine 125 mcg daily --> 137 mcg daily ([facility] and [facility])  o Changed Miralax and senna-s to as needed  o Topiramate 100 mg BID --> 150 mg BID (per patient, recently increased)  o Metformin 500 mg XR; 1 tablet twice daily --> 2 tablets twice daily ([facility] and [facility])    Additional Information:    Patient was a good historian, knew medications and doses.     Furosemide: per patient, hasn't taken since last surgery. Was taking as needed for swelling    Amoxicillin: Started 1/16/23 and originally scheduled to end 1/30/23    Last filled zolpidem 12.5 mg on 12/28/22 for #30 tablets      Prior to Admission medications    Medication Sig Last Dose Taking? Auth Provider Long Term End Date   acetaminophen (TYLENOL) 500 MG tablet Take 500-1,000 mg by mouth every 6 hours as needed for mild pain PRN Yes Unknown, Entered By History No    ammonium lactate (LAC-HYDRIN) 12 % external lotion APPLY THIN LAYER TOPICALLY TWICE A DAY AS NEEDED FOR DRY SKIN **EXTERNAL USE ONLY** PRN Yes Reported, Patient     amoxicillin-clavulanate  (AUGMENTIN) 875-125 MG tablet Take 1 tablet by mouth 2 times daily for 14 days 1/20/2023 at AM Yes Bryon Starr MD  1/30/23   bacitracin-neomycin-polymyxin (NEOSPORIN) 400-5-5000 external ointment Apply topically daily as needed (open sores on legs) PRN Yes Reported, Patient     Calcium-Vitamin D 500-3.125 MG-MCG TABS Take 2 tablets by mouth 2 times daily 1/20/2023 at AM Yes Unknown, Entered By History     carboxymethylcellulose PF (REFRESH PLUS) 0.5 % ophthalmic solution 1 drop 3 times daily as needed for dry eyes PRN Yes Unknown, Entered By History     cetirizine (ZYRTEC) 10 MG tablet Take 10 mg by mouth daily 1/20/2023 at AM Yes Reported, Patient     diclofenac (VOLTAREN) 1 % topical gel Apply 4 g topically 4 times daily as needed for moderate pain (4-6) 1/19/2023 at PM Yes Unknown, Entered By History     empagliflozin (JARDIANCE) 25 MG TABS tablet Take 25 mg by mouth daily 1/20/2023 at AM Yes Reported, Patient     fluticasone (FLONASE) 50 MCG/ACT nasal spray Spray 2 sprays into both nostrils daily 1/20/2023 at AM Yes Reported, Patient     gabapentin (NEURONTIN) 600 MG tablet Take 1,200 mg by mouth 3 times daily 1/20/2023 at AM Yes Reported, Patient Yes    levothyroxine (SYNTHROID/LEVOTHROID) 137 MCG tablet Take 137 mcg by mouth daily 1/20/2023 at AM Yes Reported, Patient Yes    lidocaine (LIDODERM) 5 % patch Apply up to 3 patches to painful area at once for up to 12 h within a 24 h period.  Remove after 12 hours.  Patient taking differently: Apply up to 3 patches to painful area at once for up to 12 h within a 24 h period.  Remove after 12 hours. L foot. 1/19/2023 at PM Yes Codie Grimm MD     linaclotide (LINZESS) 145 MCG capsule Take 1 capsule by mouth daily  Yes Reported, Patient     lisinopril (ZESTRIL) 40 MG tablet Take 40 mg by mouth daily 1/20/2023 at AM Yes Reported, Patient Yes    metFORMIN (GLUCOPHAGE XR) 500 MG 24 hr tablet Take 1,000 mg by mouth 2 times daily (with meals) 1/20/2023 at AM Yes  "Reported, Patient Yes    omeprazole (PRILOSEC) 20 MG DR capsule Take 20 mg by mouth daily 1/20/2023 at AM Yes Unknown, Entered By History     pravastatin (PRAVACHOL) 40 MG tablet Take 40 mg by mouth every evening 1/19/2023 at PM Yes Reported, Patient Yes    Semaglutide (OZEMPIC, 1 MG/DOSE, SC) Inject 1 mg Subcutaneous once a week Sundays Past Week Yes Unknown, Entered By History     topiramate (TOPAMAX) 100 MG tablet Take 150 mg by mouth 2 times daily 1/20/2023 at AM Yes Reported, Patient Yes    Vitamin D3 (CHOLECALCIFEROL) 25 mcg (1000 units) tablet Take 50 mcg by mouth daily 1/20/2023 at AM Yes Unknown, Entered By History     zolpidem ER (AMBIEN CR) 12.5 MG CR tablet Take 12.5 mg by mouth nightly as needed for sleep PRN at PM Yes Unknown, Entered By History No    furosemide (LASIX) 40 MG tablet Take 40 mg by mouth daily as needed (hand swelling, weight >215 lbs)   Reported, Patient Yes    Gauze Pads & Dressings (KERLIX SPONGES) 4\"X4\" PADS 1 Units daily for 45 days   Bryon Starr MD  1/21/23   Gauze Pads & Dressings (TELFA AMD ISLAND DRESSING) 4\"X8\" PADS 1 Units daily   Bryon Starr MD     polyethylene glycol (MIRALAX) 17 g packet Take 17 g by mouth daily  Patient taking differently: Take 17 g by mouth daily as needed PRN  Betty Chan APRN CNS     senna-docusate (SENOKOT-S/PERICOLACE) 8.6-50 MG tablet Take 1 tablet by mouth 2 times daily  Patient taking differently: Take 1 tablet by mouth 2 times daily as needed PRN  Betty Chan APRN CNS     SUMAtriptan (IMITREX) 25 MG tablet Take 25 mg by mouth at onset of headache PRN  Reported, Patient         Date completed: 01/20/23    Medication history completed by: Ela Oseguera [facility]            "

## 2023-01-21 NOTE — BRIEF OP NOTE
[facility]    Brief Operative Note    Pre-operative diagnosis: Wound infection [T14.8XXA, L08.9]  Post-operative diagnosis Wound infection [T14.8XXA, L08.9]    Procedure: Procedure(s):  IRRIGATION AND DEBRIDEMENT, FOOT, RIGHT, Placement of Wound Vac and Antibiotic Beads.  Surgeon: Surgeon(s) and Role:     * Bryon Starr MD - Primary     * Dylan Lynn MD - Resident - Assisting  Anesthesia: General   Estimated Blood Loss: Less than 10 ml    Drains: VAC  Specimens:   ID Type Source Tests Collected by Time Destination   A : Right Foot Medial Wound Fluid Wound Foot, Right ANAEROBIC BACTERIAL CULTURE ROUTINE, GRAM STAIN, AEROBIC BACTERIAL CULTURE ROUTINE Bryon Starr MD 1/21/2023 12:33 PM    B : Right Foot Medial Wound Tissue Tissue Foot, Right ANAEROBIC BACTERIAL CULTURE ROUTINE, GRAM STAIN, AEROBIC BACTERIAL CULTURE ROUTINE Bryon Starr MD 1/21/2023 12:34 PM      Findings:   Medial wound: purulent material, probed directly to plate and bone; Lateral wound: no purulence, probled to bone, no hardware present laterally.  Complications: None.  Implants:   Implant Name Type Inv. Item Serial No.  Lot No. LRB No. Used Action   GRAFT BONE OSTEOSET KIT FAST CURE [id number] - [id number] Bone/Tissue/Biologic GRAFT BONE OSTEOSET KIT FAST CURE [id number]  [place] [facility] [id number] Right 1 Implanted       Addendum:  I spoke with the patient's responsible party, his spouse Wiliam, immediately postoperatively by phone. Findings and plan were discussed. All questions she had at the time were answered to the best of my ability.  Bryon Starr MD

## 2023-01-21 NOTE — ANESTHESIA PREPROCEDURE EVALUATION
Anesthesia Pre-Procedure Evaluation    Patient: Nehemiah Magallon   MRN: [id number] : 1965        Procedure : Procedure(s):  IRRIGATION AND DEBRIDEMENT, FOOT, RIGHT          Past Medical History:   Diagnosis Date     Chronic pain syndrome 10/24/2014     Diabetes mellitus, type 2 (H)      Diabetic Charcot foot (H)      Diabetic retinopathy associated with diabetes mellitus due to underlying condition (H)      Diverticulitis of colon      Gastroesophageal reflux disease      Hepatitis C 2017    treated     History of skin cancer      Hypertension      Hypothyroidism      Legally blind      Midfoot collapse of right lower extremity      Migraine      NAFLD (nonalcoholic fatty liver disease)      Nephrolithiasis      Neuropathy      MARGARITO (obstructive sleep apnea)      Rib pain 10/24/2014     S/P colectomy 10/14/2014     Thyroid cancer (H) 10/14/2014      Past Surgical History:   Procedure Laterality Date     ARTHRODESIS FOOT Right 2022    Procedure: Right midfoot/talonavicular osteotomy and reduction of deformity Right midfoot/talonavicular arthrodesis, achilles lengthening;  Surgeon: Bryon Starr MD;  [facility]     CHOLECYSTECTOMY       COLECTOMY      @ 6 years ago, sigmoid     COLONOSCOPY  2016     FOOT SURGERY Left      LENGTHEN TENDON ACHILLES Right 2022    Procedure: LENGTHENING, TENDON, ACHILLES;  Surgeon: Bryon Starr MD;  Location: [facility] OR      Allergies   Allergen Reactions     Atorvastatin      Other reaction(s): Hyperglycemia     Celebrex [Celecoxib] Other (See Comments)     [facility]      Social History     Tobacco Use     Smoking status: Never     Smokeless tobacco: Never   Substance Use Topics     Alcohol use: Not Currently     Comment: rarely      Wt Readings from Last 1 Encounters:   23 95.3 kg (210 lb)        Anesthesia Evaluation            ROS/MED HX  ENT/Pulmonary:     (+) sleep apnea, doesn't use CPAP,     Neurologic:       Cardiovascular:     (+)  Dyslipidemia hypertension-----    METS/Exercise Tolerance:     Hematologic:       Musculoskeletal:       GI/Hepatic:     (+) GERD, hepatitis type C,     Renal/Genitourinary:       Endo:     (+) type II DM, thyroid problem, hypothyroidism,     Psychiatric/Substance Use:     (+) H/O chronic opiod use .     Infectious Disease:       Malignancy:       Other:            Physical Exam    Airway        Mallampati: II   TM distance: > 3 FB   Neck ROM: full   Mouth opening: > 3 cm    Respiratory Devices and Support         Dental       (+) Completely normal teeth      Cardiovascular   cardiovascular exam normal          Pulmonary   pulmonary exam normal               Nehemiah Magallon is a 57 year old male patient with a past medical history significant for T2DM c/b charcot foot & diabetic retinopathy + neuropathy, legally blind, GERD, hepatitis C treated, HTN, HLD, papillary thyroid cancer in remission, hypothyroidism, NAFLD, MARGARITO, chronic pain, and h/o sigmoid colectomy for diverticulitis who is presenting from [facility] clinic for R-foot surgery   OUTSIDE LABS:  CBC:   Lab Results   Component Value Date    WBC 7.8 01/20/2023    WBC 7.7 11/17/2022    HGB 11.1 (L) 01/20/2023    HGB 10.9 (L) 11/19/2022    HCT 34.7 (L) 01/20/2023    HCT 32.7 (L) 11/19/2022     01/20/2023     11/17/2022     BMP:   Lab Results   Component Value Date     01/20/2023    POTASSIUM 3.9 01/20/2023    POTASSIUM 4.2 01/20/2023    CHLORIDE 113 (H) 01/20/2023    CO2 23 01/20/2023    BUN 33 (H) 01/20/2023    CR 1.31 (H) 01/21/2023    CR 1.28 (H) 01/20/2023     (H) 01/21/2023     (H) 01/21/2023     COAGS:   Lab Results   Component Value Date    PTT 30 01/20/2023    INR 1.06 01/20/2023     POC: No results found for: BGM, HCG, HCGS  HEPATIC: No results found for: ALBUMIN, PROTTOTAL, ALT, AST, GGT, ALKPHOS, BILITOTAL, BILIDIRECT, JAYE  OTHER:   Lab Results   Component Value Date    ALICE 9.6 01/20/2023    CRP 69.0 (H) 01/20/2023    SED  59 (H) 01/20/2023       Anesthesia Plan    ASA Status:  3   NPO Status:  NPO Appropriate    Anesthesia Type: General.     - Airway: ETT   Induction: Intravenous.   Maintenance: Balanced.   Techniques and Equipment:     - Lines/Monitors: 2nd IV     Consents    Anesthesia Plan(s) and associated risks, benefits, and realistic alternatives discussed. Questions answered and patient/representative(s) expressed understanding.     - Discussed: Risks, Benefits and Alternatives for BOTH SEDATION and the PROCEDURE were discussed     - Discussed with:  Patient      - Extended Intubation/Ventilatory Support Discussed: No.      - Patient is DNR/DNI Status: No    Use of blood products discussed: No .     Postoperative Care    Pain management: IV analgesics, Multi-modal analgesia, Oral pain medications.   PONV prophylaxis: Ondansetron (or other 5HT-3)     Comments:           H&P reviewed: Unable to attach VIRTUAL H&P to encounter due to EHR limitations. Appropriate H&P reviewed. The physical exam performed by anesthesia during this surgical encounter serves as the physical portion of that virtual H&P.  Any significant changes noted within this preop evaluation.          Lizeth Yu MD

## 2023-01-21 NOTE — CONSULTS
[facility]  Consult Note - Hospitalist Service, GOLD TEAM 16  Date of Admission:  1/20/2023  Consult Requested by: Orthopedics   Reason for Consult: medical co-management gutierrez for DM and charcot     Assessment & Plan   Nehemiah Magallon is a 57 year old male w/ PMH Diabetes mellitus with multiple complications (charcot foot, diabetic retinopathy with legal blindness, peripheral neuropathy), hx thyroid malignancy, MARGARITO, Hep C (2017), HTN, NAFLD, Hx colectomy (2014) admitted on 1/20/2023 s/p I&D of right charcot foot with orthopedics.    Wound infection right foot s/p I&D and wound vac (1/21/23)  Surgery with Dr. Starr. EBL <10ml. Wound vac in place. GA. No complications. MRSA neg.   - care per primary orthopedics team:  - ID consulted per primary  - trend CRP (69 on 1/20), CBC  - follow up blood cultures ( x 2 each on 1/20 and 1/21) and wound cultures   - FEN: ADAT, Begin with clear fluids and progress diet as tolerated; Bowel regimen   - Abx: Vanc/Zosyn pending culture results and ID recs   - pain mng:    - APAP    - oxycodone 5-10q4h prn    -dilaudid 0.2-0.4mg q2h prn   - PT and OT consults   -activity: NWB RLE   - DVT ptx: ASA 81mg BID and SCDs   - Wound Care: WOC consulted, appreciate help. Wound VAC -125 continuous, Dressing to remain in place x 7-10 days, reinforce and redress PRN   - follow up in clinic w/ Dr. Ro BAEZ    Borderline SHALINI on CKD stage 2, improved  Occurs in the perioperative setting with BL Cr 0.9-1.2. Post-op Cr 1.2, improved from 1/20 (1.3)  - IV and oral hydration   - trend BMP, weight, I&O  - bladder scan prn  - if Cr not improving check renal US  - consider decrease gabapentin dose if renal function not improved    Diabetes Mellitus Type 2 w/ multiple complications (charcot foot, diabetic retinopathy with legal blindness, peripheral neuropathy), A1C 5.6 in 11/22  Glucose currently well controlled and recently 130-150s. Previously [facility] U-500 [facility] Current PTA meds: jardiance, metformin, semaglutide  - trend glucose ACHS  - hold PTA samaglutide, jardiance and metformin given post op and concerns for SHALINI  - if gluocose >180s will add sliding scale coverage  - hypoglycemia protocol    HTN  bp normal post-op  - manage underlying pain and nausea as indicated  -     MARGARITO - Continue home CPAP    Hep C (2017)  NAFLD   LFTs are normal   - limit hepatotoxins as able  - APAP ok to continue but recommend less than 2g per day     Anemia  BL hgb 10-11s, stable at 11.1 preop  - trend CBC    Diverticulosis with colectomy (2014) - monitor  Nephrolithiasis- monitor  GERD- continue PTA PPI  Hx of thyroid cancer and hypothyroidism - US thyroid 9/13/22 without signs of recurrence or suspicious adenopathy, TSH unavailable in our system - continue PTA levothyroxine, recheck TSH w/ reflex  Migraines - continue PTA sumatriptan prn  Hx skin cancer - monitor   Solitary pulmonary nodule - follow up out patient   Obesity - encourage healthy lifestyle     The patient's care was discussed with the Attending Physician, Dr. Cabrera, Bedside Nurse, Patient and Primary team.    Clinically Significant Risk Factors                        # Obesity: Estimated body mass index is 30.13 kg/m  as calculated from the following:    Height as of this encounter: 1.778 m (5' 10\").    Weight as of this encounter: 95.3 kg (210 lb)., PRESENT ON ADMISSION         Mary Woods PA-C  Hospitalist Service, [facility] TEAM 16  Securely message with [employer] (more info)  Text page via [employer] Paging/Directory   See signed in provider for up to date coverage information  ______________________________________________________________________    Chief Complaint   POD#0 s/p I&D right Charcot foot    History is obtained from the patient    History of Present Illness   Nehemiah Magallon is a 57 year old male w/ PMH Diabetes mellitus with multiple complications (charcot foot, diabetic retinopathy with legal " blindness, peripheral neuropathy), hx thyroid malignancy, MARGARITO, Hep C (2017), HTN, NAFLD, Hx colectomy (2014) admitted on 1/20/2023 s/p I&D of right charcot foot with orthopedics.    He recently underwent a right Charcot foot reconstruction 11/17/2022 which was complicated by the development of medial surgical wound necrosis and drainage, as well as a smaller area of lateral surgical wound necrosis.  Due to recent worsening of his wound appearance he was indicated for a debridement and irrigation of his right foot wounds placement of a subatmospheric wound therapy dressing.    Post-op he has no pain and notes minimal sensation in the bilateral feet in general and endorses feeling coolness to touch during assessment.     He has otherwise been in his usual state of health and mobile with a wheelchair.  Family are visiting to bedside.     Denies N/V, F/C, chest pain, sob, palpitations, abdominal pain, rashes, lumps, lesions, urinary complaints, changes to bowels, bleeding, changes in sensation, changes to weight or appetite or other complaints.       Past Medical History    Past Medical History:   Diagnosis Date     Chronic pain syndrome 10/24/2014     Diabetes mellitus, type 2 (H)      Diabetic Charcot foot (H)      Diabetic retinopathy associated with diabetes mellitus due to underlying condition (H)      Diverticulitis of colon      Gastroesophageal reflux disease      Hepatitis C 2017    treated     History of skin cancer      Hypertension      Hypothyroidism      Legally blind      Midfoot collapse of right lower extremity      Migraine      NAFLD (nonalcoholic fatty liver disease)      Nephrolithiasis      Neuropathy      MARGARITO (obstructive sleep apnea)      Rib pain 10/24/2014     S/P colectomy 10/14/2014     Thyroid cancer (H) 10/14/2014       Past Surgical History   Past Surgical History:   Procedure Laterality Date     ARTHRODESIS FOOT Right 11/17/2022    Procedure: Right midfoot/talonavicular osteotomy and  reduction of deformity Right midfoot/talonavicular arthrodesis, achilles lengthening;  Surgeon: Bryon Starr MD;  [facility]     CHOLECYSTECTOMY  1986     COLECTOMY      @ 6 years ago, sigmoid     COLONOSCOPY  2016     FOOT SURGERY Left 2021     LENGTHEN TENDON ACHILLES Right 11/17/2022    Procedure: LENGTHENING, TENDON, ACHILLES;  Surgeon: Bryon Starr MD;  [facility]: [facility] OR       Medications   I have reviewed this patient's current medications          Physical Exam   Vital Signs: Temp: 98.2  F (36.8  C) Temp src: Oral BP: 133/69 Pulse: 89   Resp: 16 SpO2: 96 % O2 Device: None (Room air) Oxygen Delivery: 6 LPM  Weight: 210 lbs 0 oz  GENERAL: Alert and oriented. NAD. Able to sit upright unassisted. Cooperative.   HEENT: Anicteric sclera. Mucous membranes moist. NC. AT. EOMI. PERRLA  CV: RRR. S1, S2. No murmurs appreciated.   RESPIRATORY: Effort normal on RA. Lungs CTAB with no wheezing, rales, rhonchi.   GI: Abdomen soft, NT, NABS  NEUROLOGICAL: No focal deficits. Moves all extremities.  CN 2-12 grossly intact.  EXTREMITIES: RLE in dressings from below knee to DIP joints is CDI. No peripheral edema. Intact left pedal pulses.   SKIN: No jaundice. No rashes on exposed skin.  BACK: no lesion    Medical Decision Making       60 MINUTES SPENT BY ME on the date of service doing chart review, history, exam, documentation & further activities per the note.      Data     I have personally reviewed the following data over the past 24 hrs:    7.4  \   10.5 (L)   / 216     143 110 (H) 24 /  166 (H)   4.1 25 1.21 \       ALT: 10 AST: 7 AP: 55 TBILI: 0.3   ALB: 2.9 (L) TOT PROTEIN: 7.0 LIPASE: N/A       INR:  1.06 PTT:  30   D-dimer:  N/A Fibrinogen:  N/A

## 2023-01-21 NOTE — ANESTHESIA PROCEDURE NOTES
Airway       Patient location during procedure: OR       Procedure Start/Stop Times: 1/21/2023 12:05 PM  Staff -        Anesthesiologist:  Lizeth Yu MD       CRNA: Jennifer Wagoner APRN CRNA       Performed By: anesthesiologistIndications and Patient Condition       Indications for airway management: alan-procedural       Induction type:intravenous       Mask difficulty assessment: 2 - vent by mask + OA or adjuvant +/- NMBA (bearded.  Size 9 oral airway.)    Final Airway Details       Final airway type: endotracheal airway       Successful airway: ETT - single  Endotracheal Airway Details        ETT size (mm): 7.5       Cuffed: yes       Successful intubation technique: direct laryngoscopy       DL Blade Type: MAC 4       Grade View of Cords: 2 (with cricoid)       Adjucts: stylet       Position: Right       Measured from: lips       Secured at (cm): 24       Bite block used: Soft    Post intubation assessment        Placement verified by: capnometry, equal breath sounds and chest rise        Number of attempts at approach: 1       Secured with: silk tape       Ease of procedure: easy       Dentition: Unchanged    Medication(s) Administered   Medication Administration Time: 1/21/2023 12:05 PM

## 2023-01-21 NOTE — PROGRESS NOTES
[facility]    Medicine Progress Note - Hospitalist Service, GOLD TEAM 16    Date of Admission:  1/20/2023    Assessment & Plan    Magallon, Mark is a 57 year old male patient with a past medical history significant for T2DM c/b charcot foot & diabetic retinopathy + neuropathy, legally blind, GERD, hepatitis C treated, HTN, HLD, papillary thyroid cancer in remission, hypothyroidism, NAFLD, MARGARITO, chronic pain, and h/o sigmoid colectomy for diverticulitis.  He had surgery in his right foot 11/17/22 by Dr Starr . He  noticed infection starting 1/15  And  was stared on Augmentin  and was seen by orthopedic surgery  1/20  who sent him to ER     Had preop eval 1/20/23 so please refer to consult note       #R-foot charcot  #S/p R foot charcot reconstruction 11/17/22  #Surgical wound necrosis of medial & lateral sides of R-foot  #R-foot wound  - planned for surgery 1/21   - DVT prophylaxis  Per orthopedic surgery    -Recent R-foot charcot reconstruction  -Presenting with concerns for worsening wounds from ortho clinic  -has been on Augmentin prior to arrival   -Plan for O.R  1/21   - was stared on IV vancomycin  And zosyn and  For further antibiotics adjustment of antibiotics would rec ID to see     - had culture in ER would do intraop culture  - blood culture 1/20   -restart  PTA ASA once ok with ortho   - CRP 69, monitor           # T2DM currently well controlled, history of diabetic retinopathy and diabetic neuropathy.  -Patient on Jardiance, Semiglutide, Metformin that have not been restarted  , restart once taking in po well   -Per patient, Humilin R [facility]  [facility]  - -A1c 5.6% in Nov/2022 repeat pending  - accu-checks insulin sliding scale   -continue gabapentin     SHALINI  - creatinine 1.31, was 0,99 11/22, IV fluids  Monitor and avoid nephritoxic agents      #Anemia  - hgb at time of discharge during last hospital stay 10.9  -hgb 11.1     #History of  "papillary thyroid cancer status post  Surgery   Post operative hypothyroidism   - Continue PTA Synthroid 137 mcg   - on semiglutide, states has been seeing endo as out patient  And prescribing medications       #MARGARITO on CPAP   - Continue home CPAP     #HTN and HLD  -Hold PTA lisinopril 40 mg  restart post operative as blood pressure  Allow   -Continue PTA Pravastatin 40 mg at bedtime      #GERD   - Continue Protonix in place of PTA omeprazole     #Migraines   - Continue PTA topamax; imitrex prior to onset    #Allergic rhinitis   - PTA flonase, Cetrizine    #History of hepatitis C  - treated      #History of nonalcoholic fatty liver disease  #History of nephrolithiasis  #Chronic pain syndrome  #Legally blind  #History of migraine headaches.                 Diet: NPO per Anesthesia Guidelines for Procedure/Surgery Except for: Meds    DVT Prophylaxis: per orthopedic surgery    Jones Catheter: Not present  Lines: None     Cardiac Monitoring: None  Code Status:   full     Clinically Significant Risk Factors Present on Admission                  # Hypertension: home medication list includes antihypertensive(s)      # Obesity: Estimated body mass index is 30.13 kg/m  as calculated from the following:    Height as of this encounter: 1.778 m (5' 10\").    Weight as of this encounter: 95.3 kg (210 lb).           Disposition Plan    TBD             Torie Darby MD  Hospitalist Service, GOLD TEAM [address] [place] [address]  Securely message with [employer] (more info)  Text page via [email or web address] Paging/Directory   See signed in provider for up to date coverage information  ______________________________________________________________________    Interval History   Doing ok, awaiting surgery, does not feel pain on feet due to neuropathy   no chest pain  No shortness of breath  No nausea vomiting     Physical Exam   Vital Signs: Temp: 96.8  F (36  C) Temp src: Oral BP: 121/49 Pulse: 94   Resp: 16 SpO2: 96 " % O2 Device: None (Room air)    Weight: 210 lbs 0 oz  General appearence: awake alert  In no apparent distress    HEENT: EOMI, PEARLA, sclera nonicteric,  moist,  mucus membranes,   NECK : supple  RESPIRATORY: lungs clear to auscultation bilateral,  no wheezing or crackles   CARDIOVASCULAR:S1 S2 regular rate and rhythm, no rubs gallops or murmurs appreciated  GASTROINTESTINAL:soft, non-distended , non-tender , + bowel sounds,   SKIN: warm and dry, no mottling noted   NEUROLOGIC; awake alert and oriented, no focal deficits found  EXTREMITIES: no clubbing, cyanosis or edema , right foot wrapped     Data     I have personally reviewed the following data over the past 24 hrs:    7.8  \   11.1 (L)   / 232     141 113 (H) 33 (H) /  144 (H)   3.9 23 1.31 (H) \       Procal: N/A CRP: 69.0 (H) Lactic Acid: N/A       INR:  1.06 PTT:  30   D-dimer:  N/A Fibrinogen:  N/A       Imaging results reviewed over the past 24 hrs:   No results found for this or any previous visit (from the past 24 hour(s)).  Recent Labs   Lab 01/21/23  1048 01/21/23  0733 01/21/23  0550 01/20/23  2154 01/20/23  1914 01/20/23  1211   WBC  --   --   --   --   --  7.8   HGB  --   --   --   --   --  11.1*   MCV  --   --   --   --   --  84   PLT  --   --   --   --   --  232   INR  --   --   --   --  1.06  --    NA  --   --   --   --   --  141   POTASSIUM  --   --   --   --  3.9 4.2   CHLORIDE  --   --   --   --   --  113*   CO2  --   --   --   --   --  23   BUN  --   --   --   --   --  33*   CR  --   --  1.31*  --  1.28* 1.24   ANIONGAP  --   --   --   --   --  5   ALICE  --   --   --   --   --  9.6   * 136* 154*   < >  --  98    < > = values in this interval not displayed.

## 2023-01-21 NOTE — BRIEF OP NOTE
Orthopaedic Surgery Brief Op-Note      Patient: Nehemiah Magallon  : 1965  Date of Service: 2023 1:48 PM    Pre-operative Diagnosis: Wound infection [T14.8XXA, L08.9]  Post-operative Diagnosis: same    Procedure(s) Performed: Procedure(s):  IRRIGATION AND DEBRIDEMENT, FOOT, RIGHT, Placement of Wound Vac and Antibiotic Beads.    Staff: Dr. Starr  Assistants:   Dylan Lynn MD    Anesthesia: General  EBL: 5 cc    Implants:   Implant Name Type Inv. Item Serial No.  Lot No. LRB No. Used Action   GRAFT BONE OSTEOSET KIT FAST CURE 25ML [id number] - [id number] Bone/Tissue/Biologic GRAFT BONE OSTEOSET KIT FAST CURE 25ML [id number]  [employer] [facility] [id number] Right 1 Implanted       Drains: Wound VAC x1  Intra-op Labs/Cxs/Specimens: Right wound tissue and fluid cultures sent  Complications: No apparent complications during procedure  Findings: Please see dictated operative note for details    Disposition: Stable to PACU    Assessment/Plan:  Nehemiah Magallon is a 57 year old male s/p Procedure(s):  IRRIGATION AND DEBRIDEMENT, FOOT, RIGHT, Placement of Wound Vac and Antibiotic Beads., performed secondary to Wound infection [T14.8XXA, L08.9] on 2023 with Dr. Starr    Activity: Up with assist  Weight bearing status: NWB RLE  Antibiotics: Vanc/Zosyn pending culture results and ID recs  Diet: Begin with clear fluids and progress diet as tolerated; Bowel regimen  DVT prophylaxis: SCD, ASA 81 BID  Bracing/Splinting: no brace needed  Wound Care: WOC consulted, appreciate help. Wound VAC -125 continuous, Dressing to remain in place x 7-10 days, reinforce and redress PRN  Drains: Document output per shift  Pain management: transition from IV to orals as tolerated.  Jones: none  X-rays: none  Physical Therapy: gait training, mobilization ADLs.  Labs: Trend Hgb on POD #1   Consults: PT, Hospitalist, ID appreciate assistance in caring for this patient.  Follow-up: Clinic with Dr. Starr, timing TBD    Disposition:  Pending culture results, Abx regimen, progress with therapies, pain control on orals, and medical stability, anticipate discharge to Home vs Rehab on POD #3-4.    Fidencio Lynn MD 01/21/2023  Orthopaedic Surgery Resident, PGY-1

## 2023-01-21 NOTE — OP NOTE
DATE OF SURGERY: 1/21/2023    PREOPERATIVE DIAGNOSIS: Wound infection [T14.8XXA, L08.9]  POSTOPERATIVE DIAGNOSIS: Wound infection [T14.8XXA, L08.9]    PROCEDURES:  1. Debridement and irrigation of right foot, placement of subatmospheric wound therapy device.    PRIMARY SURGEON: Bryon Starr MD.    FIRST ASSISTANT: Fidencio Lynn MD, PGY-1.    ANESTHESIA: General endotracheal.    COMPLICATIONS: None apparent intraoperatively or immediately postoperatively.    IMPLANTS: Two VAC sponges to medial right foot wound. Vancomycin-impregnated Osteoset resorbable antibiotic beads to both medial and lateral foot wounds.    SIGNIFICANT FINDINGS: Medial foot wound was found to communicate with bone and metal plate; and had obvious purulent material. Lateral foot wound smaller but also did appear to communicate with bone, and had no overt signs for infection.    SPECIMENS: Medial foot fluid and tissue cultures to microbiology.    DRAINS: Medial foot wound VAC dressing to suction.    ESTIMATED BLOOD LOSS: Less than 10 mL.    INDICATIONS:                          Nehemiah Magallon is a 57 year old male patient with type 2 diabetes mellitus and other medical comorbidities.  He recently underwent a right Charcot foot reconstruction 11/17/2022 which was complicated by the development of medial surgical wound necrosis and drainage, as well as a smaller area of lateral surgical wound necrosis.  Due to recent worsening of his wound appearance he was indicated for a debridement and irrigation of his right foot wounds placement of a subatmospheric wound therapy dressing, and was admitted to hospital from the clinic to accomplish this.  The nature of the planned procedure (which I explained may involve the placement of antibiotic beads, would involve the placement of a subatmospheric wound therapy dressing, and would involve debridement of both the medial and lateral wounds to the right foot), the risks, benefits, and alternatives, the  postoperative plan, and reasonable expectations for short and long term outcome were discussed, all in layman's terms. No guarantees were expressed or implied as to outcome. The patient had the opportunity to have all the questions he had at the time asked and answered appropriately, verbalized his understanding of this discussion, and verbalized his wish to proceed with the planned procedure. Written informed consent was obtained.    DESCRIPTION OF PROCEDURE:             The patient, Nehemiah Magallon, was met in the preoperative area where the correct surgical site was identified with patient participation and marked by the primary surgeon. The patient was then brought to the operating room, where they were placed supine on the operating room table with all bony prominences well padded and a safety strap across the patient's waist. The anesthesiology team induced satisfactory general anesthesia.  He was already on a standing preoperative regimen of Zosyn and vancomycin; as it was approximately time for his next vancomycin dose, vancomycin was given IV per protocol.  A tourniquet was placed proximal to the surgical site on the operative extremity.  The dressings were removed from the right foot.  The patient's right lower leg, ankle, and foot was then prepped and draped in the usual sterile fashion. Prior to proceeding with the operation a timeout was held per hospital policy correctly identifying the patient, procedure to be performed, and operative site including laterality. All in the room agreed.    The area of skin necrosis on the medial right foot wound was carefully ellipsed out, removing it at a depth just deep to the layer of the necrotic tissue, which was several millimeters.  Deep to this there did appear to be a layer of bleeding healthy tissue.  However, given the significant wound drainage, a cleft going deeper was strongly suspected.  The wound was carefully probed and the deep communicating path was  noted at the proximal edge of the wound base.  This was further explored and appeared to communicate directly with bone on the medial aspect of the right foot and the patient's medial plate and one of the screws.  This deep wound space was then thoroughly curetted out in all directions.  A significant amount of purulent appearing fluid and tissue was debrided.  Both the fluid and tissue were sent to microbiology for cultures.  The hardware appears to be stable and per the preoperative plan and discussion the patient, was left in place at this time as there did not appear to be as of yet sufficient bony healing to remove it at this time without causing bony instability exacerbating the infection.  The wound was thoroughly irrigated out with 3 L of sterile normal saline via cystoscopy tubing.  At the conclusion of the debridement, the resulting medial wound size measured 3.5 cm proximal distal by 2.5 cm dorsal plantar by 1 cm deep.  The rim of the wound was debrided to what appeared to be bleeding healthy tissue.  The debrided tissue included skin, subcutaneous tissue, periosteum, and purulent material.  The instruments involved included scalpel, curette, and a rongeur.  Meticulous hemostasis was achieved.  Attention was turned to the lateral wound where there was a longitudinally oriented area of eschar directly overlying the previous incision.  It was several millimeters dorsal plantar, and extended the length of the previous incision proximal distal.  The necrotic skin/eschar was sharply debrided with a knife.  Deep to this, there were several small clefts leading directly to what appeared to be bone on probing.  The remainder the wound bed appeared to be covered by healthy subcutaneous tissue.  There was no expressible drainage or purulence.  This was likewise irrigated out.  Resorbable, non counted vancomycin calcium sulfate beads were placed into each incision.  The medial wound was dressed with a VAC (2  sponges), and the lateral wound was dressed with a dry dressing.  The VAC dressing was connected to the VAC machine, suction was applied, and an excellent seal was noted. The foot was then carefully dressed and sterile cast padding and an Ace wrap.     All surgical counts were reported as correct at the end of the case. The patient was taken to the recovery room in stable condition.    I was present and scrubbed in for the entire case.    POSTOPERATIVE PLAN:  1. Weightbearing status: NWB R LE, elevate  2. Immobilization: None  3. Dressings: VAC to suction  4. DVT prophylaxis:  mg po daily  5. Antibiotics: Continue vancomycin and Zosyn for now  6. Labs: Trend CRP; follow cultures; ID consult  7. Disposition: Check wounds in 2 days - if appear satisfactory may discharge home once a) home health VAC changes 3x/week is arranged and b) a plan has been determined by infectious disease for antibiotic therapy  8. Follow up: 2 weeks postop in ortho clinic for dressing change and wound check    Bryon Starr MD  Attending surgeon  Orthopaedic Surgery

## 2023-01-21 NOTE — PLAN OF CARE
VS: VSS, pt denied CP or SOB.   O2: Room air sat. > 90%.   Output: Voiding adequate amount in the bathroom.    Last BM: 01/21/23   Activity: Up independent in the room.    Skin: Intact except R foot wound.   Pain: Denied pain or discomfort.    Neuro: CMS and neuro intact, baseline neuropathy on both feet.   Dressing: CDI   Diet: NPO for surgery.   LDA: JOS SL.    Equipment: IV pole and personal belongings.    Plan: Surgery this morning.    Additional Info: Report given to pre-op Nurse and pt sent with transport via  about 11 am.

## 2023-01-21 NOTE — PLAN OF CARE
"Goal Outcome Evaluation:         Pt came to the unit at 4:30pm.    VS: /73   Pulse 89   Temp 97  F (36.1  C) (Oral)   Resp 16   Ht 1.778 m (5' 10\")   Wt 95.3 kg (210 lb)   SpO2 96%   BMI 30.13 kg/m     O2: Stable on RA.   Output: Void spontaneous in the toilet.   Last BM: 1/20   Activity: Independent in the room.   Skin: Wound draining right foot. See ER note and picture.   Pain: Denies at this time   Neuro: aox4   Dressing: Krilex  dressing to the right foot CDI.   Diet: Regular diet tolerated well, NPO at midnight.    LDA: PIV infusing abx.   Equipment: Call light with in reach.   Plan: I &D tomorrow.    Additional Info:            "

## 2023-01-21 NOTE — ANESTHESIA PROCEDURE NOTES
Airway       Patient location during procedure: OR       Procedure Start/Stop Times: 1/21/2023 12:05 PM  Staff -        Anesthesiologist:  Lizeth Yu MD       Performed By: anesthesiologist  Consent for Airway        Urgency: elective  Indications and Patient Condition       Indications for airway management: alan-procedural         Mask difficulty assessment: 2 - vent by mask + OA or adjuvant +/- NMBA    Final Airway Details       Final airway type: endotracheal airway       Successful airway: ETT - single  Endotracheal Airway Details        ETT size (mm): 7.5       Cuffed: yes       Successful intubation technique: direct laryngoscopy       Grade View of Cords: 2       Position: Right       Measured from: lips       Secured at (cm): 23       Bite block used: None    Post intubation assessment        Placement verified by: capnometry, equal breath sounds and chest rise        Number of attempts at approach: 1       Secured with: plastic tape       Ease of procedure: easy       Dentition: Intact and Unchanged    Medication(s) Administered   Medication Administration Time: 1/21/2023 12:05 PM

## 2023-01-21 NOTE — ANESTHESIA CARE TRANSFER NOTE
Patient: Nehemiah Magallon    Procedure: Procedure(s):  IRRIGATION AND DEBRIDEMENT, FOOT, RIGHT, Placement of Wound Vac and Antibiotic Beads.       Diagnosis: Wound infection [T14.8XXA, L08.9]  Diagnosis Additional Information: No value filed.    Anesthesia Type:   General     Note:    Oropharynx: oropharynx clear of all foreign objects and ventilatory support  Level of Consciousness: awake  Oxygen Supplementation: face mask  Level of Supplemental Oxygen (L/min / FiO2): 10  Independent Airway: airway patency satisfactory and stable  Dentition: dentition unchanged  Vital Signs Stable: post-procedure vital signs reviewed and stable  Report to RN Given: handoff report given  Patient transferred to: PACU    Handoff Report: Identifed the Patient, Identified the Reponsible Provider, Reviewed the pertinent medical history, Discussed the surgical course, Reviewed Intra-OP anesthesia mangement and issues during anesthesia, Set expectations for post-procedure period and Allowed opportunity for questions and acknowledgement of understanding      Vitals:  Vitals Value Taken Time   /86 01/21/23 1327   Temp     Pulse 84 01/21/23 1329   Resp 13 01/21/23 1329   SpO2 100 % 01/21/23 1329   Vitals shown include unvalidated device data.    Electronically Signed By: Jennifer Wagoner CRNA, APRN CRNA  January 21, 2023  1:30 PM

## 2023-01-21 NOTE — OR NURSING
PACU to Inpatient Nursing Handoff    Patient Nehemiah Magallon is a 57 year old male who speaks English.   Procedure Procedure(s):  IRRIGATION AND DEBRIDEMENT, FOOT, RIGHT, Placement of Wound Vac and Antibiotic Beads.   Surgeon(s) Primary: Bryon Starr MD  Resident - Assisting: Dylan Lynn MD     Allergies   Allergen Reactions     Atorvastatin      Other reaction(s): Hyperglycemia     Celebrex [Celecoxib] Other (See Comments)     Dehydration per [facility]       Isolation  [unfilled]     Past Medical History   has a past medical history of Chronic pain syndrome (10/24/2014), Diabetes mellitus, type 2 (H), Diabetic Charcot foot (H), Diabetic retinopathy associated with diabetes mellitus due to underlying condition (H), Diverticulitis of colon, Gastroesophageal reflux disease, Hepatitis C (2017), History of skin cancer, Hypertension, Hypothyroidism, Legally blind, Midfoot collapse of right lower extremity, Migraine, NAFLD (nonalcoholic fatty liver disease), Nephrolithiasis, Neuropathy, MARGARITO (obstructive sleep apnea), Rib pain (10/24/2014), S/P colectomy (10/14/2014), and Thyroid cancer (H) (10/14/2014).    Anesthesia General   Dermatome Level     Preop Meds acetaminophen (Tylenol) - time given: 1117   Nerve block Not applicable   Intraop Meds fentanyl (Sublimaze): 100 mcg total  ondansetron (Zofran): last given at 1254   Local Meds No   Antibiotics vancomycin (Vancocin) - last given at 1207     Pain Patient Currently in Pain: denies   PACU meds  Not applicable   PCA / epidural No   Capnography     Telemetry ECG Rhythm: Normal sinus rhythm   Inpatient Telemetry Monitor Ordered? No        Labs Glucose Lab Results   Component Value Date     01/21/2023     01/21/2023       Hgb Lab Results   Component Value Date    HGB 11.1 01/20/2023       INR Lab Results   Component Value Date    INR 1.06 01/20/2023      PACU Imaging Not applicable     Wound/Incision Negative Pressure Wound Therapy Foot Right;Medial  (Active)   Dressing Pieces Applied (# of Each Type) 2 01/21/23 1333   Cycle Continuous 01/21/23 1333   Target Pressure (mmHg) 125 01/21/23 1333   Drainage Color/Characteristics Serosanguineous 01/21/23 1327   Number of days: 0       Incision/Surgical Site with Packing 01/21/23 Right;Lateral Foot Qty Placed: 1 (Active)   Incision Assessment UTV 01/21/23 1327   Number of days: 0       Incision/Surgical Site 11/17/22 Right Foot (Active)   Number of days: 65       Incision/Surgical Site 01/21/23 Right;Lateral Foot (Active)   Incision Assessment UTV 01/21/23 1327   Trina-Incision Assessment UTV 01/21/23 1327   Incision Drainage Amount UTV 01/21/23 1327   Drainage Description UTV 01/21/23 1327   Dressing Intervention Clean, dry, intact 01/21/23 1327   Number of days: 0      CMS        Equipment Not applicable   Other LDA       IV Access Peripheral IV 01/20/23 Anterior;Left Lower forearm (Active)   Site Assessment WDL 01/21/23 1327   Line Status Infusing 01/21/23 1327   Dressing Intervention New dressing  01/20/23 1800   Phlebitis Scale 0-->no symptoms 01/21/23 1327   Infiltration Scale 0 01/21/23 0400   Number of days: 1      Blood Products Not applicable EBL 20 mL   Intake/Output Date 01/21/23 0700 - 01/22/23 0659   Shift 9142-9680 0545-0354 3148-8259 24 Hour Total   INTAKE   I.V. 800   800   Shift Total(mL/kg) 800(8.4)   800(8.4)   OUTPUT   Urine 0   0   Blood 10   10   Shift Total(mL/kg) 10(0.1)   10(0.1)   Weight (kg) 95.25 95.25 95.25 95.25      Drains / Jones Negative Pressure Wound Therapy Foot Right;Medial (Active)   Dressing Pieces Applied (# of Each Type) 2 01/21/23 1333   Cycle Continuous 01/21/23 1333   Target Pressure (mmHg) 125 01/21/23 1333   Drainage Color/Characteristics Serosanguineous 01/21/23 1327   Number of days: 0      Time of void PreOp Void Prior to Procedure: 0930 (01/21/23 1108)    PostOp      Diapered? No   Bladder Scan     PO    ice chips     Vitals    B/P: (!) 148/83  T: 98.4  F (36.9  C)     Temp src: Oral  P:  Pulse: 86 (01/21/23 1345)          R: 16  O2:  SpO2: 98 %    O2 Device: None (Room air) (01/21/23 1345)    Oxygen Delivery: 6 LPM (01/21/23 1327)         Family/support present significant other   Patient belongings     Patient transported on cart and air mat   DC meds/scripts (obs/outpt) Not applicable   Inpatient Pain Meds Released? Yes       Special needs/considerations None   Tasks needing completion None       Jenelle Card RN  [facility] [id number]

## 2023-01-22 NOTE — PLAN OF CARE
VS: Temp: 96.8  F (36  C) Temp src: Oral BP: 134/69 Pulse: 89   Resp: 17 SpO2: 97 % O2 Device: None (Room air)      O2: On room air, denies SOB, no cough noted   Output: Voids spontaneously in bathroom   Last BM:    Activity: Up independently to bathroom, also SBA of 1. Declines to use a walker. Stable gait   Skin: Intact. Right foot incision, ace wrapped and wound vac attached   Pain: Denies   CMS: Alert and oriented X4, no complaints   Dressing: CDI   Diet: REG   LDA: PIV to left forearm, patent SL   Equipment: IV pole/pump, personal belongings, CAPNO   Plan: TBD   Additional Info:

## 2023-01-22 NOTE — PROGRESS NOTES
"Orthopaedic Surgery Attending - POD#1 s/p R foot I&D, VAC    Nehemiah Magallon is a 57 year old male who was admitted on 1/20/2023. He had a draining eschar on the medial right foot and a dry eschar (smaller) on the lateral right foot after Charcot reconstruction 11/17/2023 prompting relatively urgent admission for an I&D which occurred yesterday. There was significant purulent appearing materal from the medial wound which did appear to communicate with bone/retained hardware. The lateral wound did not appear to have any purulence, did track deep to bone, but only in several focal areas, and there is no metal laterally. Today he feels he is doing well, without significant malaise, fevers, or other unusual symptoms. He denies any significant pain at this time.    /66 (BP Location: Right arm)   Pulse 85   Temp (!) 96.3  F (35.7  C) (Oral)   Resp 17   Ht 1.778 m (5' 10\")   Wt 95.3 kg (210 lb)   SpO2 98%   BMI 30.13 kg/m      Exam:  Awake, alert, pleasant, cooperative, appropriately conversant, resting comfortably in bed in NAD.  Breathing pattern regular and nonlabored on room air.  R foot dressings c/d/i. VAC with good seal. Scant drainage in canister (this appears to be everything since surgery).    Labs:  Gram stain GPC's. Cultures pending.  CRP down from 69 on 01/20/2023 to 24 today.    Impression:  58yo male patient with multiple comorbidities including T2DM with recent debridement of a R foot Charcot reconstruction surgical site eschar/infection yesterday. Clinically stable without overt signs for sepsis. Has a skin defect medially managed with a VAC and a smaller defect laterally managed with dressings. Neither wound was able to be closed primarily, nor would it have been prudent to even if possible. There are resorbable, non-counted, vanco CaSO4 pellets in each wound. Infectious disease consultation is greatly appreciated.    Plan:  Cont VAC -125mmHg continuous to medial R foot wound  Cont abx " therapy  Cont NWB/elevate R foot  Check wounds tomorrow, change dressings  Possible repeat I&D depending on findings  NPO p MN tonight as a precaution  If wounds OK and no repeat I&D tomorrow, may proceed with discharge planning process and will need a home VAC set up for 3x/weekly VAC changes  Per report will also likely need a PICC line for abx therapy as directed by ID - input greatly appreciated  Findings/plan d/w patient, all questions answered  Please call or page with questions, thanks    Bryon Starr MD

## 2023-01-22 NOTE — CONSULTS
GENERAL INFECTIOUS [place]EASES CONSULT[place] [facility] [place]  Nehemiah Magallon   Date of birth 1965, Medical record number [id number]  Date of Visit:  01/22/2023  Date of Admission: 1/20/2023  Consult Requested by:Bryon Starr MD  Reason for Consult:  s/p I&D right foot wound, intraop cultures taken         Assessment and Recommendations:     Nehemiah Magallon is a 57 year old male, a retired RN, [place] [facility] Diabetes mellitus (HbA1c 5.6 in Jan 2023 ) with retinopathy, legal blindness, peripheral neuropathy, papillary thyroid malignancy in remission, hypothyroidism, GERD, MARGARITO, Hep C s/p treatment (2017), HTN, NAFLD, colectomy for diverticulitis (2014), bilateral hearing loss and using bilateral hearing aids, chronic pain, s/p right Charcot foot reconstruction on 11/17/2022. Periop he recieved Cefazolin. Per Nehemiah, he was then discharged on Augmentin x 7 days.  He was using a cast for 2.5-3 weeks post surgery. He then noticed a scab but no drainage (reviewed photo on his phone). Then the wound was covered with ace wrap and he thought the initial cast then the ace wrap could have caused the wound. He had kept the surgical site dry, and would air it (without dressing) when he sat down at home but applied would dressing when he went to bed.  Last Adriano 1/15/23, he noticed purulent drainage and deepening of the wound. He was prescribed with Augmentin 875 mg po q12 hr x 14 days on 1/16/23.  He went to see Dr Starr on 1/20/23 and directly admitted on 1/20/2023 from Ortho clinic for increasing drainage and wound necrosis of right foot surgical sites.     1. S/p right Charcot foot reconstruction on 11/17/2022 complicated by post op infection   - 1/21/23 -  s/p I+D of right foot, placement of wound vac and antibiotic beads . Intra-op findings: Medial wound: purulent material, probed directly to plate and bone; Lateral wound: no purulence, probed to bone, no hardware present laterally. Intra-Op cultures are pending, with  gram stain showing 1+ gram positive cocci and 4+ WBCs.   - currently on Pip/Tazobactam since admission on 1/20/23.      2.  Diabetes mellitus (HbA1c 5.6 in Jan 2023) with peripheral neuropathy   - HbA1c around 5.6 in the past 4 years per patient    3. CRP was 69 and ESR was 59 with WBC 7.8 on 1/20/23  4. Sweatings (since admission)     RECOMMENDATION:  - continue Pip/Tazobactam and Vancomycin IV   - if febrile, repeat blood cultures x2   - follow-up intra op cultures and blood cultures, will adjust antibiotics per cultures  - trend CRP every 3 days in hospital     ID will continue to follow     Thank you for allowing us to participate in the care of this patient    Pete Arambula MD, M.Med.Sc  Staff, Infectious Diseases   Pager : [phone]         History of Present Illness:     Nehemiah Magallon is a 57 year old male w PMH Diabetes mellitus with retinopathy, legal blindness, peripheral neuropathy, papillary thyroid malignancy in remission, hypothyroidism, GERD, MARGARITO, Hep C s/p treatment (2017), HTN, NAFLD, colectomy for diverticulitis (2014), bilateral hearing loss and using bilateral hearing aids, chronic pain, s/p right Charcot foot reconstruction on 11/17/2022. Periop he recieved Cefazolin. Per Nehemiah, he was then discharged on Augmentin x 7 days.  He was using a cast for 2.5-3 weeks post surgery. He then noticed a scab but no drainage (reviewed photo on his phone). Then the wound was covered with ace wrap and he thought the initial cast then the ace wrap could have caused the wound. He had kept the surgical site dry, and would air it (without dressing) when he sat down at home but applied would dressing when he went to bed.  Last Adriano 1/15/23, he noticed purulent drainage and deepening of the wound. He was prescribed with Augmentin 875 mg po q12 hr x 14 days on 1/16/23.  He went to see Dr Starr on 1/20/23 and directly admitted on 1/20/2023 from Ortho clinic for increasing drainage and wound necrosis of  right foot surgical sites. He denied any fever, chills, sweats or feeling ill at home. He has no sensation in his feet.  CRP was 69 and ESR was 59 with WBC 7.8 on 1/20/23. Blood cultures x2 on 1/20/23 are pending/ negative to date. He starts to have nightsweats after admission. Otherwise, he is feeling fine. No pain. has good appetite.     He underwent I+D of right foot, placement of wound vac and antibiotic beads on 1/21/23. Intra-op findings: Medial wound: purulent material, probed directly to plate and bone; Lateral wound: no purulence, probed to bone, no hardware present laterally. Intra-Op cultures are pending, with gram stain showing 1+ gram positive cocci and 4+ WBCs. He has been on Pip/Tazobactam since admission on 1/20/23.      Antimicrobials  Pip/Tazo 1/20 - present  Vancomycin IV 1/20 - present      Xray of Right foot 1/20/23  Impression:  1. Postsurgical changes of medial column with new fracturing of the third most proximal plate fixation screw.  2. Changes of Charcot arthropathy.  3. Medial sided soft tissue swelling, increased compared to 12/30/2022.          Review of Systems:     CONSTITUTIONAL:  No fevers or chills  EYES: negative for icterus  ENT:  negative for hearing loss, tinnitus and sore throat  RESPIRATORY:  negative for cough with sputum and dyspnea  CARDIOVASCULAR:  negative for chest pain, dyspnea  GASTROINTESTINAL:  negative for nausea, vomiting, diarrhea and constipation  GENITOURINARY:  negative for dysuria  HEME:  No easy bruising  INTEGUMENT: see HPI  NEURO:  see HPI          Past Medical History:     Past Medical History:   Diagnosis Date     Chronic pain syndrome 10/24/2014     Diabetes mellitus, type 2 (H)      Diabetic Charcot foot (H)      Diabetic retinopathy associated with diabetes mellitus due to underlying condition (H)      Diverticulitis of colon      Gastroesophageal reflux disease      Hepatitis C 2017    treated     History of skin cancer      Hypertension       Hypothyroidism      Legally blind      Midfoot collapse of right lower extremity      Migraine      NAFLD (nonalcoholic fatty liver disease)      Nephrolithiasis      Neuropathy      MARGARITO (obstructive sleep apnea)      Rib pain 10/24/2014     S/P colectomy 10/14/2014     Thyroid cancer (H) 10/14/2014          Past Surgical History:     Past Surgical History:   Procedure Laterality Date     ARTHRODESIS FOOT Right 11/17/2022    Procedure: Right midfoot/talonavicular osteotomy and reduction of deformity Right midfoot/talonavicular arthrodesis, achilles lengthening;  Surgeon: Bryon Starr MD;  Location: [facility] OR     CHOLECYSTECTOMY  1986     COLECTOMY      @ 6 years ago, sigmoid     COLONOSCOPY  2016     FOOT SURGERY Left 2021     LENGTHEN TENDON ACHILLES Right 11/17/2022    Procedure: LENGTHENING, TENDON, ACHILLES;  Surgeon: Bryon Starr MD;  Location: [facility] OR          Family History:     Family History   Problem Relation Age of Onset     Diabetes Mother      Cancer Mother      Diabetes Father      Heart Disease Father      Anesthesia Reaction No family hx of      Clotting Disorder No family hx of      Glaucoma No family hx of      Macular Degeneration No family hx of           Social History:     Social History     Tobacco Use     Smoking status: Never     Smokeless tobacco: Never   Substance Use Topics     Alcohol use: Not Currently     Comment: rarely     History   Sexual Activity     Sexual activity: Not Currently     Partners: Female     lives at home with wife a a dog , follows at [facility] but may seek medical care at other health facilities per patient          Current Medications (antimicrobials listed in bold):       aspirin  81 mg Oral BID     cetirizine  10 mg Oral Daily     empagliflozin  25 mg Oral Daily     fluticasone  2 spray Both Nostrils Daily     gabapentin  1,200 mg Oral TID     insulin aspart  1-7 Units Subcutaneous TID AC     insulin aspart  1-5 Units Subcutaneous At Bedtime     levothyroxine   137 mcg Oral QAM AC     metFORMIN  1,000 mg Oral BID w/meals     pantoprazole  40 mg Oral Daily     piperacillin-tazobactam  4.5 g Intravenous Q6H     polyethylene glycol  17 g Oral Daily     pravastatin  40 mg Oral QPM     senna-docusate  1 tablet Oral BID     sodium chloride (PF)  3 mL Intracatheter Q8H     topiramate  150 mg Oral BID     vancomycin  1,000 mg Intravenous Q12H     Vitamin D3  50 mcg Oral Daily          Allergies:     Allergies   Allergen Reactions     Atorvastatin      Other reaction(s): Hyperglycemia     Celebrex [Celecoxib] Other (See Comments)     [facility]          Physical Exam:   Vitals were reviewed  Patient Vitals for the past 24 hrs:   BP Temp Temp src Pulse Resp SpO2   01/22/23 0814 115/66 (!) 96.3  F (35.7  C) Oral 85 17 98 %   01/21/23 2338 134/69 96.8  F (36  C) Oral 89 17 97 %   01/21/23 2019 110/58 -- -- -- -- --   01/21/23 1630 99/45 97.5  F (36.4  C) Oral 91 16 96 %   01/21/23 1600 -- -- -- 87 16 95 %   01/21/23 1530 -- -- -- 88 15 95 %   01/21/23 1515 -- -- -- 84 10 94 %   01/21/23 1500 -- -- -- 86 15 95 %   01/21/23 1447 133/69 -- -- 89 16 96 %   01/21/23 1400 139/83 98.2  F (36.8  C) Oral 85 14 95 %   01/21/23 1345 (!) 148/83 -- -- 86 16 98 %   01/21/23 1330 (!) 143/63 -- -- 85 19 99 %   01/21/23 1327 (!) 154/86 98.4  F (36.9  C) Oral 86 13 100 %     Ranges for his vital signs:  Temp:  [96.3  F (35.7  C)-98.4  F (36.9  C)] 96.3  F (35.7  C)  Pulse:  [84-91] 85  Resp:  [10-19] 17  BP: ()/(45-86) 115/66  SpO2:  [94 %-100 %] 98 %  Physical Examination:  GENERAL:  well-developed, well-nourished, alert, oriented, in bed in no acute distress.   HEENT:  Head is normocephalic, atraumatic   EYES:  Eyes have anicteric sclerae without conjunctival injection   NECK:  Supple.   LUNGS:  Clear to auscultation bilateral.   CARDIOVASCULAR:  Regular rate and rhythm with no murmurs, gallops or rubs.  ABDOMEN:  Normal bowel sounds, soft, nontender. No appreciable  hepatosplenomegaly  SKIN:  No acute rashes.   Extremities : no edema .  right foot wrapped, with wound vac.   NEUROLOGIC:  Grossly nonfocal. peripheral neuropathy,          Laboratory Data:     Inflammatory Markers    Recent Labs   Lab Test 01/22/23  0839 01/20/23  1211   SED  --  59*   CRP 24.0* 69.0*     Hematology Studies    Recent Labs   Lab Test 01/22/23  0839 01/21/23  1559 01/20/23  1211 11/19/22  0944 11/18/22  1541 11/17/22  1022   WBC 6.4 7.4 7.8  --   --  7.7   HGB 10.3* 10.5* 11.1* 10.9* 10.8* 13.8   MCV 84 85 84  --   --  86    216 232  --   --  161     Metabolic Studies     Recent Labs   Lab Test 01/22/23  0839 01/21/23  1559 01/21/23  0550 01/20/23  1914 01/20/23  1211 11/20/22  0843 11/17/22  1022    143  --   --  141  --   --    POTASSIUM 4.7 4.1  --  3.9 4.2  --  4.5   CHLORIDE 111* 110*  --   --  113*  --   --    CO2 24 25  --   --  23  --   --    BUN 19 24  --   --  33*  --   --    CR 1.13 1.21 1.31* 1.28* 1.24   < > 1.22   GFRESTIMATED 75 69 63 65 67   < > 69    < > = values in this interval not displayed.     Hepatic Studies    Recent Labs   Lab Test 01/21/23  1559   BILITOTAL 0.3   ALKPHOS 55   ALBUMIN 2.9*   AST 7   ALT 10     Thyroid Studies    Recent Labs   Lab Test 01/21/23  1559   TSH 1.80     Vancomycin Levels    Recent Labs   Lab Test 01/22/23  0839   VANCOMYCIN 18.9

## 2023-01-22 NOTE — PHARMACY-VANCOMYCIN DOSING SERVICE
"Pharmacy Vancomycin Note  Date of Service 2023  Patient's  1965   57 year old, male    Indication: Skin and Soft Tissue Infection  Day of Therapy: started 23  Current vancomycin regimen:  1000 mg IV q12h  Current vancomycin monitoring method: AUC  Current vancomycin therapeutic monitoring goal: 400-600 mg*h/L    InsightRX Prediction of Current Vancomycin Regimen  Loading dose: N/A  Regimen: 1000 mg IV every 12 hours.  Start time: 14:08 on 2023  Exposure target: AUC24 (range)400-600 mg/L.hr   AUC24,ss: 542 mg/L.hr  Probability of AUC24 > 400: 93 %  Ctrough,ss: 17.9 mg/L  Probability of Ctrough,ss > 20: 35 %  Probability of nephrotoxicity (Lodise CANELO ): 14 %    Current estimated CrCl = Estimated Creatinine Clearance: 82.5 mL/min (based on SCr of 1.13 mg/dL).    Creatinine for last 3 days  2023: 12:11 PM Creatinine 1.24 mg/dL;  7:14 PM Creatinine 1.28 mg/dL  2023:  5:50 AM Creatinine 1.31 mg/dL;  3:59 PM Creatinine 1.21 mg/dL  2023:  8:39 AM Creatinine 1.13 mg/dL    Recent Vancomycin Levels (past 3 days)  2023:  8:39 AM Vancomycin 18.9 mg/L    Vancomycin IV Administrations (past 72 hours)                   vancomycin (VANCOCIN) 1000 mg in dextrose 5% 200 mL PREMIX (mg) 1,000 mg New Bag 23 0208     1,000 mg New Bag 23 0129     1,000 mg New Bag 23 1502    vancomycin (VANCOCIN) topical powder (g) 2 g Given 23 1251    vancomycin vial 1000 mg (mg) 1,000 mg Given 23 1207                Nephrotoxins and other renal medications (From now, onward)    Start     Dose/Rate Route Frequency Ordered Stop    23 0830  empagliflozin (JARDIANCE) tablet 25 mg        Note to Pharmacy: PTA Sig:Take 25 mg by mouth daily      25 mg Oral DAILY 23 0807      23 1900  piperacillin-tazobactam (ZOSYN) 4.5 g vial to attach to  mL bag        Note to Pharmacy: For [facility], [facility] and [facility]: For Zosyn-naive patients, use the \"Zosyn initial dose + " "extended infusion\" order panel.    4.5 g  over 30 Minutes Intravenous EVERY 6 HOURS 01/20/23 1736      01/20/23 1340  vancomycin (VANCOCIN) 1000 mg in dextrose 5% 200 mL PREMIX         1,000 mg  200 mL/hr over 1 Hours Intravenous EVERY 12 HOURS 01/20/23 1339               Contrast Orders - past 72 hours (72h ago, onward)    None        InsightRX Prediction of Planned New Vancomycin Regimen  Loading dose: N/A  Regimen: 1750 mg IV every 24 hours.  Start time: 13:44 on 01/22/2023  Exposure target: AUC24 (range)400-600 mg/L.hr   AUC24,ss: 482 mg/L.hr  Probability of AUC24 > 400: 82 %  Ctrough,ss: 13 mg/L  Probability of Ctrough,ss > 20: 11 %  Probability of nephrotoxicity (Lodise CANELO 2009): 8 %    Interpretation of levels and current regimen:  Vancomycin level is reflective of -600    Has serum creatinine changed greater than 50% in last 72 hours: No    Urine output:  unable to determine    Renal Function: Stable    Favor decreasing the dose slightly and changing to daily dosing. Patient is in range but at the higher end and still had a 14% risk of nephrotoxicity. This lower dose is less likely to be nephrotoxic and is still very likely to be therapeutic.    Plan:  1. Decrease Dose to 1750 mg IV every 24 hours (18.3 mg/kg)   2. Vancomycin monitoring method: AUC  3. Vancomycin therapeutic monitoring goal: 400-600 mg*h/L  4. Pharmacy will check vancomycin levels as appropriate in 1-3 Days.  5. Serum creatinine levels will be ordered daily for the first week of therapy and at least twice weekly for subsequent weeks.    Pedro Luis Mireles, PharmD  PGY1 Pharmacist Resident    "

## 2023-01-22 NOTE — PLAN OF CARE
"  VS: Blood pressure 115/66, pulse 85, temperature (!) 96.3  F (35.7  C), temperature source Oral, resp. rate 17, height 1.778 m (5' 10\"), weight 95.3 kg (210 lb), SpO2 98 %.     O2: 98% on RA.   Output: Voids spontaneously in toilet.    Last BM: Pre-op.   Activity: SBA. Refuses GB. Needs help with wound vac.   Skin: Intact, X R foot incision, ace wrapped, wound vac.   Pain: Denies   CMS: AOX4. Denies new pain, tingling, dizziness, CP.    Dressing: CDI   Diet: Regular diet, thin liquids, takes pills whole.    LDA: R forearm PIV infusing ABX.    Equipment: Walker, IV pole.   Plan: Possible discharge tomorrow, (01/23/23) per care coordinator note.    Additional Info: Pt has bilateral hearing loss. Wears hearing aids. Pt refuses GB and walker. Guides self to bathroom using wall and SBA.                            "

## 2023-01-22 NOTE — PROGRESS NOTES
01/22/23 0900   Appointment Info   Signing Clinician's Name / Credentials (PT) Risa Del Valle DPT   Rehab Comments (PT) RLE NWB + wound vac   Living Environment   People in Home spouse   Current Living Arrangements apartment   Home Accessibility no concerns   Transportation Anticipated family or friend will provide;health plan transportation   Living Environment Comments Pt lives in apartment with wife, has elevator access. Wheelchair accessible.   Self-Care   Usual Activity Tolerance moderate   Current Activity Tolerance moderate   Regular Exercise No   Equipment Currently Used at Home wheelchair, manual;cane, quad;shower chair;walker, rolling   Fall history within last six months no   Activity/Exercise/Self-Care Comment Pt used 1 forearm crutch for ambulation inside apartment & outside to take the dog out, uses manual WC to go to appointments. Has FWW. Pt does not drive due to charcot foot complications. Pt reports he rarely leaves home   General Information   Onset of Illness/Injury or Date of Surgery 01/20/23   Referring Physician Dylan Lynn MD   Patient/Family Therapy Goals Statement (PT) To return home at [facility]   Pertinent History of Current Problem (include personal factors and/or comorbidities that impact the POC) Nehemiah Magallon is a 57 year old male s/p right foot wound I&D on 1/21 with Dr. Starr. Doing well. No systemic symptoms, no pain currently.   Existing Precautions/Restrictions weight bearing   Weight-Bearing Status - LUE full weight-bearing   Weight-Bearing Status - RUE full weight-bearing   Weight-Bearing Status - LLE full weight-bearing   Weight-Bearing Status - RLE nonweight-bearing   Cognition   Affect/Mental Status (Cognition) WFL   Orientation Status (Cognition) oriented x 3   Follows Commands (Cognition) WFL   Pain Assessment   Patient Currently in Pain No   Integumentary/Edema   Integumentary/Edema no deficits were identifed   Posture    Posture Forward head position;Protracted  shoulders   Range of Motion (ROM)   Range of Motion ROM is WFL;ROM deficits secondary to surgical procedure   Strength (Manual Muscle Testing)   Strength (Manual Muscle Testing) strength is WFL   Bed Mobility   Comment, (Bed Mobility) ModIND supine<>sit   Transfers   Comment, (Transfers) ModIND sit<>stand w/ FWW   Gait/Stairs (Locomotion)   Comment, (Gait/Stairs) SBA short distance amb w/ FWW. Stairs not assessed.   Balance   Balance no deficits were identified   Sensory Examination   Sensory Perception WFL   Coordination   Coordination no deficits were identified   Muscle Tone   Muscle Tone no deficits were identified   Clinical Impression   Criteria for Skilled Therapeutic Intervention Yes, treatment indicated   PT Diagnosis (PT) Impaired functional mobility   Influenced by the following impairments NonWBing restrictions, wound vac   Functional limitations due to impairments transfers, gait, functional endurance   Clinical Presentation (PT Evaluation Complexity) Stable/Uncomplicated   Clinical Presentation Rationale Pt has 1-2 body structure/function impairments requiring low complexity clinical decision making.   Clinical Decision Making (Complexity) low complexity   Planned Therapy Interventions (PT) balance training;bed mobility training;gait training;home exercise program;patient/family education;strengthening;transfer training;neuromuscular re-education;home program guidelines;progressive activity/exercise   Risk & Benefits of therapy have been explained evaluation/treatment results reviewed;care plan/treatment goals reviewed;participants included;current/potential barriers reviewed;patient   PT Total Evaluation Time   PT Eval, Low Complexity Minutes (45161) 9   Plan of Care Review   Plan of Care Reviewed With patient   Physical Therapy Goals   PT Frequency One time eval and treatment only   PT Predicted Duration/Target Date for Goal Attainment 01/23/23   PT Goals Bed Mobility;Transfers;Gait   PT: Bed  Mobility Modified independent;Supine to/from sit;Goal Met   PT: Transfers Modified independent;Sit to/from stand;Bed to/from chair;Assistive device;Goal Met   PT: Gait Modified independent;Rolling walker;Within precautions;25 feet;Goal Met   PT Discharge Planning   PT Discharge Recommendation (DC Rec) home with assist   PT Rationale for DC Rec Pt is near baseline mobility, limited by nonWBing restrictions. Already has all necessary equipment at home. Observed mobilizing well with FWW. Anticipate safe dc home with support from wife.   PT Brief overview of current status SBA-modIND for all mobility   Total Session Time   Total Session Time (sum of timed and untimed services) 9

## 2023-01-22 NOTE — PROGRESS NOTES
Care Management Note    Length of Stay (days): 2    Expected Discharge Date: 2023     Concerns to be Addressed:  IV abx     Patient plan of care discussed at interdisciplinary rounds: Yes    Anticipated Discharge Disposition:  home      Referrals Placed by CM/SW:  [facility]      Additional Information:  During AM rounds, provider stated pt needs IV abx and requested this writer to send a referral.    Sent a referral to [facility] for an insurance benefit check.  Instructed them to refer to [facility] if pt does not have coverage.    PT NAME/Room #: Nehemiah Magallon   [id number]   or MRN: [id number]  Diagnosis: wound infection, charcot's joint of right foot  DC Therapies: none recommended currently  LINE/Tubes: none currently  Anticipated DC date: BRANDIE 3 days  Open to another HC agency?: No  PCP: Ochoa Echevarria          RNCC will continue to follow.    Natalio Carrillo RNCC  [facility] [place] Week[facility] RNCC Pager: [phone]

## 2023-01-22 NOTE — PROGRESS NOTES
Orthopaedic Surgery Progress Note 01/22/2023    Subjective  No acute events overnight.  Pain well controlled. Denies new numbness, tingling, or weakness.  Tolerating diet without nausea or vomiting.  Voiding spontaneously.  +flatus, -BM.   Denies fevers, chills, chest pain, SOB.      Objective  Temp: 96.8  F (36  C) Temp src: Oral BP: 134/69 Pulse: 89   Resp: 17 SpO2: 97 % O2 Device: None (Room air) Oxygen Delivery: 6 LPM    Exam:  Gen: No acute distress, resting comfortably in bed.  Resp: Non-labored breathing  Cardio: Regular rate via peripheral pulse  MSK:  LE:  - Dressings c/d/I, wound VAC holding suction  - Fires Quad, TA, GSC, EHL, FHL  - Baseline sensation in femoral/tibial/sural/saphenous/DP/SP nerves  - PT/DP pulses unable to assess due to wound VAC, but toes WWP    Drain output: wound VAC: uncharted.    Culture results: wound tissue and fluid cultures pending - gram stain +GPC    Assessment: Nehemiah Magallon is a 57 year old male s/p right foot wound I&D on 1/21 with Dr. Starr. Doing well. No systemic symptoms, no pain currently.     Plan:  Ortho Primary    Activity: Up with assist  Weight bearing status: NWB RLE  Antibiotics: Vanc/Zosyn pending culture results and ID recs  Diet: Begin with clear fluids and progress diet as tolerated; Bowel regimen  DVT prophylaxis: SCD, ASA 81 BID  Bracing/Splinting: no brace needed  Wound Care: WOC consulted, appreciate help. Wound VAC -125 continuous, Dressing to remain in place x 7-10 days, reinforce and redress PRN  Drains: Document output per shift  Pain management: transition from IV to orals as tolerated.  Jones: none  X-rays: none  Physical Therapy: gait training, mobilization ADLs.  Labs: Trend Hgb on POD #1   Consults: PT, Hospitalist, ID appreciate assistance in caring for this patient.  Follow-up: Clinic with Dr. Starr, timing TBD     Disposition: Pending culture results, Abx regimen, possible repeat I&D, progress with therapies, pain control on orals, and medical  stability, anticipate discharge to Home vs Rehab on POD #3-4.     Fidencio Lynn MD  Orthopaedic Surgery PGY-1

## 2023-01-22 NOTE — PROGRESS NOTES
"[facility]    Medicine Progress Note - Hospitalist Service, GOLD TEAM 16    Date of Admission:  1/20/2023    Assessment & Plan    Magallon, Mark is a 57 year old male patient with a past medical history significant for T2DM c/b charcot foot & diabetic retinopathy + neuropathy, legally blind, GERD, hepatitis C treated, HTN, HLD, papillary thyroid cancer in remission, hypothyroidism, NAFLD, MARGARITO, chronic pain, and h/o sigmoid colectomy for diverticulitis.  He had surgery in his right foot 11/17/22 by Dr Starr . 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 Debridement and irrigation of right foot, placement of subatmospheric wound therapy device\"  - DVT prophylaxis  Per orthopedic surgery    - was stared on IV vancomycin  And zosyn and  For further antibiotics adjustment per ID   - follow up  On intraop culture form 12/21, so far shows Gt + cocci   - blood culture 1/20   - repeat blood culture if ghets fevers   -restart  PTA ASA once ok with ortho   - CRP 69, trend q 3 days for now            # T2DM currently well controlled, history of diabetic retinopathy and diabetic neuropathy.  -Patient on Jardiance, Semiglutide, Metformin that have not been restarted  , restarted metformin  And jardiance. Ozempic is not on formulary so someone would need to bring it in uses q Sunday  -Per patient, Humilin R U-500 has been discontinued  [facility].  - -A1c 5.6% in Nov/2022 repeat pending  - accu-checks insulin sliding scale   -continue gabapentin     SHALINI  - creatinine 1.31, was 0,99 11/22, now down to 1.13  With IV fluids  Monitor and avoid " "nephritoxic agents      #Anemia, acute on chronic post surgery    - hgb at time of discharge during last hospital stay 10.9  -hgb now 10.3      #History of papillary thyroid cancer status post  Surgery   Post operative hypothyroidism   - Continue PTA Synthroid 137 mcg   - on semiglutide, states has been seeing endo as out patient  And prescribing medications       #MARGARITO on CPAP   - Continue home CPAP     #HTN and HLD  -Hold PTA lisinopril 40 mg  restart post operative as blood pressure  Allow   -Continue PTA Pravastatin 40 mg at bedtime      #GERD   - Continue Protonix in place of PTA omeprazole     #Migraines   - Continue PTA topamax; imitrex prior to onset    #Allergic rhinitis   - PTA flonase, Cetrizine    #History of hepatitis C  - treated      #History of nonalcoholic fatty liver disease  #History of nephrolithiasis  #Chronic pain syndrome  #Legally blind  #History of migraine headaches.                 Diet: Advance Diet as Tolerated: Regular Diet Adult    DVT Prophylaxis: per orthopedic surgery    Jones Catheter: Not present  Lines: None     Cardiac Monitoring: None  Code Status: Full Code full     Clinically Significant Risk Factors           # Hypercalcemia: corrected calcium is >10.1, will monitor as appropriate    # Hypoalbuminemia: Lowest albumin = 2.9 g/dL at 1/21/2023  3:59 PM, will monitor as appropriate            # Obesity: Estimated body mass index is 30.13 kg/m  as calculated from the following:    Height as of this encounter: 1.778 m (5' 10\").    Weight as of this encounter: 95.3 kg (210 lb)., PRESENT ON ADMISSION         Disposition Plan    TBD             Torie Darby MD  Hospitalist Service, GOLD TEAM [address] [place] [address]  Securely message with [employer] (more info)  Text page via [facility] Paging/Directory   See signed in provider for up to date coverage information  ______________________________________________________________________    Interval History "   No chest pain  No shortness of breath , doing ok, eating now and appetite ok     Physical Exam   Vital Signs: Temp: 96.8  F (36  C) Temp src: Oral BP: 134/69 Pulse: 89   Resp: 17 SpO2: 97 % O2 Device: None (Room air) Oxygen Delivery: 6 LPM  Weight: 210 lbs 0 oz  General appearence: awake alert  In no apparent distress    HEENT: EOMI, PEARLA, sclera nonicteric,  moist,  mucus membranes,   NECK : supple  RESPIRATORY: lungs clear to auscultation bilateral,  no wheezing or crackles   CARDIOVASCULAR:S1 S2 regular rate and rhythm, no rubs gallops or murmurs appreciated  GASTROINTESTINAL:soft, non-distended , non-tender , + bowel sounds,   SKIN: warm and dry, no mottling noted   NEUROLOGIC; awake alert and oriented, no focal deficits found  EXTREMITIES: no clubbing, cyanosis or edema , right foot wrapped     Data     I have personally reviewed the following data over the past 24 hrs:    7.4  \   10.5 (L)   / 216     143 110 (H) 24 /  208 (H)   4.1 25 1.21 \       ALT: 10 AST: 7 AP: 55 TBILI: 0.3   ALB: 2.9 (L) TOT PROTEIN: 7.0 LIPASE: N/A       TSH: 1.80 T4: N/A A1C: N/A       Imaging results reviewed over the past 24 hrs:   No results found for this or any previous visit (from the past 24 hour(s)).  Recent Labs   Lab 01/22/23  0227 01/21/23  2153 01/21/23  1722 01/21/23  1559 01/21/23  0733 01/21/23  0550 01/20/23  2154 01/20/23  1914 01/20/23  1211   WBC  --   --   --  7.4  --   --   --   --  7.8   HGB  --   --   --  10.5*  --   --   --   --  11.1*   MCV  --   --   --  85  --   --   --   --  84   PLT  --   --   --  216  --   --   --   --  232   INR  --   --   --   --   --   --   --  1.06  --    NA  --   --   --  143  --   --   --   --  141   POTASSIUM  --   --   --  4.1  --   --   --  3.9 4.2   CHLORIDE  --   --   --  110*  --   --   --   --  113*   CO2  --   --   --  25  --   --   --   --  23   BUN  --   --   --  24  --   --   --   --  33*   CR  --   --   --  1.21  --  1.31*  --  1.28* 1.24   ANIONGAP  --   --   --  8   --   --   --   --  5   ALICE  --   --   --  9.0  --   --   --   --  9.6   * 163* 185* 166*   < > 154*   < >  --  98   ALBUMIN  --   --   --  2.9*  --   --   --   --   --    PROTTOTAL  --   --   --  7.0  --   --   --   --   --    BILITOTAL  --   --   --  0.3  --   --   --   --   --    ALKPHOS  --   --   --  55  --   --   --   --   --    ALT  --   --   --  10  --   --   --   --   --    AST  --   --   --  7  --   --   --   --   --     < > = values in this interval not displayed.

## 2023-01-22 NOTE — PLAN OF CARE
Physical Therapy Discharge Summary    Reason for therapy discharge:    All goals and outcomes met, no further needs identified.    Progress towards therapy goal(s). See goals on Care Plan in [facility] electronic health record for goal details.  Goals met    Therapy recommendation(s):    No further therapy is recommended.

## 2023-01-23 NOTE — CONSULTS
[facility] Nurse Inpatient Assessment     Consulted for: Right foot NPWT    Patient History (according to provider note(s):      Per Dr Bryon Starr on 1/22/2023: 56yo male patient with multiple comorbidities including T2DM with recent debridement of a R foot Charcot reconstruction surgical site eschar/infection yesterday. Clinically stable without overt signs for sepsis. Has a skin defect medially managed with a VAC and a smaller defect laterally managed with dressings. Neither wound was able to be closed primarily, nor would it have been prudent to even if possible. There are resorbable, non-counted, vanco CaSO4 pellets in each wound. Infectious disease consultation is greatly appreciated.    Areas Assessed:      Areas visualized during today's visit: Right foot    Negative pressure wound therapy applied to: Right medial foot    1/23     Last photo: 1/23/2023   Wound due to: Surgical Wound   Wound history/plan of care:      Surgical date: 1/21/2023    Date Negative Pressure Wound Therapy initiated: 1/21/2023     Interventions in place: elevation    Is patient s nutritional status compromised? no   a. If yes, what interventions are in place? N/A    Reason for initiating vac therapy? Presence of co-morbidities, Need for accelerated granulation tissue and Prior history of delayed wound healing    Which?of?the?following?co-morbidities?apply? Diabetes  a. If diabetic is patient on a diabetic management program? Yes     Is osteomyelitis present in wound? yes  a.  If yes what treatments are in place? IV antibiotics per ID    Wound base: 75 % slough, 25 % bone , tendon     Palpation of the wound bed: normal       Drainage: scant      Description of drainage: serosanguinous      Measurements (length x width x depth, in cm) 6  x 4  x  3 cm       Tunneling N/A      Undermining N/A   Periwound skin: Intact       Color: pink       Temperature: normal    Odor: none   Pain:  absent   Pain intervention prior to dressing change: patient tolerated well  Treatment goal: Heal   STATUS: initial assessment   Supplies ordered: supplies stored on unit    Number of foam pieces removed from a wound (excluding foam for bridge) : 1 GranuFoam Black   Verified this matched the number of foam pieces applied last dressing change: Yes   Number of foam pieces packed into wound (excluding foam for bridge) : 1 GranuFoam Black     Wound location: Right lateral foot    1/23    Last photo: 1/23/2023  Wound due to: Surgical Wound  Wound history/plan of care: Patient to OR on 1/21/2023 with Nadja Jeffries beads at base of wound. Once beads have disintegrated, OK to place VAC over wound.   Wound base: Base currently covered with antibiotic beads     Palpation of the wound bed: normal      Drainage: none     Description of drainage: none     Measurements (length x width x depth, in cm): 5  x 1  x  1 cm      Tunneling: N/A     Undermining: N/A  Periwound skin: Intact      Color: pink      Temperature: normal   Odor: none  Pain: absent  Pain interventions prior to dressing change: patient tolerated well  Treatment goal: Heal   STATUS: initial assessment  Supplies ordered: supplies stored on unit       Treatment Plan:     Negative pressure wound therapy plan:  Wound location: Right medial foot   Change Days: Mon/Wed/Fri by WOC RN    Supplies (including all accessories) used: small  Black foam   Cleanse with MicroKlenz prior to replacing VAC    Suction setting: -125   Methods used: Window paned all periwound skin with vac drape prior to applying sponge    Staff RN to assess integrity of dressing and ensure suction is set at appropriate level every shift.   Date canister. Chart canister output every shift. Change cannister weekly and PRN if full/occluded     Remove foam dressing and replace with BID normal saline moist gauze dressing if:   -a dressing failure which cannot be repaired within 2 hours   -patient is  "discharging to home without a home pump   -patient is discharging to a facility outside the local area   -if a dressing is a \"Silver Foam\", remove before Radiation Therapy or MRI     The hospital VAC pump is not to be discharged with the patient.?Ensure to disconnect patient from machine prior to discharge. Then,    - If a home KCI VAC pump has been delivered, connect home cannister to dressing tubing then connect cannister to home pump and turn on machine    - If transferring to a nearby facility with a KCI vac, can disconnect and clamp tubing then cover end with glove so can be reconnected within 2 hours       Right lateral foot wound(s): Monday/Wednesday/Friday by WOC RN: Wash surrounding skin with MicroKlenz and gauze. Cover wound bed with plain packing strip. Cover with Mepilex. Once beads have disintegrated, OK to start NPWT to wound.      Orders: Written    RECOMMEND PRIMARY TEAM ORDER: None, at this time  Education provided: plan of care  Discussed plan of care with: Patient and Physician  WO nurse follow-up plan: Monday/WednesdayFriday  Notify WOC if wound(s) deteriorate.  Nursing to notify the Provider(s) and re-consult the WOC Nurse if new skin concern.    DATA:     Current support surface: Standard  Standard gel/foam mattress (IsoFlex, Atmos air, etc)  Containment of urine/stool: Continent of bladder and Continent of bowel  BMI: Body mass index is 30.13 kg/m .   Active diet order: Orders Placed This Encounter      NPO per Anesthesia Guidelines for Procedure/Surgery Except for: Meds     Output: I/O last 3 completed shifts:  In: 480 [P.O.:480]  Out: -      Labs: Recent Labs   Lab 01/23/23  0546 01/22/23  0839 01/21/23  1559 01/20/23  1914   ALBUMIN  --   --  2.9*  --    HGB 11.1*   < > 10.5*  --    INR  --   --   --  1.06   WBC 8.4   < > 7.4  --    CRP 14.0*   < >  --   --     < > = values in this interval not displayed.     Pressure injury risk assessment:   Sensory Perception: 3-->slightly " limited  Moisture: 4-->rarely moist  Activity: 3-->walks occasionally  Mobility: 3-->slightly limited  Nutrition: 3-->adequate  Friction and Shear: 3-->no apparent problem  Maycol Score: 19    Shivani Mireles RN [facility]  Dept. Pager: [phone]  Dept. Office Number: [phone]

## 2023-01-23 NOTE — PROGRESS NOTES
Orthopaedic Surgery Progress Note 01/23/2023    Subjective  No acute events overnight.  Pain well controlled. Denies new numbness, tingling, or weakness.  Tolerating diet without nausea or vomiting.  Voiding spontaneously.  +flatus, -BM.   Denies fevers, chills, chest pain, SOB.  Looking forward to next steps re: possible repeat I&D.    Objective  Temp: (!) 96.3  F (35.7  C) Temp src: Oral BP: 137/75 Pulse: 91   Resp: 16 SpO2: 96 % O2 Device: None (Room air)      Exam:  Gen: No acute distress, resting comfortably in bed.  Resp: Non-labored breathing  Cardio: Regular rate via peripheral pulse  MSK:  LE:  - Dressings c/d/I, wound VAC holding suction  - Fires Quad, TA, GSC, EHL, FHL  - Baseline sensation in femoral/tibial/sural/saphenous/DP/SP nerves  - PT/DP pulses unable to assess due to wound VAC, but toes WWP    Drain output: wound VAC: uncharted.    Culture results: wound tissue and fluid cultures pending - gram stain +GPC    Assessment: Nehemiah Magallon is a 57 year old male s/p right foot wound I&D on 1/21 with Dr. Starr. Doing well. No systemic symptoms, no pain currently. Possible repeat I&D pending wound check.    Plan:  Ortho Primary    Activity: Up with assist  Weight bearing status: NWB RLE  Antibiotics: Vanc/Zosyn pending culture results and ID recs  Diet: Begin with clear fluids and progress diet as tolerated; Bowel regimen  DVT prophylaxis: SCD, ASA 81 BID  Bracing/Splinting: no brace needed  Wound Care: WOC consulted, appreciate help. Wound VAC -125 continuous, Dressing to remain in place x 7-10 days, reinforce and redress PRN  Drains: Document output per shift  Pain management: transition from IV to orals as tolerated.  Jones: none  X-rays: none  Physical Therapy: gait training, mobilization ADLs.  Labs: Trend Hgb on POD #1   Consults: PT, Hospitalist, ID appreciate assistance in caring for this patient.  Follow-up: Clinic with Dr. Starr, timing TBD     Disposition: Pending culture results, Abx regimen,  possible repeat I&D, progress with therapies, pain control on orals, and medical stability, anticipate discharge to Home vs Rehab on POD #3-4.     Fidencio Lynn MD  Orthopaedic Surgery PGY-1

## 2023-01-23 NOTE — CONSULTS
Care Management Initial Consult    General Information  Assessment completed with: Patient,    Type of CM/SW Visit: Offer D/C Planning    Primary Care Provider verified and updated as needed: Yes (Dr. Echevarria)   Readmission within the last 30 days:           Advance Care Planning: Advance Care Planning Reviewed: no concerns identified          Communication Assessment  Patient's communication style: spoken language (English or Bilingual)    Hearing Difficulty or Deaf: no        Cognitive  Cognitive/Neuro/Behavioral: WDL  Level of Consciousness: alert  Arousal Level: opens eyes spontaneously                Living Environment:   People in home:       Current living Arrangements: house      Able to return to prior arrangements:  yes       Family/Social Support:  Care provided by: spouse/significant other  Provides care for:    Marital Status:   Wife  Wiliam       Description of Support System: Supportive    Support Assessment: Adequate family and caregiver support    Current Resources:   Equipment currently used at home: wheelchair, manual, cane, quad, shower chair, walker, rolling    Employment/Financial:  Employment Status: disabled        Financial Concerns: No concerns identified   Lifestyle & Psychosocial Needs:  Social Determinants of Health     Tobacco Use: Low Risk      Smoking Tobacco Use: Never     Smokeless Tobacco Use: Never     Passive Exposure: Not on file   Alcohol Use: Not on file   Financial Resource Strain: Not on file   Food Insecurity: Not on file   Transportation Needs: Not on file   Physical Activity: Not on file   Stress: Not on file   Social Connections: Not on file   Intimate Partner Violence: Not on file   Depression: Not at risk     PHQ-2 Score: 0   Housing Stability: Not on file     Functional Status:  Prior to admission patient needed assistance:    Independent   Additional Information:  Met with patient at bedside to discuss discharge planning and complete initial CMA. [facility]. Patient would like his Wound handle through [place]. [place] will need authorization through the [facility]. [facility] Request Form filled out for the [facility]. Message left for Danielle Camp, [facility] RN Coordinator, phone [phone]. Waiting for call back.   Patient will need PICC line and finalized ID plan.   Wound vac ordered from . All necessary documentation faxed.   RNCC will follow up with [facility] on 1/24/2023 for status on authorization.    Veronica Park RN, BSN  Care Coordinator, [facility] Ortho  Phone [phone]  Pager [phone]

## 2023-01-23 NOTE — PLAN OF CARE
Pt. A&Ox4. VSS. Afebrile. Lung sounds CTA. Maintaining sats on CTA. Bowel sounds active, LBM 1/21. CMS and neuro's are intact. No change to baseline neuropathy in all extremities. Denies nausea, shortness of breath, and chest pain. Denies pain. Voids spontaneously without difficulty in the BR. Tolerating regular diet.  at HS. R foot dressing CDI, wound vac in place, continuous 125mmHg, scant output in canister. Pt up with SBA to BR. PIV is patent and SL between abx. Call light is within reach, pt able to make needs known and is resting comfortably between cares. Will continue to monitor.  Plan TBD, possible I&D later today pending morning assessment.

## 2023-01-23 NOTE — PROGRESS NOTES
"[facility]    Medicine Progress Note - Hospitalist Service, GOLD TEAM 16    Date of Admission:  1/20/2023    Assessment & Plan    Magallon, Mark is a 57 year old male patient with a past medical history significant for T2DM c/b charcot foot & diabetic retinopathy + neuropathy, legally blind, GERD, hepatitis C treated, HTN, HLD, papillary thyroid cancer in remission, hypothyroidism, NAFLD, MARGARITO, chronic pain, and h/o sigmoid colectomy for diverticulitis.  He had surgery in his right foot 11/17/22 by Dr Starr . 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 Debridement and irrigation of right foot, placement of subatmospheric wound therapy device\"  - DVT prophylaxis  Per orthopedic surgery    - was stared on IV vancomycin  And zosyn and  For further antibiotics adjustment per ID   - follow up  On intraop culture form 12/21, so far shows Gt + cocci   - blood culture 1/20   - repeat blood culture if ghets fevers   -restart  PTA ASA once ok with ortho   - CRP 69--24---14 , trend q 3 days for now            # T2DM currently well controlled, history of diabetic retinopathy and diabetic neuropathy.  -Patient on Jardiance, Semiglutide, Metformin that have not been restarted  , restarted metformin  And jardiance. Ozempic is not on formulary so someone can to bring it in, usually uses q Sunday  -Per patient, [facility] R U-[facility]  [facility]  - -A1c 5.6% in Nov/2022 rand states was 5.9on recent check at [facility]   - accu-checks insulin sliding scale   -continue gabapentin     SHALINI  - creatinine 1.31, was 0,99 11/22, now down to 1.13  " "With IV fluids  Monitor and avoid nephritoxic agents      #Anemia, acute on chronic post surgery    - hgb at time of discharge during last hospital stay 10.9  -hgb now 10.3      #History of papillary thyroid cancer status post  Surgery   Post operative hypothyroidism   - Continue PTA Synthroid 137 mcg , TSH 1.8   - on semiglutide, states has been seeing endo as out patient  And prescribing medications       #MARGARITO on CPAP   - Continue home CPAP     #HTN and HLD  -Hold PTA lisinopril 40 mg  restart post operative as blood pressure  Allow   -Continue PTA Pravastatin 40 mg at bedtime      #GERD   - Continue Protonix in place of PTA omeprazole     #Migraines   - Continue PTA topamax; imitrex prior to onset    #Allergic rhinitis   - PTA flonase, Cetrizine    #History of hepatitis C  - treated      #History of nonalcoholic fatty liver disease  #History of nephrolithiasis  #Chronic pain syndrome  #Legally blind  #History of migraine headaches.                 Diet: NPO per Anesthesia Guidelines for Procedure/Surgery Except for: Meds    DVT Prophylaxis: per orthopedic surgery    Jones Catheter: Not present  Lines: None     Cardiac Monitoring: None  Code Status: Full Code full     Clinically Significant Risk Factors           # Hypercalcemia: corrected calcium is >10.1, will monitor as appropriate    # Hypoalbuminemia: Lowest albumin = 2.9 g/dL at 1/21/2023  3:59 PM, will monitor as appropriate            # Obesity: Estimated body mass index is 30.13 kg/m  as calculated from the following:    Height as of this encounter: 1.778 m (5' 10\").    Weight as of this encounter: 95.3 kg (210 lb)., PRESENT ON ADMISSION         Disposition Plan    TBD             Torie Darby MD  Hospitalist Service, [address]  Securely message with [employer] (more info)  Text page via [facility] Paging/Directory   See signed in provider for up to date coverage " information  ______________________________________________________________________    Interval History      Doing ok,no CP no shortness of breath  No nausea vomiting, has severe neuropathy so no pain in feet     Physical Exam   Vital Signs: Temp: (!) 96.3  F (35.7  C) Temp src: Oral BP: 137/75 Pulse: 91   Resp: 16 SpO2: 96 % O2 Device: None (Room air)    Weight: 210 lbs 0 oz  General appearence: awake alert  In no apparent distress    HEENT: EOMI, PEARLA, sclera nonicteric,  moist,  mucus membranes,   NECK : supple  RESPIRATORY: lungs clear to auscultation bilateral,  no wheezing or crackles   CARDIOVASCULAR:S1 S2 regular rate and rhythm, no rubs gallops or murmurs appreciated  GASTROINTESTINAL:soft, non-distended , non-tender , + bowel sounds,   SKIN: warm and dry, no mottling noted   NEUROLOGIC; awake alert and oriented, no focal deficits found  EXTREMITIES: no clubbing, cyanosis or edema , right foot wrapped     Data     I have personally reviewed the following data over the past 24 hrs:    8.4  \   11.1 (L)   / 281     142 113 (H) 18 /  157 (H)   4.6 23 1.13 \       Procal: N/A CRP: 14.0 (H) Lactic Acid: N/A         Imaging results reviewed over the past 24 hrs:   No results found for this or any previous visit (from the past 24 hour(s)).  Recent Labs   Lab 01/23/23  0546 01/22/23  2102 01/22/23  1717 01/22/23  1156 01/22/23  0839 01/21/23  1722 01/21/23  1559 01/20/23  2154 01/20/23  1914   WBC 8.4  --   --   --  6.4  --  7.4  --   --    HGB 11.1*  --   --   --  10.3*  --  10.5*  --   --    MCV 85  --   --   --  84  --  85  --   --      --   --   --  246  --  216  --   --    INR  --   --   --   --   --   --   --   --  1.06     --   --   --  142  --  143  --   --    POTASSIUM 4.6  --   --   --  4.7  --  4.1  --  3.9   CHLORIDE 113*  --   --   --  111*  --  110*  --   --    CO2 23  --   --   --  24  --  25  --   --    BUN 18  --   --   --  19  --  24  --   --    CR 1.13  --   --   --  1.13  --  1.21    < > 1.28*   ANIONGAP 6  --   --   --  7  --  8  --   --    ALICE 9.3  --   --   --  8.4*  --  9.0  --   --    * 179* 161*   < > 238*   < > 166*   < >  --    ALBUMIN  --   --   --   --   --   --  2.9*  --   --    PROTTOTAL  --   --   --   --   --   --  7.0  --   --    BILITOTAL  --   --   --   --   --   --  0.3  --   --    ALKPHOS  --   --   --   --   --   --  55  --   --    ALT  --   --   --   --   --   --  10  --   --    AST  --   --   --   --   --   --  7  --   --     < > = values in this interval not displayed.

## 2023-01-24 NOTE — CONSULTS
WOC Consult    S: New Consult to follow patient for right foot wound with QMWF VAC changes.     B: Nehemiah Magallon is a 57 year old male s/p right foot wound I&D on 1/21 with Dr. Starr. Doing well. No systemic symptoms, no pain currently. VAC changes MWF.    A/R: WOC will plan to continue with VAC changes QMWF.    Shivani Mireles RN, CWOCN

## 2023-01-24 NOTE — PROGRESS NOTES
[facility]    Medicine Progress Note - Hospitalist Service, GOLD TEAM 16    Date of Admission:  1/20/2023    Assessment & Plan    Nehemiah Magallon is a 57 year old male patient with a past medical history significant for T2DM c/b charcot foot & diabetic retinopathy + neuropathy, legally blind, GERD, hepatitis C treated, HTN, HLD, papillary thyroid cancer in remission, hypothyroidism, NAFLD, MARGARITO, chronic pain, and h/o sigmoid colectomy for diverticulitis.  S/p right foot  11/17/22 by Dr Starr.  He initially noticed infection starting 1/15 and was stared on Augmentin.  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  ---   Currently under good control  ---   Hgba1c was 5.6% in 11/2022   ---   Patient on Jardiance, Semiglutide, Metformin prior to admit  ---   Restarted metformin and jardiance.   ---   Ozempic is not on formulary, thus hold it  ---   Per patient, Humilin R U-500 has been discontinued via [facility]  ---   Continue accu-checks and SSI   ---   Continue gabapentin     SHALINI  ---   Admit cr was 1.13 ---> 1.16  ---   Baseline cr 0.9  ---   Avoid nephrotoxins except for IV antibiotics     # ACD and acute postop blood loss anemia  ---   Hgb stable at 10 - 11 g      # History of papillary thyroid cancer status post  Surgery   # Post operative hypothyroidism  ---   Continue PTA Synthroid 137 mcg , TSH 1.8   ---   On semiglutide as an  outpt, states has been seeing endo as out patient     # MARGARITO on CPAP  ---   Continue home CPAP     # HTN  # HLD  ---   Hold PTA lisinopril 40 mg daily, BP is well controlled w/o meds  ---   Continue PTA Pravastatin 40 mg at bedtime      # GERD  ---   Continue Protonix in place of PTA omeprazole     # Migraines  ---   Continue PTA topamax; imitrex prior to onset    # Allergic rhinitis   ---   PTA flonase and Cetrizine    # History of hepatitis C  ---   Treated     # History of nonalcoholic fatty liver disease  # History of nephrolithiasis  # Chronic pain syndrome  # Legally blind  # History of migraine headaches.   ---   Continue home meds           Diet: Regular Diet Adult    DVT Prophylaxis: per orthopedic surgery    Joens Catheter: Not present  Lines: None     Cardiac Monitoring: None  Code Status: Full Code full   Disposition Plan   TBD          Shannon Petit MD  Hospitalist Service, GOLD TEAM [address] [place] [address]  Securely message with [employer] (more info)  Text page via [employer] Paging/Directory   See signed in provider for up to date coverage information  ______________________________________________________________________    Interval History    No new complaints  Uneventful night.  Awaiting for wound cx result    Physical Exam   Vital Signs: Temp: (!) 95.6  F (35.3  C) Temp src: Oral BP: 115/63 Pulse: 97   Resp: 16 SpO2: 96 % O2 Device: None (Room air)    Weight: 210 lbs 0 oz  General: aao x 3, NAD.  HEENT:  NC/AT, neck supple  CVS:  NL s 1 and s2, no m/r/g.  Lungs:  CTA B/L.   Abd:  Soft, + bs, NT, no rebound or gaurding, no fluid shift.  Ext:  Right foot dressing c/d/i  Lymph:  No edema.  Neuro:  Nonfocal.  Musculoskeletal: No calf tenderness to palpation.    Skin:  No rash.  Psychiatry:  Mood and affect appropriate.      Data     I have personally reviewed the following data over the past 24 hrs:    N/A  \   N/A   / N/A     N/A N/A N/A /  180 (H)   N/A N/A 1.16 \        Imaging results reviewed over the past 24 hrs:   No results found for this or any previous visit (from the past 24 hour(s)).  Recent Labs   Lab 01/24/23  1124 01/24/23  0838 01/24/23  0816 01/24/23  0230 01/23/23  0850 01/23/23  0546 01/22/23  1156 01/22/23  0839 01/21/23  1722 01/21/23  1559 01/20/23  2154 01/20/23  1914   WBC  --   --   --   --   --  8.4  --  6.4  --  7.4  --   --    HGB  --   --   --   --   --  11.1*  --  10.3*  --  10.5*  --   --    MCV  --   --   --   --   --  85  --  84  --  85  --   --    PLT  --   --   --   --   --  281  --  246  --  216  --   --    INR  --   --   --   --   --   --   --   --   --   --   --  1.06   NA  --   --   --   --   --  142  --  142  --  143  --   --    POTASSIUM  --   --   --   --   --  4.6  --  4.7  --  4.1  --  3.9   CHLORIDE  --   --   --   --   --  113*  --  111*  --  110*  --   --    CO2  --   --   --   --   --  23  --  24  --  25  --   --    BUN  --   --   --   --   --  18  --  19  --  24  --   --    CR  --   --  1.16  --   --  1.13  --  1.13  --  1.21   < > 1.28*   ANIONGAP  --   --   --   --   --  6  --  7  --  8  --   --    ALICE  --   --   --   --   --  9.3  --  8.4*  --  9.0  --   --    * 172*  --  169*   < > 157*   < > 238*   < > 166*   < >  --    ALBUMIN  --   --   --   --   --   --   --   --   --  2.9*  --   --    PROTTOTAL  --   --   --   --   --   --   --   --   --  7.0  --   --    BILITOTAL  --   --   --   --   --   --   --   --   --  0.3  --   --    ALKPHOS  --   --   --   --   --   --   --   --   --  55  --   --    ALT  --   --   --   --   --   --   --   --   --  10  --   --    AST  --   --   --   --   --   --   --   --   --  7  --   --     < > = values in this interval not displayed.

## 2023-01-24 NOTE — PLAN OF CARE
VS: VSS, pt denied CP or SOB.   O2: Room air sat. > 90%.   Output: Voiding adequate amount in the bathroom.    Last BM: 01/21/23 passing gas.    Activity: Up with SBA to bathroom and sitting at age of bed.   Skin: Intact except wound on RLE.       Neuro: CMS and neuro intact to baseline.   Dressing: CDI, WOC Nurse changed the dressing today.   Diet: Regular tolerating okay.   LDA: PIV SL between antibiotic's.    Equipment: IV pole and personal belongings.    Plan: TBD.   Additional Info:

## 2023-01-24 NOTE — PROGRESS NOTES
Orthopaedic Surgery Progress Note 01/24/2023    Subjective  No acute events overnight.  Pain well controlled. Denies new numbness, tingling, or weakness.  Tolerating diet without nausea or vomiting.  Voiding spontaneously.  +flatus, -BM.   Denies fevers, chills, chest pain, SOB.      Objective  Temp: 97.4  F (36.3  C) Temp src: Oral BP: 118/63 Pulse: 91   Resp: 16 SpO2: 96 % O2 Device: None (Room air)      Exam:  Gen: No acute distress, resting comfortably in bed.  Resp: Non-labored breathing  Cardio: Regular rate via peripheral pulse  MSK:  LE:  - Dressings c/d/I, wound VAC holding suction  - Fires Quad, TA, GSC, EHL, FHL  - Baseline sensation in femoral/tibial/sural/saphenous/DP/SP nerves  - PT/DP pulses unable to assess due to wound VAC, but toes WWP    Drain output: wound VAC: uncharted.    Culture results: wound tissue and fluid cultures pending - gram stain +GPC    Assessment: Nehemiah Magallon is a 57 year old male s/p right foot wound I&D on 1/21 with Dr. Starr. Doing well. No systemic symptoms, no pain currently. VAC changes MWF.    Plan:  Ortho Primary    Activity: Up with assist  Weight bearing status: NWB RLE  Antibiotics: Vanc/Zosyn pending culture results and ID recs  Diet: Begin with clear fluids and progress diet as tolerated; Bowel regimen  DVT prophylaxis: SCD, ASA 81 BID  Bracing/Splinting: no brace needed  Wound Care: WOC consulted, appreciate help. Wound VAC -125 continuous, Dressing to remain in place x 7-10 days, reinforce and redress PRN  Drains: Document output per shift  Pain management: transition from IV to orals as tolerated.  Jones: none  X-rays: none  Physical Therapy: gait training, mobilization ADLs.  Labs: Trend Hgb on POD #1   Consults: PT, Hospitalist, ID appreciate assistance in caring for this patient.  Follow-up: Clinic with Dr. Starr, timing TBD     Disposition: Pending culture results, Abx regimen, progress with therapies, pain control on orals, and medical stability, anticipate  discharge to Home vs Rehab on POD #3-4.     Fidencio Lynn MD  Orthopaedic Surgery PGY-1

## 2023-01-24 NOTE — PROGRESS NOTES
GENERAL INFECTIOUS DISEASES CONSULTATION - [facility] [place]     Patient:  Nehemiah Magallon   Date of birth 1965, Medical record number [id number]  Date of Visit:  01/24/2023  Date of Admission: 1/20/2023  Consult Requested by:Bryon Starr MD  Reason for Consult:  s/p I&D right foot wound, intraop cultures taken         Assessment and Recommendations:     Nehemiah Magallon is a 57 year old male, a retired RN, [employer] [facility] Diabetes mellitus (HbA1c 5.6 in Jan 2023 ) with retinopathy, legal blindness, peripheral neuropathy, papillary thyroid malignancy in remission, hypothyroidism, GERD, MARGARITO, Hep C s/p treatment (2017), HTN, NAFLD, colectomy for diverticulitis (2014), bilateral hearing loss and using bilateral hearing aids, chronic pain, s/p right Charcot foot reconstruction on 11/17/2022. Periop he recieved Cefazolin. Per Nehemiah, he was then discharged on Augmentin x 7 days.  He was using a cast for 2.5-3 weeks post surgery. He then noticed a scab but no drainage (reviewed photo on his phone). Then the wound was covered with ace wrap and he thought the initial cast then the ace wrap could have caused the wound. He had kept the surgical site dry, and would air it (without dressing) when he sat down at home but applied would dressing when he went to bed.  Last Adriano 1/15/23, he noticed purulent drainage and deepening of the wound. He was prescribed with Augmentin 875 mg po q12 hr x 14 days on 1/16/23.  He went to see Dr Starr on 1/20/23 and directly admitted on 1/20/2023 from Ortho clinic for increasing drainage and wound necrosis of right foot surgical sites.     1. S/p right Charcot foot reconstruction on 11/17/2022 complicated by post op infection   - 1/20/23 - started on Pip/Tazobactam and Vancomycin IV   - 1/21/23 -  s/p I+D of right foot, placement of wound vac and antibiotic beads . Intra-op findings: Medial wound: purulent material, probed directly to plate and bone; Lateral wound: no purulence, probed to bone, no  hardware present laterally. Intra-Op cultures are pending, with gram stain showing 1+ gram positive cocci and 4+ WBCs.     2.  Diabetes mellitus (HbA1c 5.6 in Jan 2023) with peripheral neuropathy   - HbA1c around 5.6 in the past 4 years per patient    3. CRP was 69 and ESR was 59 with WBC 7.8 on 1/20/23 --> CRP 14 ( 1/23/23)  4. Sweatings  - resolved    RECOMMENDATION:  - continue Pip/Tazobactam and Vancomycin IV   - follow-up intra op cultures and blood cultures, will adjust antibiotics per cultures  - trend CRP every 3 days in hospital   - hold bowel regimens. if diarhea , check stool for C.diff     ID will continue to follow     Pete Arambula MD, M.Med.Sc  Staff, Infectious Diseases   Pager : [phone]    Interval History  Feels good. denies pain. tolerates antibiotics , has 1x loose bowel movement. no adbominal pain, nausea/ vomiting. has good appetite         History of Present Illness:     Nehemiah Magallon is a 57 year old male w PMH Diabetes mellitus with retinopathy, legal blindness, peripheral neuropathy, papillary thyroid malignancy in remission, hypothyroidism, GERD, MARGARITO, Hep C s/p treatment (2017), HTN, NAFLD, colectomy for diverticulitis (2014), bilateral hearing loss and using bilateral hearing aids, chronic pain, s/p right Charcot foot reconstruction on 11/17/2022. Periop he recieved Cefazolin. Per Nehemiah, he was then discharged on Augmentin x 7 days.  He was using a cast for 2.5-3 weeks post surgery. He then noticed a scab but no drainage (reviewed photo on his phone). Then the wound was covered with ace wrap and he thought the initial cast then the ace wrap could have caused the wound. He had kept the surgical site dry, and would air it (without dressing) when he sat down at home but applied would dressing when he went to bed.  Last Adriano 1/15/23, he noticed purulent drainage and deepening of the wound. He was prescribed with Augmentin 875 mg po q12 hr x 14 days on 1/16/23.  He went to  see Dr Starr on 1/20/23 and directly admitted on 1/20/2023 from Ortho clinic for increasing drainage and wound necrosis of right foot surgical sites. He denied any fever, chills, sweats or feeling ill at home. He has no sensation in his feet.  CRP was 69 and ESR was 59 with WBC 7.8 on 1/20/23. Blood cultures x2 on 1/20/23 are pending/ negative to date. He starts to have nightsweats after admission. Otherwise, he is feeling fine. No pain. has good appetite.     He underwent I+D of right foot, placement of wound vac and antibiotic beads on 1/21/23. Intra-op findings: Medial wound: purulent material, probed directly to plate and bone; Lateral wound: no purulence, probed to bone, no hardware present laterally. Intra-Op cultures are pending, with gram stain showing 1+ gram positive cocci and 4+ WBCs. He has been on Pip/Tazobactam since admission on 1/20/23.      Antimicrobials  Pip/Tazo 1/20 - present  Vancomycin IV 1/20 - present      Xray of Right foot 1/20/23  Impression:  1. Postsurgical changes of medial column with new fracturing of the third most proximal plate fixation screw.  2. Changes of Charcot arthropathy.  3. Medial sided soft tissue swelling, increased compared to 12/30/2022.          Review of Systems:     CONSTITUTIONAL:  No fevers or chills  EYES: negative for icterus  ENT:  negative for hearing loss, tinnitus and sore throat  RESPIRATORY:  negative for cough with sputum and dyspnea  CARDIOVASCULAR:  negative for chest pain, dyspnea  GASTROINTESTINAL:  negative for nausea, vomiting, diarrhea and constipation  GENITOURINARY:  negative for dysuria  HEME:  No easy bruising  INTEGUMENT: see HPI  NEURO:  see HPI          Past Medical History:     Past Medical History:   Diagnosis Date     Chronic pain syndrome 10/24/2014     Diabetes mellitus, type 2 (H)      Diabetic Charcot foot (H)      Diabetic retinopathy associated with diabetes mellitus due to underlying condition (H)      Diverticulitis of colon       Gastroesophageal reflux disease      Hepatitis C 2017    treated     History of skin cancer      Hypertension      Hypothyroidism      Legally blind      Midfoot collapse of right lower extremity      Migraine      NAFLD (nonalcoholic fatty liver disease)      Nephrolithiasis      Neuropathy      MARGARITO (obstructive sleep apnea)      Rib pain 10/24/2014     S/P colectomy 10/14/2014     Thyroid cancer (H) 10/14/2014          Past Surgical History:     Past Surgical History:   Procedure Laterality Date     ARTHRODESIS FOOT Right 11/17/2022    Procedure: Right midfoot/talonavicular osteotomy and reduction of deformity Right midfoot/talonavicular arthrodesis, achilles lengthening;  Surgeon: Bryon Starr MD;  Location: [facility] OR     CHOLECYSTECTOMY  1986     COLECTOMY      @ 6 years ago, sigmoid     COLONOSCOPY  2016     FOOT SURGERY Left 2021     IRRIGATION AND DEBRIDEMENT FOOT, COMBINED Right 1/21/2023    Procedure: IRRIGATION AND DEBRIDEMENT, FOOT, RIGHT, Placement of Wound Vac and Antibiotic Beads.;  Surgeon: Bryon Starr MD;  Location: [facility] OR     LENGTHEN TENDON ACHILLES Right 11/17/2022    Procedure: LENGTHENING, TENDON, ACHILLES;  Surgeon: Bryon Starr MD;  Location: [facility] OR          Family History:     Family History   Problem Relation Age of Onset     Diabetes Mother      Cancer Mother      Diabetes Father      Heart Disease Father      Anesthesia Reaction No family hx of      Clotting Disorder No family hx of      Glaucoma No family hx of      Macular Degeneration No family hx of           Social History:     Social History     Tobacco Use     Smoking status: Never     Smokeless tobacco: Never   Substance Use Topics     Alcohol use: Not Currently     Comment: rarely     History   Sexual Activity     Sexual activity: Not Currently     Partners: Female     lives at home with wife a a dog , follows at [facility] but may seek medical care at other health facilities per patient          Current Medications  (antimicrobials listed in bold):       aspirin  81 mg Oral BID     cetirizine  10 mg Oral Daily     empagliflozin  25 mg Oral Daily     fluticasone  2 spray Both Nostrils Daily     gabapentin  1,200 mg Oral TID     insulin aspart  1-7 Units Subcutaneous TID AC     insulin aspart  1-5 Units Subcutaneous At Bedtime     levothyroxine  137 mcg Oral QAM AC     metFORMIN  1,000 mg Oral BID w/meals     pantoprazole  40 mg Oral Daily     piperacillin-tazobactam  4.5 g Intravenous Q6H     polyethylene glycol  17 g Oral Daily     pravastatin  40 mg Oral QPM     senna-docusate  1 tablet Oral BID     sodium chloride (PF)  3 mL Intracatheter Q8H     topiramate  150 mg Oral BID     vancomycin  1,750 mg Intravenous Q24H     Vitamin D3  50 mcg Oral Daily          Allergies:     Allergies   Allergen Reactions     Atorvastatin      Other reaction(s): Hyperglycemia     Celebrex [Celecoxib] Other (See Comments)     [facility]          Physical Exam:   Vitals were reviewed  Patient Vitals for the past 24 hrs:   BP Temp Temp src Pulse Resp SpO2   01/24/23 0740 115/63 (!) 95.6  F (35.3  C) Oral 97 16 --   01/23/23 2349 118/63 97.4  F (36.3  C) Oral 91 16 96 %   01/23/23 1618 117/66 (!) 96.5  F (35.8  C) Oral 93 16 99 %     Ranges for his vital signs:  Temp:  [95.6  F (35.3  C)-97.4  F (36.3  C)] 95.6  F (35.3  C)  Pulse:  [91-97] 97  Resp:  [16] 16  BP: (115-118)/(63-66) 115/63  SpO2:  [96 %-99 %] 96 %  Physical Examination:  GENERAL:  well-developed, well-nourished, alert, oriented, in bed in no acute distress.   HEENT:  Head is normocephalic, atraumatic   EYES:  Eyes have anicteric sclerae without conjunctival injection   NECK:  Supple.   LUNGS:  breathing comfortably on room air  SKIN:  No acute rashes.   Extremities : no edema .  right foot wrapped, with wound vac. ( photos reviewed)   NEUROLOGIC:  Grossly nonfocal. peripheral neuropathy,          Laboratory Data:     Inflammatory Markers    Recent Labs   Lab Test  01/23/23  0546 01/22/23  0839 01/20/23  1211   SED  --   --  59*   CRP 14.0* 24.0* 69.0*     Hematology Studies    Recent Labs   Lab Test 01/23/23  0546 01/22/23  0839 01/21/23  1559 01/20/23  1211 11/19/22  0944 11/18/22  1541 11/17/22  1022   WBC 8.4 6.4 7.4 7.8  --   --  7.7   HGB 11.1* 10.3* 10.5* 11.1* 10.9* 10.8* 13.8   MCV 85 84 85 84  --   --  86    246 216 232  --   --  161     Metabolic Studies     Recent Labs   Lab Test 01/24/23  0816 01/23/23  0546 01/22/23  0839 01/21/23  1559 01/21/23  0550 01/20/23  1914 01/20/23  1211   NA  --  142 142 143  --   --  141   POTASSIUM  --  4.6 4.7 4.1  --  3.9 4.2   CHLORIDE  --  113* 111* 110*  --   --  113*   CO2  --  23 24 25  --   --  23   BUN  --  18 19 24  --   --  33*   CR 1.16 1.13 1.13 1.21 1.31* 1.28* 1.24   GFRESTIMATED 73 75 75 69 63 65 67     Hepatic Studies    Recent Labs   Lab Test 01/21/23  1559   BILITOTAL 0.3   ALKPHOS 55   ALBUMIN 2.9*   AST 7   ALT 10     Thyroid Studies    Recent Labs   Lab Test 01/21/23  1559   TSH 1.80     Vancomycin Levels    Recent Labs   Lab Test 01/22/23  0839   VANCOMYCIN 18.9

## 2023-01-24 NOTE — CARE PLAN
VS: VSS, pt denied CP or SOB.   O2: Room air sat. > 90%.   Output: Voiding adequate amount in the bathroom.    Last BM: 01/21/23 passing gas.    Activity: Up with SBA to bathroom and sitting in bed.   Skin: Intact except wound on RLE- connected to a wound vac         Neuro: CMS and neuro intact to baseline.   Dressing: CDI, WOC Nurse changed the dressing previous shift.   Diet: Regular tolerating okay.   LDA: PIV SL between antibiotic's.    Equipment: IV pole, personal belongings, and wound vac   Plan: TBD.   Additional Info:

## 2023-01-24 NOTE — PROGRESS NOTES
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 Periop he recieved Cefazolin. Per Nehemiah, he was then discharged on Augmentin x 7 days.  He was using a cast for 2.5-3 weeks post surgery. He then noticed a scab but no drainage (reviewed photo on his phone). Then the wound was covered with ace wrap and he thought the initial cast then the ace wrap could have caused the wound. He had kept the surgical site dry, and would air it (without dressing) when he sat down at home but applied would dressing when he went to bed.  Last Adriano 1/15/23, he noticed purulent drainage and deepening of the wound. He was prescribed with Augmentin 875 mg po q12 hr x 14 days on 1/16/23.  He went to see Dr Starr on 1/20/23 and directly admitted on 1/20/2023 from Ortho clinic for increasing drainage and wound necrosis of right foot surgical sites.     1. S/p right Charcot foot reconstruction on 11/17/2022 complicated by post op infection   - 1/21/23 -  s/p I+D of right foot, placement of wound vac and antibiotic beads . Intra-op findings: Medial wound: purulent material, probed directly to plate and bone; Lateral wound: no purulence, probed to bone, no hardware present laterally. Intra-Op cultures are pending, with  gram stain showing 1+ gram positive cocci and 4+ WBCs.   - currently on Pip/Tazobactam since admission on 1/20/23.      2.  Diabetes mellitus (HbA1c 5.6 in Jan 2023) with peripheral neuropathy   - HbA1c around 5.6 in the past 4 years per patient    3. CRP was 69 and ESR was 59 with WBC 7.8 on 1/20/23  4. Sweatings (since admission)     RECOMMENDATION:  - continue Pip/Tazobactam and Vancomycin IV   - follow-up intra op cultures and blood cultures, will adjust antibiotics per cultures  - trend CRP every 3 days in hospital     ID will continue to follow     Pete Arambula MD, M.Med.Sc  Staff, Infectious Diseases   Pager : [phone]    Interval History  Feels good. denies pain. tolerates antibiotics          History of Present Illness:     Nehemiah Magallon is a 57 year old male w PMH Diabetes mellitus with retinopathy, legal blindness, peripheral neuropathy, papillary thyroid malignancy in remission, hypothyroidism, GERD, MARGARITO, Hep C s/p treatment (2017), HTN, NAFLD, colectomy for diverticulitis (2014), bilateral hearing loss and using bilateral hearing aids, chronic pain, s/p right Charcot foot reconstruction on 11/17/2022. Periop he recieved Cefazolin. Per Nehemiah, he was then discharged on Augmentin x 7 days.  He was using a cast for 2.5-3 weeks post surgery. He then noticed a scab but no drainage (reviewed photo on his phone). Then the wound was covered with ace wrap and he thought the initial cast then the ace wrap could have caused the wound. He had kept the surgical site dry, and would air it (without dressing) when he sat down at home but applied would dressing when he went to bed.  Last Adriano 1/15/23, he noticed purulent drainage and deepening of the wound. He was prescribed with Augmentin 875 mg po q12 hr x 14 days on 1/16/23.  He went to see Dr Starr on 1/20/23 and directly admitted on 1/20/2023 from Ortho clinic for increasing drainage and wound necrosis of right foot surgical sites. He denied any  fever, chills, sweats or feeling ill at home. He has no sensation in his feet.  CRP was 69 and ESR was 59 with WBC 7.8 on 1/20/23. Blood cultures x2 on 1/20/23 are pending/ negative to date. He starts to have nightsweats after admission. Otherwise, he is feeling fine. No pain. has good appetite.     He underwent I+D of right foot, placement of wound vac and antibiotic beads on 1/21/23. Intra-op findings: Medial wound: purulent material, probed directly to plate and bone; Lateral wound: no purulence, probed to bone, no hardware present laterally. Intra-Op cultures are pending, with gram stain showing 1+ gram positive cocci and 4+ WBCs. He has been on Pip/Tazobactam since admission on 1/20/23.      Antimicrobials  Pip/Tazo 1/20 - present  Vancomycin IV 1/20 - present      Xray of Right foot 1/20/23  Impression:  1. Postsurgical changes of medial column with new fracturing of the third most proximal plate fixation screw.  2. Changes of Charcot arthropathy.  3. Medial sided soft tissue swelling, increased compared to 12/30/2022.          Review of Systems:     CONSTITUTIONAL:  No fevers or chills  EYES: negative for icterus  ENT:  negative for hearing loss, tinnitus and sore throat  RESPIRATORY:  negative for cough with sputum and dyspnea  CARDIOVASCULAR:  negative for chest pain, dyspnea  GASTROINTESTINAL:  negative for nausea, vomiting, diarrhea and constipation  GENITOURINARY:  negative for dysuria  HEME:  No easy bruising  INTEGUMENT: see HPI  NEURO:  see HPI          Past Medical History:     Past Medical History:   Diagnosis Date     Chronic pain syndrome 10/24/2014     Diabetes mellitus, type 2 (H)      Diabetic Charcot foot (H)      Diabetic retinopathy associated with diabetes mellitus due to underlying condition (H)      Diverticulitis of colon      Gastroesophageal reflux disease      Hepatitis C 2017    treated     History of skin cancer      Hypertension      Hypothyroidism      Legally blind      Midfoot  collapse of right lower extremity      Migraine      NAFLD (nonalcoholic fatty liver disease)      Nephrolithiasis      Neuropathy      MARGARITO (obstructive sleep apnea)      Rib pain 10/24/2014     S/P colectomy 10/14/2014     Thyroid cancer (H) 10/14/2014          Past Surgical History:     Past Surgical History:   Procedure Laterality Date     ARTHRODESIS FOOT Right 11/17/2022    Procedure: Right midfoot/talonavicular osteotomy and reduction of deformity Right midfoot/talonavicular arthrodesis, achilles lengthening;  Surgeon: Bryon Starr MD;  [facility]: [facility] OR     CHOLECYSTECTOMY  1986     COLECTOMY      @ 6 years ago, sigmoid     COLONOSCOPY  2016     FOOT SURGERY Left 2021     IRRIGATION AND DEBRIDEMENT FOOT, COMBINED Right 1/21/2023    Procedure: IRRIGATION AND DEBRIDEMENT, FOOT, RIGHT, Placement of Wound Vac and Antibiotic Beads.;  Surgeon: Bryon Starr MD;  [facility]: [facility]     LENGTHEN TENDON ACHILLES Right 11/17/2022    Procedure: LENGTHENING, TENDON, ACHILLES;  Surgeon: Bryon Starr MD;  Location: [facility]          Family History:     Family History   Problem Relation Age of Onset     Diabetes Mother      Cancer Mother      Diabetes Father      Heart Disease Father      Anesthesia Reaction No family hx of      Clotting Disorder No family hx of      Glaucoma No family hx of      Macular Degeneration No family hx of           Social History:     Social History     Tobacco Use     Smoking status: Never     Smokeless tobacco: Never   Substance Use Topics     Alcohol use: Not Currently     Comment: rarely     History   Sexual Activity     Sexual activity: Not Currently     Partners: Female     lives at home with wife a a dog , follows at [facility] but may seek medical care at other health facilities per patient          Current Medications (antimicrobials listed in bold):       aspirin  81 mg Oral BID     cetirizine  10 mg Oral Daily     empagliflozin  25 mg Oral Daily     fluticasone  2 spray Both Nostrils  Daily     gabapentin  1,200 mg Oral TID     insulin aspart  1-7 Units Subcutaneous TID AC     insulin aspart  1-5 Units Subcutaneous At Bedtime     levothyroxine  137 mcg Oral QAM AC     metFORMIN  1,000 mg Oral BID w/meals     pantoprazole  40 mg Oral Daily     piperacillin-tazobactam  4.5 g Intravenous Q6H     polyethylene glycol  17 g Oral Daily     pravastatin  40 mg Oral QPM     senna-docusate  1 tablet Oral BID     sodium chloride (PF)  3 mL Intracatheter Q8H     topiramate  150 mg Oral BID     vancomycin  1,750 mg Intravenous Q24H     Vitamin D3  50 mcg Oral Daily          Allergies:     Allergies   Allergen Reactions     Atorvastatin      Other reaction(s): Hyperglycemia     Celebrex [Celecoxib] Other (See Comments)     [facility]          Physical Exam:   Vitals were reviewed  Patient Vitals for the past 24 hrs:   BP Temp Temp src Pulse Resp SpO2   01/23/23 1618 117/66 (!) 96.5  F (35.8  C) Oral 93 16 99 %   01/23/23 0851 (!) 149/77 (!) 96  F (35.6  C) Oral 86 16 99 %   01/23/23 0056 137/75 (!) 96.3  F (35.7  C) Oral 91 16 96 %     Ranges for his vital signs:  Temp:  [96  F (35.6  C)-96.5  F (35.8  C)] 96.5  F (35.8  C)  Pulse:  [86-93] 93  Resp:  [16] 16  BP: (117-149)/(66-77) 117/66  SpO2:  [96 %-99 %] 99 %  Physical Examination:  GENERAL:  well-developed, well-nourished, alert, oriented, in bed in no acute distress.   HEENT:  Head is normocephalic, atraumatic   EYES:  Eyes have anicteric sclerae without conjunctival injection   NECK:  Supple.   LUNGS:  breathing comfortably on room air  SKIN:  No acute rashes.   Extremities : no edema .  right foot wrapped, with wound vac. ( photos reviewed)   NEUROLOGIC:  Grossly nonfocal. peripheral neuropathy,          Laboratory Data:     Inflammatory Markers    Recent Labs   Lab Test 01/23/23  0546 01/22/23  0839 01/20/23  1211   SED  --   --  59*   CRP 14.0* 24.0* 69.0*     Hematology Studies    Recent Labs   Lab Test 01/23/23  0546 01/22/23  0839  01/21/23  1559 01/20/23  1211 11/19/22  0944 11/18/22  1541 11/17/22  1022   WBC 8.4 6.4 7.4 7.8  --   --  7.7   HGB 11.1* 10.3* 10.5* 11.1* 10.9* 10.8* 13.8   MCV 85 84 85 84  --   --  86    246 216 232  --   --  161     Metabolic Studies     Recent Labs   Lab Test 01/23/23  0546 01/22/23  0839 01/21/23  1559 01/21/23  0550 01/20/23  1914 01/20/23  1211    142 143  --   --  141   POTASSIUM 4.6 4.7 4.1  --  3.9 4.2   CHLORIDE 113* 111* 110*  --   --  113*   CO2 23 24 25  --   --  23   BUN 18 19 24  --   --  33*   CR 1.13 1.13 1.21 1.31* 1.28* 1.24   GFRESTIMATED 75 75 69 63 65 67     Hepatic Studies    Recent Labs   Lab Test 01/21/23  1559   BILITOTAL 0.3   ALKPHOS 55   ALBUMIN 2.9*   AST 7   ALT 10     Thyroid Studies    Recent Labs   Lab Test 01/21/23  1559   TSH 1.80     Vancomycin Levels    Recent Labs   Lab Test 01/22/23  0839   VANCOMYCIN 18.9

## 2023-01-25 NOTE — PLAN OF CARE
Patient alert & oriented x4 and able to make needs known using call light. VSS and lung sounds clear and equal bilaterally. Bowel sounds active and passing gas. Patient reported having 3 loose stool. Senna not given. CMS intact. Patient legally blind, per patient, visual acuity is better on left eye than on the right. Has bilateral hearing loss, patient wears rechargeable hearing aids. Remove at bedtime and place by the bedside. Denies pain. Dressing to right foot is clean, dry and intact, connected to wound vac on continuous 125. No new output noted. Patient is up independent, only needing assistance with carrying the wound vac to the bathroom. Patient resting comfortably in bed. Will continue to follow plan of care and provide interventions as needed.

## 2023-01-25 NOTE — PHARMACY-VANCOMYCIN DOSING SERVICE
"Pharmacy Vancomycin Note  Date of Service 2023  Patient's  1965   57 year old, male    Indication: Skin and Soft Tissue Infection  Day of Therapy: started 23  Current vancomycin regimen:  1750 mg IV q24h  Current vancomycin monitoring method: AUC  Current vancomycin therapeutic monitoring goal: 400-600 mg*h/L    InsightRX Prediction of Current Vancomycin Regimen    Loading dose: N/A  Regimen: 1750 mg IV every 24 hours.  Start time: 11:09 on 2023  Exposure target: AUC24 (range)400-600 mg/L.hr   AUC24,ss: 465 mg/L.hr  Probability of AUC24 > 400: 85 %  Ctrough,ss: 12.4 mg/L  Probability of Ctrough,ss > 20: 2 %  Probability of nephrotoxicity (Lodise CANELO ): 8 %      Current estimated CrCl = Estimated Creatinine Clearance: 84 mL/min (based on SCr of 1.11 mg/dL).    Creatinine for last 3 days  2023:  5:46 AM Creatinine 1.13 mg/dL  2023:  8:16 AM Creatinine 1.16 mg/dL  2023:  9:36 AM Creatinine 1.11 mg/dL    Recent Vancomycin Levels (past 3 days)  2023:  9:36 AM Vancomycin 16.0 mg/L    Vancomycin IV Administrations (past 72 hours)                   vancomycin (VANCOCIN) 1,750 mg in sodium chloride 0.9 % 500 mL intermittent infusion (mg) 1,750 mg Given 23 1604     1,750 mg Given 23 1442     1,750 mg Given 23 1452                Nephrotoxins and other renal medications (From now, onward)    Start     Dose/Rate Route Frequency Ordered Stop    23 1400  vancomycin (VANCOCIN) 1,750 mg in sodium chloride 0.9 % 500 mL intermittent infusion         1,750 mg  over 2 Hours Intravenous EVERY 24 HOURS 23 1252      23 0830  empagliflozin (JARDIANCE) tablet 25 mg        Note to Pharmacy: PTA Sig:Take 25 mg by mouth daily      25 mg Oral DAILY 23 0807      23 1900  piperacillin-tazobactam (ZOSYN) 4.5 g vial to attach to  mL bag        Note to Pharmacy: For [facility], [facility] and [facility]: For Zosyn-naive patients, use the \"Zosyn initial dose + " "extended infusion\" order panel.    4.5 g  over 30 Minutes Intravenous EVERY 6 HOURS 01/20/23 7738               Contrast Orders - past 72 hours (72h ago, onward)    None          Interpretation of levels and current regimen:  Vancomycin level is reflective of -600    Has serum creatinine changed greater than 50% in last 72 hours: No    Urine output:  good urine output    Renal Function: Stable      Plan:  1. Continue Current Dose  2. Vancomycin monitoring method: AUC  3. Vancomycin therapeutic monitoring goal: 400-600 mg*h/L  4. Pharmacy will check vancomycin levels as appropriate in 3-5 Days.  5. Serum creatinine levels will be ordered daily for the first week of therapy and at least twice weekly for subsequent weeks.    Zehra Sanchez, PharmD    "

## 2023-01-25 NOTE — PROGRESS NOTES
Orthopaedic Surgery Progress Note 01/25/2023    Subjective  No acute events overnight.  Pain well controlled. Denies new numbness, tingling, or weakness.  Tolerating diet without nausea or vomiting.  Voiding spontaneously.  +flatus, -BM.   Denies fevers, chills, chest pain, SOB.      Objective  Temp: (!) 95.1  F (35.1  C) Temp src: Oral BP: 104/51 Pulse: 94   Resp: 16 SpO2: 95 % O2 Device: None (Room air)      Exam:  Gen: No acute distress, resting comfortably in bed.  Resp: Non-labored breathing  Cardio: Regular rate via peripheral pulse  MSK:  LE:  - Dressings c/d/I, wound VAC holding suction  - Fires Quad, TA, GSC, EHL, FHL  - Baseline sensation in femoral/tibial/sural/saphenous/DP/SP nerves  - PT/DP pulses unable to assess due to wound VAC, but toes WWP    Drain output: wound VAC: uncharted.    Culture results: wound tissue and fluid cultures pending - gram stain +GPC    Assessment: Nehemiah Magallon is a 57 year old male s/p right foot wound I&D on 1/21 with Dr. Starr. Doing well. No systemic symptoms, no pain currently. VAC changes MWF.    Plan:  Ortho Primary    Activity: Up with assist  Weight bearing status: NWB RLE  Antibiotics: Vanc/Zosyn pending culture results and ID recs  Diet: Begin with clear fluids and progress diet as tolerated; Bowel regimen  DVT prophylaxis: SCD, ASA 81 BID  Bracing/Splinting: no brace needed  Wound Care: WOC consulted, appreciate help. Wound VAC -125 continuous, Dressing to remain in place x 7-10 days, reinforce and redress PRN  Drains: Document output per shift  Pain management: transition from IV to orals as tolerated.  Jones: none  X-rays: none  Physical Therapy: gait training, mobilization ADLs.  Labs: Trend Hgb on POD #1   Consults: PT, Hospitalist, ID appreciate assistance in caring for this patient.  Follow-up: Clinic with Dr. Starr, timing TBD     Disposition: Pending culture results, Abx regimen, progress with therapies, pain control on orals, and medical stability, anticipate  discharge to Home vs Rehab on POD #3-4.     Fidencio Lynn MD  Orthopaedic Surgery PGY-1

## 2023-01-25 NOTE — PROGRESS NOTES
"Ortho staff    I saw and examined Mr. Magallon at the bedside today POD#4 s/p I&D R foot  Had a VAC change today - pictures examined, appearance of medial wound much improved  [facility] nurse's cares greatly appreciated  Pt feeling about at baseline, denies fevers, some tiredness since starting abx, tolerated lunch well  On Vanco/Zosyn    BP (!) 143/74 (BP Location: Left arm)   Pulse 94   Temp 97.2  F (36.2  C) (Oral)   Resp 16   Ht 1.778 m (5' 10\")   Wt 95.3 kg (210 lb)   SpO2 95%   BMI 30.13 kg/m    Awake, alert, NAD, pleasant and cooperative, resting in bed sitting upright  VAC in place, good seal without leak  Scant drainage in canister  Dressings c/d/i    CRP downtrending, last value 14    Impression  Surgical wound necrosis/infection s/p R Charcot foot reconstruction, improving after I&D and VAC placement    Plan  Continue to follow cultures  Vanco and zosyn for now, ID consultation greatly appreciated  Appreciate also medicine team management in this medically complex patient  Continue VAC therapy, will plan for home VAC changes 3x/week once this is set up - agree with potential plan for extending VAC dressing to lateral foot wound as well  Pt requests exam gloves to be sent to [facility]  Plan d/w patient, all questions answered to the best of my ability  "

## 2023-01-25 NOTE — PROGRESS NOTES
GENERAL INFECTIOUS DISEASES CONSULTATION - [facility]     Patient:  Nehemiah Magallon   Date of birth 1965, Medical record number [id number]  Date of Visit:  01/25/2023  Date of Admission: 1/20/2023  Consult Requested by:Bryon Starr MD  Reason for Consult:  s/p I&D right foot wound, intraop cultures taken         Assessment and Recommendations:     Nehemiah Magallon is a 57 year old male, a retired RN, [employer] Diabetes mellitus (HbA1c 5.6 in Jan 2023 ) with retinopathy, legal blindness, peripheral neuropathy, papillary thyroid malignancy in remission, hypothyroidism, GERD, MARGARITO, Hep C s/p treatment (2017), HTN, NAFLD, colectomy for diverticulitis (2014), bilateral hearing loss and using bilateral hearing aids, chronic pain, s/p right Charcot foot reconstruction on 11/17/2022. Periop he recieved Cefazolin. Per Nehemiah, he was then discharged on Augmentin x 7 days.  He was using a cast for 2.5-3 weeks post surgery. He then noticed a scab but no drainage (reviewed photo on his phone). Then the wound was covered with ace wrap and he thought the initial cast then the ace wrap could have caused the wound. He had kept the surgical site dry, and would air it (without dressing) when he sat down at home but applied would dressing when he went to bed.  Last Adriano 1/15/23, he noticed purulent drainage and deepening of the wound. He was prescribed with Augmentin 875 mg po q12 hr x 14 days on 1/16/23.  He went to see Dr Starr on 1/20/23 and directly admitted on 1/20/2023 from Ortho clinic for increasing drainage and wound necrosis of right foot surgical sites.     1. S/p right Charcot foot reconstruction on 11/17/2022 complicated by post op infection   - 1/20/23 - started on Pip/Tazobactam and Vancomycin IV   - 1/21/23 -  s/p I+D of right foot, placement of wound vac and antibiotic beads . Intra-op findings: Medial wound: purulent material, probed directly to plate and bone; Lateral wound: no purulence, probed to bone, no  hardware present laterally. Intra-Op cultures are pending, with gram stain showing 1+ gram positive cocci and 4+ WBCs.     2.  Diabetes mellitus (HbA1c 5.6 in Jan 2023) with peripheral neuropathy   - HbA1c around 5.6 in the past 4 years per patient    3. CRP was 69 and ESR was 59 with WBC 7.8 on 1/20/23 --> CRP 14 ( 1/23/23)  4. Sweatings  - resolved    RECOMMENDATION:  - continue Pip/Tazobactam and Vancomycin IV   - called micro lab to identify the 1+ normal amish on 1/20, 1/21/23 and do susceptibility testing. I was told it was a coag negative Staph   - follow-up intra op cultures and blood cultures, will adjust antibiotics per cultures  - trend CRP every 3 days in hospital   - hold bowel regimens. if diarhea , check stool for C.diff     ID will continue to follow     Ptee Arambula MD, M.Med.Sc  Staff, Infectious Diseases   Pager : [phone]    Interval History  Feels good. denies pain. tolerates antibiotics , soft stool today. no abdominal pain, nausea/ vomiting. has good appetite         History of Present Illness:     Nehemiah Magallon is a 57 year old male w PMH Diabetes mellitus with retinopathy, legal blindness, peripheral neuropathy, papillary thyroid malignancy in remission, hypothyroidism, GERD, MARGARITO, Hep C s/p treatment (2017), HTN, NAFLD, colectomy for diverticulitis (2014), bilateral hearing loss and using bilateral hearing aids, chronic pain, s/p right Charcot foot reconstruction on 11/17/2022. Periop he recieved Cefazolin. Per Nehemiah, he was then discharged on Augmentin x 7 days.  He was using a cast for 2.5-3 weeks post surgery. He then noticed a scab but no drainage (reviewed photo on his phone). Then the wound was covered with ace wrap and he thought the initial cast then the ace wrap could have caused the wound. He had kept the surgical site dry, and would air it (without dressing) when he sat down at home but applied would dressing when he went to bed.  Last Adriano 1/15/23, he noticed  purulent drainage and deepening of the wound. He was prescribed with Augmentin 875 mg po q12 hr x 14 days on 1/16/23.  He went to see Dr Starr on 1/20/23 and directly admitted on 1/20/2023 from Ortho clinic for increasing drainage and wound necrosis of right foot surgical sites. He denied any fever, chills, sweats or feeling ill at home. He has no sensation in his feet.  CRP was 69 and ESR was 59 with WBC 7.8 on 1/20/23. Blood cultures x2 on 1/20/23 are pending/ negative to date. He starts to have nightsweats after admission. Otherwise, he is feeling fine. No pain. has good appetite.     He underwent I+D of right foot, placement of wound vac and antibiotic beads on 1/21/23. Intra-op findings: Medial wound: purulent material, probed directly to plate and bone; Lateral wound: no purulence, probed to bone, no hardware present laterally. Intra-Op cultures are pending, with gram stain showing 1+ gram positive cocci and 4+ WBCs. He has been on Pip/Tazobactam since admission on 1/20/23.      Antimicrobials  Pip/Tazo 1/20 - present  Vancomycin IV 1/20 - present      Xray of Right foot 1/20/23  Impression:  1. Postsurgical changes of medial column with new fracturing of the third most proximal plate fixation screw.  2. Changes of Charcot arthropathy.  3. Medial sided soft tissue swelling, increased compared to 12/30/2022.          Review of Systems:     CONSTITUTIONAL:  No fevers or chills  EYES: negative for icterus  ENT:  negative for hearing loss, tinnitus and sore throat  RESPIRATORY:  negative for cough with sputum and dyspnea  CARDIOVASCULAR:  negative for chest pain, dyspnea  GASTROINTESTINAL:  negative for nausea, vomiting, diarrhea and constipation  GENITOURINARY:  negative for dysuria  HEME:  No easy bruising  INTEGUMENT: see HPI  NEURO:  see HPI          Past Medical History:     Past Medical History:   Diagnosis Date     Chronic pain syndrome 10/24/2014     Diabetes mellitus, type 2 (H)      Diabetic Charcot foot  (H)      Diabetic retinopathy associated with diabetes mellitus due to underlying condition (H)      Diverticulitis of colon      Gastroesophageal reflux disease      Hepatitis C 2017    treated     History of skin cancer      Hypertension      Hypothyroidism      Legally blind      Midfoot collapse of right lower extremity      Migraine      NAFLD (nonalcoholic fatty liver disease)      Nephrolithiasis      Neuropathy      MARGARITO (obstructive sleep apnea)      Rib pain 10/24/2014     S/P colectomy 10/14/2014     Thyroid cancer (H) 10/14/2014          Past Surgical History:     Past Surgical History:   Procedure Laterality Date     ARTHRODESIS FOOT Right 11/17/2022    Procedure: Right midfoot/talonavicular osteotomy and reduction of deformity Right midfoot/talonavicular arthrodesis, achilles lengthening;  Surgeon: Bryon Starr MD;  Location: [facility] OR     CHOLECYSTECTOMY  1986     COLECTOMY      @ 6 years ago, sigmoid     COLONOSCOPY  2016     FOOT SURGERY Left 2021     IRRIGATION AND DEBRIDEMENT FOOT, COMBINED Right 1/21/2023    Procedure: IRRIGATION AND DEBRIDEMENT, FOOT, RIGHT, Placement of Wound Vac and Antibiotic Beads.;  Surgeon: Bryon Starr MD;  Location: [facility] OR     LENGTHEN TENDON ACHILLES Right 11/17/2022    Procedure: LENGTHENING, TENDON, ACHILLES;  Surgeon: Bryon Starr MD;  Location: [facility] OR          Family History:     Family History   Problem Relation Age of Onset     Diabetes Mother      Cancer Mother      Diabetes Father      Heart Disease Father      Anesthesia Reaction No family hx of      Clotting Disorder No family hx of      Glaucoma No family hx of      Macular Degeneration No family hx of           Social History:     Social History     Tobacco Use     Smoking status: Never     Smokeless tobacco: Never   Substance Use Topics     Alcohol use: Not Currently     Comment: rarely     History   Sexual Activity     Sexual activity: Not Currently     Partners: Female     lives at home with wife a a dog ,  follows at [facility] but may seek medical care at other health facilities per patient          Current Medications (antimicrobials listed in bold):       aspirin  81 mg Oral BID     cetirizine  10 mg Oral Daily     empagliflozin  25 mg Oral Daily     fluticasone  2 spray Both Nostrils Daily     gabapentin  1,200 mg Oral TID     insulin aspart  1-7 Units Subcutaneous TID AC     insulin aspart  1-5 Units Subcutaneous At Bedtime     levothyroxine  137 mcg Oral QAM AC     metFORMIN  1,000 mg Oral BID w/meals     pantoprazole  40 mg Oral Daily     piperacillin-tazobactam  4.5 g Intravenous Q6H     polyethylene glycol  17 g Oral Daily     pravastatin  40 mg Oral QPM     senna-docusate  1 tablet Oral BID     sodium chloride (PF)  3 mL Intracatheter Q8H     topiramate  150 mg Oral BID     vancomycin  1,750 mg Intravenous Q24H     Vitamin D3  50 mcg Oral Daily          Allergies:     Allergies   Allergen Reactions     Atorvastatin      Other reaction(s): Hyperglycemia     Celebrex [Celecoxib] Other (See Comments)     [facility]          Physical Exam:   Vitals were reviewed  Patient Vitals for the past 24 hrs:   BP Temp Temp src Pulse Resp SpO2   01/25/23 0823 (!) 143/74 97.2  F (36.2  C) Oral 94 16 --   01/24/23 2217 104/51 (!) 95.1  F (35.1  C) Oral 94 16 95 %   01/24/23 1429 131/68 (!) 96.1  F (35.6  C) Oral 92 16 95 %     Ranges for his vital signs:  Temp:  [95.1  F (35.1  C)-97.2  F (36.2  C)] 97.2  F (36.2  C)  Pulse:  [92-94] 94  Resp:  [16] 16  BP: (104-143)/(51-74) 143/74  SpO2:  [95 %] 95 %  Physical Examination:  GENERAL:  well-developed, well-nourished, alert, oriented, in bed in no acute distress.   HEENT:  Head is normocephalic, atraumatic   EYES:  Eyes have anicteric sclerae without conjunctival injection   NECK:  Supple.   LUNGS: clear to auscultation bilateral   SKIN:  No acute rashes.   Extremities : no edema .  right foot wrapped, with wound vac. ( photos reviewed - wound looks  )   NEUROLOGIC:  Grossly nonfocal. peripheral neuropathy,          Laboratory Data:     Inflammatory Markers    Recent Labs   Lab Test 01/23/23  0546 01/22/23  0839 01/20/23  1211   SED  --   --  59*   CRP 14.0* 24.0* 69.0*     Hematology Studies    Recent Labs   Lab Test 01/23/23  0546 01/22/23  0839 01/21/23  1559 01/20/23  1211 11/19/22  0944 11/18/22  1541 11/17/22  1022   WBC 8.4 6.4 7.4 7.8  --   --  7.7   HGB 11.1* 10.3* 10.5* 11.1* 10.9* 10.8* 13.8   MCV 85 84 85 84  --   --  86    246 216 232  --   --  161     Metabolic Studies     Recent Labs   Lab Test 01/25/23  0936 01/24/23  0816 01/23/23  0546 01/22/23  0839 01/21/23  1559 01/21/23  0550 01/20/23  1914 01/20/23  1211   NA  --   --  142 142 143  --   --  141   POTASSIUM  --   --  4.6 4.7 4.1  --  3.9 4.2   CHLORIDE  --   --  113* 111* 110*  --   --  113*   CO2  --   --  23 24 25  --   --  23   BUN  --   --  18 19 24  --   --  33*   CR 1.11 1.16 1.13 1.13 1.21   < > 1.28* 1.24   GFRESTIMATED 77 73 75 75 69   < > 65 67    < > = values in this interval not displayed.     Hepatic Studies    Recent Labs   Lab Test 01/21/23  1559   BILITOTAL 0.3   ALKPHOS 55   ALBUMIN 2.9*   AST 7   ALT 10     Thyroid Studies    Recent Labs   Lab Test 01/21/23  1559   TSH 1.80     Vancomycin Levels    Recent Labs   Lab Test 01/25/23  0936 01/22/23  0839   VANCOMYCIN 16.0 18.9

## 2023-01-25 NOTE — PROGRESS NOTES
[facility] Nurse Inpatient Assessment     Consulted for: Right foot NPWT    Patient History (according to provider note(s):      Per Dr Bryon Starr on 1/22/2023: 58yo male patient with multiple comorbidities including T2DM with recent debridement of a R foot Charcot reconstruction surgical site eschar/infection yesterday. Clinically stable without overt signs for sepsis. Has a skin defect medially managed with a VAC and a smaller defect laterally managed with dressings. Neither wound was able to be closed primarily, nor would it have been prudent to even if possible. There are resorbable, non-counted, vanco CaSO4 pellets in each wound. Infectious disease consultation is greatly appreciated.    Areas Assessed:      Areas visualized during today's visit: Right foot    Negative pressure wound therapy applied to: Right medial foot     1/23 1/25     Last photo: 1/23/2023, 1/25/2023   Wound due to: Surgical Wound   Wound history/plan of care:      Surgical date: 1/21/2023    Date Negative Pressure Wound Therapy initiated: 1/21/2023     Interventions in place: elevation    Is patient s nutritional status compromised? no   a. If yes, what interventions are in place? N/A    Reason for initiating vac therapy? Presence of co-morbidities, Need for accelerated granulation tissue and Prior history of delayed wound healing    Which?of?the?following?co-morbidities?apply? Diabetes  a. If diabetic is patient on a diabetic management program? Yes     Is osteomyelitis present in wound? yes  a.  If yes what treatments are in place? IV antibiotics per ID    Wound base: 75 % slough, 25 % bone , tendon     Palpation of the wound bed: normal       Drainage: scant      Description of drainage: serosanguinous      Measurements (length x width x depth, in cm) 5  x 4  x  2 cm       Tunneling N/A      Undermining N/A   Periwound  skin: Intact       Color: pink       Temperature: normal    Odor: none   Pain: absent   Pain intervention prior to dressing change: patient tolerated well  Treatment goal: Heal   STATUS: healing   Supplies ordered: supplies stored on unit    Number of foam pieces removed from a wound (excluding foam for bridge) : 1 GranuFoam Black   Verified this matched the number of foam pieces applied last dressing change: Yes   Number of foam pieces packed into wound (excluding foam for bridge) : 1 GranuFoam Black     Wound location: Right lateral foot     1/23 1/25    Last photo: 1/23/2023, 1/25/2023  Wound due to: Surgical Wound  Wound history/plan of care: Patient to OR on 1/21/2023 with Nadja Jeffries at base of wound. Once beads have disintegrated, OK to place VAC over wound.   Wound base: Base currently covered with antibiotic beads     Palpation of the wound bed: normal      Drainage: none     Description of drainage: none     Measurements (length x width x depth, in cm): 5  x 1  x  1 cm      Tunneling: N/A     Undermining: N/A  Periwound skin: Intact      Color: pink      Temperature: normal   Odor: none  Pain: absent  Pain interventions prior to dressing change: patient tolerated well  Treatment goal: Heal   STATUS: healing  Supplies ordered: supplies stored on unit       Treatment Plan:     Negative pressure wound therapy plan:  Wound location: Right medial foot   Change Days: Mon/Wed/Fri by WOC RN    Supplies (including all accessories) used: small  Black foam   Cleanse with MicroKlenz prior to replacing VAC    Suction setting: -125   Methods used: Window paned all periwound skin with vac drape prior to applying sponge    Staff RN to assess integrity of dressing and ensure suction is set at appropriate level every shift.   Date canister. Chart canister output every shift. Change cannister weekly and PRN if full/occluded     Remove foam dressing and  "replace with BID normal saline moist gauze dressing if:   -a dressing failure which cannot be repaired within 2 hours   -patient is discharging to home without a home pump   -patient is discharging to a facility outside the local area   -if a dressing is a \"Silver Foam\", remove before Radiation Therapy or MRI     The hospital VAC pump is not to be discharged with the patient.?Ensure to disconnect patient from machine prior to discharge. Then,    - If a home KCI VAC pump has been delivered, connect home cannister to dressing tubing then connect cannister to home pump and turn on machine    - If transferring to a nearby facility with a KCI vac, can disconnect and clamp tubing then cover end with glove so can be reconnected within 2 hours       Right lateral foot wound(s): Monday/Wednesday/Friday by WOC RN: Wash surrounding skin with MicroKlenz and gauze. Cover wound bed with plain packing strip. Cover with Mepilex. Once beads have disintegrated, OK to start NPWT to wound.      Orders: Written    RECOMMEND PRIMARY TEAM ORDER: None, at this time  Education provided: plan of care  Discussed plan of care with: Patient and Physician  WO nurse follow-up plan: Monday/WednesdayFriday  Notify WOC if wound(s) deteriorate.  Nursing to notify the Provider(s) and re-consult the WO Nurse if new skin concern.    DATA:     Current support surface: Standard  Standard gel/foam mattress (IsoFlex, Atmos air, etc)  Containment of urine/stool: Continent of bladder and Continent of bowel  BMI: Body mass index is 30.13 kg/m .   Active diet order: Orders Placed This Encounter      Regular Diet Adult     Output: No intake/output data recorded.     Labs:   Recent Labs   Lab 01/23/23  0546 01/22/23  0839 01/21/23  1559 01/20/23  1914   ALBUMIN  --   --  2.9*  --    HGB 11.1*   < > 10.5*  --    INR  --   --   --  1.06   WBC 8.4   < > 7.4  --    CRP 14.0*   < >  --   --     < > = values in this interval not displayed.     Pressure injury risk " assessment:   Sensory Perception: 3-->slightly limited  Moisture: 4-->rarely moist  Activity: 3-->walks occasionally  Mobility: 3-->slightly limited  Nutrition: 3-->adequate  Friction and Shear: 3-->no apparent problem  Maycol Score: 19    Shivani Mireles RN CWOCN  Dept. Pager: [phone]  Dept. Office Number: [phone]

## 2023-01-25 NOTE — PLAN OF CARE
VS: VSS, pt denied CP or SOB.   O2: On room air, sat. High 90's.   Output: Voids spontaneously in bathroom   Last BM:  01/25/23   Activity: Up with SBA to bathroom Declines to use a walker, steady feet.    Skin: Intact, except right foot surgical incision, ace wrapped and wound vac on at 125 mm continues, dressing was done today by woc RN.    Pain: Denies pain or discomfort.    CMS: CMS and neuro intact to baseline.    Dressing: CDI   Diet: Regular tolerating okay.   LDA: PIV to left forearm, SL between antibiotic's.    Equipment: IV pole/pump, personal belongings.   Plan: TBD   Additional Info:

## 2023-01-25 NOTE — PROGRESS NOTES
[facility]    Medicine Progress Note - Hospitalist Service, GOLD TEAM 16    Date of Admission:  1/20/2023    Assessment & Plan    Nehemiah Magallon is a 57 year old male patient with a past medical history significant for T2DM c/b charcot foot & diabetic retinopathy + neuropathy, legally blind, GERD, hepatitis C treated, HTN, HLD, papillary thyroid cancer in remission, hypothyroidism, NAFLD, MARGARITO, chronic pain, and h/o sigmoid colectomy for diverticulitis.  S/p right foot  11/17/22 by Dr Starr.  He initially noticed infection starting 1/15 and was stared on Augmentin.  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  ---   Currently under good control  ---   Hgba1c was 5.6% in 11/2022   ---   Patient on Jardiance, Semiglutide, Metformin prior to admit  ---   Restarted metformin and jardiance.   ---   Ozempic is not on formulary, thus hold it  ---   Continue accu-checks and SSI   ---   Continue gabapentin     SHALINI  ---   Admit cr was 1.13 ---> 1.16 ---> 1.11 today  ---   Baseline cr 0.9  ---   Avoid nephrotoxins except for IV antibiotics     # ACD and acute postop blood loss anemia  ---   Hgb stable at 10 - 11 g      # History of papillary thyroid cancer status post  Surgery   # Post operative hypothyroidism  ---   Continue PTA Synthroid 137 mcg , TSH 1.8      # MARGARITO on CPAP  ---   Continue home CPAP     # HTN  #  HLD  ---   Hold PTA lisinopril 40 mg daily, BP is well controlled w/o meds  ---   Continue PTA Pravastatin 40 mg at bedtime      # GERD  ---   Continue Protonix in place of PTA omeprazole     # Migraines  ---   Continue PTA topamax; imitrex prior to onset    # Allergic rhinitis   ---   PTA flonase and Cetrizine    # History of hepatitis C  ---   Treated     # History of nonalcoholic fatty liver disease  # History of nephrolithiasis  # Chronic pain syndrome  # Legally blind  # History of migraine headaches.   ---   Continue home meds           Diet: Regular Diet Adult    DVT Prophylaxis: per orthopedic surgery    Jones Catheter: Not present  Lines: None     Cardiac Monitoring: None  Code Status: Full Code full   Disposition Plan   TBD          Shannon Petit MD  Hospitalist Service, GOLD TEAM [address] [place] [address]  Securely message with [employer] (more info)  Text page via [employer] Paging/Directory   See signed in provider for up to date coverage information  ______________________________________________________________________    Interval History    No new complaints  Uneventful night.  Awaiting for wound cx result    Physical Exam   Vital Signs: Temp: 97.2  F (36.2  C) Temp src: Oral BP: (!) 143/74 Pulse: 94   Resp: 16 SpO2: 95 % O2 Device: None (Room air)    Weight: 210 lbs 0 oz  General: aao x 3, NAD.  HEENT:  NC/AT, neck supple  CVS:  NL s 1 and s2, no m/r/g.  Lungs:  CTA B/L.   Abd:  Soft, + bs, NT  Ext:  Right foot dressing c/d/i  Lymph:  No edema.  Neuro:  Nonfocal.  Musculoskeletal: No calf tenderness to palpation.    Skin:  No rash.  Psychiatry:  Mood and affect appropriate.      Data     I have personally reviewed the following data over the past 24 hrs:    N/A  \   N/A   / N/A     N/A N/A N/A /  136 (H)   N/A N/A 1.11 \       Imaging results reviewed over the past 24 hrs:   No results found for this or any previous visit (from the past 24 hour(s)).  Recent Labs   Lab  01/25/23  1105 01/25/23  0936 01/25/23  0821 01/25/23  0156 01/24/23  0838 01/24/23  0816 01/23/23  0850 01/23/23  0546 01/22/23  1156 01/22/23  0839 01/21/23  1722 01/21/23  1559 01/20/23  2154 01/20/23  1914   WBC  --   --   --   --   --   --   --  8.4  --  6.4  --  7.4  --   --    HGB  --   --   --   --   --   --   --  11.1*  --  10.3*  --  10.5*  --   --    MCV  --   --   --   --   --   --   --  85  --  84  --  85  --   --    PLT  --   --   --   --   --   --   --  281  --  246  --  216  --   --    INR  --   --   --   --   --   --   --   --   --   --   --   --   --  1.06   NA  --   --   --   --   --   --   --  142  --  142  --  143  --   --    POTASSIUM  --   --   --   --   --   --   --  4.6  --  4.7  --  4.1  --  3.9   CHLORIDE  --   --   --   --   --   --   --  113*  --  111*  --  110*  --   --    CO2  --   --   --   --   --   --   --  23  --  24  --  25  --   --    BUN  --   --   --   --   --   --   --  18  --  19  --  24  --   --    CR  --  1.11  --   --   --  1.16  --  1.13  --  1.13  --  1.21   < > 1.28*   ANIONGAP  --   --   --   --   --   --   --  6  --  7  --  8  --   --    ALICE  --   --   --   --   --   --   --  9.3  --  8.4*  --  9.0  --   --    *  --  138* 167*   < >  --    < > 157*   < > 238*   < > 166*   < >  --    ALBUMIN  --   --   --   --   --   --   --   --   --   --   --  2.9*  --   --    PROTTOTAL  --   --   --   --   --   --   --   --   --   --   --  7.0  --   --    BILITOTAL  --   --   --   --   --   --   --   --   --   --   --  0.3  --   --    ALKPHOS  --   --   --   --   --   --   --   --   --   --   --  55  --   --    ALT  --   --   --   --   --   --   --   --   --   --   --  10  --   --    AST  --   --   --   --   --   --   --   --   --   --   --  7  --   --     < > = values in this interval not displayed.

## 2023-01-25 NOTE — PLAN OF CARE
"VS: /51 (BP Location: Left arm)   Pulse 94   Temp (!) 95.1  F (35.1  C) (Oral)   Resp 16   Ht 1.778 m (5' 10\")   Wt 95.3 kg (210 lb)   SpO2 95%   BMI 30.13 kg/m      O2: SpO2 > 90% and stable on RA. LS clear and equal bilaterally. Denies chest pain and SOB.    Output: Voids spontaneously without difficulty to bathroom   Last BM: 1/24/2023, denies abdominal discomfort. BS active / passing flatus. Pt reported having 3 loose BM on 1/24/2023.   Activity: SBA    Skin: WDL except, Right foot wounds.   Pain: Denies   CMS: Intact, AOx4. Baseline numbness and tingling.   Dressing: UTV. Wounds are warped no drainage seen on the wrap.   Diet: Regular diet. Denies nausea/vomiting.   BG checks BG overnight was 167   LDA: L PIV SL.   Equipment: IV pole, personal belongings,    Plan: Continue with plan of care. Call light within reach, pt able to make needs known.    Additional Info: Pt is Legally blind in both eyes per pt report   Pt has hearing difficulty and uses hearing aid.  Pt R PIV was leaking, so a new PIV was done on the L arm.      "

## 2023-01-25 NOTE — PLAN OF CARE
VS: VSS, pt denied CP or SOB.   O2: On room air, sat. High 90's.   Output: Voids spontaneously in bathroom   Last BM:  01/24/23   Activity: Up with SBA to bathroom Declines to use a walker, steady feet.    Skin: Intact, except right foot surgical incision, ace wrapped and wound vac on at 125 mm continues.    Pain: Denies pain or discomfort.    CMS: CMS and neuro intact to baseline.    Dressing: CDI   Diet: Regular tolerating okay.   LDA: PIV to left forearm, SL between antibiotic's.    Equipment: IV pole/pump, personal belongings.   Plan: TBD   Additional Info:

## 2023-01-26 NOTE — ANESTHESIA POSTPROCEDURE EVALUATION
Patient: Nehemiah Magallon    Procedure: Procedure(s):  IRRIGATION AND DEBRIDEMENT, FOOT, RIGHT, Placement of Wound Vac and Antibiotic Beads.       Anesthesia Type:  General    Note:  Disposition: Inpatient   Postop Pain Control: Uneventful            Sign Out: Well controlled pain   PONV: No   Neuro/Psych: Uneventful            Sign Out: Acceptable/Baseline neuro status   Airway/Respiratory: Uneventful            Sign Out: Acceptable/Baseline resp. status   CV/Hemodynamics: Uneventful            Sign Out: Acceptable CV status; No obvious hypovolemia; No obvious fluid overload   Other NRE: NONE   DID A NON-ROUTINE EVENT OCCUR? No           Last vitals:  Vitals Value Taken Time   /83 01/21/23 1400   Temp 36.8  C (98.2  F) 01/21/23 1400   Pulse 85 01/21/23 1400   Resp 14 01/21/23 1400   SpO2 95 % 01/21/23 1400       Electronically Signed By: Lizeth Yu MD  January 26, 2023  9:52 AM

## 2023-01-26 NOTE — PROGRESS NOTES
[facility]  Nurse Liaison    Received referral from [facility], abx therapy.  Benefits verified, Pt has VA  plan, (VA benefits are not back yet) pt will need this updated information, when available. Spoke with patient to Introduced home infusion services, review benefits and offer choice of providers. He wishes to stay with [facility] and has chosen [facility].  Provided info on [facility] Services, Including, RN visits, RPH/RN on call 24/7, supplies, and delivery process to home.  Educated on how to contact [facility]. He is in agreement with plan and willing and able to learn. He stated they are will be comfortable doing infusion in home environment. He has not been to [facility], yet.  [facility] Liaison will continue to follow until DC for any changes or additional needs. This RN provided patient with [facility] Phone number and will continue to follow through discharge.    DC: Fri  DC Therapies:Zosyn or Vanco tbd. Via [facility]  Delivery/Supplies: to pt home  LINES/TUBES: PICC tbd  AGENCY: [facility]  SNV: tbd  NOTE: Awaiting VA authorization and communication with [facility]      Leanne Torres, [facility]-Nurse Liaison  Cell:  [phone] Mon-Fri  [email or web address] hrs  [phone]

## 2023-01-26 NOTE — PLAN OF CARE
"  VS: /72 (BP Location: Left arm)   Pulse 91   Temp 96.8  F (36  C) (Oral)   Resp 16   Ht 1.778 m (5' 10\")   Wt 95.3 kg (210 lb)   SpO2 96%   BMI 30.13 kg/m       O2: Stable on room air   Output: Voids without difficulty in bathroom   Last BM: 1/25- states BM have been loose; refusing stool softeners.   Active bowel sounds, passing flatus   Activity: SBA- needs help carrying wound vac   Skin: R foot wound   Pain: denies   CMS: Baseline neuropathy in all extremities; has no feeling from knees down or in fingers.    Dressing: R foot wound- CDI, ACE wrap applied   Diet: Regular diet, thin liquids, takes pills whole   LDA: L PIV saline locked, patent   Equipment: IV pole, personal belongings   Plan: Continue plan of care   Additional Info: A&Ox4. Denies headache, chest pain, SOB. Showered today. Uses call light appropriately and makes needs known.        "

## 2023-01-26 NOTE — PROGRESS NOTES
Orthopaedic Surgery Progress Note 01/26/2023    Subjective  No acute events overnight.  Pain well controlled. Denies new numbness, tingling, or weakness.  Tolerating diet without nausea or vomiting.  Voiding spontaneously.  +flatus, -BM.   Denies fevers, chills, chest pain, SOB. Looking forward to next steps.     Objective  Temp: 98.3  F (36.8  C) Temp src: Oral BP: 114/65 Pulse: 90   Resp: 16 SpO2: 96 % O2 Device: None (Room air)      Exam:  Gen: No acute distress, resting comfortably in bed.  Resp: Non-labored breathing  Cardio: Regular rate via peripheral pulse  MSK:  LE:  - Dressings c/d/I, wound VAC holding suction  - Fires Quad, TA, GSC, EHL, FHL  - Baseline sensation in femoral/tibial/sural/saphenous/DP/SP nerves  - PT/DP pulses unable to assess due to wound VAC, but toes WWP    Drain output: wound VAC: no new output    Culture results: wound tissue and fluid cultures pending - gram stain +GPC    Assessment: Nehemiah Magallon is a 57 year old male s/p right foot wound I&D on 1/21 with Dr. Starr. Doing well. No systemic symptoms, no pain currently. VAC changes MWF. Wound healing well.    Plan:  Ortho Primary    Activity: Up with assist  Weight bearing status: NWB RLE  Antibiotics: Vanc/Zosyn pending culture results and ID recs  Diet: Begin with clear fluids and progress diet as tolerated; Bowel regimen  DVT prophylaxis: SCD, ASA 81 BID  Bracing/Splinting: no brace needed  Wound Care: WOC consulted, appreciate help. Wound VAC -125 continuous, VAC changes MWF, Dressing to remain in place x 7-10 days, reinforce and redress PRN  Drains: Document output per shift  Pain management: transition from IV to orals as tolerated.  Jones: none  X-rays: none  Physical Therapy: gait training, mobilization ADLs.  Labs: Trend Hgb on POD #1   Consults: PT, Hospitalist, ID appreciate assistance in caring for this patient.  Follow-up: Clinic with Dr. Starr, timing TBD     Disposition: Pending culture results, Abx regimen, progress with  therapies, pain control on orals, and medical stability, anticipate discharge to Home vs Rehab on POD #5-6.     Fidencio Lynn MD  Orthopaedic Surgery PGY-1

## 2023-01-26 NOTE — PROGRESS NOTES
[facility]    Medicine Progress Note - Hospitalist Service, GOLD TEAM 16    Date of Admission:  1/20/2023    Assessment & Plan    Nehemiah Magallon is a 57 year old male patient with a past medical history significant for T2DM c/b charcot foot & diabetic retinopathy + neuropathy, legally blind, GERD, hepatitis C treated, HTN, HLD, papillary thyroid cancer in remission, hypothyroidism, NAFLD, MARGARITO, chronic pain, and h/o sigmoid colectomy for diverticulitis.  S/p right foot  11/17/22 by Dr Starr.  He initially noticed infection starting 1/15 and was stared on Augmentin.  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  # R-foot wound  ---   S/p debridement and irrigation of right foot, placement of sub-atmospheric wound therapy device on 1/21  ---   Wound swap cx 1/20 grew 1+ Staph Epidermidis and 1+ staph caprae  ---   Intra-op cx 1/21 grew 1+ Staph Epidermidis, 1+ staph epidermidis and 1+ staph caprae  ---   NGTD from blood cx x 2 collected on 1/20  ---   CRP 69 ---> 24 ---> 14   ---   Continue IV Zosyn and IV Vanco, started on 1/20  ---   I do not think Zosyn is any longer needed but will differ discontinuation of Zosyn to ID consult service  ---   Wound vac dress changes q MWF, tomorrow in OR     # T2DM c/b diabetic retinopathy and neuropathy.  ---   Currently under good control  ---   Hgba1c was 5.6% in 11/2022   ---   Fasting glucose was 158  ---   Patient on Jardiance, Semiglutide, Metformin prior to admit  ---   Continue metformin and jardiance.   ---   Ozempic is not on formulary, thus hold it  ---   Continue accu-checks and SSI   ---   Continue gabapentin     SHALINI  ---   Admit cr was 1.13 ---> 1.16 ---> 1.11 on 1/25/23  ---   Baseline cr 0.9  ---   Avoid  nephrotoxins except for IV antibiotics     # ACD and acute postop blood loss anemia  ---   Hgb stable at 10 - 11 g      # History of papillary thyroid cancer status post  Surgery   # Post operative hypothyroidism  ---   Continue PTA Synthroid 137 mcg , TSH 1.8      # MARGARITO on CPAP  ---   Continue home CPAP     # HTN  # HLD  ---   Hold PTA lisinopril 40 mg daily, BP is well controlled w/o meds  ---   Continue PTA Pravastatin 40 mg at bedtime      # GERD  ---   Continue Protonix in place of PTA omeprazole     # Migraines  ---   Continue PTA topamax; imitrex prior to onset    # Allergic rhinitis   ---   PTA flonase and Cetrizine    # History of hepatitis C  ---   Treated     # History of nonalcoholic fatty liver disease  # History of nephrolithiasis  # Chronic pain syndrome  # Legally blind  # History of migraine headaches.   ---   Continue home meds           Diet: Regular Diet Adult    DVT Prophylaxis: per orthopedic surgery    Jones Catheter: Not present  Lines: None     Cardiac Monitoring: None  Code Status: Full Code full   Disposition Plan   TBD          Shannon Petit MD  Hospitalist Service, GOLD TEAM [address] [place] [address]  Securely message with [employer] (more info)  Text page via [employer] Paging/Directory   See signed in provider for up to date coverage information  ______________________________________________________________________    Interval History    No new complaints  Uneventful night.  To OR tomorrow for wound vac change    Physical Exam   Vital Signs: Temp: 96.8  F (36  C) Temp src: Oral BP: 137/72 Pulse: 91   Resp: 16 SpO2: 96 % O2 Device: None (Room air)    Weight: 210 lbs 0 oz  General: aao x 3, NAD.  HEENT:  NC/AT, neck supple  CVS:  NL s 1 and s2, no m/r/g.  Lungs:  CTA B/L.   Abd:  Soft, + bs, NT  Ext:  Right foot dressing c/d/i  Lymph:  No edema.  Neuro:  Nonfocal.  Musculoskeletal: No calf tenderness to palpation.    Skin:  No rash.  Psychiatry:  Mood and  affect appropriate.      Data     I have personally reviewed the following data over the past 24 hrs:    N/A  \   N/A   / N/A     N/A N/A N/A /  158 (H)   N/A N/A 1.11 \       Imaging results reviewed over the past 24 hrs:   No results found for this or any previous visit (from the past 24 hour(s)).  Recent Labs   Lab 01/26/23  0801 01/26/23  0202 01/25/23  2126 01/25/23  1105 01/25/23  0936 01/24/23  0838 01/24/23  0816 01/23/23  0850 01/23/23  0546 01/22/23  1156 01/22/23  0839 01/21/23  1722 01/21/23  1559 01/20/23  2154 01/20/23  1914   WBC  --   --   --   --   --   --   --   --  8.4  --  6.4  --  7.4  --   --    HGB  --   --   --   --   --   --   --   --  11.1*  --  10.3*  --  10.5*  --   --    MCV  --   --   --   --   --   --   --   --  85  --  84  --  85  --   --    PLT  --   --   --   --   --   --   --   --  281  --  246  --  216  --   --    INR  --   --   --   --   --   --   --   --   --   --   --   --   --   --  1.06   NA  --   --   --   --   --   --   --   --  142  --  142  --  143  --   --    POTASSIUM  --   --   --   --   --   --   --   --  4.6  --  4.7  --  4.1  --  3.9   CHLORIDE  --   --   --   --   --   --   --   --  113*  --  111*  --  110*  --   --    CO2  --   --   --   --   --   --   --   --  23  --  24  --  25  --   --    BUN  --   --   --   --   --   --   --   --  18  --  19  --  24  --   --    CR  --   --   --   --  1.11  --  1.16  --  1.13  --  1.13  --  1.21   < > 1.28*   ANIONGAP  --   --   --   --   --   --   --   --  6  --  7  --  8  --   --    ALICE  --   --   --   --   --   --   --   --  9.3  --  8.4*  --  9.0  --   --    * 135* 171*   < >  --    < >  --    < > 157*   < > 238*   < > 166*   < >  --    ALBUMIN  --   --   --   --   --   --   --   --   --   --   --   --  2.9*  --   --    PROTTOTAL  --   --   --   --   --   --   --   --   --   --   --   --  7.0  --   --    BILITOTAL  --   --   --   --   --   --   --   --   --   --   --   --  0.3  --   --    ALKPHOS  --   --   --   --    --   --   --   --   --   --   --   --  55  --   --    ALT  --   --   --   --   --   --   --   --   --   --   --   --  10  --   --    AST  --   --   --   --   --   --   --   --   --   --   --   --  7  --   --     < > = values in this interval not displayed.

## 2023-01-26 NOTE — PROGRESS NOTES
GENERAL INFECTIOUS DISEASES CONSULTATION - [facility]     Patient:  Nehemiah Magallon   Date of birth 1965, Medical record number [id number]  Date of Visit:  01/26/2023  Date of Admission: 1/20/2023  Consult Requested by:Bryon Starr MD  Reason for Consult:  s/p I&D right foot wound, intraop cultures taken         Assessment and Recommendations:     Nehemiah Magallon is a 57 year old male, a retired RN, [place] [facility] Diabetes mellitus (HbA1c 5.6 in Jan 2023 ) with retinopathy, legal blindness, peripheral neuropathy, papillary thyroid malignancy in remission, hypothyroidism, GERD, MARGARITO, Hep C s/p treatment (2017), HTN, NAFLD, colectomy for diverticulitis (2014), bilateral hearing loss and using bilateral hearing aids, chronic pain, s/p right Charcot foot reconstruction on 11/17/2022. Periop he recieved Cefazolin. Per Nehemiah, he was then discharged on Augmentin x 7 days.  He was using a cast for 2.5-3 weeks post surgery. He then noticed a scab but no drainage (reviewed photo on his phone). Then the wound was covered with ace wrap and he thought the initial cast then the ace wrap could have caused the wound. He had kept the surgical site dry, and would air it (without dressing) when he sat down at home but applied would dressing when he went to bed.  Last Adriano 1/15/23, he noticed purulent drainage and deepening of the wound. He was prescribed with Augmentin 875 mg po q12 hr x 14 days on 1/16/23.  He went to see Dr Starr on 1/20/23 and directly admitted on 1/20/2023 from Ortho clinic for increasing drainage and wound necrosis of right foot surgical sites.     1. S/p right Charcot foot reconstruction on 11/17/2022 complicated by post op infection   - 1/20/23 - started on Pip/Tazobactam and Vancomycin IV   - 1/21/23 -  s/p I+D of right foot, placement of wound vac and antibiotic beads . Intra-op findings: Medial wound: purulent material, probed directly to plate and bone; Lateral wound: no purulence, probed to bone, no  hardware present laterally. Intra-Op cultures are growing Staph epidermidis and Staph caprae.     2.  Diabetes mellitus (HbA1c 5.6 in Jan 2023) with peripheral neuropathy   - HbA1c around 5.6 in the past 4 years per patient    3. CRP was 69 and ESR was 59 with WBC 7.8 on 1/20/23 --> CRP 14 ( 1/23/23)  4. Sweatings  - resolved    RECOMMENDATION:  - continue Pip/Tazobactam and Vancomycin IV   -  follow-up intra op cultures and blood cultures, will adjust antibiotics per cultures  - trend CRP every 3 days in hospital     ID will continue to follow     Pete Arambula MD, M.Med.Sc  Staff, Infectious Diseases   Pager : [phone]    Interval History  Feels good. denies pain. tolerates antibiotics , no diarrhea,  no abdominal pain, nausea/ vomiting. has good appetite         History of Present Illness:     Nehemiah Magallon is a 57 year old male w PMH Diabetes mellitus with retinopathy, legal blindness, peripheral neuropathy, papillary thyroid malignancy in remission, hypothyroidism, GERD, MARGARITO, Hep C s/p treatment (2017), HTN, NAFLD, colectomy for diverticulitis (2014), bilateral hearing loss and using bilateral hearing aids, chronic pain, s/p right Charcot foot reconstruction on 11/17/2022. Periop he recieved Cefazolin. Per Nehemiah, he was then discharged on Augmentin x 7 days.  He was using a cast for 2.5-3 weeks post surgery. He then noticed a scab but no drainage (reviewed photo on his phone). Then the wound was covered with ace wrap and he thought the initial cast then the ace wrap could have caused the wound. He had kept the surgical site dry, and would air it (without dressing) when he sat down at home but applied would dressing when he went to bed.  Last Adriano 1/15/23, he noticed purulent drainage and deepening of the wound. He was prescribed with Augmentin 875 mg po q12 hr x 14 days on 1/16/23.  He went to see Dr Starr on 1/20/23 and directly admitted on 1/20/2023 from Ortho clinic for increasing drainage  and wound necrosis of right foot surgical sites. He denied any fever, chills, sweats or feeling ill at home. He has no sensation in his feet.  CRP was 69 and ESR was 59 with WBC 7.8 on 1/20/23. Blood cultures x2 on 1/20/23 are pending/ negative to date. He starts to have nightsweats after admission. Otherwise, he is feeling fine. No pain. has good appetite.     He underwent I+D of right foot, placement of wound vac and antibiotic beads on 1/21/23. Intra-op findings: Medial wound: purulent material, probed directly to plate and bone; Lateral wound: no purulence, probed to bone, no hardware present laterally. Intra-Op cultures are pending, with gram stain showing 1+ gram positive cocci and 4+ WBCs. He has been on Pip/Tazobactam since admission on 1/20/23.      Antimicrobials  Pip/Tazo 1/20 - present  Vancomycin IV 1/20 - present      Xray of Right foot 1/20/23  Impression:  1. Postsurgical changes of medial column with new fracturing of the third most proximal plate fixation screw.  2. Changes of Charcot arthropathy.  3. Medial sided soft tissue swelling, increased compared to 12/30/2022.          Review of Systems:     CONSTITUTIONAL:  No fevers or chills  EYES: negative for icterus  ENT:  negative for hearing loss, tinnitus and sore throat  RESPIRATORY:  negative for cough with sputum and dyspnea  CARDIOVASCULAR:  negative for chest pain, dyspnea  GASTROINTESTINAL:  negative for nausea, vomiting, diarrhea and constipation  GENITOURINARY:  negative for dysuria  HEME:  No easy bruising  INTEGUMENT: see HPI  NEURO:  see HPI          Past Medical History:     Past Medical History:   Diagnosis Date     Chronic pain syndrome 10/24/2014     Diabetes mellitus, type 2 (H)      Diabetic Charcot foot (H)      Diabetic retinopathy associated with diabetes mellitus due to underlying condition (H)      Diverticulitis of colon      Gastroesophageal reflux disease      Hepatitis C 2017    treated     History of skin cancer       Hypertension      Hypothyroidism      Legally blind      Midfoot collapse of right lower extremity      Migraine      NAFLD (nonalcoholic fatty liver disease)      Nephrolithiasis      Neuropathy      MARGARITO (obstructive sleep apnea)      Rib pain 10/24/2014     S/P colectomy 10/14/2014     Thyroid cancer (H) 10/14/2014          Past Surgical History:     Past Surgical History:   Procedure Laterality Date     ARTHRODESIS FOOT Right 11/17/2022    Procedure: Right midfoot/talonavicular osteotomy and reduction of deformity Right midfoot/talonavicular arthrodesis, achilles lengthening;  Surgeon: Bryon Starr MD;  Location: [facility] OR     CHOLECYSTECTOMY  1986     COLECTOMY      @ 6 years ago, sigmoid     COLONOSCOPY  2016     FOOT SURGERY Left 2021     IRRIGATION AND DEBRIDEMENT FOOT, COMBINED Right 1/21/2023    Procedure: IRRIGATION AND DEBRIDEMENT, FOOT, RIGHT, Placement of Wound Vac and Antibiotic Beads.;  Surgeon: Bryon Starr MD;  Location: [facility] OR     LENGTHEN TENDON ACHILLES Right 11/17/2022    Procedure: LENGTHENING, TENDON, ACHILLES;  Surgeon: Bryon Starr MD;  Location: [facility] OR          Family History:     Family History   Problem Relation Age of Onset     Diabetes Mother      Cancer Mother      Diabetes Father      Heart Disease Father      Anesthesia Reaction No family hx of      Clotting Disorder No family hx of      Glaucoma No family hx of      Macular Degeneration No family hx of           Social History:     Social History     Tobacco Use     Smoking status: Never     Smokeless tobacco: Never   Substance Use Topics     Alcohol use: Not Currently     Comment: rarely     History   Sexual Activity     Sexual activity: Not Currently     Partners: Female     lives at home with wife a a dog , follows at [facility] but may seek medical care at other health facilities per patient          Current Medications (antimicrobials listed in bold):       aspirin  81 mg Oral BID     cetirizine  10 mg Oral Daily      empagliflozin  25 mg Oral Daily     fluticasone  2 spray Both Nostrils Daily     gabapentin  1,200 mg Oral TID     insulin aspart  1-7 Units Subcutaneous TID AC     insulin aspart  1-5 Units Subcutaneous At Bedtime     levothyroxine  137 mcg Oral QAM AC     metFORMIN  1,000 mg Oral BID w/meals     pantoprazole  40 mg Oral Daily     piperacillin-tazobactam  4.5 g Intravenous Q6H     polyethylene glycol  17 g Oral Daily     pravastatin  40 mg Oral QPM     senna-docusate  1 tablet Oral BID     sodium chloride (PF)  3 mL Intracatheter Q8H     topiramate  150 mg Oral BID     vancomycin  1,750 mg Intravenous Q24H     Vitamin D3  50 mcg Oral Daily          Allergies:     Allergies   Allergen Reactions     Atorvastatin      Other reaction(s): Hyperglycemia     Celebrex [Celecoxib] Other (See Comments)     [facility]          Physical Exam:   Vitals were reviewed  Patient Vitals for the past 24 hrs:   BP Temp Temp src Pulse Resp SpO2   01/26/23 0806 137/72 96.8  F (36  C) Oral 91 16 96 %   01/25/23 2300 114/65 98.3  F (36.8  C) Oral 90 16 96 %   01/25/23 1420 125/63 98.2  F (36.8  C) Oral 95 15 96 %     Ranges for his vital signs:  Temp:  [96.8  F (36  C)-98.3  F (36.8  C)] 96.8  F (36  C)  Pulse:  [90-95] 91  Resp:  [15-16] 16  BP: (114-137)/(63-72) 137/72  SpO2:  [96 %] 96 %  Physical Examination:  GENERAL:  well-developed, well-nourished, alert, oriented, in bed in no acute distress.   HEENT:  Head is normocephalic, atraumatic   EYES:  Eyes have anicteric sclerae without conjunctival injection   NECK:  Supple.   LUNGS: breathing comfortably on room air    SKIN:  No acute rashes.   Extremities : no edema .  right foot wrapped, with wound vac. ( photos reviewed - wound looks )   NEUROLOGIC:  Grossly nonfocal. peripheral neuropathy,          Laboratory Data:     Inflammatory Markers    Recent Labs   Lab Test 01/26/23  0953 01/23/23  0546 01/22/23  0839 01/20/23  1211   SED  --   --   --  59*   CRP 7.4  14.0* 24.0* 69.0*     Hematology Studies    Recent Labs   Lab Test 01/26/23  0953 01/23/23  0546 01/22/23  0839 01/21/23  1559 01/20/23  1211 11/19/22  0944 11/18/22  1541 11/17/22  1022   WBC 10.6 8.4 6.4 7.4 7.8  --   --  7.7   HGB 10.6* 11.1* 10.3* 10.5* 11.1* 10.9*   < > 13.8   MCV 85 85 84 85 84  --   --  86    281 246 216 232  --   --  161    < > = values in this interval not displayed.     Metabolic Studies     Recent Labs   Lab Test 01/26/23  0953 01/25/23  0936 01/24/23  0816 01/23/23  0546 01/22/23  0839 01/21/23  1559 01/21/23  0550 01/20/23  1914 01/20/23  1211     --   --  142 142 143  --   --  141   POTASSIUM 4.2  --   --  4.6 4.7 4.1  --  3.9 4.2   CHLORIDE 110*  --   --  113* 111* 110*  --   --  113*   CO2 20  --   --  23 24 25  --   --  23   BUN 16  --   --  18 19 24  --   --  33*   CR 1.07 1.11 1.16 1.13 1.13 1.21   < > 1.28* 1.24   GFRESTIMATED 80 77 73 75 75 69   < > 65 67    < > = values in this interval not displayed.     Hepatic Studies    Recent Labs   Lab Test 01/21/23  1559   BILITOTAL 0.3   ALKPHOS 55   ALBUMIN 2.9*   AST 7   ALT 10     Thyroid Studies    Recent Labs   Lab Test 01/21/23  1559   TSH 1.80     Vancomycin Levels    Recent Labs   Lab Test 01/25/23  0936 01/22/23  0839   VANCOMYCIN 16.0 18.9

## 2023-01-26 NOTE — PLAN OF CARE
"VS: /65 (BP Location: Right arm)   Pulse 90   Temp 98.3  F (36.8  C) (Oral)   Resp 16   Ht 1.778 m (5' 10\")   Wt 95.3 kg (210 lb)   SpO2 96%   BMI 30.13 kg/m      O2: SpO2 > 90%  and stable on RA. LS clear and equal bilaterally. Denies chest pain and SOB.    Output: Voids spontaneously without difficulty to bathroom.    Last BM: Pt reported couple loose stool on the 1/25/2023; denies abdominal discomfort. BS active. Pt reported not passing gas due to the loose stool.   Activity: SBA to help with the WV   Skin: WDL except, Wound on the Righ foot, Ace Wrapped, connected with WV at 125 mm continues.   Pain: Denies   CMS: Intact, AOx4. Pt reported baseline numbness and tingling in all extremities.   Dressing: Visible dressing is CDI.   Diet: Regular diet. Denies nausea/vomiting.   BG checks BG overnight was 135.   LDA: L PIV SL.   Equipment: IV pole, personal belongings,    Plan: Continue with plan of care. Call light within reach, pt able to make needs known.      "

## 2023-01-27 NOTE — PROGRESS NOTES
Orthopaedic Surgery Progress Note 01/27/2023    Subjective  No acute events overnight.  Pain well controlled. Denies new numbness, tingling, or weakness.  Tolerating diet without nausea or vomiting.  Voiding spontaneously.  +flatus, -BM.   Denies fevers, chills, chest pain, SOB. Looking forward to next steps. Hoping to have more information from  re: coordination of [facility] and [place] services.     Objective  Temp: (!) 96.5  F (35.8  C) Temp src: Oral BP: 125/69 Pulse: 96   Resp: 16 SpO2: 94 % O2 Device: None (Room air)      Exam:  Gen: No acute distress, resting comfortably in bed.  Resp: Non-labored breathing  Cardio: Regular rate via peripheral pulse  MSK:  LE:  - Dressings c/d/I, wound VAC holding suction  - Fires Quad, TA, GSC, EHL, FHL  - Baseline sensation in femoral/tibial/sural/saphenous/DP/SP nerves  - PT/DP pulses unable to assess due to wound VAC, but toes WWP    Drain output: wound VAC: no new output    Culture results: wound tissue and fluid cultures - Staph epidermidis, Staph caprae    Assessment: Nehemiah Magallon is a 57 year old male s/p right foot wound I&D on 1/21 with Dr. Starr. Doing well. No systemic symptoms, no pain currently. VAC changes MWF. Wound healing well.    Plan 1/27:  - Place PICC line  - Follow up ID final recs    Plan:  Ortho Primary    Activity: Up with assist  Weight bearing status: NWB RLE  Antibiotics: Vanc/Zosyn pending final ID recs  Diet: Begin with clear fluids and progress diet as tolerated; Bowel regimen  DVT prophylaxis: SCD, ASA 81 BID  Bracing/Splinting: no brace needed  Wound Care: WOC consulted, appreciate help. Wound VAC -125 continuous, VAC changes MWF, Dressing to remain in place x 7-10 days, reinforce and redress PRN  Drains: Document output per shift  Pain management: transition from IV to orals as tolerated.  Jones: none  X-rays: none  Physical Therapy: gait training, mobilization ADLs.  Labs: Trend Hgb on POD #1   Consults: PT, Hospitalist, ID appreciate  assistance in caring for this patient.  Follow-up: Clinic with Dr. Starr, timing TBD     Disposition: Pending culture results, Abx regimen, progress with therapies, pain control on orals, and medical stability, anticipate discharge to Home vs Rehab on POD #5-6.     Fidencio Lynn MD  Orthopaedic Surgery PGY-1

## 2023-01-27 NOTE — PLAN OF CARE
VS: VSS, pt denied CP or SOB.   O2: Room air sat. > 90%.   Output: Voiding adequate amount in the bathroom.    Last BM: 01/27/23   Activity: Up independent in the room   Skin: Intact except surgical wound on R foot.    Pain: Denied pain or discomfort.    Neuro: CMS and neuro intact to baseline.    Dressing: CDI changed this morning by WOC RN.   Diet: Regular diet tolerating okay.    LDA: PICC placed today SL between antibiotic's.    Equipment: IV pole, wound vac and personal belongings.    Plan: Discharge home today.    Additional Info:           DISCHARGE SUMMARY    Pt discharging to: Home.  Transportation: Care cab transport.  AVS given and discussed: yes.  Stoplight Tool given and discussed: yes.  Medications given: yes.  Belongings returned: yes.  Comments: pt understand discharge plan.

## 2023-01-27 NOTE — PROGRESS NOTES
[place] [facility]-([facility])  NURSE LIAISON NOTE  Met with patient at bedside. She is expected to DC today, possibly IF VA is received.  Educated on [facility] role in Pt DC to home.  He states they are comfortable doing home infusion and is able to infusion independently.  DC: today SOC complete  DC Therapies:Vanco q24 . 1500  Delivery/Supplies: to pt home SAT AM , Aqua gaurds  LINES/TUBES: PICC SLV  AGENCY: [facility]  SNV:  When labs are due. Pt is an RN  NOTE: still Awaiting [facility] authorization and communication with [facility]  HP  Mock Teach, syringe-air removal by pt..  He went through all steps of HP ABX administration, flushing and proper sequence of ABX step for administration. He has good technique and understanding, and agrees to contact [facility] for any questions, clarification or further education needs.  Educated to keep dressing CDI, and to cover dressing during bathing. Encouraged to call [facility] for any questions, clarifications or problems.  Educated on Deliveries, importance of refrigerating medications. He was engaged during this RN Visit in hospital room.  SHe states they are comfortable doing Home Infusion.  They understand to expect possible phone calls from [facility].  Education provided on RN/RPH on call 24/7, phone number provided.  Educated to review  [facility] folder and contents, including Service Agreement and Consents, and educational Materials.  He verbalizes understanding of above.  DC: today SOC complete  DC Therapies: Vanco q24 . 1500  Delivery/Supplies: to pt home SAT prior to 2pm , Aqua gaurds  LINES/TUBES: PICC SLV  AGENCY: [facility]  SNV:  When labs are due. Pt is an RN  NOTE: still Awaiting VA authorization and communication with [facility]    Leanne Torres, [facility]-Nurse Liaison  [email or web address]  My Cell:  [phone]  M-F  [facility] OFFICE  24/hrs  [phone]

## 2023-01-27 NOTE — PHARMACY
[facility]  Parenteral ANtibiotic Review at Departure from Acute Care Collaborative Note     Patient: Nehemiah Magallon  MRN: [id number]  Allergies: Atorvastatin and Celebrex [celecoxib]    Current Location: [facility]-Ortho  OPAT to be provided by: [facility]       Line Type: PICC    Diagnosis/Indications: Post-op infection s/p right Charcot foot reconstruction on 11/17/2022, now s/p R foot I&D, wound vac, & antibiotic beads placement on 1/21/2023  Organism(s): Staph caprae (S: oxacillin), MRSE x2 strains  MRDO? Yes - other  Pending Cultures/Microbiological Tests: no      Inpatient ID involved in developing OPAT plan: Yes - discharge OPAT plan has no changes from ID provider, Dr. Pete Arambula, OPAT plan charted on 1/27/2023    Outpatient ID Follow-up: ID OPAT Clinic Referral Placed ([facility] [place] [facility] [place] [facility] Ph: [phone] and Fax: [phone]) - appointment scheduled  Designated Provider: Dr. Pete Winkler    Antimicrobial Regimen / Route Anticipated  Duration Start Date Stop /  Reassess Date   Vancomycin IV 1750 mg every 24 hours/IV ~2-6 weeks 1/21/2023 TBD by ID provider at outpatient ID follow-up   Rifampin 300 mg every 12 hours/PO 6 weeks 1/21/2023 3/4/2023     Laboratory Tests and Monitoring Frequency: CBC with Diff, CMP, CRP Once Weekly    Therapeutic Drug Monitoring: Vancomycin   - Drug: Vancomycin   - Goal(s): Goal -600, pharmacy may dose   - Level Type & Frequency: Obtain serum vancomycin level at least once weekly; may increase serum vancomycin level monitoring frequency with regimen changes, labile renal function, addition of nephrotoxic medications, etc.   - Level(s) last checked date: 1/25/2023    Imaging/Miscellaneous Monitoring: None    ID Pharmacist Interventions: None                          Dara Sanders, PharmD, BCIDP  Pager: [phone]

## 2023-01-27 NOTE — PLAN OF CARE
VS: Temp: (!) 96.3  F (35.7  C) Temp src: Oral BP: 107/59 Pulse: 97   Resp: 16 SpO2: 94 % O2 Device: None (Room air)      O2: 94% on room air, denies SOB and CP, no cough present. Lung sounds clear.   Output: Voids spontaneously and without difficulty   Last BM: 01/26/2023, pt c/o loose stools this shift, notified MD and received order for probiotic BID.   Activity: Up independently/SBA, needs assistance carrying wound vac   Skin: R) foot wound w/ wound vac   Pain: Denies   Neuro: A & O x4, reports baseline numbness and tingling in all extremities   Dressing: CDI   Diet: Regular   LDA: L) PIV SL in between IV abx, R) foot wound vac on continuous 125mmHg with minimal output   Equipment: Personal belongings, call light, wound vac, ACE wrap, IV pump/pole   Plan: TBD, continue with plan of care.   Additional Info:

## 2023-01-27 NOTE — PROCEDURES
[facility]    Single Lumen PICC Placement    Date/Time: 1/27/2023 1:35 PM  Performed by: Nini Cain RN  Authorized by: Dara Chaidez PA-C   Indications: vascular access      UNIVERSAL PROTOCOL   Site Marked: Yes  Prior Images Obtained and Reviewed:  Yes  Required items: Required blood products, implants, devices and special equipment available    Patient identity confirmed:  Verbally with patient, arm band and hospital-assigned identification number  NA - No sedation, light sedation, or local anesthesia  Confirmation Checklist:  Patient's identity using two indicators, relevant allergies, procedure was appropriate and matched the consent or emergent situation and correct equipment/implants were available  Time out: Immediately prior to the procedure a time out was called    Universal Protocol: the Joint Commission Universal Protocol was followed    Preparation: Patient was prepped and draped in usual sterile fashion    ESBL (mL):  2     ANESTHESIA    Anesthesia: Local infiltration  Local Anesthetic:  Lidocaine 1% without epinephrine  Anesthetic Total (mL):  2      SEDATION    Patient Sedated: No        Preparation: skin prepped with ChloraPrep  Skin prep agent: skin prep agent completely dried prior to procedure  Sterile barriers: maximum sterile barriers were used: cap, mask, sterile gown, sterile gloves, and large sterile sheet  Hand hygiene: hand hygiene performed prior to central venous catheter insertion  Type of line used: PICC  Catheter type: single lumen  Lumen type: non-valved  Catheter size: 3 Fr  Brand: Bard  Lot number: [id number]  Placement method: venipuncture, MST, ultrasound and tip navigation system  Number of attempts: 1  Difficulty threading catheter: no  Successful placement: yes  Orientation: right  Catheter to Vein (%): 18  Location: basilic vein  Tip Location: SVC/RA Junction  Arm circumference: adults 10 cm  Extremity  circumference: 34  Visible catheter length: 3  Total catheter length: 47  Dressing and securement: blood cleaned with CHG, chlorhexidine disc applied, alcohol impregnated caps, gloves changed prior to final dressing, subcutaneous anchor securement system and transparent securement dressing  Post procedure assessment: blood return through all ports, free fluid flow and placement verified by 3CG technology  PROCEDURE   Patient Tolerance:  Patient tolerated the procedure well with no immediate complicationsDescribe Procedure: 3 fr single lumen BARD power PICC inserted into R Basilic vein x 1 attempt using MST technique with US guidance.  Placement verified using 3cg technology.  Ready for use   Disposal: sharps and needle count correct at the end of procedure, needles and guidewire disposed in sharps container

## 2023-01-27 NOTE — PROGRESS NOTES
[facility]    Medicine Progress Note - Hospitalist Service, GOLD TEAM 16    Date of Admission:  1/20/2023    Assessment & Plan    Nehemiah Magallon is a 57 year old male patient with a past medical history significant for T2DM c/b charcot foot & diabetic retinopathy + neuropathy, legally blind, GERD, hepatitis C treated, HTN, HLD, papillary thyroid cancer in remission, hypothyroidism, NAFLD, MAGRARITO, chronic pain, and h/o sigmoid colectomy for diverticulitis.  S/p right foot  11/17/22 by Dr Starr.  He initially noticed infection starting 1/15 and was stared on Augmentin.  He was then seen at orthopedic surgery clinic on 1/20 from where he was referred to [facility] for treatment of ongoing infection     # R-foot charcot  # S/p R foot charcot reconstruction 11/17/22  # Surgical wound necrosis of medial & lateral sides of R-foot  # R-foot wound  ---   S/p debridement and irrigation of right foot, placement of sub-atmospheric wound therapy device on 1/21  ---   Wound swap cx 1/20 grew 1+ Staph Epidermidis and 1+ staph caprae  ---   Intra-op cx 1/21 grew 1+ Staph Epidermidis, 1+ staph epidermidis and 1+ staph caprae  ---   NGTD from blood cx x 2 collected on 1/20  ---   CRP 69 ---> 24 ---> 14 ---> 7.4 today   ---   Continue IV Zosyn and IV Vanco, started on 1/20  ---   Wound vac dressing changes q MWF     # T2DM c/b diabetic retinopathy and neuropathy.  ---   Currently under good control  ---   Hgba1c was 5.6% in 11/2022   ---   Fasting glucose was 138  ---   Patient on Jardiance, Semiglutide, Metformin prior to admit  ---   Continue metformin and jardiance.   ---   Ozempic is not on formulary, thus hold it  ---   Continue accu-checks and SSI   ---   Continue gabapentin     SHALINI  ---   Admit cr was 1.13 ---> 1.16 ---> 1.07 on 1/26/23  ---   Baseline cr 0.9  ---   Avoid nephrotoxins except for IV antibiotics     # ACD and acute postop blood loss anemia  ---   Hgb stable at 10 - 11 g       # History of papillary thyroid cancer status post  Surgery   # Post operative hypothyroidism  ---   Continue PTA Synthroid 137 mcg , TSH 1.8      # MARGARITO on CPAP  ---   Continue home CPAP     # HTN  # HLD  ---   Hold PTA lisinopril 40 mg daily, BP is well controlled w/o meds  ---   Continue PTA Pravastatin 40 mg at bedtime      # GERD  ---   Continue Protonix in place of PTA omeprazole     # Migraines  ---   Continue PTA topamax; imitrex prior to onset    # Allergic rhinitis   ---   PTA flonase and Cetrizine    # History of hepatitis C  ---   Treated     # History of nonalcoholic fatty liver disease  # History of nephrolithiasis  # Chronic pain syndrome  # Legally blind  # History of migraine headaches.   ---   Continue home meds           Diet: Regular Diet Adult    DVT Prophylaxis: per orthopedic surgery    Jones Catheter: Not present  Lines: None     Cardiac Monitoring: None  Code Status: Full Code full   Disposition Plan   TBD          Shannon Petit MD  Hospitalist Service, GOLD TEAM [address] [place] [address]  Securely message with Vocera (more info)  Text page via [employer] Paging/Directory   See signed in provider for up to date coverage information  ______________________________________________________________________    Interval History    No new complaints  Uneventful night.  Wound vac dressing changed today    Physical Exam   Vital Signs: Temp: (!) 96.5  F (35.8  C) Temp src: Oral BP: 125/69 Pulse: 96   Resp: 16 SpO2: 94 % O2 Device: None (Room air)    Weight: 210 lbs 0 oz  General: aao x 3, NAD.  HEENT:  NC/AT, neck supple  CVS:  NL s 1 and s2, no m/r/g.  Lungs:  CTA B/L.   Abd:  Soft, + bs, NT  Ext:  Right foot wound vac dressing c/d/i  Lymph:  No edema.  Neuro:  Nonfocal.  Musculoskeletal: No calf tenderness to palpation.    Skin:  No rash.  Psychiatry:  Mood and affect appropriate.      Data     I have personally reviewed the following data over the past 24  hrs:    N/A  \   N/A   / N/A     N/A N/A N/A /  138 (H)   N/A N/A N/A \       Procal: N/A CRP: N/A Lactic Acid: N/A         Imaging results reviewed over the past 24 hrs:   No results found for this or any previous visit (from the past 24 hour(s)).  Recent Labs   Lab 01/27/23  0812 01/26/23  2218 01/26/23  1725 01/26/23  1209 01/26/23  0953 01/25/23  1105 01/25/23  0936 01/24/23  0838 01/24/23  0816 01/23/23  0850 01/23/23  0546 01/22/23  1156 01/22/23  0839 01/21/23  1722 01/21/23  1559 01/20/23  2154 01/20/23  1914   WBC  --   --   --   --  10.6  --   --   --   --   --  8.4  --  6.4  --  7.4  --   --    HGB  --   --   --   --  10.6*  --   --   --   --   --  11.1*  --  10.3*  --  10.5*  --   --    MCV  --   --   --   --  85  --   --   --   --   --  85  --  84  --  85  --   --    PLT  --   --   --   --  307  --   --   --   --   --  281  --  246  --  216  --   --    INR  --   --   --   --   --   --   --   --   --   --   --   --   --   --   --   --  1.06   NA  --   --   --   --  139  --   --   --   --   --  142  --  142  --  143  --   --    POTASSIUM  --   --   --   --  4.2  --   --   --   --   --  4.6  --  4.7  --  4.1  --  3.9   CHLORIDE  --   --   --   --  110*  --   --   --   --   --  113*  --  111*  --  110*  --   --    CO2  --   --   --   --  20  --   --   --   --   --  23  --  24  --  25  --   --    BUN  --   --   --   --  16  --   --   --   --   --  18  --  19  --  24  --   --    CR  --   --   --   --  1.07  --  1.11  --  1.16  --  1.13  --  1.13  --  1.21   < > 1.28*   ANIONGAP  --   --   --   --  9  --   --   --   --   --  6  --  7  --  8  --   --    ALICE  --   --   --   --  9.0  --   --   --   --   --  9.3  --  8.4*  --  9.0  --   --    * 144* 133*   < > 220*   < >  --    < >  --    < > 157*   < > 238*   < > 166*   < >  --    ALBUMIN  --   --   --   --   --   --   --   --   --   --   --   --   --   --  2.9*  --   --    PROTTOTAL  --   --   --   --   --   --   --   --   --   --   --   --   --   --  7.0   --   --    BILITOTAL  --   --   --   --   --   --   --   --   --   --   --   --   --   --  0.3  --   --    ALKPHOS  --   --   --   --   --   --   --   --   --   --   --   --   --   --  55  --   --    ALT  --   --   --   --   --   --   --   --   --   --   --   --   --   --  10  --   --    AST  --   --   --   --   --   --   --   --   --   --   --   --   --   --  7  --   --     < > = values in this interval not displayed.

## 2023-01-27 NOTE — PROGRESS NOTES
GENERAL INFECTIOUS DISEASES PROGRESS NOTE - [facility]     Patient:  Nehemiah Magallon   Date of birth 1965, Medical record number [id number]  Date of Visit:  01/27/2023  Date of Admission: 1/20/2023  Consult Requested by:Bryon Starr MD  Reason for Consult:  s/p I&D right foot wound, intraop cultures taken         Assessment and Recommendations:     Nehemiah Magallon is a 57 year old male, a retired RN, [place] [facility] Diabetes mellitus (HbA1c 5.6 in Jan 2023 ) with retinopathy, legal blindness, peripheral neuropathy, papillary thyroid malignancy in remission, hypothyroidism, GERD, MARGARITO, Hep C s/p treatment (2017), HTN, NAFLD, colectomy for diverticulitis (2014), bilateral hearing loss and using bilateral hearing aids, chronic pain, s/p right Charcot foot reconstruction on 11/17/2022. Periop he recieved Cefazolin. Per Nehemiah, he was then discharged on Augmentin x 7 days.  He was using a cast for 2.5-3 weeks post surgery. He then noticed a scab but no drainage (reviewed photo on his phone). Then the wound was covered with ace wrap and he thought the initial cast then the ace wrap could have caused the wound. He had kept the surgical site dry, and would air it (without dressing) when he sat down at home but applied would dressing when he went to bed.  Last Adriano 1/15/23, he noticed purulent drainage and deepening of the wound. He was prescribed with Augmentin 875 mg po q12 hr x 14 days on 1/16/23.  He went to see Dr Starr on 1/20/23 and directly admitted on 1/20/2023 from Ortho clinic for increasing drainage and wound necrosis of right foot surgical sites.     1. S/p right Charcot foot reconstruction on 11/17/2022 complicated by post op infection   - 1/20/23 - started on Pip/Tazobactam and Vancomycin IV   - 1/21/23 -  s/p I+D of right foot, placement of wound vac and antibiotic beads . Intra-op findings: Medial wound: purulent material, probed directly to plate and bone; Lateral wound: no purulence, probed to bone, no  "hardware present laterally. Intra-Op cultures are growing Staph epidermidisx2 ( oxacillin Resistant)  and Staph caprae.     2.  Diabetes mellitus (HbA1c 5.6 in Jan 2023) with peripheral neuropathy   - HbA1c around 5.6 in the past 4 years per patient    3. CRP was 69 and ESR was 59 with WBC 7.8 on 1/20/23 --> CRP 14 ( 1/23/23) --> 7.4 (1/26/23)     RECOMMENDATION:  - continue  Vancomycin IV, stop Pip/Tazobactam    - PICC ordered by primary team   - add Rifampin 300 mg po q12 hr for synergy and biofilm activity ( hardware in place)  . discussed potential side effects of Rifampin  - weekly labs: CMP, CBC diff CRP , Vancomycin trough (-600)  - video f/u w me on 2/1/23 at 11 am     ID will sign off now    Pete Arambula MD, M.Med.Sc  Staff, Infectious Diseases   Pager : [phone]    Prolonged Parenteral/Oral Antibiotic Recommendations and ID Follow up  This template provides final ID recommendations as of this date. If there are clinical changes or questions please call the ID team.     Infectious Diseases Indication: S/p right Charcot foot reconstruction on 11/17/2022 complicated by post op infection     Antibiotic Information  Name of Antibiotic Dose of Antibiotic1 Pharmacy to assist with dosing Y/N Anticipated duration Effective start date2 End date   Vancomycin IV   1750 mg IV every 24 hrs ( infusion over 2 hrs) (-600)   2-6 weeks 1/21/23  TBD   Rifampin  300 mg PO every 12hr  6 weeks  1/21/23 3/4/23            1.Dose of antibiotic will need to be renally adjusted if creatinine clearance changes  2.Effective start date is the date of therapy with appropriate spectrum    Method of antibiotic delivery:PICC line. At the end of therapy should the line be removed? No. Selecting \"yes\" will function as written order to remove PICC line at the end of therapy.    Tentative plans for disposition: Home    Weekly labs required: CBC with diff, CMP, CRP and Vancomycin level. Dr. Arambula will " follow labs at discharge until ID follow up. Please have labs faxed to ID clinic.    Appointment : video follow-up with Dr Arambula on 2/1/23 at 11 am     ID provider to route this note to the appropriate Epic pools: [facility] INFECTIOUS DISEASE ADULT [facility], PANDA, [facility] HOME INFUSION    [facility]/ID Clinic Information:  [address]  [place]  [address]  Phone: [phone]  Fax: [phone] (Attention Brayden Matthews)    Interval History  Feels good. denies pain. tolerates antibiotics , no diarrhea,  no abdominal pain, nausea/ vomiting. has good appetite. discussed labs         History of Present Illness:     Nehemiah Magallon is a 57 year old male w PMH Diabetes mellitus with retinopathy, legal blindness, peripheral neuropathy, papillary thyroid malignancy in remission, hypothyroidism, GERD, MARGARITO, Hep C s/p treatment (2017), HTN, NAFLD, colectomy for diverticulitis (2014), bilateral hearing loss and using bilateral hearing aids, chronic pain, s/p right Charcot foot reconstruction on 11/17/2022. Periop he recieved Cefazolin. Per Nehemiah, he was then discharged on Augmentin x 7 days.  He was using a cast for 2.5-3 weeks post surgery. He then noticed a scab but no drainage (reviewed photo on his phone). Then the wound was covered with ace wrap and he thought the initial cast then the ace wrap could have caused the wound. He had kept the surgical site dry, and would air it (without dressing) when he sat down at home but applied would dressing when he went to bed.  Last Adriano 1/15/23, he noticed purulent drainage and deepening of the wound. He was prescribed with Augmentin 875 mg po q12 hr x 14 days on 1/16/23.  He went to see Dr Starr on 1/20/23 and directly admitted on 1/20/2023 from Ortho clinic for increasing drainage and wound necrosis of right foot surgical sites. He denied any fever, chills, sweats or feeling ill at home. He has no sensation in his feet.  CRP was 69 and ESR was 59 with WBC  7.8 on 1/20/23. Blood cultures x2 on 1/20/23 are pending/ negative to date. He starts to have nightsweats after admission. Otherwise, he is feeling fine. No pain. has good appetite.     He underwent I+D of right foot, placement of wound vac and antibiotic beads on 1/21/23. Intra-op findings: Medial wound: purulent material, probed directly to plate and bone; Lateral wound: no purulence, probed to bone, no hardware present laterally. Intra-Op cultures are pending, with gram stain showing 1+ gram positive cocci and 4+ WBCs. He has been on Pip/Tazobactam since admission on 1/20/23.      Antimicrobials  Pip/Tazo 1/20 - present  Vancomycin IV 1/20 - present      Xray of Right foot 1/20/23  Impression:  1. Postsurgical changes of medial column with new fracturing of the third most proximal plate fixation screw.  2. Changes of Charcot arthropathy.  3. Medial sided soft tissue swelling, increased compared to 12/30/2022.          Review of Systems:     CONSTITUTIONAL:  No fevers or chills  EYES: negative for icterus  ENT:  negative for hearing loss, tinnitus and sore throat  RESPIRATORY:  negative for cough with sputum and dyspnea  CARDIOVASCULAR:  negative for chest pain, dyspnea  GASTROINTESTINAL:  negative for nausea, vomiting, diarrhea and constipation  GENITOURINARY:  negative for dysuria  HEME:  No easy bruising  INTEGUMENT: see HPI  NEURO:  see HPI          Past Medical History:     Past Medical History:   Diagnosis Date     Chronic pain syndrome 10/24/2014     Diabetes mellitus, type 2 (H)      Diabetic Charcot foot (H)      Diabetic retinopathy associated with diabetes mellitus due to underlying condition (H)      Diverticulitis of colon      Gastroesophageal reflux disease      Hepatitis C 2017    treated     History of skin cancer      Hypertension      Hypothyroidism      Legally blind      Midfoot collapse of right lower extremity      Migraine      NAFLD (nonalcoholic fatty liver disease)      Nephrolithiasis       Neuropathy      MARGARITO (obstructive sleep apnea)      Rib pain 10/24/2014     S/P colectomy 10/14/2014     Thyroid cancer (H) 10/14/2014          Past Surgical History:     Past Surgical History:   Procedure Laterality Date     ARTHRODESIS FOOT Right 11/17/2022    Procedure: Right midfoot/talonavicular osteotomy and reduction of deformity Right midfoot/talonavicular arthrodesis, achilles lengthening;  Surgeon: Bryon Starr MD;  Location: [facility] OR     CHOLECYSTECTOMY  1986     COLECTOMY      @ 6 years ago, sigmoid     COLONOSCOPY  2016     FOOT SURGERY Left 2021     IRRIGATION AND DEBRIDEMENT FOOT, COMBINED Right 1/21/2023    Procedure: IRRIGATION AND DEBRIDEMENT, FOOT, RIGHT, Placement of Wound Vac and Antibiotic Beads.;  Surgeon: Bryon Starr MD;  Location: [facility] OR     LENGTHEN TENDON ACHILLES Right 11/17/2022    Procedure: LENGTHENING, TENDON, ACHILLES;  Surgeon: Bryon Starr MD;  Location: [facility] OR          Family History:     Family History   Problem Relation Age of Onset     Diabetes Mother      Cancer Mother      Diabetes Father      Heart Disease Father      Anesthesia Reaction No family hx of      Clotting Disorder No family hx of      Glaucoma No family hx of      Macular Degeneration No family hx of           Social History:     Social History     Tobacco Use     Smoking status: Never     Smokeless tobacco: Never   Substance Use Topics     Alcohol use: Not Currently     Comment: rarely     History   Sexual Activity     Sexual activity: Not Currently     Partners: Female     lives at home with wife a a dog , follows at [facility] but may seek medical care at other health facilities per patient          Current Medications (antimicrobials listed in bold):       aspirin  81 mg Oral BID     cetirizine  10 mg Oral Daily     empagliflozin  25 mg Oral Daily     fluticasone  2 spray Both Nostrils Daily     gabapentin  1,200 mg Oral TID     insulin aspart  1-7 Units Subcutaneous TID AC     insulin aspart  1-5  Units Subcutaneous At Bedtime     lactobacillus rhamnosus (GG)  1 capsule Oral BID     levothyroxine  137 mcg Oral QAM AC     metFORMIN  1,000 mg Oral BID w/meals     pantoprazole  40 mg Oral Daily     polyethylene glycol  17 g Oral Daily     pravastatin  40 mg Oral QPM     rifampin  300 mg Oral Q12H [facility] (08/20)     senna-docusate  1 tablet Oral BID     sodium chloride (PF)  3 mL Intracatheter Q8H     topiramate  150 mg Oral BID     vancomycin  1,750 mg Intravenous Q24H     Vitamin D3  50 mcg Oral Daily          Allergies:     Allergies   Allergen Reactions     Atorvastatin      Other reaction(s): Hyperglycemia     Celebrex [Celecoxib] Other (See Comments)     [facility] records          Physical Exam:   Vitals were reviewed  Patient Vitals for the past 24 hrs:   BP Temp Temp src Pulse Resp SpO2   01/27/23 0810 125/69 (!) 96.5  F (35.8  C) Oral 96 16 94 %   01/26/23 2220 (!) 141/71 (!) 96.6  F (35.9  C) Oral 96 16 94 %   01/26/23 1500 107/59 (!) 96.3  F (35.7  C) Oral 97 16 94 %     Ranges for his vital signs:  Temp:  [96.3  F (35.7  C)-96.6  F (35.9  C)] 96.5  F (35.8  C)  Pulse:  [96-97] 96  Resp:  [16] 16  BP: (107-141)/(59-71) 125/69  SpO2:  [94 %] 94 %  Physical Examination:  GENERAL:  well-developed, well-nourished, alert, oriented, in bed in no acute distress.   HEENT:  Head is normocephalic, atraumatic   EYES:  Eyes have anicteric sclerae without conjunctival injection   NECK:  Supple.   LUNGS: breathing comfortably on room air    SKIN:  No acute rashes.   Extremities : no edema .  right foot wrapped, with wound vac. ( photos reviewed - wound looks )   NEUROLOGIC:  Grossly nonfocal. peripheral neuropathy,          Laboratory Data:     Inflammatory Markers    Recent Labs   Lab Test 01/26/23  0953 01/23/23  0546 01/22/23  0839 01/20/23  1211   SED  --   --   --  59*   CRP 7.4 14.0* 24.0* 69.0*     Hematology Studies    Recent Labs   Lab Test 01/26/23  0953 01/23/23  0546 01/22/23  0839  01/21/23  1559 01/20/23  1211 11/19/22  0944 11/18/22  1541 11/17/22  1022   WBC 10.6 8.4 6.4 7.4 7.8  --   --  7.7   HGB 10.6* 11.1* 10.3* 10.5* 11.1* 10.9*   < > 13.8   MCV 85 85 84 85 84  --   --  86    281 246 216 232  --   --  161    < > = values in this interval not displayed.     Metabolic Studies     Recent Labs   Lab Test 01/26/23  0953 01/25/23  0936 01/24/23  0816 01/23/23  0546 01/22/23  0839 01/21/23  1559 01/21/23  0550 01/20/23  1914 01/20/23  1211     --   --  142 142 143  --   --  141   POTASSIUM 4.2  --   --  4.6 4.7 4.1  --  3.9 4.2   CHLORIDE 110*  --   --  113* 111* 110*  --   --  113*   CO2 20  --   --  23 24 25  --   --  23   BUN 16  --   --  18 19 24  --   --  33*   CR 1.07 1.11 1.16 1.13 1.13 1.21   < > 1.28* 1.24   GFRESTIMATED 80 77 73 75 75 69   < > 65 67    < > = values in this interval not displayed.     Hepatic Studies    Recent Labs   Lab Test 01/21/23  1559   BILITOTAL 0.3   ALKPHOS 55   ALBUMIN 2.9*   AST 7   ALT 10     Thyroid Studies    Recent Labs   Lab Test 01/21/23  1559   TSH 1.80     Vancomycin Levels    Recent Labs   Lab Test 01/25/23  0936 01/22/23  0839   VANCOMYCIN 16.0 18.9

## 2023-01-27 NOTE — PLAN OF CARE
Pt is A&Ox4. VSS. LS clear, on RA. BS active, LBM on 1/26/2023. Voiding well. Pt denies pain. R foot surgical dressing is c/d/I. Wound vac at 125. Pt up independently. L PIV is patent and SL. Continue to monitor.

## 2023-01-27 NOTE — PROGRESS NOTES
Care Management Follow Up    Length of Stay (days): 7    Expected Discharge Date: 01/31/2023     Concerns to be Addressed:       Patient plan of care discussed at interdisciplinary rounds: Yes    Anticipated Discharge Disposition: Home Care, Home Infusion     Anticipated Discharge Services:    [facility]  [facility]  Anticipated Discharge DME: Wound Vac    Education Provided on the Discharge Plan:  yes  Patient/Family in Agreement with the Plan:     yes  Referrals Placed by CM/SW: Homecare, Home Infusion  Private pay costs discussed: Not applicable    Additional Information:  Met with patient at bedside to discuss discharge planning. Patient is planning on discharging to home with I for home infusion services including, skilled nursing for would vac dressing changes M/W/F, lab draws weekly and PICC line dressing changes.    Patient asked this writer to contact his wife, Wiliam and update her regarding his lengthy hospital stay. This writer contacted Wiliam, and answered her questions and concerns regarding the lengthy hospital stay. She is anxious to have her  home but understands it is important to make sure he is on the correct antibiotic for his infection and that takes time for cultures to grow and sensitivities to be done. Wiliam was offered RNCC's contact information but declined at this time.     1130: Dr. Cook has cleared the patient for discharge from an ID stand point. RNCC will contact Ortho and Medicine to try and coordinate discharge today. Patient will need PICC, discharge orders, wound vac and transport home.     [facility] and DME equipment for patient except IV antibiotics. Finalized ID plan will need to be faxed to [facility] the [facility] once it is completed for authorization. This writer has spoken with Danielle Camp RNCC, patient's current RNCC for all other VA needs. Danielle has reached out to Saniya and is expecting an antibiotic authorization request from this  writer.    1323: EMMANUELLE Kothari with the [facility] has authorized IV antibiotic's for I administration. I Liaison, Leanne has seen patient for teaching. Home IV antibiotics to be delivered to patient's home.     VA authorization for wound vac: [id number]. This number emailed to Jarek Singh with  to have wound vac released. Wound vac delivered to patient room. POD signed and faxed to . [facility] to do M/W/F wound vac dressing changes.    Transport set up with [facility], phone [phone] at 1930. All patient questions answered. No further discharge concerns at this time. RNCC available as needed    EMMANUELLE [facility] Home IV Therapy Request, Phone [phone], email [email or web address]    [facility]   Phone [phone]      Veronica Park RN, BSN  Care Coordinator, 5 Ortho  Phone [phone]  Pager [phone]

## 2023-01-27 NOTE — CONSULTS
3 fr single lumen BARD power PICC inserted into R Basilic vein x 1 attempt using MST technique with US guidance.  Placement verified using 3cg technology.  Ready for use

## 2023-01-27 NOTE — PROGRESS NOTES
[facility] Nurse Inpatient Assessment     Consulted for: Right foot NPWT    Patient History (according to provider note(s):      Per Dr Bryon Starr on 1/22/2023: 58yo male patient with multiple comorbidities including T2DM with recent debridement of a R foot Charcot reconstruction surgical site eschar/infection yesterday. Clinically stable without overt signs for sepsis. Has a skin defect medially managed with a VAC and a smaller defect laterally managed with dressings. Neither wound was able to be closed primarily, nor would it have been prudent to even if possible. There are resorbable, non-counted, vanco CaSO4 pellets in each wound. Infectious disease consultation is greatly appreciated.    Areas Assessed:      Areas visualized during today's visit: Right foot    Negative pressure wound therapy applied to: Right medial foot      1/25 1/27     Last photo: 1/25/2023, 1/27/2023  Wound due to: Surgical Wound   Wound history/plan of care:      Surgical date: 1/21/2023    Date Negative Pressure Wound Therapy initiated: 1/21/2023     Interventions in place: elevation    Is patient s nutritional status compromised? no   a. If yes, what interventions are in place? N/A    Reason for initiating vac therapy? Presence of co-morbidities, Need for accelerated granulation tissue and Prior history of delayed wound healing    Which?of?the?following?co-morbidities?apply? Diabetes  a. If diabetic is patient on a diabetic management program? Yes     Is osteomyelitis present in wound? yes  a.  If yes what treatments are in place? IV antibiotics per ID    Wound base: 75 % slough, 25 % bone , tendon     Palpation of the wound bed: normal       Drainage: scant      Description of drainage: serosanguinous      Measurements (length x width x depth, in cm) 3  x 2.5  x 1.8 cm       Tunneling N/A      Undermining N/A    Periwound skin: Intact       Color: pink       Temperature: normal    Odor: none   Pain: absent   Pain intervention prior to dressing change: patient tolerated well  Treatment goal: Heal   STATUS: healing   Supplies ordered: supplies stored on unit    Number of foam pieces removed from a wound (excluding foam for bridge) : 1 Orin Black   Verified this matched the number of foam pieces applied last dressing change: Yes   Number of foam pieces packed into wound (excluding foam for bridge) : 1 GranuFoam Black       Negative pressure wound therapy applied to: Right lateral foot      1/23 1/25  Last photo: 1/23/2023, 1/25/2023   Wound due to: Surgical Wound   Wound history/plan of care:      Surgical date: 1/21/2023     Date Negative Pressure Wound Therapy initiated: 1/27/2023     Interventions in place: elevation    Is patient s nutritional status compromised? no   a. If yes, what interventions are in place? N/A    Reason for initiating vac therapy? Presence of co-morbidities    Which?of?the?following?co-morbidities?apply? Diabetes  a. If diabetic is patient on a diabetic management program? Yes     Is osteomyelitis present in wound? yes  a.  If yes what treatments are in place? IV antibiotic per ID    Wound base: Currently with residual antibiotic beads. Anticipate this will pull off with NPWT and reveal bone     Palpation of the wound bed: normal       Drainage: scant      Description of drainage: serous      Measurements (length x width x depth, in cm) 5  x 1  x  0.4 cm    Periwound skin: Intact       Color: pink       Temperature: normal    Odor: none   Pain: absent   Pain intervention prior to dressing change: patient tolerated well  Treatment goal: Heal   STATUS: stable   Supplies ordered: supplies stored on unit    Number of foam pieces removed from a wound (excluding foam for bridge) : Packing strip removed from wound bed   Verified this  "matched the number of foam pieces applied last dressing change: Yes   Number of foam pieces packed into wound (excluding foam for bridge) : 1 GranuFoam Black       Treatment Plan:     Negative pressure wound therapy plan:  Wound location: Right medial and lateral foot   Change Days: Mon/Wed/Fri by WOC RN    Supplies (including all accessories) used: medium Black foam   Cleanse with MicroKlenz prior to replacing VAC    Suction setting: -125   Methods used: Window paned all periwound skin with vac drape prior to applying sponge and Bridged trac pad off bony prominences    Staff RN to assess integrity of dressing and ensure suction is set at appropriate level every shift.   Date canister. Chart canister output every shift. Change cannister weekly and PRN if full/occluded     Remove foam dressing and replace with BID normal saline moist gauze dressing if:   -a dressing failure which cannot be repaired within 2 hours   -patient is discharging to home without a home pump   -patient is discharging to a facility outside the local area   -if a dressing is a \"Silver Foam\", remove before Radiation Therapy or MRI     The hospital VAC pump is not to be discharged with the patient.?Ensure to disconnect patient from machine prior to discharge. Then,    - If a home KCI VAC pump has been delivered, connect home cannister to dressing tubing then connect cannister to home pump and turn on machine    - If transferring to a nearby facility with a KCI vac, can disconnect and clamp tubing then cover end with glove so can be reconnected within 2 hours      Orders: Written    RECOMMEND PRIMARY TEAM ORDER: None, at this time  Education provided: plan of care  Discussed plan of care with: Patient and Physician  WO nurse follow-up plan: Monday/WednesdayFriday  Notify WOC if wound(s) deteriorate.  Nursing to notify the Provider(s) and re-consult the WOC Nurse if new skin concern.    DATA:     Current support surface: Standard  Standard " gel/foam mattress (IsoFlex, Atmos air, etc)  Containment of urine/stool: Continent of bladder and Continent of bowel  BMI: Body mass index is 30.13 kg/m .   Active diet order: Orders Placed This Encounter      Regular Diet Adult     Output: No intake/output data recorded.     Labs:   Recent Labs   Lab 01/26/23  0953 01/22/23  0839 01/21/23  1559 01/20/23  1914   ALBUMIN  --   --  2.9*  --    HGB 10.6*   < > 10.5*  --    INR  --   --   --  1.06   WBC 10.6   < > 7.4  --    CRP 7.4   < >  --   --     < > = values in this interval not displayed.     Pressure injury risk assessment:   Sensory Perception: 3-->slightly limited  Moisture: 4-->rarely moist  Activity: 3-->walks occasionally  Mobility: 3-->slightly limited  Nutrition: 3-->adequate  Friction and Shear: 3-->no apparent problem  Maycol Score: 19    Shivani Mireles RN CWOCN  Dept. Pager: [phone]  Dept. Office Number: [phone]

## 2023-01-31 NOTE — PROGRESS NOTES
"Clinic Care Coordination Contact  [facility]: Post-Discharge Note  SITUATION                                                      Admission:    Admission Date: 01/20/23   Reason for Admission: Charcot's joint of right foot  Wound infection  Discharge:   Discharge Date: 01/27/23  Discharge Diagnosis: Charcot's joint of right foot  Wound infection    BACKGROUND                                                      Per hospital discharge summary and inpatient provider notes:    History of Present Illness     Nehemiah Magallon is a 57 year old male patient with a past medical history significant for T2DM c/b charcot foot & diabetic retinopathy + neuropathy, legally blind, GERD, hepatitis C treated, HTN, HLD, papillary thyroid cancer in remission, hypothyroidism, NAFLD, MARGARITO, chronic pain, and h/o sigmoid colectomy for diverticulitis who is presenting from Ortho clinic for R-foot surgery with Dr. Starr tomorrow.      Per patient he has been having increasing drainage from his R-foor for the past week. No fever, chills, sob. He denies any R-foot pain which he attributes to his neuropathy.     ASSESSMENT           Discharge Assessment  How are you doing now that you are home?: \"I'm fine so far, no difficulties; already changed wound vac cannister, only given 4 cannisters. Unsure when wound vac nurse is coming, but will follow up with them on more cannisters if needed.  I have all the IV medications, received delivery from pharmacy yesterday, and infused first dose of Vanco; PICC working well.  No pain, no pus coming out of foot.\"  How are your symptoms? (Red Flag symptoms escalate to triage hotline per guidelines): Improved  Do you feel your condition is stable enough to be safe at home until your provider visit?: Yes  Does the patient have their discharge instructions? : Yes  Does the patient have questions regarding their discharge instructions? : No  Were you started on any new medications or were there changes to any " "of your previous medications? : Yes  Does the patient have all of their medications?: No (see comment) (Only received 20 capsules of Rifampin, which will not be enough for the 36 day rx.  Pt will call  Pharmacy to inquire on remaining 52 capsules from rx, and will follow up with provider if needed)  Do you have questions regarding any of your medications? : Yes (see comment) (Rifampin - did not receive full 72 capsule rx on d/c, only got 20 capsules.  Pt wyatt contact  Pharmacy to see about remaining 52 capsules of rx, and follow up with provider if needed.)  Do you have all of your needed medical supplies or equipment (DME)?  (i.e. oxygen tank, CPAP, cane, etc.): No - What equipment or supplies are needed? (States he has all the medical equipment needed except concerned he'll run out of wound vac cannisters, only received 4 cannisters and already changed 1 cannister as it was full after 2 days. Waiting to hear from [facility] RN for home visit date.)  Discharge follow-up appointment scheduled within 14 calendar days? : Yes  Discharge Follow Up Appointment Date: 02/01/23  Discharge Follow Up Appointment Scheduled with?: Specialty Care Provider (ID 2/1/23, Ortho 2/10/23)         Post-op (Clinicians Only)  Did the patient have surgery or a procedure: Yes (s/p IRRIGATION AND DEBRIDEMENT, FOOT, RIGHT, Placement of Wound Vac and Antibiotic Beads on 1/21/23)  Incision: open (wound vac in place)  Drainage: Yes  Drainage Color: other (\"brownish, tannish; nasty looking.\")  Incision Drainage Amount: moderate (\"filled a wound vac cannister in 2 days.\")  Fever: No  Chills: No (\"some sweats at night, same as in the hospital.\")  Incision site pain: No  Eating & Drinking: eating and drinking without complaints/concerns  PO Intake: regular diet  Bowel Function: loose stools (1-2 loose stools/day since starting Rifampin)  Date of last BM: 01/29/23  Urinary Status: voiding without complaint/concerns      PLAN                               "                        Outpatient Plan:      Follow-up with your Surgeon Team in 10-14 days for wound check. Call for appt.  Home Care Referral  Home Infusion Referral    Future Appointments   Date Time Provider Department Center   2/1/2023 11:00 AM Pete Arambula MD [facility]   2/10/2023  9:20 AM Bryon Starr MD [facility]         For any urgent concerns, please contact our 24 hour nurse triage line: [phone] ([phone])         Celina Ahmadi RN

## 2023-01-31 NOTE — DISCHARGE SUMMARY
ORTHOPAEDIC SURGERY DISCHARGE SUMMARY     Date of Admission: 1/20/2023  Date of Discharge: 1/27/2023  6:55 PM  Disposition: Home  Staff Physician: No att. providers found  Primary Care Provider: Ochoa Echevarria    DISCHARGE DIAGNOSIS:  Wound infection [T14.8XXA, L08.9]    PROCEDURES: Procedure(s):  IRRIGATION AND DEBRIDEMENT, FOOT, RIGHT, Placement of Wound Vac and Antibiotic Beads on 1/20/2023 - 1/21/2023    BRIEF HISTORY:  This is a 57 year old patient who has been followed in clinic. Please refer to that documentation for full details. Briefly, the patient has a history of right Charcot foot s/p reconstruction c/b surgical site infection. The patient has failed conservative treatment of symptoms and desires more definitive intervention. Therefore, after reviewing non-operative and operative options including the risks and benefits associated with each, the patient elected to proceed with the above stated procedure.     HOSPITAL COURSE:    The patient was admitted following the above listed procedures for pain control and rehabilitation. Nehemiah Magallon did well post-operatively. Medicine was consulted post operatively to aid in management of medical co-morbidities. The patient received routine nursing cares and at the time of discharge was medically stable. Vital signs were stable throughout admission. The patient is tolerating a regular diet and is voiding spontaneously. All PT/OT goals have been met for safe mobility. Pain is now controlled on oral medications which will be available on discharge. Stool softeners have been used while taking pain medications to help prevent constipation. Nehemiah Magallon is deemed medically safe to discharge.     Antibiotics:  Vanc/Zosyn per ID.  DVT prophylaxis:  Mechanical  PT Progress:  Has met PT/OT goals for safe mobility.    Pain Meds:  Weaned off all IV pain meds by discharge.  Inpatient Events: No significant events or complications.     PHYSICAL EXAM:    Gen: No acute  "distress, resting comfortably in bed.  Resp: Non-labored breathing  Cardio: Regular rate via peripheral pulse  MSK:  LE:  - Dressings c/d/I, wound VAC holding suction  - Fires Quad, TA, GSC, EHL, FHL  - Baseline sensation in femoral/tibial/sural/saphenous/DP/SP nerves  - PT/DP pulses unable to assess due to wound VAC, but toes WWP    FOLLOWUP:    Follow up with Dr. Starr at 2 weeks postoperatively.    Future Appointments   Date Time Provider Department Center   2/1/2023 11:00 AM [facility]   2/10/2023  9:20 AM Bryon Starr MD [facility]       Orthopaedic Surgery appointments are at the [facility] ([address] [place] [address]). Call [phone] to schedule a follow-up appointment at this location with your provider.     PLANNED DISCHARGE ORDERS:     DVT Prophylaxis: Mechanical     Activity: NWB RLE     Wound Care: see Below     Review of your medicines      UNREVIEWED medicines. Ask your doctor about these medicines      Dose / Directions   * Kerlix Sponges 4\"X4\" Pads  Used for: Necrosis of surgical wound (H)  Ask about: Should I take this medication?      Dose: 1 Units  1 Units daily for 45 days  Quantity: 45 each  Refills: 1         * This list has 1 medication(s) that are the same as other medications prescribed for you. Read the directions carefully, and ask your doctor or other care provider to review them with you.            START taking      Dose / Directions   aspirin 81 MG EC tablet  Commonly known as: ASA  Indication: VTE Prophylaxis  Used for: Wound infection      Dose: 81 mg  Take 1 tablet (81 mg) by mouth 2 times daily  Quantity: 60 tablet  Refills: 0     gabapentin 400 MG capsule  Commonly known as: NEURONTIN  Used for: Wound infection  Replaces: gabapentin 600 MG tablet      Dose: 1,200 mg  Take 3 capsules (1,200 mg) by mouth 3 times daily  Quantity: 90 capsule  Refills: 0     methocarbamol 750 MG tablet  Commonly known as: " GAP TEAM PALLIATIVE CARE UNIT PROGRESS NOTE:      [  ] Patient on hospice program.    INDICATION FOR PALLIATIVE CARE UNIT SERVICES/Interval HPI: Symptom management in the setting of a 47y/o F with PMH gastric cancer presenting with SOB and recurrent pleural effusion    OVERNIGHT EVENTS: Chart reviewed. The patient is seen and examined at the bedside.    DNR on chart: Yes  Yes      Allergies    Cipro (Rash)  QUINOLONES (Unknown)    Intolerances    MEDICATIONS  (STANDING):  artificial tears (preservative free) Ophthalmic Solution 1 Drop(s) Both EYES four times a day  Biotene Dry Mouth Oral Rinse 15 milliLiter(s) Swish and Spit three times a day  HYDROmorphone Infusion 1 mG/Hr (1 mL/Hr) IV Continuous <Continuous>  sodium chloride 0.9%. 1000 milliLiter(s) (10 mL/Hr) IV Continuous <Continuous>    MEDICATIONS  (PRN):  HYDROmorphone  Injectable 1.5 milliGRAM(s) IV Push every 1 hour PRN Moderate Pain (4 - 6)  HYDROmorphone  Injectable 2 milliGRAM(s) IV Push every 1 hour PRN Severe Pain (7 - 10)  HYDROmorphone  Injectable 2 milliGRAM(s) IV Push every 1 hour PRN dyspnea  LORazepam   Injectable 0.25 milliGRAM(s) IV Push every 1 hour PRN anxiety and agitation    ITEMS UNCHECKED ARE NOT PRESENT    PRESENT SYMPTOMS: [ ]Unable to self-report  [ ]PAINADs [ ]RDOS  Source if other than patient:  [ ]Family   [ ]Team     Pain: [ ] yes [ ] no  QOL impact -   Location -                    Aggravating factors -  Quality -  Radiation -  Timing-  Severity (0-10 scale):  Minimal acceptable level (0-10 scale):     PAINAD Score:  http://geriatrictoolkit.[place].[facility]/cog/painad.pdf (Ctrl +  left click to view)    Dyspnea:                           [ ]Mild [ ]Moderate [ ]Severe    RDOS:  0 to 2  minimal or no respiratory distress   3  mild distress  4 to 6 moderate distress  >7 severe distress  https://[email or web address].net/handouts/hen/Respiratory_Distress_Observation_Scale.pdf (Ctrl +  left click to view)     Anxiety:                             [ ]Mild [ ]Moderate [ ]Severe  Fatigue:                             [ ]Mild [ ]Moderate [ ]Severe  Nausea:                             [ ]Mild [ ]Moderate [ ]Severe  Loss of appetite:              [ ]Mild [ ]Moderate [ ]Severe  Constipation:                    [ ]Mild [ ]Moderate [ ]Severe  		  Other Symptoms:  [ ]All other review of systems negative     Palliative Performance Status Version 2:         %         http://npcrc.org/files/news/palliative_performance_scale_ppsv2.pdf  PHYSICAL EXAM:   Vital Signs Last 24 Hrs  T(C): 36.4 (28 Apr 2022 08:31), Max: 36.8 (27 Apr 2022 23:02)  T(F): 97.6 (28 Apr 2022 08:31), Max: 98.3 (27 Apr 2022 23:28)  HR: 126 (28 Apr 2022 08:31) (112 - 128)  BP: 113/66 (28 Apr 2022 08:31) (109/68 - 130/71)  BP(mean): --  RR: 20 (28 Apr 2022 08:31) (20 - 24)  SpO2: 99% (28 Apr 2022 08:31) (93% - 99%) I&O's Summary    27 Apr 2022 07:01  -  28 Apr 2022 07:00  --------------------------------------------------------  IN: 1320 mL / OUT: 1800 mL / NET: -480 mL      GENERAL: [ ] Cachexia  [ ]Alert  [ ]Oriented x   [ ]Lethargic  [ ]Unarousable  [ ]Verbal  [ ]Non-Verbal  Behavioral:   [ ] Anxiety  [ ] Delirium [ ] Agitation [ ] Other  HEENT:  [ ]Normal   [ ]Dry mouth   [ ]ET Tube/Trach  [ ]Oral lesions  PULMONARY:   [ ]Clear [ ]Tachypnea  [ ]Audible excessive secretions   [ ]Rhonchi        [ ]Right [ ]Left [ ]Bilateral  [ ]Crackles        [ ]Right [ ]Left [ ]Bilateral  [ ]Wheezing     [ ]Right [ ]Left [ ]Bilateral  [ ]Diminished BS [ ]Right [ ]Left [ ]Bilateral    CARDIOVASCULAR:    [ ]Regular [ ]Irregular [ ]Tachy  [ ]Linus [ ]Murmur [ ]Other  GASTROINTESTINAL:  [ ]Soft  [ ]Distended   [ ]+BS  [ ]Non tender [ ]Tender  [ ]Other [ ]PEG [ ]OGT/ NGT   Last BM:    GENITOURINARY:  [ ]Normal [ ] Incontinent   [ ]Oliguria/Anuria   [ ]Ansari  MUSCULOSKELETAL:   [ ]Normal   [ ]Weakness  [ ]Bed/Wheelchair bound [ ]Edema  NEUROLOGIC:   [ ]No focal deficits  [ ] Cognitive impairment  [ ] Dysphagia [ ]Dysarthria [ ] Paresis [ ]Other   SKIN:   [ ]Normal  [ ]Rash  [ ]Other  [ ]Pressure ulcer(s)  [ ]y [ ]n  Present on admission      CRITICAL CARE:  [ ] Shock Present  [ ]Septic [ ]Cardiogenic [ ]Neurologic [ ]Hypovolemic  [ ]  Vasopressors [ ]  Inotropes   [ ] Respiratory failure present [ ] Mechanical Ventilation [ ] Non-invasive ventilatory support [ ] High-Flow  [ ] Acute  [ ] Chronic [ ] Hypoxic  [ ] Hypercarbic [ ] Other  [ ] Other organ failure     LABS:                        7.1    5.50  )-----------( 286      ( 27 Apr 2022 06:47 )             24.5   04-27    141  |  84<L>  |  12  ----------------------------<  112<H>  2.9<LL>   |  43<H>  |  0.42<L>    Ca    8.9      27 Apr 2022 06:44          RADIOLOGY & ADDITIONAL STUDIES:    PROTEIN CALORIE MALNUTRITION: [ ] mild [ ] moderate [ ] severe  [ ] underweight [ ] morbid obesity    https://www.andeal.org/vault/2440/web/files/ONC/Table_Clinical%20Characteristics%20to%20Document%20Malnutrition-White%20JV%20et%20al%189549.pdf    Height (cm): 167.6 (04-15-22 @ 20:57), 167.6 (04-03-22 @ 08:47), 167.6 (03-28-22 @ 11:11)  Weight (kg): 90.7 (04-03-22 @ 08:47), 109.6 (03-28-22 @ 11:11), 113.4 (03-19-22 @ 17:35)  BMI (kg/m2): 32.3 (04-15-22 @ 20:57), 32.3 (04-03-22 @ 08:47), 39 (03-28-22 @ 11:11)    [ ] PPSV2 < or = 30% [ ] significant weight loss [ ] poor nutritional intake [ ] anasarca   Artificial Nutrition [ ]     REFERRALS:   [ ]Chaplaincy  [ ] Hospice  [ ]Child Life  [ ]Social Work  [ ]Case management [ ]Holistic Therapy [ ] Physical Therapy [ ] Dietary   Goals of Care Document: DESTINY Jim (04-27-22 @ 16:27)  Goals of Care Conversation:   Participants:  · Participants  Family  · Relative  sister/HCP Eldon    Advance Directives:  · Does patient have Advance Directive  Yes  · Indicate Type  Health Care Proxy (HCP)  · Agent's Name  Eldon  · Are any of the items on the chart  Yes  EMR  · Specify which ones are on chart  Medical Orders for Life-Sustaining Treatment (MOLST)  · Caregiver:  no    Conversation Discussion:  · Conversation  Prognosis; Treatment Options  · Conversation Details  Called by primary team that pt's sister/HCP Eldon would like to further discuss PCU transfer. Note that her code status was changed to DNR/DNI yesterday with primary team. Please refer to my other [facility] notes for previous GO discussions.    Today Eldon states that since our last conversation, pt continued to be confused in addition to having ongoing severe anxiety. Eldon understands that pt cannot safely go to [place] for more treatment. At this time she is ready to transition the focus of care to comfort measures only with transfer to palliative care unit. Advised I will d/c lab draws and non-essential meds at this time. Advised that IF pt's able to stabilize with adequate symptom control, transition from PCU to hospice at home vs inpatient can be considered as next step. Sister in agreement.     Updates d/w primary team Dr. Murphy. Appreciate patient centered medicine and child life specialist following to support this pt and her family.    Added IV ativan 0.25 mg q2 prn for severe anxiety, in addition to increasing oral xanax to 0.5 mg q6 prn. Maintain IV dilaudid 1.5 mg q4 prn, oral dilaudid 6 mg q4 prn and MS contin 60 mg TID for pain and dyspnea.    What Matters Most To Patient and Family:  · What matters most to patient and family  comfort focused care    Personal Advance Directives Treatment Guidelines:   Treatment Guidelines:  · Decision Maker  Health Care Proxy    Location of Discussion:   Time Spent on Advance Care Planning:  I spent 18 (in minutes) on advance care planning services with the patient.  This time is separate and distinct from any other care management services provided on this date.    Location of Discussion:  · Location of discussion  Telephone      Electronic Signatures:  Mimi Jim)  (Signed 27-Apr-2022 16:56)  	Authored: Goals of Care Conversation, Personal Advance Directives Treatment Guidelines, Location of Discussion      Last Updated: 27-Apr-2022 16:56 by Mimi Jim)      ROBAXIN  Used for: Wound infection      Dose: 750 mg  Take 1 tablet (750 mg) by mouth every 6 hours as needed for muscle spasms  Quantity: 15 tablet  Refills: 0     oxyCODONE 5 MG tablet  Commonly known as: ROXICODONE  Used for: Wound infection      Dose: 5 mg  Take 1 tablet (5 mg) by mouth every 4 hours as needed for moderate pain (4-6)  Quantity: 30 tablet  Refills: 0     rifampin 300 MG capsule  Commonly known as: RIFADIN  Indication: Bone and/or Joint Infection, Infection of the Skin and/or Soft Tissue  Used for: Wound infection, Charcot's joint of right foot      Dose: 300 mg  Take 1 capsule (300 mg) by mouth every 12 hours  Quantity: 52 capsule  Refills: 0     vancomycin 1,750 mg  Indication: Infection of the Skin and/or Soft Tissue  Used for: Wound infection      Dose: 1,750 mg  Inject 1,750 mg into the vein every 24 hours for 36 days  Refills: 0        CONTINUE these medicines which may have CHANGED, or have new prescriptions. If we are uncertain of the size of tablets/capsules you have at home, strength may be listed as something that might have changed.      Dose / Directions   acetaminophen 325 MG tablet  Commonly known as: TYLENOL  This may have changed:     medication strength    how much to take    when to take this    reasons to take this  Used for: Wound infection      Dose: 650 mg  Take 2 tablets (650 mg) by mouth every 8 hours as needed for mild pain, fever or headaches  Quantity: 60 tablet  Refills: 0     lidocaine 5 % patch  Commonly known as: LIDODERM  This may have changed: additional instructions  Used for: Chronic pain syndrome      Apply up to 3 patches to painful area at once for up to 12 h within a 24 h period.  Remove after 12 hours.  Quantity: 90 patch  Refills: 0     polyethylene glycol 17 g packet  Commonly known as: MIRALAX  This may have changed:     when to take this    reasons to take this  Used for: Midfoot collapse of right lower extremity      Dose: 17 g  Take 17 g by mouth  "daily  Quantity: 10 packet  Refills: 0     senna-docusate 8.6-50 MG tablet  Commonly known as: SENOKOT-S/PERICOLACE  This may have changed:     when to take this    reasons to take this  Used for: Wound infection      Dose: 1 tablet  Take 1 tablet by mouth 2 times daily  Quantity: 60 tablet  Refills: 0        CONTINUE these medicines which have NOT CHANGED      Dose / Directions   ammonium lactate 12 % external lotion  Commonly known as: LAC-HYDRIN      APPLY THIN LAYER TOPICALLY TWICE A DAY AS NEEDED FOR DRY SKIN **EXTERNAL USE ONLY**  Refills: 0     Calcium-Vitamin D 500-3.125 MG-MCG Tabs      Dose: 2 tablet  Take 2 tablets by mouth 2 times daily  Refills: 0     carboxymethylcellulose PF 0.5 % ophthalmic solution  Commonly known as: REFRESH PLUS      Dose: 1 drop  1 drop 3 times daily as needed for dry eyes  Refills: 0     cetirizine 10 MG tablet  Commonly known as: zyrTEC      Dose: 10 mg  Take 10 mg by mouth daily  Refills: 0     diclofenac 1 % topical gel  Commonly known as: VOLTAREN      Dose: 4 g  Apply 4 g topically 4 times daily as needed for moderate pain (4-6)  Refills: 0     empagliflozin 25 MG Tabs tablet  Commonly known as: JARDIANCE      Dose: 25 mg  Take 25 mg by mouth daily  Refills: 0     fluticasone 50 MCG/ACT nasal spray  Commonly known as: FLONASE      Dose: 2 spray  Spray 2 sprays into both nostrils daily  Refills: 0     furosemide 40 MG tablet  Commonly known as: LASIX      Dose: 40 mg  Take 40 mg by mouth daily as needed (hand swelling, weight >215 lbs)  Refills: 0     * Telfa AMD [place] Dressing 4\"X8\" Pads  Used for: Necrosis of surgical wound (H)      Dose: 1 Units  1 Units daily  Quantity: 45 each  Refills: 1     levothyroxine 137 MCG tablet  Commonly known as: SYNTHROID/LEVOTHROID      Dose: 137 mcg  Take 137 mcg by mouth daily  Refills: 0     linaclotide 145 MCG capsule  Commonly known as: LINZESS      Dose: 1 capsule  Take 1 capsule by mouth daily  Refills: 0     lisinopril 40 MG " GAP TEAM PALLIATIVE CARE UNIT PROGRESS NOTE:      [  ] Patient on hospice program.    INDICATION FOR PALLIATIVE CARE UNIT SERVICES/Interval HPI: Symptom management in the setting of a 47y/o F with PMH gastric cancer presenting with SOB and recurrent pleural effusion    OVERNIGHT EVENTS: Chart reviewed. The patient is seen and examined at the bedside. She required PRN Ativan 0.25mg IV X1,  PRN Dilaudid 1.5mg IV X3 for dyspnea and X2 for pain within a 24hr period.    DNR on chart: Yes  Yes      Allergies    Cipro (Rash)  QUINOLONES (Unknown)    Intolerances    MEDICATIONS  (STANDING):  artificial tears (preservative free) Ophthalmic Solution 1 Drop(s) Both EYES four times a day  Biotene Dry Mouth Oral Rinse 15 milliLiter(s) Swish and Spit three times a day  HYDROmorphone Infusion 1 mG/Hr (1 mL/Hr) IV Continuous <Continuous>  sodium chloride 0.9%. 1000 milliLiter(s) (10 mL/Hr) IV Continuous <Continuous>    MEDICATIONS  (PRN):  HYDROmorphone  Injectable 1.5 milliGRAM(s) IV Push every 1 hour PRN Moderate Pain (4 - 6)  HYDROmorphone  Injectable 2 milliGRAM(s) IV Push every 1 hour PRN Severe Pain (7 - 10)  HYDROmorphone  Injectable 2 milliGRAM(s) IV Push every 1 hour PRN dyspnea  LORazepam   Injectable 0.25 milliGRAM(s) IV Push every 1 hour PRN anxiety and agitation    ITEMS UNCHECKED ARE NOT PRESENT    PRESENT SYMPTOMS: [X ]Unable to self-report  [X ]PAINADs [X ]RDOS  Source if other than patient:  [ ]Family   [ X]Team     Pain: [ ] yes [X ] no  QOL impact -   Location -                    Aggravating factors -  Quality -  Radiation -  Timing-  Severity (0-10 scale):  Minimal acceptable level (0-10 scale):     PAINAD Score: 0  http://geriatrictoolkit.[place].edu/cog/painad.pdf (Ctrl +  left click to view)    Dyspnea:                           [ ]Mild [ ]Moderate [ ]Severe    RDOS: 5  0 to 2  minimal or no respiratory distress   3  mild distress  4 to 6 moderate distress  >7 severe distress  https://[email or web address]/handouts/hen/Respiratory_Distress_Observation_Scale.pdf (Ctrl +  left click to view)     Anxiety:                             [ ]Mild [ ]Moderate [ ]Severe  Fatigue:                             [ ]Mild [ ]Moderate [ ]Severe  Nausea:                             [ ]Mild [ ]Moderate [ ]Severe  Loss of appetite:              [ ]Mild [ ]Moderate [ ]Severe  Constipation:                    [ ]Mild [ ]Moderate [ ]Severe  		  Other Symptoms:  [ ]All other review of systems negative- unable to assess     Palliative Performance Status Version 2: 10-20%         http://[facility].org/files/news/palliative_performance_scale_ppsv2.pdf  PHYSICAL EXAM:   Vital Signs Last 24 Hrs  T(C): 36.4 (28 Apr 2022 08:31), Max: 36.8 (27 Apr 2022 23:02)  T(F): 97.6 (28 Apr 2022 08:31), Max: 98.3 (27 Apr 2022 23:28)  HR: 126 (28 Apr 2022 08:31) (112 - 128)  BP: 113/66 (28 Apr 2022 08:31) (109/68 - 130/71)  BP(mean): --  RR: 20 (28 Apr 2022 08:31) (20 - 24)  SpO2: 99% (28 Apr 2022 08:31) (93% - 99%) I&O's Summary    27 Apr 2022 07:01  -  28 Apr 2022 07:00  --------------------------------------------------------  IN: 1320 mL / OUT: 1800 mL / NET: -480 mL    GENERAL: [ ] Cachexia  [ ]Alert  [ ]Oriented x   [X ]Lethargic  [ ]Unarousable  [ ]Verbal  [ X]Non-Verbal  Behavioral:   [ ] Anxiety  [ ] Delirium [ ] Agitation [ ] Other  HEENT:  [ ]Normal   [ X]Dry mouth   [ ]ET Tube/Trach  [ ]Oral lesions  PULMONARY:   [ ]Clear [ ]Tachypnea  [ ]Audible excessive secretions   [ ]Rhonchi        [ ]Right [ ]Left [ ]Bilateral  [ ]Crackles        [ ]Right [ ]Left [ ]Bilateral  [ ]Wheezing     [ ]Right [ ]Left [ ]Bilateral  [X ]Diminished BS [ ]Right [ ]Left [ X]Bilateral    CARDIOVASCULAR:    [ ]Regular [ ]Irregular [ X]Tachy  [ ]Linus [ ]Murmur [ ]Other  GASTROINTESTINAL:  [ X]Soft  [ ]Distended   [ X]+BS  [X ]Non tender [ ]Tender  [ ]Other [ ]PEG [ ]OGT/ NGT   Last BM:    GENITOURINARY:  [ ]Normal [ ] Incontinent   [ ]Oliguria/Anuria   [ ]Ansari  MUSCULOSKELETAL:   [ ]Normal   [ ]Weakness  [ ]Bed/Wheelchair bound [ ]Edema  NEUROLOGIC:   [ ]No focal deficits  [ ] Cognitive impairment  [ ] Dysphagia [ ]Dysarthria [ ] Paresis [ ]Other   SKIN:   [ ]Normal  [ ]Rash  [ ]Other  [ ]Pressure ulcer(s)  [ ]y [ ]n  Present on admission      CRITICAL CARE:  [ ] Shock Present  [ ]Septic [ ]Cardiogenic [ ]Neurologic [ ]Hypovolemic  [ ]  Vasopressors [ ]  Inotropes   [ ] Respiratory failure present [ ] Mechanical Ventilation [ ] Non-invasive ventilatory support [ ] High-Flow  [ ] Acute  [ ] Chronic [ ] Hypoxic  [ ] Hypercarbic [ ] Other  [ ] Other organ failure     LABS:                        7.1    5.50  )-----------( 286      ( 27 Apr 2022 06:47 )             24.5   04-27    141  |  84<L>  |  12  ----------------------------<  112<H>  2.9<LL>   |  43<H>  |  0.42<L>    Ca    8.9      27 Apr 2022 06:44          RADIOLOGY & ADDITIONAL STUDIES:    PROTEIN CALORIE MALNUTRITION: [ ] mild [ ] moderate [ ] severe  [ ] underweight [ ] morbid obesity    https://www.andeal.org/vault/2440/web/files/ONC/Table_Clinical%20Characteristics%20to%20Document%20Malnutrition-White%20JV%20et%20al%202012.pdf    Height (cm): 167.6 (04-15-22 @ 20:57), 167.6 (04-03-22 @ 08:47), 167.6 (03-28-22 @ 11:11)  Weight (kg): 90.7 (04-03-22 @ 08:47), 109.6 (03-28-22 @ 11:11), 113.4 (03-19-22 @ 17:35)  BMI (kg/m2): 32.3 (04-15-22 @ 20:57), 32.3 (04-03-22 @ 08:47), 39 (03-28-22 @ 11:11)    [ ] PPSV2 < or = 30% [ ] significant weight loss [ ] poor nutritional intake [ ] anasarca   Artificial Nutrition [ ]     REFERRALS:   [ ]Chaplaincy  [ ] Hospice  [ ]Child Life  [ ]Social Work  [ ]Case management [ ]Holistic Therapy [ ] Physical Therapy [ ] Dietary   Goals of Care Document: DESTINY Jim (04-27-22 @ 16:27)  Goals of Care Conversation:   Participants:  · Participants  Family  · Relative  sister/HCP Eldon    Advance Directives:  · Does patient have Advance Directive  Yes  · Indicate Type  Health Care Proxy (HCP)  · Agent's Name  Eldon  · Are any of the items on the chart  Yes  EMR  · Specify which ones are on chart  Medical Orders for Life-Sustaining Treatment (MOLST)  · Caregiver:  no    Conversation Discussion:  · Conversation  Prognosis; Treatment Options  · Conversation Details  Called by primary team that pt's sister/HCP Eldon would like to further discuss PCU transfer. Note that her code status was changed to DNR/DNI yesterday with primary team. Please refer to my other [facility] notes for previous [facility] discussions.    Today Eldon states that since our last conversation, pt continued to be confused in addition to having ongoing severe anxiety. Eldon understands that pt cannot safely go to [place] for more treatment. At this time she is ready to transition the focus of care to comfort measures only with transfer to palliative care unit. Advised I will d/c lab draws and non-essential meds at this time. Advised that IF pt's able to stabilize with adequate symptom control, transition from PCU to hospice at home vs inpatient can be considered as next step. Sister in agreement.     Updates d/w primary team Dr. Murphy. Appreciate patient centered medicine and child life specialist following to support this pt and her family.    Added IV ativan 0.25 mg q2 prn for severe anxiety, in addition to increasing oral xanax to 0.5 mg q6 prn. Maintain IV dilaudid 1.5 mg q4 prn, oral dilaudid 6 mg q4 prn and MS contin 60 mg TID for pain and dyspnea.    What Matters Most To Patient and Family:  · What matters most to patient and family  comfort focused care    Personal Advance Directives Treatment Guidelines:   Treatment Guidelines:  · Decision Maker  Health Care Proxy    Location of Discussion:   Time Spent on Advance Care Planning:  I spent 18 (in minutes) on advance care planning services with the patient.  This time is separate and distinct from any other care management services provided on this date.    Location of Discussion:  · Location of discussion  Telephone      Electronic Signatures:  Mimi Jim)  (Signed 27-Apr-2022 16:56)  	Authored: Goals of Care Conversation, Personal Advance Directives Treatment Guidelines, Location of Discussion      Last Updated: 27-Apr-2022 16:56 by Mimi Jim)      tablet  Commonly known as: ZESTRIL      Dose: 40 mg  Take 40 mg by mouth daily  Refills: 0     metFORMIN 500 MG 24 hr tablet  Commonly known as: GLUCOPHAGE XR      Dose: 1,000 mg  Take 1,000 mg by mouth 2 times daily (with meals)  Refills: 0     omeprazole 20 MG DR capsule  Commonly known as: priLOSEC      Dose: 20 mg  Take 20 mg by mouth daily  Refills: 0     OZEMPIC (1 MG/DOSE) SC      Dose: 1 mg  Inject 1 mg Subcutaneous once a week Sundays  Refills: 0     pravastatin 40 MG tablet  Commonly known as: PRAVACHOL      Dose: 40 mg  Take 40 mg by mouth every evening  Refills: 0     SUMAtriptan 25 MG tablet  Commonly known as: IMITREX      Dose: 25 mg  Take 25 mg by mouth at onset of headache  Refills: 0     topiramate 100 MG tablet  Commonly known as: TOPAMAX      Dose: 150 mg  Take 150 mg by mouth 2 times daily  Refills: 0     Vitamin D3 25 mcg (1000 units) tablet  Commonly known as: CHOLECALCIFEROL      Dose: 50 mcg  Take 50 mcg by mouth daily  Refills: 0     zolpidem ER 12.5 MG CR tablet  Commonly known as: AMBIEN CR      Dose: 12.5 mg  Take 12.5 mg by mouth nightly as needed for sleep  Refills: 0         * This list has 1 medication(s) that are the same as other medications prescribed for you. Read the directions carefully, and ask your doctor or other care provider to review them with you.            STOP taking    amoxicillin-clavulanate 875-125 MG tablet  Commonly known as: AUGMENTIN        bacitracin-neomycin-polymyxin 400-5-5000 external ointment  Commonly known as: NEOSPORIN        gabapentin 600 MG tablet  Commonly known as: NEURONTIN  Replaced by: gabapentin 400 MG capsule              Where to get your medicines      These medications were sent to [facility] [place], [place] - [address]  [address] [address]    Phone: [phone]     acetaminophen 325 MG tablet    aspirin 81 MG EC tablet    gabapentin 400 MG capsule    methocarbamol 750 MG  "tablet    oxyCODONE 5 MG tablet    senna-docusate 8.6-50 MG tablet     These medications were sent to [facility] - [place], [place] - [address]  [address] [facility], [facility] [address]    Phone: [phone]     rifampin 300 MG capsule             Discharge Procedure Orders   Home Care Referral   Referral Priority: Routine: Next available opening Referral Type: Home Health Therapies & Aides   Number of Visits Requested: 1     Home Infusion Referral   Referral Priority: Routine: Next available opening Referral Type: Consultation   Number of Visits Requested: 1     OP[facility] enrollment and ID Clinic Referral   Referral Priority: Priority: 1-2 Weeks Referral Type: Consultation   Number of Visits Requested: 1     Reason for your hospital stay   Order Comments: Right foot infection     When to call - Contact Surgeon Team   Order Comments: You may experience symptoms that require follow-up before your scheduled appointment. Refer to the \"Stoplight Tool\" for instructions on when to contact your Surgeon Team if you are concerned about pain control, blood clots, constipation, or if you are unable to urinate.     When to call - Reach out to Urgent Care   Order Comments: If you are not able to reach your Surgeon Team and you need immediate care, go to the Orthopedic Walk-in Clinic or Urgent Care at your Surgeon's office.  Do NOT go to the Emergency Room unless you have shortness of breath, chest pain, or other signs of a medical emergency.     When to call - Reasons to Call 911   Order Comments: Call 911 immediately if you experience sudden-onset chest pain, arm weakness/numbness, slurred speech, or shortness of breath     Discharge Instruction - Breathing exercises   Order Comments: Perform breathing exercises using your Incentive Spirometer 10 times per hour while awake for 2 weeks.     Symptoms - Fever Management   Order Comments: A low grade fever can be expected after surgery.  Use acetaminophen " (TYLENOL) as needed for fever management.  Contact your Surgeon Team if you have a fever greater than 101.5 F, chills, and/or night sweats.     Symptoms - Constipation management   Order Comments: Constipation (hard, dry bowel movements) is expected after surgery due to the combination of being less active, the anesthetic, and the opioid pain medication.  You can do the following to help reduce constipation:  ~  FLUIDS:  Drink clear liquids (water or Gatorade), or juice (apple/prune).  ~  DIET:  Eat a fiber rich diet.    ~  ACTIVITY:  Get up and move around several times a day.  Increase your activity as you are able.  MEDICATIONS:  Reduce the risk of constipation by starting medications before you are constipated.  You can take Miralax   (1 packet as directed) and/or a stool softener (Senokot 1-2 tablets 1-2 times a day).  If you already have constipation and these medications are not working, you can get magnesium citrate and use as directed.  If you continue to have constipation you can try an over the counter suppository or enema.  Call your Surgeon Team if it has been greater than 3 days since your last bowel movement.     Symptoms - Reduced Urine Output   Order Comments: Changes in the amount of fluids you drank before and after surgery may result in problems urinating.  It is important to stay well-hydrated after surgery and drink plenty of water. If it has been greater than 8 hours since you have urinated despite drinking plenty of water, call your Surgeon Team.     Activity - Exercises to prevent blood clots   Order Comments: Unless otherwise directed by your Surgeon team, perform the following exercises at least three times per day for the first four weeks after surgery to prevent blood clots in your legs: 1) Point and flex your feet (Ankle Pumps), 2) Move your ankle around in big circles, 3) Wiggle your toes, 4) Walk, even for short distances, several times a day, will help decrease the risk of blood  clots.     Order Specific Question Answer Comments   Is discharge order? Yes      Comfort and Pain Management - Pain after Surgery   Order Comments: Pain after surgery is normal and expected.  You will have some amount of pain for several weeks after surgery.  Your pain will improve with time.  There are several things you can do to help reduce your pain including: rest, ice, elevation, and using pain medications as needed. Contact your Surgeon Team if you have pain that persists or worsens after surgery despite rest, ice, elevation, and taking your medication(s) as prescribed. Contact your Surgeon Team if you have new numbness, tingling, or weakness in your operative extremity.     Comfort and Pain Management - Swelling after Surgery   Order Comments: Swelling and/or bruising of the surgical extremity is common and may persist for several months after surgery. In addition to frequent icing and elevation, gentle compressive support with an ACE wrap or tubigrip may help with swelling. Apply compression regularly, removing at least twice daily to perform skin checks. Contact your Surgeon Team if your swelling increases and is NOT associated with an increase in your activity level, or if your swelling increases and is associated with redness and pain.     Comfort and Pain Management - Cold therapy   Order Comments: Ice can be used to control swelling and discomfort after surgery. Place a thin towel over your operative site and apply the ice pack overtop. Leave ice pack in place for 20 minutes, then remove for 20 minutes. Repeat this 20 minutes on/20 minutes off routine as often as tolerated.     Medication Instructions - Acetaminophen (TYLENOL) Instructions   Order Comments: You were discharged with acetaminophen (TYLENOL) for pain management after surgery. Acetaminophen most effectively manages pain symptoms when it is taken on a schedule without missing doses (every four, six, or eight hours). Your Provider will  "prescribe a safe daily dose between 3000 - 4000 mg.  Do NOT exceed this daily dose. Most patients use acetaminophen for pain control for the first four weeks after surgery.  You can wean from this medication as your pain decreases.     Medication Instructions - Opioids - Tapering Instructions   Order Comments: In the first three days following surgery, your symptoms may warrant use of the narcotic pain medication every four to six hours as prescribed. This is normal. As your pain symptoms improve, focus your efforts on decreasing (tapering) use of narcotic medications. The most successful tapering strategy is to first, decrease the number of tablets you take every 4-6 hours to the minimum prescribed. Then, increase the amount of time between doses.  For example:  First, taper to   or 1 tablet every 4-6 hours.  Then, taper to   or 1 tablet every 6-8 hours.  Then, taper to   or 1 tablet every 8-10 hours.  Then, taper to   or 1 tablet every 10-12 hours.  Then, taper to   or 1 tablet at bedtime.  The bedtime dose can help with comfort during sleep and is typically the last dose to be discontinued after surgery.     Follow Up Care   Order Comments: Follow-up with your Surgeon Team in 10-14 days for wound check. Call for appt.     Medication instructions -  Anticoagulation - aspirin   Order Comments: Take the aspirin as prescribed for a total of four weeks after surgery.  This is given to help minimize your risk of blood clot.     Comfort and Pain Management - LOWER Extremity Elevation   Order Comments: Swelling is expected for several months after surgery. This type of swelling is usually associated with gravity and activity, and can be improved with elevation.   The best way to do this is to get your \"toes above your nose\" by laying down and placing several pillows lengthwise under your calf and heel. When elevating your leg keep your knee completely straight. Perform this elevation as often as possible especially for " the first two weeks after surgery.     Medication Instructions - Opioid Instructions (1 - 2 tablets Q 4-6 hours, MAX 6 tablets)   Order Comments: You were discharged with an opioid medication (hydromorphone, oxycodone, hydrocodone, or tramadol). This medication should only be taken for breakthrough pain that is not controlled with acetaminophen (TYLENOL). If you rate your pain less than 3 you do not need this medication.  Pain rating 0-3:  You do not need this medication.  Pain rating 4-6:  Take 1 tablet every 4-6 hours as needed  Pain rating 7-10:  Take 2 tablets every 4-6 hours as needed.  Do not exceed 6 tablets per day     Medication Instructions - Muscle relaxant Medication Instructions   Order Comments: You were discharged with a muscle relaxer medication that can be used in conjunction with acetaminophen (TYLENOL) and the narcotic medication to manage pain symptoms. Take the muscle relaxant medication exactly as directed.     Return to Driving   Order Comments: Return to driving - Driving is NOT permitted until directed by your provider. Under no circumstance are you permitted to drive while using narcotic pain medications.     Order Specific Question Answer Comments   Is discharge order? Yes      NO range of motion     Order Specific Question Answer Comments   Is discharge order? Yes      NO weight bearing   Order Comments: No weight bearing on your operative extremity.     Order Specific Question Answer Comments   Is discharge order? Yes      Dressing / Wound care - Shower with wound/dressing covered   Order Comments: You must COVER your dressing or incision with saran wrap (or any other non-permeable covering) to allow the incision to remain dry while showering.   Continue to cover your dressing or incision for showering until your first office visit.  You are strictly prohibited from soaking or submerging the surgical wound underwater.     Dressing / Wound Care - NO Tub Bathing   Order Comments: Tub  bathing, swimming, or any other activities that will cause your incision to be submerged in water should be avoided until allowed by your Surgeon.    VAC -125mmHg continuous to medial R foot wound     Rolling Knee Walker DME   Order Comments: DME Documentation: Describe the reason for need to support medical necessity: Impaired gait due to Foot/Ankle surgery. Anticipated length of need: 3 months     Order Specific Question Answer Comments   DME Provider: [employer]    Type: Rolling Knee Walker    Length of Need: 3 Months      Walker DME   Order Comments: DME Documentation: Describe the reason for need to support medical necessity: Impaired gait due to Foot/Ankle surgery. Anticipated length of need: 3 months     Order Specific Question Answer Comments   DME Provider: [employer]    Walker Type: Standard (2 Wheel)    Accessories: N/A      Crutches DME   Order Comments: DME Documentation: Describe the reason for need to support medical necessity: Impaired gait due to Foot/Ankle surgery. Anticipated length of need: 3 months     Order Specific Question Answer Comments   DME Provider: [employer]    Crutch Type: Standard    Crutches Add On: NA    Length of Need: Lifetime      Discharge Instruction - Regular Diet Adult   Order Comments: Return to your pre-surgery diet unless instructed otherwise     Order Specific Question Answer Comments   Is discharge order? Yes        Fidencio Lynn MD  Orthopaedic Surgery, PGY1       GAP TEAM PALLIATIVE CARE UNIT PROGRESS NOTE:      [  ] Patient on hospice program.    INDICATION FOR PALLIATIVE CARE UNIT SERVICES/Interval HPI: Symptom management in the setting of a 49y/o F with PMH gastric cancer presenting with SOB and recurrent pleural effusion    OVERNIGHT EVENTS: Chart reviewed. The patient is seen and examined at the bedside. She required PRN Ativan 0.25mg IV X1,  PRN Dilaudid 1.5mg IV X3 for dyspnea and X2 for pain within a 24hr period.    DNR on chart: Yes  Yes      Allergies    Cipro (Rash)  QUINOLONES (Unknown)    Intolerances    MEDICATIONS  (STANDING):  artificial tears (preservative free) Ophthalmic Solution 1 Drop(s) Both EYES four times a day  Biotene Dry Mouth Oral Rinse 15 milliLiter(s) Swish and Spit three times a day  HYDROmorphone Infusion 1 mG/Hr (1 mL/Hr) IV Continuous <Continuous>  sodium chloride 0.9%. 1000 milliLiter(s) (10 mL/Hr) IV Continuous <Continuous>    MEDICATIONS  (PRN):  HYDROmorphone  Injectable 1.5 milliGRAM(s) IV Push every 1 hour PRN Moderate Pain (4 - 6)  HYDROmorphone  Injectable 2 milliGRAM(s) IV Push every 1 hour PRN Severe Pain (7 - 10)  HYDROmorphone  Injectable 2 milliGRAM(s) IV Push every 1 hour PRN dyspnea  LORazepam   Injectable 0.25 milliGRAM(s) IV Push every 1 hour PRN anxiety and agitation    ITEMS UNCHECKED ARE NOT PRESENT    PRESENT SYMPTOMS: [X ]Unable to self-report  [X ]PAINADs [X ]RDOS  Source if other than patient:  [ ]Family   [ X]Team     Pain: [ ] yes [X ] no  QOL impact -   Location -                    Aggravating factors -  Quality -  Radiation -  Timing-  Severity (0-10 scale):  Minimal acceptable level (0-10 scale):     PAINAD Score: 0  http://geriatrictoolkit.[employer].edu/cog/painad.pdf (Ctrl +  left click to view)    Dyspnea:                           [ ]Mild [ ]Moderate [ ]Severe    RDOS: 5  0 to 2  minimal or no respiratory distress   3  mild distress  4 to 6 moderate distress  >7 severe distress  https://homecareinformation.net/handouts/hen/Respiratory_Distress_Observation_Scale.pdf (Ctrl +  left click to view)     Anxiety:                             [ ]Mild [ ]Moderate [ ]Severe  Fatigue:                             [ ]Mild [ ]Moderate [ ]Severe  Nausea:                             [ ]Mild [ ]Moderate [ ]Severe  Loss of appetite:              [ ]Mild [ ]Moderate [ ]Severe  Constipation:                    [ ]Mild [ ]Moderate [ ]Severe  		  Other Symptoms:  [ ]All other review of systems negative- unable to assess     Palliative Performance Status Version 2: 10-20%         http://[facility].org/files/news/palliative_performance_scale_ppsv2.pdf  PHYSICAL EXAM:   Vital Signs Last 24 Hrs  T(C): 36.4 (28 Apr 2022 08:31), Max: 36.8 (27 Apr 2022 23:02)  T(F): 97.6 (28 Apr 2022 08:31), Max: 98.3 (27 Apr 2022 23:28)  HR: 126 (28 Apr 2022 08:31) (112 - 128)  BP: 113/66 (28 Apr 2022 08:31) (109/68 - 130/71)  BP(mean): --  RR: 20 (28 Apr 2022 08:31) (20 - 24)  SpO2: 99% (28 Apr 2022 08:31) (93% - 99%) I&O's Summary    27 Apr 2022 07:01  -  28 Apr 2022 07:00  --------------------------------------------------------  IN: 1320 mL / OUT: 1800 mL / NET: -480 mL    GENERAL: [ ] Cachexia  [ ]Alert  [ ]Oriented x   [X ]Lethargic  [ ]Unarousable  [ X]Verbal  [ ]Non-Verbal  Behavioral:   [ ] Anxiety  [ ] Delirium [ ] Agitation [ ] Other  HEENT:  [ ]Normal   [ X]Dry mouth   [ ]ET Tube/Trach  [ ]Oral lesions  PULMONARY:   [ ]Clear [ ]Tachypnea  [ ]Audible excessive secretions   [ ]Rhonchi        [ ]Right [ ]Left [ ]Bilateral  [ ]Crackles        [ ]Right [ ]Left [ ]Bilateral  [ ]Wheezing     [ ]Right [ ]Left [ ]Bilateral  [X ]Diminished BS [ ]Right [ ]Left [ X]Bilateral    CARDIOVASCULAR:    [ ]Regular [ ]Irregular [ X]Tachy  [ ]Linus [ ]Murmur [ ]Other  GASTROINTESTINAL:  [ X]Soft  [X ]Distended   [ X]+BS  [X ]Non tender [ ]Tender  [ ]Other [ ]PEG [ ]OGT/ NGT   Last BM:  4/28/22  GENITOURINARY:  [ ]Normal [X ] Incontinent   [ ]Oliguria/Anuria   [ ]Ansari [X] External Cath  MUSCULOSKELETAL:   [ ]Normal   [ X]Weakness  [ X]Bed/Wheelchair bound [ X]Edema  NEUROLOGIC:   [ ]No focal deficits  [ ] Cognitive impairment  [ ] Dysphagia [ ]Dysarthria [ ] Paresis [ ]Other   SKIN: Incontinence associated dermatitis   [ ]Normal  [ ]Rash  [ ]Other  [ ]Pressure ulcer(s)  [ ]y [ ]n  Present on admission      CRITICAL CARE:  [ ] Shock Present  [ ]Septic [ ]Cardiogenic [ ]Neurologic [ ]Hypovolemic  [ ]  Vasopressors [ ]  Inotropes   [ ] Respiratory failure present [ ] Mechanical Ventilation [ ] Non-invasive ventilatory support [ ] High-Flow  [ ] Acute  [ ] Chronic [ ] Hypoxic  [ ] Hypercarbic [ ] Other  [X ] Other organ failure- GI tract    LABS:                        7.1    5.50  )-----------( 286      ( 27 Apr 2022 06:47 )             24.5   04-27    141  |  84<L>  |  12  ----------------------------<  112<H>  2.9<LL>   |  43<H>  |  0.42<L>    Ca    8.9      27 Apr 2022 06:44      RADIOLOGY & ADDITIONAL STUDIES: Reviewed    PROTEIN CALORIE MALNUTRITION: [ ] mild [ ] moderate [ X] severe- please see the nutritionist note [ ] underweight [ ] morbid obesity    https://www.andeal.org/vault/2440/web/files/ONC/Table_Clinical%20Characteristics%20to%20Document%20Malnutrition-White%20JV%20et%20al%202012.pdf    Height (cm): 167.6 (04-15-22 @ 20:57), 167.6 (04-03-22 @ 08:47), 167.6 (03-28-22 @ 11:11)  Weight (kg): 90.7 (04-03-22 @ 08:47), 109.6 (03-28-22 @ 11:11), 113.4 (03-19-22 @ 17:35)  BMI (kg/m2): 32.3 (04-15-22 @ 20:57), 32.3 (04-03-22 @ 08:47), 39 (03-28-22 @ 11:11)    [X ] PPSV2 < or = 30% [ ] significant weight loss [ ] poor nutritional intake [ ] anasarca   Artificial Nutrition [ ]     REFERRALS:   [ ]Chaplaincy  [ ] Hospice  [ ]Child Life  [ X]Social Work  [ ]Case management [ ]Holistic Therapy [ ] Physical Therapy [ ] Dietary   Goals of Care Document: DESTINY Jim (04-27-22 @ 16:27)  Goals of Care Conversation:   Participants:  · Participants  Family  · Relative  sister/HCP Eldon    Advance Directives:  · Does patient have Advance Directive  Yes  · Indicate Type  Health Care Proxy (HCP)  · Agent's Name  Eldon  · Are any of the items on the chart  Yes  EMR  · Specify which ones are on chart  Medical Orders for Life-Sustaining Treatment (MOLST)  · Caregiver:  no    Conversation Discussion:  · Conversation  Prognosis; Treatment Options  · Conversation Details  Called by primary team that pt's sister/HCP Eldon would like to further discuss PCU transfer. Note that her code status was changed to DNR/DNI yesterday with primary team. Please refer to my other [facility] notes for previous [facility] discussions.    Today Eldon states that since our last conversation, pt continued to be confused in addition to having ongoing severe anxiety. Eldon understands that pt cannot safely go to [facility] for more treatment. At this time she is ready to transition the focus of care to comfort measures only with transfer to palliative care unit. Advised I will d/c lab draws and non-essential meds at this time. Advised that IF pt's able to stabilize with adequate symptom control, transition from PCU to hospice at home vs inpatient can be considered as next step. Sister in agreement.     Updates d/w primary team Dr. Murphy. Appreciate patient centered medicine and child life specialist following to support this pt and her family.    Added IV ativan 0.25 mg q2 prn for severe anxiety, in addition to increasing oral xanax to 0.5 mg q6 prn. Maintain IV dilaudid 1.5 mg q4 prn, oral dilaudid 6 mg q4 prn and MS contin 60 mg TID for pain and dyspnea.    What Matters Most To Patient and Family:  · What matters most to patient and family  comfort focused care    Personal Advance Directives Treatment Guidelines:   Treatment Guidelines:  · Decision Maker  Health Care Proxy    Location of Discussion:   Time Spent on Advance Care Planning:  I spent 18 (in minutes) on advance care planning services with the patient.  This time is separate and distinct from any other care management services provided on this date.    Location of Discussion:  · Location of discussion  Telephone      Electronic Signatures:  Mimi Jim)  (Signed 27-Apr-2022 16:56)  	Authored: Goals of Care Conversation, Personal Advance Directives Treatment Guidelines, Location of Discussion      Last Updated: 27-Apr-2022 16:56 by Mimi Jim)

## 2023-02-01 NOTE — LETTER
2/1/2023       RE: Nehemiah Magallon  [address]  [place] [address]     Dear Colleague,    Thank you for referring your patient, Nehemiah Magallon, to the [facility] INFECTIOUS DISEASE CLINIC [place] [facility]. Please see a copy of my visit note below.    Nehemiah is a 57 year old who is being evaluated via a billable video visit.    How would you like to obtain your AVS? MyChart  If the video visit is dropped, the invitation should be resent by: Text to cell phone: [phone]  Will anyone else be joining your video visit? No    Video-Visit Details  Type of service:  Video Visit   video time : 11.00 am to 11.11 am   Originating Location (pt. Location): Home    Distant Location (provider location):  Off-site  Platform used for Video Visit: [employer]     INFECTIOUS DISEASES PROGRESS NOTE    Infectious Diseases Clinic     SUBJECTIVE:                                                      Nehemiah Magallon is a 57 year old male who presents to clinic today for the following health issues: Post hospital Discharge follow-up    Nehemiah Magallon is a 57 year old male, a retired RN,  [facility] Diabetes mellitus (HbA1c 5.6 in Jan 2023 ) with retinopathy, legal blindness, peripheral neuropathy, papillary thyroid malignancy in remission, hypothyroidism, GERD, MARGARITO, Hep C s/p treatment (2017), HTN, NAFLD, colectomy for diverticulitis (2014), bilateral hearing loss and using bilateral hearing aids, chronic pain, s/p right Charcot foot reconstruction on 11/17/2022. Periop he recieved Cefazolin. Per Nehemiah, he was then discharged on Augmentin x 7 days.  He was using a cast for 2.5-3 weeks post surgery. He then noticed a scab but no drainage (reviewed photo on his phone). Then the wound was covered with ace wrap and he thought the initial cast then the ace wrap could have caused the wound. He had kept the surgical site dry, and would air it (without dressing) when he sat down  at home but applied would dressing when he went to bed.  Last Adriano 1/15/23, he noticed purulent drainage and deepening of the wound. He was prescribed with Augmentin 875 mg po q12 hr x 14 days on 1/16/23.  He went to see Dr Starr on 1/20/23 and directly admitted on 1/20/2023 from Ortho clinic for increasing drainage and wound necrosis of right foot surgical sites. Wound specimen was obtained before starting empiric Pip/tazobactam and Vancomycin IV on 1/20/23. He underwent I+D of right foot, placement of wound vac and antibiotic beads on 1/21/23 . Intra-op findings: Medial wound: purulent material, probed directly to plate and bone; Lateral wound: no purulence, probed to bone, no hardware present laterally. Intra-Op cultures on 1/21/23 and 1/20/23 grew Staph epidermidisx2 ( oxacillin Resistant)  and Staph caprae.     He was discharged on Vancomycin IV and Rifampin 300 mg po q12 hr ( activity against biofilm, due to hardware in place) .     Video visit on 2/1/23  Nehemiah is doing well no pain. afebrile. soft stools but no abdominal pain, nausea, vomiting. has good appetite. no pain around PICC.     Problem list and histories reviewed & adjusted, as indicated.  Additional history: as documented    PAST MEDICAL HISTORY:  Patient  has a past medical history of Chronic pain syndrome (10/24/2014), Diabetes mellitus, type 2 (H), Diabetic Charcot foot (H), Diabetic retinopathy associated with diabetes mellitus due to underlying condition (H), Diverticulitis of colon, Gastroesophageal reflux disease, Hepatitis C (2017), History of skin cancer, Hypertension, Hypothyroidism, Legally blind, Midfoot collapse of right lower extremity, Migraine, NAFLD (nonalcoholic fatty liver disease), Nephrolithiasis, Neuropathy, MARGARITO (obstructive sleep apnea), Rib pain (10/24/2014), S/P colectomy (10/14/2014), and Thyroid cancer (H) (10/14/2014).    PAST SURGICAL HISTORY:  Patient  has a past surgical history that includes colectomy;  "Cholecystectomy (1986); colonoscopy (2016); Foot surgery (Left, 2021); Arthrodesis foot (Right, 11/17/2022); Lengthen tendon achilles (Right, 11/17/2022); and Irrigation and debridement foot, combined (Right, 1/21/2023).    CURRENT MEDICATIONS:  Current Outpatient Medications   Medication Sig Dispense Refill     acetaminophen (TYLENOL) 325 MG tablet Take 2 tablets (650 mg) by mouth every 8 hours as needed for mild pain, fever or headaches 60 tablet 0     ammonium lactate (LAC-HYDRIN) 12 % external lotion APPLY THIN LAYER TOPICALLY TWICE A DAY AS NEEDED FOR DRY SKIN **EXTERNAL USE ONLY**       aspirin (ASA) 81 MG EC tablet Take 1 tablet (81 mg) by mouth 2 times daily 60 tablet 0     Calcium-Vitamin D 500-3.125 MG-MCG TABS Take 2 tablets by mouth 2 times daily       carboxymethylcellulose PF (REFRESH PLUS) 0.5 % ophthalmic solution 1 drop 3 times daily as needed for dry eyes       cetirizine (ZYRTEC) 10 MG tablet Take 10 mg by mouth daily       diclofenac (VOLTAREN) 1 % topical gel Apply 4 g topically 4 times daily as needed for moderate pain (4-6)       empagliflozin (JARDIANCE) 25 MG TABS tablet Take 25 mg by mouth daily       fluticasone (FLONASE) 50 MCG/ACT nasal spray Spray 2 sprays into both nostrils daily       furosemide (LASIX) 40 MG tablet Take 40 mg by mouth daily as needed (hand swelling, weight >215 lbs)       gabapentin (NEURONTIN) 400 MG capsule Take 3 capsules (1,200 mg) by mouth 3 times daily 90 capsule 0     Gauze Pads & Dressings ([facility]) 4\"X8\" PADS 1 Units daily 45 each 1     levothyroxine (SYNTHROID/LEVOTHROID) 137 MCG tablet Take 137 mcg by mouth daily       lidocaine (LIDODERM) 5 % patch Apply up to 3 patches to painful area at once for up to 12 h within a 24 h period.  Remove after 12 hours. (Patient taking differently: Apply up to 3 patches to painful area at once for up to 12 h within a 24 h period.  Remove after 12 hours. L foot.) 90 patch 0     linaclotide (LINZESS) 145 " MCG capsule Take 1 capsule by mouth daily       lisinopril (ZESTRIL) 40 MG tablet Take 40 mg by mouth daily       metFORMIN (GLUCOPHAGE XR) 500 MG 24 hr tablet Take 1,000 mg by mouth 2 times daily (with meals)       methocarbamol (ROBAXIN) 750 MG tablet Take 1 tablet (750 mg) by mouth every 6 hours as needed for muscle spasms 15 tablet 0     omeprazole (PRILOSEC) 20 MG DR capsule Take 20 mg by mouth daily       oxyCODONE (ROXICODONE) 5 MG tablet Take 1 tablet (5 mg) by mouth every 4 hours as needed for moderate pain (4-6) 30 tablet 0     polyethylene glycol (MIRALAX) 17 g packet Take 17 g by mouth daily 10 packet 0     pravastatin (PRAVACHOL) 40 MG tablet Take 40 mg by mouth every evening       rifampin (RIFADIN) 300 MG capsule Take 1 capsule (300 mg) by mouth every 12 hours 52 capsule 0     Semaglutide (OZEMPIC, 1 MG/DOSE, SC) Inject 1 mg Subcutaneous once a week Sundays       senna-docusate (SENOKOT-S/PERICOLACE) 8.6-50 MG tablet Take 1 tablet by mouth 2 times daily 60 tablet 0     SUMAtriptan (IMITREX) 25 MG tablet Take 25 mg by mouth at onset of headache       topiramate (TOPAMAX) 100 MG tablet Take 150 mg by mouth 2 times daily       vancomycin 1,750 mg Inject 1,750 mg into the vein every 24 hours for 36 days  0     Vitamin D3 (CHOLECALCIFEROL) 25 mcg (1000 units) tablet Take 50 mcg by mouth daily       zolpidem ER (AMBIEN CR) 12.5 MG CR tablet Take 12.5 mg by mouth nightly as needed for sleep       ALLERGY:  Allergies   Allergen Reactions     Atorvastatin      Other reaction(s): Hyperglycemia     Celebrex [Celecoxib] Other (See Comments)     [facility]     Labs reviewed in EPIC    OBJECTIVE:                                                    GENERAL: healthy, alert and no distress  EYES: Eyes grossly normal to inspection  NECK: supple  RESP: breathing comfortably on room air. no cough  SKIN: no suspicious lesions or rashes, right foot - wound vac+ ( photos in chart reviewed)  NEURO: mentation  intact and speech normal  PSYCH: mentation appears normal, affect normal  PICC      ASSESSMENT AND PLAN:                                                      1. S/p right Charcot foot reconstruction on 11/17/2022 complicated by post op infection   - 1/20/23 - started on Pip/Tazobactam and Vancomycin IV   - 1/21/23 -  s/p I+D of right foot, placement of wound vac and antibiotic beads . Intra-op findings: Medial wound: purulent material, probed directly to plate and bone; Lateral wound: no purulence, probed to bone, no hardware present laterally. Intra-Op cultures are growing Staph epidermidisx2 ( oxacillin Resistant)  and Staph caprae.      2.  Diabetes mellitus (HbA1c 5.6 in Jan 2023) with peripheral neuropathy   - HbA1c around 5.6 in the past 4 years per patient     3. CRP was 69 and ESR was 59 with WBC 7.8 on 1/20/23 --> CRP 14 ( 1/23/23) --> 7.4 (1/26/23) - 7.87 ( 1/30/23)     RECOMMENDATION:  - continue  Vancomycin IV and oral Rifampin for synergy and biofilm activity ( hardware in place)  . discussed potential side effects of Rifampin  - continue vancomycin IV until next week and reevaluate, if labs are improving and no diarrhea, will stop Vancomycin IV and start oral docycycline  - weekly labs: CMP, CBC diff CRP , Vancomycin trough (-600)  - probiotic daily   - if diarrhea, will check stool for C.diff     - video f/u w me on 2/15/23 at 11.15 am       Pete Arambula MD, M.Med.Sc  Division of Infectious Diseases and International Medicine  [facility]      30  minutes was spent with patient, chart review, documentation, care coordination : on 2/1/23

## 2023-02-01 NOTE — PROGRESS NOTES
Nehemiah is a 57 year old who is being evaluated via a billable video visit.    How would you like to obtain your AVS? [employer]  If the video visit is dropped, the invitation should be resent by: Text to cell phone: [phone]  Will anyone else be joining your video visit? No    Video-Visit Details  Type of service:  Video Visit   video time : 11.00 am to 11.11 am   Originating Location (pt. 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 Periop he recieved Cefazolin. Per Nehemiah, he was then discharged on Augmentin x 7 days.  He was using a cast for 2.5-3 weeks post surgery. He then noticed a scab but no drainage (reviewed photo on his phone). Then the wound was covered with ace wrap and he thought the initial cast then the ace wrap could have caused the wound. He had kept the surgical site dry, and would air it (without dressing) when he sat down at home but applied would dressing when he went to bed.  Last Adriano 1/15/23, he noticed purulent drainage and deepening of the wound. He was prescribed with Augmentin 875 mg po q12 hr x 14 days on 1/16/23.  He went to see Dr Starr on 1/20/23 and directly admitted on 1/20/2023 from Ortho clinic for increasing drainage and wound  necrosis of right foot surgical sites. Wound specimen was obtained before starting empiric Pip/tazobactam and Vancomycin IV on 1/20/23. He underwent I+D of right foot, placement of wound vac and antibiotic beads on 1/21/23 . Intra-op findings: Medial wound: purulent material, probed directly to plate and bone; Lateral wound: no purulence, probed to bone, no hardware present laterally. Intra-Op cultures on 1/21/23 and 1/20/23 grew Staph epidermidisx2 ( oxacillin Resistant)  and Staph caprae.     He was discharged on Vancomycin IV and Rifampin 300 mg po q12 hr ( activity against biofilm, due to hardware in place) .     Video visit on 2/1/23  Nehemiah is doing well no pain. afebrile. soft stools but no abdominal pain, nausea, vomiting. has good appetite. no pain around PICC.     Problem list and histories reviewed & adjusted, as indicated.  Additional history: as documented    PAST MEDICAL HISTORY:  Patient  has a past medical history of Chronic pain syndrome (10/24/2014), Diabetes mellitus, type 2 (H), Diabetic Charcot foot (H), Diabetic retinopathy associated with diabetes mellitus due to underlying condition (H), Diverticulitis of colon, Gastroesophageal reflux disease, Hepatitis C (2017), History of skin cancer, Hypertension, Hypothyroidism, Legally blind, Midfoot collapse of right lower extremity, Migraine, NAFLD (nonalcoholic fatty liver disease), Nephrolithiasis, Neuropathy, MARGARITO (obstructive sleep apnea), Rib pain (10/24/2014), S/P colectomy (10/14/2014), and Thyroid cancer (H) (10/14/2014).    PAST SURGICAL HISTORY:  Patient  has a past surgical history that includes colectomy; Cholecystectomy (1986); colonoscopy (2016); Foot surgery (Left, 2021); Arthrodesis foot (Right, 11/17/2022); Lengthen tendon achilles (Right, 11/17/2022); and Irrigation and debridement foot, combined (Right, 1/21/2023).    CURRENT MEDICATIONS:  Current Outpatient Medications   Medication Sig Dispense Refill     acetaminophen (TYLENOL) 325  "MG tablet Take 2 tablets (650 mg) by mouth every 8 hours as needed for mild pain, fever or headaches 60 tablet 0     ammonium lactate (LAC-HYDRIN) 12 % external lotion APPLY THIN LAYER TOPICALLY TWICE A DAY AS NEEDED FOR DRY SKIN **EXTERNAL USE ONLY**       aspirin (ASA) 81 MG EC tablet Take 1 tablet (81 mg) by mouth 2 times daily 60 tablet 0     Calcium-Vitamin D 500-3.125 MG-MCG TABS Take 2 tablets by mouth 2 times daily       carboxymethylcellulose PF (REFRESH PLUS) 0.5 % ophthalmic solution 1 drop 3 times daily as needed for dry eyes       cetirizine (ZYRTEC) 10 MG tablet Take 10 mg by mouth daily       diclofenac (VOLTAREN) 1 % topical gel Apply 4 g topically 4 times daily as needed for moderate pain (4-6)       empagliflozin (JARDIANCE) 25 MG TABS tablet Take 25 mg by mouth daily       fluticasone (FLONASE) 50 MCG/ACT nasal spray Spray 2 sprays into both nostrils daily       furosemide (LASIX) 40 MG tablet Take 40 mg by mouth daily as needed (hand swelling, weight >215 lbs)       gabapentin (NEURONTIN) 400 MG capsule Take 3 capsules (1,200 mg) by mouth 3 times daily 90 capsule 0     Gauze Pads & Dressings ([facility] [employer]) 4\"X8\" PADS 1 Units daily 45 each 1     levothyroxine (SYNTHROID/LEVOTHROID) 137 MCG tablet Take 137 mcg by mouth daily       lidocaine (LIDODERM) 5 % patch Apply up to 3 patches to painful area at once for up to 12 h within a 24 h period.  Remove after 12 hours. (Patient taking differently: Apply up to 3 patches to painful area at once for up to 12 h within a 24 h period.  Remove after 12 hours. L foot.) 90 patch 0     linaclotide (LINZESS) 145 MCG capsule Take 1 capsule by mouth daily       lisinopril (ZESTRIL) 40 MG tablet Take 40 mg by mouth daily       metFORMIN (GLUCOPHAGE XR) 500 MG 24 hr tablet Take 1,000 mg by mouth 2 times daily (with meals)       methocarbamol (ROBAXIN) 750 MG tablet Take 1 tablet (750 mg) by mouth every 6 hours as needed for muscle spasms 15 tablet 0     " omeprazole (PRILOSEC) 20 MG DR capsule Take 20 mg by mouth daily       oxyCODONE (ROXICODONE) 5 MG tablet Take 1 tablet (5 mg) by mouth every 4 hours as needed for moderate pain (4-6) 30 tablet 0     polyethylene glycol (MIRALAX) 17 g packet Take 17 g by mouth daily 10 packet 0     pravastatin (PRAVACHOL) 40 MG tablet Take 40 mg by mouth every evening       rifampin (RIFADIN) 300 MG capsule Take 1 capsule (300 mg) by mouth every 12 hours 52 capsule 0     Semaglutide (OZEMPIC, 1 MG/DOSE, SC) Inject 1 mg Subcutaneous once a week Sundays       senna-docusate (SENOKOT-S/PERICOLACE) 8.6-50 MG tablet Take 1 tablet by mouth 2 times daily 60 tablet 0     SUMAtriptan (IMITREX) 25 MG tablet Take 25 mg by mouth at onset of headache       topiramate (TOPAMAX) 100 MG tablet Take 150 mg by mouth 2 times daily       vancomycin 1,750 mg Inject 1,750 mg into the vein every 24 hours for 36 days  0     Vitamin D3 (CHOLECALCIFEROL) 25 mcg (1000 units) tablet Take 50 mcg by mouth daily       zolpidem ER (AMBIEN CR) 12.5 MG CR tablet Take 12.5 mg by mouth nightly as needed for sleep       ALLERGY:  Allergies   Allergen Reactions     Atorvastatin      Other reaction(s): Hyperglycemia     Celebrex [Celecoxib] Other (See Comments)     [facility]     Labs reviewed in EPIC    OBJECTIVE:                                                    GENERAL: healthy, alert and no distress  EYES: Eyes grossly normal to inspection  NECK: supple  RESP: breathing comfortably on room air. no cough  SKIN: no suspicious lesions or rashes, right foot - wound vac+ ( photos in chart reviewed)  NEURO: mentation intact and speech normal  PSYCH: mentation appears normal, affect normal  PICC      ASSESSMENT AND PLAN:                                                      1. S/p right Charcot foot reconstruction on 11/17/2022 complicated by post op infection   - 1/20/23 - started on Pip/Tazobactam and Vancomycin IV   - 1/21/23 -  s/p I+D of right foot,  placement of wound vac and antibiotic beads . Intra-op findings: Medial wound: purulent material, probed directly to plate and bone; Lateral wound: no purulence, probed to bone, no hardware present laterally. Intra-Op cultures are growing Staph epidermidisx2 ( oxacillin Resistant)  and Staph caprae.      2.  Diabetes mellitus (HbA1c 5.6 in Jan 2023) with peripheral neuropathy   - HbA1c around 5.6 in the past 4 years per patient     3. CRP was 69 and ESR was 59 with WBC 7.8 on 1/20/23 --> CRP 14 ( 1/23/23) --> 7.4 (1/26/23) - 7.87 ( 1/30/23)     RECOMMENDATION:  - continue  Vancomycin IV and oral Rifampin for synergy and biofilm activity ( hardware in place)  . discussed potential side effects of Rifampin  - continue vancomycin IV until next week and reevaluate, if labs are improving and no diarrhea, will stop Vancomycin IV and start oral docycycline  - weekly labs: CMP, CBC diff CRP , Vancomycin trough (-600)  - probiotic daily   - if diarrhea, will check stool for C.diff     - video f/u w me on 2/15/23 at 11.15 am       Pete Arambula MD, M.Med.Sc  Division of Infectious Diseases and International Medicine  [facility]      30  minutes was spent with patient, chart review, documentation, care coordination : on 2/1/23

## 2023-02-06 NOTE — TELEPHONE ENCOUNTER
If there is a significant concern then best to bring him into clinic urgently for a wound check, along with CRP and xrays,

## 2023-02-07 NOTE — TELEPHONE ENCOUNTER
Health Call Center    Phone Message    May a detailed message be left on voicemail: yes     Reason for Call: Other: Kori is a nurse who works with the patient's wound vac appts.      She was under the impression patient may be stopping his IV antibiotics this weeks; however, if he does, then he needs to continue wound vac appts @ the clinic and can no longer see Kori.    Please call her back to discuss.    Action Taken: Message routed to:  [facility] ortho    Travel Screening: Not Applicable

## 2023-02-07 NOTE — TELEPHONE ENCOUNTER
RN reached out to Dr. Starr and Dr. Arambula for advise. Dr. Arambula responded that for now, patient will continue with IV Vancomycin but eventually patient will graduate to oral Doxycycline.   RN called and left Kori a detailed message and call back number.    Herlinda Nicolas RN

## 2023-02-10 NOTE — LETTER
2/10/2023         RE: Nehemiah Magallon  [address]  [place] [address]        Dear Colleague,    Thank you for referring your patient, Nehemiah Magallon, to the [facility] ORTHOPEDIC [facility]IC [place] [facility]. Please see a copy of my visit note below.    Chief complaint: Follow-up wound infection, right foot.  Date of surgery: 1/17/2022 right foot Charcot reconstruction and percutaneous tendo Achilles lengthening.  1/21/2023 I&D right foot and VAC application.    I saw Mr. Magallon today in follow-up for his right foot infection in the setting of recent Charcot reconstruction.  He denies interim fevers.  He has been doing VAC changes with home health nursing.  Apparently the lateral foot wound is getting to the point where the VAC nurse has recommended discontinuing VAC changes to that site.  He has been fatigued, likely due to his antibiotic regimen, but notes the diarrhea has improved with probiotics.  He has been on vancomycin and rifampin.  His last CRP was less than 2.9 on 2/6/2023.    Examination with the VAC dressing was removed from both right foot wounds shows the foot wounds to be improving.  I am unable to probe any sinus tracts from the medial incision.  At the time of surgery there was a large tract extending distally.  I am unable to probe to bone or metal.  There is no malodor or grossly purulent fluid.  There is some fibrinous debris in the wound, but also some granulation tissue present, as well as evidence for new epithelialization around the margins.  The lateral wound also appears to be healing without malodor or purulence.  There is likewise some fibrinous debris as well as granulation tissue present.  No alan-incisional blistering.  No significant erythema at this time.  No lymphangitic streaking on the foot or ankle.  New sterile dressings are applied.    Plan: Continue antibiotic regimen as directed by ID.  Continue following CRP.  Continue 3 times weekly VAC changes.  At this  point we can trial a period without the lateral VAC changes and do dressing changes instead.  I did discuss that this may lead to continued progression of wound healing, or also go the other direction and require reinstitution of VAC therapy to that wound.  I did discuss that his progress seems to be encouraging, but that we are not out of the woods yet and there is still a chance that his clinical course could reverse progress or result in that this may prompt the need for another debridement and/or hardware removal.  I discussed that although it is understood that the infection is not likely to be completely eradicated with metal still present, it is hoped that infection can at least be temporarily suppressed until there is more bony healing, after which the metal can then be removed.  All questions were answered appropriately.    Requested supplies reordered:  Large Gloves, clean non-sterile  Mepilex Border Adhesive 4x4cm size, box of 5, x 12 boxes  Dermal Wound Cleanser  Cavilon Skin Protection    I also contacted at the 's request Danielle Conrado from the community care office at the [facility] at [phone] and left a voicemail requesting extension of the patient current 30-day emergency use authorization for wound VAC in advance of the anticipated expiration of his DME in the next several weeks.    Sincerely,        Bryon Starr MD

## 2023-02-10 NOTE — PROGRESS NOTES
Chief complaint: Follow-up wound infection, right foot.  Date of surgery: 1/17/2022 right foot Charcot reconstruction and percutaneous tendo Achilles lengthening.  1/21/2023 I&D right foot and VAC application.    I saw Mr. Magallon today in follow-up for his right foot infection in the setting of recent Charcot reconstruction.  He denies interim fevers.  He has been doing VAC changes with home health nursing.  Apparently the lateral foot wound is getting to the point where the VAC nurse has recommended discontinuing VAC changes to that site.  He has been fatigued, likely due to his antibiotic regimen, but notes the diarrhea has improved with probiotics.  He has been on vancomycin and rifampin.  His last CRP was less than 2.9 on 2/6/2023.    Examination with the VAC dressing was removed from both right foot wounds shows the foot wounds to be improving.  I am unable to probe any sinus tracts from the medial incision.  At the time of surgery there was a large tract extending distally.  I am unable to probe to bone or metal.  There is no malodor or grossly purulent fluid.  There is some fibrinous debris in the wound, but also some granulation tissue present, as well as evidence for new epithelialization around the margins.  The lateral wound also appears to be healing without malodor or purulence.  There is likewise some fibrinous debris as well as granulation tissue present.  No alan-incisional blistering.  No significant erythema at this time.  No lymphangitic streaking on the foot or ankle.  New sterile dressings are applied.    Plan: Continue antibiotic regimen as directed by ID.  Continue following CRP.  Continue 3 times weekly VAC changes.  At this point we can trial a period without the lateral VAC changes and do dressing changes instead.  I did discuss that this may lead to continued progression of wound healing, or also go the other direction and require reinstitution of VAC therapy to that wound.  I did discuss  that his progress seems to be encouraging, but that we are not out of the woods yet and there is still a chance that his clinical course could reverse progress or result in that this may prompt the need for another debridement and/or hardware removal.  I discussed that although it is understood that the infection is not likely to be completely eradicated with metal still present, it is hoped that infection can at least be temporarily suppressed until there is more bony healing, after which the metal can then be removed.  All questions were answered appropriately.    Requested supplies reordered:  Large Gloves, clean non-sterile  Mepilex Border Adhesive 4x4cm size, box of 5, x 12 boxes  Dermal Wound Cleanser  Cavilon Skin Protection    I also contacted at the 's request Danielle Camp from the community care office at the [facility] at [phone] and left a voicemail requesting extension of the patient current 30-day emergency use authorization for wound VAC in advance of the anticipated expiration of his DME in the next several weeks.

## 2023-02-10 NOTE — NURSING NOTE
"Reason For Visit:   Chief Complaint   Patient presents with     RECHECK     3 weeks post op from irrigation and debridement of right foot. Placement of wound vac and antibiotic beads. DOS: 01/21/2023.        Ht 1.778 m (5' 10\")   Wt 96.2 kg (212 lb)   BMI 30.42 kg/m      Pain Assessment  Patient Currently in Pain: Denies    Guru Howell, EMT  "

## 2023-02-15 NOTE — PROGRESS NOTES
Video-Visit Details    Nehemiah is a 57 year old who is being evaluated via a billable video visit.    How would you like to obtain your AVS? MyChart  If the video visit is dropped, the invitation should be resent by: Text to cell phone: [phone]  Will anyone else be joining your video visit? No       Video-Visit Details  Type of service: Video Visit   video time: 11.20 am to 11.33 am     Originating Location (pt. 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 Periop he recieved Cefazolin. Per Nehemiah, he was then discharged on Augmentin x 7 days.  He was using a cast for 2.5-3 weeks post surgery. He then noticed a scab but no drainage (reviewed photo on his phone). Then the wound was covered with ace wrap and he thought the initial cast then the ace wrap could have caused the wound. He had kept the surgical site dry, and would air it (without dressing) when he sat down at home but applied would dressing when he went to bed.  Last Adriano 1/15/23, he noticed purulent drainage and deepening of the wound. He was prescribed with Augmentin 875 mg po q12 hr x 14 days on 1/16/23.  He went to see Dr Starr on 1/20/23 and directly admitted on 1/20/2023 from [facility] clinic for  increasing drainage and wound necrosis of right foot surgical sites. Wound specimen was obtained before starting empiric Pip/tazobactam and Vancomycin IV on 1/20/23. He underwent I+D of right foot, placement of wound vac and antibiotic beads on 1/21/23 . Intra-op findings: Medial wound: purulent material, probed directly to plate and bone; Lateral wound: no purulence, probed to bone, no hardware present laterally. Intra-Op cultures on 1/21/23 and 1/20/23 grew Staph epidermidisx2 ( oxacillin Resistant)  and Staph caprae.     He was discharged on Vancomycin IV and Rifampin 300 mg po q12 hr ( activity against biofilm, due to hardware in place) .     Video Follow-up on 2/1/23  Nehemiah is doing well no pain. afebrile. soft stools but no abdominal pain, nausea, vomiting. has good appetite. no pain around PICC.     Video follow-up on 2/15/23  Nehemiah feels better. less drainage from lateral wound. wound vac to lateral wound had been discontinued. Medial wound still drains but less. Diabetes is under better control.   has some GI upset, nausea with rifampin. He also complains of some itching around PICC. no diarrhea. taking probiotic.     Problem list and histories reviewed & adjusted, as indicated.  Additional history: as documented    PAST MEDICAL HISTORY:  Patient  has a past medical history of Chronic pain syndrome (10/24/2014), Diabetes mellitus, type 2 (H), Diabetic Charcot foot (H), Diabetic retinopathy associated with diabetes mellitus due to underlying condition (H), Diverticulitis of colon, Gastroesophageal reflux disease, Hepatitis C (2017), History of skin cancer, Hypertension, Hypothyroidism, Legally blind, Midfoot collapse of right lower extremity, Migraine, NAFLD (nonalcoholic fatty liver disease), Nephrolithiasis, Neuropathy, MARGARITO (obstructive sleep apnea), Rib pain (10/24/2014), S/P colectomy (10/14/2014), and Thyroid cancer (H) (10/14/2014).    PAST SURGICAL HISTORY:  Patient  has a past surgical history that  "includes colectomy; Cholecystectomy (1986); colonoscopy (2016); Foot surgery (Left, 2021); Arthrodesis foot (Right, 11/17/2022); Lengthen tendon achilles (Right, 11/17/2022); and Irrigation and debridement foot, combined (Right, 1/21/2023).    CURRENT MEDICATIONS:  Current Outpatient Medications   Medication Sig Dispense Refill     doxycycline hyclate (VIBRAMYCIN) 100 MG capsule Take 1 capsule (100 mg) by mouth 2 times daily for 14 days 28 capsule 0     acetaminophen (TYLENOL) 325 MG tablet Take 2 tablets (650 mg) by mouth every 8 hours as needed for mild pain, fever or headaches 60 tablet 0     ammonium lactate (LAC-HYDRIN) 12 % external lotion APPLY THIN LAYER TOPICALLY TWICE A DAY AS NEEDED FOR DRY SKIN **EXTERNAL USE ONLY**       aspirin (ASA) 81 MG EC tablet Take 1 tablet (81 mg) by mouth 2 times daily 60 tablet 0     Calcium-Vitamin D 500-3.125 MG-MCG TABS Take 2 tablets by mouth 2 times daily       carboxymethylcellulose PF (REFRESH PLUS) 0.5 % ophthalmic solution 1 drop 3 times daily as needed for dry eyes       cetirizine (ZYRTEC) 10 MG tablet Take 10 mg by mouth daily       diclofenac (VOLTAREN) 1 % topical gel Apply 4 g topically 4 times daily as needed for moderate pain (4-6)       dimethicone 1 % external cream Apply topically daily for 30 days Dispense one 120 ml bottle please and send to patient from home pharmacy ([facility]) 120 mL 1     empagliflozin (JARDIANCE) 25 MG TABS tablet Take 25 mg by mouth daily       fluticasone (FLONASE) 50 MCG/ACT nasal spray Spray 2 sprays into both nostrils daily       furosemide (LASIX) 40 MG tablet Take 40 mg by mouth daily as needed (hand swelling, weight >215 lbs)       gabapentin (NEURONTIN) 400 MG capsule Take 3 capsules (1,200 mg) by mouth 3 times daily 90 capsule 0     Gauze Pads & Dressings (TELFA AMD ISLAND DRESSING) 4\"X8\" PADS 1 Units daily 45 each 1     levothyroxine (SYNTHROID/LEVOTHROID) 137 MCG tablet Take 137 mcg by mouth daily       lidocaine " (LIDODERM) 5 % patch Apply up to 3 patches to painful area at once for up to 12 h within a 24 h period.  Remove after 12 hours. (Patient taking differently: Apply up to 3 patches to painful area at once for up to 12 h within a 24 h period.  Remove after 12 hours. L foot.) 90 patch 0     linaclotide (LINZESS) 145 MCG capsule Take 1 capsule by mouth daily       lisinopril (ZESTRIL) 40 MG tablet Take 40 mg by mouth daily       metFORMIN (GLUCOPHAGE XR) 500 MG 24 hr tablet Take 1,000 mg by mouth 2 times daily (with meals)       methocarbamol (ROBAXIN) 750 MG tablet Take 1 tablet (750 mg) by mouth every 6 hours as needed for muscle spasms 15 tablet 0     omeprazole (PRILOSEC) 20 MG DR capsule Take 20 mg by mouth daily       oxyCODONE (ROXICODONE) 5 MG tablet Take 1 tablet (5 mg) by mouth every 4 hours as needed for moderate pain (4-6) (Patient not taking: Reported on 2/10/2023) 30 tablet 0     polyethylene glycol (MIRALAX) 17 g packet Take 17 g by mouth daily (Patient not taking: Reported on 2/10/2023) 10 packet 0     pravastatin (PRAVACHOL) 40 MG tablet Take 40 mg by mouth every evening       rifampin (RIFADIN) 300 MG capsule Take 1 capsule (300 mg) by mouth every 12 hours 52 capsule 0     Semaglutide (OZEMPIC, 1 MG/DOSE, SC) Inject 1 mg Subcutaneous once a week Sundays       senna-docusate (SENOKOT-S/PERICOLACE) 8.6-50 MG tablet Take 1 tablet by mouth 2 times daily (Patient not taking: Reported on 2/10/2023) 60 tablet 0     SUMAtriptan (IMITREX) 25 MG tablet Take 25 mg by mouth at onset of headache       topiramate (TOPAMAX) 100 MG tablet Take 150 mg by mouth 2 times daily       vancomycin 1,750 mg Inject 1,750 mg into the vein every 24 hours for 36 days  0     Vitamin D3 (CHOLECALCIFEROL) 25 mcg (1000 units) tablet Take 50 mcg by mouth daily       Wound Cleanser LIQD Externally apply topically daily for 30 days 236 mL bottle please (or similar amount), 1 refill. Apply to R foot wounds as directed. Please mail to  patient from his home pharmacy ([facility]). 236 mL 1     zolpidem ER (AMBIEN CR) 12.5 MG CR tablet Take 12.5 mg by mouth nightly as needed for sleep       ALLERGY:  Allergies   Allergen Reactions     Atorvastatin      Other reaction(s): Hyperglycemia     Celebrex [Celecoxib] Other (See Comments)     [facility]     Labs reviewed in EPIC    OBJECTIVE:                                                    GENERAL: healthy, alert and no distress  EYES: Eyes grossly normal to inspection  NECK: supple  RESP: breathing comfortably on room air. no cough  SKIN: no suspicious lesions or rashes, right foot - wound vac+ ( photos in chart reviewed)  NEURO: mentation intact and speech normal  PSYCH: mentation appears normal, affect normal  PICC      ASSESSMENT AND PLAN:                                                      1. S/p right Charcot foot reconstruction on 11/17/2022 complicated by post op infection   - 1/20/23 - started on Pip/Tazobactam and Vancomycin IV   - 1/21/23 -  s/p I+D of right foot, placement of wound vac and antibiotic beads . Intra-op findings: Medial wound: purulent material, probed directly to plate and bone; Lateral wound: no purulence, probed to bone, no hardware present laterally. Intra-Op cultures are growing Staph epidermidisx2 ( oxacillin Resistant)  and Staph caprae.      2.  Diabetes mellitus (HbA1c 5.6 in Jan 2023) with peripheral neuropathy   - HbA1c around 5.6 in the past 4 years per patient     3. CRP was 69 and ESR was 59 with WBC 7.8 on 1/20/23 --> CRP 14 ( 1/23/23) --> 7.4 (1/26/23) - 7.87 ( 1/30/23) <3 (2/13/23)      RECOMMENDATION:  - can stop Vancomycin IV and  Rifampin   - can remove PICC   - start Doxycycline 100 mg po BID, concerns for possible osteomyelitis and persistent drainage   - probiotic daily     - video f/u w me on 3/8/23 at 8.30 am        Pete Arambula MD, M.Med.Sc  Division of Infectious Diseases and International Medicine  [facility]  [place]      30  minutes was spent with patient, chart review, documentation, care coordination : on 2/15/23

## 2023-02-15 NOTE — LETTER
2/15/2023       RE: Nehemiah Magallon  [address]  [place] [address]     Dear Colleague,    Thank you for referring your patient, Nehemiah Magallon, to the [facility] INFECTIOUS DISEASE CLINIC [place] at [facility]. Please see a copy of my visit note below.    Video-Visit Details    Nehemiah is a 57 year old who is being evaluated via a billable video visit.    How would you like to obtain your AVS? MyChart  If the video visit is dropped, the invitation should be resent by: Text to cell phone: [phone]  Will anyone else be joining your video visit? No       Video-Visit Details  Type of service: Video Visit   video time: 11.20 am to 11.33 am     Originating Location (pt. 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 Periop he recieved Cefazolin. Per Nehemiah, he was then discharged on Augmentin x 7 days.  He was using a cast for 2.5-3 weeks post surgery. He then noticed a scab but no drainage (reviewed photo on his phone). Then the wound was covered with ace wrap and he thought the initial cast then the ace wrap could have caused the wound. He had kept the surgical site dry, and would air it (without  dressing) when he sat down at home but applied would dressing when he went to bed.  Last Adriano 1/15/23, he noticed purulent drainage and deepening of the wound. He was prescribed with Augmentin 875 mg po q12 hr x 14 days on 1/16/23.  He went to see Dr Starr on 1/20/23 and directly admitted on 1/20/2023 from Ortho clinic for increasing drainage and wound necrosis of right foot surgical sites. Wound specimen was obtained before starting empiric Pip/tazobactam and Vancomycin IV on 1/20/23. He underwent I+D of right foot, placement of wound vac and antibiotic beads on 1/21/23 . Intra-op findings: Medial wound: purulent material, probed directly to plate and bone; Lateral wound: no purulence, probed to bone, no hardware present laterally. Intra-Op cultures on 1/21/23 and 1/20/23 grew Staph epidermidisx2 ( oxacillin Resistant)  and Staph caprae.     He was discharged on Vancomycin IV and Rifampin 300 mg po q12 hr ( activity against biofilm, due to hardware in place) .     Video Follow-up on 2/1/23  Nehemiah is doing well no pain. afebrile. soft stools but no abdominal pain, nausea, vomiting. has good appetite. no pain around PICC.     Video follow-up on 2/15/23  Nehemiah feels better. less drainage from lateral wound. wound vac to lateral wound had been discontinued. Medial wound still drains but less. Diabetes is under better control.   has some GI upset, nausea with rifampin. He also complains of some itching around PICC. no diarrhea. taking probiotic.     Problem list and histories reviewed & adjusted, as indicated.  Additional history: as documented    PAST MEDICAL HISTORY:  Patient  has a past medical history of Chronic pain syndrome (10/24/2014), Diabetes mellitus, type 2 (H), Diabetic Charcot foot (H), Diabetic retinopathy associated with diabetes mellitus due to underlying condition (H), Diverticulitis of colon, Gastroesophageal reflux disease, Hepatitis C (2017), History of skin cancer, Hypertension, Hypothyroidism,  Legally blind, Midfoot collapse of right lower extremity, Migraine, NAFLD (nonalcoholic fatty liver disease), Nephrolithiasis, Neuropathy, MARGARITO (obstructive sleep apnea), Rib pain (10/24/2014), S/P colectomy (10/14/2014), and Thyroid cancer (H) (10/14/2014).    PAST SURGICAL HISTORY:  Patient  has a past surgical history that includes colectomy; Cholecystectomy (1986); colonoscopy (2016); Foot surgery (Left, 2021); Arthrodesis foot (Right, 11/17/2022); Lengthen tendon achilles (Right, 11/17/2022); and Irrigation and debridement foot, combined (Right, 1/21/2023).    CURRENT MEDICATIONS:  Current Outpatient Medications   Medication Sig Dispense Refill     doxycycline hyclate (VIBRAMYCIN) 100 MG capsule Take 1 capsule (100 mg) by mouth 2 times daily for 14 days 28 capsule 0     acetaminophen (TYLENOL) 325 MG tablet Take 2 tablets (650 mg) by mouth every 8 hours as needed for mild pain, fever or headaches 60 tablet 0     ammonium lactate (LAC-HYDRIN) 12 % external lotion APPLY THIN LAYER TOPICALLY TWICE A DAY AS NEEDED FOR DRY SKIN **EXTERNAL USE ONLY**       aspirin (ASA) 81 MG EC tablet Take 1 tablet (81 mg) by mouth 2 times daily 60 tablet 0     Calcium-Vitamin D 500-3.125 MG-MCG TABS Take 2 tablets by mouth 2 times daily       carboxymethylcellulose PF (REFRESH PLUS) 0.5 % ophthalmic solution 1 drop 3 times daily as needed for dry eyes       cetirizine (ZYRTEC) 10 MG tablet Take 10 mg by mouth daily       diclofenac (VOLTAREN) 1 % topical gel Apply 4 g topically 4 times daily as needed for moderate pain (4-6)       dimethicone 1 % external cream Apply topically daily for 30 days Dispense one 120 ml bottle please and send to patient from home pharmacy ([facility]) 120 mL 1     empagliflozin (JARDIANCE) 25 MG TABS tablet Take 25 mg by mouth daily       fluticasone (FLONASE) 50 MCG/ACT nasal spray Spray 2 sprays into both nostrils daily       furosemide (LASIX) 40 MG tablet Take 40 mg by mouth daily as needed (hand  "swelling, weight >215 lbs)       gabapentin (NEURONTIN) 400 MG capsule Take 3 capsules (1,200 mg) by mouth 3 times daily 90 capsule 0     Gauze Pads & Dressings (TELFA AMD ISLAND DRESSING) 4\"X8\" PADS 1 Units daily 45 each 1     levothyroxine (SYNTHROID/LEVOTHROID) 137 MCG tablet Take 137 mcg by mouth daily       lidocaine (LIDODERM) 5 % patch Apply up to 3 patches to painful area at once for up to 12 h within a 24 h period.  Remove after 12 hours. (Patient taking differently: Apply up to 3 patches to painful area at once for up to 12 h within a 24 h period.  Remove after 12 hours. L foot.) 90 patch 0     linaclotide (LINZESS) 145 MCG capsule Take 1 capsule by mouth daily       lisinopril (ZESTRIL) 40 MG tablet Take 40 mg by mouth daily       metFORMIN (GLUCOPHAGE XR) 500 MG 24 hr tablet Take 1,000 mg by mouth 2 times daily (with meals)       methocarbamol (ROBAXIN) 750 MG tablet Take 1 tablet (750 mg) by mouth every 6 hours as needed for muscle spasms 15 tablet 0     omeprazole (PRILOSEC) 20 MG DR capsule Take 20 mg by mouth daily       oxyCODONE (ROXICODONE) 5 MG tablet Take 1 tablet (5 mg) by mouth every 4 hours as needed for moderate pain (4-6) (Patient not taking: Reported on 2/10/2023) 30 tablet 0     polyethylene glycol (MIRALAX) 17 g packet Take 17 g by mouth daily (Patient not taking: Reported on 2/10/2023) 10 packet 0     pravastatin (PRAVACHOL) 40 MG tablet Take 40 mg by mouth every evening       rifampin (RIFADIN) 300 MG capsule Take 1 capsule (300 mg) by mouth every 12 hours 52 capsule 0     Semaglutide (OZEMPIC, 1 MG/DOSE, SC) Inject 1 mg Subcutaneous once a week Sundays       senna-docusate (SENOKOT-S/PERICOLACE) 8.6-50 MG tablet Take 1 tablet by mouth 2 times daily (Patient not taking: Reported on 2/10/2023) 60 tablet 0     SUMAtriptan (IMITREX) 25 MG tablet Take 25 mg by mouth at onset of headache       topiramate (TOPAMAX) 100 MG tablet Take 150 mg by mouth 2 times daily       vancomycin 1,750 mg " Inject 1,750 mg into the vein every 24 hours for 36 days  0     Vitamin D3 (CHOLECALCIFEROL) 25 mcg (1000 units) tablet Take 50 mcg by mouth daily       Wound Cleanser LIQD Externally apply topically daily for 30 days 236 mL bottle please (or similar amount), 1 refill. Apply to R foot wounds as directed. Please mail to patient from his home pharmacy ([facility]). 236 mL 1     zolpidem ER (AMBIEN CR) 12.5 MG CR tablet Take 12.5 mg by mouth nightly as needed for sleep       ALLERGY:  Allergies   Allergen Reactions     Atorvastatin      Other reaction(s): Hyperglycemia     Celebrex [Celecoxib] Other (See Comments)     [facility]     Labs reviewed in EPIC    OBJECTIVE:                                                    GENERAL: healthy, alert and no distress  EYES: Eyes grossly normal to inspection  NECK: supple  RESP: breathing comfortably on room air. no cough  SKIN: no suspicious lesions or rashes, right foot - wound vac+ ( photos in chart reviewed)  NEURO: mentation intact and speech normal  PSYCH: mentation appears normal, affect normal  PICC      ASSESSMENT AND PLAN:                                                      1. S/p right Charcot foot reconstruction on 11/17/2022 complicated by post op infection   - 1/20/23 - started on Pip/Tazobactam and Vancomycin IV   - 1/21/23 -  s/p I+D of right foot, placement of wound vac and antibiotic beads . Intra-op findings: Medial wound: purulent material, probed directly to plate and bone; Lateral wound: no purulence, probed to bone, no hardware present laterally. Intra-Op cultures are growing Staph epidermidisx2 ( oxacillin Resistant)  and Staph caprae.      2.  Diabetes mellitus (HbA1c 5.6 in Jan 2023) with peripheral neuropathy   - HbA1c around 5.6 in the past 4 years per patient     3. CRP was 69 and ESR was 59 with WBC 7.8 on 1/20/23 --> CRP 14 ( 1/23/23) --> 7.4 (1/26/23) - 7.87 ( 1/30/23) <3 (2/13/23)      RECOMMENDATION:  - can stop Vancomycin IV  and  Rifampin   - can remove PICC   - start Doxycycline 100 mg po BID, concerns for possible osteomyelitis and persistent drainage   - probiotic daily     - video f/u w me on 3/8/23 at 8.30 am        Pete Arambula MD, M.Med.Sc  Division of Infectious Diseases and International Medicine  [facility]      30  minutes was spent with patient, chart review, documentation, care coordination : on 2/15/23

## 2023-02-15 NOTE — NURSING NOTE
Is the patient currently in the state of [place]? YES    Visit mode:VIDEO    If the visit is dropped, the patient can be reconnected by: VIDEO VISIT: Text to cell phone: [phone]    Will anyone else be joining the visit? NO      How would you like to obtain your AVS? MyChart    Are changes needed to the allergy or medication list? NO    Comments or concerns regarding today's visit:     Pt. Reports side effects from medication and would like to discuss.     Nivia Ervin on 2/15/2023 at 11:03 AM

## 2023-02-17 NOTE — TELEPHONE ENCOUNTER
[facility] Call Center    Phone Message    May a detailed message be left on voicemail: yes     Reason for Call: Other: Pharmacy does not carry medication Dimethicone % external cream 120 mL. Would like an alternative if possible.     Action Taken: Other: FAVIO ORKAMI    Travel Screening: Not Applicable

## 2023-02-20 NOTE — PROGRESS NOTES
Received Completed forms Yes   Faxed Forms Faxed To: [facility]  Fax Number: [phone]   Sent to HIM (Date) 2/20/23

## 2023-02-21 NOTE — TELEPHONE ENCOUNTER
[facility] called [facility] and spoke with Didi and per Dr. Starr. It is okay for patient to have clear moisture barrier.    Herlinda Nicolas RN

## 2023-02-21 NOTE — TELEPHONE ENCOUNTER
[facility] Call Center    Phone Message    May a detailed message be left on voicemail: no     Reason for Call: Medication Change Request  [facility]'t [facility] [place] Moisture Barrier external cream- they are wondering if it can be changed to clear moisture barrier       Action Taken: Message routed to:  [facility]    Travel Screening: Not Applicable

## 2023-03-02 NOTE — TELEPHONE ENCOUNTER
Telephone Call    Nehemiah reached out and informed that he developed generalized pruritic raised rash involving body, arms, back on Friday . He denies prolonged sun exposure recently. He stopped Doxycycline on last Friday and contacted his PCP on Monday. PCP started him on calamine lotion andscheduled  benadryl. He denies any lips/ tongue swelling. His PCP has been contacting him daily.     Consider oral prednisone if rash is not improving. He agrees and  says his PCP had mentioned prednisone to him.     Dr Arambula

## 2023-03-08 NOTE — PROGRESS NOTES
INFECTIOUS DISEASES PROGRESS NOTE    Infectious Diseases Clinic     SUBJECTIVE:                                                      Nehemiah Magallon is a 57 year old male who presents to clinic today for the following health issues: Post hospital Discharge follow-up    Nehemiah Magallon is a 57 year old male, a retired RN, [place] [facility] Diabetes mellitus (HbA1c 5.6 in Jan 2023 ) with retinopathy, legal blindness, peripheral neuropathy, papillary thyroid malignancy in remission, hypothyroidism, GERD, MARGARITO, Hep C s/p treatment (2017), HTN, NAFLD, colectomy for diverticulitis (2014), bilateral hearing loss and using bilateral hearing aids, chronic pain, s/p right Charcot foot reconstruction on 11/17/2022. Periop he recieved Cefazolin. Per [place], he was then discharged on Augmentin x 7 days.  He was using a cast for 2.5-3 weeks post surgery. He then noticed a scab but no drainage (reviewed photo on his phone). Then the wound was covered with ace wrap and he thought the initial cast then the ace wrap could have caused the wound. He had kept the surgical site dry, and would air it (without dressing) when he sat down at home but applied would dressing when he went to bed.  Last Adriano 1/15/23, he noticed purulent drainage and deepening of the wound. He was prescribed with Augmentin 875 mg po q12 hr x 14 days on 1/16/23.  He went to see Dr Starr on 1/20/23 and directly admitted on 1/20/2023 from Ortho clinic for increasing drainage and wound necrosis of right foot surgical sites. Wound specimen was obtained before starting empiric Pip/tazobactam and Vancomycin IV on 1/20/23. He underwent I+D of right foot, placement of wound vac and antibiotic beads on 1/21/23 . Intra-op findings: Medial wound: purulent material, probed directly to plate and bone; Lateral wound: no purulence, probed to bone, no hardware present laterally. Intra-Op cultures on 1/21/23 and 1/20/23 grew Staph epidermidisx2 ( oxacillin Resistant)  and Staph caprae.     He  was discharged on Vancomycin IV and Rifampin 300 mg po q12 hr ( activity against biofilm, due to hardware in place) .     Video Follow-up on 2/1/23  Nehemiah is doing well no pain. afebrile. soft stools but no abdominal pain, nausea, vomiting. has good appetite. no pain around PICC.     Video follow-up on 2/15/23  Nehemiah feels better. less drainage from lateral wound. wound vac to lateral wound had been discontinued. Medial wound still drains but less. Diabetes is under better control.   has some GI upset, nausea with rifampin. He also complains of some itching around PICC. no diarrhea. taking probiotic.     Clinic follow-up on 3/8/23  Nehemiah is feeling better. The itching and papular rashes are getting better. No fever, chills. right foot lateral wound is healing well, and the medial side is getting better, filling in. he says there had been not much drainage recently. There was some wound vac leakage and wound vac was discontinued earlier today.     Vancomycin and Rifamoin were discontinued on 2/15/23 and started on oral doxycycline at that time. He had tolerated Doxycycline many times in the past. He developed significant pruritus and papular rashes over his body, and arms . denies any new medication/s besides doxycycline. He saw his PCP and started him on oral benadryl and increased his zyrtec . He is feeling better.  Tolerated Bactrim in the past.     Problem list and histories reviewed & adjusted, as indicated.  Additional history: as documented    PAST MEDICAL HISTORY:  Patient  has a past medical history of Chronic pain syndrome (10/24/2014), Diabetes mellitus, type 2 (H), Diabetic Charcot foot (H), Diabetic retinopathy associated with diabetes mellitus due to underlying condition (H), Diverticulitis of colon, Gastroesophageal reflux disease, Hepatitis C (2017), History of skin cancer, Hypertension, Hypothyroidism, Legally blind, Midfoot collapse of right lower extremity, Migraine, NAFLD (nonalcoholic fatty liver  disease), Nephrolithiasis, Neuropathy, MARGARITO (obstructive sleep apnea), Rib pain (10/24/2014), S/P colectomy (10/14/2014), and Thyroid cancer (H) (10/14/2014).    PAST SURGICAL HISTORY:  Patient  has a past surgical history that includes colectomy; Cholecystectomy (1986); colonoscopy (2016); Foot surgery (Left, 2021); Arthrodesis foot (Right, 11/17/2022); Lengthen tendon achilles (Right, 11/17/2022); and Irrigation and debridement foot, combined (Right, 1/21/2023).    CURRENT MEDICATIONS:  Current Outpatient Medications   Medication Sig Dispense Refill     acetaminophen (TYLENOL) 325 MG tablet Take 2 tablets (650 mg) by mouth every 8 hours as needed for mild pain, fever or headaches 60 tablet 0     ammonium lactate (LAC-HYDRIN) 12 % external lotion APPLY THIN LAYER TOPICALLY TWICE A DAY AS NEEDED FOR DRY SKIN **EXTERNAL USE ONLY**       aspirin (ASA) 81 MG EC tablet Take 1 tablet (81 mg) by mouth 2 times daily 60 tablet 0     Calcium-Vitamin D 500-3.125 MG-MCG TABS Take 2 tablets by mouth 2 times daily       carboxymethylcellulose PF (REFRESH PLUS) 0.5 % ophthalmic solution 1 drop 3 times daily as needed for dry eyes       [place] Moisture Barrier external cream Apply daily as directed to skin 92 g 6     cetirizine (ZYRTEC) 10 MG tablet Take 10 mg by mouth daily       diclofenac (VOLTAREN) 1 % topical gel Apply 4 g topically 4 times daily as needed for moderate pain (4-6)       dimethicone 1 % external cream Apply topically daily for 30 days Dispense one 120 ml bottle please and send to patient from home pharmacy ([facility]) 120 mL 1     diphenhydrAMINE (BENADRYL) 25 MG capsule 25 mg       empagliflozin (JARDIANCE) 25 MG TABS tablet Take 25 mg by mouth daily       fluticasone (FLONASE) 50 MCG/ACT nasal spray Spray 2 sprays into both nostrils daily       furosemide (LASIX) 40 MG tablet Take 40 mg by mouth daily as needed (hand swelling, weight >215 lbs)       gabapentin (NEURONTIN) 400 MG capsule Take 3  "capsules (1,200 mg) by mouth 3 times daily 90 capsule 0     Gauze Pads & Dressings (TELFA [place] DRESSING) 4\"X8\" PADS 1 Units daily 45 each 1     levothyroxine (SYNTHROID/LEVOTHROID) 137 MCG tablet Take 137 mcg by mouth daily       lidocaine (LIDODERM) 5 % patch Apply up to 3 patches to painful area at once for up to 12 h within a 24 h period.  Remove after 12 hours. (Patient taking differently: Apply up to 3 patches to painful area at once for up to 12 h within a 24 h period.  Remove after 12 hours. L foot.) 90 patch 0     linaclotide (LINZESS) 145 MCG capsule Take 1 capsule by mouth daily       lisinopril (ZESTRIL) 40 MG tablet Take 40 mg by mouth daily       metFORMIN (GLUCOPHAGE XR) 500 MG 24 hr tablet Take 1,000 mg by mouth 2 times daily (with meals)       methocarbamol (ROBAXIN) 750 MG tablet Take 1 tablet (750 mg) by mouth every 6 hours as needed for muscle spasms 15 tablet 0     omeprazole (PRILOSEC) 20 MG DR capsule Take 20 mg by mouth daily       pravastatin (PRAVACHOL) 40 MG tablet Take 40 mg by mouth every evening       Semaglutide (OZEMPIC, 1 MG/DOSE, SC) Inject 1 mg Subcutaneous once a week Sundays       SUMAtriptan (IMITREX) 25 MG tablet Take 25 mg by mouth at onset of headache       topiramate (TOPAMAX) 100 MG tablet Take 150 mg by mouth 2 times daily       Vitamin D3 (CHOLECALCIFEROL) 25 mcg (1000 units) tablet Take 50 mcg by mouth daily       Wound Cleanser LIQD Externally apply topically daily for 30 days 236 mL bottle please (or similar amount), 1 refill. Apply to R foot wounds as directed. Please mail to patient from his home pharmacy ([facility]). 236 mL 1     zolpidem ER (AMBIEN CR) 12.5 MG CR tablet Take 12.5 mg by mouth nightly as needed for sleep       oxyCODONE (ROXICODONE) 5 MG tablet Take 1 tablet (5 mg) by mouth every 4 hours as needed for moderate pain (4-6) (Patient not taking: Reported on 2/10/2023) 30 tablet 0     polyethylene glycol (MIRALAX) 17 g packet Take 17 g by mouth " daily (Patient not taking: Reported on 2/10/2023) 10 packet 0     senna-docusate (SENOKOT-S/PERICOLACE) 8.6-50 MG tablet Take 1 tablet by mouth 2 times daily (Patient not taking: Reported on 2/10/2023) 60 tablet 0     ALLERGY:  Allergies   Allergen Reactions     Atorvastatin      Other reaction(s): Hyperglycemia     Celebrex [Celecoxib] Other (See Comments)     [facility]     Doxycycline Rash     Labs reviewed in EPIC    OBJECTIVE:                                                    Vitals: /85 (BP Location: Right arm, Patient Position: Sitting, Cuff Size: Adult Large)   Pulse 95   Temp 98.3  F (36.8  C) (Oral)   SpO2 99%   BMI= There is no height or weight on file to calculate BMI.  GENERAL: healthy, alert and no distress  EYES: Eyes grossly normal to inspection  NECK: supple  RESP: breathing comfortably on room air. no cough  SKIN: few papular lesions, on his abdomen . no urticarial rashes/ redness.  right foot - lateral wound is healed, no drainage and the medial wound, is with minimal bleeding when the wound vac is removed. no maldor . wound is swabbed and sent for culture. wound is washed with saline , tissue looks healthy. wound is redressed . wound vac will be restarted when he gets home     NEURO: mentation intact and speech normal  PSYCH: mentation appears normal, affect normal     ASSESSMENT AND PLAN:                                                      1. S/p right Charcot foot reconstruction on 11/17/2022 complicated by post op infection   - 1/20/23 - started on Pip/Tazobactam and Vancomycin IV   - 1/21/23 -  s/p I+D of right foot, placement of wound vac and antibiotic beads . Intra-op findings: Medial wound: purulent material, probed directly to plate and bone; Lateral wound: no purulence, probed to bone, no hardware present laterally. Intra-Op cultures are growing Staph epidermidisx2 (oxacillin Resistant) and Staph caprae. ( all doxycycline sensitive)  - 2/15/23 - Vancomycin IV  and Rifampin were stopped and started on Doxycycline.  - 2/24/23 - Doxycycline was stopped due to papular lesions on his body, back, arms , associated with itching   - 3/8/23 - papular lesions and pruritus - improving with benadryl and Zyrtec     2.  Diabetes mellitus (HbA1c 5.6 in Jan 2023) with peripheral neuropathy   - HbA1c around 5.6 in the past 4 years per patient     3. CRP was 69 and ESR was 59 with WBC 7.8 on 1/20/23 --> CRP 14 ( 1/23/23) --> 7.4 (1/26/23) - 7.87 ( 1/30/23) <3 (2/13/23)   4. Acute kidney injury      RECOMMENDATION:  - follow-up wound culture ( right foot - medial wound)   - CBC diff, CMP, CRP  - continue wound vac per Ortho   - continue to monitor renal function. stay well hydrated and avoid nephrotoxins    - clinic follow-up w me on 3/29/23 at 9.30 am      Pete Arambula MD, M.Med.Sc  Division of Infectious Diseases and International Medicine  [facility]      40  minutes was spent with patient, chart review, documentation, care coordination : on 3/8/23

## 2023-03-08 NOTE — LETTER
3/8/2023       RE: Nehemiah Magallon  [address]  [place] [address]     Dear Colleague,    Thank you for referring your patient, Nehemiah Magallon, to the [facility] INFECTIOUS DISEASE CLINIC [place] [facility]. Please see a copy of my visit note below.    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 Periop he recieved Cefazolin. Per Nehemiah, he was then discharged on Augmentin x 7 days.  He was using a cast for 2.5-3 weeks post surgery. He then noticed a scab but no drainage (reviewed photo on his phone). Then the wound was covered with ace wrap and he thought the initial cast then the ace wrap could have caused the wound. He had kept the surgical site dry, and would air it (without dressing) when he sat down at home but applied would dressing when he went to bed.  Last Adriano 1/15/23, he noticed purulent drainage and deepening of the wound. He was prescribed with Augmentin 875 mg po q12 hr x 14 days on 1/16/23.  He went to see Dr Starr on 1/20/23 and directly admitted on 1/20/2023 from Ortho clinic for increasing drainage and wound necrosis of right foot surgical sites. Wound specimen was obtained before starting empiric Pip/tazobactam and Vancomycin IV on 1/20/23. He underwent I+D of right foot,  placement of wound vac and antibiotic beads on 1/21/23 . Intra-op findings: Medial wound: purulent material, probed directly to plate and bone; Lateral wound: no purulence, probed to bone, no hardware present laterally. Intra-Op cultures on 1/21/23 and 1/20/23 grew Staph epidermidisx2 ( oxacillin Resistant)  and Staph caprae.     He was discharged on Vancomycin IV and Rifampin 300 mg po q12 hr ( activity against biofilm, due to hardware in place) .     Video Follow-up on 2/1/23  Nehemiah is doing well no pain. afebrile. soft stools but no abdominal pain, nausea, vomiting. has good appetite. no pain around PICC.     Video follow-up on 2/15/23  Nehemiah feels better. less drainage from lateral wound. wound vac to lateral wound had been discontinued. Medial wound still drains but less. Diabetes is under better control.   has some GI upset, nausea with rifampin. He also complains of some itching around PICC. no diarrhea. taking probiotic.     Clinic follow-up on 3/8/23  Nehemiah is feeling better. The itching and papular rashes are getting better. No fever, chills. right foot lateral wound is healing well, and the medial side is getting better, filling in. he says there had been not much drainage recently. There was some wound vac leakage and wound vac was discontinued earlier today.     Vancomycin and Rifamoin were discontinued on 2/15/23 and started on oral doxycycline at that time. He had tolerated Doxycycline many times in the past. He developed significant pruritus and papular rashes over his body, and arms . denies any new medication/s besides doxycycline. He saw his PCP and started him on oral benadryl and increased his zyrtec . He is feeling better.  Tolerated Bactrim in the past.     Problem list and histories reviewed & adjusted, as indicated.  Additional history: as documented    PAST MEDICAL HISTORY:  Patient  has a past medical history of Chronic pain syndrome (10/24/2014), Diabetes mellitus, type 2 (H), Diabetic  Charcot foot (H), Diabetic retinopathy associated with diabetes mellitus due to underlying condition (H), Diverticulitis of colon, Gastroesophageal reflux disease, Hepatitis C (2017), History of skin cancer, Hypertension, Hypothyroidism, Legally blind, Midfoot collapse of right lower extremity, Migraine, NAFLD (nonalcoholic fatty liver disease), Nephrolithiasis, Neuropathy, MARGARITO (obstructive sleep apnea), Rib pain (10/24/2014), S/P colectomy (10/14/2014), and Thyroid cancer (H) (10/14/2014).    PAST SURGICAL HISTORY:  Patient  has a past surgical history that includes colectomy; Cholecystectomy (1986); colonoscopy (2016); Foot surgery (Left, 2021); Arthrodesis foot (Right, 11/17/2022); Lengthen tendon achilles (Right, 11/17/2022); and Irrigation and debridement foot, combined (Right, 1/21/2023).    CURRENT MEDICATIONS:  Current Outpatient Medications   Medication Sig Dispense Refill     acetaminophen (TYLENOL) 325 MG tablet Take 2 tablets (650 mg) by mouth every 8 hours as needed for mild pain, fever or headaches 60 tablet 0     ammonium lactate (LAC-HYDRIN) 12 % external lotion APPLY THIN LAYER TOPICALLY TWICE A DAY AS NEEDED FOR DRY SKIN **EXTERNAL USE ONLY**       aspirin (ASA) 81 MG EC tablet Take 1 tablet (81 mg) by mouth 2 times daily 60 tablet 0     Calcium-Vitamin D 500-3.125 MG-MCG TABS Take 2 tablets by mouth 2 times daily       carboxymethylcellulose PF (REFRESH PLUS) 0.5 % ophthalmic solution 1 drop 3 times daily as needed for dry eyes       [place] Moisture Barrier external cream Apply daily as directed to skin 92 g 6     cetirizine (ZYRTEC) 10 MG tablet Take 10 mg by mouth daily       diclofenac (VOLTAREN) 1 % topical gel Apply 4 g topically 4 times daily as needed for moderate pain (4-6)       dimethicone 1 % external cream Apply topically daily for 30 days Dispense one 120 ml bottle please and send to patient from home pharmacy ([facility]) 120 mL 1     diphenhydrAMINE (BENADRYL) 25 MG capsule  "25 mg       empagliflozin (JARDIANCE) 25 MG TABS tablet Take 25 mg by mouth daily       fluticasone (FLONASE) 50 MCG/ACT nasal spray Spray 2 sprays into both nostrils daily       furosemide (LASIX) 40 MG tablet Take 40 mg by mouth daily as needed (hand swelling, weight >215 lbs)       gabapentin (NEURONTIN) 400 MG capsule Take 3 capsules (1,200 mg) by mouth 3 times daily 90 capsule 0     Gauze Pads & Dressings (TELFA [place] DRESSING) 4\"X8\" PADS 1 Units daily 45 each 1     levothyroxine (SYNTHROID/LEVOTHROID) 137 MCG tablet Take 137 mcg by mouth daily       lidocaine (LIDODERM) 5 % patch Apply up to 3 patches to painful area at once for up to 12 h within a 24 h period.  Remove after 12 hours. (Patient taking differently: Apply up to 3 patches to painful area at once for up to 12 h within a 24 h period.  Remove after 12 hours. L foot.) 90 patch 0     linaclotide (LINZESS) 145 MCG capsule Take 1 capsule by mouth daily       lisinopril (ZESTRIL) 40 MG tablet Take 40 mg by mouth daily       metFORMIN (GLUCOPHAGE XR) 500 MG 24 hr tablet Take 1,000 mg by mouth 2 times daily (with meals)       methocarbamol (ROBAXIN) 750 MG tablet Take 1 tablet (750 mg) by mouth every 6 hours as needed for muscle spasms 15 tablet 0     omeprazole (PRILOSEC) 20 MG DR capsule Take 20 mg by mouth daily       pravastatin (PRAVACHOL) 40 MG tablet Take 40 mg by mouth every evening       Semaglutide (OZEMPIC, 1 MG/DOSE, SC) Inject 1 mg Subcutaneous once a week Sundays       SUMAtriptan (IMITREX) 25 MG tablet Take 25 mg by mouth at onset of headache       topiramate (TOPAMAX) 100 MG tablet Take 150 mg by mouth 2 times daily       Vitamin D3 (CHOLECALCIFEROL) 25 mcg (1000 units) tablet Take 50 mcg by mouth daily       Wound Cleanser LIQD Externally apply topically daily for 30 days 236 mL bottle please (or similar amount), 1 refill. Apply to R foot wounds as directed. Please mail to patient from his home pharmacy ([facility]). 236 mL 1     " zolpidem ER (AMBIEN CR) 12.5 MG CR tablet Take 12.5 mg by mouth nightly as needed for sleep       oxyCODONE (ROXICODONE) 5 MG tablet Take 1 tablet (5 mg) by mouth every 4 hours as needed for moderate pain (4-6) (Patient not taking: Reported on 2/10/2023) 30 tablet 0     polyethylene glycol (MIRALAX) 17 g packet Take 17 g by mouth daily (Patient not taking: Reported on 2/10/2023) 10 packet 0     senna-docusate (SENOKOT-S/PERICOLACE) 8.6-50 MG tablet Take 1 tablet by mouth 2 times daily (Patient not taking: Reported on 2/10/2023) 60 tablet 0     ALLERGY:  Allergies   Allergen Reactions     Atorvastatin      Other reaction(s): Hyperglycemia     Celebrex [Celecoxib] Other (See Comments)     [facility]     Doxycycline Rash     Labs reviewed in EPIC    OBJECTIVE:                                                    Vitals: /85 (BP Location: Right arm, Patient Position: Sitting, Cuff Size: Adult Large)   Pulse 95   Temp 98.3  F (36.8  C) (Oral)   SpO2 99%   BMI= There is no height or weight on file to calculate BMI.  GENERAL: healthy, alert and no distress  EYES: Eyes grossly normal to inspection  NECK: supple  RESP: breathing comfortably on room air. no cough  SKIN: few papular lesions, on his abdomen . no urticarial rashes/ redness.  right foot - lateral wound is healed, no drainage and the medial wound, is with minimal bleeding when the wound vac is removed. no maldor . wound is swabbed and sent for culture. wound is washed with saline , tissue looks healthy. wound is redressed . wound vac will be restarted when he gets home     NEURO: mentation intact and speech normal  PSYCH: mentation appears normal, affect normal     ASSESSMENT AND PLAN:                                                      1. S/p right Charcot foot reconstruction on 11/17/2022 complicated by post op infection   - 1/20/23 - started on Pip/Tazobactam and Vancomycin IV   - 1/21/23 -  s/p I+D of right foot, placement of wound vac  and antibiotic beads . Intra-op findings: Medial wound: purulent material, probed directly to plate and bone; Lateral wound: no purulence, probed to bone, no hardware present laterally. Intra-Op cultures are growing Staph epidermidisx2 (oxacillin Resistant) and Staph caprae. ( all doxycycline sensitive)  - 2/15/23 - Vancomycin IV and Rifampin were stopped and started on Doxycycline.  - 2/24/23 - Doxycycline was stopped due to papular lesions on his body, back, arms , associated with itching   - 3/8/23 - papular lesions and pruritus - improving with benadryl and Zyrtec     2.  Diabetes mellitus (HbA1c 5.6 in Jan 2023) with peripheral neuropathy   - HbA1c around 5.6 in the past 4 years per patient     3. CRP was 69 and ESR was 59 with WBC 7.8 on 1/20/23 --> CRP 14 ( 1/23/23) --> 7.4 (1/26/23) - 7.87 ( 1/30/23) <3 (2/13/23)   4. Acute kidney injury      RECOMMENDATION:  - follow-up wound culture ( right foot - medial wound)   - CBC diff, CMP, CRP  - continue wound vac per Ortho   - continue to monitor renal function. stay well hydrated and avoid nephrotoxins    - clinic follow-up w me on 3/29/23 at 9.30 am      Pete Arambula MD, M.Med.Sc  Division of Infectious Diseases and International Medicine  [facility]      40  minutes was spent with patient, chart review, documentation, care coordination : on 3/8/23

## 2023-03-08 NOTE — NURSING NOTE
Chief Complaint   Patient presents with     RECHECK     Wound infection     Blood pressure 138/85, pulse 95, temperature 98.3  F (36.8  C), temperature source Oral, SpO2 99 %.    Danis Loya on 3/8/2023 at 8:16 AM

## 2023-03-10 NOTE — PROGRESS NOTES
Chief complaint: Follow-up wound infection, right foot.  Date of surgery: 1/17/2022 right foot Charcot reconstruction and percutaneous tendo Achilles lengthening.  1/21/2023 I&D right foot and VAC application. Intraoperative cultures positive for Staphylococcus caprae and Staphylococcus epidermidis.    I saw Mr. Magallon today in scheduled f/u for his right foot wounds. He has been having VAC changes for the medial wound. The lateral wound has healed well enough that the VAC has been d/c'ed from that side. Under the care of home health nursing. Also saw ID recently - cultures from R foot grew S. Caprae and S. epidermidis. He had been on doxycycline after completing vanco/rifampin (which were stopped on  2/15), but he had to stop the doxycycline on 02/24 due to a systemic rash which is still in the process of resolving. This has caused some more pain in his L achilles as well where he's had previous surgery. The L foot reconstruction is otherwise doing well, though he does have neuropathic pain there. Denies fevers, chills, sweats.    Exam shows him to be pelasant as always, courteous, cooperative, and in no acute distress. Nonspetic appearing. He has his VAC machine with him. The medial right foot VAC dressing is removed for exam. Both the  medial and lateral wounds appear to be healing well. They are both carefully and sterilely probed and I am unable to probe to bone or hardware. There is no visibly exposed bone or hardware. Good granulation tissue appears to be present in both. No malodor or purulence. The lateral wound has become rather small. The medial wound appears to be improving. No surrounding erythema or blistering. No sinus tracts found. No surrounding fluctuance.    Plan  - Cont NWB R LE.  - Temporarily hold VAC changes on R medial wound per home health recommendations - they will start MediHoney. We discussed that the wound may improve, or worsen in which case we can then resume the  VAC.  - Pt reports  Diclofenac gel has worked well for L foot pain and has even helped with the scars - continue.  - f/u 4/7/23, reexamine B feet, and review weightbearing xrays B feet  - All questions answered.

## 2023-03-10 NOTE — LETTER
3/10/2023         RE: Nehemiah Magallon  [address]  [place] [address]        Dear Colleague,    Thank you for referring your patient, Nehemiah Magallon, to the [facility] ORTHOPEDIC CLINIC [place]. Please see a copy of my visit note below.    Chief complaint: Follow-up wound infection, right foot.  Date of surgery: 1/17/2022 right foot Charcot reconstruction and percutaneous tendo Achilles lengthening.  1/21/2023 I&D right foot and VAC application. Intraoperative cultures positive for Staphylococcus caprae and Staphylococcus epidermidis.    I saw Mr. Magallon today in scheduled f/u for his right foot wounds. He has been having VAC changes for the medial wound. The lateral wound has healed well enough that the VAC has been d/c'ed from that side. Under the care of home health nursing. Also saw ID recently - cultures from R foot grew S. Caprae and S. epidermidis. He had been on doxycycline after completing vanco/rifampin (which were stopped on  2/15), but he had to stop the doxycycline on 02/24 due to a systemic rash which is still in the process of resolving. This has caused some more pain in his L achilles as well where he's had previous surgery. The L foot reconstruction is otherwise doing well, though he does have neuropathic pain there. Denies fevers, chills, sweats.    Exam shows him to be pelasant as always, courteous, cooperative, and in no acute distress. Nonspetic appearing. He has his VAC machine with him. The medial right foot VAC dressing is removed for exam. Both the  medial and lateral wounds appear to be healing well. They are both carefully and sterilely probed and I am unable to probe to bone or hardware. There is no visibly exposed bone or hardware. Good granulation tissue appears to be present in both. No malodor or purulence. The lateral wound has become rather small. The medial wound appears to be improving. No surrounding erythema or blistering. No sinus tracts found. No  surrounding fluctuance.    Plan  - Cont NWB R LE.  - Temporarily hold VAC changes on R medial wound per home health recommendations - they will start MediHoney. We discussed that the wound may improve, or worsen in which case we can then resume the  VAC.  - Pt reports Diclofenac gel has worked well for L foot pain and has even helped with the scars - continue.  - f/u 4/7/23, reexamine B feet, and review weightbearing xrays B feet  - All questions answered.    Sincerely,        Bryon Starr MD

## 2023-03-29 NOTE — LETTER
3/29/2023       RE: Nehemiah Magallon  [address]  [place] [address]     Dear Colleague,    Thank you for referring your patient, Nehemiah Magallon, to the [facility] INFECTIOUS DISEASE CLINIC [place] at [facility]. Please see a copy of my visit note below.    INFECTIOUS DISEASES PROGRESS NOTE    Infectious Diseases Clinic     SUBJECTIVE:                                                      Nehemiah Magallon is a 57 year old male who presents to clinic today for the following health issues: Post hospital Discharge follow-up    Nehemiah Magallon is a 57 year old male, a retired RN, [place] [facility] Diabetes mellitus (HbA1c 5.6 in Jan 2023 ) with retinopathy, legal blindness, peripheral neuropathy, papillary thyroid malignancy in remission, hypothyroidism, GERD, MARGARITO, Hep C s/p treatment (2017), HTN, NAFLD, colectomy for diverticulitis (2014), bilateral hearing loss and using bilateral hearing aids, chronic pain, s/p right Charcot foot reconstruction on 11/17/2022. Periop he recieved Cefazolin. Per Nehemiah, he was then discharged on Augmentin x 7 days.  He was using a cast for 2.5-3 weeks post surgery. He then noticed a scab but no drainage (reviewed photo on his phone). Then the wound was covered with ace wrap and he thought the initial cast then the ace wrap could have caused the wound. He had kept the surgical site dry, and would air it (without dressing) when he sat down at home but applied would dressing when he went to bed.  Last Adriano 1/15/23, he noticed purulent drainage and deepening of the wound. He was prescribed with Augmentin 875 mg po q12 hr x 14 days on 1/16/23.  He went to see Dr Starr on 1/20/23 and directly admitted on 1/20/2023 from Ortho clinic for increasing drainage and wound necrosis of right foot surgical sites. Wound specimen was obtained before starting empiric Pip/tazobactam and Vancomycin IV on 1/20/23. He underwent I+D of right foot,  placement of wound vac and antibiotic beads on 1/21/23 . Intra-op findings: Medial wound: purulent material, probed directly to plate and bone; Lateral wound: no purulence, probed to bone, no hardware present laterally. Intra-Op cultures on 1/21/23 and 1/20/23 grew Staph epidermidisx2 ( oxacillin Resistant)  and Staph caprae.     He was discharged on Vancomycin IV and Rifampin 300 mg po q12 hr ( activity against biofilm, due to hardware in place) .     Video Follow-up on 2/1/23  Nehemiah is doing well no pain. afebrile. soft stools but no abdominal pain, nausea, vomiting. has good appetite. no pain around PICC.     Video follow-up on 2/15/23  Nehemiah feels better. less drainage from lateral wound. wound vac to lateral wound had been discontinued. Medial wound still drains but less. Diabetes is under better control.   has some GI upset, nausea with rifampin. He also complains of some itching around PICC. no diarrhea. taking probiotic.     Clinic follow-up on 3/8/23  Nehemiah is feeling better. The itching and papular rashes are getting better. No fever, chills. right foot lateral wound is healing well, and the medial side is getting better, filling in. he says there had been not much drainage recently. There was some wound vac leakage and wound vac was discontinued earlier today.     Vancomycin and Rifamoin were discontinued on 2/15/23 and started on oral doxycycline at that time. He had tolerated Doxycycline many times in the past. He developed significant pruritus and papular rashes over his body, and arms . denies any new medication/s besides doxycycline. He saw his PCP and started him on oral benadryl and increased his zyrtec . He is feeling better.  Tolerated Bactrim in the past.     Problem list and histories reviewed & adjusted, as indicated.  Additional history: as documented    PAST MEDICAL HISTORY:  Patient  has a past medical history of Chronic pain syndrome (10/24/2014), Diabetes mellitus, type 2 (H), Diabetic  Charcot foot (H), Diabetic retinopathy associated with diabetes mellitus due to underlying condition (H), Diverticulitis of colon, Gastroesophageal reflux disease, Hepatitis C (2017), History of skin cancer, Hypertension, Hypothyroidism, Legally blind, Midfoot collapse of right lower extremity, Migraine, NAFLD (nonalcoholic fatty liver disease), Nephrolithiasis, Neuropathy, MARGARITO (obstructive sleep apnea), Rib pain (10/24/2014), S/P colectomy (10/14/2014), and Thyroid cancer (H) (10/14/2014).    PAST SURGICAL HISTORY:  Patient  has a past surgical history that includes colectomy; Cholecystectomy (1986); colonoscopy (2016); Foot surgery (Left, 2021); Arthrodesis foot (Right, 11/17/2022); Lengthen tendon achilles (Right, 11/17/2022); and Irrigation and debridement foot, combined (Right, 1/21/2023).    CURRENT MEDICATIONS:  Current Outpatient Medications   Medication Sig Dispense Refill     acetaminophen (TYLENOL) 325 MG tablet Take 2 tablets (650 mg) by mouth every 8 hours as needed for mild pain, fever or headaches 60 tablet 0     ammonium lactate (LAC-HYDRIN) 12 % external lotion APPLY THIN LAYER TOPICALLY TWICE A DAY AS NEEDED FOR DRY SKIN **EXTERNAL USE ONLY**       aspirin (ASA) 81 MG EC tablet Take 1 tablet (81 mg) by mouth 2 times daily 60 tablet 0     Calcium-Vitamin D 500-3.125 MG-MCG TABS Take 2 tablets by mouth 2 times daily       carboxymethylcellulose PF (REFRESH PLUS) 0.5 % ophthalmic solution 1 drop 3 times daily as needed for dry eyes       [place] Moisture Barrier external cream Apply daily as directed to skin 92 g 6     cephALEXin (KEFLEX) 500 MG capsule Take 1 capsule (500 mg) by mouth 4 times daily for 10 days 40 capsule 0     cetirizine (ZYRTEC) 10 MG tablet Take 10 mg by mouth daily       diclofenac (VOLTAREN) 1 % topical gel Apply 4 g topically 4 times daily as needed for moderate pain (4-6)       diphenhydrAMINE (BENADRYL) 25 MG capsule 25 mg       empagliflozin (JARDIANCE) 25 MG TABS tablet  "Take 25 mg by mouth daily       fluticasone (FLONASE) 50 MCG/ACT nasal spray Spray 2 sprays into both nostrils daily       furosemide (LASIX) 40 MG tablet Take 40 mg by mouth daily as needed (hand swelling, weight >215 lbs)       gabapentin (NEURONTIN) 400 MG capsule Take 3 capsules (1,200 mg) by mouth 3 times daily 90 capsule 0     Gauze Pads & Dressings (TELFA [place] DRESSING) 4\"X8\" PADS 1 Units daily 45 each 1     levothyroxine (SYNTHROID/LEVOTHROID) 137 MCG tablet Take 137 mcg by mouth daily       lidocaine (LIDODERM) 5 % patch Apply up to 3 patches to painful area at once for up to 12 h within a 24 h period.  Remove after 12 hours. (Patient taking differently: Apply up to 3 patches to painful area at once for up to 12 h within a 24 h period.  Remove after 12 hours. L foot.) 90 patch 0     linaclotide (LINZESS) 145 MCG capsule Take 1 capsule by mouth daily       lisinopril (ZESTRIL) 40 MG tablet Take 40 mg by mouth daily       metFORMIN (GLUCOPHAGE XR) 500 MG 24 hr tablet Take 1,000 mg by mouth 2 times daily (with meals)       methocarbamol (ROBAXIN) 750 MG tablet Take 1 tablet (750 mg) by mouth every 6 hours as needed for muscle spasms 15 tablet 0     omeprazole (PRILOSEC) 20 MG DR capsule Take 20 mg by mouth daily       pravastatin (PRAVACHOL) 40 MG tablet Take 40 mg by mouth every evening       Semaglutide (OZEMPIC, 1 MG/DOSE, SC) Inject 1 mg Subcutaneous once a week Sundays       SUMAtriptan (IMITREX) 25 MG tablet Take 25 mg by mouth at onset of headache       topiramate (TOPAMAX) 100 MG tablet Take 150 mg by mouth 2 times daily       Vitamin D3 (CHOLECALCIFEROL) 25 mcg (1000 units) tablet Take 50 mcg by mouth daily       zolpidem ER (AMBIEN CR) 12.5 MG CR tablet Take 12.5 mg by mouth nightly as needed for sleep       oxyCODONE (ROXICODONE) 5 MG tablet Take 1 tablet (5 mg) by mouth every 4 hours as needed for moderate pain (4-6) (Patient not taking: Reported on 2/10/2023) 30 tablet 0     polyethylene " glycol (MIRALAX) 17 g packet Take 17 g by mouth daily (Patient not taking: Reported on 2/10/2023) 10 packet 0     senna-docusate (SENOKOT-S/PERICOLACE) 8.6-50 MG tablet Take 1 tablet by mouth 2 times daily (Patient not taking: Reported on 2/10/2023) 60 tablet 0     ALLERGY:  Allergies   Allergen Reactions     Atorvastatin      Other reaction(s): Hyperglycemia     Celebrex [Celecoxib] Other (See Comments)     [facility]     Doxycycline Rash     Labs reviewed in EPIC    OBJECTIVE:                                                    Vitals: /80 (BP Location: Right arm, Patient Position: Sitting, Cuff Size: Adult Large)   Pulse 86   Temp 98.2  F (36.8  C) (Oral)   SpO2 98%   BMI= There is no height or weight on file to calculate BMI.  GENERAL: healthy, alert and no distress  EYES: Eyes grossly normal to inspection  NECK: Supple  RESP: breathing comfortably on room air. no cough  SKIN: skin lesions - has significantly improved. few scratch marks.   NEURO: mentation intact and speech normal   PSYCH: mentation appears normal, affect normal     ASSESSMENT AND PLAN:                                                      1. S/p right Charcot foot reconstruction on 11/17/2022 complicated by post op infection   - 1/20/23 - started on Pip/Tazobactam and Vancomycin IV   - 1/21/23 -  s/p I+D of right foot, placement of wound vac and antibiotic beads . Intra-op findings: Medial wound: purulent material, probed directly to plate and bone; Lateral wound: no purulence, probed to bone, no hardware present laterally. Intra-Op cultures are growing Staph epidermidisx2 (oxacillin Resistant) and Staph caprae. ( all doxycycline sensitive)  - 2/15/23 - Vancomycin IV and Rifampin were stopped and started on Doxycycline.  - 2/24/23 - Doxycycline was stopped due to papular lesions on his body, back, arms, associated with itching   - 3/8/23 - Papular lesions and pruritus - improving with benadryl and Zyrtec     2.  Diabetes  mellitus (HbA1c 5.6 in Jan 2023) with peripheral neuropathy   - HbA1c around 5.6 in the past 4 years per patient     3. CRP was 69 and ESR was 59 with WBC 7.8 on 1/20/23 --> CRP 14 ( 1/23/23) --> 7.4 (1/26/23) - 7.87 (1/30/23) <3 (2/13/23) <3 (3/8/23)   4. Acute kidney injury      RECOMMENDATION:  - follow-up wound culture (right foot - medial wound)   - keflex for another 10 days   - probiotic daily     - clinic follow-up w me on 4/26/23 at 11.30 am      Pete Arambula MD, M.Med.Sc  Division of Infectious Diseases and International Medicine  [facility]      30  minutes was spent with patient, chart review, documentation, care coordination: 3/29/23

## 2023-03-29 NOTE — NURSING NOTE
Chief Complaint   Patient presents with     RECHECK     Surgical site infection     Blood pressure 124/80, pulse 86, temperature 98.2  F (36.8  C), temperature source Oral, SpO2 98 %.    Danis Loya on 3/29/2023 at 9:25 AM

## 2023-03-30 NOTE — PROGRESS NOTES
INFECTIOUS DISEASES PROGRESS NOTE    Infectious Diseases Clinic     SUBJECTIVE:                                                      Nehemiah Magallon is a 57 year old male who presents to clinic today for the following health issues: Post hospital Discharge follow-up    Nehemiah Magallon is a 57 year old male, a retired RN, [place] [employer] [facility] Diabetes mellitus (HbA1c 5.6 in Jan 2023 ) with retinopathy, legal blindness, peripheral neuropathy, papillary thyroid malignancy in remission, hypothyroidism, GERD, MARGARITO, Hep C s/p treatment (2017), HTN, NAFLD, colectomy for diverticulitis (2014), bilateral hearing loss and using bilateral hearing aids, chronic pain, s/p right Charcot foot reconstruction on 11/17/2022. Periop he recieved Cefazolin. Per Nehemiah, he was then discharged on Augmentin x 7 days.  He was using a cast for 2.5-3 weeks post surgery. He then noticed a scab but no drainage (reviewed photo on his phone). Then the wound was covered with ace wrap and he thought the initial cast then the ace wrap could have caused the wound. He had kept the surgical site dry, and would air it (without dressing) when he sat down at home but applied would dressing when he went to bed.  Last Adriano 1/15/23, he noticed purulent drainage and deepening of the wound. He was prescribed with Augmentin 875 mg po q12 hr x 14 days on 1/16/23.  He went to see Dr Starr on 1/20/23 and directly admitted on 1/20/2023 from Ortho clinic for increasing drainage and wound necrosis of right foot surgical sites. Wound specimen was obtained before starting empiric Pip/tazobactam and Vancomycin IV on 1/20/23. He underwent I+D of right foot, placement of wound vac and antibiotic beads on 1/21/23 . Intra-op findings: Medial wound: purulent material, probed directly to plate and bone; Lateral wound: no purulence, probed to bone, no hardware present laterally. Intra-Op cultures on 1/21/23 and 1/20/23 grew Staph epidermidisx2 ( oxacillin Resistant)  and Staph caprae.     He  was discharged on Vancomycin IV and Rifampin 300 mg po q12 hr ( activity against biofilm, due to hardware in place) .     Video Follow-up on 2/1/23  Nehemiah is doing well no pain. afebrile. soft stools but no abdominal pain, nausea, vomiting. has good appetite. no pain around PICC.     Video follow-up on 2/15/23  Nehemiah feels better. less drainage from lateral wound. wound vac to lateral wound had been discontinued. Medial wound still drains but less. Diabetes is under better control.   has some GI upset, nausea with rifampin. He also complains of some itching around PICC. no diarrhea. taking probiotic.     Clinic follow-up on 3/8/23  Nehemiah is feeling better. The itching and papular rashes are getting better. No fever, chills. right foot lateral wound is healing well, and the medial side is getting better, filling in. he says there had been not much drainage recently. There was some wound vac leakage and wound vac was discontinued earlier today.     Vancomycin and Rifamoin were discontinued on 2/15/23 and started on oral doxycycline at that time. He had tolerated Doxycycline many times in the past. He developed significant pruritus and papular rashes over his body, and arms . denies any new medication/s besides doxycycline. He saw his PCP and started him on oral benadryl and increased his zyrtec . He is feeling better.  Tolerated Bactrim in the past.     Problem list and histories reviewed & adjusted, as indicated.  Additional history: as documented    PAST MEDICAL HISTORY:  Patient  has a past medical history of Chronic pain syndrome (10/24/2014), Diabetes mellitus, type 2 (H), Diabetic Charcot foot (H), Diabetic retinopathy associated with diabetes mellitus due to underlying condition (H), Diverticulitis of colon, Gastroesophageal reflux disease, Hepatitis C (2017), History of skin cancer, Hypertension, Hypothyroidism, Legally blind, Midfoot collapse of right lower extremity, Migraine, NAFLD (nonalcoholic fatty liver  disease), Nephrolithiasis, Neuropathy, MARGARITO (obstructive sleep apnea), Rib pain (10/24/2014), S/P colectomy (10/14/2014), and Thyroid cancer (H) (10/14/2014).    PAST SURGICAL HISTORY:  Patient  has a past surgical history that includes colectomy; Cholecystectomy (1986); colonoscopy (2016); Foot surgery (Left, 2021); Arthrodesis foot (Right, 11/17/2022); Lengthen tendon achilles (Right, 11/17/2022); and Irrigation and debridement foot, combined (Right, 1/21/2023).    CURRENT MEDICATIONS:  Current Outpatient Medications   Medication Sig Dispense Refill     acetaminophen (TYLENOL) 325 MG tablet Take 2 tablets (650 mg) by mouth every 8 hours as needed for mild pain, fever or headaches 60 tablet 0     ammonium lactate (LAC-HYDRIN) 12 % external lotion APPLY THIN LAYER TOPICALLY TWICE A DAY AS NEEDED FOR DRY SKIN **EXTERNAL USE ONLY**       aspirin (ASA) 81 MG EC tablet Take 1 tablet (81 mg) by mouth 2 times daily 60 tablet 0     Calcium-Vitamin D 500-3.125 MG-MCG TABS Take 2 tablets by mouth 2 times daily       carboxymethylcellulose PF (REFRESH PLUS) 0.5 % ophthalmic solution 1 drop 3 times daily as needed for dry eyes       [place] Moisture Barrier external cream Apply daily as directed to skin 92 g 6     cephALEXin (KEFLEX) 500 MG capsule Take 1 capsule (500 mg) by mouth 4 times daily for 10 days 40 capsule 0     cetirizine (ZYRTEC) 10 MG tablet Take 10 mg by mouth daily       diclofenac (VOLTAREN) 1 % topical gel Apply 4 g topically 4 times daily as needed for moderate pain (4-6)       diphenhydrAMINE (BENADRYL) 25 MG capsule 25 mg       empagliflozin (JARDIANCE) 25 MG TABS tablet Take 25 mg by mouth daily       fluticasone (FLONASE) 50 MCG/ACT nasal spray Spray 2 sprays into both nostrils daily       furosemide (LASIX) 40 MG tablet Take 40 mg by mouth daily as needed (hand swelling, weight >215 lbs)       gabapentin (NEURONTIN) 400 MG capsule Take 3 capsules (1,200 mg) by mouth 3 times daily 90 capsule 0      "Gauze Pads & Dressings (TELFA [place]LAND DRESSING) 4\"X8\" PADS 1 Units daily 45 each 1     levothyroxine (SYNTHROID/LEVOTHROID) 137 MCG tablet Take 137 mcg by mouth daily       lidocaine (LIDODERM) 5 % patch Apply up to 3 patches to painful area at once for up to 12 h within a 24 h period.  Remove after 12 hours. (Patient taking differently: Apply up to 3 patches to painful area at once for up to 12 h within a 24 h period.  Remove after 12 hours. L foot.) 90 patch 0     linaclotide (LINZESS) 145 MCG capsule Take 1 capsule by mouth daily       lisinopril (ZESTRIL) 40 MG tablet Take 40 mg by mouth daily       metFORMIN (GLUCOPHAGE XR) 500 MG 24 hr tablet Take 1,000 mg by mouth 2 times daily (with meals)       methocarbamol (ROBAXIN) 750 MG tablet Take 1 tablet (750 mg) by mouth every 6 hours as needed for muscle spasms 15 tablet 0     omeprazole (PRILOSEC) 20 MG DR capsule Take 20 mg by mouth daily       pravastatin (PRAVACHOL) 40 MG tablet Take 40 mg by mouth every evening       Semaglutide (OZEMPIC, 1 MG/DOSE, SC) Inject 1 mg Subcutaneous once a week Sundays       SUMAtriptan (IMITREX) 25 MG tablet Take 25 mg by mouth at onset of headache       topiramate (TOPAMAX) 100 MG tablet Take 150 mg by mouth 2 times daily       Vitamin D3 (CHOLECALCIFEROL) 25 mcg (1000 units) tablet Take 50 mcg by mouth daily       zolpidem ER (AMBIEN CR) 12.5 MG CR tablet Take 12.5 mg by mouth nightly as needed for sleep       oxyCODONE (ROXICODONE) 5 MG tablet Take 1 tablet (5 mg) by mouth every 4 hours as needed for moderate pain (4-6) (Patient not taking: Reported on 2/10/2023) 30 tablet 0     polyethylene glycol (MIRALAX) 17 g packet Take 17 g by mouth daily (Patient not taking: Reported on 2/10/2023) 10 packet 0     senna-docusate (SENOKOT-S/PERICOLACE) 8.6-50 MG tablet Take 1 tablet by mouth 2 times daily (Patient not taking: Reported on 2/10/2023) 60 tablet 0     ALLERGY:  Allergies   Allergen Reactions     Atorvastatin      Other " reaction(s): Hyperglycemia     Celebrex [Celecoxib] Other (See Comments)     [facility]     Doxycycline Rash     Labs reviewed in EPIC    OBJECTIVE:                                                    Vitals: /80 (BP Location: Right arm, Patient Position: Sitting, Cuff Size: Adult Large)   Pulse 86   Temp 98.2  F (36.8  C) (Oral)   SpO2 98%   BMI= There is no height or weight on file to calculate BMI.  GENERAL: healthy, alert and no distress  EYES: Eyes grossly normal to inspection  NECK: Supple  RESP: breathing comfortably on room air. no cough  SKIN: skin lesions - has significantly improved. few scratch marks.   NEURO: mentation intact and speech normal   PSYCH: mentation appears normal, affect normal     ASSESSMENT AND PLAN:                                                      1. S/p right Charcot foot reconstruction on 11/17/2022 complicated by post op infection   - 1/20/23 - started on Pip/Tazobactam and Vancomycin IV   - 1/21/23 -  s/p I+D of right foot, placement of wound vac and antibiotic beads . Intra-op findings: Medial wound: purulent material, probed directly to plate and bone; Lateral wound: no purulence, probed to bone, no hardware present laterally. Intra-Op cultures are growing Staph epidermidisx2 (oxacillin Resistant) and Staph caprae. ( all doxycycline sensitive)  - 2/15/23 - Vancomycin IV and Rifampin were stopped and started on Doxycycline.  - 2/24/23 - Doxycycline was stopped due to papular lesions on his body, back, arms, associated with itching   - 3/8/23 - Papular lesions and pruritus - improving with benadryl and Zyrtec     2.  Diabetes mellitus (HbA1c 5.6 in Jan 2023) with peripheral neuropathy   - HbA1c around 5.6 in the past 4 years per patient     3. CRP was 69 and ESR was 59 with WBC 7.8 on 1/20/23 --> CRP 14 ( 1/23/23) --> 7.4 (1/26/23) - 7.87 (1/30/23) <3 (2/13/23) <3 (3/8/23)   4. Acute kidney injury      RECOMMENDATION:    - keflex for another 10 days   -  probiotic daily   - has already stopped medihoney. Concerns for possible yeast with medihoney use .   - follow-up wound culture (right foot - medial wound)     - clinic follow-up w me on 4/26/23 at 11.30 am      Pete Arambula MD, M.Med.Sc  Division of Infectious Diseases and International Medicine  [facility]      30  minutes was spent with patient, chart review, documentation, care coordination: 3/29/23

## 2023-04-07 NOTE — LETTER
4/7/2023         RE: Nehemiah Magallon  [address]  [place] [address]        Dear Colleague,    Thank you for referring your patient, Nehemiah Magallon, to the [facility] [place]. Please see a copy of my visit note below.    Chief complaint: Follow-up wound infection, right foot.  Date of surgery: 1/17/2022 right foot Charcot reconstruction and percutaneous tendo Achilles lengthening.  1/21/2023 I&D right foot and VAC application. Intraoperative cultures positive for Staphylococcus caprae and Staphylococcus epidermidis.    I saw Mr. Magallon today in follow-up.  This pleasant 57-year-old man has diabetes mellitus and [facility], and a more recent right Charcot rocker-bottom reconstruction here at the [facility] which has been complicated by wound infection.  The left foot had been doing fine.  He does have however some severe neuropathic pain on it that he does not have on his right.  As result x-rays were obtained of the left today to assess the bony alignment and the hardware.  His right foot wounds continue to heal with the wound care team by report.  He has been having Medihoney applied, and there is apparently a yeast infection present.  Bacterial cultures were negative.  He reports the medial wound has continued to improve.  He does not see the lateral wound.  He denies any fevers or chills.  He is not having pain on the right foot.  He remains nonweightbearing the right lower extremity and does no ambulation.  He does all his weightbearing for pivots and transfers on his left foot.    Examination shows him to be pleasant, cooperative, and nonseptic appearing from a general clinical standpoint.  Breathing pattern is regular and nonlabored on room air.  He is sitting upright in a manual wheelchair.  The right foot medial wound is carefully cleansed with Betadine and dried to absorb  a small amount, about one mL, of serous exudate that appears to be pooling at the base.  The majority the wound has a clean healthy granulating base. The wound is carefully probed, I am unable to probe to bone or metal, nor am I able to probe any sinus tracts.  The surrounding area is nontender to palpation without fluctuance, blistering, or significant erythema.  The lateral wound is completely dry, and for the most part appears fully healed, though there is a dry superficial appearing eschar in the middle.  On the plantar aspect of the foot, there is still a small prominence that I can palpate on the plantar right midfoot, though this appears significantly reduced from preoperative exam, and the region of the cuboid appears to be decompressed.  There does appear to be a small amount redundant skin from correction of the deformity.  There is no plantar foot ulceration or any immediate obvious threat to skin integrity on the plantar side.    Independent review of images was performed including bilateral weightbearing foot radiographs dated 04/07/2023.  Images were discussed with and shown to the patient.  The left foot appears to be stable with a well aligned talonavicular and subtalar arthrodesis in good position with intact hardware.  The slight halo around one of the talonavicular screws in the navicular appears stable and unchanged. 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 The left hallux IP joint and the left subtalar joint both appear to be fused with intact hardware.  There is good maintenance of correction of the previous pes planovalgus deformity without any evidence for loss of reduction.  No obvious acute fractures or infection.  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  The remainder the hardware including the long medial column plate and beaming screw appear to be  intact and stable.  The alignment of the foot appears to be stable, and the foot does still appear to be improved compared with the preoperative rocker deformity.    Assessment: Right rocker bottom Charcot deformity foot reconstruction, with stable alignment with delayed union and broken hardware, as well as a wound infection that appears to be improving with I&D, antibiotic therapy, VAC therapy, and current wound care team dressing changes.  Doing clinically well with a left pes planovalgus reconstruction with stable alignment and neuropathic pain.    Plan:  I discussed with Mr. Magallon that we will continue to follow the wound and radiographs closely, about every 3 weeks and every 6 weeks respectively, and that it is possible that the bony healing may eventually improve without further broken hardware, or that it may not heal any further with nonunion, leading to further broken / loose hardware and loss of reduction.  I discussed that there is a possibility he may need additional surgery to treat this.  I discussed with him that it is reassuring the left foot continues to do well, and discussed the findings on that side as well.  All questions were answered.  Follow-up in 3 weeks for wound check.  A new sterile Mepilex dressing was applied today for the right foot.  The visit after that, in 6 weeks, we plan on obtaining repeat weightbearing right foot radiographs.  Continue nonweightbearing on the right lower extremity.  I did discuss the recommendation to wear a boot on the right side, though he expressed his preference to wear a shoe.      Sincerely,        Bryon Starr MD

## 2023-04-07 NOTE — PROGRESS NOTES
Chief complaint: Follow-up wound infection, right foot.  Date of surgery: 1/17/2022 right foot Charcot reconstruction and percutaneous tendo Achilles lengthening.  1/21/2023 I&D right foot and VAC application. Intraoperative cultures positive for Staphylococcus caprae and Staphylococcus epidermidis.    I saw Mr. Magallon today in follow-up.  This pleasant 57-year-old man has diabetes mellitus and [facility], and a more recent right Charcot rocker-bottom reconstruction here at the [facility] which has been complicated by wound infection.  The left foot had been doing fine.  He does have however some severe neuropathic pain on it that he does not have on his right.  As result x-rays were obtained of the left today to assess the bony alignment and the hardware.  His right foot wounds continue to heal with the wound care team by report.  He has been having Medihoney applied, and there is apparently a yeast infection present.  Bacterial cultures were negative.  He reports the medial wound has continued to improve.  He does not see the lateral wound.  He denies any fevers or chills.  He is not having pain on the right foot.  He remains nonweightbearing the right lower extremity and does no ambulation.  He does all his weightbearing for pivots and transfers on his left foot.    Examination shows him to be pleasant, cooperative, and nonseptic appearing from a general clinical standpoint.  Breathing pattern is regular and nonlabored on room air.  He is sitting upright in a manual wheelchair.  The right foot medial wound is carefully cleansed with Betadine and dried to absorb a small amount, about one mL, of serous exudate that appears to be pooling at the base.  The majority the wound has a clean healthy granulating base. The wound is carefully probed, I am unable to probe to bone or metal, nor am I able to probe any sinus tracts.  The  surrounding area is nontender to palpation without fluctuance, blistering, or significant erythema.  The lateral wound is completely dry, and for the most part appears fully healed, though there is a dry superficial appearing eschar in the middle.  On the plantar aspect of the foot, there is still a small prominence that I can palpate on the plantar right midfoot, though this appears significantly reduced from preoperative exam, and the region of the cuboid appears to be decompressed.  There does appear to be a small amount redundant skin from correction of the deformity.  There is no plantar foot ulceration or any immediate obvious threat to skin integrity on the plantar side.    Independent review of images was performed including bilateral weightbearing foot radiographs dated 04/07/2023.  Images were discussed with and shown to the patient.  The left foot appears to be stable with a well aligned talonavicular and subtalar arthrodesis in good position with intact hardware.  The slight halo around one of the talonavicular screws in the navicular appears stable and unchanged. 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 The left hallux IP joint and the left subtalar joint both appear to be fused with intact hardware.  There is good maintenance of correction of the previous pes planovalgus deformity without any evidence for loss of reduction.  No obvious acute fractures or infection.  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  The remainder the hardware including the long medial column plate and beaming screw appear to be intact and stable.  The alignment of the foot appears to be stable, and the foot does still appear to be improved compared with the preoperative rocker deformity.    Assessment: Right rocker bottom Charcot deformity foot reconstruction, with stable alignment with  delayed union and broken hardware, as well as a wound infection that appears to be improving with I&D, antibiotic therapy, VAC therapy, and current wound care team dressing changes.  Doing clinically well with a left pes planovalgus reconstruction with stable alignment and neuropathic pain.    Plan:  I discussed with Mr. Magallon that we will continue to follow the wound and radiographs closely, about every 3 weeks and every 6 weeks respectively, and that it is possible that the bony healing may eventually improve without further broken hardware, or that it may not heal any further with nonunion, leading to further broken / loose hardware and loss of reduction.  I discussed that there is a possibility he may need additional surgery to treat this.  I discussed with him that it is reassuring the left foot continues to do well, and discussed the findings on that side as well.  All questions were answered.  Follow-up in 3 weeks for wound check.  A new sterile Mepilex dressing was applied today for the right foot.  The visit after that, in 6 weeks, we plan on obtaining repeat weightbearing right foot radiographs.  Continue nonweightbearing on the right lower extremity.  I did discuss the recommendation to wear a boot on the right side, though he expressed his preference to wear a shoe.

## 2023-04-08 PROBLEM — M14.679 CHARCOT ARTHROPATHY OF MIDFOOT: Status: ACTIVE | Noted: 2023-01-01

## 2023-04-08 PROBLEM — I96 NECROSIS OF SURGICAL WOUND (H): Status: ACTIVE | Noted: 2023-01-01

## 2023-04-08 PROBLEM — M21.42 PES PLANOVALGUS, ACQUIRED, LEFT: Status: ACTIVE | Noted: 2023-01-01

## 2023-04-08 PROBLEM — I97.89 NECROSIS OF SURGICAL WOUND (H): Status: ACTIVE | Noted: 2023-01-01

## 2023-04-11 NOTE — PROGRESS NOTES
Received Completed forms Yes   Faxed Forms Faxed To: [facility]   Fax Number: [phone]   Sent to HIM (Date) 4/7/23

## 2023-04-24 NOTE — PROGRESS NOTES
Received Completed forms Yes   Faxed Forms Faxed To: [facility]  Fax Number: [phone]   Sent to HIM (Date) 4/21/23

## 2023-04-26 NOTE — PROGRESS NOTES
INFECTIOUS DISEASES PROGRESS NOTE    Infectious Diseases Clinic     SUBJECTIVE:                                                      Nehemiah Magallon is a 57 year old male who presents to clinic today for the following health issues: Post hospital Discharge follow-up    Nehemiah Magallon is a 57 year old male, a retired RN, [employer] Diabetes mellitus (HbA1c 5.6 in Jan 2023 ) with retinopathy, legal blindness, peripheral neuropathy, papillary thyroid malignancy in remission, hypothyroidism, GERD, MARGARITO, Hep C s/p treatment (2017), HTN, NAFLD, colectomy for diverticulitis (2014), bilateral hearing loss and using bilateral hearing aids, chronic pain, s/p right Charcot foot reconstruction on 11/17/2022. Periop he recieved Cefazolin. Per Nehemiah, he was then discharged on Augmentin x 7 days.  He was using a cast for 2.5-3 weeks post surgery. He then noticed a scab but no drainage (reviewed photo on his phone). Then the wound was covered with ace wrap and he thought the initial cast then the ace wrap could have caused the wound. He had kept the surgical site dry, and would air it (without dressing) when he sat down at home but applied would dressing when he went to bed.  Last Adriano 1/15/23, he noticed purulent drainage and deepening of the wound. He was prescribed with Augmentin 875 mg po q12 hr x 14 days on 1/16/23.  He went to see Dr Starr on 1/20/23 and directly admitted on 1/20/2023 from Ortho clinic for increasing drainage and wound necrosis of right foot surgical sites. Wound specimen was obtained before starting empiric Pip/tazobactam and Vancomycin IV on 1/20/23. He underwent I+D of right foot, placement of wound vac and antibiotic beads on 1/21/23 . Intra-op findings: Medial wound: purulent material, probed directly to plate and bone; Lateral wound: no purulence, probed to bone, no hardware present laterally. Intra-Op cultures on 1/21/23 and 1/20/23 grew Staph epidermidisx2 ( oxacillin Resistant)  and Staph caprae.     He  was discharged on Vancomycin IV and Rifampin 300 mg po q12 hr ( activity against biofilm, due to hardware in place) .     Video Follow-up on 2/1/23  Nehemiah is doing well no pain. afebrile. soft stools but no abdominal pain, nausea, vomiting. has good appetite. no pain around PICC.     Video follow-up on 2/15/23  Nehemiah feels better. less drainage from lateral wound. wound vac to lateral wound had been discontinued. Medial wound still drains but less. Diabetes is under better control.   has some GI upset, nausea with rifampin. He also complains of some itching around PICC. no diarrhea. taking probiotic.     Clinic follow-up on 3/8/23  Nehemiah is feeling better. The itching and papular rashes are getting better. No fever, chills. right foot lateral wound is healing well, and the medial side is getting better, filling in. he says there had been not much drainage recently. There was some wound vac leakage and wound vac was discontinued earlier today.     Vancomycin and Rifampin were discontinued on 2/15/23 and started on oral doxycycline at that time. He had tolerated Doxycycline many times in the past. He developed significant pruritus and papular rashes over his body, and arms . denies any new medication/s besides doxycycline. He saw his PCP and started him on oral benadryl and increased his zyrtec . He is feeling better.  Tolerated Bactrim in the past.     Clinic follow-up 4/26/23  Nehemiah is doing well. right foot medial wound is closing and is shallower. no significant drainage over 2 weeks. Today, minimal drainage on the wound dressing. he changes wound dressing 1x/daily and he applies iodine to the wound. lateral wound is scabbed over.     no diarrhea. has good appetite.     Problem list and histories reviewed & adjusted, as indicated.  Additional history: as documented    PAST MEDICAL HISTORY:  Patient  has a past medical history of Chronic pain syndrome (10/24/2014), Diabetes mellitus, type 2 (H), Diabetic Charcot foot  (H), Diabetic retinopathy associated with diabetes mellitus due to underlying condition (H), Diverticulitis of colon, Gastroesophageal reflux disease, Hepatitis C (2017), History of skin cancer, Hypertension, Hypothyroidism, Legally blind, Midfoot collapse of right lower extremity, Migraine, NAFLD (nonalcoholic fatty liver disease), Nephrolithiasis, Neuropathy, MARGARITO (obstructive sleep apnea), Rib pain (10/24/2014), S/P colectomy (10/14/2014), and Thyroid cancer (H) (10/14/2014).    PAST SURGICAL HISTORY:  Patient  has a past surgical history that includes colectomy; Cholecystectomy (1986); colonoscopy (2016); Foot surgery (Left, 2021); Arthrodesis foot (Right, 11/17/2022); Lengthen tendon achilles (Right, 11/17/2022); and Irrigation and debridement foot, combined (Right, 1/21/2023).    CURRENT MEDICATIONS:  Current Outpatient Medications   Medication Sig Dispense Refill     acetaminophen (TYLENOL) 325 MG tablet Take 2 tablets (650 mg) by mouth every 8 hours as needed for mild pain, fever or headaches 60 tablet 0     ammonium lactate (LAC-HYDRIN) 12 % external lotion APPLY THIN LAYER TOPICALLY TWICE A DAY AS NEEDED FOR DRY SKIN **EXTERNAL USE ONLY**       ammonium lactate (LAC-HYDRIN) 12 % external lotion APPLY THIN LAYER TOPICALLY TWICE A DAY AS NEEDED FOR DRY SKIN **EXTERNAL USE ONLY**       aspirin (ASA) 81 MG EC tablet Take 1 tablet (81 mg) by mouth 2 times daily 60 tablet 0     bacitracin-neomycin-polymyxin (NEOSPORIN) 400-5-5000 external ointment APPLY SMALL AMOUNT TOPICALLY EVERY DAY - ANTIBIOTIC OINTMENT       Calcium-Vitamin D 500-3.125 MG-MCG TABS Take 2 tablets by mouth 2 times daily       carboxymethylcellulose PF (REFRESH PLUS) 0.5 % ophthalmic solution 1 drop 3 times daily as needed for dry eyes       [place] Moisture Barrier external cream Apply daily as directed to skin 92 g 6     cetirizine (ZYRTEC) 10 MG tablet Take 10 mg by mouth daily       diclofenac (VOLTAREN) 1 % topical gel APPLY 4 GRAMS  "TOPICALLY FOUR TIMES A DAY TO AFFECTED AREA FOR PAIN. USE DOSE CARD IN BOX TO MEASURE DOSE. MAX 32 GRAMS TOTAL PER DAY.       diclofenac (VOLTAREN) 1 % topical gel Apply 4 g topically 4 times daily as needed for moderate pain (4-6)       diphenhydrAMINE (BENADRYL) 25 MG capsule 25 mg       empagliflozin (JARDIANCE) 25 MG TABS tablet Take 25 mg by mouth daily       fluticasone (FLONASE) 50 MCG/ACT nasal spray SPRAY 2 SPRAYS IN EACH NOSTRIL TWICE A DAY FOR RELIEF OF ALLERGIES AND CONGESTION -USE REGULARLY FOR BEST RESULTS       fluticasone (FLONASE) 50 MCG/ACT nasal spray Spray 2 sprays into both nostrils daily       furosemide (LASIX) 40 MG tablet Take 40 mg by mouth daily as needed (hand swelling, weight >215 lbs)       gabapentin (NEURONTIN) 400 MG capsule Take 3 capsules (1,200 mg) by mouth 3 times daily 90 capsule 0     gabapentin (NEURONTIN) 600 MG tablet Take 2 tablets by mouth 3 times daily       Gauze Pads & Dressings ([facility] ISLAND DRESSING) 4\"X8\" PADS 1 Units daily 45 each 1     levothyroxine (SYNTHROID) 137 MCG tablet 137 mcg       levothyroxine (SYNTHROID/LEVOTHROID) 137 MCG tablet Take 137 mcg by mouth daily       lidocaine (LIDODERM) 5 % patch APPLY 1 OR 2 PATCHES 5% TOPICALLY EVERY DAY FOR PAIN -WEAR FOR UP TO 12 HOURS THEN REMOVE AND LEAVE OFF FOR 12 HOURS       lidocaine (LIDODERM) 5 % patch Apply up to 3 patches to painful area at once for up to 12 h within a 24 h period.  Remove after 12 hours. (Patient taking differently: Apply up to 3 patches to painful area at once for up to 12 h within a 24 h period.  Remove after 12 hours. L foot.) 90 patch 0     linaclotide (LINZESS) 145 MCG capsule Take 1 capsule by mouth daily       Liniments (VAPORIZING CREAM) 4.7-1.2-2.6 % CREA APPLY A THIN LAYER TOPICALLY FOUR TIMES A DAY AS NEEDED FOR PAIN       lisinopril (ZESTRIL) 40 MG tablet Take 1 tablet by mouth daily       lisinopril (ZESTRIL) 40 MG tablet Take 40 mg by mouth daily       metFORMIN (GLUCOPHAGE " XR) 500 MG 24 hr tablet 1,000 mg       metFORMIN (GLUCOPHAGE XR) 500 MG 24 hr tablet Take 1,000 mg by mouth 2 times daily (with meals)       methocarbamol (ROBAXIN) 750 MG tablet Take 1 tablet (750 mg) by mouth every 6 hours as needed for muscle spasms 15 tablet 0     naproxen (NAPROSYN) 500 MG tablet 500 mg       omeprazole (PRILOSEC) 20 MG DR capsule Take 20 mg by mouth daily       pravastatin (PRAVACHOL) 40 MG tablet Take 40 mg by mouth every evening       Semaglutide (OZEMPIC, 1 MG/DOSE, SC) Inject 1 mg Subcutaneous once a week Sundays       sodium fluoride dental gel (PREVIDENT) 1.1 % GEL topical gel BRUSH SMALL AMOUNT MOUTH EVERY MORNING AND AT BEDTIME ON TOOTHBRUSH, BRUSH FOR 2 MINUTES.       SUMAtriptan (IMITREX) 25 MG tablet 25 mg       SUMAtriptan (IMITREX) 25 MG tablet Take 25 mg by mouth at onset of headache       topiramate (TOPAMAX) 100 MG tablet Take 150 mg by mouth 2 times daily       Vitamin D3 (CHOLECALCIFEROL) 25 mcg (1000 units) tablet Take 50 mcg by mouth daily       zolpidem ER (AMBIEN CR) 12.5 MG CR tablet Take 12.5 mg by mouth nightly as needed for sleep       oxyCODONE (ROXICODONE) 5 MG tablet Take 1 tablet (5 mg) by mouth every 4 hours as needed for moderate pain (4-6) (Patient not taking: Reported on 2/10/2023) 30 tablet 0     polyethylene glycol (MIRALAX) 17 g packet Take 17 g by mouth daily (Patient not taking: Reported on 2/10/2023) 10 packet 0     senna-docusate (SENOKOT-S/PERICOLACE) 8.6-50 MG tablet Take 1 tablet by mouth 2 times daily (Patient not taking: Reported on 2/10/2023) 60 tablet 0     ALLERGY:  Allergies   Allergen Reactions     Atorvastatin      Other reaction(s): Hyperglycemia     Celebrex [Celecoxib] Other (See Comments)     [facility]     Doxycycline Rash     Labs reviewed in EPIC    OBJECTIVE:                                                    Vitals: /77   Pulse 90   Temp 98.2  F (36.8  C) (Oral)   SpO2 98%   BMI= There is no height or weight  on file to calculate BMI.  GENERAL: healthy, alert and no distress  EYES: Eyes grossly normal to inspection  NECK: Supple  RESP: breathing comfortably on room air. no cough  SKIN: right foot - medial wound - shallower, minimal drainage on the wound dressing. specimen from the wound sent for cultures. lateral wound is scabbed over. no signs of infection   NEURO: mentation intact and speech normal   PSYCH: mentation appears normal, affect normal     ASSESSMENT AND PLAN:                                                      1. S/p right Charcot foot reconstruction on 11/17/2022 complicated by post op infection   - 1/20/23 - started on Pip/Tazobactam and Vancomycin IV   - 1/21/23 -  s/p I+D of right foot, placement of wound vac and antibiotic beads . Intra-op findings: Medial wound: purulent material, probed directly to plate and bone; Lateral wound: no purulence, probed to bone, no hardware present laterally. Intra-Op cultures are growing Staph epidermidisx2 (oxacillin Resistant) and Staph caprae. ( all doxycycline sensitive)  - 2/15/23 - Vancomycin IV and Rifampin were stopped and started on Doxycycline.  - 2/24/23 - Doxycycline was stopped due to papular lesions on his body, back, arms, associated with itching   - 3/8/23 - Papular lesions and pruritus - improving with benadryl and Zyrtec     2.  Diabetes mellitus (HbA1c 5.6 in Jan 2023) with peripheral neuropathy   - HbA1c around 5.6 in the past 4 years per patient     3. CRP was 69 and ESR was 59 with WBC 7.8 on 1/20/23 --> CRP 14 ( 1/23/23) --> 7.4 (1/26/23) - 7.87 (1/30/23) <3 (2/13/23) <3 (3/8/23)   4. Acute kidney injury - 3/8/23      RECOMMENDATION:  - follow-up wound culture , continue current wound care   - probiotic daily   - has already stopped medihoney.    - clinic follow-up w me on 5/31/23 at 9.30 am      Pete Arambula MD, M.Med.Sc  Division of Infectious Diseases and International Medicine  [facility]      20  minutes was spent  with patient, chart review, documentation, care coordination: 4/26/23

## 2023-04-26 NOTE — LETTER
4/26/2023       RE: Nehemiah Magallon  [address]  [place] [address]     Dear Colleague,    Thank you for referring your patient, Nehemiah Magallon, to the [facility] INFECTIOUS DISEASE CLINIC [place] at [facility]. Please see a copy of my visit note below.    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 Periop he recieved Cefazolin. Per Nehemiah, he was then discharged on Augmentin x 7 days.  He was using a cast for 2.5-3 weeks post surgery. He then noticed a scab but no drainage (reviewed photo on his phone). Then the wound was covered with ace wrap and he thought the initial cast then the ace wrap could have caused the wound. He had kept the surgical site dry, and would air it (without dressing) when he sat down at home but applied would dressing when he went to bed.  Last Adriano 1/15/23, he noticed purulent drainage and deepening of the wound. He was prescribed with Augmentin 875 mg po q12 hr x 14 days on 1/16/23.  He went to see Dr Starr on 1/20/23 and directly admitted on 1/20/2023 from Ortho clinic for increasing drainage and wound necrosis of right foot surgical sites. Wound specimen was obtained before starting empiric Pip/tazobactam and Vancomycin IV on 1/20/23. He underwent I+D of right foot,  placement of wound vac and antibiotic beads on 1/21/23 . Intra-op findings: Medial wound: purulent material, probed directly to plate and bone; Lateral wound: no purulence, probed to bone, no hardware present laterally. Intra-Op cultures on 1/21/23 and 1/20/23 grew Staph epidermidisx2 ( oxacillin Resistant)  and Staph caprae.     He was discharged on Vancomycin IV and Rifampin 300 mg po q12 hr ( activity against biofilm, due to hardware in place) .     Video Follow-up on 2/1/23  Nehemiah is doing well no pain. afebrile. soft stools but no abdominal pain, nausea, vomiting. has good appetite. no pain around PICC.     Video follow-up on 2/15/23  Nehemiah feels better. less drainage from lateral wound. wound vac to lateral wound had been discontinued. Medial wound still drains but less. Diabetes is under better control.   has some GI upset, nausea with rifampin. He also complains of some itching around PICC. no diarrhea. taking probiotic.     Clinic follow-up on 3/8/23  Nehemiah is feeling better. The itching and papular rashes are getting better. No fever, chills. right foot lateral wound is healing well, and the medial side is getting better, filling in. he says there had been not much drainage recently. There was some wound vac leakage and wound vac was discontinued earlier today.     Vancomycin and Rifampin were discontinued on 2/15/23 and started on oral doxycycline at that time. He had tolerated Doxycycline many times in the past. He developed significant pruritus and papular rashes over his body, and arms . denies any new medication/s besides doxycycline. He saw his PCP and started him on oral benadryl and increased his zyrtec . He is feeling better.  Tolerated Bactrim in the past.     Clinic follow-up 4/26/23  Nehemiah is doing well. right foot medial wound is closing and is shallower. no significant drainage over 2 weeks. Today, minimal drainage on the wound dressing. he changes wound dressing 1x/daily and he applies iodine to  the wound. lateral wound is scabbed over.     no diarrhea. has good appetite.     Problem list and histories reviewed & adjusted, as indicated.  Additional history: as documented    PAST MEDICAL HISTORY:  Patient  has a past medical history of Chronic pain syndrome (10/24/2014), Diabetes mellitus, type 2 (H), Diabetic Charcot foot (H), Diabetic retinopathy associated with diabetes mellitus due to underlying condition (H), Diverticulitis of colon, Gastroesophageal reflux disease, Hepatitis C (2017), History of skin cancer, Hypertension, Hypothyroidism, Legally blind, Midfoot collapse of right lower extremity, Migraine, NAFLD (nonalcoholic fatty liver disease), Nephrolithiasis, Neuropathy, MARGARITO (obstructive sleep apnea), Rib pain (10/24/2014), S/P colectomy (10/14/2014), and Thyroid cancer (H) (10/14/2014).    PAST SURGICAL HISTORY:  Patient  has a past surgical history that includes colectomy; Cholecystectomy (1986); colonoscopy (2016); Foot surgery (Left, 2021); Arthrodesis foot (Right, 11/17/2022); Lengthen tendon achilles (Right, 11/17/2022); and Irrigation and debridement foot, combined (Right, 1/21/2023).    CURRENT MEDICATIONS:  Current Outpatient Medications   Medication Sig Dispense Refill    acetaminophen (TYLENOL) 325 MG tablet Take 2 tablets (650 mg) by mouth every 8 hours as needed for mild pain, fever or headaches 60 tablet 0    ammonium lactate (LAC-HYDRIN) 12 % external lotion APPLY THIN LAYER TOPICALLY TWICE A DAY AS NEEDED FOR DRY SKIN **EXTERNAL USE ONLY**      ammonium lactate (LAC-HYDRIN) 12 % external lotion APPLY THIN LAYER TOPICALLY TWICE A DAY AS NEEDED FOR DRY SKIN **EXTERNAL USE ONLY**      aspirin (ASA) 81 MG EC tablet Take 1 tablet (81 mg) by mouth 2 times daily 60 tablet 0    bacitracin-neomycin-polymyxin (NEOSPORIN) 400-5-5000 external ointment APPLY SMALL AMOUNT TOPICALLY EVERY DAY - ANTIBIOTIC OINTMENT      Calcium-Vitamin D 500-3.125 MG-MCG TABS Take 2 tablets by mouth 2 times daily    "   carboxymethylcellulose PF (REFRESH PLUS) 0.5 % ophthalmic solution 1 drop 3 times daily as needed for dry eyes      [place] Moisture Barrier external cream Apply daily as directed to skin 92 g 6    cetirizine (ZYRTEC) 10 MG tablet Take 10 mg by mouth daily      diclofenac (VOLTAREN) 1 % topical gel APPLY 4 GRAMS TOPICALLY FOUR TIMES A DAY TO AFFECTED AREA FOR PAIN. USE DOSE CARD IN BOX TO MEASURE DOSE. MAX 32 GRAMS TOTAL PER DAY.      diclofenac (VOLTAREN) 1 % topical gel Apply 4 g topically 4 times daily as needed for moderate pain (4-6)      diphenhydrAMINE (BENADRYL) 25 MG capsule 25 mg      empagliflozin (JARDIANCE) 25 MG TABS tablet Take 25 mg by mouth daily      fluticasone (FLONASE) 50 MCG/ACT nasal spray SPRAY 2 SPRAYS IN EACH NOSTRIL TWICE A DAY FOR RELIEF OF ALLERGIES AND CONGESTION -USE REGULARLY FOR BEST RESULTS      fluticasone (FLONASE) 50 MCG/ACT nasal spray Spray 2 sprays into both nostrils daily      furosemide (LASIX) 40 MG tablet Take 40 mg by mouth daily as needed (hand swelling, weight >215 lbs)      gabapentin (NEURONTIN) 400 MG capsule Take 3 capsules (1,200 mg) by mouth 3 times daily 90 capsule 0    gabapentin (NEURONTIN) 600 MG tablet Take 2 tablets by mouth 3 times daily      Gauze Pads & Dressings ([facility] ISLAND DRESSING) 4\"X8\" PADS 1 Units daily 45 each 1    levothyroxine (SYNTHROID) 137 MCG tablet 137 mcg      levothyroxine (SYNTHROID/LEVOTHROID) 137 MCG tablet Take 137 mcg by mouth daily      lidocaine (LIDODERM) 5 % patch APPLY 1 OR 2 PATCHES 5% TOPICALLY EVERY DAY FOR PAIN -WEAR FOR UP TO 12 HOURS THEN REMOVE AND LEAVE OFF FOR 12 HOURS      lidocaine (LIDODERM) 5 % patch Apply up to 3 patches to painful area at once for up to 12 h within a 24 h period.  Remove after 12 hours. (Patient taking differently: Apply up to 3 patches to painful area at once for up to 12 h within a 24 h period.  Remove after 12 hours. L foot.) 90 patch 0    linaclotide (LINZESS) 145 MCG capsule Take 1 " capsule by mouth daily      Liniments (VAPORIZING CREAM) 4.7-1.2-2.6 % CREA APPLY A THIN LAYER TOPICALLY FOUR TIMES A DAY AS NEEDED FOR PAIN      lisinopril (ZESTRIL) 40 MG tablet Take 1 tablet by mouth daily      lisinopril (ZESTRIL) 40 MG tablet Take 40 mg by mouth daily      metFORMIN (GLUCOPHAGE XR) 500 MG 24 hr tablet 1,000 mg      metFORMIN (GLUCOPHAGE XR) 500 MG 24 hr tablet Take 1,000 mg by mouth 2 times daily (with meals)      methocarbamol (ROBAXIN) 750 MG tablet Take 1 tablet (750 mg) by mouth every 6 hours as needed for muscle spasms 15 tablet 0    naproxen (NAPROSYN) 500 MG tablet 500 mg      omeprazole (PRILOSEC) 20 MG DR capsule Take 20 mg by mouth daily      pravastatin (PRAVACHOL) 40 MG tablet Take 40 mg by mouth every evening      Semaglutide (OZEMPIC, 1 MG/DOSE, SC) Inject 1 mg Subcutaneous once a week Sundays      sodium fluoride dental gel (PREVIDENT) 1.1 % GEL topical gel BRUSH SMALL AMOUNT MOUTH EVERY MORNING AND AT BEDTIME ON TOOTHBRUSH, BRUSH FOR 2 MINUTES.      SUMAtriptan (IMITREX) 25 MG tablet 25 mg      SUMAtriptan (IMITREX) 25 MG tablet Take 25 mg by mouth at onset of headache      topiramate (TOPAMAX) 100 MG tablet Take 150 mg by mouth 2 times daily      Vitamin D3 (CHOLECALCIFEROL) 25 mcg (1000 units) tablet Take 50 mcg by mouth daily      zolpidem ER (AMBIEN CR) 12.5 MG CR tablet Take 12.5 mg by mouth nightly as needed for sleep      oxyCODONE (ROXICODONE) 5 MG tablet Take 1 tablet (5 mg) by mouth every 4 hours as needed for moderate pain (4-6) (Patient not taking: Reported on 2/10/2023) 30 tablet 0    polyethylene glycol (MIRALAX) 17 g packet Take 17 g by mouth daily (Patient not taking: Reported on 2/10/2023) 10 packet 0    senna-docusate (SENOKOT-S/PERICOLACE) 8.6-50 MG tablet Take 1 tablet by mouth 2 times daily (Patient not taking: Reported on 2/10/2023) 60 tablet 0     ALLERGY:  Allergies   Allergen Reactions    Atorvastatin      Other reaction(s): Hyperglycemia    Celebrex  [Celecoxib] Other (See Comments)     [facility]    Doxycycline Rash     Labs reviewed in EPIC    OBJECTIVE:                                                    Vitals: /77   Pulse 90   Temp 98.2  F (36.8  C) (Oral)   SpO2 98%   BMI= There is no height or weight on file to calculate BMI.  GENERAL: healthy, alert and no distress  EYES: Eyes grossly normal to inspection  NECK: Supple  RESP: breathing comfortably on room air. no cough  SKIN: right foot - medial wound - shallower, minimal drainage on the wound dressing. specimen from the wound sent for cultures. lateral wound is scabbed over. no signs of infection   NEURO: mentation intact and speech normal   PSYCH: mentation appears normal, affect normal     ASSESSMENT AND PLAN:                                                      1. S/p right Charcot foot reconstruction on 11/17/2022 complicated by post op infection   - 1/20/23 - started on Pip/Tazobactam and Vancomycin IV   - 1/21/23 -  s/p I+D of right foot, placement of wound vac and antibiotic beads . Intra-op findings: Medial wound: purulent material, probed directly to plate and bone; Lateral wound: no purulence, probed to bone, no hardware present laterally. Intra-Op cultures are growing Staph epidermidisx2 (oxacillin Resistant) and Staph caprae. ( all doxycycline sensitive)  - 2/15/23 - Vancomycin IV and Rifampin were stopped and started on Doxycycline.  - 2/24/23 - Doxycycline was stopped due to papular lesions on his body, back, arms, associated with itching   - 3/8/23 - Papular lesions and pruritus - improving with benadryl and Zyrtec     2.  Diabetes mellitus (HbA1c 5.6 in Jan 2023) with peripheral neuropathy   - HbA1c around 5.6 in the past 4 years per patient     3. CRP was 69 and ESR was 59 with WBC 7.8 on 1/20/23 --> CRP 14 ( 1/23/23) --> 7.4 (1/26/23) - 7.87 (1/30/23) <3 (2/13/23) <3 (3/8/23)   4. Acute kidney injury - 3/8/23      RECOMMENDATION:  - follow-up wound culture ,  continue current wound care   - probiotic daily   - has already stopped medihoney.    - clinic follow-up w me on 5/31/23 at 9.30 am      Pete Arambula MD, M.Med.Sc  Division of Infectious Diseases and International Medicine  [facility]      20  minutes was spent with patient, chart review, documentation, care coordination: 4/26/23

## 2023-05-12 NOTE — PROGRESS NOTES
DOS 11/17/2022 R foot Charcot reconstruction, perc DOM  DOS 01/21/2023 I&D R foot, VAC    I saw Mr. Magallon today in follow-up for a recheck on his right foot.  This diabetic 57-year-old gentleman is well-known to me over about the past 7 years for problems with both feet.  He endorses that his left foot has been doing fine from the orthopedic reconstruction without any problems with the surgical incisions; the issue the left side appears, rather, to be the neuropathic pain that he has a flareup with every spring. This was an issue we discussed at our last appointment, but he feels it does seem to be improving at this time, with the pain somewhat alleviated by various medications.  He has tried tizanidine for this, which does help with the pain but also makes him drowsy.  The Voltaren gel does help.    With regard to the current complaint, the right foot, he does endorse that the previously very large medial wound has continued to diminish in size and heal well.  He denies fevers.  The wound has gotten small enough to the point where it is no longer practical to do a VAC and has been managed with Mepilex dressing changes.  He would like a new order for orthopedic shoes, AFOs, and the associated inserts inside the AFOs.    Examination today reveals a pleasant, cooperative, awake, alert, adult male patient sitting upright in a wheelchair in no acute distress.  He is nonseptic appearing from a general clinical standpoint, and has a nonlabored breathing pattern on room air.  The right shoe is been removed for examination, and the medial wound has diminished in size markedly.  It is now about 10 to 12 mm in length proximal distal, by approximately 2 mm in width superior-inferior, and several millimeters deep.  It is sterilely prepared with iodine, and carefully probed sterilely; I am unable to probe to bone or hardware, and I am unable to find any sinus tracts or tunneling.  The remainder of the length of the long medial  incision is fully healed and dry.  The talonavicular, navicular cuneiform, and TMT joints are nontender to palpation.  No pain with gentle range of motion of the hallux MTP joint.  The lateral incision is well-healed and dry.  No signs for plantar right foot ulceration, and there are no other open wounds signs the small portion of the long medial incision.    Today's imaging studies are pending at the time of our encounter.  I did review the most recent previous radiographs of each foot, showing a stable well corrected left foot with intact hardware, and a right foot with ongoing absence of union but stable alignment, and one of the screws in the talonavicular plate is broken which we discussed.    Impression: 57-year-old male diabetic patient with stable right foot Charcot reconstruction with ongoing absence of union, broken screw, and significantly healing medial wound without overt signs for active infection.    Plan:  Weightbearing radiographs right foot today.  ESR and CRP today.  New set of orthopedic shoes, AFOs, and inserts ordered today.  Continue to follow closely. Follow-up in 1 month.  Instructed to return sooner for any problems or concerns.  All questions were answered.

## 2023-05-12 NOTE — LETTER
5/12/2023         RE: Nehemiah Magallon  [address]  [place] [address]        Dear Colleague,    Thank you for referring your patient, Nehemiah Magallon, to the [facility] ORTHOPEDIC CLINIC [place]. Please see a copy of my visit note below.    DOS 11/17/2022 R foot Charcot reconstruction, perc DOM  DOS 01/21/2023 I&D R foot, VAC    I saw Mr. Magallon today in follow-up for a recheck on his right foot.  This diabetic 57-year-old gentleman is well-known to me over about the past 7 years for problems with both feet.  He endorses that his left foot has been doing fine from the orthopedic reconstruction without any problems with the surgical incisions; the issue the left side appears, rather, to be the neuropathic pain that he has a flareup with every spring. This was an issue we discussed at our last appointment, but he feels it does seem to be improving at this time, with the pain somewhat alleviated by various medications.  He has tried tizanidine for this, which does help with the pain but also makes him drowsy.  The Voltaren gel does help.    With regard to the current complaint, the right foot, he does endorse that the previously very large medial wound has continued to diminish in size and heal well.  He denies fevers.  The wound has gotten small enough to the point where it is no longer practical to do a VAC and has been managed with Mepilex dressing changes.  He would like a new order for orthopedic shoes, AFOs, and the associated inserts inside the AFOs.    Examination today reveals a pleasant, cooperative, awake, alert, adult male patient sitting upright in a wheelchair in no acute distress.  He is nonseptic appearing from a general clinical standpoint, and has a nonlabored breathing pattern on room air.  The right shoe is been removed for examination, and the medial wound has diminished in size markedly.  It is now about 10 to 12 mm in length proximal distal, by approximately 2 mm in  width superior-inferior, and several millimeters deep.  It is sterilely prepared with iodine, and carefully probed sterilely; I am unable to probe to bone or hardware, and I am unable to find any sinus tracts or tunneling.  The remainder of the length of the long medial incision is fully healed and dry.  The talonavicular, navicular cuneiform, and TMT joints are nontender to palpation.  No pain with gentle range of motion of the hallux MTP joint.  The lateral incision is well-healed and dry.  No signs for plantar right foot ulceration, and there are no other open wounds signs the small portion of the long medial incision.    Today's imaging studies are pending at the time of our encounter.  I did review the most recent previous radiographs of each foot, showing a stable well corrected left foot with intact hardware, and a right foot with ongoing absence of union but stable alignment, and one of the screws in the talonavicular plate is broken which we discussed.    Impression: 57-year-old male diabetic patient with stable right foot Charcot reconstruction with ongoing absence of union, broken screw, and significantly healing medial wound without overt signs for active infection.    Plan:  Weightbearing radiographs right foot today.  ESR and CRP today.  New set of orthopedic shoes, AFOs, and inserts ordered today.  Continue to follow closely. Follow-up in 1 month.  Instructed to return sooner for any problems or concerns.  All questions were answered.    Bryon Starr MD

## 2023-05-31 NOTE — PROGRESS NOTES
INFECTIOUS DISEASES PROGRESS NOTE    Infect[facility] Diseases [facility]     SUBJECTIVE:                                                      Nehemiah Magallon is a 57 year old male who presents to clinic today for the following health issues: Post hospital Discharge follow-up    Nehemiah Magallon is a 57 year old male, a retired RN, [employer] [facility] Diabetes mellitus (HbA1c 5.6 in Jan 2023 ) with retinopathy, legal blindness, peripheral neuropathy, papillary thyroid malignancy in remission, hypothyroidism, GERD, MARGARITO, Hep C s/p treatment (2017), HTN, NAFLD, colectomy for diverticulitis (2014), bilateral hearing loss and using bilateral hearing aids, chronic pain, s/p right Charcot foot reconstruction on 11/17/2022. Periop he recieved Cefazolin. Per Nehemiah, he was then discharged on Augmentin x 7 days.  He was using a cast for 2.5-3 weeks post surgery. He then noticed a scab but no drainage (reviewed photo on his phone). Then the wound was covered with ace wrap and he thought the initial cast then the ace wrap could have caused the wound. He had kept the surgical site dry, and would air it (without dressing) when he sat down at home but applied would dressing when he went to bed.  Last Adriano 1/15/23, he noticed purulent drainage and deepening of the wound. He was prescribed with Augmentin 875 mg po q12 hr x 14 days on 1/16/23.  He went to see Dr Starr on 1/20/23 and directly admitted on 1/20/2023 from Ortho clinic for increasing drainage and wound necrosis of right foot surgical sites. Wound specimen was obtained before starting empiric Pip/tazobactam and Vancomycin IV on 1/20/23. He underwent I+D of right foot, placement of wound vac and antibiotic beads on 1/21/23 . Intra-op findings: Medial wound: purulent material, probed directly to plate and bone; Lateral wound: no purulence, probed to bone, no hardware present laterally. Intra-Op cultures on 1/21/23 and 1/20/23 grew Staph epidermidisx2 ( oxacillin Resistant)  and Staph caprae.     He  was discharged on Vancomycin IV and Rifampin 300 mg po q12 hr ( activity against biofilm, due to hardware in place) .     Video Follow-up on 2/1/23  Nehemiah is doing well no pain. afebrile. soft stools but no abdominal pain, nausea, vomiting. has good appetite. no pain around PICC.     Video follow-up on 2/15/23  Nehemiah feels better. less drainage from lateral wound. wound vac to lateral wound had been discontinued. Medial wound still drains but less. Diabetes is under better control.   has some GI upset, nausea with rifampin. He also complains of some itching around PICC. no diarrhea. taking probiotic.     Clinic follow-up on 3/8/23  Nehemiah is feeling better. The itching and papular rashes are getting better. No fever, chills. right foot lateral wound is healing well, and the medial side is getting better, filling in. he says there had been not much drainage recently. There was some wound vac leakage and wound vac was discontinued earlier today.     Vancomycin and Rifampin were discontinued on 2/15/23 and started on oral doxycycline at that time. He had tolerated Doxycycline many times in the past. He developed significant pruritus and papular rashes over his body, and arms . denies any new medication/s besides doxycycline. He saw his PCP and started him on oral benadryl and increased his zyrtec . He is feeling better.  Tolerated Bactrim in the past.     Clinic follow-up 4/26/23  Nehemiah is doing well. right foot medial wound is closing and is shallower. no significant drainage over 2 weeks. Today, minimal drainage on the wound dressing. he changes wound dressing 1x/daily and he applies iodine to the wound. lateral wound is scabbed over.     no diarrhea. has good appetite.     Clinic follow-up on 5/31/23   Nehemiah is doing well. right foot surgical site are well healed. no drainage for more than 1 month. no pain. swelling has significantly improved. has been covering the medial wound when he showers but leave it uncovered when  he sleeps.    Still no weight bearing per Dr Starr and has been using the wheelchair. Blood sugar is well controlled with HbA1c 6 about 2 weeks ago. chronic neuropathy in left foot     Problem list and histories reviewed & adjusted, as indicated.  Additional history: as documented    PAST MEDICAL HISTORY:  Patient  has a past medical history of Chronic pain syndrome (10/24/2014), Diabetes mellitus, type 2 (H), Diabetic Charcot foot (H), Diabetic retinopathy associated with diabetes mellitus due to underlying condition (H), Diverticulitis of colon, Gastroesophageal reflux disease, Hepatitis C (2017), History of skin cancer, Hypertension, Hypothyroidism, Legally blind, Midfoot collapse of right lower extremity, Migraine, NAFLD (nonalcoholic fatty liver disease), Nephrolithiasis, Neuropathy, MARGARITO (obstructive sleep apnea), Rib pain (10/24/2014), S/P colectomy (10/14/2014), and Thyroid cancer (H) (10/14/2014).    PAST SURGICAL HISTORY:  Patient  has a past surgical history that includes colectomy; Cholecystectomy (1986); colonoscopy (2016); Foot surgery (Left, 2021); Arthrodesis foot (Right, 11/17/2022); Lengthen tendon achilles (Right, 11/17/2022); and Irrigation and debridement foot, combined (Right, 1/21/2023).    CURRENT MEDICATIONS:  Current Outpatient Medications   Medication Sig Dispense Refill     acetaminophen (TYLENOL) 325 MG tablet Take 2 tablets (650 mg) by mouth every 8 hours as needed for mild pain, fever or headaches 60 tablet 0     ammonium lactate (LAC-HYDRIN) 12 % external lotion APPLY THIN LAYER TOPICALLY TWICE A DAY AS NEEDED FOR DRY SKIN **EXTERNAL USE ONLY**       ammonium lactate (LAC-HYDRIN) 12 % external lotion APPLY THIN LAYER TOPICALLY TWICE A DAY AS NEEDED FOR DRY SKIN **EXTERNAL USE ONLY**       aspirin (ASA) 81 MG EC tablet Take 1 tablet (81 mg) by mouth 2 times daily 60 tablet 0     bacitracin-neomycin-polymyxin (NEOSPORIN) 400-5-5000 external ointment APPLY SMALL AMOUNT TOPICALLY EVERY DAY  "- ANTIBIOTIC OINTMENT       Calcium-Vitamin D 500-3.125 MG-MCG TABS Take 2 tablets by mouth 2 times daily       carboxymethylcellulose PF (REFRESH PLUS) 0.5 % ophthalmic solution 1 drop 3 times daily as needed for dry eyes       [place] Moisture Barrier external cream Apply daily as directed to skin 92 g 6     cetirizine (ZYRTEC) 10 MG tablet Take 10 mg by mouth daily       diclofenac (VOLTAREN) 1 % topical gel APPLY 4 GRAMS TOPICALLY FOUR TIMES A DAY TO AFFECTED AREA FOR PAIN. USE DOSE CARD IN BOX TO MEASURE DOSE. MAX 32 GRAMS TOTAL PER DAY.       diclofenac (VOLTAREN) 1 % topical gel Apply 4 g topically 4 times daily as needed for moderate pain (4-6)       diphenhydrAMINE (BENADRYL) 25 MG capsule 25 mg       empagliflozin (JARDIANCE) 25 MG TABS tablet Take 25 mg by mouth daily       fluticasone (FLONASE) 50 MCG/ACT nasal spray SPRAY 2 SPRAYS IN EACH NOSTRIL TWICE A DAY FOR RELIEF OF ALLERGIES AND CONGESTION -USE REGULARLY FOR BEST RESULTS       fluticasone (FLONASE) 50 MCG/ACT nasal spray Spray 2 sprays into both nostrils daily       furosemide (LASIX) 40 MG tablet Take 40 mg by mouth daily as needed (hand swelling, weight >215 lbs)       gabapentin (NEURONTIN) 400 MG capsule Take 3 capsules (1,200 mg) by mouth 3 times daily 90 capsule 0     gabapentin (NEURONTIN) 600 MG tablet Take 2 tablets by mouth 3 times daily       Gauze Pads & Dressings ([facility] DRESSING) 4\"X8\" PADS 1 Units daily 45 each 1     levothyroxine (SYNTHROID) 137 MCG tablet 137 mcg       levothyroxine (SYNTHROID/LEVOTHROID) 137 MCG tablet Take 137 mcg by mouth daily       lidocaine (LIDODERM) 5 % patch APPLY 1 OR 2 PATCHES 5% TOPICALLY EVERY DAY FOR PAIN -WEAR FOR UP TO 12 HOURS THEN REMOVE AND LEAVE OFF FOR 12 HOURS       lidocaine (LIDODERM) 5 % patch Apply up to 3 patches to painful area at once for up to 12 h within a 24 h period.  Remove after 12 hours. (Patient taking differently: Apply up to 3 patches to painful area at once for " up to 12 h within a 24 h period.  Remove after 12 hours. L foot.) 90 patch 0     linaclotide (LINZESS) 145 MCG capsule Take 1 capsule by mouth daily       Liniments (VAPORIZING CREAM) 4.7-1.2-2.6 % CREA APPLY A THIN LAYER TOPICALLY FOUR TIMES A DAY AS NEEDED FOR PAIN       lisinopril (ZESTRIL) 40 MG tablet Take 1 tablet by mouth daily       lisinopril (ZESTRIL) 40 MG tablet Take 40 mg by mouth daily       metFORMIN (GLUCOPHAGE XR) 500 MG 24 hr tablet 1,000 mg       metFORMIN (GLUCOPHAGE XR) 500 MG 24 hr tablet Take 1,000 mg by mouth 2 times daily (with meals)       methocarbamol (ROBAXIN) 750 MG tablet Take 1 tablet (750 mg) by mouth every 6 hours as needed for muscle spasms 15 tablet 0     naproxen (NAPROSYN) 500 MG tablet 500 mg       omeprazole (PRILOSEC) 20 MG DR capsule Take 20 mg by mouth daily       oxyCODONE (ROXICODONE) 5 MG tablet Take 1 tablet (5 mg) by mouth every 4 hours as needed for moderate pain (4-6) (Patient not taking: Reported on 2/10/2023) 30 tablet 0     polyethylene glycol (MIRALAX) 17 g packet Take 17 g by mouth daily (Patient not taking: Reported on 2/10/2023) 10 packet 0     pravastatin (PRAVACHOL) 40 MG tablet Take 40 mg by mouth every evening       Semaglutide (OZEMPIC, 1 MG/DOSE, SC) Inject 1 mg Subcutaneous once a week Sundays       senna-docusate (SENOKOT-S/PERICOLACE) 8.6-50 MG tablet Take 1 tablet by mouth 2 times daily (Patient not taking: Reported on 2/10/2023) 60 tablet 0     sodium fluoride dental gel (PREVIDENT) 1.1 % GEL topical gel BRUSH SMALL AMOUNT MOUTH EVERY MORNING AND AT BEDTIME ON TOOTHBRUSH, BRUSH FOR 2 MINUTES.       SUMAtriptan (IMITREX) 25 MG tablet 25 mg       SUMAtriptan (IMITREX) 25 MG tablet Take 25 mg by mouth at onset of headache       topiramate (TOPAMAX) 100 MG tablet Take 150 mg by mouth 2 times daily       Vitamin D3 (CHOLECALCIFEROL) 25 mcg (1000 units) tablet Take 50 mcg by mouth daily       zolpidem ER (AMBIEN CR) 12.5 MG CR tablet Take 12.5 mg by mouth  nightly as needed for sleep       ALLERGY:  Allergies   Allergen Reactions     Atorvastatin      Other reaction(s): Hyperglycemia     Celebrex [Celecoxib] Other (See Comments)     [facility]     Doxycycline Rash     Labs reviewed in EPIC    OBJECTIVE:                                                    Vitals: /71 (BP Location: Right arm, Patient Position: Sitting, Cuff Size: Adult Regular)   Pulse 102   Wt 98.9 kg (218 lb)   SpO2 97%   BMI 31.28 kg/m    BMI= Body mass index is 31.28 kg/m .  GENERAL: healthy, alert and no distress  EYES: Eyes grossly normal to inspection  NECK: Supple  RESP: breathing comfortably on room air. no cough  SKIN: right foot - medial wound - healed with no signs of infection .No drainage.  Lateral surgical site is well healed . no signs of infection. less swollen and not tender.   right leg : trace edema    NEURO: mentation intact and speech normal , neuropathy in feet  PSYCH: mentation appears normal, affect normal     ASSESSMENT AND PLAN:                                                      1. S/p right Charcot foot reconstruction on 11/17/2022 complicated by post op infection   - 1/20/23 - started on Pip/Tazobactam and Vancomycin IV   - 1/21/23 -  s/p I+D of right foot, placement of wound vac and antibiotic beads . Intra-op findings: Medial wound: purulent material, probed directly to plate and bone; Lateral wound: no purulence, probed to bone, no hardware present laterally. Intra-Op cultures are growing Staph epidermidisx2 (oxacillin Resistant) and Staph caprae. ( all doxycycline sensitive)  - 2/15/23 - Vancomycin IV and Rifampin were stopped and started on Doxycycline.  - 2/24/23 - Doxycycline was stopped due to papular lesions on his body, back, arms, associated with itching   - 3/8/23 - Papular lesions and pruritus - improving with benadryl and Zyrtec     2.  Diabetes mellitus (HbA1c 5.6 in Jan 2023) with peripheral neuropathy   - HbA1c around 5.6 in the  past 4 years per patient     3. CRP was 69 and ESR was 59 with WBC 7.8 on 1/20/23 --> CRP 14 ( 1/23/23) --> 7.4 (1/26/23) - 7.87 (1/30/23) <3 (2/13/23) <3 (3/8/23) < 3 (5/12/23)   4. Acute kidney injury - 3/8/23      RECOMMENDATION:  - surgical sites are well healed. No signs of infection in right foot at this time  - continue current care     follow-up PRN with me       Pete Arambula MD, M.Med.Sc  Division of Infectious Diseases and International Medicine  [facility]      20  minutes was spent with patient, chart review, documentation, care coordination: 5/31/23

## 2023-05-31 NOTE — LETTER
5/31/2023       RE: Nehemiah Magallon  [address]  [place] [address]     Dear Colleague,    Thank you for referring your patient, Nehemiah Magallon, to the [facility] INFECTIOUS DISEASE CLINIC [place] [facility]. Please see a copy of my visit note below.    INFECTIOUS DISEASES PROGRESS NOTE    Infectious Diseases Clinic     SUBJECTIVE:                                                      Nehemiah Magallon is a 57 year old male who presents to clinic today for the following health issues: Post hospital Discharge follow-up    Nehemiah Magallon is a 57 year old male, a retired RN,  [facility] Diabetes mellitus (HbA1c 5.6 in Jan 2023 ) with retinopathy, legal blindness, peripheral neuropathy, papillary thyroid malignancy in remission, hypothyroidism, GERD, MARGARITO, Hep C s/p treatment (2017), HTN, NAFLD, colectomy for diverticulitis (2014), bilateral hearing loss and using bilateral hearing aids, chronic pain, s/p right Charcot foot reconstruction on 11/17/2022. Periop he recieved Cefazolin. Per Nehemiah, he was then discharged on Augmentin x 7 days.  He was using a cast for 2.5-3 weeks post surgery. He then noticed a scab but no drainage (reviewed photo on his phone). Then the wound was covered with ace wrap and he thought the initial cast then the ace wrap could have caused the wound. He had kept the surgical site dry, and would air it (without dressing) when he sat down at home but applied would dressing when he went to bed.  Last Adriano 1/15/23, he noticed purulent drainage and deepening of the wound. He was prescribed with Augmentin 875 mg po q12 hr x 14 days on 1/16/23.  He went to see Dr Starr on 1/20/23 and directly admitted on 1/20/2023 from Ortho clinic for increasing drainage and wound necrosis of right foot surgical sites. Wound specimen was obtained before starting empiric Pip/tazobactam and Vancomycin IV on 1/20/23. He underwent I+D of right foot,  placement of wound vac and antibiotic beads on 1/21/23 . Intra-op findings: Medial wound: purulent material, probed directly to plate and bone; Lateral wound: no purulence, probed to bone, no hardware present laterally. Intra-Op cultures on 1/21/23 and 1/20/23 grew Staph epidermidisx2 ( oxacillin Resistant)  and Staph caprae.     He was discharged on Vancomycin IV and Rifampin 300 mg po q12 hr ( activity against biofilm, due to hardware in place) .     Video Follow-up on 2/1/23  Nehemiah is doing well no pain. afebrile. soft stools but no abdominal pain, nausea, vomiting. has good appetite. no pain around PICC.     Video follow-up on 2/15/23  Nehemiah feels better. less drainage from lateral wound. wound vac to lateral wound had been discontinued. Medial wound still drains but less. Diabetes is under better control.   has some GI upset, nausea with rifampin. He also complains of some itching around PICC. no diarrhea. taking probiotic.     Clinic follow-up on 3/8/23  Nehemiah is feeling better. The itching and papular rashes are getting better. No fever, chills. right foot lateral wound is healing well, and the medial side is getting better, filling in. he says there had been not much drainage recently. There was some wound vac leakage and wound vac was discontinued earlier today.     Vancomycin and Rifampin were discontinued on 2/15/23 and started on oral doxycycline at that time. He had tolerated Doxycycline many times in the past. He developed significant pruritus and papular rashes over his body, and arms . denies any new medication/s besides doxycycline. He saw his PCP and started him on oral benadryl and increased his zyrtec . He is feeling better.  Tolerated Bactrim in the past.     Clinic follow-up 4/26/23  Nehemiah is doing well. right foot medial wound is closing and is shallower. no significant drainage over 2 weeks. Today, minimal drainage on the wound dressing. he changes wound dressing 1x/daily and he applies iodine to  the wound. lateral wound is scabbed over.     no diarrhea. has good appetite.     Clinic follow-up on 5/31/23   Nehemiah is doing well. right foot surgical site are well healed. no drainage for more than 1 month. no pain. swelling has significantly improved. has been covering the medial wound when he showers but leave it uncovered when he sleeps.    Still no weight bearing per Dr Starr and has been using the wheelchair. Blood sugar is well controlled with HbA1c 6 about 2 weeks ago. chronic neuropathy in left foot     Problem list and histories reviewed & adjusted, as indicated.  Additional history: as documented    PAST MEDICAL HISTORY:  Patient  has a past medical history of Chronic pain syndrome (10/24/2014), Diabetes mellitus, type 2 (H), Diabetic Charcot foot (H), Diabetic retinopathy associated with diabetes mellitus due to underlying condition (H), Diverticulitis of colon, Gastroesophageal reflux disease, Hepatitis C (2017), History of skin cancer, Hypertension, Hypothyroidism, Legally blind, Midfoot collapse of right lower extremity, Migraine, NAFLD (nonalcoholic fatty liver disease), Nephrolithiasis, Neuropathy, MARGARITO (obstructive sleep apnea), Rib pain (10/24/2014), S/P colectomy (10/14/2014), and Thyroid cancer (H) (10/14/2014).    PAST SURGICAL HISTORY:  Patient  has a past surgical history that includes colectomy; Cholecystectomy (1986); colonoscopy (2016); Foot surgery (Left, 2021); Arthrodesis foot (Right, 11/17/2022); Lengthen tendon achilles (Right, 11/17/2022); and Irrigation and debridement foot, combined (Right, 1/21/2023).    CURRENT MEDICATIONS:  Current Outpatient Medications   Medication Sig Dispense Refill    acetaminophen (TYLENOL) 325 MG tablet Take 2 tablets (650 mg) by mouth every 8 hours as needed for mild pain, fever or headaches 60 tablet 0    ammonium lactate (LAC-HYDRIN) 12 % external lotion APPLY THIN LAYER TOPICALLY TWICE A DAY AS NEEDED FOR DRY SKIN **EXTERNAL USE ONLY**      ammonium  "lactate (LAC-HYDRIN) 12 % external lotion APPLY THIN LAYER TOPICALLY TWICE A DAY AS NEEDED FOR DRY SKIN **EXTERNAL USE ONLY**      aspirin (ASA) 81 MG EC tablet Take 1 tablet (81 mg) by mouth 2 times daily 60 tablet 0    bacitracin-neomycin-polymyxin (NEOSPORIN) 400-5-5000 external ointment APPLY SMALL AMOUNT TOPICALLY EVERY DAY - ANTIBIOTIC OINTMENT      Calcium-Vitamin D 500-3.125 MG-MCG TABS Take 2 tablets by mouth 2 times daily      carboxymethylcellulose PF (REFRESH PLUS) 0.5 % ophthalmic solution 1 drop 3 times daily as needed for dry eyes      [place] Moisture Barrier external cream Apply daily as directed to skin 92 g 6    cetirizine (ZYRTEC) 10 MG tablet Take 10 mg by mouth daily      diclofenac (VOLTAREN) 1 % topical gel APPLY 4 GRAMS TOPICALLY FOUR TIMES A DAY TO AFFECTED AREA FOR PAIN. USE DOSE CARD IN BOX TO MEASURE DOSE. MAX 32 GRAMS TOTAL PER DAY.      diclofenac (VOLTAREN) 1 % topical gel Apply 4 g topically 4 times daily as needed for moderate pain (4-6)      diphenhydrAMINE (BENADRYL) 25 MG capsule 25 mg      empagliflozin (JARDIANCE) 25 MG TABS tablet Take 25 mg by mouth daily      fluticasone (FLONASE) 50 MCG/ACT nasal spray SPRAY 2 SPRAYS IN EACH NOSTRIL TWICE A DAY FOR RELIEF OF ALLERGIES AND CONGESTION -USE REGULARLY FOR BEST RESULTS      fluticasone (FLONASE) 50 MCG/ACT nasal spray Spray 2 sprays into both nostrils daily      furosemide (LASIX) 40 MG tablet Take 40 mg by mouth daily as needed (hand swelling, weight >215 lbs)      gabapentin (NEURONTIN) 400 MG capsule Take 3 capsules (1,200 mg) by mouth 3 times daily 90 capsule 0    gabapentin (NEURONTIN) 600 MG tablet Take 2 tablets by mouth 3 times daily      Gauze Pads & Dressings ([facility] DRESSING) 4\"X8\" PADS 1 Units daily 45 each 1    levothyroxine (SYNTHROID) 137 MCG tablet 137 mcg      levothyroxine (SYNTHROID/LEVOTHROID) 137 MCG tablet Take 137 mcg by mouth daily      lidocaine (LIDODERM) 5 % patch APPLY 1 OR 2 PATCHES 5% " TOPICALLY EVERY DAY FOR PAIN -WEAR FOR UP TO 12 HOURS THEN REMOVE AND LEAVE OFF FOR 12 HOURS      lidocaine (LIDODERM) 5 % patch Apply up to 3 patches to painful area at once for up to 12 h within a 24 h period.  Remove after 12 hours. (Patient taking differently: Apply up to 3 patches to painful area at once for up to 12 h within a 24 h period.  Remove after 12 hours. L foot.) 90 patch 0    linaclotide (LINZESS) 145 MCG capsule Take 1 capsule by mouth daily      Liniments (VAPORIZING CREAM) 4.7-1.2-2.6 % CREA APPLY A THIN LAYER TOPICALLY FOUR TIMES A DAY AS NEEDED FOR PAIN      lisinopril (ZESTRIL) 40 MG tablet Take 1 tablet by mouth daily      lisinopril (ZESTRIL) 40 MG tablet Take 40 mg by mouth daily      metFORMIN (GLUCOPHAGE XR) 500 MG 24 hr tablet 1,000 mg      metFORMIN (GLUCOPHAGE XR) 500 MG 24 hr tablet Take 1,000 mg by mouth 2 times daily (with meals)      methocarbamol (ROBAXIN) 750 MG tablet Take 1 tablet (750 mg) by mouth every 6 hours as needed for muscle spasms 15 tablet 0    naproxen (NAPROSYN) 500 MG tablet 500 mg      omeprazole (PRILOSEC) 20 MG DR capsule Take 20 mg by mouth daily      oxyCODONE (ROXICODONE) 5 MG tablet Take 1 tablet (5 mg) by mouth every 4 hours as needed for moderate pain (4-6) (Patient not taking: Reported on 2/10/2023) 30 tablet 0    polyethylene glycol (MIRALAX) 17 g packet Take 17 g by mouth daily (Patient not taking: Reported on 2/10/2023) 10 packet 0    pravastatin (PRAVACHOL) 40 MG tablet Take 40 mg by mouth every evening      Semaglutide (OZEMPIC, 1 MG/DOSE, SC) Inject 1 mg Subcutaneous once a week Sundays      senna-docusate (SENOKOT-S/PERICOLACE) 8.6-50 MG tablet Take 1 tablet by mouth 2 times daily (Patient not taking: Reported on 2/10/2023) 60 tablet 0    sodium fluoride dental gel (PREVIDENT) 1.1 % GEL topical gel BRUSH SMALL AMOUNT MOUTH EVERY MORNING AND AT BEDTIME ON TOOTHBRUSH, BRUSH FOR 2 MINUTES.      SUMAtriptan (IMITREX) 25 MG tablet 25 mg      SUMAtriptan  (IMITREX) 25 MG tablet Take 25 mg by mouth at onset of headache      topiramate (TOPAMAX) 100 MG tablet Take 150 mg by mouth 2 times daily      Vitamin D3 (CHOLECALCIFEROL) 25 mcg (1000 units) tablet Take 50 mcg by mouth daily      zolpidem ER (AMBIEN CR) 12.5 MG CR tablet Take 12.5 mg by mouth nightly as needed for sleep       ALLERGY:  Allergies   Allergen Reactions    Atorvastatin      Other reaction(s): Hyperglycemia    Celebrex [Celecoxib] Other (See Comments)     [facility]    Doxycycline Rash     Labs reviewed in EPIC    OBJECTIVE:                                                    Vitals: /71 (BP Location: Right arm, Patient Position: Sitting, Cuff Size: Adult Regular)   Pulse 102   Wt 98.9 kg (218 lb)   SpO2 97%   BMI 31.28 kg/m    BMI= Body mass index is 31.28 kg/m .  GENERAL: healthy, alert and no distress  EYES: Eyes grossly normal to inspection  NECK: Supple  RESP: breathing comfortably on room air. no cough  SKIN: right foot - medial wound - healed with no signs of infection .No drainage.  Lateral surgical site is well healed . no signs of infection. less swollen and not tender.   right leg : trace edema    NEURO: mentation intact and speech normal , neuropathy in feet  PSYCH: mentation appears normal, affect normal     ASSESSMENT AND PLAN:                                                      1. S/p right Charcot foot reconstruction on 11/17/2022 complicated by post op infection   - 1/20/23 - started on Pip/Tazobactam and Vancomycin IV   - 1/21/23 -  s/p I+D of right foot, placement of wound vac and antibiotic beads . Intra-op findings: Medial wound: purulent material, probed directly to plate and bone; Lateral wound: no purulence, probed to bone, no hardware present laterally. Intra-Op cultures are growing Staph epidermidisx2 (oxacillin Resistant) and Staph caprae. ( all doxycycline sensitive)  - 2/15/23 - Vancomycin IV and Rifampin were stopped and started on Doxycycline.  -  2/24/23 - Doxycycline was stopped due to papular lesions on his body, back, arms, associated with itching   - 3/8/23 - Papular lesions and pruritus - improving with benadryl and Zyrtec     2.  Diabetes mellitus (HbA1c 5.6 in Jan 2023) with peripheral neuropathy   - HbA1c around 5.6 in the past 4 years per patient     3. CRP was 69 and ESR was 59 with WBC 7.8 on 1/20/23 --> CRP 14 ( 1/23/23) --> 7.4 (1/26/23) - 7.87 (1/30/23) <3 (2/13/23) <3 (3/8/23) < 3 (5/12/23)   4. Acute kidney injury - 3/8/23      RECOMMENDATION:  - surgical sites are well healed. No signs of infection in right foot at this time  - continue current care     follow-up PRN with me       Pete Arambula MD, M.Med.Sc  Division of Infectious Diseases and International Medicine  [facility]      20  minutes was spent with patient, chart review, documentation, care coordination: 5/31/23

## 2023-06-09 NOTE — LETTER
6/9/2023       RE: Nehemiah Magallon  [address]  [place] [address]    Dear Colleague,    Thank you for referring your patient, Nehemiah Magallon, to the [facility] ORTHOPEDIC CLINIC [place]. Please see a copy of my visit note below.    DOS 11/17/2022 R foot Charcot reconstruction, perc DOM  DOS 01/21/2023 I&D R foot, VAC    Pt reports he is overall doing well  No further scheduled appts from ID  Recent allergy/cold but o/w denies fevers  Wounds have healed, no drainage in over the past month  Requesting new pair B AFOs, inserts for AFOs, and 2 pairs of orthopaedic shoes  Labs draw after last appt - ESR 7, CRP < 3    Med/lat R foot wounds fully healed, dry, no drainage/fluctuance  R foot/ankle nontender throughout  R ankle ROM 10 degrees DF above neutral with knee extended, PF 30  R TA, GS strength 5/5    No images at this appt. We discussed the results of previous imaging including the hardware (broken screw).    Impression  56yo with DM and R Charcot foot, complicated by wound infection, s/p debridement and hardware retention, now with healed wounds and clinically quiescent/resolved infection.    Plan  May now start to WBAT R LE with AFOs in place.  AFOs, inserts, and shoes [facility]  F/u approx 1 month WB xrays B feet.  Signs and symptoms that should prompt immediate medical attention, and the importance of this, were discussed.      Bryon Starr MD

## 2023-06-09 NOTE — PROGRESS NOTES
DOS 11/17/2022 R foot Charcot reconstruction, perc DOM  DOS 01/21/2023 I&D R foot, VAC    Pt reports he is overall doing well  No further scheduled appts from ID  Recent allergy/cold but o/w denies fevers  Wounds have healed, no drainage in over the past month  Requesting new pair B AFOs, inserts for AFOs, and 2 pairs of orthopaedic shoes  Labs draw after last appt - ESR 7, CRP < 3    Med/lat R foot wounds fully healed, dry, no drainage/fluctuance  R foot/ankle nontender throughout  R ankle ROM 10 degrees DF above neutral with knee extended, PF 30  R TA, GS strength 5/5    No images at this appt. We discussed the results of previous imaging including the hardware (broken screw).    Impression  58yo with DM and R Charcot foot, complicated by wound infection, s/p debridement and hardware retention, now with healed wounds and clinically quiescent/resolved infection.    Plan  May now start to WBAT R LE with AFOs in place.  AFOs, inserts, and shoes order [facility].  F/u approx 1 month WB xrays B feet.  Signs and symptoms that should prompt immediate medical attention, and the importance of this, were discussed.

## 2023-07-14 NOTE — PROGRESS NOTES
Chief complaint: Follow-up wound infection, right foot.  Date of surgery: 11/17/2022 right foot Charcot reconstruction and percutaneous tendo Achilles lengthening.  1/21/2023 I&D right foot and VAC application. Intraoperative cultures positive for Staphylococcus caprae and Staphylococcus epidermidis.    I saw Mr. Magallon today in follow-up for his bilateral feet.  He has diabetes and neuropathy and reports the neuropathy does seem to be worsening with time.  He reports good diabetes management and A1c control.  He denies any interim development of open wounds or ulcers since the last visit.  His acute phase reactants normalized to an ESR of 7 and CRP less than 0.3 prior to his last appointment here.  He denies any pain.  He has been fitted for his AFOs and is awaiting these.  He would like some more Mepilex dressings, another pair of orthopedic shoes (he only received 1 pair last time), and a refill of the Voltaren gel.  He goes through about a tube a week, about 4-5 a month, and also usually gets 2 pairs orthopedic shoes a year, not just the 1 pair that he got last time.    Examination shows the skin on the right foot to be well-healed without any evidence for ulceration, drainage, or active infection.  Skin is warm and dry.  No visible erythema or ecchymosis.  No visible blistering.  The previous areas of dehiscence/infection on the medial and lateral incisions appear to have resolved.  The right foot is nontender to palpation throughout.  Ankle dorsiflexion to about 10 to 15 degrees above neutral with the knee extended, and plantarflexion to about 30 to 35 degrees.  No crepitance with hallux range of motion, which is about ready of dorsiflexion to 10 of plantarflexion.    Independent review of imaging was performed including weightbearing AP, oblique, and lateral bilateral feet radiographs dated today.  Comparison is made for both feet with the previous radiographs.  Alignment appears to be stable.  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  The left foot radiographs show excellent alignment with what appears to be solidly fused subtalar joint and hallux IP joint.  The TN joint on this side may also show absence of full union, though is in excellent alignment and appears to be stable.  No obvious evidence for active infection or acute fracture in either side.    Assessment: 58-year-old patient with diabetes and neuropathy, stable status post right foot Charcot reconstruction, with clinically resolved operative infection, and approximately 2 years status post right foot double arthrodesis and hallux IP arthrodesis flexible pes planovalgus and arthritis.    Plan: Findings and plan were discussed with Mr. Magallon. He may continue weight-bear as tolerated.  Another pair of orthopedic shoes as ordered.  Voltaren gel and Mepilex is ordered as requested above.  Signs and symptoms to watch out for that should prompt immediate medical attention were discussed.  Otherwise follow-up in 2 months.  All questions were answered.

## 2023-07-14 NOTE — LETTER
7/14/2023         RE: Nehemiah Magallon  [address]  [place] [address]        Dear Colleague,    Thank you for referring your patient, Nehemiah Magallon, to the [facility] ORTHOPEDIC CLINIC [place]. Please see a copy of my visit note below.    Chief complaint: Follow-up wound infection, right foot.  Date of surgery: 11/17/2022 right foot Charcot reconstruction and percutaneous tendo Achilles lengthening.  1/21/2023 I&D right foot and VAC application. Intraoperative cultures positive for Staphylococcus caprae and Staphylococcus epidermidis.    I saw Mr. Magallon today in follow-up for his bilateral feet.  He has diabetes and neuropathy and reports the neuropathy does seem to be worsening with time.  He reports good diabetes management and A1c control.  He denies any interim development of open wounds or ulcers since the last visit.  His acute phase reactants normalized to an ESR of 7 and CRP less than 0.3 prior to his last appointment here.  He denies any pain.  He has been fitted for his AFOs and is awaiting these.  He would like some more Mepilex dressings, another pair of orthopedic shoes (he only received 1 pair last time), and a refill of the Voltaren gel.  He goes through about a tube a week, about 4-5 a month, and also usually gets 2 pairs orthopedic shoes a year, not just the 1 pair that he got last time.    Examination shows the skin on the right foot to be well-healed without any evidence for ulceration, drainage, or active infection.  Skin is warm and dry.  No visible erythema or ecchymosis.  No visible blistering.  The previous areas of dehiscence/infection on the medial and lateral incisions appear to have resolved.  The right foot is nontender to palpation throughout.  Ankle dorsiflexion to about 10 to 15 degrees above neutral with the knee extended, and plantarflexion to about 30 to 35 degrees.  No crepitance with hallux range of motion, which is about ready of dorsiflexion to  10 of plantarflexion.    Independent review of imaging was performed including weightbearing AP, oblique, and lateral bilateral feet radiographs dated today.  Comparison is made for both feet with the previous radiographs.  Alignment appears to be stable.  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  The left foot radiographs show excellent alignment with what appears to be solidly fused subtalar joint and hallux IP joint.  The TN joint on this side may also show absence of full union, though is in excellent alignment and appears to be stable.  No obvious evidence for active infection or acute fracture in either side.    Assessment: 58-year-old patient with diabetes and neuropathy, stable status post right foot Charcot reconstruction, with clinically resolved operative infection, and approximately 2 years status post right foot double arthrodesis and hallux IP arthrodesis flexible pes planovalgus and arthritis.    Plan: Findings and plan were discussed with Mr. Magallon. He may continue weight-bear as tolerated.  Another pair of orthopedic shoes as ordered.  Voltaren gel and Mepilex is ordered as requested above.  Signs and symptoms to watch out for that should prompt immediate medical attention were discussed.  Otherwise follow-up in 2 months.  All questions were answered.    Sincerely,        Bryon Starr MD

## 2023-07-17 NOTE — PROGRESS NOTES
RN faxing DME orders to [phone] this is the [facility] Fax (Phone #[phone])     Brandie Mayo RN

## 2023-09-15 NOTE — LETTER
9/15/2023         RE: Nehemiah Magallon  [address]  [place] [address]        Dear Colleague,    Thank you for referring your patient, Nehemiah Magallon, to the [facility] [place]. Please see a copy of my visit note below.    Orthopaedic Surgery Clinic - Follow-up Appointment    Chief complaint: Follow-up right Charcot foot.  Date of surgery: 11/17/2022, right foot Charcot reconstruction and percutaneous tendo Achilles lengthening.  Date of surgery: 01/21/2023 I&D right foot and VAC application. Intraoperative cultures positive for Staphylococcus caprae and Staphylococcus epidermidis. Vanco/rifampin until 02/15/2023. Doxycycline until 02/24/2023.      I had the opportunity to see this pleasant 58-year-old  today in follow-up for his right Charcot foot.  He is now about 10 months out from his reconstruction.  He also underwent a contralateral left flatfoot reconstruction and IP joint fusion prior to that in the past, which has been very stable over time and which he reports is still doing well.  He does report ongoing baseline neuropathic pain in his left foot.  He denies any pain in the right Charcot foot.  He denies any recent fevers or chills.  He denies any wound drainage in either foot.  He does endorse checking his feet diligently on a daily basis, and using his AFO for all ambulation.  He does note ongoing swelling in the right foot.    Examination shows the patient to be a pleasant, cooperative, awake, and alert adult sitting upright in a manual wheelchair in no acute distress.  He is nonseptic appearing from a general clinical standpoint.  Breathing pattern is regular and nonlabored on room air.  Skin is intact throughout the right ankle and foot without any skin that appears to be at immediate obvious risk.  Approximate ROM examination shows right ankle dorsiflexion 10 with the knee extended, and plantarflexion 30. Motor strength is 5-/5 for right tibialis  "anterior and gastroc/soleus.  Brisk capillary refill of < 1-2\" in all 5 toes.  The right foot appears to be nontender to palpation throughout.  No obviously palpable hardware or visible skin tenting.  Palpable plantar bone near or around the cuboid, not appearing to threaten skin.  All incisions appear to be well healed, dry, and intact, without visible erythema or necrosis.    Independent review of imaging was performed including weightbearing AP, oblique, and lateral radiographs of the right foot dated today, 09/15/2023.  Findings were discussed with and shown to the patient.  No obvious evidence for tumor, infection, or acute fracture.  The previously known broken and loose screw appears to be stable when compared with previous x-rays.  The Charcot reconstruction alignment looks stable.  The naviculocuneiform and first tarsometatarsal joints appear to be fused.  The talonavicular joint does not appear to be fully fused yet, but the alignment of the joint appears stable.    Impression:  58-year-old patient stable status post right Charcot foot reconstruction.  Clinically resolved/quiescent postoperative infection with healed skin defects.    Plan:  Continue to weight-bear as tolerated.  Continue AFO use for all weightbearing.  Continue daily skin checks.  Follow-up in approximately 2 to 3 months with repeat right foot weightbearing radiographs, or sooner for any problems.  Signs and symptoms to watch out for that should prompt immediate medical attention were discussed.  All questions were answered.  Voltaren gel is ordered with sufficient refills as requested.  The need for long term follow-up discussed.  The potential need for future surgery was discussed, though the foot appears to be stable at this time.  [place] agreement on continuing observation and nonoperative treatment for now.    I also spoke with Danielle Camp at the [facility] at [phone] after the encounter to find out how to renew the " 's Community Care referral  at the 's request.  There's a form to fill out that she is sending us that should be faxed to [phone].    Sincerely,        Bryon Starr MD

## 2023-09-15 NOTE — PROGRESS NOTES
"Orthopaedic Surgery Clinic - Follow-up Appointment    Chief complaint: Follow-up right Charcot foot.  Date of surgery: 11/17/2022, right foot Charcot reconstruction and percutaneous tendo Achilles lengthening.  Date of surgery: 01/21/2023 I&D right foot and VAC application. Intraoperative cultures positive for Staphylococcus caprae and Staphylococcus epidermidis. Vanco/rifampin until 02/15/2023. Doxycycline until 02/24/2023.      I had the opportunity to see this pleasant 58-year-old  today in follow-up for his right Charcot foot.  He is now about 10 months out from his reconstruction.  He also underwent a contralateral left flatfoot reconstruction and IP joint fusion prior to that in the past, which has been very stable over time and which he reports is still doing well.  He does report ongoing baseline neuropathic pain in his left foot.  He denies any pain in the right Charcot foot.  He denies any recent fevers or chills.  He denies any wound drainage in either foot.  He does endorse checking his feet diligently on a daily basis, and using his AFO for all ambulation.  He does note ongoing swelling in the right foot.    Examination shows the patient to be a pleasant, cooperative, awake, and alert adult sitting upright in a manual wheelchair in no acute distress.  He is nonseptic appearing from a general clinical standpoint.  Breathing pattern is regular and nonlabored on room air.  Skin is intact throughout the right ankle and foot without any skin that appears to be at immediate obvious risk.  Approximate ROM examination shows right ankle dorsiflexion 10 with the knee extended, and plantarflexion 30. Motor strength is 5-/5 for right tibialis anterior and gastroc/soleus.  Brisk capillary refill of < 1-2\" in all 5 toes.  The right foot appears to be nontender to palpation throughout.  No obviously palpable hardware or visible skin tenting.  Palpable plantar bone near or around the cuboid, not appearing to " threaten skin.  All incisions appear to be well healed, dry, and intact, without visible erythema or necrosis.    Independent review of imaging was performed including weightbearing AP, oblique, and lateral radiographs of the right foot dated today, 09/15/2023.  Findings were discussed with and shown to the patient.  No obvious evidence for tumor, infection, or acute fracture.  The previously known broken and loose screw appears to be stable when compared with previous x-rays.  The Charcot reconstruction alignment looks stable.  The naviculocuneiform and first tarsometatarsal joints appear to be fused.  The talonavicular joint does not appear to be fully fused yet, but the alignment of the joint appears stable.    Impression:  58-year-old patient stable status post right Charcot foot reconstruction.  Clinically resolved/quiescent postoperative infection with healed skin defects.    Plan:  Continue to weight-bear as tolerated.  Continue AFO use for all weightbearing.  Continue daily skin checks.  Follow-up in approximately 2 to 3 months with repeat right foot weightbearing radiographs, or sooner for any problems.  Signs and symptoms to watch out for that should prompt immediate medical attention were discussed.  All questions were answered.  Voltaren gel is ordered with sufficient refills as requested.  The need for long term follow-up discussed.  The potential need for future surgery was discussed, though the foot appears to be stable at this time.  [place] agreement on continuing observation and nonoperative treatment for now.    I also spoke with Danielle Camp at the [facility] at [phone] after the encounter to find out how to renew the 's Community Care referral  at the 's request.  There's a form to fill out that she is sending us that should be faxed to [phone].

## 2023-10-30 NOTE — LETTER
10/30/2023         RE: Nehemiah Magallon  [address]  [place] [address]        Dear Colleague,    Thank you for referring your patient, Nehemiah Magallon, to the [facility] [place]. Please see a copy of my visit note below.    Orthopaedic Surgery Clinic - Follow-up Appointment    Chief complaint: Follow-up right Charcot foot.  Date of surgery: 11/17/2022, right foot Charcot reconstruction and percutaneous tendo Achilles lengthening.  Date of surgery: 01/21/2023 I&D right foot and VAC application. Intraoperative cultures positive for Staphylococcus caprae and Staphylococcus epidermidis. Vanco/rifampin until 02/15/2023. Doxycycline until 02/24/2023.      I had the opportunity to see Mr. Magallon, who is a patient well known to me. This pleasant 58-year-old  was seen today in an urgent appointment for his right foot [facility]  He has a history of right foot Charcot reconstruction and percutaneous DOM almost a year ago, 11/17/2022.  He had a postoperative infection treated with an I&D and VAC treatment.  Cultures grew out Staph caprae and Staph epidermidis, and he underwent debridement on 1/21/2023 with hardware retention and eventual full wound healing by secondary intention.  He was on vanco and rifampin until 02/15/2023, followed by oral doxycycline which was discontinued secondary to rash 02/24/2023.  After that time, his infection appears to have resolved with no overt symptoms for infection for a relatively long time.  However, starting on 10/25/2023, he reports a relatively acute increase in his pain and swelling, this time more in the anterolateral right ankle than the foot.   He does report that his pain is increased to about 7 out of 10 in severity and that it is now coming up his right leg, as well as the right anterolateral ankle, which for him is new.  He denies any foot/ankle ulceration or open wounds.   The previously healed  incisions including the dorsomedial right foot wound that had healed with a VAC, remained closed with no recurrent drainage.  He denies any chills or night sweats.  He does endorse some malaise and elevated temperature for him, a low-grade fever of around 99.  [facility] and reports he was there for about 7 hours.  Mr. Magallon has brought in records [facility] which are reviewed and include an orthopaedic consultation note from Dr. Elieser Carrillo, which notes that the skin was intact with no erythema, and apparently healed incisions.  An aspiration was performed, which from review of Dr. Carrillo's note was from the anteromedial right ankle, between the medial malleolus and the tibialis anterior.  The culture results are not available at this time, but the cell count was around 13,000, with around 85% neutrophils.  There did not appear to be a concern for a septic joint, but there was a possibility of osteomyelitis.  Plain radiographs do not appear to have been performed or if so are not available, but there does appear to have been a CT scan was performed, the report of which is available, which showed soft tissue swelling, a tibiotalar joint effusion, loosening of the hardware, Lisfranc deformity, and findings suggestive of osteomyelitis.  There were also ill-defined erosions in the lateral talus and distal fibula.  Mr. Magallon reports he still has been able to don his AFO without issues with the fitting.    Examination shows the patient to be in awake, alert, pleasant, and cooperative adult sitting upright in a regular chair in no acute distress.  He has his AFO with him which is doffed for examination.  Breathing pattern is regular and nonlabored on room air.  He appears nonseptic from a general clinical standpoint.  Skin on the right lower leg, ankle, and foot is completely intact with well healed, dry, scars.  I see no erythematous streaking on the right leg.  There  does appear to be swelling about the right ankle particularly anterolaterally as well as about the right foot.  However, the swelling is nontense without significantly increased turgor.  The skin does not feel to be taut, and all compartments are soft and easily compressible.  There is no visible skin break.  There is no blistering.  There is no drainage.  There is no visible ecchymosis.  The foot appears to be warm and clinically perfused.  Light touch sensation is endorsed at his baseline which is numb in a stocking distribution.  Right tib ant and gastrocsoleus strength are 5/5.  Right EHL strength is weaker at 3/5.  Inversion and eversion strength are also weak at 3/5.  The right foot is plantigrade.  It is nontender to palpation throughout the previous arthrodesis sites and the hardware sites, though there is tenderness around the right ankle.  There is no palpable fluctuance or induration.  There is palpable crepitance with ankle range of motion, which is about 15 degrees of dorsiflexion about 15 degrees of plantarflexion.    Imaging:  I did review his report of [facility] with the findings as noted above.  Weightbearing radiographs of the right ankle and foot were performed at this encounter 10/30/2023, and the foot radiographs are compared with previous foot radiographs.  Results were discussed with the .  As noted previously, loose screws and a broken screw.  The alignment of the midfoot fusion sites and of the hardware appears to be stable.  Chronic appearing deformity at the midfoot including the Lisfranc joint consistent with Charcot neuroarthropathy.  No obvious new fractures or dislocations.  The ankle mortise is congruent without medial clear space widening or syndesmotic widening.  Although there are some degenerative changes at the ankle, the joint space is relatively well preserved and there is no varus or valgus tilting.  Vascular calcifications.    Laboratory  studies:  Laboratory results from the  [facility] were reviewed including the right foot aspiration with 13,310 white blood cells with 84.5% PMNs.  Acute phase reactants from that encounter included sed rate 67, CRP 73.74, and serum WBC 10.45 with 74.9% neutrophils.  Repeat acute phase reactants drawn at the current encounter, dated today, 10/30/2023, show sed rate 44, CRP 74.50, and serum WBC 9.2 with 79% neutrophils.    Impression: 58-year-old male  with diabetes mellitus and peripheral neuropathy with history of right Charcot foot and chronic bony deformity, status post right Charcot foot reconstruction with postop infection status post debridement and initial quiescence of overt signs of infection, now with recurrent signs of suggestive of recurrent infection for about 5 days.    Plan: The findings and treatment options, both nonsurgical and surgical, were discussed with patient in layman's terms today.  Full opportunity was given to him to participate in the shared decision-making process.  I would recommend a surgical debridement and irrigation of the right foot and right ankle, removal of hardware, possible placement of antibiotic beads, and possible VAC placement.  The nature of the surgery, as well as the risks, benefits, and alternatives, were all discussed in layman's terms.  All questions were answered.  The postop plan was discussed.  The possibility of a future amputation was discussed, though I expressed that that is not the currently proposed surgery.  The possibility that this infection may require just one procedure or may require multiple procedures, without any guarantee of resolving the infection, was also discussed.  I discussed I will attempt to find out the culture culture results from the right [facility].    Teaching Flowsheet   Relevant Diagnosis: Right Foot  Teaching Topic: Surgery     Person(s) involved in teaching:   Patient      Motivation Level:  Asks Questions: Yes  Eager to Learn: Yes  Cooperative: Yes  Receptive (willing/able to accept information): Yes  Any cultural factors/Rastafari beliefs that may influence understanding or compliance? No       Patient demonstrates understanding of the following:  Reason for the appointment, diagnosis and treatment plan: Yes  Knowledge of proper use of medications and conditions for which they are ordered (with special attention to potential side effects or drug interactions): Yes  Which situations necessitate calling provider and whom to contact: Yes       Teaching Concerns Addressed:        Proper use and care of medication (medical equip, care aids, etc.): Yes  Nutritional needs and diet plan: Yes  Pain management techniques: Yes  Wound Care: Yes  How and/when to access community resources: Yes     Instructional Materials Used/Given: pre op packet, declined soap      Time spent with patient: 15 minutes.    Brandie Mayo RN      Sincerely,        Bryon Starr MD

## 2023-10-30 NOTE — PROGRESS NOTES
Teaching Flowsheet   Relevant Diagnosis: Right Foot  Teaching Topic: Surgery     Person(s) involved in teaching:   Patient     Motivation Level:  Asks Questions: Yes  Eager to Learn: Yes  Cooperative: Yes  Receptive (willing/able to accept information): Yes  Any cultural factors/Denominational beliefs that may influence understanding or compliance? No       Patient demonstrates understanding of the following:  Reason for the appointment, diagnosis and treatment plan: Yes  Knowledge of proper use of medications and conditions for which they are ordered (with special attention to potential side effects or drug interactions): Yes  Which situations necessitate calling provider and whom to contact: Yes       Teaching Concerns Addressed:        Proper use and care of medication (medical equip, care aids, etc.): Yes  Nutritional needs and diet plan: Yes  Pain management techniques: Yes  Wound Care: Yes  How and/when to access community resources: Yes     Instructional Materials Used/Given: pre op packet, declined soap      Time spent with patient: 15 minutes.    Brandie Mayo RN

## 2023-10-30 NOTE — TELEPHONE ENCOUNTER
Phoned patient as there has been a cancellation with Dr. Starr for this week--move up his surgery date.     Call went to voicemail. Provided brief reason of call and call back number of [phone].     Will try again.

## 2023-10-30 NOTE — PATIENT INSTRUCTIONS
Preparing for Your Surgery      Name:  Nehemiah Magallon   MRN:  [id number]   :  1965   Today's Date:  10/30/2023       Arriving for surgery:  Surgery date:  23  Arrival time:  9:45 am  Surgery time: 12:15 pm    Please come to:     Please come to:      [facility] [address]  [address] [place]  [address]  The [facility]amp for patients and visitors is located beneath the [facility]. The parking facility entrance is at the intersection of [address] [place] [address] and [address]. Patients and visitors who self-park will receive the reduced hospital parking rate (no ticket validation needed).  [employer] parking, located at the [facility] main entrance on [address] [place] [address], is available Monday - Friday from 7 am to 3:30 pm.  Discounted parking pass options can be purchased from  attendants during business hours.  -Check in at the security desk in the [facility] ([facility]) Lobby. They will direct you to the correct elevators.  -Proceed to the 3rd floor, check in at the Adult Surgery Waiting Lounge. [phone]  If you are in need of directions, a wheelchair or escort please stop at the Information Desk in the lobby.  Inform the information person that you are here for surgery; a wheelchair and escort to [address] will be provided.   An escort to the Adult Surgery Waiting Lounge will be provided.    What can I eat or drink?  -  You may eat and drink normally up to 8 hours prior to arrival time. (Until 1:45 am)  -  You may have clear liquids until 2 hours prior to arrival time. (Until 7:45 am)    Examples of clear liquids:  Water  Clear broth  Juices (apple, white grape, white cranberry  and cider) without pulp  Noncarbonated, powder based beverages  (lemonade and José Miguel-Aid)  Sodas (Sprite, 7-Up, ginger ale and seltzer)  Coffee or tea (without milk or cream)  Gatorade    -  No  Alcohol or cannabis products for at least 24 hours before surgery.     Which medicines can I take?    Hold Aspirin for 7 days before surgery.   Hold Multivitamins for 7 days before surgery.  Hold Supplements for 7 days before surgery.  Hold Ibuprofen (Advil, Motrin) for 1 day(s) before surgery--unless otherwise directed by surgeon.  Hold Naproxen (Aleve) for 4 days before surgery.    -  DO NOT take these medications the day of surgery:  Lotions/creams/gels  Calcium-Vitamin D  Cetirizine (Zyrtec)  Empagliflozin (Jardiance) - stop 3 days before surgery  Metformin  Miralax  Semaglutide (Ozempic) - stop at least 7 days before surgery - do not take your dose on 11/5/23  Sumatriptan  Vitamin D    -  PLEASE TAKE these medications per your usual routine:  Tylenol if needed  Refresh eye drops if needed  Flonase nasal spray if needed  Gabapentin (Neurontin)  Levothyroxine  Lidoderm patch if needed - avoid the area of surgery  Linaclotide (Linzess)  Omeprazole (Prilosec)  Pravastatin (Pravachol)  Senokot  Topiramate (Topamax)  Zolpidem (Ambien)      How do I prepare myself?  - Please take 2 showers (one the night prior to surgery and one the morning of surgery) using Scrubcare or Hibiclens soap.    Use this soap only from the neck to your toes.     Leave the soap on your skin for one minute--then rinse thoroughly.      You may use your own shampoo and conditioner. No other hair products.   - Please remove all jewelry and body piercings.  - No lotions, deodorants or fragrance.  - No makeup or fingernail polish.   - Bring your ID and insurance card.    -If you have a Deep Brain Stimulator, Spinal Cord Stimulator, or any Neuro Stimulator device---you must bring the remote control to the hospital.      ALL PATIENTS GOING HOME THE SAME DAY OF SURGERY ARE REQUIRED TO HAVE A RESPONSIBLE ADULT TO DRIVE AND BE IN ATTENDANCE WITH THEM FOR 24 HOURS FOLLOWING SURGERY.    Covid testing policy as of 12/06/2022  Your surgeon will notify and  schedule you for a COVID test if one is needed before surgery--please direct any questions or COVID symptoms to your surgeon      Questions or Concerns:    - For any questions regarding the day of surgery or your hospital stay, please contact the Pre Admission Nursing Office at [phone].       - If you have health changes between today and your surgery, please call your surgeon.       - For questions after surgery, please call your surgeons office.           Current Visitor Guidelines    You may have 2 visitors in the pre op area.    Visiting hours: 8 a.m. to 8:30 p.m.    You may have four visitors during your inpatient hospital stay.    Patients confirmed or suspected to have symptoms of COVID 19 or flu:     No visitors allowed for adult patients.   Children (under age 18) can have 1 named visitor.     People who are sick or showing symptoms of COVID 19 or flu:    Are not allowed to visit patients--we can only make exceptions in special situations.       Please follow these guidelines for your visit:          Please maintain social distance          Masking is optional--however at times you may be asked to wear a mask for the safety of yourself and others     Clean your hands with alcohol hand . Do this when you arrive at and leave the building and patient room,    And again after you touch your mask or anything in the room.     Go directly to and from the room you are visiting.     Stay in the patient s room during your visit. Limit going to other places in the hospital as much as possible     Leave bags and jackets at home or in the car.     For everyone s health, please don t come and go during your visit. That includes for smoking   during your visit.

## 2023-10-30 NOTE — TELEPHONE ENCOUNTER
Patient is scheduled for surgery with Dr. Starr    Spoke with: Nehemiah    Date of Surgery: 11/8/23    Location: [facility]    Informed patient they will need an adult  Yes    Pre op with Provider PAC, 10/30/23    Additional imaging/appointments: POP Made    Surgery packet: Received     Additional comments: N/A        Halima Luciano on 10/30/2023 at 12:44 PM

## 2023-10-30 NOTE — H&P (VIEW-ONLY)
Pre-Operative H & P     CC:  Preoperative exam to assess for increased cardiopulmonary risk while undergoing surgery and anesthesia.    Date of Encounter: 10/30/2023  Primary Care Physician:  Ochoa Echevarria     Reason for visit:   Encounter Diagnoses   Name Primary?    Pre-op evaluation Yes    Chronic osteomyelitis of right foot (H)     Type 2 diabetes mellitus without complication, without long-term current use of insulin (H)        HPI  Nehemiah Magallon is a 58 year old male who presents for pre-operative H & P in preparation for  Procedure Information       Case: [id number] Date/Time: 11/08/23 1215    Procedures:       Debridement and irrigation of right ankle and foot, (Right: Foot)      Remove hardware right foot (Right: Foot)    Anesthesia type: Choice    Diagnosis: Other chronic osteomyelitis of right foot (H) [M86.671]    Pre-op diagnosis: Other chronic osteomyelitis of right foot (H) [M86.671]    Location:  OR 14 / [facility]    Providers: Bryon Starr MD            Patient is being evaluated for comorbid conditions of hypertension, MARGARITO, allergic rhinitis, anemia, migraine, neuropathy, chronic pain, type 2 diabetes, hypothyroidism, obesity, GERD, nonalcoholic fatty liver disease, history of hepatitis C, insomnia, history of thyroid cancer status post thyroidectomy, and history of skin cancer.    He has a history of right Charcot foot and chronic bony deformity.  He is status post right Charcot foot reconstruction with postoperative infection status post debridement.  He was evaluated by orthopedics earlier today with concerns of recurrent signs of infection.  Treatment options were discussed at that visit and a plan was made to proceed with surgical intervention as scheduled above.    History is obtained from the patient and chart review    Hx of abnormal bleeding or anti-platelet use: None      Past Medical History  Past Medical History:   Diagnosis Date    Chronic pain syndrome 10/24/2014    Diabetes  mellitus, type 2 (H)     Diabetic Charcot foot (H)     Diabetic retinopathy associated with diabetes mellitus due to underlying condition (H)     Diverticulitis of colon     Gastroesophageal reflux disease     Hepatitis C 2017    treated    History of skin cancer     Hypertension     Hypothyroidism     Legally blind     Midfoot collapse of right lower extremity     Migraine     NAFLD (nonalcoholic fatty liver disease)     Nephrolithiasis     Neuropathy     MARGARITO (obstructive sleep apnea)     Rib pain 10/24/2014    S/P colectomy 10/14/2014    Thyroid cancer (H) 10/14/2014       Past Surgical History  Past Surgical History:   Procedure Laterality Date    ARTHRODESIS FOOT Right 11/17/2022    Procedure: Right midfoot/talonavicular osteotomy and reduction of deformity Right midfoot/talonavicular arthrodesis, achilles lengthening;  Surgeon: Bryon Starr MD;  Location: [facility]    CHOLECYSTECTOMY  1986    COLECTOMY      @ 6 years ago, sigmoid    COLONOSCOPY  2016    FOOT SURGERY Left 2021    IRRIGATION AND DEBRIDEMENT FOOT, COMBINED Right 1/21/2023    Procedure: IRRIGATION AND DEBRIDEMENT, FOOT, RIGHT, Placement of Wound Vac and Antibiotic Beads.;  Surgeon: Bryon Starr MD;  Location: [facility]    LENGTHEN TENDON ACHILLES Right 11/17/2022    Procedure: LENGTHENING, TENDON, ACHILLES;  Surgeon: Bryon Starr MD;  Location: [facility]       Prior to Admission Medications  Current Outpatient Medications   Medication Sig Dispense Refill    acetaminophen (TYLENOL) 325 MG tablet Take 2 tablets (650 mg) by mouth every 8 hours as needed for mild pain, fever or headaches 60 tablet 0    ammonium lactate (LAC-HYDRIN) 12 % external lotion APPLY THIN LAYER TOPICALLY TWICE A DAY AS NEEDED FOR DRY SKIN **EXTERNAL USE ONLY**      ammonium lactate (LAC-HYDRIN) 12 % external lotion Apply topically every evening      Calcium-Vitamin D 500-3.125 MG-MCG TABS Take 2 tablets by mouth 2 times daily      carboxymethylcellulose PF (REFRESH PLUS) 0.5 %  ophthalmic solution 1 drop 3 times daily as needed for dry eyes      cetirizine (ZYRTEC) 10 MG tablet Take 10 mg by mouth every morning      Cyanocobalamin 1000 MCG CAPS Take 2,000 mcg by mouth every morning      diclofenac (VOLTAREN) 1 % topical gel Apply 4 g topically 4 times daily (Patient taking differently: Apply 4 g topically every evening) 480 g 2    empagliflozin (JARDIANCE) 25 MG TABS tablet Take 25 mg by mouth every morning      fluticasone (FLONASE) 50 MCG/ACT nasal spray Spray 2 sprays into both nostrils every morning      folic acid (FOLVITE) 1 MG tablet Take 1 mg by mouth every morning      gabapentin (NEURONTIN) 600 MG tablet Take 2 tablets by mouth 3 times daily      levothyroxine (SYNTHROID) 137 MCG tablet Take 150 mcg by mouth every morning      levothyroxine (SYNTHROID/LEVOTHROID) 137 MCG tablet Take 137 mcg by mouth daily      lidocaine (LIDODERM) 5 % patch APPLY 1 OR 2 PATCHES 5% TOPICALLY EVERY DAY FOR PAIN -WEAR FOR UP TO 12 HOURS THEN REMOVE AND LEAVE OFF FOR 12 HOURS      lidocaine (LIDODERM) 5 % patch Apply up to 3 patches to painful area at once for up to 12 h within a 24 h period.  Remove after 12 hours. (Patient taking differently: Apply up to 3 patches to painful area at once for up to 12 h within a 24 h period.  Remove after 12 hours. L foot.) 90 patch 0    linaclotide (LINZESS) 145 MCG capsule Take 1 capsule by mouth every morning      metFORMIN (GLUCOPHAGE XR) 500 MG 24 hr tablet Take 1,000 mg by mouth 2 times daily (with meals)      metFORMIN (GLUCOPHAGE XR) 500 MG 24 hr tablet Take 1,000 mg by mouth 2 times daily (with meals)      omeprazole (PRILOSEC) 20 MG DR capsule Take 20 mg by mouth every morning      polyethylene glycol (MIRALAX) 17 g packet Take 17 g by mouth daily 10 packet 0    pravastatin (PRAVACHOL) 40 MG tablet Take 20 mg by mouth every evening      Semaglutide (OZEMPIC, 1 MG/DOSE, SC) Inject 2 mg Subcutaneous once a week Sunday's      sodium fluoride dental gel  "(PREVIDENT) 1.1 % GEL topical gel BRUSH SMALL AMOUNT MOUTH EVERY MORNING AND AT BEDTIME ON TOOTHBRUSH, BRUSH FOR 2 MINUTES.      SUMAtriptan (IMITREX) 25 MG tablet 25 mg      SUMAtriptan (IMITREX) 25 MG tablet Take 25 mg by mouth at onset of headache      topiramate (TOPAMAX) 100 MG tablet Take 150 mg by mouth every morning      Vitamin D3 (CHOLECALCIFEROL) 25 mcg (1000 units) tablet Take 50 mcg by mouth every morning      zolpidem ER (AMBIEN CR) 12.5 MG CR tablet Take 12.5 mg by mouth nightly as needed for sleep      Perkins Moisture Barrier external cream Apply daily as directed to skin 92 g 6    diclofenac (VOLTAREN) 1 % topical gel Apply 4 g topically 4 times daily as needed for moderate pain (4-6)      fluticasone (FLONASE) 50 MCG/ACT nasal spray SPRAY 2 SPRAYS IN EACH NOSTRIL TWICE A DAY FOR RELIEF OF ALLERGIES AND CONGESTION -USE REGULARLY FOR BEST RESULTS (Patient not taking: Reported on 10/30/2023)      gabapentin (NEURONTIN) 400 MG capsule Take 3 capsules (1,200 mg) by mouth 3 times daily (Patient not taking: Reported on 10/30/2023) 90 capsule 0    Gauze Pads & Dressings (TELFA [place] DRESSING) 4\"X8\" PADS 1 Units daily 45 each 1    senna-docusate (SENOKOT-S/PERICOLACE) 8.6-50 MG tablet Take 1 tablet by mouth 2 times daily (Patient not taking: Reported on 10/30/2023) 60 tablet 0    Wound Dressings (MEPILEX BORDER FLEX) PADS Externally apply 1 each topically daily as needed (as needed for saturated dressing) 60 each 6       Allergies  Allergies   Allergen Reactions    Atorvastatin      Other reaction(s): Hyperglycemia    Celebrex [Celecoxib] Other (See Comments)     [facility]    Doxycycline Rash    Rifampin Rash       Social History  Social History     Socioeconomic History    Marital status:      Spouse name: Not on file    Number of children: Not on file    Years of education: Not on file    Highest education level: Not on file   Occupational History    Not on file   Tobacco " Use    Smoking status: Never    Smokeless tobacco: Never   Substance and Sexual Activity    Alcohol use: Not Currently     Comment: rarely    Drug use: No    Sexual activity: Not Currently     Partners: Female   Other Topics Concern    Not on file   Social History Narrative    Not on file     Social Determinants of Health     Financial Resource Strain: Not on file   Food Insecurity: Not on file   Transportation Needs: Not on file   Physical Activity: Not on file   Stress: Not on file   Social Connections: Not on file   Interpersonal Safety: Not on file   Housing Stability: Not on file       Family History  Family History   Problem Relation Age of Onset    Diabetes Mother     Cancer Mother     Diabetes Father     Heart Disease Father     Anesthesia Reaction No family hx of     Clotting Disorder No family hx of     Glaucoma No family hx of     Macular Degeneration No family hx of        Review of Systems  The complete review of systems is negative other than noted in the HPI or here.   Anesthesia Evaluation   Pt has had prior anesthetic.     No history of anesthetic complications       ROS/MED HX  ENT/Pulmonary:     (+) sleep apnea, doesn't use CPAP,         allergic rhinitis,                         (-) asthma, COPD and recent URI   Neurologic:     (+)    peripheral neuropathy, - bilateral lower extremities knees down, severe. migraines,                       (-) no seizures, no CVA and no TIA   Cardiovascular:     (+)  hypertension (improved with weight loss)- -   -  - -                                 Previous cardiac testing   Echo: Date: 3/7/2019 Results:  Summary:  1.  Normal left ventricular size and function (visual EF 55 to 60%, biplane EF 60%).  2.  Moderate concentric left ventricular hypertrophy.  3.  LV diastolic function is indeterminate.  Stress Test:  Date: Results:    ECG Reviewed:  Date: 7/28/2021 Results:  NSR  Cath:  Date: Results:   (-) dyslipidemia   METS/Exercise Tolerance:  Comment: METS  "limited due to severe neuropathy. Will use forearm crutch or manual wheelchair primarily. Denies chest pain/pressure, INFANTE, orthopnea, dizziness, palpitations, edema.     Hematologic:     (+)      anemia,       (-) history of blood clots and history of blood transfusion   Musculoskeletal: Comment: Charcot foot  Chronic osteomyelitis  (+)  arthritis,             GI/Hepatic: Comment: History of diverticulitis s/p colectomy  History of cholecystectomy    (+) GERD, Asymptomatic on medication,   hepatitis resolved      hepatitis type C, liver disease (NAFLD),       Renal/Genitourinary:     (+) renal disease,  Pt does not require dialysis,           Endo:     (+)  type II DM, Last HgA1c: 6.3,  Not using insulin, - not using insulin pump.  not previously admitted for DM/DKA. Diabetic complications: neuropathy. thyroid problem, hypothyroidism,           Psychiatric/Substance Use:     (+) psychiatric history other (comment) (Insomnia)       Infectious Disease:  - neg infectious disease ROS     Malignancy:   (+) Malignancy, History of Skin and Other.Skin CA Remission status post Surgery.  Other CA Thyroid cancer Remission status post Surgery.    Other:      (+)  , H/O Chronic Pain,, other significant disability Blind         /67 (BP Location: Right arm, Patient Position: Sitting, Cuff Size: Adult Large)   Pulse 96   Temp 98  F (36.7  C) (Oral)   Resp 16   Ht 1.778 m (5' 10\")   Wt 86.2 kg (190 lb)   SpO2 96%   BMI 27.26 kg/m      Physical Exam   Constitutional: Pleasant, well-appearing male, no apparent distress, and appears stated age.  Eyes: Pupils equal, round and reactive to light, extra ocular muscles intact, sclera clear, conjunctiva normal.  HENT: Normocephalic and atraumatic, oral pharynx with moist mucus membranes, good dentition. No goiter appreciated.   Respiratory: Clear to auscultation bilaterally, no crackles or wheezing.  Cardiovascular: Regular rate and rhythm, normal S1 and S2, and no murmur noted. "  Carotids +2, no bruits. No edema. Palpable pulses to radial  DP and PT arteries.   GI: Normal bowel sounds, soft, non-distended, non-tender, no masses palpated, no hepatosplenomegaly.  Lymph/Hematologic: No cervical lymphadenopathy and no supraclavicular lymphadenopathy.  Genitourinary:  Deferred  Skin: Warm and dry.  No rashes on exposed skin   Musculoskeletal: Full ROM of neck. There is no redness, warmth, or swelling of the visible joints. Gross motor strength is normal.    Neurologic: Awake, alert, oriented to name, place and time. Cranial nerves II-XII are grossly intact.  Neuropsychiatric: Calm, cooperative. Normal affect.       Prior Labs/Diagnostic Studies   All labs and imaging personally reviewed     EKG/ stress test - if available please see in ROS above   No results found.    The patient's records and results personally reviewed by this provider.     Outside records reviewed [facility]    LAB/DIAGNOSTIC STUDIES TODAY:  BMP, A1c    Assessment  Nehemiah Magallon is a 58 year old male seen as a PAC referral for risk assessment and optimization for anesthesia.    Plan/Recommendations  Pt will be optimized for the proposed procedure.  See below for details on the assessment, risk, and preoperative recommendations    NEUROLOGY  - No history of TIA, CVA or seizure  - History of migraine headaches with PRN imitrex. Hold 24 hours prior to surgery.  - History of severe peripheral neuropathy affecting bilateral lower extremities. Patient states that he has zero sensation in from his knees down. He typically uses a manual wheelchair or forearm crutch due to this. Consider fall risk precautions.    -Post Op delirium risk factors:  No risk identified    HEENT  - Patient is legally blind    - No current airway concerns.  Will need to be reassessed day of surgery.  Mallampati: II  TM: > 3    CARDIAC  - No history of CAD and Afib  - Hypertension  Patient was previously on antihypertensives but has been  "taken off these after losing a significant amount of weight. /67 on exam today.    - METS (Metabolic Equivalents)  Patient CANNOT perform 4 METS exercise due to neuropathy. Denies chest pain/pressure, INFANTE, orthopnea, dizziness, palpitations, edema.              Total Score: 1    Functional Capacity: Unable to complete 4 METS      RCRI-Very low risk: Class 1 0.4% complication rate            Total Score: 0        PULMONARY  - Obstructive Sleep Apnea  MARGARITO without home CPAP use. Patient reports that he does not tolerate the CPAP machine due to claustrophobia. He is scheduled to have an Inspire device placed in December.    - Denies asthma or inhaler use  - Tobacco History    History   Smoking Status    Never   Smokeless Tobacco    Never       GI  - GERD  Controlled on medications: Proton Pump Inhibitor  PONV Medium Risk  Total Score: 2           1 AN PONV: Patient is not a current smoker    1 AN PONV: Intended Post Op Opioids      - History of diverticulitis s/p colectomy  - History of cholecystectomy  - History of hepatitis C, treated  - History of NAFLD    /RENAL  - Baseline Creatinine 1.30-1.42    ENDOCRINE    - BMI: Estimated body mass index is 27.26 kg/m  as calculated from the following:    Height as of this encounter: 1.778 m (5' 10\").    Weight as of this encounter: 86.2 kg (190 lb).  Overweight (BMI 25.0-29.9)  - Diabetes  Diabetes Mellitus, Type 2, non-insulin dependent.  Patient reports last A1c was 6.5, will update prior to surgery as results are not available. Hold morning oral hypoglycemic medications. Hold Jardiance 3 days prior to surgery. Hold Ozempic 7 days prior to surgery. Recommend close monitoring of the patient's blood glucose levels throughout the perioperative period.    HEME  VTE Medium Risk 1.8%            Total Score: 5    VTE: Male    VTE: Sepsis      - No history of abnormal bleeding or antiplatelet use.  - Chronic mild anemia  Recommend perioperative use of blood conservation " techniques intraoperatively and close monitoring for postoperative bleeding.      MSK  - Charcot foot/chronic osteomyelitis. Surgery as scheduled above    PSYCH  - Insomnia, managed with PRN Ambien.     Different anesthesia methods/types have been discussed with the patient, but they are aware that the final plan will be decided by the assigned anesthesia provider on the date of service.    The patient is optimized for their procedure. AVS with information on surgery time/arrival time, meds and NPO status given by nursing staff. No further diagnostic testing indicated.      On the day of service:     Prep time: 20 minutes  Visit time: 15 minutes  Documentation time: 10 minutes  ------------------------------------------  Total time: 45 minutes      Shanika Watkins PA-C  Preoperative Assessment Center  [facility]  [facility]  Phone: [phone]  Fax: [phone]

## 2023-10-30 NOTE — H&P
Pre-Operative H & P     CC:  Preoperative exam to assess for increased cardiopulmonary risk while undergoing surgery and anesthesia.    Date of Encounter: 10/30/2023  Primary Care Physician:  Ochoa Echevarria     Reason for visit:   Encounter Diagnoses   Name Primary?    Pre-op evaluation Yes    Chronic osteomyelitis of right foot (H)     Type 2 diabetes mellitus without complication, without long-term current use of insulin (H)        HPI  Nehemiah Magallon is a 58 year old male who presents for pre-operative H & P in preparation for  Procedure Information       Case: [id number] Date/Time: 11/08/23 1215    Procedures:       Debridement and irrigation of right ankle and foot, (Right: Foot)      Remove hardware right foot (Right: Foot)    Anesthesia type: Choice    Diagnosis: Other chronic osteomyelitis of right foot (H) [M86.671]    Pre-op diagnosis: Other chronic osteomyelitis of right foot (H) [M86.671]    Location:  OR [facility] / [facility]    Providers: Bryon Starr MD            Patient is being evaluated for comorbid conditions of hypertension, MARGARITO, allergic rhinitis, anemia, migraine, neuropathy, chronic pain, type 2 diabetes, hypothyroidism, obesity, GERD, nonalcoholic fatty liver disease, history of hepatitis C, insomnia, history of thyroid cancer status post thyroidectomy, and history of skin cancer.    He has a history of right Charcot foot and chronic bony deformity.  He is status post right Charcot foot reconstruction with postoperative infection status post debridement.  He was evaluated by orthopedics earlier today with concerns of recurrent signs of infection.  Treatment options were discussed at that visit and a plan was made to proceed with surgical intervention as scheduled above.    History is obtained from the patient and chart review    Hx of abnormal bleeding or anti-platelet use: None      Past Medical History  Past Medical History:   Diagnosis Date    Chronic pain syndrome 10/24/2014    Diabetes  mellitus, type 2 (H)     Diabetic Charcot foot (H)     Diabetic retinopathy associated with diabetes mellitus due to underlying condition (H)     Diverticulitis of colon     Gastroesophageal reflux disease     Hepatitis C 2017    treated    History of skin cancer     Hypertension     Hypothyroidism     Legally blind     Midfoot collapse of right lower extremity     Migraine     NAFLD (nonalcoholic fatty liver disease)     Nephrolithiasis     Neuropathy     MARGARITO (obstructive sleep apnea)     Rib pain 10/24/2014    S/P colectomy 10/14/2014    Thyroid cancer (H) 10/14/2014       Past Surgical History  Past Surgical History:   Procedure Laterality Date    ARTHRODESIS FOOT Right 11/17/2022    Procedure: Right midfoot/talonavicular osteotomy and reduction of deformity Right midfoot/talonavicular arthrodesis, achilles lengthening;  Surgeon: Bryon Starr MD;  [facility]    CHOLECYSTECTOMY  1986    COLECTOMY      @ 6 years ago, sigmoid    COLONOSCOPY  2016    FOOT SURGERY Left 2021    IRRIGATION AND DEBRIDEMENT FOOT, COMBINED Right 1/21/2023    Procedure: IRRIGATION AND DEBRIDEMENT, FOOT, RIGHT, Placement of Wound Vac and Antibiotic Beads.;  Surgeon: Bryon Starr MD;  [facility]    LENGTHEN TENDON ACHILLES Right 11/17/2022    Procedure: LENGTHENING, TENDON, ACHILLES;  Surgeon: Bryon Starr MD;  Location: [facility]       Prior to Admission Medications  Current Outpatient Medications   Medication Sig Dispense Refill    acetaminophen (TYLENOL) 325 MG tablet Take 2 tablets (650 mg) by mouth every 8 hours as needed for mild pain, fever or headaches 60 tablet 0    ammonium lactate (LAC-HYDRIN) 12 % external lotion APPLY THIN LAYER TOPICALLY TWICE A DAY AS NEEDED FOR DRY SKIN **EXTERNAL USE ONLY**      ammonium lactate (LAC-HYDRIN) 12 % external lotion Apply topically every evening      Calcium-Vitamin D 500-3.125 MG-MCG TABS Take 2 tablets by mouth 2 times daily      carboxymethylcellulose PF (REFRESH PLUS) 0.5 %  ophthalmic solution 1 drop 3 times daily as needed for dry eyes      cetirizine (ZYRTEC) 10 MG tablet Take 10 mg by mouth every morning      Cyanocobalamin 1000 MCG CAPS Take 2,000 mcg by mouth every morning      diclofenac (VOLTAREN) 1 % topical gel Apply 4 g topically 4 times daily (Patient taking differently: Apply 4 g topically every evening) 480 g 2    empagliflozin (JARDIANCE) 25 MG TABS tablet Take 25 mg by mouth every morning      fluticasone (FLONASE) 50 MCG/ACT nasal spray Spray 2 sprays into both nostrils every morning      folic acid (FOLVITE) 1 MG tablet Take 1 mg by mouth every morning      gabapentin (NEURONTIN) 600 MG tablet Take 2 tablets by mouth 3 times daily      levothyroxine (SYNTHROID) 137 MCG tablet Take 150 mcg by mouth every morning      levothyroxine (SYNTHROID/LEVOTHROID) 137 MCG tablet Take 137 mcg by mouth daily      lidocaine (LIDODERM) 5 % patch APPLY 1 OR 2 PATCHES 5% TOPICALLY EVERY DAY FOR PAIN -WEAR FOR UP TO 12 HOURS THEN REMOVE AND LEAVE OFF FOR 12 HOURS      lidocaine (LIDODERM) 5 % patch Apply up to 3 patches to painful area at once for up to 12 h within a 24 h period.  Remove after 12 hours. (Patient taking differently: Apply up to 3 patches to painful area at once for up to 12 h within a 24 h period.  Remove after 12 hours. L foot.) 90 patch 0    linaclotide (LINZESS) 145 MCG capsule Take 1 capsule by mouth every morning      metFORMIN (GLUCOPHAGE XR) 500 MG 24 hr tablet Take 1,000 mg by mouth 2 times daily (with meals)      metFORMIN (GLUCOPHAGE XR) 500 MG 24 hr tablet Take 1,000 mg by mouth 2 times daily (with meals)      omeprazole (PRILOSEC) 20 MG DR capsule Take 20 mg by mouth every morning      polyethylene glycol (MIRALAX) 17 g packet Take 17 g by mouth daily 10 packet 0    pravastatin (PRAVACHOL) 40 MG tablet Take 20 mg by mouth every evening      Semaglutide (OZEMPIC, 1 MG/DOSE, SC) Inject 2 mg Subcutaneous once a week Sunday's      sodium fluoride dental gel  "(PREVIDENT) 1.1 % GEL topical gel BRUSH SMALL AMOUNT MOUTH EVERY MORNING AND AT BEDTIME ON TOOTHBRUSH, BRUSH FOR 2 MINUTES.      SUMAtriptan (IMITREX) 25 MG tablet 25 mg      SUMAtriptan (IMITREX) 25 MG tablet Take 25 mg by mouth at onset of headache      topiramate (TOPAMAX) 100 MG tablet Take 150 mg by mouth every morning      Vitamin D3 (CHOLECALCIFEROL) 25 mcg (1000 units) tablet Take 50 mcg by mouth every morning      zolpidem ER (AMBIEN CR) 12.5 MG CR tablet Take 12.5 mg by mouth nightly as needed for sleep      Bend Moisture Barrier external cream Apply daily as directed to skin 92 g 6    diclofenac (VOLTAREN) 1 % topical gel Apply 4 g topically 4 times daily as needed for moderate pain (4-6)      fluticasone (FLONASE) 50 MCG/ACT nasal spray SPRAY 2 SPRAYS IN EACH NOSTRIL TWICE A DAY FOR RELIEF OF ALLERGIES AND CONGESTION -USE REGULARLY FOR BEST RESULTS (Patient not taking: Reported on 10/30/2023)      gabapentin (NEURONTIN) 400 MG capsule Take 3 capsules (1,200 mg) by mouth 3 times daily (Patient not taking: Reported on 10/30/2023) 90 capsule 0    Gauze Pads & Dressings (TELFA [place]LAND DRESSING) 4\"X8\" PADS 1 Units daily 45 each 1    senna-docusate (SENOKOT-S/PERICOLACE) 8.6-50 MG tablet Take 1 tablet by mouth 2 times daily (Patient not taking: Reported on 10/30/2023) 60 tablet 0    Wound Dressings (MEPILEX BORDER FLEX) PADS Externally apply 1 each topically daily as needed (as needed for saturated dressing) 60 each 6       Allergies  Allergies   Allergen Reactions    Atorvastatin      Other reaction(s): Hyperglycemia    Celebrex [Celecoxib] Other (See Comments)     [facility]    Doxycycline Rash    Rifampin Rash       Social History  Social History     Socioeconomic History    Marital status:      Spouse name: Not on file    Number of children: Not on file    Years of education: Not on file    Highest education level: Not on file   Occupational History    Not on file   Tobacco " Use    Smoking status: Never    Smokeless tobacco: Never   Substance and Sexual Activity    Alcohol use: Not Currently     Comment: rarely    Drug use: No    Sexual activity: Not Currently     Partners: Female   Other Topics Concern    Not on file   Social History Narrative    Not on file     Social Determinants of Health     Financial Resource Strain: Not on file   Food Insecurity: Not on file   Transportation Needs: Not on file   Physical Activity: Not on file   Stress: Not on file   Social Connections: Not on file   Interpersonal Safety: Not on file   Housing Stability: Not on file       Family History  Family History   Problem Relation Age of Onset    Diabetes Mother     Cancer Mother     Diabetes Father     Heart Disease Father     Anesthesia Reaction No family hx of     Clotting Disorder No family hx of     Glaucoma No family hx of     Macular Degeneration No family hx of        Review of Systems  The complete review of systems is negative other than noted in the HPI or here.   Anesthesia Evaluation   Pt has had prior anesthetic.     No history of anesthetic complications       ROS/MED HX  ENT/Pulmonary:     (+) sleep apnea, doesn't use CPAP,         allergic rhinitis,                         (-) asthma, COPD and recent URI   Neurologic:     (+)    peripheral neuropathy, - bilateral lower extremities knees down, severe. migraines,                       (-) no seizures, no CVA and no TIA   Cardiovascular:     (+)  hypertension (improved with weight loss)- -   -  - -                                 Previous cardiac testing   Echo: Date: 3/7/2019 Results:  Summary:  1.  Normal left ventricular size and function (visual EF 55 to 60%, biplane EF 60%).  2.  Moderate concentric left ventricular hypertrophy.  3.  LV diastolic function is indeterminate.  Stress Test:  Date: Results:    ECG Reviewed:  Date: 7/28/2021 Results:  NSR  Cath:  Date: Results:   (-) dyslipidemia   METS/Exercise Tolerance:  Comment: METS  "limited due to severe neuropathy. Will use forearm crutch or manual wheelchair primarily. Denies chest pain/pressure, INFANTE, orthopnea, dizziness, palpitations, edema.     Hematologic:     (+)      anemia,       (-) history of blood clots and history of blood transfusion   Musculoskeletal: Comment: Charcot foot  Chronic osteomyelitis  (+)  arthritis,             GI/Hepatic: Comment: History of diverticulitis s/p colectomy  History of cholecystectomy    (+) GERD, Asymptomatic on medication,   hepatitis resolved      hepatitis type C, liver disease (NAFLD),       Renal/Genitourinary:     (+) renal disease,  Pt does not require dialysis,           Endo:     (+)  type II DM, Last HgA1c: 6.3,  Not using insulin, - not using insulin pump.  not previously admitted for DM/DKA. Diabetic complications: neuropathy. thyroid problem, hypothyroidism,           Psychiatric/Substance Use:     (+) psychiatric history other (comment) (Insomnia)       Infectious Disease:  - neg infectious disease ROS     Malignancy:   (+) Malignancy, History of Skin and Other.Skin CA Remission status post Surgery.  Other CA Thyroid cancer Remission status post Surgery.    Other:      (+)  , H/O Chronic Pain,, other significant disability Blind         /67 (BP Location: Right arm, Patient Position: Sitting, Cuff Size: Adult Large)   Pulse 96   Temp 98  F (36.7  C) (Oral)   Resp 16   Ht 1.778 m (5' 10\")   Wt 86.2 kg (190 lb)   SpO2 96%   BMI 27.26 kg/m      Physical Exam   Constitutional: Pleasant, well-appearing male, no apparent distress, and appears stated age.  Eyes: Pupils equal, round and reactive to light, extra ocular muscles intact, sclera clear, conjunctiva normal.  HENT: Normocephalic and atraumatic, oral pharynx with moist mucus membranes, good dentition. No goiter appreciated.   Respiratory: Clear to auscultation bilaterally, no crackles or wheezing.  Cardiovascular: Regular rate and rhythm, normal S1 and S2, and no murmur noted. "  Carotids +2, no bruits. No edema. Palpable pulses to radial  DP and PT arteries.   GI: Normal bowel sounds, soft, non-distended, non-tender, no masses palpated, no hepatosplenomegaly.  Lymph/Hematologic: No cervical lymphadenopathy and no supraclavicular lymphadenopathy.  Genitourinary:  Deferred  Skin: Warm and dry.  No rashes on exposed skin   Musculoskeletal: Full ROM of neck. There is no redness, warmth, or swelling of the visible joints. Gross motor strength is normal.    Neurologic: Awake, alert, oriented to name, place and time. Cranial nerves II-XII are grossly intact.  Neuropsychiatric: Calm, cooperative. Normal affect.       Prior Labs/Diagnostic Studies   All labs and imaging personally reviewed     EKG/ stress test - if available please see in ROS above   No results found.    The patient's records and results personally reviewed by this provider.     Outside records reviewed [facility]    LAB/DIAGNOSTIC STUDIES TODAY:  BMP, A1c    Assessment  Nehemiah Magallon is a 58 year old male seen as a PAC referral for risk assessment and optimization for anesthesia.    Plan/Recommendations  Pt will be optimized for the proposed procedure.  See below for details on the assessment, risk, and preoperative recommendations    NEUROLOGY  - No history of TIA, CVA or seizure  - History of migraine headaches with PRN imitrex. Hold 24 hours prior to surgery.  - History of severe peripheral neuropathy affecting bilateral lower extremities. Patient states that he has zero sensation in from his knees down. He typically uses a manual wheelchair or forearm crutch due to this. Consider fall risk precautions.    -Post Op delirium risk factors:  No risk identified    HEENT  - Patient is legally blind    - No current airway concerns.  Will need to be reassessed day of surgery.  Mallampati: II  TM: > 3    CARDIAC  - No history of CAD and Afib  - Hypertension  Patient was previously on antihypertensives but has been  "taken off these after losing a significant amount of weight. /67 on exam today.    - METS (Metabolic Equivalents)  Patient CANNOT perform 4 METS exercise due to neuropathy. Denies chest pain/pressure, INFANTE, orthopnea, dizziness, palpitations, edema.              Total Score: 1    Functional Capacity: Unable to complete 4 METS      RCRI-Very low risk: Class 1 0.4% complication rate            Total Score: 0        PULMONARY  - Obstructive Sleep Apnea  MARGARITO without home CPAP use. Patient reports that he does not tolerate the CPAP machine due to claustrophobia. He is scheduled to have an Inspire device placed in December.    - Denies asthma or inhaler use  - Tobacco History    History   Smoking Status    Never   Smokeless Tobacco    Never       GI  - GERD  Controlled on medications: Proton Pump Inhibitor  PONV Medium Risk  Total Score: 2           1 AN PONV: Patient is not a current smoker    1 AN PONV: Intended Post Op Opioids      - History of diverticulitis s/p colectomy  - History of cholecystectomy  - History of hepatitis C, treated  - History of NAFLD    /RENAL  - Baseline Creatinine 1.30-1.42    ENDOCRINE    - BMI: Estimated body mass index is 27.26 kg/m  as calculated from the following:    Height as of this encounter: 1.778 m (5' 10\").    Weight as of this encounter: 86.2 kg (190 lb).  Overweight (BMI 25.0-29.9)  - Diabetes  Diabetes Mellitus, Type 2, non-insulin dependent.  Patient reports last A1c was 6.5, will update prior to surgery as results are not available. Hold morning oral hypoglycemic medications. Hold Jardiance 3 days prior to surgery. Hold Ozempic 7 days prior to surgery. Recommend close monitoring of the patient's blood glucose levels throughout the perioperative period.    HEME  VTE Medium Risk 1.8%            Total Score: 5    VTE: Male    VTE: Sepsis      - No history of abnormal bleeding or antiplatelet use.  - Chronic mild anemia  Recommend perioperative use of blood conservation " techniques intraoperatively and close monitoring for postoperative bleeding.      MSK  - Charcot foot/chronic osteomyelitis. Surgery as scheduled above    PSYCH  - Insomnia, managed with PRN Ambien.     Different anesthesia methods/types have been discussed with the patient, but they are aware that the final plan will be decided by the assigned anesthesia provider on the date of service.    The patient is optimized for their procedure. AVS with information on surgery time/arrival time, meds and NPO status given by nursing staff. No further diagnostic testing indicated.      On the day of service:     Prep time: 20 minutes  Visit time: 15 minutes  Documentation time: 10 minutes  ------------------------------------------  Total time: 45 minutes      Shanika Watkins PA-C  Preoperative Assessment Center  [facility]  Phone: [phone]  Fax: [phone]

## 2023-10-30 NOTE — PROGRESS NOTES
Orthopaedic Surgery Clinic - Follow-up Appointment    Chief complaint: Follow-up right Charcot foot.  Date of surgery: 11/17/2022, right foot Charcot reconstruction and percutaneous tendo Achilles lengthening.  Date of surgery: 01/21/2023 I&D right foot and VAC application. Intraoperative cultures positive for Staphylococcus caprae and Staphylococcus epidermidis. Vanco/rifampin until 02/15/2023. Doxycycline until 02/24/2023.      I had the opportunity to see Mr. Magallon, who is a patient well known to me. This pleasant 58-year-old  was seen today in an urgent appointment for his right foot [facility]  He has a history of right foot Charcot reconstruction and percutaneous DOM almost a year ago, 11/17/2022.  He had a postoperative infection treated with an I&D and VAC treatment.  Cultures grew out Staph caprae and Staph epidermidis, and he underwent debridement on 1/21/2023 with hardware retention and eventual full wound healing by secondary intention.  He was on vanco and rifampin until 02/15/2023, followed by oral doxycycline which was discontinued secondary to rash 02/24/2023.  After that time, his infection appears to have resolved with no overt symptoms for infection for a relatively long time.  However, starting on 10/25/2023, he reports a relatively acute increase in his pain and swelling, this time more in the anterolateral right ankle than the foot.   He does report that his pain is increased to about 7 out of 10 in severity and that it is now coming up his right leg, as well as the right anterolateral ankle, which for him is new.  He denies any foot/ankle ulceration or open wounds.   The previously healed incisions including the dorsomedial right foot wound that had healed with a VAC, remained closed with no recurrent drainage.  He denies any chills or night sweats.  He does endorse some malaise and elevated temperature for him, a low-grade fever of around 99.  [facility] and reports he was there for about 7 hours.  Mr. Magallon has brought in records from that encounter [facility] the [facility] which are reviewed and include an orthopaedic consultation note from Dr. Elieser Carrillo, which notes that the skin was intact with no erythema, and apparently healed incisions.  An aspiration was performed, which from review of Dr. Carrillo's note was from the anteromedial right ankle, between the medial malleolus and the tibialis anterior.  The culture results are not available at this time, but the cell count was around 13,000, with around 85% neutrophils.  There did not appear to be a concern for a septic joint, but there was a possibility of osteomyelitis.  Plain radiographs do not appear to have been performed or if so are not available, but there does appear to have been a CT scan was performed, the report of which is available, which showed soft tissue swelling, a tibiotalar joint effusion, loosening of the hardware, Lisfranc deformity, and findings suggestive of osteomyelitis.  There were also ill-defined erosions in the lateral talus and distal fibula.  Mr. Magallon reports he still has been able to don his AFO without issues with the fitting.    Examination shows the patient to be in awake, alert, pleasant, and cooperative adult sitting upright in a regular chair in no acute distress.  He has his AFO with him which is doffed for examination.  Breathing pattern is regular and nonlabored on room air.  He appears nonseptic from a general clinical standpoint.  Skin on the right lower leg, ankle, and foot is completely intact with well healed, dry, scars.  I see no erythematous streaking on the right leg.  There does appear to be swelling about the right ankle particularly anterolaterally as well as about the right foot.  However, the swelling is nontense without significantly increased turgor.  The skin does not feel to be taut, and all compartments are soft and easily  compressible.  There is no visible skin break.  There is no blistering.  There is no drainage.  There is no visible ecchymosis.  The foot appears to be warm and clinically perfused.  Light touch sensation is endorsed at his baseline which is numb in a stocking distribution.  Right tib ant and gastrocsoleus strength are 5/5.  Right EHL strength is weaker at 3/5.  Inversion and eversion strength are also weak at 3/5.  The right foot is plantigrade.  It is nontender to palpation throughout the previous arthrodesis sites and the hardware sites, though there is tenderness around the right ankle.  There is no palpable fluctuance or induration.  There is palpable crepitance with ankle range of motion, which is about 15 degrees of dorsiflexion about 15 degrees of plantarflexion.    Imaging:  I did review his report of [facility] with the findings as noted above.  Weightbearing radiographs of the right ankle and foot were performed at this encounter 10/30/2023, and the foot radiographs are compared with previous foot radiographs.  Results were discussed with the .  As noted previously, loose screws and a broken screw.  The alignment of the midfoot fusion sites and of the hardware appears to be stable.  Chronic appearing deformity at the midfoot including the Lisfranc joint consistent with Charcot neuroarthropathy.  No obvious new fractures or dislocations.  The ankle mortise is congruent without medial clear space widening or syndesmotic widening.  Although there are some degenerative changes at the ankle, the joint space is relatively well preserved and there is no varus or valgus tilting.  Vascular calcifications.    Laboratory studies:  Laboratory results from the  [facility] encounter were reviewed including the right foot aspiration with 13,310 white blood cells with 84.5% PMNs.  Acute phase reactants from that encounter included sed rate 67, CRP 73.74, and serum WBC 10.45 with  74.9% neutrophils.  Repeat acute phase reactants drawn at the current encounter, dated today, 10/30/2023, show sed rate 44, CRP 74.50, and serum WBC 9.2 with 79% neutrophils.    Impression: 58-year-old male  with diabetes mellitus and peripheral neuropathy with history of right Charcot foot and chronic bony deformity, status post right Charcot foot reconstruction with postop infection status post debridement and initial quiescence of overt signs of infection, now with recurrent signs of suggestive of recurrent infection for about 5 days.    Plan: The findings and treatment options, both nonsurgical and surgical, were discussed with patient in layman's terms today.  Full opportunity was given to him to participate in the shared decision-making process.  I would recommend a surgical debridement and irrigation of the right foot and right ankle, removal of hardware, possible placement of antibiotic beads, and possible VAC placement.  The nature of the surgery, as well as the risks, benefits, and alternatives, were all discussed in layman's terms.  All questions were answered.  The postop plan was discussed.  The possibility of a future amputation was discussed, though I expressed that that is not the currently proposed surgery.  The possibility that this infection may require just one procedure or may require multiple procedures, without any guarantee of resolving the infection, was also discussed.  I discussed I will attempt to find out the culture culture results from the right ankle aspiration performed [facility] the [facility].

## 2023-11-01 NOTE — ANESTHESIA PREPROCEDURE EVALUATION
Anesthesia Pre-Procedure Evaluation    Patient: Nehemiah Magallon   MRN: [id number] : 1965        Procedure : Procedure(s):  Debridement and Irrigation of Right Ankle and Foot  Remove Hardware Right Foot          Past Medical History:   Diagnosis Date    Chronic pain syndrome 10/24/2014    Diabetes mellitus, type 2 (H)     Diabetic Charcot foot (H)     Diabetic retinopathy associated with diabetes mellitus due to underlying condition (H)     Diverticulitis of colon     Gastroesophageal reflux disease     Hepatitis C 2017    treated    History of skin cancer     Hypertension     Hypothyroidism     Legally blind     Midfoot collapse of right lower extremity     Migraine     NAFLD (nonalcoholic fatty liver disease)     Nephrolithiasis     Neuropathy     MARGARITO (obstructive sleep apnea)     Rib pain 10/24/2014    S/P colectomy 10/14/2014    Thyroid cancer (H) 10/14/2014      Past Surgical History:   Procedure Laterality Date    ARTHRODESIS FOOT Right 2022    Procedure: Right midfoot/talonavicular osteotomy and reduction of deformity Right midfoot/talonavicular arthrodesis, achilles lengthening;  Surgeon: Bryon Starr MD;  [facility]    CHOLECYSTECTOMY      COLECTOMY      @ 6 years ago, sigmoid    COLONOSCOPY      FOOT SURGERY Left     IRRIGATION AND DEBRIDEMENT FOOT, COMBINED Right 2023    Procedure: IRRIGATION AND DEBRIDEMENT, FOOT, RIGHT, Placement of Wound Vac and Antibiotic Beads.;  Surgeon: Bryon Starr MD;  [facility]    LENGTHEN TENDON ACHILLES Right 2022    Procedure: LENGTHENING, TENDON, ACHILLES;  Surgeon: Bryon Starr MD;  Location: [facility]      Allergies   Allergen Reactions    Atorvastatin      Other reaction(s): Hyperglycemia    Celebrex [Celecoxib] Other (See Comments)     [facility]    Doxycycline Rash    Rifampin Rash      Social History     Tobacco Use    Smoking status: Never    Smokeless tobacco: Never   Substance Use Topics    Alcohol use:  Not Currently     Comment: rarely      Wt Readings from Last 1 Encounters:   10/30/23 86.2 kg (190 lb)        Anesthesia Evaluation   Pt has had prior anesthetic. Type: General.    No history of anesthetic complications       ROS/MED HX  ENT/Pulmonary:     (+) sleep apnea, doesn't use CPAP,                                     Neurologic:     (+)    peripheral neuropathy, - hands and legs.                           Cardiovascular:    (-) hypertension (resolved with weight loss)   METS/Exercise Tolerance:     Hematologic:     (+)      anemia (hgb 11),          Musculoskeletal:       GI/Hepatic:     (+)           hepatitis type C, liver disease (NAFLD),       Renal/Genitourinary:     (+) renal disease (cr 1.3), type: CRI, Pt does not require dialysis,    Nephrolithiasis ,       Endo:     (+)  type II DM, Last HgA1c: 7.3, date: 10/30/23,     Diabetic complications: neuropathy retinopathy. thyroid problem, hypothyroidism,           Psychiatric/Substance Use:       Infectious Disease:       Malignancy:   (+) Malignancy, History of Other.Other CA thyroid Remission status post.    Other:      (+)  , H/O Chronic Pain (ulnar nerve and carpal tunnel pain),, other significant disability Wheelchair bound and Blind         Physical Exam    Airway  airway exam normal      Mallampati: II       Respiratory Devices and Support         Dental       (+) Minor Abnormalities - some fillings, tiny chips      Cardiovascular   cardiovascular exam normal          Pulmonary   pulmonary exam normal                OUTSIDE LABS:  CBC:   Lab Results   Component Value Date    WBC 9.2 10/30/2023    WBC 9.4 03/08/2023    HGB 11.5 (L) 10/30/2023    HGB 11.8 (L) 03/08/2023    HCT 36.5 (L) 10/30/2023    HCT 36.2 (L) 03/08/2023     10/30/2023     03/08/2023     BMP:   Lab Results   Component Value Date     10/30/2023     03/08/2023    POTASSIUM 4.6 10/30/2023    POTASSIUM 5.1 03/08/2023    CHLORIDE 102 10/30/2023    CHLORIDE  "105 03/08/2023    CO2 24 10/30/2023    CO2 24 03/08/2023    BUN 20.6 10/30/2023    BUN 36.2 (H) 03/08/2023    CR 1.32 (H) 10/30/2023    CR 1.42 (H) 03/08/2023     (H) 10/30/2023     (H) 03/08/2023     COAGS:   Lab Results   Component Value Date    PTT 30 01/20/2023    INR 1.06 01/20/2023     POC: No results found for: \"BGM\", \"HCG\", \"HCGS\"  HEPATIC:   Lab Results   Component Value Date    ALBUMIN 4.4 03/08/2023    PROTTOTAL 7.2 03/08/2023    ALT 36 03/08/2023    AST 28 03/08/2023    ALKPHOS 61 03/08/2023    BILITOTAL 0.2 03/08/2023     OTHER:   Lab Results   Component Value Date    A1C 7.3 (H) 10/30/2023    ALICE 10.1 (H) 10/30/2023    MAG 2.0 01/21/2023    TSH 1.80 01/21/2023    CRP <2.9 02/06/2023    SED 44 (H) 10/30/2023       Anesthesia Plan    ASA Status:  3    NPO Status:  NPO Appropriate    Anesthesia Type: General.     - Airway: ETT   Induction: Intravenous.   Maintenance: Balanced.   Techniques and Equipment:     - Airway: Video-Laryngoscope     - Lines/Monitors: BIS     Consents    Anesthesia Plan(s) and associated risks, benefits, and realistic alternatives discussed. Questions answered and patient/representative(s) expressed understanding.     - Discussed: Risks, Benefits and Alternatives for the PROCEDURE were discussed     - Discussed with:  Patient            Postoperative Care    Pain management: IV analgesics, Oral pain medications, Multi-modal analgesia.   PONV prophylaxis: Ondansetron (or other 5HT-3), Dexamethasone or Solumedrol     Comments:    Other Comments: On Ozempic-check last dose            Myah Alexander MD  "

## 2023-11-02 PROBLEM — M86.171 OSTEOMYELITIS OF ANKLE OR FOOT, RIGHT, ACUTE (H): Status: ACTIVE | Noted: 2023-01-01

## 2023-11-02 NOTE — ANESTHESIA CARE TRANSFER NOTE
Patient: Nehemiah Magallon    Procedure: Procedure(s):  Debridement and Irrigation of Right Ankle and Foot  Remove Hardware Right Foot       Diagnosis: Other chronic osteomyelitis of right foot (H) [M86.671]  Diagnosis Additional Information: No value filed.    Anesthesia Type:   General     Note:    Oropharynx: oropharynx clear of all foreign objects and spontaneously breathing  Level of Consciousness: awake  Oxygen Supplementation: face mask  Level of Supplemental Oxygen (L/min / FiO2): 6  Independent Airway: airway patency satisfactory and stable  Dentition: dentition unchanged  Vital Signs Stable: post-procedure vital signs reviewed and stable  Report to RN Given: handoff report given  Patient transferred to: PACU    Handoff Report: Identifed the Patient, Identified the Reponsible Provider, Reviewed the pertinent medical history, Discussed the surgical course, Reviewed Intra-OP anesthesia mangement and issues during anesthesia, Set expectations for post-procedure period and Allowed opportunity for questions and acknowledgement of understanding      Vitals:  Vitals Value Taken Time   /72 11/02/23 1724   Temp 36.3    Pulse 90 11/02/23 1732   Resp 9 11/02/23 1732   SpO2 97 % 11/02/23 1732   Vitals shown include unfiled device data.    Electronically Signed By: CARLITO Campbell CRNA  November 2, 2023  5:33 PM

## 2023-11-02 NOTE — LETTER
Health Information Management Services               Recipient:    [facility]  Attn: Danielle Camp       Sender:  Veronica Park RN, BSN  Care Coordinator, [facility]  Phone [phone]  Pager [phone]          Date: November 14, 2023  Patient Name:  Nehemiah Magallon  Patient YOB: 1965  Routing Message:    Discahrge orders and home care referral for skilled nursing.    [facility], [place]   Phone  [phone]  Fax  [phone]        The documents accompanying this notice contain confidential information belonging to the sender.  This information is intended only for the use of the individual or entity named above.  The authorized recipient of this information is prohibited from disclosing this information to any other party and is required to destroy the information after its stated need has been fulfilled, unless otherwise required by state law.      If you are not the intended recipient, you are hereby notified that any disclosure, copy, distribution or action taken in reliance on the contents of these documents is strictly prohibited.  If you have received this document in error, please return it by fax to [phone] with a note on the cover sheet explaining why you are returning it (e.g. not your patient, not your provider, etc.).  If you need further assistance, please call [facility] Centralized Transcription at [phone].  Documents may also be returned by mail to [employer], , [address], [place], [place] [address].

## 2023-11-02 NOTE — PROGRESS NOTES
Orthopaedic Surgery Attending    I personally saw and examined this pleasant 58 year old patient who is well-known to me preoperatively today in the preoperative area. Relevant portions of the chart, imaging, and labs were reviewed. The plan for debridement and irrigation of the right foot and ankle, and hardware removal, including the nature of the surgery, the risks, benefits, and alternatives, and postoperative plan, as well as expectations for short and long term outcome, were discussed in layman's terms. The possibility of implantation of antibiotic beads was discussed. The possibility of additional future surgeries for this problem was discussed. All questions were answered appropriately. The patient verbalized understanding of our discussion and agreement with the plan. I marked the surgical site with patient participation.

## 2023-11-02 NOTE — OR NURSING
PACU to Inpatient Nursing Handoff    Patient Nehemiah Magallon is a 58 year old male who speaks English.   Procedure Procedure(s):  Debridement and Irrigation of Right Ankle and Foot  Remove Hardware Right Foot   Surgeon(s) Primary: Bryon Starr MD  Resident - Assisting: Joesph Smith MD     Allergies   Allergen Reactions    Atorvastatin      Other reaction(s): Hyperglycemia    Celebrex [Celecoxib] Other (See Comments)     [facility]    Doxycycline Rash    Rifampin Rash       Isolation  None     Past Medical History   has a past medical history of Chronic pain syndrome (10/24/2014), Diabetes mellitus, type 2 (H), Diabetic Charcot foot (H), Diabetic retinopathy associated with diabetes mellitus due to underlying condition (H), Diverticulitis of colon, Gastroesophageal reflux disease, Hepatitis C (2017), History of skin cancer, Hypertension, Hypothyroidism, Legally blind, Midfoot collapse of right lower extremity, Migraine, NAFLD (nonalcoholic fatty liver disease), Nephrolithiasis, Neuropathy, MARGARITO (obstructive sleep apnea), Rib pain (10/24/2014), S/P colectomy (10/14/2014), and Thyroid cancer (H) (10/14/2014).    Anesthesia General   Dermatome Level     Preop Meds acetaminophen (Tylenol) - time given: 1122   Nerve block Not applicable   Intraop Meds midazolam 1 mg/mL (mg)   Total dose: 2 mg  Date/Time Rate/Dose/Volume Action Route Admin User Audit    11/02/23 1428 2 mg Given Intravenous Ilsa Díaz APRN CRNA     fentaNYL 50 mcg/mL (mcg)   Total dose: 150 mcg  Date/Time Rate/Dose/Volume Action Route Admin User Audit    11/02/23 1438 100 mcg Given Intravenous Ilsa Díaz APRN CRNA     1711 50 mcg Given Intravenous Sharlene Mon APRN CRNA     lidocaine 2% (mg)   Total dose: 300 mg  Date/Time Rate/Dose/Volume Action Route Admin User Audit    11/02/23 1437 100 mg Given Intravenous Ilsa Díaz APRN CRNA     1439 200 mg Given Intravenous Ilsa Díaz APRN CRNA      propofol 10 mg/mL (mg)   Total dose: 30 mg  Date/Time Rate/Dose/Volume Action Route Admin User Audit    11/02/23 1704 30 mg Given Intravenous Sharlene Mon APRN CRNA     rocuronium 10 mg/mL (mg)   Total dose: 50 mg  Date/Time Rate/Dose/Volume Action Route Admin User Audit    11/02/23 1439 50 mg Given Intravenous Ilsa Díaz APRN CRNA     phenylephrine (MILIND-SYNEPHRINE) injection (mcg)   Total dose: 300 mcg  Date/Time Rate/Dose/Volume Action Route Admin User Audit    11/02/23 1451 100 mcg New Bag Intravenous ArjunIlsa wright APRN CRNA     1501 100 mcg Bolus Intravenous ArjunIlsa wright APRN CRNA     1512 100 mcg Bolus Intravenous ArjunIlsa wright APRN CRNA     dexamethasone (DECADRON) 4 mg/mL (mg)   Total dose: 4 mg  Date/Time Rate/Dose/Volume Action Route Admin User Audit    11/02/23 1450 4 mg Given Intravenous Ilsa Díaz APRN CRNA     ondansetron 2 mg/mL (mg)   Total dose: 4 mg  Date/Time Rate/Dose/Volume Action Route Admin User Audit    11/02/23 1701 4 mg Given Intravenous Sharlene Mon APRN CRNA     sugammadex (BRIDION) 200mg/2mL (mg)   Total dose: 200 mg  Date/Time Rate/Dose/Volume Action Route Admin User Audit    11/02/23 1707 200 mg Given Intravenous Sharlene Mon APRN CRNA     phenylephrine 0.1 mg/mL infusion (mcg/kg/min) (mcg/kg/min)   Total dose: 2.93 mg Dosing weight: 89.7  Date/Time Rate/Dose/Volume Action Route Admin User Audit    11/02/23 1509 0.3 mcg/kg/min - 16.146 mL/hr New Bag Intravenous ArjunIlsa wright APRN CRNA     1517 0.2 mcg/kg/min - 10.764 mL/hr Rate Change Intravenous ArjunIlsa wright APRN CRNA     1547 0.4 mcg/kg/min - 21.528 mL/hr Rate Change Intravenous Sharlene Mon APRN CRNA     1553 0.3 mcg/kg/min - 16.146 mL/hr Rate Change Intravenous Sharlene Mon, CARLITO CRNA     1706  Stopped Intravenous Sharlene Mon, CARLITO CRNA     ceFAZolin vial 1 gm (g)   Total dose: 2 g  Date/Time Rate/Dose/Volume Action Route Admin  User Audit    11/02/23 1514 2 g Given Intravenous Ilsa Díaz APRN CRNA     dexMEDetomidine (PRECEDEX) 4 mcg/ml in NaCl 25mL (mcg)   Total dose: 16 mcg  Date/Time Rate/Dose/Volume Action Route Admin User Audit    11/02/23 1701 8 mcg Given Intravenous Sharlene Mon APRN CRNA     1705 8 mcg Given Intravenous Sharlene Mon APRN CRNA     LR (mL)   Total volume: 1,000 mL    Date/Time Rate/Dose/Volume Action Route Admin User Audit    11/02/23 1428  New Bag Intravenous Ilsa Díaz APRN CRNA     1712 1,000 mL Anesthesia Volume Adjustment Intravenous Sharlene Mon APRN CRNA        Local Meds No   Antibiotics cefazolin (Ancef) - last given at 1514     Pain Patient Currently in Pain: denies   PACU meds  Not applicable   PCA / epidural No   Capnography     Telemetry ECG Rhythm: Normal sinus rhythm   Inpatient Telemetry Monitor Ordered? No        Labs Glucose Lab Results   Component Value Date     11/02/2023     02/06/2023       Hgb Lab Results   Component Value Date    HGB 11.5 10/30/2023       INR Lab Results   Component Value Date    INR 1.06 01/20/2023      PACU Imaging Not applicable     Wound/Incision Negative Pressure Wound Therapy Foot Right;Medial (Active)   Number of days: 285       Incision/Surgical Site with Packing 01/21/23 Right;Lateral Foot Qty Placed: 1 (Active)   Number of days: 285       Incision/Surgical Site 11/17/22 Right Foot (Active)   Number of days: 350       Incision/Surgical Site 01/21/23 Right;Lateral Foot (Active)   Number of days: 285       Incision/Surgical Site 11/02/23 Right;Lateral Ankle (Active)   Incision Assessment UTV 11/02/23 1724   Trina-Incision Assessment UTV;Other (Comment);Cool 11/02/23 1724   Closure DOMINGO 11/02/23 1724   Incision Drainage Amount UTV 11/02/23 1724   Dressing Intervention Clean, dry, intact 11/02/23 1724   Number of days: 0       Incision/Surgical Site 11/02/23 Right;Medial Foot (Active)   Incision Assessment WDL  11/02/23 1722   Closure Approximated;Sutures 11/02/23 1722   Dressing Intervention New dressing applied;Clean, dry, intact 11/02/23 1722   Number of days: 0      CMS        Equipment ice pack   Other LDA       IV Access Peripheral IV 11/02/23 Anterior;Left Upper forearm (Active)   Site Assessment WDL 11/02/23 1724   Line Status Infusing 11/02/23 1724   Dressing Transparent 11/02/23 1724   Dressing Status clean;dry;intact 11/02/23 1724   Phlebitis Scale 0-->no symptoms 11/02/23 1724   Infiltration? no 11/02/23 1724   Number of days: 0       PICC 01/27/23 Single Lumen Right Basilic (Active)   Number of days: 279      Blood Products Not applicable  mL   Intake/Output Date 11/02/23 0700 - 11/03/23 0659   Shift 3639-9898 3431-8068 6724-8701 24 Hour Total   INTAKE   I.V.  1000  1000   Shift Total(mL/kg)  1000(11.15)  1000(11.15)   OUTPUT   Urine  175  175   Blood  100  100   Shift Total(mL/kg)  275(3.07)  275(3.07)   Weight (kg) 89.7 89.7 89.7 89.7      Drains / Jones Negative Pressure Wound Therapy Foot Right;Medial (Active)   Number of days: 285      Time of void PreOp Time of Void Prior to Procedure: 1000 (11/02/23 1016)    PostOp  Jones in place through case - no urge to void    Diapered? No   Bladder Scan     PO    water     Vitals    B/P: 121/67  T: 97.3  F (36.3  C)    Temp src: Axillary  P:  Pulse: 89 (11/02/23 1730)          R: 14  O2:  SpO2: 99 %    O2 Device: None (Room air) (11/02/23 1730)    Oxygen Delivery: 5 LPM (11/02/23 1724)         Family/support present None present at this time   Patient belongings  Transferring with patient   Patient transported on cart   DC meds/scripts (obs/outpt) Not applicable   Inpatient Pain Meds Released? Yes       Special needs/considerations Legally blind, non weight bearing on R leg   Tasks needing completion None       Kita Hurst, RN  [facility] [id number]

## 2023-11-02 NOTE — BRIEF OP NOTE
"[facility]    Brief Operative Note    Pre-operative diagnosis: Other chronic osteomyelitis of right foot (H) [M86.671]  Post-operative diagnosis Same as pre-operative diagnosis    Procedure: Debridement and Irrigation of Right Ankle and Foot, Right - Foot  Remove Hardware Right Foot, Right - Foot    Surgeon: Surgeon(s) and Role:     * Bryon Starr MD - Primary     * Joesph Smith MD - Resident - Assisting  Anesthesia: General   Estimated Blood Loss: Less than 50 ml    Drains: None - wound vac placed  Specimens:   ID Type Source Tests Collected by Time Destination   A : \"RIGHT MIDFOOT FLUID\" Aspirate Foot, Right ANAEROBIC BACTERIAL CULTURE ROUTINE, GRAM STAIN, FUNGAL OR YEAST CULTURE ROUTINE, AEROBIC BACTERIAL CULTURE ROUTINE Bryon Starr MD 11/2/2023  3:14 PM    B : \"RIGHT MIDFOOT TISSUE\" Tissue Foot, Right ANAEROBIC BACTERIAL CULTURE ROUTINE, GRAM STAIN, FUNGAL OR YEAST CULTURE ROUTINE, AEROBIC BACTERIAL CULTURE ROUTINE Bryon Starr MD 11/2/2023  3:16 PM      Findings:  Purulent fluid found over the medial foot plate. Most screws were loose.   Complications: None.  Implants: * No implants in log *      Post op plan:  NWB RLE  Broad spectrum antibiotics - will narrow pending cultures  Trend CRP  ID and medicine consults  PT/OT consults   Regular diet  Mechanical dvt ppx while in house, discharge on aspirin 162 mg x 4 weeks   Follow up TBD  Dispo pending medical stability, antibiotics. Plan for RTOR on 11/8 for repeat I&D    Joesph Smith MD  Orthopedic Surgery PGY4        Addendum:  I spoke with the patient's responsible party, his spouse Wiliam, immediately postoperatively by phone at [phone]. Findings and plan were discussed. All questions were answered.  Bryon Starr MD  Attending surgeon  Orthopaedic Surgery  Date of Addendum: 11/02/23  "

## 2023-11-02 NOTE — ANESTHESIA PROCEDURE NOTES
Airway       Patient location during procedure: OR       Procedure Start/Stop Times: 11/2/2023 2:40 PM  Staff -        CRNA: Ilsa Díaz APRN CRNA       Performed By: CRNA  Consent for Airway        Urgency: elective  Indications and Patient Condition       Indications for airway management: alan-procedural       Induction type:intravenous       Mask difficulty assessment: 2 - vent by mask + OA or adjuvant +/- NMBA    Final Airway Details       Final airway type: endotracheal airway       Successful airway: ETT - single and Oral  Endotracheal Airway Details        ETT size (mm): 7.5       Cuffed: yes       Successful intubation technique: video laryngoscopy       VL Blade Size: Glidescope 4       Grade View of Cords: 1       Adjucts: stylet       Position: Right       Measured from: lips       Secured at (cm): 23       Bite block used: None    Post intubation assessment        Placement verified by: capnometry, equal breath sounds and chest rise        Number of attempts at approach: 1       Number of other approaches attempted: 0       Secured with: silk tape       Ease of procedure: easy       Dentition: Unchanged    Medication(s) Administered   Medication Administration Time: 11/2/2023 2:40 PM

## 2023-11-02 NOTE — ANESTHESIA POSTPROCEDURE EVALUATION
Patient: Nehemiah Magallon    Procedure: Procedure(s):  Debridement and Irrigation of Right Ankle and Foot  Remove Hardware Right Foot       Anesthesia Type:  General    Note:  Disposition: Inpatient   Postop Pain Control: Uneventful            Sign Out: Well controlled pain   PONV: No   Neuro/Psych: Uneventful            Sign Out: Acceptable/Baseline neuro status   Airway/Respiratory: Uneventful            Sign Out: Acceptable/Baseline resp. status   CV/Hemodynamics: Uneventful            Sign Out: Acceptable CV status; No obvious hypovolemia; No obvious fluid overload   Other NRE:    DID A NON-ROUTINE EVENT OCCUR?            Last vitals:  Vitals Value Taken Time   /67 11/02/23 1737   Temp 36.3  C (97.3  F) 11/02/23 1724   Pulse 92 11/02/23 1739   Resp 11 11/02/23 1739   SpO2 95 % 11/02/23 1739   Vitals shown include unfiled device data.    Electronically Signed By: Hernan Beckham MD  November 2, 2023  5:40 PM

## 2023-11-03 NOTE — PHARMACY-VANCOMYCIN DOSING SERVICE
"Pharmacy Vancomycin Initial Note  Date of Service 2023  Patient's  1965  58 year old, male    Indication: Bone and Joint Infection    Current estimated CrCl = Estimated Creatinine Clearance: 76.6 mL/min (based on SCr of 1.17 mg/dL).    Creatinine for last 3 days  2023:  7:40 PM Creatinine 1.17 mg/dL    Recent Vancomycin Level(s) for last 3 days  No results found for requested labs within last 3 days.      Vancomycin IV Administrations (past 72 hours)        No vancomycin orders with administrations in past 72 hours.                    Nephrotoxins and other renal medications (From now, onward)      Start     Dose/Rate Route Frequency Ordered Stop    23 0900  vancomycin (VANCOCIN) 1,000 mg in 200 mL dextrose intermittent infusion         1,000 mg  200 mL/hr over 1 Hours Intravenous EVERY 12 HOURS 23 0800  [Held by provider]  empagliflozin (JARDIANCE) tablet 25 mg        (On hold since today at 195 until manually unheld; held by Janusz Vail PA-CHold Reason: Other)   Note to Pharmacy: PTA Sig:Take 25 mg by mouth every morning      25 mg Oral EVERY MORNING 23 193  piperacillin-tazobactam (ZOSYN) 3.375 g vial to attach to  mL bag        Note to Pharmacy: For [facility], [facility] and [facility]: For Zosyn-naive patients, use the \"Zosyn initial dose + extended infusion\" order panel.    3.375 g  over 30 Minutes Intravenous EVERY 8 HOURS 23 193  vancomycin (VANCOCIN) 1,750 mg in sodium chloride 0.9 % 500 mL intermittent infusion         1,750 mg  over 2 Hours Intravenous ONCE 23 191              Contrast Orders - past 72 hours (72h ago, onward)      None            [employer] Prediction of Planned Initial Vancomycin Regimen  Loading dose: 1750 mg at 21:00 2023.  Regimen: 1000 mg IV every 12 hours.  Start time: 20:28 on 2023  Exposure target: AUC24 (range)400-600 mg/L.hr   AUC24,ss: 563 mg/L.hr  Probability " of AUC24 > 400: 83 %  Ctrough,ss: 18.8 mg/L  Probability of Ctrough,ss > 20: 44 %  Probability of nephrotoxicity (Lodise CANELO 2009): 15 %          Plan:  1750mg loading dose, then start 1000 mg IV q12h.   Vancomycin monitoring method: AUC  Vancomycin therapeutic monitoring goal: 400-600 mg*h/L  Pharmacy will check vancomycin levels as appropriate in 1-3 Days.    Serum creatinine levels will be ordered daily for the first week of therapy and at least twice weekly for subsequent weeks.      Blake Mercedes RP

## 2023-11-03 NOTE — PROGRESS NOTES
11/03/23 1000   Appointment Info   Signing Clinician's Name / Credentials (PT) Sinai Cagle DPT   Rehab Comments (PT) NWB R LE; return to OR 11/8   Living Environment   People in Home spouse  (wife retired, has own health issues; would need to be ind to go back to condo)   Current Living Arrangements condominium   Home Accessibility wheelchair accessible   Transportation Anticipated health plan transportation  (usually uses care cab)   Living Environment Comments from condo w/ spouse, would need to be ind home is WC accessible   Self-Care   Usual Activity Tolerance good  (amb w/ lofstrand crutch on R UE only; WC outside of the home)   Current Activity Tolerance moderate   Equipment Currently Used at Home wheelchair, manual;crutches  (lofstrand crutches)   Fall history within last six months no   Activity/Exercise/Self-Care Comment ind w/ everything prior to surgery   General Information   Onset of Illness/Injury or Date of Surgery 11/02/23   Referring Physician Joesph Smith MD   Patient/Family Therapy Goals Statement (PT) stick w/ WC transfer until surgery   Pertinent History of Current Problem (include personal factors and/or comorbidities that impact the POC) Nehemiah Magallon is a 58 year old male s/p I&D and removal of hardware right foot on 11/2 with Dr. Starr. Doing well. Will continue to monitor cultures and adjust antibiotics accordingly. Plan to return to OR on 11/8.   Existing Precautions/Restrictions fall;weight bearing   Weight-Bearing Status - LUE full weight-bearing   Weight-Bearing Status - RUE full weight-bearing   Weight-Bearing Status - LLE full weight-bearing   Weight-Bearing Status - RLE nonweight-bearing   General Observations Pt sitting upright in bed and very pleasant   Cognition   Affect/Mental Status (Cognition) WFL   Pain Assessment   Patient Currently in Pain No   Integumentary/Edema   Integumentary/Edema Comments pt has ACE wrap present on R LE   Posture    Posture Forward head  position;Protracted shoulders   Range of Motion (ROM)   Range of Motion ROM is WFL  (aside from R ankle)   Strength (Manual Muscle Testing)   Strength (Manual Muscle Testing) No deficits observed during functional mobility   Bed Mobility   Comment, (Bed Mobility) nuno bed mob   Transfers   Comment, (Transfers) nuno transfers   Gait/Stairs (Locomotion)   Comment, (Gait/Stairs) NT - non amb at this time, does not desire to work on amb until after his next procedure, wishes to stick w/ WC based mobility at this time   Balance   Balance no deficits were identified   Sensory Examination   Sensory Perception Comments numbness B knees and below, chronic   Clinical Impression   Criteria for Skilled Therapeutic Intervention Evaluation only  (1x eval and treat)   PT Diagnosis (PT) impaired functional mobility   Influenced by the following impairments NWB status and upcoming procedures   Functional limitations due to impairments impaired bed mob, transfers, amb   Clinical Presentation (PT Evaluation Complexity) evolving   Clinical Presentation Rationale pt w/ upcoming procedure on 11/8 pt states that he may end up needing amputation if I&Ds unsuccessful   Clinical Decision Making (Complexity) low complexity   Planned Therapy Interventions (PT) bed mobility training;transfer training;wheelchair management/propulsion training   Risk & Benefits of therapy have been explained evaluation/treatment results reviewed;care plan/treatment goals reviewed;risks/benefits reviewed;current/potential barriers reviewed;participants voiced agreement with care plan;participants included;patient   Clinical Impression Comments Pt nuno w/ bed mob, transfers, and WC mobility at this time. Has no current need for skilled IP PT services as wishes to be WC based until full plan established following his next procedure on 11/8.   PT Total Evaluation Time   PT Lalit Low Complexity Minutes (80205) 3   Physical Therapy Goals   PT Frequency One time eval  and treatment only   PT Predicted Duration/Target Date for Goal Attainment 11/03/23   PT Goals Bed Mobility;Transfers;Wheelchair Mobility   PT: Bed Mobility Modified independent;Supine to/from sit;Rolling;Within precautions;Goal Met   PT: Transfers Modified independent;Sit to/from stand;Bed to/from chair;Within precautions;Goal Met   PT: Wheelchair Mobility 50 feet;Caregiver SBA;manual wheelchair;Goal Met   Interventions   Interventions Quick Adds Therapeutic Activity   Therapeutic Activity   Therapeutic Activities: dynamic activities to improve functional performance Minutes (20643) 15   Symptoms Noted During/After Treatment None   Treatment Detail/Skilled Intervention Pt supine in bed on PT arrival, agreeable to demo mobility for PT and we discussed goals of care and PT role. At this point pt wishes to stay WC bound and does not desire amb until after his next procedure on 11/8. Pt states he may end up w/ amputation so he can worry about progressing to amb after full plan is established whcih we will not know until next procedure. Pt demo's nuno w/ bed mob w/ use of bedrail. SPT completed w/ use of bedrail and WC armrest to transfer to . Pt ind w/ WC mobility for 70ft including brake usage and turning. Pt sets up transfer back to bed (educated on transfer to strong side but prefers to keep chair in same spot for transfer in and out of bed), completed SPT WC>bed nuno. Pt ind w/ sit>sup and able to demo SLR B to clear WC and get LEs back into bed. Pt denies any questions or concerns and educated on ability to be ind in room and on unit for WC based activities. Pt denies further Q's or needs for PT at this time.   PT Discharge Planning   PT Plan DC PT can re-order as needed after next procedure 11/8 if pt's mobility/plans have changed   PT Discharge Recommendation (DC Rec) home   PT Rationale for DC Rec Pt nuno w/ mobility, can use WC in home   PT Brief overview of current status nuno w/ bed<>WC   Total Session  Time   Timed Code Treatment Minutes 15   Total Session Time (sum of timed and untimed services) 18

## 2023-11-03 NOTE — PLAN OF CARE
"Goal Outcome Evaluation:           Overall Patient Progress: improving    Outcome Evaluation: calm and pleasant. denies pain overnight. uses wheelchair to ambulate, pivot transfer. baseline neuropathy to all extremities.     Legally blind and [place]. Wears Hearing aids.      VS: /69 (BP Location: Right arm)   Pulse 100   Temp 97.1  F (36.2  C) (Oral)   Resp 17   Ht 1.778 m (5' 10\")   Wt 89.7 kg (197 lb 12 oz)   SpO2 94%   BMI 28.37 kg/m      O2: Stable on Room air   Output: Voids adequately to the bathroom    Last BM: 11/2   Activity: Pivot transfer to wheelchair   Skin: Intact besides right foot/ankle surgical incision    Pain: Denies pain. Scheduled Tylenol given      CMS: AXO X4, Baseline neuropathy to all extremities    Dressing: UTV- Bandaged covered with ACE wrap   Diet: Reg. BG Checks. 2AM =195   LDA: L PIV saline locked   Plan: TBD  Pending  PT consult and OT   Additional Info: ID consult           "

## 2023-11-03 NOTE — PROGRESS NOTES
"  VS: /60 (BP Location: Left arm)   Pulse 88   Temp 97.9  F (36.6  C) (Oral)   Resp 17   Ht 1.778 m (5' 10\")   Wt 89.7 kg (197 lb 12 oz)   SpO2 98%   BMI 28.37 kg/m      O2: RA   Output: Voiding in bathroom independently    Last BM: 11/2   Activity: Can transfer independently to wheelchair   Skin: Pt. Skin is dry, clean and intact, but was unable to assess surgical incision at RLE due to surgical dressing   Pain: Diminished sensation in all distribution (baseline), chronic   CMS: AXO 4 X4 Pt states no sensation present in LE   Dressing: Pt has ACE wrap present on RLE   Diet: Regular diet   LDA: Peripheral IV on anterior; right lower forearm  DM5S flush    Equipment: Pt has wheelchair at bedside   Plan: Giving IV antibiotics, surgery sometime next week.    Additional Info:      "

## 2023-11-03 NOTE — PROVIDER NOTIFICATION
Message sent to [facility] Hospitalist in behalf of bedside RN  5 Ortho 552 M.P requesting Gabapentin be restarted tonight instead of tomorrow morning.ty  [place] ASCOM [phone]

## 2023-11-03 NOTE — CONSULTS
[facility]  Consult Note - Hospitalist Service  Date of Admission:  11/2/2023  Consult Requested by: Dr. 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, ) with a past medical history significant for non-insulin dependent T2DM with retinopathy, legal blindness, severe peripheral neuropathy (zero sensation from knees down), papillary thyroid malignancy in remission s/p thyroidectomy and subsequent hypothyroidism, GERD, MARGARITO not on CPAP, CKD, Hep C s/p treatment (2017), HTN, migraines, anemia, NAFLD, colectomy for diverticulitis (2014), cholecystectomy, bilateral hearing loss and using bilateral hearing aids, chronic pain, s/p right Charcot foot reconstruction on 11/17/2022 unfortunately complicated by post-op wound infection progressing to osteomyelitis. Was admitted to the hospital 1/20/23. He underwent I+D of right foot, placement of wound vac and antibiotic beads on 1/21/23. Intra-Op cultures on 1/21/23 and 1/20/23 grew Staph epidermidis x 2 (oxacillin Resistant) and Staph caprae. He was discharged on Vancomycin IV and Rifampin 300 mg po q12 hr (activity against biofilm, due to hardware in place). Vancomycin and Rifampin were discontinued on 2/15/23 and started on oral doxycycline at that time which was associated with a rash. Doxycycline was stopped 2/24/23[facility] for soft tissue swelling, tibiotalar joint effusion, loosening of the hardware, Lisfranc deformity, and findings suggestive of osteomyelitis; ill-defined erosions in the lateral talus and distal fibula. Patient was [facility] to [facility] after undergoing I&D R ankle and foot with hardware removal 11/2/23, subsequently started on broad spectrum antibiotics.    S/p I&D R Ankle and Foot + Hardware Removal (11/2/23)  Acute Blood Loss Anemia  EBL <50cc and no apparent intra-op complications.  Purulent fluid was found over the medial foot plate and most screws were loose during intra-op assessment. Pre-op hemoglobin 11.5.   - Management per primary orthopedics team including pain control, activity, antibiotics, DVT prophylaxis, wound cares. Please refer to their documentation for details.  -NWB RLE   -Broad spectrum antibiotics (Zosyn and Vancomycin)  -Trend CRP  -PT/OT consults   -Mechanical dvt ppx while in house, discharge on aspirin 162 mg x 4 weeks   -Plan for RTOR on 11/8 for repeat I&D    - Recheck hemoglobin in am   - Follow up intra-op cultures   - Appreciate ID assistance   - Note: Patient reports severe rash related to doxycycline and rifampin (unclear which as there was some overlap) that spread across his entire body and took several months to completely resolve. Please avoid these antibiotics.    T2DM c/b Severe Neuropathy  Last A1C 7.3 10/30/23. PTA regimen Jardiance, Ozempic, Metformin. Patient has no sensation in his lower extremities from the knee down which is baseline.   - Monitor BG before meals and at bedtime   - Medium sliding scale insulin     - Hold PTA regimen for now, though please resume jardiance and metformin in the morning tomorrow if patient is stable and not requiring further contrast studies or interventions (he would like to avoid insulin if at all possible).   - Continue PTA Gabapentin 600mg TID    CKD  Baseline creatinine 1.30-1.42. Pre-op labs 10/30 creatinine 1.32.   - Check BMP in am    GERD   - Continue PTA PPI    Hypothyroidism   - Continue PTA Synthroid 150mcg daily    HLD   - Continue PTA statin    MARGARITO  Does not use CPAP due to claustrophobia. Scheduled to have Inspire placed on December.   - Monitor continuous oximetry and capno    Insomnia   - Hold PTA Ambien prn for now given post-op sedating medications. Consider resume in 1-2 days as able.       Clinically Significant Risk Factors Present on Admission                      # DMII: A1C = 7.3 % (Ref range:  "<5.7 %) within past 6 months    # Overweight: Estimated body mass index is 28.37 kg/m  as calculated from the following:    Height as of this encounter: 1.778 m (5' 10\").    Weight as of this encounter: 89.7 kg (197 lb 12 oz).              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, ) with a past medical history significant for non-insulin dependent T2DM with retinopathy, legal blindness, severe peripheral neuropathy (zero sensation from knees down), papillary thyroid malignancy in remission s/p thyroidectomy and subsequent hypothyroidism, GERD, MARGARITO not on CPAP, CKD, Hep C s/p treatment (2017), HTN, migraines, anemia, NAFLD, colectomy for diverticulitis (2014), cholecystectomy, bilateral hearing loss and using bilateral hearing aids, chronic pain, s/p right Charcot foot reconstruction on 11/17/2022 unfortunately complicated by post-op wound infection progressing to osteomyelitis. Was admitted to the hospital 1/20/23. He underwent I+D of right foot, placement of wound vac and antibiotic beads on 1/21/23. Intra-Op cultures on 1/21/23 and 1/20/23 grew Staph epidermidis x 2 (oxacillin Resistant) and Staph caprae. He was discharged on Vancomycin IV and Rifampin 300 mg po q12 hr (activity against biofilm, due to hardware in place). Vancomycin and Rifampin were discontinued on 2/15/23 and started on oral doxycycline at that time which was associated with a rash. Doxycycline was stopped 2/24/23. [facility] for soft tissue swelling, tibiotalar joint effusion, loosening of the hardware, Lisfranc deformity, and findings suggestive of osteomyelitis; ill-defined erosions in " the lateral talus and distal fibula. Patient was [facility] after undergoing I&D R ankle and foot with hardware removal 11/2/23, subsequently started on broad spectrum antibiotics.    Patient reports he has no sensation from the knees down bilaterally so he will not experience any post-op pain. He is eating his dinner on my arrival, sitting up in bed. Has no particular questions or concerns. We reviewed his medications and expected clinical course.    Past Medical History    Past Medical History:   Diagnosis Date    Chronic pain syndrome 10/24/2014    Diabetes mellitus, type 2 (H)     Diabetic Charcot foot (H)     Diabetic retinopathy associated with diabetes mellitus due to underlying condition (H)     Diverticulitis of colon     Gastroesophageal reflux disease     Hepatitis C 2017    treated    History of skin cancer     Hypertension     Hypothyroidism     Legally blind     Midfoot collapse of right lower extremity     Migraine     NAFLD (nonalcoholic fatty liver disease)     Nephrolithiasis     Neuropathy     MARGARITO (obstructive sleep apnea)     Rib pain 10/24/2014    S/P colectomy 10/14/2014    Thyroid cancer (H) 10/14/2014       Past Surgical History   Past Surgical History:   Procedure Laterality Date    ARTHRODESIS FOOT Right 11/17/2022    Procedure: Right midfoot/talonavicular osteotomy and reduction of deformity Right midfoot/talonavicular arthrodesis, achilles lengthening;  Surgeon: Bryon Starr MD;  Location: [facility] OR    CHOLECYSTECTOMY  1986    COLECTOMY      @ 6 years ago, sigmoid    COLONOSCOPY  2016    FOOT SURGERY Left 2021    IRRIGATION AND DEBRIDEMENT FOOT, COMBINED Right 1/21/2023    Procedure: IRRIGATION AND DEBRIDEMENT, FOOT, RIGHT, Placement of Wound Vac and Antibiotic Beads.;  Surgeon: Bryon Starr MD;  Location: [facility] OR    LENGTHEN TENDON ACHILLES Right 11/17/2022    Procedure: LENGTHENING, TENDON, ACHILLES;  Surgeon: Bryon Starr MD;  Location: [facility] OR       Medications  "  Medications Prior to Admission   Medication Sig Dispense Refill Last Dose    acetaminophen (TYLENOL) 325 MG tablet Take 2 tablets (650 mg) by mouth every 8 hours as needed for mild pain, fever or headaches 60 tablet 0 Past Month    ammonium lactate (LAC-HYDRIN) 12 % external lotion APPLY THIN LAYER TOPICALLY TWICE A DAY AS NEEDED FOR DRY SKIN **EXTERNAL USE ONLY**   Past Week    ammonium lactate (LAC-HYDRIN) 12 % external lotion Apply topically every evening   Past Week    Calcium-Vitamin D 500-3.125 MG-MCG TABS Take 2 tablets by mouth 2 times daily   Past Week    carboxymethylcellulose PF (REFRESH PLUS) 0.5 % ophthalmic solution 1 drop 3 times daily as needed for dry eyes   Past Week    Cody Moisture Barrier external cream Apply daily as directed to skin 92 g 6 Past Week    cetirizine (ZYRTEC) 10 MG tablet Take 10 mg by mouth every morning   Past Week    Cyanocobalamin 1000 MCG CAPS Take 2,000 mcg by mouth every morning   Past Week    diclofenac (VOLTAREN) 1 % topical gel Apply 4 g topically 4 times daily (Patient taking differently: Apply 4 g topically every evening) 480 g 2 11/1/2023 at 2100    empagliflozin (JARDIANCE) 25 MG TABS tablet Take 25 mg by mouth every morning   11/1/2023 at 0900    fluticasone (FLONASE) 50 MCG/ACT nasal spray    11/1/2023 at 0900    fluticasone (FLONASE) 50 MCG/ACT nasal spray Spray 2 sprays into both nostrils every morning   11/1/2023 at 0900    folic acid (FOLVITE) 1 MG tablet Take 1 mg by mouth every morning   Past Week    gabapentin (NEURONTIN) 600 MG tablet Take 2 tablets by mouth 3 times daily   11/2/2023 at 0600    Gauze Pads & Dressings ([facility] Foundation[facility] DRESSING) 4\"X8\" PADS 1 Units daily 45 each 1 Past Week    levothyroxine (SYNTHROID) 137 MCG tablet Take 150 mcg by mouth every morning   11/1/2023 at 0900    levothyroxine (SYNTHROID/LEVOTHROID) 137 MCG tablet Take 137 mcg by mouth daily   11/1/2023 at 0900    lidocaine (LIDODERM) 5 % patch APPLY 1 OR 2 PATCHES 5% " TOPICALLY EVERY DAY FOR PAIN -WEAR FOR UP TO 12 HOURS THEN REMOVE AND LEAVE OFF FOR 12 HOURS   Past Month    lidocaine (LIDODERM) 5 % patch Apply up to 3 patches to painful area at once for up to 12 h within a 24 h period.  Remove after 12 hours. (Patient taking differently: Apply up to 3 patches to painful area at once for up to 12 h within a 24 h period.  Remove after 12 hours. L foot.) 90 patch 0 Past Month    linaclotide (LINZESS) 145 MCG capsule Take 1 capsule by mouth every morning   Past Month    metFORMIN (GLUCOPHAGE XR) 500 MG 24 hr tablet Take 1,000 mg by mouth 2 times daily (with meals)   11/1/2023 at 1600    metFORMIN (GLUCOPHAGE XR) 500 MG 24 hr tablet Take 1,000 mg by mouth 2 times daily (with meals)   11/1/2023 at 1600    omeprazole (PRILOSEC) 20 MG DR capsule Take 20 mg by mouth every morning   11/1/2023 at 0800    polyethylene glycol (MIRALAX) 17 g packet Take 17 g by mouth daily 10 packet 0 More than a month    pravastatin (PRAVACHOL) 40 MG tablet Take 20 mg by mouth every evening   Past Week at 2100    Semaglutide (OZEMPIC, 1 MG/DOSE, SC) Inject 2 mg Subcutaneous once a week Sunday's   Past Week at 2100    senna-docusate (SENOKOT-S/PERICOLACE) 8.6-50 MG tablet Take 1 tablet by mouth 2 times daily 60 tablet 0 Unknown    sodium fluoride dental gel (PREVIDENT) 1.1 % GEL topical gel BRUSH SMALL AMOUNT MOUTH EVERY MORNING AND AT BEDTIME ON TOOTHBRUSH, BRUSH FOR 2 MINUTES.   Unknown    SUMAtriptan (IMITREX) 25 MG tablet 25 mg   More than a month    SUMAtriptan (IMITREX) 25 MG tablet Take 25 mg by mouth at onset of headache   More than a month    topiramate (TOPAMAX) 100 MG tablet Take 150 mg by mouth every morning   Past Week    Vitamin D3 (CHOLECALCIFEROL) 25 mcg (1000 units) tablet Take 50 mcg by mouth every morning   11/1/2023    Wound Dressings (MEPILEX BORDER FLEX) PADS Externally apply 1 each topically daily as needed (as needed for saturated dressing) 60 each 6 More than a month    zolpidem ER  (AMBIEN CR) 12.5 MG CR tablet Take 12.5 mg by mouth nightly as needed for sleep   Past Week at 2100          Review of Systems    The 10 point Review of Systems is negative other than noted in the HPI or here.    Social History   I have reviewed this patient's social history and updated it with pertinent information if needed.  Social History     Tobacco Use    Smoking status: Never    Smokeless tobacco: Never   Substance Use Topics    Alcohol use: Not Currently     Comment: rarely    Drug use: No         Family History   I have reviewed this patient's family history and updated it with pertinent information if needed.  Family History   Problem Relation Age of Onset    Diabetes Mother     Cancer Mother     Diabetes Father     Heart Disease Father     Anesthesia Reaction No family hx of     Clotting Disorder No family hx of     Glaucoma No family hx of     Macular Degeneration No family hx of          Allergies   Allergies   Allergen Reactions    Doxycycline Rash     Very extensive full body rash lasting for several months. Given at same time as Rifampin.    Rifampin Rash     Very extensive full body rash lasting for several months. Given at the same time as doxycycline.    Atorvastatin      Other reaction(s): Hyperglycemia    Celebrex [Celecoxib] Other (See Comments)     [facility]        Physical Exam   Vital Signs: Temp: 97.2  F (36.2  C) Temp src: Oral BP: (!) 141/61 Pulse: 92   Resp: 20 SpO2: 95 % O2 Device: None (Room air) Oxygen Delivery: 5 LPM  Weight: 197 lbs 12.04 oz    GENERAL: Alert and oriented x 3. Well nourished, well developed.  No acute distress.    HEENT: Normocephalic, atraumatic. Anicteric sclera.   RESPIRATORY: Effort normal on room air, no audible wheezing.   NEUROLOGICAL: Significant neuropathy with no sensation from knees down bilaterally.  MUSCULOSKELETAL: R ankle dressing CDI, toes warm, able to wiggle toes.  EXTREMITIES: No gross deformities noted.  SKIN: Grossly warm, dry,  and intact.    Medical Decision Making       90 MINUTES SPENT BY ME on the date of service doing chart review, history, exam, documentation & further activities per the note.      Data   Imaging results reviewed over the past 24 hrs:   Recent Results (from the past 24 hour(s))   XR Surgery DALILA L/T 5 Min Fluoro    Narrative    This exam was marked as non-reportable because it will not be read by a   radiologist or a [place] non-radiologist provider.           Recent Labs   Lab 11/02/23  1940 11/02/23  1801 11/02/23  0941 10/30/23  0912   WBC  --   --   --  9.2   HGB  --   --   --  11.5*   MCV  --   --   --  76*   PLT  --   --   --  327   NA  --   --   --  139   POTASSIUM  --   --   --  4.6   CHLORIDE  --   --   --  102   CO2  --   --   --  24   BUN  --   --   --  20.6   CR 1.17  --   --  1.32*   ANIONGAP  --   --   --  13   ALICE  --   --   --  10.1*   GLC  --  142* 152* 210*

## 2023-11-03 NOTE — PLAN OF CARE
Goal Outcome Evaluation:      Plan of Care Reviewed With: patient    Overall Patient Progress: improvingOverall Patient Progress: improving    Outcome Evaluation: Patient state he feels better overall, denies pain d/t  no sensation because of neuropathy, able to wiggle toes on right foot, but states he knowes when he is in pain because his body starts to shiver. Scheduled tylenol administered. Denies SOB and chest pain, and nausea.Per pt numbness in BL lower extremities,  and N/T in BL upper extremities. Pt is stand pivot to wheelchair and is tolerating that well. Pt had loose stools x3 this shift, post void PVRs done, pt is voiding without difficulty. Right PIV infusing LR at 75 ml/hr. Right foot/ankle dressing-CDI.    See flowsheet for complete assessment.    Pt wears bilaterally hearing aids.    Patient arrived to unit from PACU around 1810. Skin assessment done by two nurses, second RN, Nathan BRIGHT

## 2023-11-03 NOTE — PLAN OF CARE
Physical Therapy Discharge Summary    Reason for therapy discharge:    All goals and outcomes met, no further needs identified.  Patient/family request discontinuation of services.    Progress towards therapy goal(s). See goals on Care Plan in [facility] electronic health record for goal details.  Goals met    Therapy recommendation(s):    No further therapy is recommended. Can re-order PT as needed after next procedure on 11/8 if pt's mobility/goals have changed.

## 2023-11-03 NOTE — PROGRESS NOTES
Orthopaedic Surgery Progress Note 11/03/2023    E: No acute events overnight.    S: Pain well controlled. Denies new numbness or tingling, endorses very little sensation in foor at baseline. Plan of care reviewed and questions answered    O:  Temp: 97.1  F (36.2  C) Temp src: Oral BP: 132/69 Pulse: 100   Resp: 17 SpO2: 94 % O2 Device: Nasal cannula Oxygen Delivery: 5 LPM    Exam:  Gen: No acute distress, resting comfortably in bed.  Resp: Non-labored breathing    MSK:    RLE  Dressing c/d/I  Diminished sensation in all distributions (baseline)  Wiggles toes  Toes WWP      Recent Labs   Lab 11/03/23  0652 10/30/23  0912   WBC  --  9.2   HGB 9.8* 11.5*   PLT  --  327       Culture results: pending    CRP 37 < 74.5    Assessment: Nehemiah Magallon is a 58 year old male s/p I&D and removal of hardware right foot on 11/2 with Dr. Starr. Doing well. Will continue to monitor cultures and adjust antibiotics accordingly. Plan to return to OR on 11/8.       Plan:  Ortho Primary  Activity: Up with assist.  Weight bearing status: NWB RLE  Antibiotics: vanc/zosyn pending cultures, ID consult  Diet: Begin with clear fluids and progress diet as tolerated.  DVT prophylaxis: mechanical while in house  Elevation: Elevate RLE on pillows  Wound Care: Dressing change by ortho in OR on 11/8  Pain management: transition from IV to orals as tolerated.   Physical Therapy:  ROM, ADL's.  Occupational Therapy: ADL's.  Labs: Trend CRP q48hrs  Cultures: Pending, follow culture results closely.  Consults: PT, OT, medicine, ID, appreciate assistance in caring for this patient.  Follow-up: TBD    Disposition: TBD, return to OR on 11/8.       Joesph Smith MD 11/03/2023  Orthopaedic Surgery Resident, PGY4

## 2023-11-03 NOTE — CONSULTS
[facility] ID SERVICE: NEW CONSULTATION    Patient:  Nehemiah Magallon, Date of birth 1965, Medical record number [id number]  Date of Admission: 11/2/2023  Date of Visit:  11/3/2023  Requesting Provider: Bryon Starr         Assessment and Recommendations:     ID Problem List:  Right foot chronic contiguous osteomyelitis with hardware   Worsening chronic osteomyelitis noted on CT 10/24/2023 with soft tissue swelling tibiotalar fusion and loosening of hardware plus Lisfranc deformity and ill-defined erosion of the lateral talus/distal fibula  Now status post I&D for right ankle and foot hardware removal 11/2/2023 started on Zosyn and vancomycin  S/p R Charcot right foot  reconstruction 11/17/2022 complicated by postop wound infection  1/20/23 - started on Pip/Tazobactam and Vancomycin IV   1/21/23 -  s/p I+D of right foot, placement of wound vac and antibiotic beads. Staph epidermidisx2 (oxacillin Resistant) and Staph caprae. ( all doxycycline sensitive)  2/15/23 - Vancomycin IV and Rifampin were stopped and started on Doxycycline.  2/24/23 - Doxycycline was stopped (papular lesions on his body, back, arms, associated with itching)  Severe peripheral neuropathy  T2DM (A1c 7.3 10/30/2023)  CKD  Hepatitis C status posttreatment (2017)    Discussion   58-year-old male with past medical history significant for type 2 diabetes, severe peripheral neuropathy, CKD, right Charcot right foot requiring reconstruction on 11/17/2022 complicated by post wound infection treated with antibiotics unfortunately progressing to osteomyelitis requiring I&D of the right foot and placement of wound VAC/antibiotic beads on 1/2123 (MRSE and staph caprae).  He presents after worsening osteomyelitis noted on CT imaging now status post right foot and ankle I&D with hardware removal on 11/2/2023.  His antibiotics currently are Zosyn and vancomycin.     His wound cultures and bone biopsy are currently in process.  On initial Gram stain  no organisms seen from 11/2.  Appears there is a plan for return to the OR on 11/18 for repeat I&D.      Recommendations:  Continue current antibiotics: Vancomycin & Zosyn; plan to narrow when cultures result  Anticipate long-term IV antibiotic course thus okay to place a PICC at any time   Final antibiotic route/duration to be determined pending cultures and plans for future surgical intervention  Await formal operative note    Thank you for this consult. ID team will continue to follow this patient. Please reach out if any questions or concerns.     Total time spent during this encounter (chart review, documentation, MDM, coordination of care): 40 minutes     This patient was staffed with Dr. 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  He had previous right foot Charcot reconstruction on 11/17/2022 which was complicated by postoperative infection (see full history below).  He required long-term antibiotics.  He has now been off antibiotics [facility] concerning for soft tissue swelling, tibiotalar joint effusion, loosening of the hardware, Lisfranc deformity, and findings suggestive of osteomyelitis; ill-defined erosions in the lateral talus and distal fibula. Patient was admitted to [facility] after undergoing  I&D R ankle and foot with hardware removal 11/2/23, subsequently started on vancomycin and Zosyn.  Cultures currently pending.    His past infectious disease history is as following:  S/p right Charcot foot reconstruction on 11/17/2022. Periop he recieved Cefazolin. He was then discharged on Augmentin x 7 days.  He was using a cast for 2.5-3 weeks post surgery. He then noticed a scab but no drainage. 1/15/23, he noticed purulent drainage and deepening of the wound. He was prescribed with Augmentin 875 mg po q12 hr x 14 days on 1/16/23.  H    He saw Dr Starr on 1/20/23 and directly admitted on 1/20/2023 from Ortho clinic for increasing drainage and wound necrosis of right foot surgical sites. Wound specimen was obtained before starting empiric Pip/tazobactam and Vancomycin IV on 1/20/23. He underwent I+D of right foot, placement of wound vac and antibiotic beads on 1/21/23 .     Intra-op findings: Medial wound: purulent material, probed directly to plate and bone; Lateral wound: no purulence, probed to bone, no hardware present laterally. Intra-Op cultures on 1/21/23 and 1/20/23 grew Staph epidermidisx2 ( oxacillin Resistant)  and Staph caprae.   He was discharged on Vancomycin IV and Rifampin 300 mg po q12 hr ( activity against biofilm, due to hardware in place) .          Review of Systems:     Complete 12 point review of systems negative except as noted in HPI.         Recent Antimicrobials::   Current antibiotics: Vancomycin and Zosyn      Previous antibiotics include vancomycin + rifampin from 1/20/2023 to 2/15/2023, doxycycline stopped 2/24/2023     Past Medical History:     Past Medical History:   Diagnosis Date    Chronic pain syndrome 10/24/2014    Diabetes mellitus, type 2 (H)     Diabetic Charcot foot (H)     Diabetic retinopathy associated with diabetes mellitus due to underlying condition (H)     Diverticulitis of colon     Gastroesophageal reflux disease     Hepatitis C 2017    treated    History of skin  cancer     Hypertension     Hypothyroidism     Legally blind     Midfoot collapse of right lower extremity     Migraine     NAFLD (nonalcoholic fatty liver disease)     Nephrolithiasis     Neuropathy     MARGARITO (obstructive sleep apnea)     Rib pain 10/24/2014    S/P colectomy 10/14/2014    Thyroid cancer (H) 10/14/2014         Allergies:      Allergies   Allergen Reactions    Doxycycline Rash     Very extensive full body rash lasting for several months. Given at same time as Rifampin.    Rifampin Rash     Very extensive full body rash lasting for several months. Given at the same time as doxycycline.    Atorvastatin      Other reaction(s): Hyperglycemia    Celebrex [Celecoxib] Other (See Comments)     [facility]          Family History:   Reviewed and noncontributory.   Family History   Problem Relation Age of Onset    Diabetes Mother     Cancer Mother     Diabetes Father     Heart Disease Father     Anesthesia Reaction No family hx of     Clotting Disorder No family hx of     Glaucoma No family hx of     Macular Degeneration No family hx of           Social History:     Social History     Socioeconomic History    Marital status:      Spouse name: Not on file    Number of children: Not on file    Years of education: Not on file    Highest education level: Not on file   Occupational History    Not on file   Tobacco Use    Smoking status: Never    Smokeless tobacco: Never   Substance and Sexual Activity    Alcohol use: Not Currently     Comment: rarely    Drug use: No    Sexual activity: Not Currently     Partners: Female   Other Topics Concern    Not on file   Social History Narrative    Not on file     Social Determinants of Health     Financial Resource Strain: Not on file   Food Insecurity: Not on file   Transportation Needs: Not on file   Physical Activity: Not on file   Stress: Not on file   Social Connections: Not on file   Interpersonal Safety: Not on file   Housing Stability: Not on file  "           Physical Exam:     /60 (BP Location: Left arm)   Pulse 88   Temp 97.9  F (36.6  C) (Oral)   Resp 17   Ht 1.778 m (5' 10\")   Wt 89.7 kg (197 lb 12 oz)   SpO2 98%   BMI 28.37 kg/m       Exam:  GENERAL:  Well-developed, well-nourished, lying in no acute distress.   Head: normocephalic, atraumatic.   EYES: PERRLA, EOMI, conjunctiva clear, anicteric sclerae.    ENT:  Oropharynx is moist without exudates or ulcers.  NECK:  Supple.  LUNGS:  Breathing comfortably, on room air, clear to auscultation bilaterally, no w/r/r.  CARDIOVASCULAR:  Regular rate and rhythm, +S1S2 with no murmurs, gallops or rubs.  ABDOMEN:  Normal bowel sounds, soft, nontender. No appreciable hepatosplenomegaly.   : no suprapubic or flank tendterness.   EXT: Extremities warm.  Right foot wrapped heavily in bandages.  No drainage seen.  Left foot without any rashes warm to touch previous surgical scars well-healed.  SKIN:  No acute rashes.    NEUROLOGIC:  Grossly nonfocal.     Lines and devices:   PIV is in place without any surrounding erythema.    Labs, Microbiology and Imaging studies are reviewed.   Cultures:   1/2/23:  Gram stain: no organisms seen +4 white blood cells   Fungal or yeast culture: pending   Aerobic bacterial culture: pending   Anaerobic bacterial culture: pending   Surgical pathology: pending            Laboratory Data:   Metabolic Studies       Recent Labs   Lab Test 11/03/23  0652 11/02/23  2108 11/02/23  1940 11/02/23  0941 10/30/23  0912 01/21/23  1722 01/21/23  1559     --   --   --  139   < > 143   POTASSIUM 4.2  --   --   --  4.6   < > 4.1   CHLORIDE 104  --   --   --  102   < > 110*   CO2 26  --   --   --  24   < > 25   ANIONGAP 7  --   --   --  13   < > 8   BUN 19.1  --   --   --  20.6   < > 24   CR 1.17  --  1.17  --  1.32*   < > 1.21   GFRESTIMATED 72  --  72  --  62   < > 69   *  156*   < >  --    < > 210*   < > 166*   A1C  --   --   --   --  7.3*  --   --    ALICE 8.7  --   --   --  " "10.1*   < > 9.0   MAG  --   --   --   --   --   --  2.0    < > = values in this interval not displayed.       Hepatic Studies    Recent Labs   Lab Test 03/08/23  0941 02/13/23  1425 02/06/23  1430   BILITOTAL 0.2 <0.2 0.4   ALKPHOS 61 71 81   PROTTOTAL 7.2 6.6 7.3   ALBUMIN 4.4 4.1 3.4   AST 28 23 13   ALT 36 17 21       Hematology Studies      Recent Labs   Lab Test 11/03/23  0652 10/30/23  0912 03/08/23  0941 02/13/23  1425 02/08/23  1215 01/30/23  1430 01/26/23  0953   WBC  --  9.2 9.4 7.0 9.4 11.7* 10.6   HGB 9.8* 11.5* 11.8* 11.0* 11.2* 11.1* 10.6*   HCT  --  36.5* 36.2* 34.4* 34.9* 35.3* 34.0*   PLT  --  327 210 162 240 426 307       Medication levels    Recent Labs   Lab Test 02/13/23  1425   VANCOMYCIN 15.3       Inflammatory Markers    Recent Labs   Lab Test 10/30/23  0912 05/12/23  0907 02/06/23  1430 01/26/23  0953 01/23/23  0546 01/22/23  0839 01/20/23  1211   SED 44* 7  --   --   --   --  59*   CRP  --   --  <2.9 7.4 14.0* 24.0* 69.0*         Microbiology:  Last Culture results with specimen source  No results found for: \"CULT\" No results found for: \"SDES\"       Imaging:  Recent Results (from the past 48 hour(s))   XR Surgery DALILA L/T 5 Min Fluoro    Narrative    This exam was marked as non-reportable because it will not be read by a   radiologist or a [place] non-radiologist provider.            "

## 2023-11-03 NOTE — PROGRESS NOTES
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, ) with a past medical history significant for non-insulin dependent T2DM with retinopathy, legal blindness, severe peripheral neuropathy (zero sensation from knees down), papillary thyroid malignancy in remission s/p thyroidectomy and subsequent hypothyroidism, GERD, MARGARITO not on CPAP, CKD, Hep C s/p treatment (2017), HTN, migraines, anemia, NAFLD, colectomy for diverticulitis (2014), cholecystectomy, bilateral hearing loss and using bilateral hearing aids, chronic pain, s/p right Charcot foot reconstruction on 11/17/2022 unfortunately complicated by post-op wound infection progressing to osteomyelitis. Was admitted to the hospital 1/20/23. He underwent I+D of right foot, placement of wound vac and antibiotic beads on 1/21/23. Intra-Op cultures on 1/21/23 and 1/20/23 grew Staph epidermidis x 2 (oxacillin Resistant) and Staph caprae. He was discharged on Vancomycin IV and Rifampin 300 mg po q12 hr (activity against biofilm, due to hardware in place). Vancomycin and Rifampin were discontinued on 2/15/23 and started on oral doxycycline at that time which was associated with a rash. Doxycycline was stopped 2/24/23. [facility] for soft tissue swelling, tibiotalar joint effusion, loosening of the hardware, Lisfranc deformity, and findings suggestive of osteomyelitis; ill-defined erosions in the lateral talus and distal fibula. Patient was [facility] to [facility] after undergoing I&D R ankle and foot with hardware removal 11/2/23, subsequently started on broad spectrum antibiotics.    #Status post I&D R Ankle and Foot + Hardware Removal (11/2/23)  Acute Blood Loss Anemia  EBL <50cc and no apparent intra-op complications. Purulent fluid was found over the medial  foot plate and most screws were loose during intra-op assessment. Pre-op hemoglobin 11.5.  - Management per primary orthopedics team including pain control, activity, antibiotics, DVT prophylaxis, wound cares. Please refer to their documentation for details.  > NWB RLE   > Broad spectrum antibiotics (Zosyn and Vancomycin)  > Trend CRP  > PT/OT consults   > Mechanical dvt ppx while in house, discharge on aspirin 162 mg x 4 weeks   > Plan for RTOR on 11/8 for repeat I&D   - Recheck hemoglobin in am   - Follow up intra-op cultures  - Appreciate ID assistance  - Note: Patient reports severe rash related to doxycycline and rifampin (unclear which as there was some overlap) that spread across his entire body and took several months to completely resolve. Please avoid these antibiotics.    #T2DM c/b Severe Neuropathy  Last A1C 7.3 10/30/23. PTA regimen Jardiance, Ozempic, Metformin. Patient has no sensation in his lower extremities from the knee down which is baseline.  - Monitor BG before meals and at bedtime  - Medium sliding scale insulin    - Hold PTA regimen for now, though please resume jardiance and metformin in the morning tomorrow if patient is stable and not requiring further contrast studies or interventions (he would like to avoid insulin if at all possible).  - Continue PTA Gabapentin 600mg TID    #CKD  Baseline creatinine 1.30-1.42. Pre-op labs 10/30 creatinine 1.32.  - Check BMP in am    #GERD  - Continue PTA PPI    #Hypothyroidism  - Continue PTA Synthroid 150mcg daily    #HLD  - Continue PTA statin    #MARGARITO  Does not use CPAP due to claustrophobia. Scheduled to have Inspire placed on December.  - Monitor continuous oximetry and capno    #Insomnia  - Hold PTA Ambien prn for now given post-op sedating medications. Consider resume in 1-2 days as able.          Diet: Advance Diet as Tolerated: Regular Diet Adult    DVT Prophylaxis: Pneumatic Compression Devices  Jones Catheter: Not present  Lines: PRESENT        "      Cardiac Monitoring: None  Code Status: Full Code      Clinically Significant Risk Factors Present on Admission                      # DMII: A1C = 7.3 % (Ref range: <5.7 %) within past 6 months    # Overweight: Estimated body mass index is 28.37 kg/m  as calculated from the following:    Height as of this encounter: 1.778 m (5' 10\").    Weight as of this encounter: 89.7 kg (197 lb 12 oz).              Disposition Plan      Expected Discharge Date: 11/08/2023                  Aroldo David DO, MHS  Hospitalist Service, GOLD TEAM [address] [place] [address]  Securely message with [employer] (more info)  Text page via [facility] Paging/Directory   See signed in provider for up to date coverage information  ______________________________________________________________________    Interval History   Patient not available in hospital room.  As such, cannot obtain comprehensive review of systems at present.  We will fully evaluate patient tomorrow    Physical Exam   Vital Signs: Temp: 97.9  F (36.6  C) Temp src: Oral BP: 136/60 Pulse: 88   Resp: 17 SpO2: 98 % O2 Device: None (Room air) Oxygen Delivery: 5 LPM  Weight: 197 lbs 12.04 oz    Patient not available in hospital room.  As such, cannot perform comprehensive physical examination.    We will fully evaluate patient tomorrow    Medical Decision Making       25 MINUTES SPENT BY ME on the date of service doing chart review, history, exam, documentation & further activities per the note.      Data     I have personally reviewed the following data over the past 24 hrs:    N/A  \   9.8 (L)   / N/A     137 104 19.1 /  214 (H)   4.2 26 1.17 \     Procal: N/A CRP: 37.33 (H) Lactic Acid: N/A         Imaging results reviewed over the past 24 hrs:   Recent Results (from the past 24 hour(s))   XR Surgery DALILA L/T 5 Min Fluoro    Narrative    This exam was marked as non-reportable because it will not be read by a   radiologist or a [place] non-radiologist " provider.

## 2023-11-04 NOTE — PROGRESS NOTES
Orthopaedic Surgery Progress Note 11/04/2023    E: No acute events overnight.    S: Pain well controlled. Denies new numbness or tingling, endorses very little sensation in foor at baseline. Plan of care reviewed and questions answered    O:  Temp: 98.5  F (36.9  C) Temp src: Oral BP: (!) 159/84 Pulse: 94   Resp: 16 SpO2: 98 % O2 Device: None (Room air)      Exam:  Gen: No acute distress, resting comfortably in bed.  Resp: Non-labored breathing    MSK:    RLE  Dressing c/d/I  Diminished sensation in all distributions (baseline)  Wiggles toes  Toes WWP      Recent Labs   Lab 11/03/23  0652 10/30/23  0912   WBC  --  9.2   HGB 9.8* 11.5*   PLT  --  327       Culture results: pending. Gram stain negative.     CRP 37 < 74.5    Assessment: Nehemiah Magallon is a 58 year old male s/p I&D and removal of hardware right foot on 11/2 with Dr. Starr. Doing well. Will continue to monitor cultures and adjust antibiotics accordingly. Plan to return to OR on 11/8.       Plan:  Ortho Primary  Activity: Up with assist.  Weight bearing status: NWB RLE  Antibiotics: vanc/zosyn pending cultures, ID consult  Diet: Begin with clear fluids and progress diet as tolerated.  DVT prophylaxis: mechanical while in house  Elevation: Elevate RLE on pillows  Wound Care: Dressing change by ortho in OR on 11/8  Pain management: transition from IV to orals as tolerated.   Physical Therapy:  ROM, ADL's.  Occupational Therapy: ADL's.  Labs: Trend CRP q48hrs  Cultures: Pending, follow culture results closely.  Consults: PT, OT, medicine, ID, appreciate assistance in caring for this patient.  Follow-up: TBD    Disposition: TBD, return to OR on 11/8.       Joesph Smith MD 11/04/2023  Orthopaedic Surgery Resident, PGY4

## 2023-11-04 NOTE — OP NOTE
DATE OF SURGERY:  11/02/2023.     PREOPERATIVE DIAGNOSIS:  Right foot and ankle surgical site infection.    POSTOPERATIVE DIAGNOSIS:  Right foot and ankle surgical site infection.     PROCEDURE:  Sharp excisional irrigation and debridement of right foot and ankle.  Removal of deep/buried implants from right foot.  Implantation of antibiotic beads into right foot.  (Beads placed into deep, subfascial space adjacent to bone.  Addendum 11/08/2023.)     PRIMARY SURGEON:  Bryon Starr MD.     ASSISTANT SURGEON:  Joesph Smith MD.     ANESTHESIA:  General endotracheal.     COMPLICATIONS:  None apparent intraoperatively or immediately postoperatively.     IMPLANTED DEVICES:  Two strands of polymethylmethacrylate antibiotic beads with tobramycin and vancomycin - one strand of ten beads in anterolateral right ankle, and one strand of twenty beads in medial right foot.     SIGNIFICANT FINDINGS:  Grossly visible purulent appearing fluid and infected appearing soft tissue and bone in the right midfoot fusion and right anterolateral ankle.  Previous midfoot arthrodesis sites visualized and appeared to be partially but not fully healed.  All prior hardware in right foot removed.  Technique:  Cutting, curetting, irrigating.  Instruments:  Scalpel, rongeur, curet, elevator, cystoscopy tubing.  Nature of debrided tissue:  Purulent appearing fluid, purulent appearing friable soft tissue, soft/necrotic appearing bone.  Appearance of wound after debridement:  Down to healthy, bleeding tissue.  Area of debridement:  (1) Right anterolateral ankle wound approximately 4 cm proximo-distal by 3 cm medio-lateral by 2 cm deep.  (2) Right medial foot wound approximately 8 cm proximo-distal by 3 cm dorso-plantar by 1 cm deep.  (3) Right hallux MTP wound approximately 1 cm proximo-distal by 0.5 cm medio-lateral by 0.5 cm deep.  Depth of debridement: Down to and including bone (distal fibula, first metatarsal, medial cuneiform, navicular,  talus).     SPECIMENS:  Tissue and fluid samples from right medial midfoot sent to microbiology for gram stain, aerobic culture, anaerobic culture, and fungal culture.  Tissue sample from right anterolateral ankle sent to microbiology for gram stain, aerobic culture, anaerobic culture, and fungal culture.  Tissue sample from right anterolateral ankle sent to pathology gross and microscopic analysis.    DRAINS:  None.     ESTIMATED BLOOD LOSS:  Approximately 100 mL.    INDICATIONS:  Nehemiah Magallon is a 58 year old patient with a history of diabetes mellitus, peripheral neuropathy, and right foot Charcot neuroarthropathy who underwent a right foot Charcot reconstruction in November 2022.  He developed a postoperative infection which was treated with a debridement and irrigation and hardware removal in January 2023, a course of antibiotic therapy, and wound VAC which was able to eventually result in wound closure and quiescence of clinically overt signs for infection for months.  He recently re-presented to another hospital with increased swelling and pain in the right ankle and foot, with a CT scan at that hospital showing soft tissue swelling, a tibiotalar joint effusion, known loosening of the hardware, known chronic Lisfranc deformity, and findings suggestive of osteomyelitis, and a right anteromedial ankle joint aspiration showing cell count 13,310 WBCs with 84.5% PMNs.  He followed up in clinic at the [facility] where acute phase reactants were elevated including CRP 74.5 and ESR 44.  On examination he had swelling and pain in the anterolateral right ankle.  The diagnosis was made of ongoing/recurrent right foot/ankle infection, and the recommendation was made for a debridement and irrigation of his right ankle and foot, and removal of the previous hardware with possible implantation of antibiotic beads.  The diagnosis, prognosis, nature of the recommended procedure, the risks, benefits, and  alternatives, and the postoperative plan were all discussed with the patient in layman's terms.  The severity of this patient's problem was discussed including the potential need for multiple future procedures, the possibility that his infection may not be able to be cured, and even the potential for future amputation as a last resort.  No guarantees were expressed or implied as to outcome.  The patient had the opportunity to have all questions at the time asked and answered appropriately, verbalized understanding of this discussion, and verbalized the wish to proceed with the planned procedure.  Written informed consent was obtained.     DESCRIPTION OF PROCEDURE:       The patient, Nehemiah Magallon, was met in the preoperative area where the correct surgical site was identified with patient participation and marked by the primary surgeon.  The surgical plan as well as the risks, benefits, and alternatives were again discussed.  All questions the patient had at the time were answered, and he verbalized the wish to proceed with surgery.  He was then brought to the operating room and carefully transferred into the supine position on the operating room table with all bony prominences well padded and a safety strap across the waist.  The patient was then placed under general anesthesia, and an endotracheal tube was successfully placed by the anesthesia team.  A Jones catheter was inserted using sterile technique.  The skin over the planned surgical sites was inspected and was notable for swelling over the anterolateral right ankle and dorsomedial right midfoot, with intact skin and well healed prior surgical scars.  Intravenous antibiotics were made ready for later infusion as soon as intraoperative cultures were to be taken.  A tourniquet was placed on the thigh of the operative lower extremity but never inflated or required for the surgery.  Venous thromboembolic prophylaxis was performed with a  sequential compression  device on the contralateral nonoperatvive lower extremity.  The patient's operative lower extremity was then prepped with chlorhexidine and alcohol, and draped free in the usual sterile fashion.  The toes were kept covered with a sterile glove for the entire procedure.  The surgeon initials were clearly visualized at the surgical site on the right ankle and foot.  Prior to proceeding with the operation a timeout was held per hospital policy correctly identifying the patient, procedure to be performed, and operative site including laterality. All in the room agreed.    A longitudinal incision was made over the dorsomedial right midfoot, in line with the previous incision, from the mid-first metatarsal to the medial talus.  Careful dissection was performed through the subcutaneous tissues, and meticulous hemostasis was obtained with electrocautery.  The tibialis anterior tendon was kept intact.  Distal and proximal to this the medial aspects of the first metatarsal, navicular, and talar head were subperiosteally dissected and blunt retractors were placed directly on bone to protect surrounding neurovascular and tendinous structures.  The medial Charcot locking plate and screws were visualized along its entire length.  There was immediate return of purulent appearing fluid and friable soft tissue around the plate and bones of the medial midfoot arthrodesis sites.  The fluid was aspirated and the soft tissue was debrided with a rongeur, and these samples were sent to microbiology.  The perioperative antibiotic dose of Ancef was then administered intravenously.  There was a copious amount of this fluid and tissue from throughout the medial midfoot, and this was all thoroughly manually expressed, curetted out, and evacuated.  The seven intact screws in the previous medial Charcot plate were visualized and all easily removed with an appropriate screwdriver.  The one broken screw head was easily removed.  The plate was  confirmed to be completely mobile by direct manipulation and by C-arm images showing no screws holding the plate to bone, and the plate was then easily slid out from under the gently and bluntly retracted, protected, and intact tibialis anterior tendon insertion and out of the wound.  The shaft of the broken screw was buried within bone in the midfoot.  C-arm was used in orthogonal views to localize the position of the buried screw.  Bony debridement with a curet and small rongeur was performed along the tract of bone towards the embedded screw fragment; by distracting the visibly apparent nonunion site of the talonavicular joint with a small lamina , the broken screw shaft was then able to be removed with a small pituitary rongeur.  All removed hardware was passed off of the operative field.  C-arm fluoroscopy was used to visualize the foot in multiple views to confirm removal of all prior hardware.  C-arm and direct visualization of the fusion site suggested a partial bony arthrodesis but a large bony defect and obvious nonunion with gross motion at the talonavicular joint.  A scalpel, curet, rongeur, and Adson elevator were then used to thoroughly debride all necrotic tissue from the medial midfoot wound, including subcutaneous tissue, fascia, periosteum, and bone, until a healthy bleeding base of soft tissue an bone was reached.  Care was taken to avoid neurovascular injury during this process.    A separate small longitudinal incision was made over the dorsal aspect of the first metatarsophalangeal joint at the site of the previous scar.  The incision was carefully dissected through the subcutaneous tissues and MTP joint capsule, medial to the EHL tendon, with meticulous hemostasis achieved with electrocautery.  The entry site of the beaming screw was then easily visualized and the head was noted to be appropriately recessed deep to the articular surface.  Soft tissue was removed from the head with a  curet and rongeur.  There was no visible purulence from this incision.  The appropriate screwdriver was then used to remove the entire first ray beaming screw in its entirety and intact, and it was passed off the operative field.  The beaming screw tract within bone (first metatarsal, medial cuneiform, navicular, and talus) was then thoroughly curetted out.  No purulent fluid or material was seen in this wound.    A separate longitudinal incision was then made over the anterolateral right ankle in the midline of the area of obvious swelling.  Meticulous hemostasis was achieved with electrocautery, and careful soft tissue dissection was performed.  Care was taken to avoid injury to the superficial peroneal nerve.  The deep fascial layer was then carefully incised longitudinally, and there was an immediate return of a large amount of a mixture of purulent appearing fluid and friable soft tissue.  This was all thoroughly manually expressed, curetted out, and evacuated.  Samples of this fluid and tissue were also sent to microbiology as well as to pathology.  The wound was thoroughly explored and this apparent abscess cavity did appear to track directly down to bone in the anterolateral distal tibia as well as the distal fibula, mainly all proximal to the ankle joint, but in the distal portion of the wound, there did appear to be a small communication with the ankle joint.  Using a combination of scalpel, rongeur, Adson elevator, and a curet, this anterolateral ankle wound was thoroughly debrided of nonviable and infected appearing tissue including subcutaneous tissue, fascia, periosteum, and bone (distal fibula and tibia).    All wounds including the right hallux MTP wound, right medial midfoot, and right anterolateral ankle wound (including the ankle joint) were then thoroughly irrigated with 6 L of normal saline with cystoscopy tubing.  Two strands of ten and twenty (thirty total) polymethylmethacrylate beads were  made by hand at this time, containing tobramycin and vancomycin.  The strand of twenty beads was placed into the medial midfoot wound, taking care to pass the strand deep to the tibialis anterior tendon insertion on the medial cuneiform and pack some of it into the large talonavicular nonunion site bone defect.  The strand of ten beads was placed into the anterolateral right ankle wound.      There was no evidence for neurovascular injury.  Meticulous hemostasis was achieved.  The skin for all three incisions was approximated with interrupted 3-0 nylon sutures.  The skin was then carefully cleansed with sterile saline and then dried, and the incisions were dressed sterilely with Adaptic, gauze bandages, ABDs, and cast padding.  An Ace wrap was applied.     All surgical counts were reported as correct prior to closure and again at the end of the case.  The patient was extubated and then carefully and safely transferred to the [facility] in the supine position and then taken to the recovery room in stable condition.     I was present and scrubbed in for the entire case.       Bryon Starr MD  Attending surgeon  Orthopaedic Surgery

## 2023-11-04 NOTE — PLAN OF CARE
Goal Outcome Evaluation:      Plan of Care Reviewed With: patient    Overall Patient Progress: improving    Outcome Evaluation: Pt cleared by PT to do independent transfer from bed to wheelchair and going to the toilet. Patient reported he has been sleepng mostly this evening. Was given 1 unit of insulin last evening. This morning hospitalist was paged re: Jardiance and metformin to unhold orders, patient is stable and prefers to take pills than to receive insulin injection.

## 2023-11-04 NOTE — PHARMACY-ADMISSION MEDICATION HISTORY
Pharmacy Intern Admission Medication History    Admission medication history is complete. The information provided in this note is only as accurate as the sources available at the time of the update.    Information Source(s): Patient and CareEverywhere/SureScripts via phone    Pertinent Information: Patient was a good historian of their medications.     Changes made to PTA medication list:  Added:   Levothyroxine 150 mcg tablet  Deleted:   Levothyroxine 137 mcg tablet: take 1 tablet PO every day (per patient, does not take anymore)   Sumatriptan 25 mg tablet - duplicate  Changed:   Gabapentin 600 mg tablet: take 2 tablets PO TID --> take 1 tablet PO TID    Allergies reviewed with patient and updates made in EHR: no    Medication History Completed By: Simab Samuel 11/4/2023 5:06 PM    PTA Med List   Medication Sig Last Dose    acetaminophen (TYLENOL) 325 MG tablet Take 2 tablets (650 mg) by mouth every 8 hours as needed for mild pain, fever or headaches Past Month    ammonium lactate (LAC-HYDRIN) 12 % external lotion APPLY THIN LAYER TOPICALLY TWICE A DAY AS NEEDED FOR DRY SKIN **EXTERNAL USE ONLY** Past Week    ammonium lactate (LAC-HYDRIN) 12 % external lotion Apply topically every evening Past Week    Calcium-Vitamin D 500-3.125 MG-MCG TABS Take 1 tablet by mouth 2 times daily Past Week    carboxymethylcellulose PF (REFRESH PLUS) 0.5 % ophthalmic solution 1 drop 3 times daily as needed for dry eyes Past Week    Cody Moisture Barrier external cream Apply daily as directed to skin Past Week    cetirizine (ZYRTEC) 10 MG tablet Take 10 mg by mouth every morning 11/1/2023    Cyanocobalamin 1000 MCG CAPS Take 2,000 mcg by mouth every morning Past Week    diclofenac (VOLTAREN) 1 % topical gel Apply 4 g topically 4 times daily (Patient taking differently: Apply 4 g topically every evening) 11/1/2023 at 2100    empagliflozin (JARDIANCE) 25 MG TABS tablet Take 25 mg by mouth every morning 11/1/2023 at 0900     "fluticasone (FLONASE) 50 MCG/ACT nasal spray  11/1/2023 at 0900    fluticasone (FLONASE) 50 MCG/ACT nasal spray Spray 2 sprays into both nostrils every morning 11/1/2023 at 0900    folic acid (FOLVITE) 1 MG tablet Take 1 mg by mouth every morning Past Week    gabapentin (NEURONTIN) 600 MG tablet Take 1 tablet by mouth 3 times daily 11/2/2023 at 0600    Gauze Pads & Dressings ([facility] ISLAND DRESSING) 4\"X8\" PADS 1 Units daily Past Week    levothyroxine (SYNTHROID/LEVOTHROID) 150 MCG tablet Take 150 mcg by mouth every morning 11/1/2023 at 0900    lidocaine (LIDODERM) 5 % patch APPLY 1 OR 2 PATCHES 5% TOPICALLY EVERY DAY FOR PAIN -WEAR FOR UP TO 12 HOURS THEN REMOVE AND LEAVE OFF FOR 12 HOURS Past Month    lidocaine (LIDODERM) 5 % patch Apply up to 3 patches to painful area at once for up to 12 h within a 24 h period.  Remove after 12 hours. (Patient taking differently: Apply up to 3 patches to painful area at once for up to 12 h within a 24 h period.  Remove after 12 hours. L foot.) Past Month    linaclotide (LINZESS) 145 MCG capsule Take 1 capsule by mouth every morning 11/1/2023    metFORMIN (GLUCOPHAGE XR) 500 MG 24 hr tablet Take 1,000 mg by mouth 2 times daily (with meals) 11/1/2023 at 1600    omeprazole (PRILOSEC) 20 MG DR capsule Take 20 mg by mouth every morning 11/1/2023 at 0800    polyethylene glycol (MIRALAX) 17 g packet Take 17 g by mouth daily More than a month    pravastatin (PRAVACHOL) 40 MG tablet Take 20 mg by mouth every evening 11/1/2023 at 2100    Semaglutide (OZEMPIC, 1 MG/DOSE, SC) Inject 2 mg Subcutaneous once a week Sunday's 11/1/2023 at 2100    senna-docusate (SENOKOT-S/PERICOLACE) 8.6-50 MG tablet Take 1 tablet by mouth 2 times daily Unknown    sodium fluoride dental gel (PREVIDENT) 1.1 % GEL topical gel BRUSH SMALL AMOUNT MOUTH EVERY MORNING AND AT BEDTIME ON TOOTHBRUSH, BRUSH FOR 2 MINUTES. Unknown    SUMAtriptan (IMITREX) 25 MG tablet Take 25 mg by mouth at onset of headache More than a " month    topiramate (TOPAMAX) 100 MG tablet Take 150 mg by mouth every morning 11/1/2023    Vitamin D3 (CHOLECALCIFEROL) 25 mcg (1000 units) tablet Take 50 mcg by mouth every morning 11/1/2023    Wound Dressings (MEPILEX BORDER FLEX) PADS Externally apply 1 each topically daily as needed (as needed for saturated dressing) Unknown    zolpidem ER (AMBIEN CR) 12.5 MG CR tablet Take 12.5 mg by mouth nightly as needed for sleep 11/1/2023 at 2100

## 2023-11-04 NOTE — PLAN OF CARE
Goal Outcome Evaluation:      Plan of Care Reviewed With: patient    Overall Patient Progress: improving    Outcome Evaluation: Pt interactive in cares and plan. Pt happy to be cleared by PT for independent transfers.  Pt intermittently refuses taking insulin, education reinforced and pt accepted medication this afternoon and evening.

## 2023-11-04 NOTE — PROGRESS NOTES
"VS: BP (!) 159/84 (BP Location: Left arm, Patient Position: Sitting)   Pulse 94   Temp 98.5  F (36.9  C) (Oral)   Resp 16   Ht 1.778 m (5' 10\")   Wt 89.7 kg (197 lb 12 oz)   SpO2 98%   BMI 28.37 kg/m       O2: Stable on Room air   Output: Voids adequately to the bathroom    Last BM: 11/4   Activity: Pivot transfer to wheelchair   Skin: Intact besides right foot/ankle surgical incision    Pain: Denies pain.       CMS: AXO X4, Baseline neuropathy to all extremities    Dressing: UTV- Bandaged covered with ACE wrap   Diet: Reg. BG Checks.   LDA: R PIV saline locked   Plan: Tback to the OR 11/8 for another I and D. PICC orders places, but Cx still growing, no immediate need for placement.   Additional Info: ID following     "

## 2023-11-04 NOTE — PLAN OF CARE
Goal Outcome Evaluation:      Plan of Care Reviewed With: patient    Overall Patient Progress: improvingOverall Patient Progress: improving    Outcome Evaluation: Independent to BRPs. Pain controlled. LE numbness is PTA. Plan for another I/D on 11/8

## 2023-11-04 NOTE — PROGRESS NOTES
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, ) with a past medical history significant for non-insulin dependent T2DM with retinopathy, legal blindness, severe peripheral neuropathy (zero sensation from knees down), papillary thyroid malignancy in remission s/p thyroidectomy and subsequent hypothyroidism, GERD, MARGARITO not on CPAP, CKD, Hep C s/p treatment (2017), HTN, migraines, anemia, NAFLD, colectomy for diverticulitis (2014), cholecystectomy, bilateral hearing loss and using bilateral hearing aids, chronic pain, s/p right Charcot foot reconstruction on 11/17/2022 unfortunately complicated by post-op wound infection progressing to osteomyelitis. Was admitted to the hospital 1/20/23. He underwent I+D of right foot, placement of wound vac and antibiotic beads on 1/21/23. Intra-Op cultures on 1/21/23 and 1/20/23 grew Staph epidermidis x 2 (oxacillin Resistant) and Staph caprae. He was discharged on Vancomycin IV and Rifampin 300 mg po q12 hr (activity against biofilm, due to hardware in place). Vancomycin and Rifampin were discontinued on 2/15/23 and started on oral doxycycline at that time which was associated with a rash. Doxycycline was stopped 2/24/23. [facility] for soft tissue swelling, tibiotalar joint effusion, loosening of the hardware, Lisfranc deformity, and findings suggestive of osteomyelitis; ill-defined erosions in the lateral talus and distal fibula. Patient was admitted to [facility] after undergoing I&D R ankle and foot with hardware removal 11/2/23, subsequently started on broad spectrum antibiotics.    #Status post I&D R Ankle and Foot + Hardware Removal (11/2/23)  #Acute Blood Loss Anemia  #Chronic osteomyelitis  EBL <50cc and no apparent intra-op complications. Purulent fluid  was found over the medial foot plate and most screws were loose during intra-op assessment. Pre-op hemoglobin 11.5.  - Management per primary orthopedics team including pain control, activity, antibiotics, DVT prophylaxis, wound cares. Please refer to their documentation for details.  > NWB RLE   > Broad spectrum antibiotics (Zosyn and Vancomycin)  > Trend CRP  > PT/OT consults   > Mechanical dvt ppx while in house, discharge on aspirin 162 mg x 4 weeks   > Plan for RTOR on 11/8 for repeat I&D   - Recheck hemoglobin in am   - Follow up intra-op cultures  - Appreciate ID assistance  - Note: Patient reports severe rash related to doxycycline and rifampin (unclear which as there was some overlap) that spread across his entire body and took several months to completely resolve. Please avoid these antibiotics.    #T2DM c/b Severe Neuropathy  Last A1C 7.3 10/30/23. PTA regimen Jardiance, Ozempic, Metformin. Patient has no sensation in his lower extremities from the knee down which is baseline.  - Monitor BG before meals and at bedtime  - Medium sliding scale insulin    - Hold PTA regimen for now, though please resume jardiance and metformin in the morning tomorrow if patient is stable and not requiring further contrast studies or interventions (he would like to avoid insulin if at all possible).  - Continue PTA Gabapentin 600mg TID    #CKD  Baseline creatinine 1.30-1.42. Pre-op labs 10/30 creatinine 1.32.  - Check BMP in am    #GERD  - Continue PTA PPI    #Hypothyroidism  - Continue PTA Synthroid 150mcg daily    #HLD  - Continue PTA statin    #MARGARITO  Does not use CPAP due to claustrophobia. Scheduled to have Inspire placed on December.  - Monitor continuous oximetry and capno    #Insomnia  - Hold PTA Ambien prn for now given post-op sedating medications. Consider resume in 1-2 days as able.          Diet: Advance Diet as Tolerated: Regular Diet Adult    DVT Prophylaxis: Pneumatic Compression Devices  Jones Catheter: Not  "present  Lines: PRESENT             Cardiac Monitoring: None  Code Status: Full Code      Clinically Significant Risk Factors                        # DMII: A1C = 7.3 % (Ref range: <5.7 %) within past 6 months, PRESENT ON ADMISSION  # Overweight: Estimated body mass index is 28.37 kg/m  as calculated from the following:    Height as of this encounter: 1.778 m (5' 10\").    Weight as of this encounter: 89.7 kg (197 lb 12 oz)., PRESENT ON ADMISSION            Disposition Plan     Expected Discharge Date: 11/08/2023                    Aroldo David DO, S  Hospitalist Service, GOLD TEAM 17  [facility]  Securely message with [employer] (more info)  Text page via [facility] Paging/Directory   See signed in provider for up to date coverage information  ______________________________________________________________________    Interval History   Patient is in excellent spirits today.  Patient reports ankle discomfort is tolerable.  Patient denies chest pain, SOB.  Patient denies abdominal pain, nausea, vomiting.  Patient pending PICC placement.    Physical Exam   Vital Signs: Temp: 98.5  F (36.9  C) Temp src: Oral BP: (!) 159/84     Resp: 16 SpO2: 98 % O2 Device: None (Room air)    Weight: 197 lbs 12.04 oz    GENERAL: Alert and oriented x 3; no acute distress; well-nourished.  HEENT: Normocephalic; atraumatic; PERRLA; MMM.  CV: RRR; normal S1, S2; no rubs, murmurs, or gallops.  RESP: Lung fields clear to aucultation B/L; no wheezing or crepitations.  GI: Abdomen is soft, nontender, nondistended; no organomegaly; normal bowel sounds.  : Deferred genital examination.   MSK: Right foot wrapped.  DERM: Skin is intact; no rash, lesions, or skin breakdown.  NEURO: No focal deficits appreciated; strength & sensorium are grossly intact.  PSYCH: No active hallucinations; affect, insight appear within normal limits.    Medical Decision Making       55 MINUTES SPENT BY ME on the date of service " doing chart review, history, exam, documentation & further activities per the note.      Data     I have personally reviewed the following data over the past 24 hrs:    N/A  \   10.2 (L)   / N/A     N/A N/A N/A /  237 (H)   N/A N/A 1.12 \       Imaging results reviewed over the past 24 hrs:   No results found for this or any previous visit (from the past 24 hour(s)).

## 2023-11-04 NOTE — PROGRESS NOTES
[facility] GENERAL INFECTIOUS DISEASES: PROGRESS NOTE     Patient:  Nehemiah Magallon   YOB: 1965, MRN: [id number]  Date of Visit: 11/04/2023  Date of Admission: 11/2/2023  Consult Requester: Bryon Starr MD          Assessment and Recommendations:     ID Problem List:  Right foot chronic contiguous osteomyelitis with hardware   Worsening chronic osteomyelitis noted on CT 10/24/2023 with soft tissue swelling tibiotalar fusion and loosening of hardware plus Lisfranc deformity and ill-defined erosion of the lateral talus/distal fibula  Now status post I&D for right ankle and foot hardware removal 11/2/2023, cultures - Candida albicans  S/p R Charcot right foot  reconstruction 11/17/2022 complicated by postop wound infection  1/20/23 - started on Pip/Tazobactam and Vancomycin IV   1/21/23 -  s/p I+D of right foot, placement of wound vac and antibiotic beads. Staph epidermidisx2 (oxacillin Resistant) and Staph caprae. (Each S to doxycycline)  2/15/23 - Vancomycin IV and Rifampin were stopped and started on Doxycycline.  2/24/23 - Doxycycline was stopped (papular lesions on his body, back, arms, associated with itching)  Severe peripheral neuropathy  T2DM (A1c 7.3 10/30/2023)  CKD  Hepatitis C status posttreatment (2017)    Discussion:  58-year-old male with past medical history significant for type 2 diabetes, severe peripheral neuropathy, CKD, right Charcot right foot requiring reconstruction on 11/17/2022 complicated by post wound infection treated with antibiotics unfortunately progressing to osteomyelitis requiring I&D of the right foot and placement of wound VAC/antibiotic beads on 1/2123 (MRSE and staph caprae).  He presents after worsening osteomyelitis noted on CT imaging now status post right foot and ankle I&D with hardware removal on 11/2/2023.  His antibiotics currently are Zosyn and vancomycin.      His wound cultures and bone biopsy are currently in process. On initial Gram stain no organisms  seen from 11/2. Cultures are growing Candida albicans. Appears there is a plan for return to the OR on 11/18 for repeat I&D.     Recommend to discontinue vancomycin, as no evidence of MRSA growth in cultures, negative MRSA nares.     Recommendations:  Discontinue iv Vancomycin  Continue iv Pip-tazo   Start Fluconazole 400mg daily  Please obtain EKG, for baseline QTc  Requested add-on susceptibility of Candida albicans from intra-op cultures  Noted plan to RTOR 11/8    Recommendations are discussed with the primary team.     Thank you for the consult. ID team will continue to follow. Please reach out if any questions or concerns.     Total time spent during this encounter (chart review, documentation, MDM, coordination of care): 40 minutes    Nirali Argueta MD   Infectious Diseases Staff Physician  Pager: [phone]  [employer] [facility]an   11/04/2023         Interval History and Events:     Seen and examined at bedside. No particular complaints are reported. Doing well, no fever, chills.          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  He had previous right foot Charcot reconstruction on 11/17/2022 which was complicated by postoperative infection (see full history below).  He required long-term antibiotics.  He has now been off antibiotics [facility] for soft tissue  swelling, tibiotalar joint effusion, loosening of the hardware, Lisfranc deformity, and findings suggestive of osteomyelitis; ill-defined erosions in the lateral talus and distal fibula. Patient was admitted to [facility] after undergoing I&D R ankle and foot with hardware removal 11/2/23, subsequently started on vancomycin and Zosyn.  Cultures currently pending.     His past infectious disease history is as following:  S/p right Charcot foot reconstruction on 11/17/2022. Periop he recieved Cefazolin. He was then discharged on Augmentin x 7 days.  He was using a cast for 2.5-3 weeks post surgery. He then noticed a scab but no drainage. 1/15/23, he noticed purulent drainage and deepening of the wound. He was prescribed with Augmentin 875 mg po q12 hr x 14 days on 1/16/23.  H     He saw Dr Starr on 1/20/23 and directly admitted on 1/20/2023 from Ortho clinic for increasing drainage and wound necrosis of right foot surgical sites. Wound specimen was obtained before starting empiric Pip/tazobactam and Vancomycin IV on 1/20/23. He underwent I+D of right foot, placement of wound vac and antibiotic beads on 1/21/23 .      Intra-op findings: Medial wound: purulent material, probed directly to plate and bone; Lateral wound: no purulence, probed to bone, no hardware present laterally. Intra-Op cultures on 1/21/23 and 1/20/23 grew Staph epidermidisx2 ( oxacillin Resistant)  and Staph caprae.   He was discharged on Vancomycin IV and Rifampin 300 mg po q12 hr ( activity against biofilm, due to hardware in place) .          Review of Systems:   Targeted 10 point ROS was completed with pertinent positives and negatives are detailed above.         Antimicrobial history:     Current:  Vancomycin, Pip-tazo 11/2 - present    Prior:  Doxycycline through 2/24/2023  Vancomycin, Rifampin 1/20 - 2/15, 2023         Physical Examination:   Temp:  [98  F (36.7  C)-98.5  F (36.9  C)] 98.5  F (36.9  C)  Pulse:  [94] 94  Resp:  [16] 16  BP:  (111-159)/(57-84) 159/84  SpO2:  [94 %-98 %] 98 %    No intake/output data recorded.    Vitals:    11/02/23 0939   Weight: 89.7 kg (197 lb 12 oz)     EXAM:  GEN: Pleasant and cooperative male, not in acute distress, sitting up in bed  Respiratory: No increased work of breathing, CTAB, no crackles or wheezing. On room air.   Cardiovascular: RRR, +S1S2 no MRG No peripheral edema.  GI: Normal bowel sounds, soft, non-distended and non-tender. No appreciable hepatosplenomegaly.   Skin: Warm, dry, well-perfused. No bruising, bleeding, rashes, or lesions on limited exam.  Musculoskeletal: s/p RLE in post surgical dressing, ace wrap  Neuropsychiatry: grossly non focal      Lines: PIV    Labs, Microbiology and Imaging studies reviewed.   Leukocytes wnl  S Cr wnl today  Elevated CRP    11/3 MRSA nares negative  11/2 intra-op cultures candida albicans    10/30 XR foot, and ankle   1. Stable post surgical changes along the medial aspect of the right foot.  2. Charcot changes in the right midfoot, similar to prior comparison exam.         Laboratory Data:     Inflammatory Markers    Recent Labs   Lab Test 10/30/23  0912 05/12/23  0907 02/06/23  1430 01/26/23  0953 01/23/23  0546 01/22/23  0839 01/20/23  1211   SED 44* 7  --   --   --   --  59*   CRP  --   --  <2.9 7.4 14.0*   < > 69.0*    < > = values in this interval not displayed.       Metabolic Studies     Recent Labs   Lab Test 11/04/23  0851 11/04/23  0745 11/03/23  1151 11/03/23  0652 11/02/23  2108 11/02/23  1940 11/02/23  0941 10/30/23  0912 03/08/23  0941 01/21/23  1722 01/21/23  1559   NA  --   --   --  137  --   --   --  139 139   < > 143   POTASSIUM  --   --   --  4.2  --   --   --  4.6 5.1   < > 4.1   CHLORIDE  --   --   --  104  --   --   --  102 105   < > 110*   CO2  --   --   --  26  --   --   --  24 24   < > 25   ANIONGAP  --   --   --  7  --   --   --  13 10   < > 8   BUN  --   --   --  19.1  --   --   --  20.6 36.2*   < > 24   CR 1.12  --   --  1.17  --  1.17   --  1.32* 1.42*   < > 1.21   GFRESTIMATED 76  --   --  72  --  72  --  62 57*   < > 69   GLC  --  150*   < > 149*  156*   < >  --    < > 210* 154*   < > 166*   A1C  --   --   --   --   --   --   --  7.3*  --   --   --    ALICE  --   --   --  8.7  --   --   --  10.1* 9.6   < > 9.0   MAG  --   --   --   --   --   --   --   --   --   --  2.0    < > = values in this interval not displayed.       Hepatic Studies    Recent Labs   Lab Test 03/08/23  0941 02/13/23  1425 02/06/23  1430   BILITOTAL 0.2 <0.2 0.4   ALKPHOS 61 71 81   ALBUMIN 4.4 4.1 3.4   AST 28 23 13   ALT 36 17 21       Hematology Studies      Recent Labs   Lab Test 11/04/23  0851 11/03/23  0652 10/30/23  0912 03/08/23  0941 02/13/23  1425   WBC  --   --  9.2 9.4 7.0   HGB 10.2* 9.8* 11.5* 11.8* 11.0*   HCT  --   --  36.5* 36.2* 34.4*   PLT  --   --  327 210 162     Vancomycin Levels     Recent Labs   Lab Test 11/04/23  0851 02/13/23  1425 02/06/23  1430   VANCOMYCIN 15.6 15.3 14.7       Microbiology  Lab Results   Component Value Date    CULTURE No anaerobic organisms isolated after 1 day 11/02/2023    CULTURE No growth after 1 day 11/02/2023    CULTURE No growth, less than 1 day 11/02/2023

## 2023-11-05 NOTE — PLAN OF CARE
"Goal Outcome Evaluation:      Plan of Care Reviewed With: patient    Overall Patient Progress: improvingOverall Patient Progress: improving    Outcome Evaluation: Pt doing well this shift. Pain controlled. Pivot transfer to wheelchair.       VS: /66 (BP Location: Left arm)   Pulse 92   Temp 97.8  F (36.6  C) (Oral)   Resp 18   Ht 1.778 m (5' 10\")   Wt 89.7 kg (197 lb 12 oz)   SpO2 96%   BMI 28.37 kg/m       O2: At room air   Output: Voids spontaneously without difficulty in the bathroom.   Last BM: 11/4/23   Activity: Pivot transfer to wheelchair. Patient wheeled himself to family lounge to have his cellphone charge.    Skin: Right foot/ankle surgical incision.   Scab on left shin.   Pain: Denies pain.   CMS: A&Ox4. Patient has baseline neuropathy to all extremities.    BLE numbness from knees down to feet.   Dressing: Bandage covered with ace wrap-CDI.   Diet: Regular. BG at 0200- 143   LDA: PIV on right, saline locked    Equipment: Wheelchair, IV pole, call light and personal belongings.    Plan: Plans to have PICC line    Plans for another I&D on 11/8   Additional Info:      "

## 2023-11-05 NOTE — PROGRESS NOTES
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, ) with a past medical history significant for non-insulin dependent T2DM with retinopathy, legal blindness, severe peripheral neuropathy (zero sensation from knees down), papillary thyroid malignancy in remission s/p thyroidectomy and subsequent hypothyroidism, GERD, MARGARITO not on CPAP, CKD, Hep C s/p treatment (2017), HTN, migraines, anemia, NAFLD, colectomy for diverticulitis (2014), cholecystectomy, bilateral hearing loss and using bilateral hearing aids, chronic pain, s/p right Charcot foot reconstruction on 11/17/2022 unfortunately complicated by post-op wound infection progressing to osteomyelitis. Was admitted to the hospital 1/20/23. He underwent I+D of right foot, placement of wound vac and antibiotic beads on 1/21/23. Intra-Op cultures on 1/21/23 and 1/20/23 grew Staph epidermidis x 2 (oxacillin Resistant) and Staph caprae. He was discharged on Vancomycin IV and Rifampin 300 mg po q12 hr (activity against biofilm, due to hardware in place). Vancomycin and Rifampin were discontinued on 2/15/23 and started on oral doxycycline at that time which was associated with a rash. Doxycycline was stopped 2/24/23. [facility] for soft tissue swelling, tibiotalar joint effusion, loosening of the hardware, Lisfranc deformity, and findings suggestive of osteomyelitis; ill-defined erosions in the lateral talus and distal fibula. Patient was admitted to [facility] after undergoing I&D R ankle and foot with hardware removal 11/2/23, subsequently started on broad spectrum antibiotics.    #Status post I&D R Ankle and Foot + Hardware Removal (11/2/23)  #Acute Blood Loss Anemia  #Chronic osteomyelitis  EBL <50cc and no apparent intra-op complications. Purulent fluid  was found over the medial foot plate and most screws were loose during intra-op assessment. Pre-op hemoglobin 11.5.  - Management per primary orthopedics team including pain control, activity, antibiotics, DVT prophylaxis, wound cares. Please refer to their documentation for details.  > NWB RLE   > Broad spectrum antibiotics (Zosyn and Vancomycin)  > Trend CRP  > PT/OT consults   > Mechanical dvt ppx while in house, discharge on aspirin 162 mg x 4 weeks   > Plan for RTOR on 11/8 for repeat I&D   - Recheck hemoglobin in am   - Follow up intra-op cultures  - Appreciate ID assistance  - Note: Patient reports severe rash related to doxycycline and rifampin (unclear which as there was some overlap) that spread across his entire body and took several months to completely resolve. Please avoid these antibiotics.    #T2DM c/b Severe Neuropathy  Last A1C 7.3 10/30/23. PTA regimen Jardiance, Ozempic, Metformin. Patient has no sensation in his lower extremities from the knee down which is baseline.  - Monitor BG before meals and at bedtime  - Medium sliding scale insulin    - Hold PTA regimen for now, though please resume jardiance and metformin in the morning tomorrow if patient is stable and not requiring further contrast studies or interventions (he would like to avoid insulin if at all possible).  - Continue PTA Gabapentin 600mg TID    #CKD  Baseline creatinine 1.30-1.42. Pre-op labs 10/30 creatinine 1.32.  - Check BMP in am    #GERD  - Continue PTA PPI    #Hypothyroidism  - Continue PTA Synthroid 150mcg daily    #HLD  - Continue PTA statin    #MARGARITO  Does not use CPAP due to claustrophobia. Scheduled to have Inspire placed on December.  - Monitor continuous oximetry and capno    #Insomnia  - Hold PTA Ambien prn for now given post-op sedating medications. Consider resume in 1-2 days as able.          Diet: Advance Diet as Tolerated: Regular Diet Adult    DVT Prophylaxis: Pneumatic Compression Devices  Jones Catheter: Not  "present  Lines: PRESENT             Cardiac Monitoring: None  Code Status: Full Code      Clinically Significant Risk Factors              # Hypoalbuminemia: Lowest albumin = 3.4 g/dL at 11/5/2023  5:49 AM, will monitor as appropriate           # DMII: A1C = 7.3 % (Ref range: <5.7 %) within past 6 months, PRESENT ON ADMISSION  # Overweight: Estimated body mass index is 28.37 kg/m  as calculated from the following:    Height as of this encounter: 1.778 m (5' 10\").    Weight as of this encounter: 89.7 kg (197 lb 12 oz)., PRESENT ON ADMISSION            Disposition Plan     Expected Discharge Date: 11/08/2023                  Per orthopedic surgery, patient is returning to the OR on 11/8/2023.    Aroldo David DO, S  Hospitalist Service, [address]  Securely message with [employer] (more info)  Text page via [facility] Paging/Directory   See signed in provider for up to date coverage information  ______________________________________________________________________    Interval History   Patient is seated comfortably upon entering room.  Patient endorses no specific symptomatology.  Per nursing staff, no acute issues or clinical concerns.  Orthopedic surgery documentation reviewed.  Per orthopedic surgery, patient is returning to the OR on 11/8/2023.    Physical Exam   Vital Signs: Temp: 98.4  F (36.9  C) Temp src: Oral BP: (!) 157/83 Pulse: 97   Resp: 16 SpO2: 96 % O2 Device: None (Room air)    Weight: 197 lbs 12.04 oz    GENERAL: Alert and oriented x 3; no acute distress; well-nourished.  HEENT: Normocephalic; atraumatic; PERRLA; MMM.  CV: RRR; normal S1, S2; no rubs, murmurs, or gallops.  RESP: Lung fields clear to aucultation B/L; no wheezing or crepitations.  GI: Abdomen is soft, nontender, nondistended; no organomegaly; normal bowel sounds.  : Deferred genital examination.   MSK: Right foot and ankle wrapped.  DERM: Skin is intact; no rash, lesions, or skin " breakdown.  NEURO: No focal deficits appreciated; strength & sensorium are grossly intact.  PSYCH: No active hallucinations; affect, insight appear within normal limits.    Medical Decision Making       45 MINUTES SPENT BY ME on the date of service doing chart review, history, exam, documentation & further activities per the note.      Data     I have personally reviewed the following data over the past 24 hrs:    8.2  \   9.8 (L)   / 324     140 107 12.0 /  205 (H)   4.5 27 1.27 (H) \     ALT: 5 AST: 8 AP: 58 TBILI: <0.2   ALB: 3.4 (L) TOT PROTEIN: 6.3 (L) LIPASE: N/A     Procal: N/A CRP: 22.51 (H) Lactic Acid: N/A         Imaging results reviewed over the past 24 hrs:   No results found for this or any previous visit (from the past 24 hour(s)).

## 2023-11-05 NOTE — PLAN OF CARE
Goal Outcome Evaluation:      Plan of Care Reviewed With: patient    Overall Patient Progress: improvingOverall Patient Progress: improving    Outcome Evaluation: Up in w/c this shift and took a shower. Eating well. Awaiting another I/D 11/8. PICC to be placed priot to DC. IV team aware.

## 2023-11-05 NOTE — PROGRESS NOTES
"VS: BP (!) 157/83 (BP Location: Left arm)   Pulse 97   Temp 98.4  F (36.9  C) (Oral)   Resp 16   Ht 1.778 m (5' 10\")   Wt 89.7 kg (197 lb 12 oz)   SpO2 96%   BMI 28.37 kg/m          O2: RA   Output: Voids spontaneously without difficulty in the bathroom.   Last BM: 11/4/23   Activity: Pivot transfer to wheelchair. Patient wheeled himself to shower today as well as family lounge.   Skin: Right foot/ankle surgical incision.   Scab on left shin.   Pain: Denies pain.   CMS: A&Ox4. Patient has baseline neuropathy to all extremities.    BLE numbness from knees down to feet.   Dressing: Bandage covered with ace wrap-CDI.   Diet: Regular. BG with coverage.   LDA: PIV on right, saline locked    Equipment: Wheelchair, IV pole, call light and personal belongings.    Plan: Plans to have PICC line  prior to discharge  Plans for another I&D on 11/8. He will shower again 11/7.   Additional Info:       "

## 2023-11-05 NOTE — PLAN OF CARE
"Goal Outcome Evaluation:      Plan of Care Reviewed With: patient    Overall Patient Progress: improvingOverall Patient Progress: improving    Outcome Evaluation: pt is doing well this shift. Up independently to bathroom and family lounge      VS: /56 (BP Location: Left arm, Patient Position: Sitting, Cuff Size: Adult Regular)   Pulse 94   Temp 97.4  F (36.3  C) (Oral)   Resp 18   Ht 1.778 m (5' 10\")   Wt 89.7 kg (197 lb 12 oz)   SpO2 97%   BMI 28.37 kg/m      O2: Stable on RA> 90%, denied SOB, lung sounds clear    Output: Continent- voids spontaneously without any difficulty.   Last BM: Continent- 11/4/23   Activity: Transfer independently from bed to WC to bathroom. NWB to RLE.   Skin: Intact except for incision to RLE, old scab on left shin   Pain: Denied pain   CMS: A&Ox4, able to make needs known, denied chest pain, baseline numbness to left LLE.   Dressing: RLE to dressing    Diet: Regular diet/thin liquids, takes pills whole, good appetite, denies nausea.  and 163   LDA: Right PIV-SL   Equipment: IV pole, WC,patient personal belonging    Plan: Repeat IND on Wednesday.   Additional Info: Refused bowel meds, lidocaine patient ( pt prefers Voltaren gel over Lido patch).      "

## 2023-11-05 NOTE — PROGRESS NOTES
Orthopaedic Surgery Progress Note 11/05/2023    E: No acute events overnight.    S: Pain well controlled. Denies new numbness or tingling, endorses very little sensation in foot at baseline. Plan of care reviewed and questions answered    O:  Temp: 98.4  F (36.9  C) Temp src: Oral BP: (!) 157/83 Pulse: 97   Resp: 16 SpO2: 96 % O2 Device: None (Room air)      Exam:  Gen: No acute distress, resting comfortably in bed.  Resp: Non-labored breathing    MSK:    RLE  Dressing c/d/I  Diminished sensation in all distributions (baseline)  Wiggles toes  Toes WWP      Recent Labs   Lab 11/05/23  0549 11/04/23  0851 11/03/23  0652 10/30/23  0912   WBC 8.2  --   --  9.2   HGB 9.8* 10.2* 9.8* 11.5*     --   --  327       Culture results: candida     CRP  22 < 37 < 74.5    Assessment: Nehemiah Magallon is a 58 year old male s/p I&D and removal of hardware right foot on 11/2 with Dr. Starr. Doing well. Cultures growing candida, on fluconazole and zosyn per ID. Plan to return to OR on 11/8.       Plan:  Ortho Primary  Activity: Up with assist.  Weight bearing status: NWB RLE  Antibiotics: zosyn and fluconazole per ID  Diet: Begin with clear fluids and progress diet as tolerated.  DVT prophylaxis: mechanical while in house  Elevation: Elevate RLE on pillows  Wound Care: Dressing change by ortho in OR on 11/8  Pain management: transition from IV to orals as tolerated.   Physical Therapy:  ROM, ADL's.  Occupational Therapy: ADL's.  Labs: Trend CRP q48hrs  Cultures: Pending, follow culture results closely.  Consults: PT, OT, medicine, ID, appreciate assistance in caring for this patient.  Follow-up: TBD    Disposition: TBD, return to OR on 11/8.       Joesph Smith MD 11/05/2023  Orthopaedic Surgery Resident, PGY4

## 2023-11-06 NOTE — PROGRESS NOTES
"  VS: BP (!) 140/82   Pulse 97   Temp 98.4  F (36.9  C)   Resp 16   Ht 1.778 m (5' 10\")   Wt 89.7 kg (197 lb 12 oz)   SpO2 97%   BMI 28.37 kg/m      O2: Room air saturations 97%.    Output: Pt denies issues with urine output   Last BM:    Activity: Pt likes to get up into wheelchair to wheel up and down the halls. Pt states\" I get really uncomfortable laying in bed a lot.\" Pt is able to transfer self back and forth  to wheelchair independently and on and off the toilet independently.    Skin: Right foot/ankle is currently wrapped in ace wrap. Plan is for patient to have another I and D on Wed 11/8/2023   Pain: Pt states\" I at baseline have neuropathy from my knees down to my toes on both side. The only thing that helps is repositioning and Neurontin\"    CMS: Baseline Neuropathy bilaterally in lower extremities.    Dressing: Right ankle foot dressing CDI   Diet: Regular. Checking blood sugars before meals and covering with sliding scale   LDA: IV SL   Equipment: Wheelchair, IS   Plan: Will continue to get IV antibiotics and plan for I and D on Wed of this week.    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        "

## 2023-11-06 NOTE — PROGRESS NOTES
"Orthopedic Surgery Progress Note: 11/06/2023    Subjective:   No acute events overnight. Pain well-controlled. No new concerns.     Objective:   /63 (BP Location: Left arm, Patient Position: Semi-Gonsalves's, Cuff Size: Adult Regular)   Pulse 98   Temp 98.3  F (36.8  C) (Oral)   Resp 18   Ht 1.778 m (5' 10\")   Wt 89.7 kg (197 lb 12 oz)   SpO2 96%   BMI 28.37 kg/m    No intake/output data recorded.  General: NAD. Resting comfortably in bed.  Respiratory: Nonlabored breathing  Musculoskeletal:  RLE  Dressing c/d/I  Diminished sensation in all distributions (baseline)  Wiggles toes  Toes WWP    Laboratory Data:  Lab Results   Component Value Date    WBC 8.2 11/05/2023    HGB 9.8 (L) 11/05/2023     11/05/2023    INR 1.06 01/20/2023     Culture results: candida      CRP  22 < 37 < 74.5    Assessment & Plan:   Nehemiah Magallon is a 58 year old male  s/p I&D and removal of hardware right foot on 11/2 with Dr. Starr. Doing well. Cultures growing candida, on fluconazole and zosyn per ID. Plan to return to OR on 11/8.     Plan for Today:  - Pain control  - Continue antibiotics  - OR planning for 11/8/23    Ortho Primary  Activity: Up with assist.  Weight bearing status: NWB RLE  Antibiotics: zosyn and fluconazole per ID  Diet: Begin with clear fluids and progress diet as tolerated.  DVT prophylaxis: mechanical while in house  Elevation: Elevate RLE on pillows  Wound Care: Dressing change by ortho in OR on 11/8  Pain management: transition from IV to orals as tolerated.   Physical Therapy:  ROM, ADL's.  Occupational Therapy: ADL's.  Labs: Trend CRP q48hrs  Cultures: Pending, follow culture results closely.  Consults: PT, OT, medicine, ID, appreciate assistance in caring for this patient.  Follow-up: TBD     Disposition: TBD, return to OR on 11/8.     Orthopedic surgery staff for this patient is Dr." Ro.    ------------------------------------------------------------------------------------------    Respectfully,    Henry Vieyra MD  Orthopedic Surgery PGY1  [phone]    Please page me directly with any questions/concerns during regular weekday hours before 5 pm. If there is no response, if it is a weekend, or if it is during evening hours then please page the orthopedic surgery resident on call.      FOLLOWUP:    Future Appointments   Date Time Provider Department Center   11/20/2023 11:00 AM Nurse, Micah CM [facility]   12/15/2023  8:20 AM Bryon Starr MD [facility]   12/22/2023 11:20 AM Bryon Starr [facility]

## 2023-11-06 NOTE — PROGRESS NOTES
Patient appropriate for bedside PICC for home infusion.  No plan for discharge yet.  Patient has functioning PIV.    Plan: [facility].  We will monitor patient chart daily for discharge planning

## 2023-11-06 NOTE — PLAN OF CARE
"Goal Outcome Evaluation:      Plan of Care Reviewed With: patient    Overall Patient Progress: improving    Outcome Evaluation: Patient awaiting I&D on 11/08. Has neuropatic pain in lower extremity this shift, refused medication but instead relaxation used. Plans to have PICC line place.      VS: /63 (BP Location: Left arm, Patient Position: Semi-Gonsalves's, Cuff Size: Adult Regular)   Pulse 98   Temp 98.3  F (36.8  C) (Oral)   Resp 18   Ht 1.778 m (5' 10\")   Wt 89.7 kg (197 lb 12 oz)   SpO2 96%   BMI 28.37 kg/m       O2: Stable at room air    Output: Voids spontaneously without difficulty in the bathroom.     Last BM: 11/05/23 x2 per pt report.    Activity: Up independently in the room using wheelchair/ pivot transfer to wheelchair.    Skin: R foot/ankle surgical incision  Old scab on left shin   Pain: Neuropathic pain in lower extremity overnight.  Has voltaren gel at bedtime.   CMS: Baseline peripheral neuropathy in all extremities.   Dressing: Dressing covered by ace wrap, CDI.   Diet: Regular. Denies nausea and vomiting.   BG at 0200- 157   LDA: New PIV on right arm, saline locked.    Equipment: IV pole, wheelchair and personal belongings.   Plan: Plans to have PICC line    Plans for another I&D on 11/8   Additional Info:      "

## 2023-11-06 NOTE — PROGRESS NOTES
[facility] INFECTIOUS DISEASES: PROGRESS NOTE     Patient:  Nehemiah Magallon   YOB: 1965, MRN: [id number]  Date of Visit: 11/06/2023  Date of Admission: 11/2/2023  Consult Requester: Bryon Starr MD          Assessment and Recommendations:     ID Problem List:  Right foot chronic contiguous osteomyelitis with hardware   Worsening chronic osteomyelitis noted on CT 10/24/2023 with soft tissue swelling tibiotalar fusion and loosening of hardware plus Lisfranc deformity and ill-defined erosion of the lateral talus/distal fibula  Now status post I&D for right ankle and foot hardware removal 11/2/2023, cultures - Candida albicans  S/p R Charcot right foot  reconstruction 11/17/2022 complicated by postop wound infection  1/20/23 - started on Pip/Tazobactam and Vancomycin IV   1/21/23 -  s/p I+D of right foot, placement of wound vac and antibiotic beads. Staph epidermidisx2 (oxacillin Resistant) and Staph caprae. (Each S to doxycycline)  2/15/23 - Vancomycin IV and Rifampin were stopped and started on Doxycycline.  2/24/23 - Doxycycline was stopped (papular lesions on his body, back, arms, associated with itching)  Severe peripheral neuropathy  T2DM (A1c 7.3 10/30/2023)  CKD  Hepatitis C status posttreatment (2017)    Discussion:  58-year-old male with past medical history significant for type 2 diabetes, severe peripheral neuropathy, CKD, right Charcot right foot requiring reconstruction on 11/17/2022 complicated by post wound infection treated with antibiotics unfortunately progressing to osteomyelitis requiring I&D of the right foot and placement of wound VAC/antibiotic beads on 1/2123 (MRSE and staph caprae).  He presents after worsening osteomyelitis noted on CT imaging now status post right foot and ankle I&D with hardware removal on 11/2/2023.  Was initially on Zosyn and vancomycin. Recommend to discontinue vancomycin, as no evidence of MRSA growth in cultures, negative MRSA nares.      His wound  cultures and bone biopsy are currently in process. On initial Gram stain no organisms seen from 11/2. Cultures are growing Candida albicans. Appears there is a plan for return to the OR on 11/8 for repeat I&D. Recommend discontinuing Zosyn given cultures only growing Candida.       Recommendations:  Discontinue IV Pip-tazo   Continue Fluconazole 400mg daily  Final antibiotic regimen/duration to be determine pending surgical interventions (RTOR 11/8)  Requested add-on susceptibility of Candida albicans from intra-op cultures  Noted plan to RTOR 11/8    Recommendations are discussed with the primary team.     Thank you for the consult. ID team will continue to follow. Please reach out if any questions or concerns.     Total time spent during this encounter (chart review, documentation, MDM, coordination of care): 40 minutes    This patient was staffed with Dr. Nirali Chowdary MD  Internal Medicine PGY1  11/6/23         Interval History and Events:     Seen and examined at bedside. No particular complaints are reported. Doing well, no fever, chills. Discussed current antibiotic regimen.          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  He had previous right foot Charcot reconstruction on 11/17/2022 which was complicated by postoperative infection (see full history below).   He required long-term antibiotics.  He has now been off antibiotics [facility] concerning for soft tissue swelling, tibiotalar joint effusion, loosening of the hardware, Lisfranc deformity, and findings suggestive of osteomyelitis; ill-defined erosions in the lateral talus and distal fibula. Patient was [facility] after undergoing I&D R ankle and foot with hardware removal 11/2/23, subsequently started on vancomycin and Zosyn.  Cultures currently pending.     His past infectious disease history is as following:  S/p right Charcot foot reconstruction on 11/17/2022. Periop he recieved Cefazolin. He was then discharged on Augmentin x 7 days.  He was using a cast for 2.5-3 weeks post surgery. He then noticed a scab but no drainage. 1/15/23, he noticed purulent drainage and deepening of the wound. He was prescribed with Augmentin 875 mg po q12 hr x 14 days on 1/16/23.  H     He saw Dr Starr on 1/20/23 and directly admitted on 1/20/2023 from Ortho clinic for increasing drainage and wound necrosis of right foot surgical sites. Wound specimen was obtained before starting empiric Pip/tazobactam and Vancomycin IV on 1/20/23. He underwent I+D of right foot, placement of wound vac and antibiotic beads on 1/21/23 .      Intra-op findings: Medial wound: purulent material, probed directly to plate and bone; Lateral wound: no purulence, probed to bone, no hardware present laterally. Intra-Op cultures on 1/21/23 and 1/20/23 grew Staph epidermidisx2 ( oxacillin Resistant)  and Staph caprae.     He was discharged on Vancomycin IV and Rifampin 300 mg po q12 hr ( activity against biofilm, due to hardware in place) .          Review of Systems:   Targeted 10 point ROS was completed with pertinent positives and negatives are detailed above.         Antimicrobial history:     Current:  Vancomycin 11/2-11/4  Pip-tazo 11/2 - 11/6  Fluconazole 11/4-    Prior:  Doxycycline through 2/24/2023  Vancomycin,  Rifampin 1/20 - 2/15, 2023         Physical Examination:   Temp:  [97.7  F (36.5  C)-98.4  F (36.9  C)] 98.4  F (36.9  C)  Pulse:  [96-98] 97  Resp:  [16-18] 16  BP: (130-140)/(63-82) 140/82  SpO2:  [96 %-99 %] 97 %    No intake/output data recorded.    Vitals:    11/02/23 0939   Weight: 89.7 kg (197 lb 12 oz)     EXAM:  GEN: Pleasant and cooperative male, not in acute distress, sitting up in bed  Respiratory: No increased work of breathing, CTAB, no crackles or wheezing. On room air.   Cardiovascular: RRR, +S1S2 no MRG No peripheral edema.  GI: Normal bowel sounds, soft, non-distended and non-tender. No appreciable hepatosplenomegaly.   Skin: Warm, dry, well-perfused. No bruising, bleeding, rashes, or lesions on limited exam.  Musculoskeletal: s/p RLE in post surgical dressing, ace wrap  Neuropsychiatry: grossly non focal      Lines: PIV    Labs, Microbiology and Imaging studies reviewed.     11/3 MRSA nares negative  11/2 intra-op cultures candida albicans    10/30 XR foot, and ankle   1. Stable post surgical changes along the medial aspect of the right foot.  2. Charcot changes in the right midfoot, similar to prior comparison exam.         Laboratory Data:     Inflammatory Markers    Recent Labs   Lab Test 10/30/23  0912 05/12/23  0907 02/06/23  1430 01/26/23  0953 01/23/23  0546 01/22/23  0839 01/20/23  1211   SED 44* 7  --   --   --   --  59*   CRP  --   --  <2.9 7.4 14.0*   < > 69.0*    < > = values in this interval not displayed.       Metabolic Studies     Recent Labs   Lab Test 11/06/23  0721 11/05/23  0732 11/05/23  0549 11/04/23  1145 11/04/23  0851 11/03/23  1151 11/03/23  0652 11/02/23  0941 10/30/23  0912 01/21/23  1722 01/21/23  1559   NA  --   --  140  --   --   --  137  --  139   < > 143   POTASSIUM  --   --  4.5  --   --   --  4.2  --  4.6   < > 4.1   CHLORIDE  --   --  107  --   --   --  104  --  102   < > 110*   CO2  --   --  27  --   --   --  26  --  24   < > 25   ANIONGAP  --   --  6*  --   --    --  7  --  13   < > 8   BUN  --   --  12.0  --   --   --  19.1  --  20.6   < > 24   CR  --   --  1.27*  --  1.12  --  1.17   < > 1.32*   < > 1.21   GFRESTIMATED  --   --  65  --  76  --  72   < > 62   < > 69   *   < > 156*   < >  --    < > 149*  156*   < > 210*   < > 166*   A1C  --   --   --   --   --   --   --   --  7.3*  --   --    ALICE  --   --  9.0  --   --   --  8.7  --  10.1*   < > 9.0   MAG  --   --   --   --   --   --   --   --   --   --  2.0    < > = values in this interval not displayed.       Hepatic Studies    Recent Labs   Lab Test 11/05/23  0549 03/08/23  0941 02/13/23  1425   BILITOTAL <0.2 0.2 <0.2   ALKPHOS 58 61 71   ALBUMIN 3.4* 4.4 4.1   AST 8 28 23   ALT 5 36 17       Hematology Studies      Recent Labs   Lab Test 11/05/23  0549 11/04/23  0851 11/03/23  0652 10/30/23  0912 03/08/23  0941   WBC 8.2  --   --  9.2 9.4   HGB 9.8* 10.2* 9.8* 11.5* 11.8*   HCT 31.8*  --   --  36.5* 36.2*     --   --  327 210     Vancomycin Levels     Recent Labs   Lab Test 11/04/23  0851 02/13/23  1425 02/06/23  1430   VANCOMYCIN 15.6 15.3 14.7       Microbiology  Lab Results   Component Value Date    CULTURE No anaerobic organisms isolated after 3 days 11/02/2023    CULTURE Yeast (A) 11/02/2023    CULTURE 1+ Candida albicans (A) 11/02/2023

## 2023-11-06 NOTE — PROGRESS NOTES
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, ) with a past medical history significant for non-insulin dependent T2DM with retinopathy, legal blindness, severe peripheral neuropathy (zero sensation from knees down), papillary thyroid malignancy in remission s/p thyroidectomy and subsequent hypothyroidism, GERD, MARGARITO not on CPAP, CKD, Hep C s/p treatment (2017), HTN, migraines, anemia, NAFLD, colectomy for diverticulitis (2014), cholecystectomy, bilateral hearing loss and using bilateral hearing aids, chronic pain, s/p right Charcot foot reconstruction on 11/17/2022 unfortunately complicated by post-op wound infection progressing to osteomyelitis. Was admitted to the hospital 1/20/23. He underwent I+D of right foot, placement of wound vac and antibiotic beads on 1/21/23. Intra-Op cultures on 1/21/23 and 1/20/23 grew Staph epidermidis x 2 (oxacillin Resistant) and Staph caprae. He was discharged on Vancomycin IV and Rifampin 300 mg po q12 hr (activity against biofilm, due to hardware in place). Vancomycin and Rifampin were discontinued on 2/15/23 and started on oral doxycycline at that time which was associated with a rash. Doxycycline was stopped 2/24/23[facility] for soft tissue swelling, tibiotalar joint effusion, loosening of the hardware, Lisfranc deformity, and findings suggestive of osteomyelitis; ill-defined erosions in the lateral talus and distal fibula. Patient was admitted to [facility] after undergoing I&D R ankle and foot with hardware removal 11/2/23, subsequently started on broad spectrum antibiotics.    #Status post I&D R Ankle and Foot + Hardware Removal (11/2/23)  #Acute Blood Loss Anemia  #Chronic osteomyelitis  EBL <50cc and no apparent intra-op complications. Purulent fluid  was found over the medial foot plate and most screws were loose during intra-op assessment. Pre-op hemoglobin 11.5.  - Management per primary orthopedics team including pain control, activity, antibiotics, DVT prophylaxis, wound cares. Please refer to their documentation for details.  > NWB RLE   > Broad spectrum antibiotics (Zosyn and Vancomycin)  > Trend CRP  > PT/OT consults   > Mechanical dvt ppx while in house, discharge on aspirin 162 mg x 4 weeks   > Plan for RTOR on 11/8 for repeat I&D   - Recheck hemoglobin in am   - Follow up intra-op cultures  - Appreciate ID assistance  - Note: Patient reports severe rash related to doxycycline and rifampin (unclear which as there was some overlap) that spread across his entire body and took several months to completely resolve. Please avoid these antibiotics.    #T2DM c/b Severe Neuropathy  Last A1C 7.3 10/30/23. PTA regimen Jardiance, Ozempic, Metformin. Patient has no sensation in his lower extremities from the knee down which is baseline.  - Monitor BG before meals and at bedtime  - Medium sliding scale insulin    - Hold PTA regimen for now, though please resume jardiance and metformin in the morning tomorrow if patient is stable and not requiring further contrast studies or interventions (he would like to avoid insulin if at all possible).  - Continue PTA Gabapentin 600mg TID    #CKD  Baseline creatinine 1.30-1.42. Pre-op labs 10/30 creatinine 1.32.  - Check BMP in am    #GERD  - Continue PTA PPI    #Hypothyroidism  - Continue PTA Synthroid 150mcg daily    #HLD  - Continue PTA statin    #MARGARITO  Does not use CPAP due to claustrophobia. Scheduled to have Inspire placed on December.  - Monitor continuous oximetry and capno    #Insomnia  - Hold PTA Ambien prn for now given post-op sedating medications. Consider resume in 1-2 days as able.          Diet: Advance Diet as Tolerated: Regular Diet Adult    DVT Prophylaxis: Pneumatic Compression Devices  Jones Catheter: Not  "present  Lines: PRESENT             Cardiac Monitoring: None  Code Status: Full Code      Clinically Significant Risk Factors              # Hypoalbuminemia: Lowest albumin = 3.4 g/dL at 11/5/2023  5:49 AM, will monitor as appropriate           # DMII: A1C = 7.3 % (Ref range: <5.7 %) within past 6 months, PRESENT ON ADMISSION  # Overweight: Estimated body mass index is 28.37 kg/m  as calculated from the following:    Height as of this encounter: 1.778 m (5' 10\").    Weight as of this encounter: 89.7 kg (197 lb 12 oz)., PRESENT ON ADMISSION            Disposition Plan      Expected Discharge Date: 11/08/2023,  3:00 PM      Discharge Comments: surg on 11/7            Aroldo David DO, MHS  Hospitalist Service, GOLD TEAM [address] [place] [address]  Securely message with [employer] (more info)  Text page via [facility] Paging/Directory   See signed in provider for up to date coverage information  ______________________________________________________________________    Interval History   Patient is in excellent spirits today.  Treatment plan discussed with patient at length.  Infectious disease recommending discontinuing Zosyn.  Plan to return to OR on 11/8/2023.  Patient is requesting tizanidine for left foot neuropathic pain.    Physical Exam   Vital Signs: Temp: 98.4  F (36.9  C) Temp src: Oral BP: (!) 140/82 Pulse: 97   Resp: 16 SpO2: 97 % O2 Device: None (Room air)    Weight: 197 lbs 12.04 oz    GENERAL: Alert and oriented x 3; no acute distress; well-nourished.  HEENT: Normocephalic; atraumatic; PERRLA; MMM.  CV: RRR; normal S1, S2; no rubs, murmurs, or gallops.  RESP: Lung fields clear to aucultation B/L; no wheezing or crepitations.  GI: Abdomen is soft, nontender, nondistended; no organomegaly; normal bowel sounds.  : Deferred genital examination.   MSK: Right foot wrapped.  DERM: Skin is intact; no rash, lesions, or skin breakdown.  NEURO: No focal deficits appreciated; strength & " sensorium are grossly intact.  PSYCH: No active hallucinations; affect, insight appear within normal limits.    Medical Decision Making       45 MINUTES SPENT BY ME on the date of service doing chart review, history, exam, documentation & further activities per the note.      Data         Imaging results reviewed over the past 24 hrs:   No results found for this or any previous visit (from the past 24 hour(s)).

## 2023-11-07 NOTE — PLAN OF CARE
"VS: /76 (BP Location: Left arm)   Pulse 90   Temp 98.7  F (37.1  C) (Oral)   Resp 18   Ht 1.778 m (5' 10\")   Wt 89.7 kg (197 lb 12 oz)   SpO2 97%   BMI 28.37 kg/m       O2: Saturating within normal limits on room air   Output: unmeasured   Last BM: 11/6/23   Activity: independent   Skin: Within normal limits except surgical incision   Pain: Pain of 3 tolerated   CMS: Alert and oriented X4   Dressing: Clean dry and intact   Diet: regular   LDA: Right PIV   Equipment: Wheel chair   Plan: Care to be continued   Additional Info:         Goal Outcome Evaluation:                        "

## 2023-11-07 NOTE — PLAN OF CARE
Goal Outcome Evaluation:      Plan of Care Reviewed With: patient    Overall Patient Progress: improving    Outcome Evaluation: PICC line no longer needed, no longer being placed.    Shift: 0700 to 1930    Pt A&Ox4, denies SOB, CP, nausea, vomiting, diarrhea, constipation, and tingling. Pt endorses numbness in extremities, sensation absent below knee in RLE. Pt endorses neuropathic pain in L leg at 3/10, refused PRN medication for it. Pt independent. Pt continent of B&B, LBM 11/6, stool softener/laxatives refused this AM. Urinal at bedside. Regular diet, thin liquids, takes pills whole. Pt on BG checks x4/day. Pt showered this AM. R ankle/foot bandage CDI. Pt has call light in reach, calls appropriately, and has no unanswered questions or concerns at end of shift. Continue POC.

## 2023-11-07 NOTE — PROGRESS NOTES
"Orthopedic Surgery Progress Note: 11/07/2023    Subjective:   No acute events overnight. Pain well-controlled. No concerns. Plan for OR tomorrow. Discontinued zosyn per ID recs.     Objective:   /76 (BP Location: Left arm)   Pulse 90   Temp 98.7  F (37.1  C) (Oral)   Resp 18   Ht 1.778 m (5' 10\")   Wt 89.7 kg (197 lb 12 oz)   SpO2 97%   BMI 28.37 kg/m    No intake/output data recorded.  General: NAD. Resting comfortably in bed.  Respiratory: Nonlabored breathing  Musculoskeletal:  RLE  Dressing c/d/I  Diminished sensation in all distributions (baseline)  Wiggles toes  Toes WWP    Laboratory Data:  Lab Results   Component Value Date    WBC 8.2 11/05/2023    HGB 9.8 (L) 11/05/2023     11/05/2023    INR 1.06 01/20/2023     Culture results: candida      CRP  22 < 37 < 74.5    Assessment & Plan:   Nehemiah Magallon is a 58 year old male s/p I&D and removal of hardware right foot on 11/2 with Dr. Starr. Doing well. Cultures growing candida, on fluconazole and zosyn per ID. Plan to return to OR on 11/8.      Plan for Today:  - Pain control  - Continue fluconazole, discontinue Zosyn  - OR planning for 11/8/23    Ortho Primary  Activity: Up with assist.  Weight bearing status: NWB RLE  Antibiotics: fluconazole per ID  Diet: Regular diet, NPO at midnight for OR tomorrow  DVT prophylaxis: mechanical while in house  Elevation: Elevate RLE on pillows  Wound Care: Dressing change by ortho in OR on 11/8  Pain management: transition from IV to orals as tolerated.   Physical Therapy:  ROM, ADL's.  Occupational Therapy: ADL's.  Labs: Trend CRP q48hrs  Cultures: Pending, follow culture results closely.  Consults: PT, OT, medicine, ID, appreciate assistance in caring for this patient.  Follow-up: TBD     Disposition: TBD, return to OR on 11/8.    Orthopedic surgery staff for this patient is Dr. Starr.    ------------------------------------------------------------------------------------------    Respectfully,    Henry" MD Jarrell  Orthopedic Surgery PGY1  [phone]    Please page me directly with any questions/concerns during regular weekday hours before 5 pm. If there is no response, if it is a weekend, or if it is during evening hours then please page the orthopedic surgery resident on call.      FOLLOWUP:    Future Appointments   Date Time Provider Department Center   11/20/2023 11:00 AM Nurse, [facility]   12/15/2023  8:20 AM Bryon Starr MD [facility]

## 2023-11-07 NOTE — CONSULTS
Care Management Initial Consult    General Information  Assessment completed with: Patient,    Type of CM/SW Visit: Initial Assessment    Primary Care Provider verified and updated as needed: Yes (Dr. Echevarria)   Readmission within the last 30 days: no previous admission in last 30 days      Reason for Consult: discharge planning  Advance Care Planning: Advance Care Planning Reviewed: verified with patient, no concerns identified          Communication Assessment  Patient's communication style: spoken language (English or Bilingual)    Hearing Difficulty or Deaf: yes   Wear Glasses or Blind: yes    Cognitive  Cognitive/Neuro/Behavioral: WDL  Level of Consciousness: alert  Arousal Level: opens eyes spontaneously  Orientation: oriented x 4  Mood/Behavior: calm  Best Language: 0 - No aphasia  Speech: spontaneous, clear    Living Environment:   People in home: spouse  Wiliam  Current living Arrangements: condominium      Able to return to prior arrangements: yes       Family/Social Support:  Care provided by: self  Provides care for: no one  Marital Status:   Wife  Wiliam       Description of Support System: Supportive    Support Assessment: Adequate family and caregiver support    Current Resources:   Patient receiving home care services: No     Community Resources:    Equipment currently used at home: wheelchair, manual  Supplies currently used at home: None    Employment/Financial:  Employment Status: disabled, retired        Financial Concerns: none   Referral to Financial Worker: No       Does the patient's insurance plan have a 3 day qualifying hospital stay waiver?  No    Lifestyle & Psychosocial Needs:  Social Determinants of Health     Food Insecurity: Not on file   Depression: Not at risk (5/12/2023)    PHQ-2     PHQ-2 Score: 0   Housing Stability: Not on file   Tobacco Use: Low Risk  (11/3/2023)    Patient History     Smoking Tobacco Use: Never     Smokeless Tobacco Use: Never     Passive Exposure: Not on file    Financial Resource Strain: Not on file   Alcohol Use: Not on file   Transportation Needs: Not on file   Physical Activity: Not on file   Interpersonal Safety: Not on file   Stress: Not on file   Social Connections: Not on file       Functional Status:  Prior to admission patient needed assistance:   Dependent ADLs:: Independent, Wheelchair-independent  Dependent IADLs:: Independent  Assesssment of Functional Status: Not at baseline with ADL Functioning    Mental Health Status:  Mental Health Status: No Current Concerns       Chemical Dependency Status: NA        Values/Beliefs:  Spiritual, Cultural Beliefs, Scientology Practices, Values that affect care: no               Additional Information:  Met with patient at bedside to complete initial CMA. Plan for patient to have repeat right foot I&D on 11/8. Currently patient is off all IV antibiotics. Has PIV.   Patient plans to discharge to home with his wife, Wiliam, after surgery, pending medical clearance.   [facility] Danielle Camp is his Community Care RN who will assist with home care referral approvals. Message left for Danielle regarding surgery date and potential home needs. [facility] will continue to follow patient.    Danielle Camp RN Care Coordinator  Phone [phone]  [email or web address]    Update 1528: Holly will be out of the office on 11/8. Any needs for Community Care RN please call [facility] at [phone].     Fax number for [facility] referrals: [phone]    Veronica Park RN, BSN  Care Coordinator, 5 Ortho  Phone [phone]  Pager [phone]

## 2023-11-07 NOTE — PROGRESS NOTES
[facility]    Medicine Progress Note - Hospitalist Service, GOLD TEAM 17    Date of Admission:  11/2/2023    Assessment & Plan   A: Patient is a 59 y/o manwho has multiple medical problems including diabetes mellitus type II with retinopathy, legal blindness and severe peripheral neuropathy; papillary thyroid malignancy in remission s/p thyroidectomy and with post-operative hypothyroidism; GERD; obstructive sleep apnea not on CPAP; chronic kidney disease; hepatitis C s/p treatment in 2017; hypertension; migraines; anemia; non-alcoholic fatty liver disease; past colectomy for diverticulitis in 2014; past cholecystectomy; bilateral hearing loss and using bilateral hearing aids; chronic pain; past right Charcot foot reconstruction in Jan-2022 complicated by post-operative wound infection progressing to osteomyelitis.     Patient had been hospitalized in Jan-2023 and underwent I&D of right foot and placement of wound vac and antibiotic beads on 21-Jan-2023. Intra-operative cultures grew Staphylococcus epidermidis (oxacillin resistant) and Staphylococcus caprae. Patient was discharged on IV vancomycin and rifampin. Vancomycin and rifampin were discontinued on 15-Feb-2023 and patient was started on oral doxycyline which was associated with a rash. Doxycycline was stopped on 24-Feb-2023.     [facility] which was concerning for soft tissue swelling, tibiotalar joint effusion, loosening of hardware, Lisfranc deformity and findings suggestive of osteomyelitis. Patient was [facility] to [facility] on 02-Nov-2023. Patient underwent I&D right ankle and foot with hardware removal on 02-Nov-2023. Patient was initially on broad spectrum antibiotics but, as cultures only grew Candida albicans, patient is now off antibiotics and is on fluconazole.    P:  1.) Right foot and ankle surgical site infection; patient underwent surgical intervention on  "02-Nov-2023:  - Patient to return to OR tomorrow, 08-Nov-2023, per Orthopaedic Surgery.  - Patient currently on fluconazole per Infectious Disease.    2.) Diabetes mellitus type II with severe diabetic neuropathy:  - Patient usually on jardiance, ozempic and metformin as outpatient but these are to be on hold for now.  - Patient to be monitored on insulin sliding scale.  - Patient on gabapentin for diabetic neuropathy.  - Patient would like to avoid insulin if at all possible.    3.) Chronic kidney disease, appears to be stage II:  - Labs to be rechecked.    4.) GERD:  - Patient on PPI.    5.) History of thyroid malignancy s/p thyroidectomy; patient has post-operative hypothyroidism:  - Patient on levothyroxine 150 mcg daily.  - Further surveillance as outpatient.    6.) Hyperlipidemia:  - Patient to continue pravastatin.    7.) Obstructive sleep apnea:  - Patient does not use CPAP due to claustrophobia.  - Patient scheduled to have Inspire placed in December.    8.) Insomnia:  - Patient usually on ambien as outpatient but this is currently on hold.            Diet: Advance Diet as Tolerated: Regular Diet Adult  NPO per Anesthesia Guidelines for Procedure/Surgery Except for: Meds    Jones Catheter: Not present  Lines: PRESENT             Cardiac Monitoring: None  Code Status: Full Code      Clinically Significant Risk Factors              # Hypoalbuminemia: Lowest albumin = 3.4 g/dL at 11/5/2023  5:49 AM, will monitor as appropriate           # DMII: A1C = 7.3 % (Ref range: <5.7 %) within past 6 months   # Overweight: Estimated body mass index is 28.37 kg/m  as calculated from the following:    Height as of this encounter: 1.778 m (5' 10\").    Weight as of this encounter: 89.7 kg (197 lb 12 oz).      # Financial/Environmental Concerns: none         Disposition Plan      Expected Discharge Date: 11/09/2023      Destination: home with family;home with help/services  Discharge Comments: surg on 11/7            Jamel " MD Rick  Hospitalist Service, GOLD TEAM 17  [facility]  Securely message with [employer] (more info)  Text page via [employer] Paging/Directory   See signed in provider for up to date coverage information  ______________________________________________________________________    Interval History     Patient noted no pain, no dyspnea and no new problems.    Physical Exam   Vital Signs: Temp: 97.6  F (36.4  C) Temp src: Oral BP: (!) 153/85 Pulse: 87   Resp: 20 SpO2: 95 % O2 Device: None (Room air)    Weight: 197 lbs 12.04 oz    General: Patient comfortable, NAD.  Heart: RRR, S1 S2 present, no overt murmurs heard.  Lungs: Breath sounds present. No crackles/wheezes heard.  Extremities: Legs nonswollen.    Medical Decision Making             Data

## 2023-11-07 NOTE — PROGRESS NOTES
Follow up related to PICC consult:    Per ID and Ortho notes- IV zosyn dc'd. Pt currently on oral ABX therapy only. PICC not indicated at this time. [facility] until after OR 11/8 and final ID recommendations are made. Please page [phone] with questions.

## 2023-11-08 NOTE — ANESTHESIA CARE TRANSFER NOTE
Patient: Nehemiah Magallon    Procedure: Procedure(s):  IRRIGATION AND DEBRIDEMENT, RIGHT FOOT, WOUND VAC APPLICATION       Diagnosis: Necrosis of surgical wound (H) [I97.89, I96]  Diagnosis Additional Information: No value filed.    Anesthesia Type:   General     Note:    Oropharynx: oropharynx clear of all foreign objects and spontaneously breathing  Level of Consciousness: drowsy  Oxygen Supplementation: face mask  Level of Supplemental Oxygen (L/min / FiO2): 8  Independent Airway: airway patency satisfactory and stable  Dentition: dentition unchanged  Vital Signs Stable: post-procedure vital signs reviewed and stable  Report to RN Given: handoff report given  Patient transferred to: PACU    Handoff Report: Identifed the Patient, Identified the Reponsible Provider, Reviewed the pertinent medical history, Discussed the surgical course, Reviewed Intra-OP anesthesia mangement and issues during anesthesia, Set expectations for post-procedure period and Allowed opportunity for questions and acknowledgement of understanding      Vitals:  Vitals Value Taken Time   /100 11/08/23 1553   Temp 36.6C    Pulse 84 11/08/23 1556   Resp 10 11/08/23 1556   SpO2 100 % 11/08/23 1556   Vitals shown include unfiled device data.    Electronically Signed By: CARLITO Merrill CRNA  November 8, 2023  3:56 PM

## 2023-11-08 NOTE — ANESTHESIA POSTPROCEDURE EVALUATION
Patient: Nehemiah Magallon    Procedure: Procedure(s):  IRRIGATION AND DEBRIDEMENT, RIGHT FOOT, WOUND VAC APPLICATION       Anesthesia Type:  General    Note:  Disposition: Inpatient   Postop Pain Control: Uneventful            Sign Out: Well controlled pain   PONV: No   Neuro/Psych: Uneventful            Sign Out: Acceptable/Baseline neuro status   Airway/Respiratory: Uneventful            Sign Out: Acceptable/Baseline resp. status   CV/Hemodynamics:    Other NRE: NONE   DID A NON-ROUTINE EVENT OCCUR? No       Last vitals:  Vitals Value Taken Time   /86 11/08/23 1600   Temp 36.8  C (98.2  F) 11/08/23 1553   Pulse 88 11/08/23 1608   Resp 22 11/08/23 1608   SpO2 93 % 11/08/23 1609   Vitals shown include unfiled device data.    Electronically Signed By: Dutch Crawley MD  November 8, 2023  4:10 PM

## 2023-11-08 NOTE — PROGRESS NOTES
[facility]    Medicine Progress Note - Hospitalist Service, GOLD TEAM 17    Date of Admission:  11/2/2023    Assessment & Plan   A: Patient is a 59 y/o manwho has multiple medical problems including diabetes mellitus type II with retinopathy, legal blindness and severe peripheral neuropathy; papillary thyroid malignancy in remission s/p thyroidectomy and with post-operative hypothyroidism; GERD; obstructive sleep apnea not on CPAP; chronic kidney disease; hepatitis C s/p treatment in 2017; hypertension; migraines; anemia; non-alcoholic fatty liver disease; past colectomy for diverticulitis in 2014; past cholecystectomy; bilateral hearing loss and using bilateral hearing aids; chronic pain; past right Charcot foot reconstruction in Jan-2022 complicated by post-operative wound infection progressing to osteomyelitis.      Patient had been hospitalized in Jan-2023 and underwent I&D of right foot and placement of wound vac and antibiotic beads on 21-Jan-2023. Intra-operative cultures grew Staphylococcus epidermidis (oxacillin resistant) and Staphylococcus caprae. Patient was discharged on IV vancomycin and rifampin. Vancomycin and rifampin were discontinued on 15-Feb-2023 and patient was started on oral doxycyline which was associated with a rash. Doxycycline was stopped on 24-Feb-2023.      [facility] which was concerning for soft tissue swelling, tibiotalar joint effusion, loosening of hardware, Lisfranc deformity and findings suggestive of osteomyelitis. Patient was admitted to [facility] on 02-Nov-2023. Patient underwent I&D right ankle and foot with hardware removal on 02-Nov-2023. Patient was initially on broad spectrum antibiotics but, as cultures only grew Candida albicans, patient is now off antibiotics and is on fluconazole.    Patient underwent irrigation and debridement right foot and wound vac application right foot for  "necrosis of surgical wound earlier today.      P:  1.) Right foot and ankle surgical site infection; patient underwent surgical intervention on 02-Nov-2023:  - Patient returned to the OR today per Orthopaedic Surgery.   - Patient currently on fluconazole per Infectious Disease.     2.) Diabetes mellitus type II with severe diabetic neuropathy:  - Patient usually on jardiance, ozempic and metformin as outpatient but these are to be on hold for now. If renal function is at baseline tomorrow, metformin and jardiance will be restarted.  - Patient to be monitored on insulin sliding scale.  - Patient on gabapentin for diabetic neuropathy.  - Patient would like to avoid insulin if at all possible.     3.) Chronic kidney disease, appears to be stage II:  - Labs to be rechecked tomorrow.  - Avoiding nephrotoxins.     4.) GERD:  - Patient on PPI.     5.) History of thyroid malignancy s/p thyroidectomy; patient has post-operative hypothyroidism:  - Patient on levothyroxine 150 mcg daily.  - Further surveillance as outpatient.     6.) Hyperlipidemia:  - Patient to continue pravastatin.     7.) Obstructive sleep apnea:  - Patient does not use CPAP due to claustrophobia.  - Patient scheduled to have Inspire placed in December.     8.) Insomnia:  - Patient usually on ambien as outpatient but this is currently on hold.             Diet: Advance Diet as Tolerated: Regular Diet Adult    Jones Catheter: Not present  Lines: PRESENT             Cardiac Monitoring: None  Code Status: Full Code      Clinically Significant Risk Factors              # Hypoalbuminemia: Lowest albumin = 3.4 g/dL at 11/5/2023  5:49 AM, will monitor as appropriate           # DMII: A1C = 7.3 % (Ref range: <5.7 %) within past 6 months   # Overweight: Estimated body mass index is 28.37 kg/m  as calculated from the following:    Height as of this encounter: 1.778 m (5' 10\").    Weight as of this encounter: 89.7 kg (197 lb 12 oz).      # Financial/Environmental " Concerns: none         Disposition Plan      Expected Discharge Date: 11/10/2023      Destination: home with family;home with help/services  Discharge Comments: surg on 11/7            Jamel Cabrera MD  Hospitalist Service, GOLD TEAM [address] [place] [address]  Securely message with [employer] (more info)  Text page via [facility] Paging/Directory   See signed in provider for up to date coverage information  ______________________________________________________________________    Interval History   Patient noted feeling well. Patient noted no new problems.     Physical Exam   Vital Signs: Temp: 97.5  F (36.4  C) Temp src: Oral BP: (!) 146/78 Pulse: 95   Resp: 12 SpO2: 96 % O2 Device: None (Room air)    Weight: 197 lbs 12.04 oz    General: Patient comfortable, NAD.    Labs noted.  Sodium 136; Potassium 4.2; Creatinine 1.09    Medical Decision Making

## 2023-11-08 NOTE — BRIEF OP NOTE
[facility]    Brief Operative Note    Pre-operative diagnosis: Necrosis of surgical wound (H) [I97.89, I96]  Post-operative diagnosis Same as pre-operative diagnosis    Procedure: IRRIGATION AND DEBRIDEMENT, RIGHT FOOT, WOUND VAC APPLICATION, Right - Foot    Surgeon: Surgeon(s) and Role:     * Bryon Starr MD - Primary  Anesthesia: General   Estimated Blood Loss: 20 mL from 11/8/2023  2:05 PM to 11/8/2023  3:51 PM      Drains: Wound VAC to right anterolateral ankle and medial foot, 4 total sponges, two tubes connected by a Y connector to 125 mg Hg continuous  Specimens: * No specimens in log *  Findings:   All 30 previous beads removed. Ongoing purulence anterolateral ankle > medial foot. Hemostasis achieved. VAC sponges applied to both anterolateral ankle and medial foot wounds, left open. Hallux wound closed primarily .  Complications: None.  Implants: * No implants in log *        POSTOPERATIVE PLAN:    Admit to [facility], orthopaedics primary with medicine consultation or co-management, resume all prior medications/orders.  Diet: Clear liquids and advance as tolerated.  Antibiotics: Resume preop antifungal therapy per ID.  Pain management: Multimodal. Wean from IV to oral medications.  Weightbearing status: Strict nonweightbearing on the operative lower extremity.  Use appropriate assistive gait devices and assistance (nursing/PT) for ambulation.  Activity: May be up ad pancho for ADLs but encourage flat bedrest with the foot and ankle of the operative lower extremity elevated above the level of the heart and iced as much of the time as possible for the first 2 weeks postop.  Elevate heel off of the bed    PT/OT: Gait training, transfers, ADLs.  Dressings: Dressings will be removed at next I&D or VAC change.  DVT prophylaxis: Early mobilization, SCD's to L LE.  Consultations: PT, OT, Medicine, and Infectious Disease.  Disposition: Plan for repeat I&D and VAC change in  OR by early next week.  Discharge pending.

## 2023-11-08 NOTE — PROGRESS NOTES
Infectious Disease - Chart Update:    Noted plan to RTOR later today 11/8. Final plan to determine accordingly afterwards. Meantime continue po fluconazole. Candida albicans susceptibility 11/2 reviewed, S to fluconazole.     ID team will continue to follow. Please reach out if any questions or concerns.     Nirali Argueta MD  Infectious Disease Staff Physician  Pager: [phone]  Mundi dayan   Date: 11/8/2023

## 2023-11-08 NOTE — PROGRESS NOTES
Orthopaedic staff  I saw and examined Mr. Magallon preoperatively today. Findings including fungal culture results from both medial and lateral wounds discussed. Plan for repeat I&D, possible beads, possible VAC discussed including risks, benefits, and alternatives to surgery. Possibility of additional surgeries discussed. All questions answered. Surgical sites marked on R foot/ankle. Incisions appear to be well coapted without act parag drainage. Small area of necrotic appearing skin on anteromedial right ankle adjacent to medial incision.

## 2023-11-08 NOTE — PROGRESS NOTES
"Orthopedic Surgery Progress Note: 11/08/2023    Subjective:   No acute events overnight. Pain well-controlled. Ready for surgery today.     Objective:   /64 (BP Location: Left arm)   Pulse 98   Temp 99.1  F (37.3  C) (Oral)   Resp 18   Ht 1.778 m (5' 10\")   Wt 89.7 kg (197 lb 12 oz)   SpO2 94%   BMI 28.37 kg/m    No intake/output data recorded.  General: NAD. Resting comfortably in bed.  Respiratory: Nonlabored breathing  Musculoskeletal:  RLE  Dressing c/d/I  Diminished sensation in all distributions (baseline)  Wiggles toes  Toes WWP    Laboratory Data:  Lab Results   Component Value Date    WBC 8.2 11/05/2023    HGB 9.8 (L) 11/05/2023     11/05/2023    INR 1.06 01/20/2023     Culture results: candida      CRP  22 < 37 < 74.5    Assessment & Plan:   Nehemiah Magallon is a 58 year old male  s/p I&D and removal of hardware right foot on 11/2 with Dr. Starr. Doing well. Cultures growing candida, on fluconazole per ID. Plan to return to OR on 11/8.     Plan for Today:  OR this afternoon, scheduled for 1:20 pm  NPO until OR  Consent for surgery  Pain control  Continue Fluconazole    Ortho Primary  Activity: Up with assist.  Weight bearing status: NWB RLE  Antibiotics: fluconazole per ID  Diet: NPO since midnight for OR today  DVT prophylaxis: mechanical while in house  Elevation: Elevate RLE on pillows  Wound Care: Dressing change by ortho in OR on 11/8  Pain management: transition from IV to orals as tolerated.   Physical Therapy:  ROM, ADL's.  Occupational Therapy: ADL's.  Labs: Trend CRP q48hrs  Cultures: Positive for candida, follow culture results closely.  Consults: PT, OT, medicine, ID, appreciate assistance in caring for this patient.  Follow-up: TBD     Disposition: Return to OR on 11/8.     Orthopedic surgery staff for this patient is Dr. Starr.    ------------------------------------------------------------------------------------------    Respectfully,    Henry Vieyra MD  Orthopedic Surgery " PGY1  [phone]    Please page me directly with any questions/concerns during regular weekday hours before 5 pm. If there is no response, if it is a weekend, or if it is during evening hours then please page the orthopedic surgery resident on call.      FOLLOWUP:    Future Appointments   Date Time Provider Department Center   11/20/2023 11:00 AM Nurse, [facility]   12/15/2023  8:20 AM Bryon Starr MD [facility]

## 2023-11-08 NOTE — PLAN OF CARE
"VS: /62   Pulse 95   Temp 97.6  F (36.4  C)   Resp 16   Ht 1.778 m (5' 10\")   Wt 89.7 kg (197 lb 12 oz)   SpO2 95%   BMI 28.37 kg/m      O2: Sating >90% on RA. Lung sounds clear. Denies chest pain and SOB.   Output: Voids spontaneously and adequately.    Last BM: 11/7/23 per pt report. Bowel sounds active x4. Passing flatus.    Activity: Independent.    Skin: Bruise to L AC. Scab to L shin. R foot/ankle surgical incision. Pt declined full assessment.    Pain: Denied pain.    CMS: A&Ox4. Baseline neuropathy to BUE and baseline numbness to BLE from the knee down. Edema +2 to R foot.    Dressing: Dressing to R foot/ankle C/D/I. PIV dressing to R forearm C/D/I.   Diet: NPO for surgery. , 126, and 166. Denies N/V.    LDA: PIV to R forearm is S/L.    Equipment: IV pole, wheelchair, and personal belongings.    Plan: Continue to monitor. Call light within reach and utilized appropriately.    Additional Info: Pt went to pre-op at 1245. Pt arrived back on unit at 1645. Pt is refusing CAPNO.       "

## 2023-11-08 NOTE — OR NURSING
PACU to Inpatient Nursing Handoff    Patient Nehemiah Magallon is a 58 year old male who speaks English.   Procedure Procedure(s):  IRRIGATION AND DEBRIDEMENT, RIGHT FOOT, WOUND VAC APPLICATION   Surgeon(s) Primary: Bryon Starr MD     Allergies   Allergen Reactions    Doxycycline Rash     Very extensive full body rash lasting for several months. Given at same time as Rifampin.    Rifampin Rash     Very extensive full body rash lasting for several months. Given at the same time as doxycycline.    Atorvastatin      Other reaction(s): Hyperglycemia    Celebrex [Celecoxib] Other (See Comments)     [facility] records       Isolation  [unfilled]     Past Medical History   has a past medical history of Chronic pain syndrome (10/24/2014), Diabetes mellitus, type 2 (H), Diabetic Charcot foot (H), Diabetic retinopathy associated with diabetes mellitus due to underlying condition (H), Diverticulitis of colon, Gastroesophageal reflux disease, Hepatitis C (2017), History of skin cancer, Hypertension, Hypothyroidism, Legally blind, Midfoot collapse of right lower extremity, Migraine, NAFLD (nonalcoholic fatty liver disease), Nephrolithiasis, Neuropathy, MARGARITO (obstructive sleep apnea), Rib pain (10/24/2014), S/P colectomy (10/14/2014), and Thyroid cancer (H) (10/14/2014).    Anesthesia General   Dermatome Level     Preop Meds Not applicable   Nerve block Not applicable   Intraop Meds dexamethasone (Decadron)  ondansetron (Zofran): last given at 1457   Local Meds No   Antibiotics cefazolin (Ancef) - last given at 1401     Pain Patient Currently in Pain: denies   PACU meds  Not applicable   PCA / epidural No   Capnography Respiratory Monitoring (EtCO2): 42 mmHg   Telemetry ECG Rhythm: Normal sinus rhythm   Inpatient Telemetry Monitor Ordered? No        Labs Glucose Lab Results   Component Value Date     11/08/2023     11/08/2023     02/06/2023       Hgb Lab Results   Component Value Date    HGB 9.9  11/08/2023       INR Lab Results   Component Value Date    INR 1.09 11/08/2023      PACU Imaging Not applicable     Wound/Incision Negative Pressure Wound Therapy Foot Anterior;Right;Lateral;Outer (Active)   Number of days: 0       Incision/Surgical Site 11/17/22 Right;Lateral;Outer Foot (Active)   Number of days: 356       Incision/Surgical Site 11/02/23 Right;Medial Foot (Active)   Incision Assessment UTV 11/08/23 1355   Trina-Incision Assessment UTV 11/08/23 1300   Closure DOMINGO 11/08/23 1355   Incision Drainage Amount UTV 11/08/23 1355   Dressing Intervention Clean, dry, intact 11/08/23 1553   Number of days: 6       Incision/Surgical Site 11/08/23 Right Foot (Active)   Incision Drainage Amount None 11/08/23 1553   Number of days: 0      CMS        Equipment Not applicable   Other LDA       IV Access Peripheral IV 11/05/23 Anterior;Right Lower forearm (Active)   Site Assessment WDL 11/08/23 1300   Line Status Saline locked 11/08/23 1100   Dressing Transparent 11/08/23 1100   Dressing Status clean;dry;intact 11/08/23 1300   Dressing Intervention New dressing  11/05/23 1611   Line Intervention Flushed 11/08/23 1100   Phlebitis Scale 0-->no symptoms 11/08/23 1300   Infiltration? no 11/08/23 1300   Number of days: 3       PICC 01/27/23 Single Lumen Right Basilic (Active)   Number of days: 285      Blood Products Not applicable EBL 20 mL   Intake/Output Date 11/08/23 0700 - 11/09/23 0659   Shift 3463-1614 5214-4786 6872-9361 24 Hour Total   INTAKE   I.V.  1000  1000   Shift Total(mL/kg)  1000(11.15)  1000(11.15)   OUTPUT   Blood  20  20   Shift Total(mL/kg)  20(0.22)  20(0.22)   Weight (kg) 89.7 89.7 89.7 89.7      Drains / Jones Negative Pressure Wound Therapy Foot Anterior;Right;Lateral;Outer (Active)   Number of days: 0      Time of void PreOp Time of Void Prior to Procedure: 1130 (11/08/23 1312)    PostOp Urine Occurrence: 4 (4 times overnight per pt report) (11/06/23 0400)    Diapered? No   Bladder Scan Bladder  Scan Volume (mL): 112 ml (11/02/23 2200)    mL (11/04/23 2000)  tolerating sips     Vitals    B/P: (!) 155/87  T: 98.2  F (36.8  C)    Temp src: Oral  P:  Pulse: 84 (11/08/23 1553)          R: 14  O2:  SpO2: 100 %    O2 Device: None (Room air) (11/08/23 0724)    Oxygen Delivery: 5 LPM (11/02/23 1724)         Family/support present none   Patient belongings     Patient transported on cart and air mat   DC meds/scripts (obs/outpt) Not applicable   Inpatient Pain Meds Released? Yes       Special needs/considerations None   Tasks needing completion None       Italia Montesinos RN  ASCOM [id number]

## 2023-11-08 NOTE — ANESTHESIA PROCEDURE NOTES
Airway       Patient location during procedure: OR       Procedure Start/Stop Times: 11/8/2023 2:15 PM  Staff -        Anesthesiologist:  Dutch Crawley MD       CRNA: Zita Corea APRN CRNA       Performed By: CRNA  Consent for Airway        Urgency: elective  Indications and Patient Condition       Indications for airway management: alan-procedural, airway protection and altered level of consciousness       Induction type:intravenous       Mask difficulty assessment: 1 - vent by mask    Final Airway Details       Final airway type: endotracheal airway       Successful airway: ETT - single and Oral  Endotracheal Airway Details        ETT size (mm): 7.5       Cuffed: yes       Inital cuff pressure (cm H2O): 22       Successful intubation technique: video laryngoscopy       VL Blade Size: MAC 4       Grade View of Cords: 1       Adjucts: stylet       Position: Right       Measured from: gums/teeth       Secured at (cm): 21       Bite block used: None    Post intubation assessment        Placement verified by: capnometry, equal breath sounds and chest rise        Number of attempts at approach: 1       Number of other approaches attempted: 0       Secured with: silk tape       Ease of procedure: easy       Dentition: Intact and Unchanged    Medication(s) Administered   Medication Administration Time: 11/8/2023 2:15 PM

## 2023-11-08 NOTE — PROGRESS NOTES
PICC consult in chart.    Patient is not on any IV antibiotics.  No plan for discharge with IV therapy.    Plan: [facility] contact [facility] if plan of care changes for PICC placement.  #1315. [facility] will continue to monitor.

## 2023-11-08 NOTE — ANESTHESIA PREPROCEDURE EVALUATION
Anesthesia Pre-Procedure Evaluation    Patient: Nehemiah Magallon   MRN: [id number] : 1965        Procedure : Procedure(s):  Debridement and Irrigation of Right Ankle and Foot  Remove Hardware Right Foot          Past Medical History:   Diagnosis Date     Chronic pain syndrome 10/24/2014     Diabetes mellitus, type 2 (H)      Diabetic Charcot foot (H)      Diabetic retinopathy associated with diabetes mellitus due to underlying condition (H)      Diverticulitis of colon      Gastroesophageal reflux disease      Hepatitis C 2017    treated     History of skin cancer      Hypertension      Hypothyroidism      Legally blind      Midfoot collapse of right lower extremity      Migraine      NAFLD (nonalcoholic fatty liver disease)      Nephrolithiasis      Neuropathy      MARGARITO (obstructive sleep apnea)      Rib pain 10/24/2014     S/P colectomy 10/14/2014     Thyroid cancer (H) 10/14/2014      Past Surgical History:   Procedure Laterality Date     ARTHRODESIS FOOT Right 2022    Procedure: Right midfoot/talonavicular osteotomy and reduction of deformity Right midfoot/talonavicular arthrodesis, achilles lengthening;  Surgeon: Bryon Starr MD;  [facility]     CHOLECYSTECTOMY       COLECTOMY      @ 6 years ago, sigmoid     COLONOSCOPY       FOOT SURGERY Left      IRRIGATION AND DEBRIDEMENT FOOT, COMBINED Right 2023    Procedure: IRRIGATION AND DEBRIDEMENT, FOOT, RIGHT, Placement of Wound Vac and Antibiotic Beads.;  Surgeon: Bryon Starr MD;  [facility] OR     IRRIGATION AND DEBRIDEMENT FOOT, COMBINED Right 2023    Procedure: Debridement and Irrigation of Right Ankle and Foot;  Surgeon: Bryon Starr MD;  [facility]     LENGTHEN TENDON ACHILLES Right 2022    Procedure: LENGTHENING, TENDON, ACHILLES;  Surgeon: Bryon Starr MD;  [facility]     REMOVE HARDWARE FOOT Right 2023    Procedure: Remove Hardware Right Foot;  Surgeon: Bryon Starr MD;  [facility]       Allergies   Allergen Reactions     Doxycycline Rash     Very extensive full body rash lasting for several months. Given at same time as Rifampin.     Rifampin Rash     Very extensive full body rash lasting for several months. Given at the same time as doxycycline.     Atorvastatin      Other reaction(s): Hyperglycemia     Celebrex [Celecoxib] Other (See Comments)     Dehydration per [facility] records      Social History     Tobacco Use     Smoking status: Never     Smokeless tobacco: Never   Substance Use Topics     Alcohol use: Not Currently     Comment: rarely      Wt Readings from Last 1 Encounters:   11/02/23 89.7 kg (197 lb 12 oz)        Anesthesia Evaluation   Pt has had prior anesthetic. Type: General.    No history of anesthetic complications       ROS/MED HX  ENT/Pulmonary:     (+) sleep apnea, doesn't use CPAP,                                     Neurologic:     (+)    peripheral neuropathy, - hands and legs.                           Cardiovascular:    (-) hypertension (resolved with weight loss)   METS/Exercise Tolerance:     Hematologic:     (+)      anemia (hgb 11),          Musculoskeletal:       GI/Hepatic:     (+)           hepatitis type C, liver disease (NAFLD),       Renal/Genitourinary:     (+) renal disease (cr 1.3), type: CRI, Pt does not require dialysis,    Nephrolithiasis ,       Endo:     (+)  type II DM, Last HgA1c: 7.3, date: 10/30/23,     Diabetic complications: neuropathy retinopathy. thyroid problem, hypothyroidism,           Psychiatric/Substance Use:       Infectious Disease:       Malignancy:   (+) Malignancy, History of Other.Other CA thyroid Remission status post.    Other:      (+)  , H/O Chronic Pain (ulnar nerve and carpal tunnel pain),, other significant disability Wheelchair bound and Blind         Physical Exam    Airway  airway exam normal      Mallampati: II       Respiratory Devices and Support         Dental       (+) Minor Abnormalities - some fillings, tiny  "chips      Cardiovascular   cardiovascular exam normal          Pulmonary   pulmonary exam normal              OUTSIDE LABS:  CBC:   Lab Results   Component Value Date    WBC 10.3 11/08/2023    WBC 8.2 11/05/2023    HGB 9.9 (L) 11/08/2023    HGB 9.8 (L) 11/05/2023    HCT 32.1 (L) 11/08/2023    HCT 31.8 (L) 11/05/2023     11/08/2023     11/05/2023     BMP:   Lab Results   Component Value Date     11/08/2023     11/05/2023    POTASSIUM 4.2 11/08/2023    POTASSIUM 4.5 11/05/2023    CHLORIDE 102 11/08/2023    CHLORIDE 107 11/05/2023    CO2 26 11/08/2023    CO2 27 11/05/2023    BUN 19.2 11/08/2023    BUN 12.0 11/05/2023    CR 1.09 11/08/2023    CR 1.27 (H) 11/05/2023     (H) 11/08/2023     (H) 11/08/2023     COAGS:   Lab Results   Component Value Date    PTT 34 11/08/2023    INR 1.09 11/08/2023     POC: No results found for: \"BGM\", \"HCG\", \"HCGS\"  HEPATIC:   Lab Results   Component Value Date    ALBUMIN 3.4 (L) 11/05/2023    PROTTOTAL 6.3 (L) 11/05/2023    ALT 5 11/05/2023    AST 8 11/05/2023    ALKPHOS 58 11/05/2023    BILITOTAL <0.2 11/05/2023     OTHER:   Lab Results   Component Value Date    A1C 7.3 (H) 10/30/2023    ALICE 9.6 11/08/2023    MAG 2.0 01/21/2023    TSH 1.80 01/21/2023    CRP <2.9 02/06/2023    SED 44 (H) 10/30/2023       Anesthesia Plan    ASA Status:  3    NPO Status:  NPO Appropriate    Anesthesia Type: General.     - Airway: ETT   Induction: Intravenous.   Maintenance: Balanced.   Techniques and Equipment:     - Airway: Video-Laryngoscope     - Lines/Monitors: BIS     Consents    Anesthesia Plan(s) and associated risks, benefits, and realistic alternatives discussed. Questions answered and patient/representative(s) expressed understanding.     - Discussed: Risks, Benefits and Alternatives for the PROCEDURE were discussed     - Discussed with:  Patient      - Extended Intubation/Ventilatory Support Discussed: No.      - Patient is DNR/DNI Status: No     Use of " blood products discussed: No .     Postoperative Care    Pain management: IV analgesics, Oral pain medications, Multi-modal analgesia.   PONV prophylaxis: Ondansetron (or other 5HT-3), Dexamethasone or Solumedrol     Comments:    Other Comments: On Ozempic-check last dose            Dutch Crawley MD

## 2023-11-08 NOTE — PLAN OF CARE
"Goal Outcome Evaluation:      Plan of Care Reviewed With: patient    Overall Patient Progress: no change  Pt is AXO x4. Denies pain, SOB  Plan of OR today. NPO since midnight. Patient took a full shower 11/07. CHG bath not done.     VS: /64 (BP Location: Left arm)   Pulse 98   Temp 99.1  F (37.3  C) (Oral)   Resp 18   Ht 1.778 m (5' 10\")   Wt 89.7 kg (197 lb 12 oz)   SpO2 94%   BMI 28.37 kg/m      O2: Stable on room air   Output: Voids without difficulty   Last BM: 11/6   Activity: Using wheelchair   Skin: Right scab to left shin, right foot/ankle surgical incision   Pain: Denies Pain    CMS: AXO X4. Baseline numbness to lower extremities    Dressing: Right foot/ankle CDI   Diet: NPO    LDA: Right PIV-SL   Plan: OR at 1:20 PM    Additional Info:                "

## 2023-11-08 NOTE — OP NOTE
DATE OF SURGERY:  11/08/2023.     PREOPERATIVE DIAGNOSIS:  Right foot and ankle surgical site infection.     POSTOPERATIVE DIAGNOSIS:  Right foot and ankle surgical site infection.     PROCEDURE:  Sharp excisional irrigation and debridement of right ankle and foot.  Removal of antibiotic beads from right ankle and foot from subfascial space adjacent to bone.  Application of wound Vacuum Assisted Closure dressings to right ankle and right foot with Durable Medical Equipment VAC machine.     PRIMARY SURGEON:  Bryon Starr MD.     ANESTHESIA:  General endotracheal.     COMPLICATIONS:  None apparent intraoperatively or immediately postoperatively.     IMPLANTED DEVICES:  None other than the VAC dressings.     SIGNIFICANT FINDINGS:  Ongoing grossly visible purulent appearing fluid and soft tissue in the right anterolateral ankle as well as to a smaller extent in the medial midfoot.  Both considered to be less purulent than previously. Technique:  Cutting, curetting, irrigating.  Instruments:  Scalpel, rongeur, curet, elevator, cystoscopy tubing.  Nature of debrided tissue:  Purulent appearing fluid, purulent appearing friable soft tissue.  Appearance of wound after debridement:  Down to healthy, bleeding tissue.  Area of debridement:  (1) Right anterolateral ankle wound approximately 4 cm proximo-distal by 2 cm medio-lateral by 2 cm deep.  (2) Right medial foot wound approximately 8 cm proximo-distal by 2 cm dorso-plantar by 2 cm deep.  (3) Right hallux MTP wound approximately 1 cm proximo-distal by 0.5 cm medio-lateral by 0.5 cm deep.  Depth of debridement: Down to and including bone (distal fibula, first metatarsal, medial cuneiform, navicular, talus).     SPECIMENS:  None.     DRAINS:  Two VAC tubes, one from anterolateral right ankle and one from medial right foot.     ESTIMATED BLOOD LOSS:  Approximately 20 mL.     INDICATIONS:  Nehemiah Magallon is a 58 year old patient with a history of diabetes mellitus, peripheral  neuropathy, and right foot Charcot neuroarthropathy with a postoperative infection previously involving S. caprae and S. epidermidis, now with Candida albicans.  Pathology results from the anterolateral right ankle swelling have come back as consistent with abscess. The patient returns to the OR for recommended, planned repeat debridement and irrigation of his right ankle and foot infection given the severity of his infection, removal of the previous antibiotic beads, and placement of a wound VAC.  The diagnosis, prognosis, nature of the recommended procedure, the risks, benefits, and alternatives, and the postoperative plan were all discussed with the patient in layman's terms.  The severity of this patient's problem was discussed including the potential need for multiple future procedures, the possibility that his infection may not be able to be cured, and even the potential for future amputation as a last resort.  No guarantees were expressed or implied as to outcome.  The patient had the opportunity to have all questions at the time asked and answered appropriately, verbalized understanding of this discussion, and verbalized the wish to proceed with the planned procedure.  Written informed consent was obtained.     DESCRIPTION OF PROCEDURE:       The patient, Nehemiah Magallon, was met in the preoperative area where the correct surgical site was identified with patient participation and marked by the primary surgeon.  All questions were answered.  He was then brought to the operating room and carefully transferred into the supine position on the operating room table with all bony prominences well padded and a safety strap across the waist.  The patient was then placed under general anesthesia, and an endotracheal tube was successfully placed by the anesthesia team. The skin over the planned surgical sites was inspected and was notable for significantly decreased swelling over the anterolateral right ankle and  dorsomedial right midfoot, with well coapted prior surgical incisions over the anterolateral right ankle, medial right midfoot, and dorsal hallux, with no active drainage, and a small amount of skin necrosis adjacent to the dorsal proximal margin of the medial midfoot wound, near the anteromedial right ankle.  Intravenous cefazolin was administered per protocol.  A tourniquet was placed on the thigh of the operative lower extremity but never inflated or required for the surgery.  Venous thromboembolic prophylaxis was performed with a  sequential compression device on the contralateral nonoperatvive lower extremity.  The patient's operative lower extremity was then prepped with betadine scrub and paint, and draped free in the usual sterile fashion.  The toes were kept covered with a sterile glove for the entire procedure.  The surgeon's initials were clearly visualized at the surgical site on the right ankle and foot.  Prior to proceeding with the operation a timeout was held per hospital policy correctly identifying the patient, procedure to be performed, and operative site including laterality. All in the room agreed.     The previous sutures were removed from all incisions.  One strand of ten antibiotic beads was removed from the anterolateral right ankle wound, and one strand of twenty antibiotic beads was removed from the medial midfoot wound; both strands (30 beads total) were passed off of the operative field.  There was purulent appearing fluid and friable soft tissue in the anterolateral right ankle wound, and to a smaller extent the medial midfoot wound, though both were less than at the most recent debridement.  The hallux wound appeared to be clean with no purulence.  The infected fluid was expressed and aspirated, and the soft tissue was debrided with a rongeur, curet, and elevator.  Small amounts of friable, loose subcutaneous tissue and synovium was resected with a rongeur from the hallux wound.  Bony  debridement with a curet and small rongeur was also performed on all exposed bony surfaces including the distal fibula and anterolateral right distal tibia in the anterolateral ankle wound, the interior of the first metatarsal, cuneiform, and navicular through the hallux wound, and the first metatarsal, cuneiform, navicular, and talus through the medial midfoot wound.  A scalpel was used to sharply excisionally debride a dime-sized area of skin necrosis adjacent to the proximal dorsal margin of the medial midfoot wound.  As noted previously interrogation of the fusion sites suggested a partial bony arthrodesis with no obvious cleft or motion at the first tarsometatarsal and naviculocuneiform joints, but a large bony defect and obvious nonunion with gross motion at the talonavicular joint.  A scalpel, curet, rongeur, and elevator were used to thoroughly debride all loose, friable, nonviable soft tissue from the medial midfoot wound and anterolateral right ankle wound including subcutaneous tissue, fascia, periosteum, and bone, until a healthy bleeding base of soft tissue and bone was reached.  As noted previously the anterolateral right ankle wound did visibly communicate with the ankle joint with visible talus in the distal portion of the wound.  Care was taken to avoid neurovascular injury during this process.      All wounds including the right hallux MTP wound and the MTP joint, right medial midfoot and large nonunion site / bone defect at the talonavicular joint, and right anterolateral ankle wound and the ankle joint were then thoroughly irrigated with 6 L of normal saline with cystoscopy tubing.     There was no evidence for neurovascular injury.  Meticulous hemostasis was achieved.  The skin for the dorsal hallux ncision was approximated with interrupted 3-0 nylon sutures.  The skin around the anterolateral right ankle wound and the medial right midfoot wound was cleansed and dried, and benzoin was applied.   These wounds were left open and packed with two VAC sponges each.  Adhesive dressings were applied, and the two tubes were connected with a Y connector.  A good seal was achieved with no leak.  The hallux incision was dressed sterilely with gauze bandages.  ABDs were placed under the two VAC tubes to protect the skin.  Sterile cast padding followed by an ace wrap were then applied.     All surgical counts were reported as correct prior to closure and again at the end of the case.  The patient was extubated and then carefully and safely transferred to the [facility] in the supine position and then taken to the recovery room in stable condition.     I was present and scrubbed in for the entire case.        Bryon Starr MD  Attending surgeon  Orthopaedic Surgery

## 2023-11-09 NOTE — PROGRESS NOTES
"Orthopedic Surgery Progress Note: 11/09/2023    Subjective:   No acute events overnight. Pain well-controlled. Tolerating a diet. No new concerns or complaints. Denies SOB, chest Pain, fevers, chills.     Objective:   /67 (BP Location: Left arm)   Pulse 79   Temp 98.2  F (36.8  C) (Oral)   Resp 12   Ht 1.778 m (5' 10\")   Wt 89.7 kg (197 lb 12 oz)   SpO2 94%   BMI 28.37 kg/m    No intake/output data recorded.  General: NAD. Resting comfortably in bed.  Respiratory: Nonlabored breathing  Musculoskeletal:    RLE  ACE C/D/I  Wiggles toes  Warm and well perfused  Sensation decreased at patient baseline    Laboratory Data:  Lab Results   Component Value Date    WBC 10.3 11/08/2023    HGB 9.9 (L) 11/08/2023     11/08/2023    INR 1.09 11/08/2023         Assessment & Plan:   Nehemiah Magallon is a 58 year old male s/p I&D and removal of hardware right foot on 11/2 and I&D, wound vac placement on 11/8 with Dr. Starr. Doing well. Cultures growing candida, on fluconazole per ID. Plan to return to OR on 11/13.      Plan for Today:  - Continue Fluconazole per ID  - Pain control  - PT/OT  - OR planning for Monday    Orthopaedics primary with medicine consultation or co-management, resume all prior medications/orders.  Diet: Clear liquids and advance as tolerated.  Antibiotics: Resume preop antifungal therapy per ID.  Pain management: Multimodal. Wean from IV to oral medications.  Weightbearing status: Strict nonweightbearing on the operative lower extremity.  Use appropriate assistive gait devices and assistance (nursing/PT) for ambulation.  Activity: May be up ad pancho for ADLs but encourage flat bedrest with the foot and ankle of the operative lower extremity elevated above the level of the heart and iced as much of the time as possible for the first 2 weeks postop.  Elevate heel off of the bed    PT/OT: Gait training, transfers, ADLs.  Dressings: Dressings will be removed at next I&D or VAC change.  DVT prophylaxis: " Early mobilization, SCD's to L LE.  Consultations: PT, OT, Medicine, and Infectious Disease.  Disposition: Plan for repeat I&D and VAC change in OR by early next week.  Discharge pending.    Orthopedic surgery staff for this patient is Dr. Starr.    ------------------------------------------------------------------------------------------    Respectfully,    Henry Vieyra MD  Orthopedic Surgery PGY1  [phone]    Please page me directly with any questions/concerns during regular weekday hours before 5 pm. If there is no response, if it is a weekend, or if it is during evening hours then please page the orthopedic surgery resident on call.      FOLLOWUP:    Future Appointments   Date Time Provider Department Center   11/20/2023 11:00 AM Nurse, Micah CM Ortho [facility]   12/15/2023  8:20 AM Bryon Starr MD [facility]

## 2023-11-09 NOTE — PROGRESS NOTES
[facility]    Medicine Progress Note - Hospitalist Service, GOLD TEAM 17    Date of Admission:  11/2/2023    Assessment & Plan   A: Patient is a 59 y/o manwho has multiple medical problems including diabetes mellitus type II with retinopathy, legal blindness and severe peripheral neuropathy; papillary thyroid malignancy in remission s/p thyroidectomy and with post-operative hypothyroidism; GERD; obstructive sleep apnea not on CPAP; chronic kidney disease; hepatitis C s/p treatment in 2017; hypertension; migraines; anemia; non-alcoholic fatty liver disease; past colectomy for diverticulitis in 2014; past cholecystectomy; bilateral hearing loss and using bilateral hearing aids; chronic pain; past right Charcot foot reconstruction in Jan-2022 complicated by post-operative wound infection progressing to osteomyelitis.      Patient had been hospitalized in Jan-2023 and underwent I&D of right foot and placement of wound vac and antibiotic beads on 21-Jan-2023. Intra-operative cultures grew Staphylococcus epidermidis (oxacillin resistant) and Staphylococcus caprae. Patient was discharged on IV vancomycin and rifampin. Vancomycin and rifampin were discontinued on 15-Feb-2023 and patient was started on oral doxycyline which was associated with a rash. Doxycycline was stopped on 24-Feb-2023.      [facility] which was concerning for soft tissue swelling, tibiotalar joint effusion, loosening of hardware, Lisfranc deformity and findings suggestive of osteomyelitis. Patient was admitted to [facility] on 02-Nov-2023. Patient underwent I&D right ankle and foot with hardware removal on 02-Nov-2023. Patient was initially on broad spectrum antibiotics but, as cultures only grew Candida albicans, patient is now off antibiotics and is on fluconazole.     Patient underwent irrigation and debridement right foot and wound vac application right foot  "for necrosis of surgical wound earlier on 08-Nov-2023     P:  1.) Right foot and ankle surgical site infection; patient underwent surgical intervention on 02-Nov-2023 and 08-Nov-2023:  - Current plan is for further surgical intervention on 13-Nov-2023 per Orthopaedic Surgery.   - Patient currently on fluconazole per Infectious Disease.     2.) Diabetes mellitus type II with severe diabetic neuropathy:  - Patient usually on jardiance, ozempic and metformin as outpatient but these were held. Metformin and jardiance will be restarted.  - Patient to be monitored on insulin sliding scale.  - Patient on gabapentin for diabetic neuropathy.  - Patient would like to avoid insulin if at all possible.     3.) Chronic kidney disease, appears to be stage II:  - Labs were rechecked today and renal function appears to be around baseline.  - Avoiding nephrotoxins.     4.) GERD:  - Patient on PPI.     5.) History of thyroid malignancy s/p thyroidectomy; patient has post-operative hypothyroidism:  - Patient on levothyroxine 150 mcg daily.  - Further surveillance as outpatient.     6.) Hyperlipidemia:  - Patient to continue pravastatin.     7.) Obstructive sleep apnea:  - Patient does not use CPAP due to claustrophobia.  - Patient scheduled to have Inspire placed in December.     8.) Insomnia:  - Patient usually on ambien as outpatient but this is currently on hold.             Diet: Advance Diet as Tolerated: Regular Diet Adult    Jones Catheter: Not present  Lines: PRESENT             Cardiac Monitoring: None  Code Status: Full Code      Clinically Significant Risk Factors              # Hypoalbuminemia: Lowest albumin = 3.4 g/dL at 11/5/2023  5:49 AM, will monitor as appropriate           # DMII: A1C = 7.3 % (Ref range: <5.7 %) within past 6 months   # Overweight: Estimated body mass index is 28.37 kg/m  as calculated from the following:    Height as of this encounter: 1.778 m (5' 10\").    Weight as of this encounter: 89.7 kg (197 " lb 12 oz).      # Financial/Environmental Concerns: none         Disposition Plan     Expected Discharge Date: 11/10/2023      Destination: home with family;home with help/services  Discharge Comments: surg on 11/7            Jamel Cabrera MD  Hospitalist Service, GOLD TEAM [address] [place] [address]  Securely message with [employer] (more info)  Text page via [facility] Paging/Directory   See signed in provider for up to date coverage information  ______________________________________________________________________    Interval History   Patient noted hiccoughs today. Patient noted no fever, no chills, no nausea, no vomiting, no dyspnea and no new pain.     Physical Exam   Vital Signs: Temp: 97.9  F (36.6  C) Temp src: Oral BP: 132/69 Pulse: 86   Resp: 12 SpO2: 98 % O2 Device: None (Room air)    Weight: 197 lbs 12.04 oz    General: Patient comfortable, NAD.  Heart: RRR, S1 S2 w/o murmurs.  Lungs: Breath sounds present. No crackles/wheezes heard.  Abdomen: Soft, nontender.    Labs noted.  Sodium 137; Potassium 4.5; Creatinine 1.16; CRP 71.83    Medical Decision Making             Data

## 2023-11-09 NOTE — PLAN OF CARE
"Goal Outcome Evaluation:      Plan of Care Reviewed With: patient    Overall Patient Progress: improvingOverall Patient Progress: improving       VS: /67 (BP Location: Left arm)   Pulse 79   Temp 98.2  F (36.8  C) (Oral)   Resp 12   Ht 1.778 m (5' 10\")   Wt 89.7 kg (197 lb 12 oz)   SpO2 94%   BMI 28.37 kg/m      O2: Stable on room air   Output: Bathroom    Last BM: 11/8   Activity: Using Wheelchair    Skin: Right foot/ankle surgical incision    Pain: Denies pain   CMS: AxO X4. Baseline  neuropathy. Uses wheelchair   Dressing: R foot and ankle CDI   Diet: Reg. Blood glucose checks   LDA: Wound vac count 125   Plan: TBD   Additional Info:           "

## 2023-11-09 NOTE — PLAN OF CARE
Goal Outcome Evaluation:    VS: Temp: 98.2  F (36.8  C) Temp src: Oral BP: 117/67 Pulse: 79   Resp: 12 SpO2: 94 % O2 Device: None (Room air)       O2: On room air, denies SOB, no cough noted   Output: Voids spontaneously in bathroom   Last BM: 11/8   Activity: Up independently, transfers and pivots self onto wheelchair, occasionally ambulates   Skin: Intact with the exception of the incision to right foot/ankle   Pain: denies   CMS: Alert and oriented X4, able to verbalize needs appropriately, has baseline neuropathy to UE &LE. No sensation below the knees bilaterally, tingling in UE   Dressing: UTV, ankle is ace wrapped   Diet: REG, patient is insulin dependent diabetic   LDA: Wound vac to right ankle @ 125mmHg, PIV to right forearm SL   Equipment: Wound vac, wheelchair   Plan: TBD   Additional Info:

## 2023-11-09 NOTE — PLAN OF CARE
"     VS: /69 (BP Location: Left arm)   Pulse 86   Temp 97.9  F (36.6  C) (Oral)   Resp 12   Ht 1.778 m (5' 10\")   Wt 89.7 kg (197 lb 12 oz)   SpO2 98%   BMI 28.37 kg/m        O2: Sating >90% on RA. Lung sounds clear. Denies chest pain and SOB.   Output: Voids spontaneously and adequately.    Last BM: 11/9/23 x2 per pt report. Bowel sounds active x4. Passing flatus.    Activity: Independent. Pt pivot transfers self to wheelchair and back to bed    Skin: Bruise to L AC. Scab to L shin. R foot/ankle surgical incision. Pt declined full assessment.    Pain: Denied pain. Tylenol given per pt request.    CMS: A&Ox4. Baseline neuropathy to BUE and baseline numbness to BLE from the knee down. Edema +1 to R foot.    Dressing: Dressing to R foot/ankle C/D/I. PIV dressing to R forearm C/D/I.   Diet: Regular. , 252, and 211. Denies N/V.    LDA: PIV to R forearm is S/L. Surgical wound vac to R foot/ankle continuous at 125.   Equipment: IV pole, wheelchair, wound vac, and personal belongings.    Plan: Continue to monitor. Call light within reach and utilized appropriately.    Additional Info: Per ortho note, pt will return to the OR on 11/13.           "

## 2023-11-09 NOTE — PROGRESS NOTES
[facility] GENERAL INFECTIOUS DISEASES: PROGRESS NOTE     Patient:  Nehemiah Magallon   YOB: 1965, MRN: [id number]  Date of Visit: 11/09/2023  Date of Admission: 11/2/2023  Consult Requester: Bryon Starr MD          Assessment and Recommendations:     ID Problem List:  Right foot chronic contiguous osteomyelitis with hardware   Worsening chronic osteomyelitis noted on CT 10/24/2023 with soft tissue swelling tibiotalar fusion and loosening of hardware plus Lisfranc deformity and ill-defined erosion of the lateral talus/distal fibula  11/2 s/p I&D for right ankle and foot hardware removal, cultures - Candida albicans  11/8 s/p I&D, antibiotic beads removal, wound vac placement - no cultures, pathology were sent  Plan to RTOR likely 11/13  S/p R Charcot right foot  reconstruction 11/17/2022 complicated by postop wound infection  1/20/23 - started on Pip/Tazobactam and Vancomycin IV   1/21/23 -  s/p I+D of right foot, placement of wound vac and antibiotic beads. Staph epidermidisx2 (oxacillin Resistant) and Staph caprae. (Each S to doxycycline)  2/15/23 - Vancomycin IV and Rifampin were stopped and started on Doxycycline.  2/24/23 - Doxycycline was stopped (papular lesions on his body, back, arms, associated with itching)  Severe peripheral neuropathy  T2DM (A1c 7.3 10/30/2023)  CKD  Hepatitis C status posttreatment (2017)    Discussion:  58-year-old male with past medical history significant for type 2 diabetes, severe peripheral neuropathy, CKD, right Charcot right foot requiring reconstruction on 11/17/2022 complicated by post wound infection treated with antibiotics unfortunately progressing to osteomyelitis requiring I&D of the right foot and placement of wound VAC/antibiotic beads on 1/2123 (MRSE and staph caprae).  He presents after worsening osteomyelitis noted on CT imaging now status post right foot and ankle I&D with hardware removal on 11/2/2023.  Initially on empiric Zosyn and vancomycin, and  then on fluconazole based on intra-op cultures growth of Candida albicans (S to fluconazole). Of note, no use of antimicrobials prior to this admission. RTOR 11/8, s/p I&D, antibiotic beads removal, wound vac placement. Reviewed OP findings, with purulence material noted and debrided extensively to bone. No cultures or pathology specimens were obtained. Plan to RTOR likely 11/13. Suggest to obtain cultures and pathology.     Recommendations:  Continue Fluconazole 400mg daily  Noted plan to RTOR early next week  Please send cultures (aerobic, anaerobic, fungal, AFB) and pathology if bone involvement  Anticipate 6 weeks course, at least, from source control, final surgery date (yet to achieve)  Check CBC, CRP q72h while inpatient    Recommendations are discussed with the primary team.     Thank you for the consult. ID team will continue to follow. Please reach out if any questions or concerns.     Total time spent during this encounter (chart review, documentation, MDM, coordination of care): 40 minutes    Nirali Argueta MD   Infectious Diseases Staff Physician  Pager: [phone]  [place]   11/09/2023         Interval History and Events:     Seen and examined at bedside. No particular complaints are reported.          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  He had previous right foot  Charcot reconstruction on 11/17/2022 which was complicated by postoperative infection (see full history below).  He required long-term antibiotics.  He has now been off [facility] for soft tissue swelling, tibiotalar joint effusion, loosening of the hardware, Lisfranc deformity, and findings suggestive of osteomyelitis; ill-defined erosions in the lateral talus and distal fibula. Patient was [facility] to [facility] after undergoing I&D R ankle and foot with hardware removal 11/2/23, subsequently started on vancomycin and Zosyn.  Cultures currently pending.     His past infectious disease history is as following:  S/p right Charcot foot reconstruction on 11/17/2022. Periop he recieved Cefazolin. He was then discharged on Augmentin x 7 days.  He was using a cast for 2.5-3 weeks post surgery. He then noticed a scab but no drainage. 1/15/23, he noticed purulent drainage and deepening of the wound. He was prescribed with Augmentin 875 mg po q12 hr x 14 days on 1/16/23.  H     He saw Dr Starr on 1/20/23 and directly admitted on 1/20/2023 from Ortho clinic for increasing drainage and wound necrosis of right foot surgical sites. Wound specimen was obtained before starting empiric Pip/tazobactam and Vancomycin IV on 1/20/23. He underwent I+D of right foot, placement of wound vac and antibiotic beads on 1/21/23 .      Intra-op findings: Medial wound: purulent material, probed directly to plate and bone; Lateral wound: no purulence, probed to bone, no hardware present laterally. Intra-Op cultures on 1/21/23 and 1/20/23 grew Staph epidermidisx2 ( oxacillin Resistant)  and Staph caprae. He was discharged on Vancomycin IV and Rifampin 300 mg po q12 hr ( activity against biofilm, due to hardware in place) .          Review of Systems:   Targeted 10 point ROS was completed with pertinent positives and negatives are detailed above.         Antimicrobial history:     Current:  Fluconazole  11/4 - present    Prior:  Vancomycin 11/2 - 11/4  Pip-tazo 11/2 - 11/6  Doxycycline through 2/24/2023  Vancomycin, Rifampin 1/20 - 2/15, 2023         Physical Examination:   Temp:  [97.5  F (36.4  C)-98.2  F (36.8  C)] 97.9  F (36.6  C)  Pulse:  [79-95] 86  Resp:  [12-14] 12  BP: (117-155)/(67-87) 132/69  SpO2:  [94 %-100 %] 98 %    I/O last 3 completed shifts:  In: 1000 [I.V.:1000]  Out: 20 [Blood:20]    Vitals:    11/02/23 0939   Weight: 89.7 kg (197 lb 12 oz)     EXAM:   GEN: Pleasant and cooperative male, not in acute distress, sitting up in bed  Respiratory: No increased work of breathing, CTAB, no crackles or wheezing. On room air.   Cardiovascular: RRR, +S1S2 no MRG No peripheral edema.  GI: Normal bowel sounds, soft, non-distended and non-tender. No appreciable hepatosplenomegaly.   Skin: Warm, dry, well-perfused. No bruising, bleeding, rashes, or lesions on limited exam.  Musculoskeletal: s/p RLE in post surgical dressing, ace wrap and wound vac in place  Neuropsychiatry: grossly non focal    Lines: PIV    Labs, Microbiology and Imaging studies reviewed.   Leukocytes wnl  S Cr wnl today  Elevated CRP    11/3 MRSA nares negative  11/2 intra-op cultures candida albicans    10/30 XR foot, and ankle   1. Stable post surgical changes along the medial aspect of the right foot.  2. Charcot changes in the right midfoot, similar to prior comparison exam.         Laboratory Data:     Inflammatory Markers    Recent Labs   Lab Test 10/30/23  0912 05/12/23  0907 02/06/23  1430 01/26/23  0953 01/23/23  0546 01/22/23  0839 01/20/23  1211   SED 44* 7  --   --   --   --  59*   CRP  --   --  <2.9 7.4 14.0*   < > 69.0*    < > = values in this interval not displayed.       Metabolic Studies     Recent Labs   Lab Test 11/09/23  0741 11/09/23  0530 11/08/23  0904 11/08/23  0745 11/05/23  0732 11/05/23  0549 11/02/23  0941 10/30/23  0912 01/21/23  1722 01/21/23  1559   NA  --  137  --  136  --  140   < > 139   < > 143   POTASSIUM   --  4.5  --  4.2  --  4.5   < > 4.6   < > 4.1   CHLORIDE  --  101  --  102  --  107   < > 102   < > 110*   CO2  --  26  --  26  --  27   < > 24   < > 25   ANIONGAP  --  10  --  8  --  6*   < > 13   < > 8   BUN  --  23.0  --  19.2  --  12.0   < > 20.6   < > 24   CR  --  1.16  --  1.09  --  1.27*   < > 1.32*   < > 1.21   GFRESTIMATED  --  73  --  78  --  65   < > 62   < > 69   * 207*   < > 152*   < > 156*   < > 210*   < > 166*   A1C  --   --   --   --   --   --   --  7.3*  --   --    ALICE  --  9.5  --  9.6  --  9.0   < > 10.1*   < > 9.0   MAG  --   --   --   --   --   --   --   --   --  2.0    < > = values in this interval not displayed.       Hepatic Studies    Recent Labs   Lab Test 11/05/23  0549 03/08/23  0941 02/13/23  1425   BILITOTAL <0.2 0.2 <0.2   ALKPHOS 58 61 71   ALBUMIN 3.4* 4.4 4.1   AST 8 28 23   ALT 5 36 17       Hematology Studies      Recent Labs   Lab Test 11/08/23  0745 11/05/23  0549 11/04/23  0851 11/03/23  0652 10/30/23  0912   WBC 10.3 8.2  --   --  9.2   HGB 9.9* 9.8* 10.2*   < > 11.5*   HCT 32.1* 31.8*  --   --  36.5*    324  --   --  327    < > = values in this interval not displayed.     Vancomycin Levels     Recent Labs   Lab Test 11/04/23  0851 02/13/23  1425 02/06/23  1430   VANCOMYCIN 15.6 15.3 14.7       Microbiology  Lab Results   Component Value Date    CULTURE No anaerobic organisms isolated 11/02/2023    CULTURE Yeast (A) 11/02/2023    CULTURE 1+ Candida albicans (A) 11/02/2023

## 2023-11-10 NOTE — PLAN OF CARE
Plan of Care:    Per Dr. Starr, if Nehemiah able to tolerate bedside VAC changes, no need for OR on 11/13. Patient reports minimal pain with VAC change today.     Called and left voicemail for C RN at [phone].     Also, patient request walker order-he just needs a replacement for his previous one that broke. Knows how to use. DME order placed.       Radha Sheth PA-C  11/10/2023 11:50 AM  [facility]  Orthopaedic Surgery

## 2023-11-10 NOTE — PROGRESS NOTES
Care Management Follow Up    Length of Stay (days): 8    Expected Discharge Date: 11/13/2023     Concerns to be Addressed: discharge planning     Patient plan of care discussed at interdisciplinary rounds: Yes    Anticipated Discharge Disposition: Home Care, Home Infusion     Anticipated Discharge Services: None  Anticipated Discharge DME: None    Patient/family educated on Medicare website which has current facility and service quality ratings: no  Education Provided on the Discharge Plan: Yes  Patient/Family in Agreement with the Plan: yes    Referrals Placed by CM/SW:    Private pay costs discussed: Not applicable    Additional Information:  Met with patient at bedside to discuss discharge planning. Patient had not been told he wasn't having surgery on 11/13/2023. Unable to get VA authorization for home wound vac on such short notice and the Holiday. Patient stated he wasn't planning on leaving until 11/13 and was not upset about having to stay the weekend.  Saniya Chaidez PA-C placed wound vac orders on [employer]. This writer left a message with [facility] and emailed [employer] liaison all VA information for obtaining authorization on home wound vac.   Patient uses [employer] for transportation. Patient will need a ride set up 11/13 after 3pm. Patient's parking lot is under construction and will not be able to be accessed during the day.  Home care referral for skilled nursing sent to [facility]. RNCC will follow up with patient and provders on 11/13/2023 to finalize discharge.     Veronica Park RN, BSN  Care Coordinator, 5 Ortho  Phone [phone]  Pager [phone]

## 2023-11-10 NOTE — PLAN OF CARE
Plan of Care Reviewed With: patient  Overall Patient Progress: improving  Outcome Evaluation: Reported pain x1 in LLE r/t neuropathy managed with sched Voltaren gel, otherwise denied pain.  RLE in cast CDI, WV in place cont @ 125 mmHg.  Tolerating activity + diet.  IND tx to WC.  Plan to RTOR 11/13.  ALMAZ bilat, hearing aids at bedside.  Would like to take shower sometime today.     For vital signs and complete assessments, please see documentation flowsheets.

## 2023-11-10 NOTE — PROGRESS NOTES
[facility]    Medicine Progress Note - Hospitalist Service, GOLD TEAM 17    Date of Admission:  11/2/2023    Assessment & Plan   A: Patient is a 59 y/o manwho has multiple medical problems including diabetes mellitus type II with retinopathy, legal blindness and severe peripheral neuropathy; papillary thyroid malignancy in remission s/p thyroidectomy and with post-operative hypothyroidism; GERD; obstructive sleep apnea not on CPAP; chronic kidney disease; hepatitis C s/p treatment in 2017; hypertension; migraines; anemia; non-alcoholic fatty liver disease; past colectomy for diverticulitis in 2014; past cholecystectomy; bilateral hearing loss and using bilateral hearing aids; chronic pain; past right Charcot foot reconstruction in Jan-2022 complicated by post-operative wound infection progressing to osteomyelitis.      Patient had been hospitalized in Jan-2023 and underwent I&D of right foot and placement of wound vac and antibiotic beads on 21-Jan-2023. Intra-operative cultures grew Staphylococcus epidermidis (oxacillin resistant) and Staphylococcus caprae. Patient was discharged on IV vancomycin and rifampin. Vancomycin and rifampin were discontinued on 15-Feb-2023 and patient was started on oral doxycyline which was associated with a rash. Doxycycline was stopped on 24-Feb-2023.      [facility] which was concerning for soft tissue swelling, tibiotalar joint effusion, loosening of hardware, Lisfranc deformity and findings suggestive of osteomyelitis. Patient was admitted to [facility] on 02-Nov-2023. Patient underwent I&D right ankle and foot with hardware removal on 02-Nov-2023. Patient was initially on broad spectrum antibiotics but, as cultures only grew Candida albicans, patient is now off antibiotics and is on fluconazole.     Patient underwent irrigation and debridement right foot and wound vac application right foot  for necrosis of surgical wound on 08-Nov-2023     P:  1.) Right foot and ankle surgical site infection; patient underwent surgical intervention on 02-Nov-2023 and 08-Nov-2023:  - Patient receiving wound care.  - Per Orthopaedic Surgery, if patient tolerating bedside VAC changes, patient would not need surgical intervention on 13-Nov-2023.  - Patient currently on fluconazole per Infectious Disease.     2.) Diabetes mellitus type II with severe diabetic neuropathy:  - Patient usually on jardiance, ozempic and metformin as outpatient but these were held. Metformin and jardiance have been restarted.  - Patient to be monitored on insulin sliding scale.  - Patient on gabapentin for diabetic neuropathy.  - Patient would like to avoid insulin if at all possible.     3.) Chronic kidney disease, appears to be stage II:  - Renal function appears to be around baseline.  - Avoiding nephrotoxins.     4.) GERD:  - Patient on PPI.     5.) History of thyroid malignancy s/p thyroidectomy; patient has post-operative hypothyroidism:  - Patient on levothyroxine 150 mcg daily.  - Further surveillance as outpatient.     6.) Hyperlipidemia:  - Patient to continue pravastatin.     7.) Obstructive sleep apnea:  - Patient does not use CPAP due to claustrophobia.  - Patient scheduled to have Inspire placed in December.     8.) Insomnia:  - Patient usually on ambien as outpatient but this is currently on hold.             Diet: Advance Diet as Tolerated: Regular Diet Adult  Snacks/Supplements Adult: Other; Please allow pt to order double entrees; Between Meals    Jones Catheter: Not present  Lines: None     Cardiac Monitoring: None  Code Status: Full Code      Clinically Significant Risk Factors              # Hypoalbuminemia: Lowest albumin = 3.4 g/dL at 11/5/2023  5:49 AM, will monitor as appropriate           # DMII: A1C = 7.3 % (Ref range: <5.7 %) within past 6 months   # Overweight: Estimated body mass index is 28.07 kg/m  as calculated  "from the following:    Height as of this encounter: 1.778 m (5' 10\").    Weight as of this encounter: 88.7 kg (195 lb 9.6 oz).      # Financial/Environmental Concerns: none         Disposition Plan      Expected Discharge Date: 11/15/2023      Destination: home with family;home with help/services  Discharge Comments: surg on 11/13            Jamel Cabrera MD  Hospitalist Service, GOLD TEAM 17  [facility]  Securely message with [employer] (more info)  Text page via [facility] Paging/Directory   See signed in provider for up to date coverage information  ______________________________________________________________________    Interval History     Patient noted feeling well. Patient noted no dyspnea, no fever, no chills and no diarrhea. Patient noted no new problems.    Physical Exam   Vital Signs: Temp: 98  F (36.7  C) Temp src: Oral BP: 117/65 Pulse: 84   Resp: 16 SpO2: 98 % O2 Device: None (Room air)    Weight: 195 lbs 9.6 oz    General: Patient comfortable, NAD.  Heart: RRR, S1 S2 w/o murmurs.  Lungs: Breath sounds present. No crackles/wheezes heard.  Abdomen: Soft, nontender.    Labs noted.   Sodium 137; Potassium 4.5; Creatinine 1.16; CRP 71.83    Medical Decision Making           "

## 2023-11-10 NOTE — CONSULTS
[facility]  WO Nurse Inpatient Assessment     Consulted for: Right foot VAC changes    Patient History (according to provider note(s):      Per Dr Joesph Simth with Orthopedics on 11/10/2023: Nehemiah Magallon is a 58 year old male s/p I&D and removal of hardware right foot on 11/2 and I&D, wound vac placement on 11/8 with Dr. Starr. Doing well. Cultures growing candida, on fluconazole per ID. Plan to return to OR on 11/13.       Assessment:      Areas visualized during today's visit: Right foot    Negative pressure wound therapy applied to: Right medial and lateral foot      Last photo: 11/10/2023   Wound due to: Surgical Wound   Wound history/plan of care:    Surgical date: 11/8/2023   Service following: Ortho, ID and Medicine  Date Negative Pressure Wound Therapy initiated: 11/8/2023   Interventions in place: N/A  Is patient s nutritional status compromised? no   If yes, what interventions are in place? N/A  Reason for initiating vac therapy? High risk of infections and Need for accelerated granulation tissue  Which?of?the?following?co-morbidities?apply? Diabetes  If diabetic is patient on a diabetic management program? Yes   Is osteomyelitis present in wound? no   If yes what treatments are in place? N/A    Wound base: 100 % granulation tissue, dark stained by silver sponge     Palpation of the wound bed: normal       Drainage: scant      Volume in cannister: 10     Last cannister change date: 11/8/2023     Description of drainage: serosanguinous      Measurements (length x width x depth, in cm) Medial  7  x 1.5  x  3 cm; Lateral 5 x 1.5 x 2.8     Tunneling N/A      Undermining N/A   Periwound skin: Intact       Color: pale       Temperature: normal    Odor: none   Pain: absent   Pain intervention prior to dressing change: slow and gentle cares   Treatment goal: Increase granulation  STATUS: initial assessment   Supplies ordered: supplies stored on unit    Number of foam  "pieces removed from a wound (excluding foam for bridge) :  2 medial, 2 lateral GranuFoam Silver   Verified this matched the number of foam pieces applied last dressing change: Yes   Number of foam pieces packed into wound (excluding foam for bridge) :  2 medial, 2 lateral GranuFoam Silver     Treatment Plan:     Negative pressure wound therapy plan:  Wound location: Right medial and lateral foot   Change Days: Mon/Wed/Fri by WOC RN    Supplies (including all accessories) used: small  Silver (Ag) impregnated foam   Cleanse with MicroKlenz prior to replacing VAC    Suction setting: -125   Methods used: Window paned all periwound skin with vac drape prior to applying sponge    Staff RN to assess integrity of dressing and ensure suction is set at appropriate level every shift.   Date canister. Chart canister output every shift. Change cannister weekly and PRN if full/occluded     Remove foam dressing and replace with BID normal saline moist gauze dressing if:   -a dressing failure which cannot be repaired within 2 hours   -patient is discharging to home without a home pump   -patient is discharging to a facility outside the local area   -if a dressing is a \"Silver Foam\", remove before Radiation Therapy or MRI     The hospital VAC pump is not to be discharged with the patient.?Ensure to disconnect patient from machine prior to discharge. Then,    - If a home KCI VAC pump has been delivered, connect home cannister to dressing tubing then connect cannister to home pump and turn on machine    - If transferring to a nearby facility with a KCI vac, can disconnect and clamp tubing then cover end with glove so can be reconnected within 2 hours       Orders: Written    RECOMMEND PRIMARY TEAM ORDER: None, at this time  Education provided: plan of care  Discussed plan of care with: Patient and Nurse  WOC nurse follow-up plan: Monday/WednesdayFriday  Notify WOC if wound(s) deteriorate.  Nursing to notify the Provider(s) and " re-consult the [facility] Nurse if new skin concern.    DATA:     Current support surface: Standard  Standard gel/foam mattress (IsoFlex, Atmos air, etc)  Containment of urine/stool: Continent of bladder and Continent of bowel  BMI: Body mass index is 28.37 kg/m .   Active diet order: Orders Placed This Encounter      Advance Diet as Tolerated: Regular Diet Adult     Output: No intake/output data recorded.     Labs:   Recent Labs   Lab 11/08/23  0745 11/05/23  0549   ALBUMIN  --  3.4*   HGB 9.9* 9.8*   INR 1.09  --    WBC 10.3 8.2     Pressure injury risk assessment:   Sensory Perception: 3-->slightly limited  Moisture: 4-->rarely moist  Activity: 2-->chairfast  Mobility: 3-->slightly limited  Nutrition: 3-->adequate  Friction and Shear: 3-->no apparent problem  Maycol Score: 18    Shivani Mireles RN CWOCN  Pager no longer is use, please contact through [employer]   Mark Anthony group: [facility]  Dept. Office Number: *[phone]

## 2023-11-10 NOTE — PLAN OF CARE
"Goal Outcome Evaluation:       Nehemiah is a 59 yo male inpatient today awaiting planned surgical intervention on R foot Monday 11/13. VS WDL. RLE slightly larger than LLE - patient states being aware of this since his recent surgery. Patient states having showered was goal of today--wife may visit at some point later this afternoon. R PIV removed today after losing access (infiltration) and L PIV established. Patient's R foot wound was re-dressed today--circulation intact with scant drainage to wound vac.                  VS:  /67 (BP Location: Left arm)   Pulse 84   Temp 98.2  F (36.8  C) (Oral)   Resp 16   Ht 1.778 m (5' 10\")   Wt 89.7 kg (197 lb 12 oz)   SpO2 98%   BMI 28.37 kg/m     O2: Sating 98% on RA. Lung sounds clear.    Output: Voids spontaneously and adequately.    Last BM: 11/9/23 x2 per pt report. Bowel sounds active x4. Passing flatus.    Activity: Independent. Pt pivot transfers self to wheelchair and back to bed    Skin: Bruise to L AC. Scab to L shin. R foot/ankle surgical incision.   Pain: Denied pain. Tylenol given per pt request.    CMS: A&Ox4. Baseline neuropathy to BUE and baseline numbness to BLE from the knee down. Edema +1 to R foot and lower limb.   Dressing: Dressing to R foot/ankle C/D/I following WOC visit. PIV dressing to L forearm C/D/I.   Diet: Regular.    LDA: PIV to L forearm is S/L. Surgical wound vac to R foot/ankle continuous. Scant drainage.   Equipment: IV pole, wheelchair, wound vac, and personal belongings.    Plan: Continue to monitor. Call light within reach and utilized appropriately.    Additional Info: Per ortho note, pt may return to the OR on 11/13.               "

## 2023-11-10 NOTE — PROGRESS NOTES
Infectious Disease - Chart update:     Per Orthopedic progress note, plan to RTOR on 11/13. However, updated note that may not need if able to tolerate VAC changes.     Per ID perspective, no new changes. Continue po fluconazole. Kindly refer to progress note 11/9/2023 for details.     ID team will continue to follow. Please reach out if any questions or concerns.     For urgent questions over the weekend, please reach out to on-call ID provider per amcom. Dr. Inocente Freeman will assume [facility] ID service from Monday 11/13/2023 onwards.     Nirali Argueta MD  Infectious Diseases Staff Physician  Pager: [phone]  [employer] dayan   Date: 11/10/2023

## 2023-11-10 NOTE — PROGRESS NOTES
"Orthopedic Surgery Progress Note: 11/10/2023    Subjective:   No acute events overnight. Pain well-controlled. Tolerating a diet. No new concerns or complaints.    Objective:   /67 (BP Location: Left arm)   Pulse 84   Temp 98.2  F (36.8  C) (Oral)   Resp 16   Ht 1.778 m (5' 10\")   Wt 89.7 kg (197 lb 12 oz)   SpO2 98%   BMI 28.37 kg/m    No intake/output data recorded.  General: NAD. Resting comfortably in bed.  Respiratory: Nonlabored breathing  Musculoskeletal:    RLE  ACE C/D/I  Wiggles toes  Warm and well perfused  Sensation decreased at patient baseline    Laboratory Data:  Lab Results   Component Value Date    WBC 10.3 11/08/2023    HGB 9.9 (L) 11/08/2023     11/08/2023    INR 1.09 11/08/2023         Assessment & Plan:   Nehemiah Magallon is a 58 year old male s/p I&D and removal of hardware right foot on 11/2 and I&D, wound vac placement on 11/8 with Dr. Starr. Doing well. Cultures growing candida, on fluconazole per ID. Patient tolerated bedside wound vac change today, therefore no plans for return to OR. Will plan for outpatient wound vac changes.     Plan for Today:  - Continue Fluconazole per ID  - Pain control  - PT/OT  - Planning for outpatient wound vac changes.     Orthopaedics primary with medicine consultation or co-management, resume all prior medications/orders.  Diet: Clear liquids and advance as tolerated.  Antibiotics: Resume preop antifungal therapy per ID.  Pain management: Multimodal. Wean from IV to oral medications.  Weightbearing status: Strict nonweightbearing on the operative lower extremity.  Use appropriate assistive gait devices and assistance (nursing/PT) for ambulation.  Activity: May be up ad pancho for ADLs but encourage flat bedrest with the foot and ankle of the operative lower extremity elevated above the level of the heart and iced as much of the time as possible for the first 2 weeks postop.  Elevate heel off of the bed    PT/OT: Gait training, transfers, " ADLs.  Dressings: Dressings will be removed at next I&D or VAC change.  DVT prophylaxis: Early mobilization, SCD's to L LE.  Consultations: PT, OT, Medicine, and Infectious Disease.  Disposition: pending outpatient wound vac     Orthopedic surgery staff for this patient is Dr. Starr.    ------------------------------------------------------------------------------------------    Joesph Smith MD   Orthopaedic Surgery, PGY4

## 2023-11-10 NOTE — PROGRESS NOTES
Brief orthopedic update    Patient tolerated bedside VAC change quite well with wound care team.  Discussed case with Dr. Starr who states that the patient does not need to return to the OR for VAC change on Monday.  Instead we will plan for bedside VAC change by wound care team on Monday.  [facility]  Patient will likely be able to discharge on Monday or Tuesday pending coordination of outpatient VAC changes.    Joesph Smith MD  Orthopedic Surgery PGY4

## 2023-11-10 NOTE — PROGRESS NOTES
CLINICAL NUTRITION SERVICES - ASSESSMENT NOTE     Nutrition Prescription    RECOMMENDATIONS FOR MDs/PROVIDERS TO ORDER:  None    Malnutrition Status:    Patient does not meet two of the established criteria necessary for diagnosing malnutrition    Recommendations already ordered by Registered Dietitian (RD):  Approved pt for double entrees at meal times    Future/Additional Recommendations:  Monitor labs, intakes, and weight trends.     REASON FOR ASSESSMENT  Nehemiah Magallon is a/an 58 year old male assessed by the dietitian for LOS    NUTRITION/MEDICAL HISTORY  History of diabetes mellitus type II with retinopathy, legal blindness and severe peripheral neuropathy; papillary thyroid malignancy in remission s/p thyroidectomy and with post-operative hypothyroidism; GERD; obstructive sleep apnea not on CPAP; chronic kidney disease; hepatitis C s/p treatment in 2017; hypertension; migraines; anemia; non-alcoholic fatty liver disease; past colectomy for diverticulitis in 2014; past cholecystectomy; bilateral hearing loss and using bilateral hearing aids; chronic pain; past right Charcot foot reconstruction in Jan-2022 complicated by post-operative wound infection progressing to osteomyelitis. S/p I&D right ankle and foot with hardware removal on 02-Nov-2023 and I&D right foot and wound vac application right foot for necrosis of surgical wound earlier on 08-Nov-2023.     FINDINGS  RD met with pt at bedside. Pt reports his appetite is great. States the fried foods have shown up overdone. Also wondering why he cannot get two cheeseburgers anymore. Explained option for RD to allow pt to order double entrees. Pt worried about ordering more than 3 meals daily because he is worried about having to pay for each meal.    Discussed with social work. Per discussion, pt will not need to pay extra for meals. Able to order multiple meals daily.     CURRENT NUTRITION ORDERS  Diet: Regular  Intake/Tolerance: % of documented meals.  "    Pt ordering (on average) 2695 kcal and 109 g protein per day per [employer]. With documented intakes, pt is likely meeting >75% minimum energy and protein needs.    LABS   11/03/23 06:52 11/05/23 05:49 11/07/23 07:09 11/08/23 07:45 11/09/23 05:30   Sodium 137 140  136 137   Potassium 4.2 4.5  4.2 4.5   Urea Nitrogen 19.1 12.0  19.2 23.0   Creatinine 1.17 1.27 (H)  1.09 1.16   CRP Inflammation 37.33 (H) 22.51 (H) 30.94 (H)  71.83 (H)   Glucose 156 (H) 156 (H)  152 (H) 207 (H)     MEDICATIONS  Medications reviewed: folic acid, insulin (novolog), culturell, levothyroxine, metformin, protonix, miralax, Topomax    ANTHROPOMETRICS  Height: 177.8 cm (5' 10\")  Most Recent Weight: 88.7 kg (195 lb 9.6 oz)  IBW: 75.5 kg (117% IBW)  BMI: Overweight BMI 25-29.9  Weight History: Pt with limited weight history prior to admission, with 2 lb (1%) weight loss over 1 week during admission, 22 lb (10%) weight loss over 6 months - pt reports this was intentional weight loss.   Wt Readings from Last Encounters:   11/10/23 88.7 kg (195 lb 9.6 oz)   11/02/23 89.7 kg (197 lb 12 oz)   10/30/23 86.2 kg (190 lb)   05/31/23 98.9 kg (218 lb)   02/10/23 96.2 kg (212 lb)   01/20/23 95.3 kg (210 lb)   11/17/22 100.2 kg (221 lb)   11/04/22 95.3 kg (210 lb)   03/06/15 91.6 kg (202 lb)   10/14/14 94.8 kg (209 lb)     Dosing Weight: 90 kg - most recent weight    ASSESSED NUTRITION NEEDS  Estimated Energy Needs: 3349-5340 kcals/day (25 - 30 kcals/kg)  Justification: Maintenance  Estimated Protein Needs: + grams protein/day (1 - 1.2+ grams of pro/kg)  Justification: Maintenance vs increased needs  Estimated Fluid Needs: 1 ml/kcal or per provider pending fluid status    PHYSICAL FINDINGS  See malnutrition section below.  Right food wound vac - see WOC nurse note 11/10    MALNUTRITION  % Intake: No decreased intake noted  % Weight Loss: Weight loss does not meet criteria  Subcutaneous Fat Loss: None observed  Muscle Loss: None observed  Fluid " Accumulation/Edema: Does not meet criteria  Malnutrition Diagnosis: Patient does not meet two of the established criteria necessary for diagnosing malnutrition    NUTRITION DIAGNOSIS  Increased nutrient needs related to wound healing as evidenced by estimated protein needs of 1-1.2 g/kg.     INTERVENTIONS  Implementation  Approved for double entrees    Goals  Patient to consume % of nutritionally adequate meal trays TID, or the equivalent with supplements/snacks.     Monitoring/Evaluation  Progress toward goals will be monitored and evaluated per protocol.    Danielle Watkins MS, RDN, LD  RD pager: [phone], or Mark Anthony Simmons   Weekend/Holiday Pager: [phone]

## 2023-11-11 NOTE — PROGRESS NOTES
"Orthopedic Surgery Progress Note: 11/11/2023    Subjective:   No acute events overnight. AVSS. CRP bumped. Pain well-controlled. Tolerating a diet. No new concerns or complaints.  Reminds me that he would like a walker since his current one is broken. Will discuss with PT.  Reminds me that he will need all scripts, including DME supplies, sent to the [facility] for ongoing home vac changes.     Objective:   /61 (BP Location: Right arm, Patient Position: Semi-Gonsalves's, Cuff Size: Adult Large)   Pulse 87   Temp 98.5  F (36.9  C) (Oral)   Resp 17   Ht 1.778 m (5' 10\")   Wt 88.7 kg (195 lb 9.6 oz)   SpO2 98%   BMI 28.07 kg/m    No intake/output data recorded.  General: NAD. Resting comfortably in bed.  Respiratory: Nonlabored breathing  Musculoskeletal:  RLE  ACE C/D/I  Vac intact and holding suction. ~ 20 ml maybe in canister  Wiggles toes  Warm and well perfused  Sensation decreased at patient baseline - feels sensation starting at knees bilaterally    Laboratory Data:  Lab Results   Component Value Date    WBC 10.3 11/08/2023    HGB 9.9 (L) 11/08/2023     11/08/2023    INR 1.09 11/08/2023     Micro  Cxs 11/2: candida albicans    Assessment & Plan:   Nehemiah Magallon is a 58 year old male s/p I&D and removal of hardware right foot on 11/2 and I&D, wound vac placement on 11/8 with Dr. Starr. Doing well. Cultures growing candida, on fluconazole per ID. Patient tolerated bedside wound vac change today, therefore no plans for return to OR. Will plan for outpatient wound vac changes.     Plan for Today:  - walker from PT  - Continue Fluconazole per ID  - will clarify WB with staff, Dr. Starr - continue strict NWB RLE for now  - PT/OT  - Planning for outpatient wound vac changes  - dispo planning. Will need all scripts/supplies sent to [facility]    Medicine Primary  Diet: Clear liquids and advance as tolerated.  Antibiotics: Resume preop antifungal therapy per ID.  Pain management: Multimodal. Wean from IV to oral " medications.  Weightbearing status: Strict nonweightbearing on the operative lower extremity.  Use appropriate assistive gait devices and assistance (nursing/PT) for ambulation.  Activity: May be up ad pancho for ADLs but encourage flat bedrest with the foot and ankle of the operative lower extremity elevated above the level of the heart and iced as much of the time as possible for the first 2 weeks postop.  Elevate heel off of the bed    PT/OT: Gait training, transfers, ADLs.  Dressings: Dressings will be removed at next I&D or VAC change.  DVT prophylaxis: Early mobilization, SCD's to L LE.  Consultations: PT, OT, Medicine, and Infectious Disease.  Disposition: pending outpatient wound vac     Orthopedic surgery staff for this patient is Dr. Starr.    ------------------------------------------------------------------------------------------    Gracia Sadler MD   Orthopaedic Surgery, PGY4

## 2023-11-11 NOTE — PROGRESS NOTES
[facility]    Medicine Progress Note - Hospitalist Service, GOLD TEAM 17    Date of Admission:  11/2/2023    Assessment & Plan   A: Patient is a 59 y/o manwho has multiple medical problems including diabetes mellitus type II with retinopathy, legal blindness and severe peripheral neuropathy; papillary thyroid malignancy in remission s/p thyroidectomy and with post-operative hypothyroidism; GERD; obstructive sleep apnea not on CPAP; chronic kidney disease; hepatitis C s/p treatment in 2017; hypertension; migraines; anemia; non-alcoholic fatty liver disease; past colectomy for diverticulitis in 2014; past cholecystectomy; bilateral hearing loss and using bilateral hearing aids; chronic pain; past right Charcot foot reconstruction in Jan-2022 complicated by post-operative wound infection progressing to osteomyelitis.      Patient had been hospitalized in Jan-2023 and underwent I&D of right foot and placement of wound vac and antibiotic beads on 21-Jan-2023. Intra-operative cultures grew Staphylococcus epidermidis (oxacillin resistant) and Staphylococcus caprae. Patient was discharged on IV vancomycin and rifampin. Vancomycin and rifampin were discontinued on 15-Feb-2023 and patient was started on oral doxycyline which was associated with a rash. Doxycycline was stopped on 24-Feb-2023.      [facility] which was concerning for soft tissue swelling, tibiotalar joint effusion, loosening of hardware, Lisfranc deformity and findings suggestive of osteomyelitis. Patient was admitted to [facility] on 02-Nov-2023. Patient underwent I&D right ankle and foot with hardware removal on 02-Nov-2023. Patient was initially on broad spectrum antibiotics but, as cultures only grew Candida albicans, patient is now off antibiotics and is on fluconazole.     Patient underwent irrigation and debridement right foot and wound vac application right foot  for necrosis of surgical wound on 08-Nov-2023     P:  1.) Right foot and ankle surgical site infection; patient underwent surgical intervention on 02-Nov-2023 and 08-Nov-2023:  - Patient receiving wound care.  - Per Orthopaedic Surgery, if patient tolerating bedside VAC changes, patient would not need surgical intervention on 13-Nov-2023.  - Patient currently on fluconazole per Infectious Disease.     2.) Diabetes mellitus type II with severe diabetic neuropathy:  - Patient usually on jardiance, ozempic and metformin as outpatient but these were held. Metformin and jardiance have been restarted. Ozempic is not on formulary and will be resumed as outpatient.  - Patient to be monitored on insulin sliding scale.  - Patient on gabapentin for diabetic neuropathy.  - Patient would like to avoid insulin if at all possible.     3.) Chronic kidney disease, appears to be stage II:  - Renal function appears to be around baseline.  - Avoiding nephrotoxins.     4.) GERD:  - Patient on PPI.     5.) History of thyroid malignancy s/p thyroidectomy; patient has post-operative hypothyroidism:  - Patient on levothyroxine 150 mcg daily.  - Further surveillance as outpatient.     6.) Hyperlipidemia:  - Patient to continue pravastatin.     7.) Obstructive sleep apnea:  - Patient does not use CPAP due to claustrophobia.  - Patient scheduled to have Inspire placed in December.     8.) Insomnia:  - Patient usually on ambien as outpatient but this is currently on hold.             Diet: Advance Diet as Tolerated: Regular Diet Adult  Snacks/Supplements Adult: Other; Please allow pt to order double entrees; Between Meals    Jones Catheter: Not present  Lines: None     Cardiac Monitoring: None  Code Status: Full Code      Clinically Significant Risk Factors              # Hypoalbuminemia: Lowest albumin = 3.4 g/dL at 11/5/2023  5:49 AM, will monitor as appropriate           # DMII: A1C = 7.3 % (Ref range: <5.7 %) within past 6 months   #  "Overweight: Estimated body mass index is 28.07 kg/m  as calculated from the following:    Height as of this encounter: 1.778 m (5' 10\").    Weight as of this encounter: 88.7 kg (195 lb 9.6 oz).      # Financial/Environmental Concerns: none         Disposition Plan     Expected Discharge Date: 11/15/2023      Destination: home with family;home with help/services  Discharge Comments: surg on 11/13            Jamel Cabrera MD  Hospitalist Service, GOLD TEAM 17  [facility]  Securely message with [employer] (more info)  Text page via [facility] Paging/Directory   See signed in provider for up to date coverage information  ______________________________________________________________________    Interval History   Patient noted felling well and noted no new problems.    Physical Exam   Vital Signs: Temp: 98.6  F (37  C) Temp src: Oral BP: 137/71 Pulse: 94   Resp: 16 SpO2: 97 % O2 Device: None (Room air)    Weight: 195 lbs 9.6 oz    General: Patient comfortable, NAD.  Heart: RRR, S1 S2 w/o murmurs.  Lungs: Breath sounds present. No crackles/wheezes heard.  Abdomen: Soft, nontender.    Labs noted.  WBC 9.3; Hb 10.1; Platelets 415  CRP 23.58    Medical Decision Making             Data     "

## 2023-11-11 NOTE — PLAN OF CARE
Goal Outcome Evaluation:      Plan of Care Reviewed With: patient    Overall Patient Progress: improvingOverall Patient Progress: improving       VS: VSS   O2: On room air, denies SOB, no cough noted   Output: Voids spontaneously in bathroom   Last BM: 11/10   Activity: Up independently, transfers and pivots self onto wheelchair, occasionally ambulates   Skin: Intact with the exception of the incision to right foot/ankle   Pain: denies   CMS: Alert and oriented X4, able to verbalize needs appropriately, has baseline neuropathy to UE &LE. No sensation below the knees bilaterally, tingling in UE   Dressing: R ankle is ace wrapped   Diet: REG, patient is insulin dependent diabetic   LDA: Wound vac to right ankle @ 125mmHg, PIV to right forearm SL   Equipment: Wound vac, wheelchair   Plan: Discharge home 11/13 or 11/14   Additional Info:

## 2023-11-11 NOTE — PLAN OF CARE
Goal Outcome Evaluation:      Plan of Care Reviewed With: patient    Overall Patient Progress: improvingOverall Patient Progress: improving    Outcome Evaluation: Pt denies pain. Baseline neuropathy managed with scheduled voltaren gel. Wound vac x2 in place RLE. Independent, wheelchair for transfers. Plan to discharge home with home care on 11/13 after 3 pm using [employer] transportation. SW following for home care services. Pt aware of discharge plan.

## 2023-11-11 NOTE — PLAN OF CARE
Goal Outcome Evaluation:      Plan of Care Reviewed With: patient    Overall Patient Progress: improvingOverall Patient Progress: improving    Outcome Evaluation: Pt denies pain. Baseline neuropathy to BLE, managed with voltaren gel on L foot. Wound vac to RLE. Dressing CDI. Independent using wheelchair to transfer. NWB RLE except when pivot transfering per MD note. Plan to discharge home 11/13 after 3pm using [employer] transportation. SW following. Continue with plan of care.

## 2023-11-11 NOTE — PROGRESS NOTES
[id number]    Alert orient on room air, denies pain, N, V. LP IV SL and patent. Voids spontaneous , Uses wheelchair to go to bathroom, wound vac on the right ankle, VSS. Plan to discharge Monday.

## 2023-11-11 NOTE — PROGRESS NOTES
Ortho Update    Discussed with Dr. Starr. Celeste for patient to bear weight on right foot to pivot transfer. Otherwise NWB RLE and continue to elevate.    I will update orders.    Gracia Sadler, PGY-4

## 2023-11-12 NOTE — PLAN OF CARE
"  Problem: Adult Inpatient Plan of Care  Goal: Plan of Care Review  Description: The Plan of Care Review/Shift note should be completed every shift.  The Outcome Evaluation is a brief statement about your assessment that the patient is improving, declining, or no change.  This information will be displayed automatically on your shift  note.  Outcome: Progressing  Flowsheets (Taken 11/12/2023 6833)  Outcome Evaluation: Patient is ready for possible discharge tomorrow after 3pm, independent in room and NWB transfers to/from . Minimal output in wound drain & no complaints of pain.  Plan of Care Reviewed With:   patient   spouse  Overall Patient Progress: improving  Goal: Patient-Specific Goal (Individualized)  Description: You can add care plan individualizations to a care plan. Examples of Individualization might be:  \"Parent requests to be called daily at 9am for status\", \"I have a hard time hearing out of my right ear\", or \"Do not touch me to wake me up as it startles  me\".  Outcome: Progressing  Goal: Optimal Comfort and Wellbeing  Outcome: Progressing  Goal: Readiness for Transition of Care  Outcome: Progressing     Problem: Surgery Nonspecified  Goal: Fluid and Electrolyte Balance  Outcome: Progressing  Goal: Blood Glucose Level Within Targeted Range  Outcome: Progressing   Goal Outcome Evaluation:      Plan of Care Reviewed With: patient, spouse    Overall Patient Progress: improvingOverall Patient Progress: improving    Outcome Evaluation: Patient is ready for possible discharge tomorrow after 3pm, independent in room and NWB transfers to/from . Minimal output in wound drain & no complaints of pain.      "

## 2023-11-12 NOTE — PROGRESS NOTES
"Orthopedic Surgery Progress Note: 11/12/2023    Subjective:   No acute events overnight. AVSS. Pain well-controlled. Tolerating a diet. No new concerns or complaints.  Reminds me that he would like a walker again. Will pass this along to daytime team to secure walker.  Reminds me that he will need all scripts, including DME supplies, sent to the [facility] for ongoing home vac changes.   He plans to watch football today and hang out.    Objective:   /76 (BP Location: Right arm)   Pulse 91   Temp 98.4  F (36.9  C) (Oral)   Resp 18   Ht 1.778 m (5' 10\")   Wt 88.7 kg (195 lb 9.6 oz)   SpO2 95%   BMI 28.07 kg/m    No intake/output data recorded.  General: NAD. Resting comfortably in bed.  Respiratory: Nonlabored breathing  Musculoskeletal:  RLE  ACE C/D/I  Vac intact and holding suction. ~ 20 ml maybe in canister  Wiggles toes  Warm and well perfused  Sensation decreased at patient baseline - feels sensation starting at knees bilaterally    Laboratory Data:  Lab Results   Component Value Date    WBC 9.3 11/11/2023    HGB 10.1 (L) 11/11/2023     11/11/2023    INR 1.09 11/08/2023     Micro  Cxs 11/2: candida albicans    Assessment & Plan:   Nehemiah Magallon is a 58 year old male s/p I&D and removal of hardware right foot on 11/2 and I&D, wound vac placement on 11/8 with Dr. Starr. Doing well. Cultures growing candida, on fluconazole per ID. Patient tolerated bedside wound vac change today, therefore no plans for return to OR. Will plan for outpatient wound vac changes.     Plan for Today:  - walker from PT - appreciate assistance  - shower, per patient request  - Planning for outpatient wound vac changes  - dispo planning. Will need all scripts/supplies sent to [facility]    Medicine Primary  Diet: Clear liquids and advance as tolerated.  Antibiotics: Resume preop antifungal therapy per ID.  Pain management: Multimodal. Wean from IV to oral medications.  Weightbearing status: nonweightbearing on the operative lower " extremity.  Okay to place weight on right leg for transfers (orders updated 11/11). Use appropriate assistive gait devices and assistance (nursing/PT) for ambulation.  Activity: May be up ad pancho for ADLs but encourage flat bedrest with the foot and ankle of the operative lower extremity elevated above the level of the heart and iced as much of the time as possible for the first 2 weeks postop.  Elevate heel off of the bed    PT/OT: Gait training, transfers, ADLs.  Dressings: Dressings will be removed at next I&D or VAC change.  DVT prophylaxis: Early mobilization, SCD's to L LE.  Consultations: PT, OT, Medicine, and Infectious Disease.  Disposition: pending outpatient wound vac. Likely 11/13    Orthopedic surgery staff for this patient is Dr. Starr.    ------------------------------------------------------------------------------------------    Gracia Sadler MD   Orthopaedic Surgery, PGY4

## 2023-11-12 NOTE — PROGRESS NOTES
"DME Wound Care Supplies needed for vac changes at home:    - Chux pads  - 1\" tape (eg paper)  - dermal wound cleanser  - Gloves  - Cavilon ([employer] product, protect skin)  - ABD pads  - Gauze 4x4s  - Kerlix rolls  - ACE wraps    Vac supplies:  - black sponge  - lacy pads (2 per change)  - Y adaptor  - vac canisters  - portable vac    Needs multiple refills of this. [facility]. Starr will need to sign the scripts.    Lacy Sadler, PGY-4  "

## 2023-11-12 NOTE — PROGRESS NOTES
[facility]    Medicine Progress Note - Hospitalist Service, GOLD TEAM 17    Date of Admission:  11/2/2023    Assessment & Plan   A: Patient is a 59 y/o manwho has multiple medical problems including diabetes mellitus type II with retinopathy, legal blindness and severe peripheral neuropathy; papillary thyroid malignancy in remission s/p thyroidectomy and with post-operative hypothyroidism; GERD; obstructive sleep apnea not on CPAP; chronic kidney disease; hepatitis C s/p treatment in 2017; hypertension; migraines; anemia; non-alcoholic fatty liver disease; past colectomy for diverticulitis in 2014; past cholecystectomy; bilateral hearing loss and using bilateral hearing aids; chronic pain; past right Charcot foot reconstruction in Jan-2022 complicated by post-operative wound infection progressing to osteomyelitis.      Patient had been hospitalized in Jan-2023 and underwent I&D of right foot and placement of wound vac and antibiotic beads on 21-Jan-2023. Intra-operative cultures grew Staphylococcus epidermidis (oxacillin resistant) and Staphylococcus caprae. Patient was discharged on IV vancomycin and rifampin. Vancomycin and rifampin were discontinued on 15-Feb-2023 and patient was started on oral doxycyline which was associated with a rash. Doxycycline was stopped on 24-Feb-2023.      [facility] which was concerning for soft tissue swelling, tibiotalar joint effusion, loosening of hardware, Lisfranc deformity and findings suggestive of osteomyelitis. Patient was admitted to [facility] on 02-Nov-2023. Patient underwent I&D right ankle and foot with hardware removal on 02-Nov-2023. Patient was initially on broad spectrum antibiotics but, as cultures only grew Candida albicans, patient is now off antibiotics and is on fluconazole.     Patient underwent irrigation and debridement right foot and wound vac application right foot  for necrosis of surgical wound on 08-Nov-2023     Patient is somewhat hyperglycemic but has expressed not wanting to be on insulin. Given that HbA1c was 7.3% recently, anticipate blood glucose control would improve when ozempic is resumed.    P:  1.) Right foot and ankle surgical site infection; patient underwent surgical intervention on 02-Nov-2023 and 08-Nov-2023:  - Patient receiving wound care.  - Per Orthopaedic Surgery, if patient tolerating bedside VAC changes, patient would not need surgical intervention on 13-Nov-2023.  - Patient currently on fluconazole per Infectious Disease.     2.) Diabetes mellitus type II with severe diabetic neuropathy:  - Patient usually on jardiance, ozempic and metformin as outpatient but these were held. Metformin and jardiance have been restarted. Ozempic is not on formulary and will be resumed as outpatient.  - Patient to be monitored on insulin sliding scale.  - Patient on gabapentin for diabetic neuropathy.  - Patient would like to avoid insulin if at all possible.     3.) Chronic kidney disease, appears to be stage II:  - Renal function appears to be around baseline.  - Avoiding nephrotoxins.     4.) GERD:  - Patient on PPI.     5.) History of thyroid malignancy s/p thyroidectomy; patient has post-operative hypothyroidism:  - Patient on levothyroxine 150 mcg daily.  - Further surveillance as outpatient.     6.) Hyperlipidemia:  - Patient to continue pravastatin.     7.) Obstructive sleep apnea:  - Patient does not use CPAP due to claustrophobia.  - Patient scheduled to have Inspire placed in December.     8.) Insomnia:  - Patient usually on ambien as outpatient but this is currently on hold.          Diet: Advance Diet as Tolerated: Regular Diet Adult  Snacks/Supplements Adult: Other; Please allow pt to order double entrees; Between Meals    Jones Catheter: Not present  Lines: None     Cardiac Monitoring: None  Code Status: Full Code      Clinically Significant Risk Factors  "             # Hypoalbuminemia: Lowest albumin = 3.4 g/dL at 11/5/2023  5:49 AM, will monitor as appropriate           # DMII: A1C = 7.3 % (Ref range: <5.7 %) within past 6 months   # Overweight: Estimated body mass index is 28.07 kg/m  as calculated from the following:    Height as of this encounter: 1.778 m (5' 10\").    Weight as of this encounter: 88.7 kg (195 lb 9.6 oz).      # Financial/Environmental Concerns: none         Disposition Plan     Expected Discharge Date: 11/15/2023      Destination: home with family;home with help/services  Discharge Comments: surg on 11/13            Jamel Cabrera MD  Hospitalist Service, GOLD TEAM [address] [place] [address]  Securely message with [employer] (more info)  Text page via [facility] Paging/Directory   See signed in provider for up to date coverage information  ______________________________________________________________________    Interval History   Patient noted feeling well. Patient noted no fever, no chills and no new problems.    Physical Exam   Vital Signs: Temp: 97.7  F (36.5  C) Temp src: Oral BP: 118/60 Pulse: 91   Resp: 16 SpO2: 97 % O2 Device: None (Room air)    Weight: 195 lbs 9.6 oz    General: Patient comfortable, NAD.    Labs noted.  On 30-Oct-2023: HbA1c 7.3%    Medical Decision Making           "

## 2023-11-12 NOTE — PLAN OF CARE
Goal Outcome Evaluation:      Plan of Care Reviewed With: patient    Overall Patient Progress: improvingOverall Patient Progress: improving     Pt A&Ox4. VSS.  overnight. Wound vac and dressing to RLE. Vac dressing change per WOC MWF. Up independently in room, pivot transfers to w/c. Denies pain overnight, uses voltaren gel throughout the day. No change to baseline neuropathy.   Plan is discharge to home on Monday after 3pm.

## 2023-11-12 NOTE — PROGRESS NOTES
"  VS: /60 (BP Location: Right arm, Patient Position: Semi-Gonsalves's, Cuff Size: Adult Large)   Pulse 91   Temp 97.7  F (36.5  C) (Oral)   Resp 16   Ht 1.778 m (5' 10\")   Wt 88.7 kg (195 lb 9.6 oz)   SpO2 97%   BMI 28.07 kg/m     O2: Stable on RA, denied SOB & CP   Output: Spontaneously voids to bathroom without difficulty, continent   Last BM: 11/11/23, continent   Activity: NWB R foot except for independent transfers to/from    Skin: Surgical incisions on L foot, do not remove per order   Pain: Denied   CMS: A/O x4, denied N/T, baseline N/T on all extremities   Dressing: CDI    Diet: Reg/thin, BG checks 4x/day   LDA: R PIV SL, R foot wound vac running @ 125 mmHg   Plan: Discharging likely tomorrow after 3pm   Additional Info: MIC hearing aids & glasses worn       "

## 2023-11-13 NOTE — PROGRESS NOTES
[facility]  WO Nurse Inpatient Assessment     Consulted for: Right foot VAC changes    Patient History (according to provider note(s):      Per Dr Joesph Smith with Orthopedics on 11/10/2023: Nehemiah Magallon is a 58 year old male s/p I&D and removal of hardware right foot on 11/2 and I&D, wound vac placement on 11/8 with Dr. Starr. Doing well. Cultures growing candida, on fluconazole per ID. Plan to return to OR on 11/13.       Assessment:      Areas visualized during today's visit: Right foot    Negative pressure wound therapy applied to: Right medial and lateral foot        Last photo: 11/13/2023   Wound due to: Surgical Wound   Wound history/plan of care:    Surgical date: 11/8/2023   Service following: Ortho, ID and Medicine  Date Negative Pressure Wound Therapy initiated: 11/8/2023   Interventions in place: N/A  Is patient s nutritional status compromised? no   If yes, what interventions are in place? N/A  Reason for initiating vac therapy? High risk of infections and Need for accelerated granulation tissue  Which?of?the?following?co-morbidities?apply? Diabetes  If diabetic is patient on a diabetic management program? Yes   Is osteomyelitis present in wound? no   If yes what treatments are in place? N/A    Wound base: 100 % granulation tissue, dark stained by silver sponge     Palpation of the wound bed: normal       Drainage: scant      Volume in cannister: 50 ml      Last cannister change date: 11/8/2023     Description of drainage: serosanguinous      Measurements (length x width x depth, in cm) Medial  7  x 1.5  x  3 cm; Lateral 5 x 1.5 x 2.8(from 11/10)     Tunneling N/A      Undermining N/A   Periwound skin: Intact       Color: pale       Temperature: normal    Odor: none   Pain: absent   Pain intervention prior to dressing change: slow and gentle cares   Treatment goal: Increase granulation  STATUS: stable   Supplies ordered: supplies stored on unit    Number of  "foam pieces removed from a wound (excluding foam for bridge) :  2 medial, 2 lateral GranuFoam Silver   Verified this matched the number of foam pieces applied last dressing change: Yes   Number of foam pieces packed into wound (excluding foam for bridge) :  2 medial, 1 lateral GranuFoam Silver     Treatment Plan:     Negative pressure wound therapy plan:  Wound location: Right medial and lateral foot   Change Days: Mon/Wed/Fri by WOC RN    Supplies (including all accessories) used: medium Silver (Ag) impregnated foam  (extra track pad and Y connector)  Cleanse with Micro[employer] prior to replacing VAC    Suction setting: -125   Methods used: Window paned all periwound skin with vac drape prior to applying sponge    Staff RN to assess integrity of dressing and ensure suction is set at appropriate level every shift.   Date canister. Chart canister output every shift. Change cannister weekly and PRN if full/occluded     Remove foam dressing and replace with BID normal saline moist gauze dressing if:   -a dressing failure which cannot be repaired within 2 hours   -patient is discharging to home without a home pump   -patient is discharging to a facility outside the local area   -if a dressing is a \"Silver Foam\", remove before Radiation Therapy or MRI     The hospital VAC pump is not to be discharged with the patient.?Ensure to disconnect patient from machine prior to discharge. Then,    - If a home KCI VAC pump has been delivered, connect home cannister to dressing tubing then connect cannister to home pump and turn on machine    - If transferring to a nearby facility with a KCI vac, can disconnect and clamp tubing then cover end with glove so can be reconnected within 2 hours       Orders: Updated    RECOMMEND PRIMARY TEAM ORDER: None, at this time  Education provided: plan of care  Discussed plan of care with: Patient and Nurse  WOC nurse follow-up plan: Monday/WednesdayFriday  Notify WOC if wound(s) " deteriorate.  Nursing to notify the Provider(s) and re-consult the [facility] Nurse if new skin concern.    DATA:     Current support surface: Standard  Standard gel/foam mattress (IsoFlex, Atmos air, etc)  Containment of urine/stool: Continent of bladder and Continent of bowel  BMI: Body mass index is 28.07 kg/m .   Active diet order: Orders Placed This Encounter      Advance Diet as Tolerated: Regular Diet Adult     Output: No intake/output data recorded.     Labs:   Recent Labs   Lab 11/11/23  0802 11/08/23  0745   HGB 10.1* 9.9*   INR  --  1.09   WBC 9.3 10.3     Pressure injury risk assessment:   Sensory Perception: 3-->slightly limited  Moisture: 4-->rarely moist  Activity: 2-->chairfast  Mobility: 4-->no limitation  Nutrition: 3-->adequate  Friction and Shear: 3-->no apparent problem  Maycol Score: 19    Brianna Spicer RN CWOCN  Pager no longer is use, please contact through [employer] group: [facility]  Dept. Office Number: *[phone]

## 2023-11-13 NOTE — PROGRESS NOTES
[facility]    Medicine Progress Note - Hospitalist Service, GOLD TEAM 17    Date of Admission:  11/2/2023    Assessment & Plan   A: Patient is a 59 y/o manwho has multiple medical problems including diabetes mellitus type II with retinopathy, legal blindness and severe peripheral neuropathy; papillary thyroid malignancy in remission s/p thyroidectomy and with post-operative hypothyroidism; GERD; obstructive sleep apnea not on CPAP; chronic kidney disease; hepatitis C s/p treatment in 2017; hypertension; migraines; anemia; non-alcoholic fatty liver disease; past colectomy for diverticulitis in 2014; past cholecystectomy; bilateral hearing loss and using bilateral hearing aids; chronic pain; past right Charcot foot reconstruction in Jan-2022 complicated by post-operative wound infection progressing to osteomyelitis.      Patient had been hospitalized in Jan-2023 and underwent I&D of right foot and placement of wound vac and antibiotic beads on 21-Jan-2023. Intra-operative cultures grew Staphylococcus epidermidis (oxacillin resistant) and Staphylococcus caprae. Patient was discharged on IV vancomycin and rifampin. Vancomycin and rifampin were discontinued on 15-Feb-2023 and patient was started on oral doxycyline which was associated with a rash. Doxycycline was stopped on 24-Feb-2023.      [facility] which was concerning for soft tissue swelling, tibiotalar joint effusion, loosening of hardware, Lisfranc deformity and findings suggestive of osteomyelitis. Patient was admitted to [facility] on 02-Nov-2023. Patient underwent I&D right ankle and foot with hardware removal on 02-Nov-2023. Patient was initially on broad spectrum antibiotics but, as cultures only grew Candida albicans, patient is now off antibiotics and is on fluconazole.     Patient underwent irrigation and debridement right foot and wound vac application right foot  for necrosis of surgical wound on 08-Nov-2023     Patient is somewhat hyperglycemic but has expressed not wanting to be on insulin. Given that HbA1c was 7.3% recently, anticipate blood glucose control would improve when ozempic is resumed.     P:  1.) Right foot and ankle surgical site infection; patient underwent surgical intervention on 02-Nov-2023 and 08-Nov-2023:  - Patient receiving wound care.  - Per Orthopaedic Surgery, if patient tolerating bedside VAC changes, patient would not need surgical intervention on 13-Nov-2023.  - Patient currently on fluconazole per Infectious Disease.     2.) Diabetes mellitus type II with severe diabetic neuropathy:  - Patient usually on jardiance, ozempic and metformin as outpatient but these were held. Metformin and jardiance have been restarted. Ozempic is not on formulary and will be resumed as outpatient.  - Patient to be monitored on insulin sliding scale.  - Patient on gabapentin for diabetic neuropathy.  - Patient would like to avoid insulin if at all possible.     3.) Chronic kidney disease, appears to be stage II:  - Renal function appears to be around baseline.  - Avoiding nephrotoxins.     4.) GERD:  - Patient on PPI.     5.) History of thyroid malignancy s/p thyroidectomy; patient has post-operative hypothyroidism:  - Patient on levothyroxine 150 mcg daily.  - Further surveillance as outpatient.     6.) Hyperlipidemia:  - Patient to continue pravastatin.     7.) Obstructive sleep apnea:  - Patient does not use CPAP due to claustrophobia.  - Patient scheduled to have Inspire placed in December.     8.) Insomnia:  - Patient usually on ambien as outpatient but this is currently on hold.          Diet: Advance Diet as Tolerated: Regular Diet Adult  Snacks/Supplements Adult: Other; Please allow pt to order double entrees; Between Meals    Jones Catheter: Not present  Lines: None     Cardiac Monitoring: None  Code Status: Full Code      Clinically Significant Risk Factors  "             # Hypoalbuminemia: Lowest albumin = 3.4 g/dL at 11/5/2023  5:49 AM, will monitor as appropriate           # DMII: A1C = 7.3 % (Ref range: <5.7 %) within past 6 months   # Overweight: Estimated body mass index is 28.07 kg/m  as calculated from the following:    Height as of this encounter: 1.778 m (5' 10\").    Weight as of this encounter: 88.7 kg (195 lb 9.6 oz).      # Financial/Environmental Concerns: none         Disposition Plan      Expected Discharge Date: 11/13/2023,  3:00 PM    Destination: home with family;home with help/services  Discharge Comments: surg on 11/13            Jamel Cabrera MD  Hospitalist Service, GOLD TEAM 17  [facility]  Securely message with [employer] (more info)  Text page via [employer] Paging/Directory   See signed in provider for up to date coverage information  ______________________________________________________________________    Interval History     Patient noted feeling well, noted no new problems.    Physical Exam   Vital Signs: Temp: 98.6  F (37  C) Temp src: Oral BP: 132/68 Pulse: 94   Resp: 16 SpO2: 96 % O2 Device: None (Room air)    Weight: 195 lbs 9.6 oz    General:Patient comfortable, NAD.    Medical Decision Making           "

## 2023-11-13 NOTE — PROGRESS NOTES
[facility] GENERAL INFECTIOUS DISEASES: PROGRESS NOTE     Patient:  Nehemiah Magallon   YOB: 1965, MRN: [id number]  Date of Visit: 11/13/2023  Date of Admission: 11/2/2023          Assessment and Recommendations:     ID Problem List:  Right foot chronic contiguous osteomyelitis with hardware   Worsening chronic osteomyelitis noted on CT 10/24/2023 with soft tissue swelling tibiotalar fusion and loosening of hardware plus Lisfranc deformity and ill-defined erosion of the lateral talus/distal fibula  11/2 s/p I&D for right ankle and foot hardware removal, cultures - Candida albicans  11/8 s/p I&D, antibiotic beads removal, wound vac placement - no cultures, pathology were sent  Plan to RTOR likely 11/13  S/p R Charcot right foot  reconstruction 11/17/2022 complicated by postop wound infection  1/20/23 - started on Pip/Tazobactam and Vancomycin IV   1/21/23 -  s/p I+D of right foot, placement of wound vac and antibiotic beads. Staph epidermidisx2 (oxacillin Resistant) and Staph caprae. (Each S to doxycycline)  2/15/23 - Vancomycin IV and Rifampin were stopped and started on Doxycycline.  2/24/23 - Doxycycline was stopped (papular lesions on his body, back, arms, associated with itching)  Severe peripheral neuropathy  T2DM (A1c 7.3 10/30/2023)  CKD  Hepatitis C status posttreatment (2017)    Discussion:  58-year-old male with past medical history significant for type 2 diabetes, severe peripheral neuropathy, CKD, right Charcot right foot requiring reconstruction on 11/17/2022 complicated by post wound infection treated with antibiotics unfortunately progressing to osteomyelitis requiring I&D of the right foot and placement of wound VAC/antibiotic beads on 1/2123 (MRSE and staph caprae).  He presents after worsening osteomyelitis noted on CT imaging now status post right foot and ankle I&D with hardware removal on 11/2/2023.  Initially on empiric Zosyn and vancomycin, and then on fluconazole based on intra-op  cultures growth of Candida albicans (S to fluconazole). Of note, no use of antimicrobials prior to this admission. RTOR 11/8, s/p I&D, antibiotic beads removal, wound vac placement. Op findings with purulent material noted and debrided extensively to bone. No cultures or pathology specimens were obtained.     Initially had plan to RTOR likely  today 11/13, but no plans today per ortho, and plan is to discharge layter today     Recommendations:  - Continue Fluconazole 400mg janee  - Minimum 6 week course, but anticipate longer given candida osteo, IDSA guidelines recommend 6 months minimum for candida osteoarticular infections, final TBD at ID clinic follow up  - ID clinic follow up with Dr Argueta in 6 weeks (have requested to our scheduling)  - Inpatient ID will sign off, pls call if qs    I have reviewed interval events, vital, labs, images, cultures and antibiotics    Total time on day of visit including chart review, visit, counseling, documentation and coordination of care: 50 minutes    Dw primary    Inocente Freeman  Staff Physician  Division of Infectious Diseases           Interval History and Events:     Seen and examined at bedside. No particular complaints are reported. No RTOR planned. Pt reports planned doischarge at 3 pm today. Remains on Fluconazole, tolerating. Denies fevers, chills, new issues w foot         HPI per initial ID consult on 11/3/2023:     Nehemiah Magallon is a 58 year old male patient (retired RN, [employer]) with a past medical history significant for non-insulin dependent T2DM with retinopathy, legal blindness, severe peripheral neuropathy (zero sensation from knees down), papillary thyroid malignancy in remission s/p thyroidectomy and subsequent hypothyroidism, GERD, MARGARITO not on CPAP, CKD, Hep C s/p treatment (2017), HTN, migraines, anemia, NAFLD, colectomy for diverticulitis (2014), cholecystectomy, bilateral hearing loss and using bilateral hearing aids, chronic pain, s/p right      He  now presents given worsening osteomyelitis on the R foot and ankle.  He had previous right foot Charcot reconstruction on 11/17/2022 which was complicated by postoperative infection (see full history below).  He required long-term antibiotics.  He has now been off antibiotics [facility] for soft tissue swelling, tibiotalar joint effusion, loosening of the hardware, Lisfranc deformity, and findings suggestive of osteomyelitis; ill-defined erosions in the lateral talus and distal fibula. Patient was [facility] after undergoing I&D R ankle and foot with hardware removal 11/2/23, subsequently started on vancomycin and Zosyn.  Cultures currently pending.     His past infectious disease history is as following:  S/p right Charcot foot reconstruction on 11/17/2022. Periop he recieved Cefazolin. He was then discharged on Augmentin x 7 days.  He was using a cast for 2.5-3 weeks post surgery. He then noticed a scab but no drainage. 1/15/23, he noticed purulent drainage and deepening of the wound. He was prescribed with Augmentin 875 mg po q12 hr x 14 days on 1/16/23.  H     He saw Dr Starr on 1/20/23 and directly admitted on 1/20/2023 from Ortho clinic for increasing drainage and wound necrosis of right foot surgical sites. Wound specimen was obtained before starting empiric Pip/tazobactam and Vancomycin IV on 1/20/23. He underwent I+D of right foot, placement of wound vac and antibiotic beads on 1/21/23 .      Intra-op findings: Medial wound: purulent material, probed directly to plate and bone; Lateral wound: no purulence, probed to bone, no hardware present laterally. Intra-Op cultures on 1/21/23 and 1/20/23 grew Staph epidermidisx2 ( oxacillin Resistant)  and Staph caprae. He was discharged on Vancomycin IV and Rifampin 300 mg po q12 hr ( activity against biofilm, due to hardware in place) .          Antimicrobial history:     Current:  Fluconazole 11/4 -  present    Prior:  Vancomycin 11/2 - 11/4  Pip-tazo 11/2 - 11/6  Doxycycline through 2/24/2023  Vancomycin, Rifampin 1/20 - 2/15, 2023         Physical Examination:   Temp:  [98.6  F (37  C)-98.7  F (37.1  C)] 98.6  F (37  C)  Pulse:  [81-94] 94  Resp:  [16] 16  BP: (116-132)/(63-68) 132/68  SpO2:  [94 %-96 %] 96 %    No intake/output data recorded.    Vitals:    11/02/23 0939 11/10/23 1433   Weight: 89.7 kg (197 lb 12 oz) 88.7 kg (195 lb 9.6 oz)     EXAM:   GEN: Pleasant and cooperative male, not in acute distress, sitting up in bed  Respiratory: No increased work of breathing,   Skin: No visible rash  Musculoskeletal: s/p RLE in post surgical dressing, ace wrap and wound vac in place  Neuropsychiatry: grossly non focal      Labs, Microbiology and Imaging studies reviewed.       11/3 MRSA nares negative  11/2 intra-op cultures candida albicans    10/30 XR foot, and ankle   1. Stable post surgical changes along the medial aspect of the right foot.  2. Charcot changes in the right midfoot, similar to prior comparison exam.         Laboratory Data:     Inflammatory Markers    Recent Labs   Lab Test 10/30/23  0912 05/12/23  0907 02/06/23  1430 01/26/23  0953 01/23/23  0546 01/22/23  0839 01/20/23  1211   SED 44* 7  --   --   --   --  59*   CRP  --   --  <2.9 7.4 14.0*   < > 69.0*    < > = values in this interval not displayed.       Metabolic Studies     Recent Labs   Lab Test 11/13/23  0815 11/09/23  0741 11/09/23  0530 11/08/23  0904 11/08/23  0745 11/05/23  0732 11/05/23  0549 11/02/23  0941 10/30/23  0912 01/21/23  1722 01/21/23  1559   NA  --   --  137  --  136  --  140   < > 139   < > 143   POTASSIUM  --   --  4.5  --  4.2  --  4.5   < > 4.6   < > 4.1   CHLORIDE  --   --  101  --  102  --  107   < > 102   < > 110*   CO2  --   --  26  --  26  --  27   < > 24   < > 25   ANIONGAP  --   --  10  --  8  --  6*   < > 13   < > 8   BUN  --   --  23.0  --  19.2  --  12.0   < > 20.6   < > 24   CR  --   --  1.16  --  1.09   --  1.27*   < > 1.32*   < > 1.21   GFRESTIMATED  --   --  73  --  78  --  65   < > 62   < > 69   *   < > 207*   < > 152*   < > 156*   < > 210*   < > 166*   A1C  --   --   --   --   --   --   --   --  7.3*  --   --    ALICE  --   --  9.5  --  9.6  --  9.0   < > 10.1*   < > 9.0   MAG  --   --   --   --   --   --   --   --   --   --  2.0    < > = values in this interval not displayed.       Hepatic Studies    Recent Labs   Lab Test 11/05/23  0549 03/08/23  0941 02/13/23  1425   BILITOTAL <0.2 0.2 <0.2   ALKPHOS 58 61 71   ALBUMIN 3.4* 4.4 4.1   AST 8 28 23   ALT 5 36 17       Hematology Studies      Recent Labs   Lab Test 11/11/23  0802 11/08/23  0745 11/05/23  0549   WBC 9.3 10.3 8.2   HGB 10.1* 9.9* 9.8*   HCT 33.7* 32.1* 31.8*    373 324     Vancomycin Levels     Recent Labs   Lab Test 11/04/23  0851 02/13/23  1425 02/06/23  1430   VANCOMYCIN 15.6 15.3 14.7       Microbiology  Lab Results   Component Value Date    CULTURE No anaerobic organisms isolated 11/02/2023    CULTURE Yeast (A) 11/02/2023    CULTURE 1+ Candida albicans (A) 11/02/2023

## 2023-11-13 NOTE — PROGRESS NOTES
11/13/23  Care Management Follow Up    CHW scheduled a discharge ride for pt for 1530 through [employer] ([phone]). Ride  is Care transport. RNCC following, Charge RN, bedside RN, and pt were notified.    UPDATE:  Ride was rescheduled for 1800 with the same vendor. Everyone was notified.    Tarsha Cavazos  Inpatient CHW  [facility] 5 Ortho, 8 Med Surge, & ED  PH: [phone]

## 2023-11-13 NOTE — PROGRESS NOTES
Care Management Discharge Note    Discharge Date: 11/14/2023       Discharge Disposition: Home Care    [facility] [place]   Phone  [phone]  Fax  [phone]      Discharge Services: Skilled nursing     Discharge DME: Wound Vac    Discharge Transportation: [employer], 1530 pickup today.  Private pay costs discussed: Not applicable    Does the patient's insurance plan have a 3 day qualifying hospital stay waiver?  No    PAS Confirmation Code:  Na  Patient/family educated on Medicare website which has current facility and service quality ratings: no    Education Provided on the Discharge Plan: Yes  Persons Notified of Discharge Plans: patient  Patient/Family in Agreement with the Plan: yes    Handoff Referral Completed: Yes    Additional Information:  Finalizing discharge plans.  did not received wound vac orders on 11/10. New order for wound vac placed this morning. [facility] wound vac. VA authorization referral number: [id number]. Waiting for 3M to release wound vac. [facility] has accepted the patient for skilled nursing. Patient needs M/W/Th wound vac dressing changes. [facility] to assist patient with ordering supplies requested by Dr. Starr. All additional wound care supplies ordered by Dr. Starr and will show up on patient's discharge orders. Transportation set up by W through [employer]. [employer] transport time is 1530 this afternoon. No further discharge concerns at this time. RNCC available as needed.    Update 1509:  has not approved wound vac. Waiting for approval so wound vac can be released and patient can discharge home. Patient's transportation rescheduled for 6pm this evening. Patient notified. RNCC will continue to follow.    Veronica Park RN, BSN  Care Coordinator, 5 Ortho  Phone [phone]  Pager [phone]

## 2023-11-13 NOTE — PROGRESS NOTES
"VS: /68 (BP Location: Left arm)   Pulse 94   Temp 98.6  F (37  C) (Oral)   Resp 16   Ht 1.778 m (5' 10\")   Wt 88.7 kg (195 lb 9.6 oz)   SpO2 96%   BMI 28.07 kg/m       O2: Stable on RA, denied SOB & CP   Output: Spontaneously voids to bathroom without difficulty, continent   Last BM: 11/13/23, continent   Activity: NWB R foot except for independent transfers to/from    Skin: Surgical incisions on L foot, do not remove per order. Changed today by WOC RN   Pain: Denied   CMS: A/O x4, denied N/T, baseline N/T on all extremities   Dressing: CDI    Diet: Reg/thin, BG checks 4x/day   LDA: R foot wound vac running @ 125 mmHg   Plan: Discharging  330pm today set up by . Waiting for wd vac to arrive.   Additional Info: MIC hearing aids & glasses worn     "

## 2023-11-13 NOTE — PLAN OF CARE
"Pt. discharged at 1745 via wheelchair to home. Pt. was accompanied by [employer], and left with personal belongings. Prior to discharge, PIV was removed. Pt. received complete discharge paperwork and wound vac along with wound vac supplies. Pt. was given times of last dose for all discharge medications in writing on discharge medication sheets.  Discharge teaching included medication management, pain management, activity restrictions, dressing changes, and signs and symptoms of infection. Pt. to follow up with provider/home care as scheduled. Pt. had no further questions at the time of discharge and no unmet needs were identified. Pt declined all nursing cares, medications and assessments prior to discharge and stated \"I just want to get home and I will take care of myself and everything once I get home\".  "

## 2023-11-13 NOTE — PLAN OF CARE
Goal Outcome Evaluation:      Plan of Care Reviewed With: patient    Overall Patient Progress: improvingOverall Patient Progress: improving    Outcome Evaluation: Preparing for discharge at 1530 today. CM has ride arranged and wd vac arranged to be delivered. MD present now to complete discharge.

## 2023-11-13 NOTE — PLAN OF CARE
Goal Outcome Evaluation:      Plan of Care Reviewed With: patient    Overall Patient Progress: improvingOverall Patient Progress: improving    Outcome Evaluation: PT FOR d/c TODAY AT 1500 VIA [employer] ride, pt states ready, complained of left foot nerve pain medication applied, refused Lidocaine says does not help. Pt able to transfer seft to wheel chair back and forth to bathroom.

## 2023-11-14 NOTE — DISCHARGE SUMMARY
"[facility]  Hospitalist Discharge Summary      Date of Admission:  02-Nov-2023  Date of Discharge:   13-Nov-2023  Discharging Provider: Jamel Cabrera MD  Discharge Service: Hospitalist Service, GOLD TEAM 17    Discharge Diagnoses   1.) Right foot and ankle surgical site infection   2.) Diabetes mellitus type II with severe diabetic neuropathy   3.) Chronic kidney disease, appears to be stage II   4.) GERD  5.) History of thyroid malignancy s/p thyroidectomy; patient has post-operative hypothyroidism   6.) Hyperlipidemia  7.) Obstructive sleep apnea   8.) Insomnia    Clinically Significant Risk Factors     # DMII: A1C = 7.3 % (Ref range: <5.7 %) within past 6 months  # Overweight: Estimated body mass index is 28.07 kg/m  as calculated from the following:    Height as of this encounter: 1.778 m (5' 10\").    Weight as of this encounter: 88.7 kg (195 lb 9.6 oz).       Follow-ups Needed After Discharge   Follow-up Appointments     Adult [facility] Follow-up and recommended labs and tests      Follow up with PCP within 1 week.  Follow up with Infectious Disease in 6 weeks.    Appointments on [place] and/or [facility] (with [facility] or [facility]   provider or service). Call [phone] if you haven't heard regarding   these appointments within 7 days of discharge.            Unresulted Labs Ordered in the Past 30 Days of this Admission       Date and Time Order Name Status Description    11/2/2023  4:26 PM Fungal or Yeast Culture Routine Preliminary     11/2/2023  3:28 PM Fungal or Yeast Culture Routine Preliminary     11/2/2023  3:28 PM Fungal or Yeast Culture Routine Preliminary             Discharge Disposition   - Patient was discharged to home    Hospital Course   Patient is a 59 y/o manwho has multiple medical problems including diabetes mellitus type II with retinopathy, legal blindness and severe peripheral neuropathy; papillary thyroid malignancy in remission s/p " thyroidectomy and with post-operative hypothyroidism; GERD; obstructive sleep apnea not on CPAP; chronic kidney disease; hepatitis C s/p treatment in 2017; hypertension; migraines; anemia; non-alcoholic fatty liver disease; past colectomy for diverticulitis in 2014; past cholecystectomy; bilateral hearing loss and using bilateral hearing aids; chronic pain; past right Charcot foot reconstruction in Jan-2022 complicated by post-operative wound infection progressing to osteomyelitis.      Patient had been hospitalized in Jan-2023 and underwent I&D of right foot and placement of wound vac and antibiotic beads on 21-Jan-2023. Intra-operative cultures grew Staphylococcus epidermidis (oxacillin resistant) and Staphylococcus caprae. Patient was discharged on IV vancomycin and rifampin. Vancomycin and rifampin were discontinued on 15-Feb-2023 and patient was started on oral doxycyline which was associated with a rash. Doxycycline was stopped on 24-Feb-2023.      [facility] which was concerning for soft tissue swelling, tibiotalar joint effusion, loosening of hardware, Lisfranc deformity and findings suggestive of osteomyelitis. Patient was admitted to [facility] on 02-Nov-2023.    1.) Right foot and ankle surgical site infection:  - Patient underwent I&D right ankle and foot with hardware removal on 02-Nov-2023.   - Patient underwent irrigation and debridement right foot and wound vac application right foot for necrosis of surgical wound on 08-Nov-2023   - Patient was initially on broad spectrum antibiotics but, as cultures only grew Candida albicans, patient is now off antibiotics and is on fluconazole.   - Patient was discharged on fluconazole. Patient to f/u with Infectious Disease in 6 weeks. Final duration of fluconazole therapy to be determined as outpatient.    2.) Diabetes mellitus type II with severe diabetic neuropathy:   - Patient was continued on jardiance and  metformin during hospital stay. Ozempic was on hold due to it not being on formulary.  - Patient to resume jardiance, metformin and ozempic as outpatient.  - Patient to remain on gabapentin for diabetic neuropathy.  - Patient expressed desire to avoid insulin if at all possible.    3.) Chronic kidney disease, appears to be stage II:  - Renal function was at baseline at time of discharge.    4.) GERD:  - Patient was continued on PPI during hospital stay.    5.) History of thyroid malignancy s/p thyroidectomy; patient has post-operative hypothyroidism:  - Patient was continued on levothyroxine 150 mcg daily.  - Further surveillance as outpatient.    6.) Hyperlipidemia:  - Patient was continued pravastatin.    7.) Obstructive sleep apnea:  - Patient does not use CPAP due to claustrophobia.  - Patient scheduled to have Inspire placed in December.    8.) Insomnia:  - Patient usually on ambien as outpatient but this was on hold during hospital stay. Ambien was resumed upon discharge.       Consultations This Hospital Stay   INTERNAL MEDICINE ADULT IP CONSULT [facility]  INFECTIOUS DISEASE [facility] ADULT IP CONSULT  PHYSICAL THERAPY ADULT IP CONSULT  PHARMACY TO DOSE VANCO  VASCULAR ACCESS ADULT IP CONSULT  CARE MANAGEMENT / SOCIAL WORK IP CONSULT  WOUND OSTOMY CONTINENCE NURSE  IP CONSULT  CARE MANAGEMENT / SOCIAL WORK IP CONSULT  OCCUPATIONAL THERAPY ADULT IP CONSULT    Code Status   Full Code    Time Spent on this Encounter   - Time spent discharging patient was 35 minutes.       Jamel Cabrera MD  [facility] MED SURG ORTHOPEDIC  [address] [place] [id number]  Phone: [phone]  Fax: [phone]  ______________________________________________________________________    Physical Exam   Vital Signs: Temp: 98.6  F (37  C) Temp src: Oral BP: 132/68 Pulse: 94   Resp: 16 SpO2: 96 % O2 Device: None (Room air)    Weight: 195 lbs 9.6 oz    General:Patient comfortable, NAD.         Primary Care Physician   Ochoa Echevarria    Discharge Orders      Home Care Referral      Reason for your hospital stay    Right foot and ankle surgical site infection     Activity    Your activity upon discharge:   - Non-weightbearing on right lower extremity. Okay to place weight on right leg for transfers. Use appropriate assistive gait devices and assistance for ambulation.  -May be up ad pancho for ADLs but encourage flat bedrest with the foot and ankle of the operative lower extremity elevated above the level of the heart and iced as much of the time as possible for the first 2 weeks postop.  Elevate heel off of the bed     Adult [facility] Follow-up and recommended labs and tests    Follow up with PCP within 1 week.  Follow up with Infectious Disease in 6 weeks.    Appointments on [place] and/or [facility] (with [facility] or [facility] provider or service). Call [phone] if you haven't heard regarding these appointments within 7 days of discharge.     [facility] Walker DME    DME Documentation: Describe the reason for need to support medical necessity: Impaired gait due to Foot/Ankle surgery. Anticipated length of need: 3 months     Walker DME    DME Documentation: Describe the reason for need to support medical necessity: Impaired gait due to Foot/Ankle surgery. Anticipated length of need: 3 months     Walker Order    I, the undersigned, certify that the above prescribed supplies are medically necessary for this patient and is both reasonable and necessary in reference to accepted standards of medical and necessary in reference to accepted standards of medical practice in the treatment of this patient's condition and is not prescribed as a convenience.      Wound Care Order for DME - ONLY FOR DME    I, the undersigned, certify that the above prescribed supplies are medically necessary for this patient and is both reasonable and necessary in reference to accepted standards of medical and necessary in reference to  accepted standards of medical practice in the treatment of this patient's condition and is not prescribed as a convenience.       Also needs Cavilon advanced skin prep to protect skin  Also needs gloves for dressing changes  Also needs vac supplies (separate order)     Miscellaneous Order for DME - ONLY FOR DME    I, the undersigned, certify that the above prescribed supplies are medically necessary for this patient and is both reasonable and necessary in reference to accepted standards of medical and necessary in reference to accepted standards of medical practice in the treatment of this patient's condition and is not prescribed as a convenience.      Miscellaneous Order for DME - ONLY FOR DME    I, the undersigned, certify that the above prescribed supplies are medically necessary for this patient and is both reasonable and necessary in reference to accepted standards of medical and necessary in reference to accepted standards of medical practice in the treatment of this patient's condition and is not prescribed as a convenience.      Miscellaneous Order for DME - ONLY FOR DME    DME Documentation:   Describe the reason for need to support medical necessity: ongoing vac changes that will be done by wound nurse.     I, the undersigned, certify that the above prescribed supplies are medically necessary for this patient and is both reasonable and necessary in reference to accepted standards of medical and necessary in reference to accepted standards of medical practice in the treatment of this patient's condition and is not prescribed as a convenience.     Walker Order    I, the undersigned, certify that the above prescribed supplies are medically necessary for this patient and is both reasonable and necessary in reference to accepted standards of medical and necessary in reference to accepted standards of medical practice in the treatment of this patient's condition and is not prescribed as a convenience.      Diet  "   Follow this diet upon discharge: Regular diet       Significant Results and Procedures   - None    Discharge Medications   Current Discharge Medication List        START taking these medications    Details   fluconazole (DIFLUCAN) 200 MG tablet Take 2 tablets (400 mg) by mouth daily  Qty: 60 tablet, Refills: 1    Associated Diagnoses: Wound infection      lactobacillus rhamnosus, GG, (CULTURELL) capsule Take 1 capsule by mouth 2 times daily  Qty: 60 capsule, Refills: 0    Associated Diagnoses: Wound infection           CONTINUE these medications which have CHANGED    Details   acetaminophen (TYLENOL) 325 MG tablet Take 2 tablets (650 mg) by mouth every 4 hours as needed for other (For optimal non-opioid multimodal pain management to improve pain control.)    Associated Diagnoses: Wound infection      diclofenac (VOLTAREN) 1 % topical gel Apply 4 g topically every evening    Associated Diagnoses: Right foot pain           CONTINUE these medications which have NOT CHANGED    Details   Calcium-Vitamin D 500-3.125 MG-MCG TABS Take 1 tablet by mouth 2 times daily      carboxymethylcellulose PF (REFRESH PLUS) 0.5 % ophthalmic solution 1 drop 3 times daily as needed for dry eyes      cetirizine (ZYRTEC) 10 MG tablet Take 10 mg by mouth every morning      Cyanocobalamin 1000 MCG CAPS Take 2,000 mcg by mouth every morning      empagliflozin (JARDIANCE) 25 MG TABS tablet Take 25 mg by mouth every morning      fluticasone (FLONASE) 50 MCG/ACT nasal spray Spray 2 sprays into both nostrils every morning      folic acid (FOLVITE) 1 MG tablet Take 1 mg by mouth every morning      gabapentin (NEURONTIN) 600 MG tablet Take 1 tablet by mouth 3 times daily      Gauze Pads & Dressings ([facility] DRESSING) 4\"X8\" PADS 1 Units daily  Qty: 45 each, Refills: 1    Comments: Note to [facility] pharmacy: please mail out to the   Associated Diagnoses: Necrosis of surgical wound (H)      levothyroxine (SYNTHROID/LEVOTHROID) " 150 MCG tablet Take 150 mcg by mouth every morning      lidocaine (LIDODERM) 5 % patch Apply up to 3 patches to painful area at once for up to 12 h within a 24 h period.  Remove after 12 hours.  Qty: 90 patch, Refills: 0    Associated Diagnoses: Chronic pain syndrome      linaclotide (LINZESS) 145 MCG capsule Take 1 capsule by mouth every morning      metFORMIN (GLUCOPHAGE XR) 500 MG 24 hr tablet Take 1,000 mg by mouth 2 times daily (with meals)      omeprazole (PRILOSEC) 20 MG DR capsule Take 20 mg by mouth every morning      polyethylene glycol (MIRALAX) 17 g packet Take 17 g by mouth daily  Qty: 10 packet, Refills: 0    Associated Diagnoses: Midfoot collapse of right lower extremity      pravastatin (PRAVACHOL) 40 MG tablet Take 20 mg by mouth every evening      Semaglutide (OZEMPIC, 1 MG/DOSE, SC) Inject 2 mg Subcutaneous once a week Sunday's      senna-docusate (SENOKOT-S/PERICOLACE) 8.6-50 MG tablet Take 1 tablet by mouth 2 times daily  Qty: 60 tablet, Refills: 0    Associated Diagnoses: Wound infection      sodium fluoride dental gel (PREVIDENT) 1.1 % GEL topical gel BRUSH SMALL AMOUNT MOUTH EVERY MORNING AND AT BEDTIME ON TOOTHBRUSH, BRUSH FOR 2 MINUTES.      SUMAtriptan (IMITREX) 25 MG tablet Take 25 mg by mouth at onset of headache      topiramate (TOPAMAX) 100 MG tablet Take 150 mg by mouth every morning      Vitamin D3 (CHOLECALCIFEROL) 25 mcg (1000 units) tablet Take 50 mcg by mouth every morning      zolpidem ER (AMBIEN CR) 12.5 MG CR tablet Take 12.5 mg by mouth nightly as needed for sleep           STOP taking these medications       ammonium lactate (LAC-HYDRIN) 12 % external lotion Comments:   Reason for Stopping:         ammonium lactate (LAC-HYDRIN) 12 % external lotion Comments:   Reason for Stopping:         Cody Moisture Barrier external cream Comments:   Reason for Stopping:         Wound Dressings (MEPILEX BORDER FLEX) PADS Comments:   Reason for Stopping:             Allergies    Allergies   Allergen Reactions    Doxycycline Rash     Very extensive full body rash lasting for several months. Given at same time as Rifampin.    Rifampin Rash     Very extensive full body rash lasting for several months. Given at the same time as doxycycline.    Atorvastatin      Other reaction(s): Hyperglycemia    Celebrex [Celecoxib] Other (See Comments)     [facility]

## 2023-11-14 NOTE — PROGRESS NOTES
[facility] Care [facility]    Background: Transitional Care Management program identified per system criteria and reviewed by [facility] team for possible outreach.    Assessment: Upon chart review, CCRC Team member will not proceed with patient outreach related to this episode of Transitional Care Management program due to reason below:    Patient declined to answer all post hospital discharge questions with CCRC team member and disconnected call.    Plan: Transitional Care Management episode addressed appropriately per reason noted above.      JW Mann  [facility] Resource [facility]    *Connected Care Resource Team does NOT follow patient ongoing. Referrals are identified based on internal discharge reports and the outreach is to ensure patient has an understanding of their discharge instructions.

## 2023-11-20 NOTE — PROGRESS NOTES
[unfilled]    Suture removal    Date/Time: 11/20/2023 10:38 AM    Performed by: Nurse, [facility]  Authorized by: Bryon Starr MD  Body area: lower extremity  Location details: right foot      UNIVERSAL PROTOCOL   Site Marked: NA  Prior Images Obtained and Reviewed:  NA  Required items: Required blood products, implants, devices and special equipment available    Patient identity confirmed:  Verbally with patient  Patient was reevaluated immediately before administering moderate or deep sedation or anesthesia  Confirmation Checklist:  Patient's identity using two indicators, relevant allergies, procedure was appropriate and matched the consent or emergent situation and correct equipment/implants were available  Time out: Immediately prior to the procedure a time out was called    Universal Protocol: the Joint Commission Universal Protocol was followed    Preparation: Patient was prepped and draped in usual sterile fashion      Wound Appearance: red, draining and moist  Sutures Removed: 2  Post-removal: dressing applied      PROCEDURE    Patient Tolerance:  Patient tolerated the procedure well with no immediate complications  Length of time physician/provider present for 1:1 monitoring during sedation: 0

## 2023-11-22 NOTE — TELEPHONE ENCOUNTER
Galina Jung LPN, called ortho intake.  She was reporting S/S for patient as she was redressing his wound and inspecting wound vac.  Patient was reporting feeling feverish and chills, weakness and shakiness.  He reported elevated blood sugar levels and admitted to improper insulin intake.  There was some pain in the foot, but once LPN moved wound vac tube, pain subsided.  Lexus notes that her RN, Atul, will be seeing the patient on Friday, 11/24, and they are wondering if our clinic wants to order any labs or tests for the patient.  ATC will consult with RNCC for follow up.        JACKLYN Mojica: [phone]  Atul RN: [phone]    Hugo Christine MS, ATC, LAT, [facility]  Dr. Kal Starr

## 2023-11-22 NOTE — TELEPHONE ENCOUNTER
RN called [facility]. Patient has been having uncontrolled blood sugars as high as 300. Not taking his insulin. Not eating. Patient also has a slight fever off 99.4.     RN verbalized that CBC and CRP can be taken on Friday. Patient should control his blood sugars. If he has immediate concerns about symptoms he should present to the ED    Brandie Mayo RN

## 2023-11-23 PROBLEM — A41.9 SEPSIS, DUE TO UNSPECIFIED ORGANISM, UNSPECIFIED WHETHER ACUTE ORGAN DYSFUNCTION PRESENT (H): Status: ACTIVE | Noted: 2023-01-01

## 2023-11-23 NOTE — PHARMACY-VANCOMYCIN DOSING SERVICE
Pharmacy Vancomycin Initial Note  Date of Service 2023  Patient's  1965  58 year old, male    Indication: Sepsis    Current estimated CrCl = Estimated Creatinine Clearance: 64.2 mL/min (A) (based on SCr of 1.39 mg/dL (H)).    Creatinine for last 3 days  2023:  2:08 PM Creatinine 1.39 mg/dL    Recent Vancomycin Level(s) for last 3 days  No results found for requested labs within last 3 days.      Vancomycin IV Administrations (past 72 hours)        No vancomycin orders with administrations in past 72 hours.                    Nephrotoxins and other renal medications (From now, onward)      Start     Dose/Rate Route Frequency Ordered Stop    23 1600  vancomycin (VANCOCIN) 1,500 mg in 0.9% NaCl 250 mL intermittent infusion         1,500 mg  over 90 Minutes Intravenous EVERY 24 HOURS 23 1548      23 1500  vancomycin (VANCOCIN) 2,250 mg in sodium chloride 0.9 % 500 mL intermittent infusion         25 mg/kg × 88.7 kg  over 120 Minutes Intravenous ONCE 23 1434              Contrast Orders - past 72 hours (72h ago, onward)      None            [employer] Prediction of Planned Initial Vancomycin Regimen  Loading dose: 2250 mg at 16:00 2023.  Regimen: 1500 mg IV every 24 hours.  Start time: 16:00 on 2023  Exposure target: AUC24 (range)400-600 mg/L.hr   AUC24,ss: 493 mg/L.hr  Probability of AUC24 > 400: 73 %  Ctrough,ss: 14.1 mg/L  Probability of Ctrough,ss > 20: 22 %  Probability of nephrotoxicity (Lodise CANELO ): 9 %          Plan:  Start vancomycin  2250 mg IV x1, then 1500 mg IV q24h.   Vancomycin monitoring method: AUC  Vancomycin therapeutic monitoring goal: 400-600 mg*h/L  Pharmacy will check vancomycin levels as appropriate in 1-3 Days.    Serum creatinine levels will be ordered daily for the first week of therapy and at least twice weekly for subsequent weeks.      Brianna Rivero, [facility]

## 2023-11-23 NOTE — ED TRIAGE NOTES
Pt BIBA for infection in right foot. Vomiting, increased weakness and having fever for last 4 days. 102.9 temperature for EMS. Glucose 240. 500 mL of NS given by EMS. Wound vac in place for foot.

## 2023-11-23 NOTE — H&P
[facility]    History and Physical - Hospitalist Service, GOLD TEAM        Date of Admission:  11/23/2023    Assessment & Plan      Nehemiah Magallon is a 58 year old male with a pmh of T2DM with severe peripheral neuropathy, papillary thyroid malignancy in remission s/p thyroidectomy, HLD, and right Charcot foot reconstruction in 1/2022 complicated by post operative wound infection progressing to osteomyelitis.    # Right Foot Osteomyelitis  # Sepsis  # Candida Albicans infection  # Right Charcot foot reconstruction 1/2022  - Initially had Charcot right foot reconstruction in 1/2022. Patient was later hospitalized and I&D in 1/2023 had intra operative cultures growing Staphylococcus epidermis (oxacillin resistant) and Staphylococcus caprae.  During most recent admission in 11/2023 I & D cultures grew Candida albicans and he was discharged on 6 weeks of fluconazole.  - Started on Vancomycin and zosyn in the ED, blood cultures and UA sent.   and wbc 19.8  - Patient has right leg/ankle wound vac in place with full canister; we have ordered a replacement canister  - Orthopedic surgery consult and wound care consult  - Xray imaging concerning for osteomyelitis; will defer to Ortho if MRI is needed  - He has an outpatient follow up with Infectious diseases in 5 weeks.  - Currently has an outpatient home health RN coming every M/W/F.    # T2DM  # Severe Peripheral Neuropathy  - will continue outpatient gabapentin  - Currently using sliding scale insulin this evening as he says his sugars have been elevated at 250-300 due to his current illness.  - As an outpatient he is on jardiance, metformin, and Ozempic    # Papillary thyroid malignancy in remission s/p thyroidectomy with associated hypothyroidism  - Will resume PTA levothyroxine    # Nausea  # Vomiting  - Will do low dose fluids overnight  - zofran as needed    # CKD stage 2  - Creatine upon admission is 1.39 up from  "1.16 during last admission    # HLD  - We have resumed PTA pravastatin    # MARGARITO  - Patient refuses CPAP due to claustrophobia  - Currently scheduled for inspire in December    GERD  - Continue PTA omeprazole          Diet: Combination Diet Regular Diet Adult    DVT Prophylaxis: SCDs for now, will see if I&D needed by Ortho tomorrow and then start levonox if not going to OR.  Jones Catheter: Not present  Lines: None     Cardiac Monitoring: None  Code Status: Full Code      Clinically Significant Risk Factors Present on Admission                      # DMII: A1C = 7.3 % (Ref range: <5.7 %) within past 6 months    # Overweight: Estimated body mass index is 28.07 kg/m  as calculated from the following:    Height as of 11/2/23: 1.778 m (5' 10\").    Weight as of 11/10/23: 88.7 kg (195 lb 9.6 oz).       # Financial/Environmental Concerns:           Disposition Plan [facility] medicine floor      Expected Discharge Date: 11/25/2023                The patient's care was discussed with the Attending Physician, Dr. Kowalski .    VERO CALDWELL PA-C  Hospitalist Service, [facility]  Securely message with [employer] (more info)  Text page via [facility] Paging/Directory   See signed in provider for up to date coverage information    ______________________________________________________________________    Chief Complaint       History is obtained from the patient    History of Present Illness   Nehemiah Magallon is a 58 year old male with a pmh of T2DM with severe peripheral neuropathy, papillary thyroid malignancy in remission s/p thyroidectomy, HTN, and right Charcot foot reconstruction in 1/2022 complicated by post operative wound infection progressing to osteomyelitis who presents with 4 days of right foot pain, fevers, chills, and vomiting.  The patient had been discharged on 11/13/2023 with fluconazole after I & D cultures only grew yeast.  Over the last 24 days his " symptoms worsened so much he had trouble moving around the house.  His wound vac is changed M/W/F by a home health RN and it was just changed yesterday.  The canister completely filled over the last 24 hours and stopped working.  One canister normally lasts his close to a week.  He denies chest pain, cough, SOB, abdominal pain, and diarrhea.  He will be admitted and transferred to the [facility] for orthopedic surgery follow up.      Past Medical History    Past Medical History:   Diagnosis Date    Chronic pain syndrome 10/24/2014    Diabetes mellitus, type 2 (H)     Diabetic Charcot foot (H)     Diabetic retinopathy associated with diabetes mellitus due to underlying condition (H)     Diverticulitis of colon     Gastroesophageal reflux disease     Hepatitis C 2017    treated    History of skin cancer     Hypertension     Hypothyroidism     Legally blind     Midfoot collapse of right lower extremity     Migraine     NAFLD (nonalcoholic fatty liver disease)     Nephrolithiasis     Neuropathy     MARGARITO (obstructive sleep apnea)     Rib pain 10/24/2014    S/P colectomy 10/14/2014    Thyroid cancer (H) 10/14/2014       Past Surgical History   Past Surgical History:   Procedure Laterality Date    ARTHRODESIS FOOT Right 11/17/2022    Procedure: Right midfoot/talonavicular osteotomy and reduction of deformity Right midfoot/talonavicular arthrodesis, achilles lengthening;  Surgeon: Bryon Starr MD;  Location: [facility] OR    CHOLECYSTECTOMY  1986    COLECTOMY      @ 6 years ago, sigmoid    COLONOSCOPY  2016    FOOT SURGERY Left 2021    IRRIGATION AND DEBRIDEMENT FOOT, COMBINED Right 1/21/2023    Procedure: IRRIGATION AND DEBRIDEMENT, FOOT, RIGHT, Placement of Wound Vac and Antibiotic Beads.;  Surgeon: Bryon Starr MD;  [facility]    IRRIGATION AND DEBRIDEMENT FOOT, COMBINED Right 11/2/2023    Procedure: Debridement and Irrigation of Right Ankle and Foot;  Surgeon: Bryon Starr MD;  [facility]    IRRIGATION AND  "DEBRIDEMENT FOOT, COMBINED Right 2023    Procedure: IRRIGATION AND DEBRIDEMENT, RIGHT FOOT, WOUND VAC APPLICATION;  Surgeon: Bryon Starr MD;  [facility]    LENGTHEN TENDON ACHILLES Right 2022    Procedure: LENGTHENING, TENDON, ACHILLES;  Surgeon: Bryon Starr MD;  [facility]    REMOVE HARDWARE FOOT Right 2023    Procedure: Remove Hardware Right Foot;  Surgeon: Bryon Starr MD;  [facility] OR       Prior to Admission Medications   Prior to Admission Medications   Prescriptions Last Dose Informant Patient Reported? Taking?   Calcium-Vitamin D 500-3.125 MG-MCG TABS   Yes No   Sig: Take 1 tablet by mouth 2 times daily   Cyanocobalamin 1000 MCG CAPS   Yes No   Sig: Take 2,000 mcg by mouth every morning   Gauze Pads & Dressings ([employer] ISLAND DRESSING) 4\"X8\" PADS   No No   Si Units daily   SUMAtriptan (IMITREX) 25 MG tablet   Yes No   Sig: Take 25 mg by mouth at onset of headache   Semaglutide (OZEMPIC, 1 MG/DOSE, SC)   Yes No   Sig: Inject 2 mg Subcutaneous once a week    Vitamin D3 (CHOLECALCIFEROL) 25 mcg (1000 units) tablet   Yes No   Sig: Take 50 mcg by mouth every morning   acetaminophen (TYLENOL) 325 MG tablet   No No   Sig: Take 2 tablets (650 mg) by mouth every 4 hours as needed for other (For optimal non-opioid multimodal pain management to improve pain control.)   carboxymethylcellulose PF (REFRESH PLUS) 0.5 % ophthalmic solution   Yes No   Si drop 3 times daily as needed for dry eyes   cetirizine (ZYRTEC) 10 MG tablet   Yes No   Sig: Take 10 mg by mouth every morning   diclofenac (VOLTAREN) 1 % topical gel   No No   Sig: Apply 4 g topically every evening   empagliflozin (JARDIANCE) 25 MG TABS tablet   Yes No   Sig: Take 25 mg by mouth every morning   fluconazole (DIFLUCAN) 200 MG tablet   No No   Sig: Take 2 tablets (400 mg) by mouth daily   fluticasone (FLONASE) 50 MCG/ACT nasal spray   Yes No   Sig: Spray 2 sprays into both nostrils every morning   folic " acid (FOLVITE) 1 MG tablet   Yes No   Sig: Take 1 mg by mouth every morning   gabapentin (NEURONTIN) 600 MG tablet   Yes No   Sig: Take 1 tablet by mouth 3 times daily   lactobacillus rhamnosus, GG, (CULTURELL) capsule   No No   Sig: Take 1 capsule by mouth 2 times daily   levothyroxine (SYNTHROID/LEVOTHROID) 150 MCG tablet   Yes No   Sig: Take 150 mcg by mouth every morning   lidocaine (LIDODERM) 5 % patch   No No   Sig: Apply up to 3 patches to painful area at once for up to 12 h within a 24 h period.  Remove after 12 hours.   Patient taking differently: Apply up to 3 patches to painful area at once for up to 12 h within a 24 h period.  Remove after 12 hours. L foot.   linaclotide (LINZESS) 145 MCG capsule   Yes No   Sig: Take 1 capsule by mouth every morning   metFORMIN (GLUCOPHAGE XR) 500 MG 24 hr tablet   Yes No   Sig: Take 1,000 mg by mouth 2 times daily (with meals)   omeprazole (PRILOSEC) 20 MG DR capsule   Yes No   Sig: Take 20 mg by mouth every morning   polyethylene glycol (MIRALAX) 17 g packet   No No   Sig: Take 17 g by mouth daily   pravastatin (PRAVACHOL) 40 MG tablet   Yes No   Sig: Take 20 mg by mouth every evening   senna-docusate (SENOKOT-S/PERICOLACE) 8.6-50 MG tablet   No No   Sig: Take 1 tablet by mouth 2 times daily   sodium fluoride dental gel (PREVIDENT) 1.1 % GEL topical gel   Yes No   Sig: BRUSH SMALL AMOUNT MOUTH EVERY MORNING AND AT BEDTIME ON TOOTHBRUSH, BRUSH FOR 2 MINUTES.   topiramate (TOPAMAX) 100 MG tablet   Yes No   Sig: Take 150 mg by mouth every morning   zolpidem ER (AMBIEN CR) 12.5 MG CR tablet   Yes No   Sig: Take 12.5 mg by mouth nightly as needed for sleep      Facility-Administered Medications: None        Review of Systems    The 10 point Review of Systems is negative other than noted in the HPI or here.     Physical Exam   Vital Signs: Temp: 99  F (37.2  C) Temp src: Oral BP: 99/59 Pulse: 96   Resp: 12 SpO2: 99 % O2 Device: None (Room air)    Weight: 0 lbs 0 oz    Gen:  well nourished, no acute distress  HEENT: no scleral icterus, moist mucous membranes, no cervical lymphadenopathy  Cardiac: RRR with no murmurs or rubs  Pulm: CTAB with no adventitious breath sounds  Abd: non distended and non tender to palpation, + BS  Extremities: Right lower leg and ankle wrapped with a wound vac in place.  Previous scars of left lower extremity surgery.  2+ capillary refill of bilateral toes.  Radial pulses 2+ bilaterally.  Neuro: CN 2-12 intact, bilateral lower extremity neuropathy  Integumentary: Right ankle wrapped; left mid shin ulceration/scab  Psych: appropriate affect    Medical Decision Making       40 MINUTES SPENT BY ME on the date of service doing chart review, history, exam, documentation & further activities per the note.      Data     I have personally reviewed the following data over the past 24 hrs:    19.8 (H)  \   11.2 (L)   / 257     135 98 21.9 /  233 (H)   4.5 21 (L) 1.39 (H) \     ALT: 10 AST: 13 AP: 78 TBILI: 0.7   ALB: 4.0 TOT PROTEIN: 8.0 LIPASE: N/A     Procal: N/A CRP: 339.00 (H) Lactic Acid: 1.7

## 2023-11-23 NOTE — ED PROVIDER NOTES
ED Provider Note  [facility]      History     Chief Complaint   Patient presents with    Foot Pain     HPI  Nehemiah Magallon is a 58 year old male with a history significant for with T2DM with severe peripheral neuropathy; papillary thyroid malignancy in remission s/p thyroidectomy and with post-operative hypothyroidism; hypertension; anemia;  chronic pain; past right Charcot foot reconstruction in Jan-2022 complicated by post-operative wound infection progressing to osteomyelitis. Pt presents to the ED today BIBA for infection of the R foot. Pt endorses worsening pain, frequent vomiting, weakness, and fever for the past 4 days. PTA his temp was 102.9.  No other symptoms noted.    Per chart review, pt was recently admitted from 11/2-11/13/23 for osteomyelitis of the R ankle/foot.    Past Medical History  Past Medical History:   Diagnosis Date    Chronic pain syndrome 10/24/2014    Diabetes mellitus, type 2 (H)     Diabetic Charcot foot (H)     Diabetic retinopathy associated with diabetes mellitus due to underlying condition (H)     Diverticulitis of colon     Gastroesophageal reflux disease     Hepatitis C 2017    treated    History of skin cancer     Hypertension     Hypothyroidism     Legally blind     Midfoot collapse of right lower extremity     Migraine     NAFLD (nonalcoholic fatty liver disease)     Nephrolithiasis     Neuropathy     MARGARITO (obstructive sleep apnea)     Rib pain 10/24/2014    S/P colectomy 10/14/2014    Thyroid cancer (H) 10/14/2014     Past Surgical History:   Procedure Laterality Date    ARTHRODESIS FOOT Right 11/17/2022    Procedure: Right midfoot/talonavicular osteotomy and reduction of deformity Right midfoot/talonavicular arthrodesis, achilles lengthening;  Surgeon: Bryon Starr MD;  [facility]    CHOLECYSTECTOMY  1986    COLECTOMY      @ 6 years ago, sigmoid    COLONOSCOPY  2016    FOOT SURGERY Left 2021    IRRIGATION AND DEBRIDEMENT FOOT, COMBINED Right  "1/21/2023    Procedure: IRRIGATION AND DEBRIDEMENT, FOOT, RIGHT, Placement of Wound Vac and Antibiotic Beads.;  Surgeon: Bryon Starr MD;  [facility]    IRRIGATION AND DEBRIDEMENT FOOT, COMBINED Right 11/2/2023    Procedure: Debridement and Irrigation of Right Ankle and Foot;  Surgeon: Bryon Starr MD;  [facility]    IRRIGATION AND DEBRIDEMENT FOOT, COMBINED Right 11/8/2023    Procedure: IRRIGATION AND DEBRIDEMENT, RIGHT FOOT, WOUND VAC APPLICATION;  Surgeon: Bryon Starr MD;  [facility]    LENGTHEN TENDON ACHILLES Right 11/17/2022    Procedure: LENGTHENING, TENDON, ACHILLES;  Surgeon: Bryon Starr MD;  [facility]    REMOVE HARDWARE FOOT Right 11/2/2023    Procedure: Remove Hardware Right Foot;  Surgeon: Bryon Starr MD;  [facility]     acetaminophen (TYLENOL) 325 MG tablet  Calcium-Vitamin D 500-3.125 MG-MCG TABS  carboxymethylcellulose PF (REFRESH PLUS) 0.5 % ophthalmic solution  cetirizine (ZYRTEC) 10 MG tablet  Cyanocobalamin 1000 MCG CAPS  diclofenac (VOLTAREN) 1 % topical gel  empagliflozin (JARDIANCE) 25 MG TABS tablet  fluconazole (DIFLUCAN) 200 MG tablet  fluticasone (FLONASE) 50 MCG/ACT nasal spray  folic acid (FOLVITE) 1 MG tablet  gabapentin (NEURONTIN) 600 MG tablet  Gauze Pads & Dressings ([facility] ISLAND DRESSING) 4\"X8\" PADS  lactobacillus rhamnosus, GG, (CULTURELL) capsule  levothyroxine (SYNTHROID/LEVOTHROID) 150 MCG tablet  lidocaine (LIDODERM) 5 % patch  linaclotide (LINZESS) 145 MCG capsule  metFORMIN (GLUCOPHAGE XR) 500 MG 24 hr tablet  omeprazole (PRILOSEC) 20 MG DR capsule  polyethylene glycol (MIRALAX) 17 g packet  pravastatin (PRAVACHOL) 40 MG tablet  Semaglutide (OZEMPIC, 1 MG/DOSE, SC)  senna-docusate (SENOKOT-S/PERICOLACE) 8.6-50 MG tablet  sodium fluoride dental gel (PREVIDENT) 1.1 % GEL topical gel  SUMAtriptan (IMITREX) 25 MG tablet  topiramate (TOPAMAX) 100 MG tablet  Vitamin D3 (CHOLECALCIFEROL) 25 mcg (1000 units) tablet  zolpidem ER (AMBIEN CR) " 12.5 MG CR tablet      Allergies   Allergen Reactions    Doxycycline Rash     Very extensive full body rash lasting for several months. Given at same time as Rifampin.    Rifampin Rash     Very extensive full body rash lasting for several months. Given at the same time as doxycycline.    Atorvastatin      Other reaction(s): Hyperglycemia    Celebrex [Celecoxib] Other (See Comments)     [facility]     Family History  Family History   Problem Relation Age of Onset    Diabetes Mother     Cancer Mother     Diabetes Father     Heart Disease Father     Anesthesia Reaction No family hx of     Clotting Disorder No family hx of     Glaucoma No family hx of     Macular Degeneration No family hx of      Social History   Social History     Tobacco Use    Smoking status: Never    Smokeless tobacco: Never   Substance Use Topics    Alcohol use: Not Currently     Comment: rarely    Drug use: No      Past medical history, past surgical history, medications, allergies, family history, and social history were reviewed with the patient. No additional pertinent items.      A complete review of systems was performed with pertinent positives and negatives noted in the HPI, and all other systems negative.    Physical Exam   BP: 138/80  Pulse: (!) 121  Temp: (!) 102.3  F (39.1  C)  Resp: 18  SpO2: 98 %  Physical Exam  Vitals and nursing note reviewed.   Constitutional:       General: He is not in acute distress.     Appearance: He is well-developed. He is ill-appearing. He is not diaphoretic.   HENT:      Head: Normocephalic and atraumatic.      Mouth/Throat:      Pharynx: No oropharyngeal exudate.   Eyes:      General: No scleral icterus.        Right eye: No discharge.         Left eye: No discharge.      Pupils: Pupils are equal, round, and reactive to light.   Cardiovascular:      Rate and Rhythm: Regular rhythm. Tachycardia present.      Heart sounds: Normal heart sounds. No murmur heard.     No friction rub. No gallop.    Pulmonary:      Effort: Pulmonary effort is normal. No respiratory distress.      Breath sounds: Normal breath sounds. No wheezing.   Chest:      Chest wall: No tenderness.   Abdominal:      General: Bowel sounds are normal. There is no distension.      Palpations: Abdomen is soft.      Tenderness: There is no abdominal tenderness.   Musculoskeletal:         General: No tenderness or deformity. Normal range of motion.      Cervical back: Normal range of motion and neck supple.        Legs:    Skin:     General: Skin is warm and dry.      Coloration: Skin is not pale.      Findings: No erythema or rash.   Neurological:      Mental Status: He is alert and oriented to person, place, and time.      Cranial Nerves: No cranial nerve deficit.           ED Course, Procedures, & Data      Procedures                      No results found for any visits on 11/23/23.  Medications - No data to display  Labs Ordered and Resulted from Time of ED Arrival to Time of ED Departure - No data to display  No orders to display              Assessment & Plan    This 58-year-old male with a history of diabetes and Charcot foot who presents with concerns for worsening infection of right lower extremity.  Patient has wound VAC in place.  Over the past several days he has been having fevers as well as nausea vomiting and increased pain in his lower extremity.  Upon arrival patient was noted to be tachycardic and febrile.  Wound VAC is in place.  X-rays show osteomyelitis.  Lactic acid is not elevated.  CBC shows WBC count of 19.8.  Patient was given IV fluids, piperacillin/tazobactam and vancomycin. We will admit for further monitoring work-up and treatment.     I have reviewed the nursing notes. I have reviewed the findings, diagnosis, plan and need for follow up with the patient.    New Prescriptions    No medications on file       Final diagnoses:   None   Micheal CHO, am serving as a trained medical scribe to document services  personally performed by Donal Villareal DO based on the provider's statements to me on November 23, 2023.  This document has been checked and approved by the attending provider.    I, Donal Villareal DO, was physically present and have reviewed and verified the accuracy of this note documented by Micheal Barajas medical scribe.      Donal Villareal DO  [facility] EMERGENCY DEPARTMENT  11/23/2023     Donal Villareal DO  11/23/23 1959

## 2023-11-24 NOTE — PLAN OF CARE
"  VS: /64 (BP Location: Right arm)   Pulse 101   Temp 99.6  F (37.6  C) (Oral)   Resp 16   Ht 1.778 m (5' 10\")   Wt 87.2 kg (192 lb 3.9 oz)   SpO2 97%   BMI 27.58 kg/m     Output/Last BM: Voids spontaneously   Activity: Wheelchair   Skin/Dressing: R Foot DM wounds - see WOC RN note   Pain: Ongoing R foot pain   CMS: A/Ox4, chronic BLE Neuropathy   Diet: NPO   LDA: PIV to RUE, abx infusing   Equipment:    Plan:    Additional Info: Pt had shower today. WOC RN completed R foot wound care. MRI checklist completed and in chart. Temp around 1700 at 103, MD paged     Goal Outcome Evaluation:  Plan of Care Reviewed With: patient  Overall Patient Progress: No Change          "

## 2023-11-24 NOTE — PLAN OF CARE
Occupational Therapy: Orders received. Chart reviewed and discussed with care team.? Occupational Therapy not indicated due to pt is at current functional baseline. Pt pivots to w/c mod IND and is IND with ADLs in wheelchair.? Defer discharge recommendations to medical team.? Please re-order if pt has a change in functional status.    Educated pt on recommendation for leg exercises within precautions, arm exercises, and to sit up in chair as much as possible to avoid deconditioning while in hospital.

## 2023-11-24 NOTE — PROGRESS NOTES
Shift [id number]:Pt admitted for infection of his Right Foot,increased weakness , vomiting and having fevers at home     Summary:Pt has a history of T2DM with severe peripheral neuropathy, Hypertension,and Chronic pain. Pt had a  Charcot foot reconstruction on 1/22 that was complicated by a post-op wound infection progressing to Osteomyelitis  VS:       Pt A/O X 4. Afebrile. VSS. Lungs- Clear bilaterally  Denies nausea, shortness of breath, and chest pain.     Output:       Bowels- + in all four quadrants. Voids spontaneously without   difficulty in the toilet Pt does not use the urinal.      Activity:       Pt is non-weight bearing Pt uses his Wheelchair.     Skin:   Right ankle wrapped, Left mid-shin ulceration/scab .     Pain:       Has pain in the Lower extremities Pt refused Prn pain med's except for Tylenol Pt's Right Lower Extremity is elevated.      CMS:       CMS and Neuro's are intact. Pt has numbness and tingling in bilateral Lower extremities.Pt has severe neuropathy      Dressing:       Right lower leg and ankle wrapped with a  wound vac in place .      Diet:       Pt is on a Regular diet  Pt is drinking fluids.       LDA:       PIV is patent in the Right Upper Arm. .      Equipment:       Refused PCD's on BLE's. Bilateral heels are elevated off the bed.  Pt has a Right Leg/Ankle wound vac   Plan:       Pt is able to make needs known and the call light is within the pt's reach. Continue to monitor.       Additional Info:        .

## 2023-11-24 NOTE — PHARMACY-ADMISSION MEDICATION HISTORY
"Pharmacist Admission Medication History    Admission medication history is complete. The information provided in this note is only as accurate as the sources available at the time of the update.    Information Source(s): Patient and Clinic records via in-person    Pertinent Information: Medications verified via patient's own \"[facility]\" [facility]. Verified medications with patient at bedside.    Changes made to PTA medication list:  Added: insulin NPH, current creams  Deleted: None  Changed:   pravastatin increased from 20 mg daily to 40 mg daily  linaclotide increased from 145 mcg daily to 290 mcg daily    Allergies reviewed with patient and updates made in EHR: yes    Medication History Completed By: Brianna Rivero [facility] 11/23/2023 6:30 PM  Prior to Admission medications    Medication Sig Last Dose Taking? Auth Provider Long Term End Date   acetaminophen (TYLENOL) 325 MG tablet Take 2 tablets (650 mg) by mouth every 4 hours as needed for other (For optimal non-opioid multimodal pain management to improve pain control.) Unknown Yes Jamel Cabrera MD No    acetaminophen (TYLENOL) 500 MG tablet Take 1,000 mg by mouth 3 times daily 11/22/2023 Yes Unknown, Entered By History No    ammonium lactate (AMLACTIN) 12 % external cream Apply topically 2 times daily as needed for dry skin Past Week Yes Unknown, Entered By History No    augmented betamethasone dipropionate (DIPROLENE AF) 0.05 % external cream Apply topically 2 times daily Past Week Yes Unknown, Entered By History     cetirizine (ZYRTEC) 10 MG tablet Take 10 mg by mouth every morning 11/22/2023 Yes Reported, Patient     cyanocobalamin (VITAMIN B-12) 1000 MCG tablet Take 2,000 mcg by mouth daily 11/22/2023 at am Yes Unknown, Entered By History     empagliflozin (JARDIANCE) 25 MG TABS tablet Take 25 mg by mouth every morning 11/22/2023 Yes Reported, Patient     fluconazole (DIFLUCAN) 200 MG tablet Take 2 tablets (400 mg) by mouth daily 11/22/2023 at am Yes " Jamel Cabrera MD     fluticasone (FLONASE) 50 MCG/ACT nasal spray Spray 2 sprays into both nostrils 2 times daily 11/22/2023 Yes Reported, Patient     folic acid (FOLVITE) 1 MG tablet Take 1 mg by mouth every morning 11/22/2023 Yes Reported, Patient     gabapentin (NEURONTIN) 600 MG tablet Take 1 tablet by mouth 3 times daily 11/22/2023 at am Yes Reported, Patient Yes    insulin  UNIT/ML vial Inject 10 Units Subcutaneous 2 times daily 11/22/2023 at am Yes Unknown, Entered By History Yes    lactobacillus rhamnosus, GG, (CULTURELL) capsule Take 1 capsule by mouth 2 times daily 11/22/2023 Yes Jamel Cabrera MD     levothyroxine (SYNTHROID/LEVOTHROID) 150 MCG tablet Take 150 mcg by mouth every morning 11/22/2023 Yes Reported, Patient Yes    lidocaine (LIDODERM) 5 % patch Apply up to 3 patches to painful area at once for up to 12 h within a 24 h period.  Remove after 12 hours.  Patient taking differently: Apply up to 3 patches to painful area at once for up to 12 h within a 24 h period.  Remove after 12 hours. L foot. 11/22/2023 Yes Codie Grimm MD     linaclotide (LINZESS) 145 MCG capsule Take 290 mcg by mouth every morning 11/22/2023 at am Yes Reported, Patient     MENTHOL-METHYL SALICYLATE EX Externally apply topically 4 times daily as needed (pain) Past Week Yes Unknown, Entered By History     metFORMIN (GLUCOPHAGE XR) 500 MG 24 hr tablet Take 1,000 mg by mouth 2 times daily (with meals) 11/22/2023 at am Yes Reported, Patient Yes    neomycin-bacitracin-polymyxin (NEOSPORIN) 5-400-5000 ointment Apply topically daily Past Week Yes Unknown, Entered By History No    nitroGLYcerin (NITRO-BID) 2 % OINT ointment Place 1 inch onto the skin 2 times daily apply to ear.  Yes Unknown, Entered By History Yes    omeprazole (PRILOSEC) 20 MG DR capsule Take 20 mg by mouth every morning 11/22/2023 at am Yes Unknown, Entered By History     pravastatin (PRAVACHOL) 40 MG tablet Take 40 mg by mouth every evening 11/22/2023 Yes  "Reported, Patient Yes    selenium sulfide (SELSUN) 2.5 % external lotion Apply topically every other day apply to scalp Past Week Yes Unknown, Entered By History     Semaglutide (OZEMPIC, 1 MG/DOSE, SC) Inject 2 mg Subcutaneous once a week Sunday's Past Month Yes Unknown, Entered By History     topiramate (TOPAMAX) 100 MG tablet Take 150 mg by mouth every morning 11/22/2023 Yes Reported, Patient Yes    Vitamin D3 (CHOLECALCIFEROL) 25 mcg (1000 units) tablet Take 50 mcg by mouth every morning 11/22/2023 Yes Unknown, Entered By History     White Petrolatum-Mineral Oil ([facility] LUBRICATING EYE/OVERNIGHT) OINT Apply 0.5 inches to eye at bedtime Past Week Yes Unknown, Entered By History     zolpidem ER (AMBIEN CR) 12.5 MG CR tablet Take 12.5 mg by mouth nightly as needed for sleep Past Week Yes Unknown, Entered By History No    Gauze Pads & Dressings ([facility] DRESSING) 4\"X8\" PADS 1 Units daily   Bryon Starr MD     senna-docusate (SENOKOT-S/PERICOLACE) 8.6-50 MG tablet Take 1 tablet by mouth 2 times daily   Dara Chaidez PA-C No    sodium fluoride dental gel (PREVIDENT) 1.1 % GEL topical gel BRUSH SMALL AMOUNT MOUTH EVERY MORNING AND AT BEDTIME ON TOOTHBRUSH, BRUSH FOR 2 MINUTES.   Reported, Patient             "

## 2023-11-24 NOTE — CONSULTS
[facility]  WO Nurse Inpatient Assessment     Consulted for: right foot with osteo     Summary: prior wound VAC    Patient History (according to provider note(s):      Nehemiah Magallon is a 58 year old male with a pmh of T2DM with severe peripheral neuropathy, papillary thyroid malignancy in remission s/p thyroidectomy, HLD, and right Charcot foot reconstruction in 1/2022 complicated by post operative wound infection progressing to osteomyelitis.     Assessment:      Areas visualized during today's visit: Focused: right foot     Wound location: right foot    Medial (prior admission)    Lateral (prior admission)    Last photo: 11/24  Wound due to: Surgical Wound  Wound history/plan of care: see above   Wound base: 50 % granulation tissue, 50 % non-granular tissue     Palpation of the wound bed: normal      Drainage: copious     Description of drainage: serosanguinous and tan     Measurements (length x width x depth, in cm):   Medial 1.9  x 7.2  x  0.8 cm    Tunneling: up to 1.4 cm in center of wound   Lateral 3.4  x 1.6  x  0.4 cm    Tunneling: up to 2.4 cm in center of wound        Undermining: N/A  Periwound skin: Edematous and Erythema- blanchable      Color: pink      Temperature: normal   Odor: none  Pain: no grimacing or signs of discomfort, none  Pain interventions prior to dressing change: patient tolerated well  Treatment goal: Drainage control, Infection control/prevention, and Increase granulation  STATUS: initial assessment  Supplies ordered: at bedside     Treatment Plan:     Right medial and lateral foot wound(s): Daily cleanse with Vashe (order#[id number]) and pat dry. Gently pack AMD GAUZE(order#[id number]) into wound. Cover ABD and wrap with kerlix(NOT AMD gauze). Place ACE wrap over and have foot elevated while in bed.     Orders: Written    RECOMMEND PRIMARY TEAM ORDER: None, at this time  Education provided: plan of care and wound progress  Discussed plan of care  with: Patient and Nurse  WOC nurse follow-up plan: weekly  Notify WOC if wound(s) deteriorate.  Nursing to notify the Provider(s) and re-consult the WOC Nurse if new skin concern.    DATA:     Current support surface: Standard  Standard gel/foam mattress (IsoFlex, Atmos air, etc)  Containment of urine/stool: Incontinent pad in bed  BMI: Body mass index is 27.58 kg/m .   Active diet order: Orders Placed This Encounter      Combination Diet Regular Diet Adult     Output: I/O last 3 completed shifts:  In: 500 [P.O.:500]  Out: -      Labs:   Recent Labs   Lab 11/23/23  1408   ALBUMIN 4.0   HGB 11.2*   WBC 19.8*     Pressure injury risk assessment:   Sensory Perception: 3-->slightly limited  Moisture: 4-->rarely moist  Activity: 2-->chairfast  Mobility: 4-->no limitation  Nutrition: 3-->adequate  Friction and Shear: 3-->no apparent problem  Maycol Score: 19    Brianna Spicer RN CWOCN   Pager no longer is use, please contact through [employer] group: WO Nurse [facility]   Dept. Office Number: [phone]

## 2023-11-24 NOTE — PROGRESS NOTES
[facility]    Medicine Progress Note - Hospitalist Service, GOLD TEAM 18    Date of Admission:  11/23/2023    Assessment & Plan   58 year old male with a pmh of T2DM with severe peripheral neuropathy, papillary thyroid malignancy in remission s/p thyroidectomy, HLD, and right Charcot foot reconstruction in 1/2022 complicated by post operative wound infection progressing to osteomyelitis.  Patient was admitted to medical floor. Bcx growing G + cocci.      # Right Foot Osteomyelitis  # Sepsis  # Candida Albicans infection  # Right Charcot foot reconstruction 1/2022  - Initially had Charcot right foot reconstruction in 1/2022. Patient was later hospitalized and I&D in 1/2023 had intra operative cultures growing Staphylococcus epidermis (oxacillin resistant) and Staphylococcus caprae.  During most recent admission in 11/2023 I & D cultures grew Candida albicans and he was discharged on 6 weeks of fluconazole.  - Continue on broad spectrum ABX. ID consult. Continue trending WBC. Follow-up cultures.   - WOC consult.   - Orthopedic surgery consult.      # T2DM  # Severe Peripheral Neuropathy  - will continue outpatient gabapentin  - Continue on sliding scale insulin, add carb count. Restart metformin.    - As an outpatient he is on jardiance, metformin, and Ozempic     # Papillary thyroid malignancy in remission s/p thyroidectomy with associated hypothyroidism  - Continue on PTA levothyroxine     # Nausea  # Vomiting  - Will do low dose fluids overnight  - zofran as needed     # CKD stage 2  - Acute on chronic CKD ?. Cr 1.35 (trending down). Continue monitoring.      # HLD  - We have resumed PTA pravastatin     # MARGARITO  - Patient refuses CPAP due to claustrophobia  - Currently scheduled for inspire in December     GERD  - Continue PTA omeprazole        Diet: Combination Diet Regular Diet    DVT Prophylaxis: Pneumatic Compression Devices and Anti-embolisim stockings (TEDs)  Robert  "Catheter: Not present  Lines: None     Cardiac Monitoring: None  Code Status: Full Code      Clinically Significant Risk Factors Present on Admission                      # DMII: A1C = 7.3 % (Ref range: <5.7 %) within past 6 months    # Overweight: Estimated body mass index is 27.58 kg/m  as calculated from the following:    Height as of this encounter: 1.778 m (5' 10\").    Weight as of this encounter: 87.2 kg (192 lb 3.9 oz).       # Financial/Environmental Concerns:           Disposition Plan      Expected Discharge Date: 11/26/2023      Destination: home with family              Damaso Jacinto MD  Hospitalist Service, GOLD TEAM 18  [facility]  Securely message with [employer] (more info)  Text page via [facility] Paging/Directory   See signed in provider for up to date coverage information  ______________________________________________________________________    Interval History     No acute events overnight.   He was pleasant.   No chest pain, palpitations, shortness of breath, nausea, vomit, fever or chills.     Physical Exam   Vital Signs: Temp: 99.6  F (37.6  C) Temp src: Oral BP: 119/64 Pulse: 101   Resp: 16 SpO2: 97 % O2 Device: None (Room air)    Weight: 192 lbs 3.86 oz  Gen: well nourished, no acute distress, room air.   Cardiac: RRR with no murmurs or rubs  Pulm: Normal respiratory effort, clear lungs.   Abd: non distended and non tender to palpation, + BS  Extremities: Right lower leg and ankle wrapped with a wound vac in place.  Previous scars of left lower extremity surgery.  2+ capillary refill of bilateral toes.  Radial pulses 2+ bilaterally.  Neuro: CN 2-12 intact, bilateral lower extremity neuropathy  Integumentary: Right ankle wrapped; left mid shin ulceration/scab  Psych: appropriate affect    Medical Decision Making       45 MINUTES SPENT BY ME on the date of service doing chart review, history, exam, documentation & further activities per the note.  "     Data     I have personally reviewed the following data over the past 24 hrs:    15.0 (H)  \   9.5 (L)   / 199     133 (L) 99 19.2 /  197 (H)   4.2 22 1.35 (H) \     ALT: 10 AST: 13 AP: 78 TBILI: 0.7   ALB: 4.0 TOT PROTEIN: 8.0 LIPASE: N/A     Procal: N/A CRP: 268.66 (H) Lactic Acid: 0.8       INR:  1.29 (H) PTT:  35   D-dimer:  N/A Fibrinogen:  N/A       Imaging results reviewed over the past 24 hrs:   Recent Results (from the past 24 hour(s))   XR Foot Right 3 Views    Narrative    EXAM: XR ANKLE RIGHT G/E 3 VIEWS, XR TIBIA AND FIBULA RIGHT 2 VIEWS, XR FOOT RIGHT G/E 3 VIEWS  LOCATION: [facility]  DATE: 11/23/2023    INDICATION: Right foot infection, fever, sepsis.  COMPARISON: None.      Impression    IMPRESSION: There is some periosteal new bone formation along the lateral margin of the distal fibular metaphysis which could reflect underlying osteomyelitis- MRI would be helpful in further evaluation. No fracture. Chronic periosteal new bone formation   along the medial margin of the distal fibular shaft. Extensive neuropathic changes throughout the midfoot including lateral dislocation of the 2nd through 5th metatarsals. Mild degenerative changes at the 1st MTP joint. Moderate-sized plantar calcaneal   spur.   XR Ankle Right 3 Views    Narrative    EXAM: XR ANKLE RIGHT G/E 3 VIEWS, XR TIBIA AND FIBULA RIGHT 2 VIEWS, XR FOOT RIGHT G/E 3 VIEWS  LOCATION: [facility]  DATE: 11/23/2023    INDICATION: Right foot infection, fever, sepsis.  COMPARISON: None.      Impression    IMPRESSION: There is some periosteal new bone formation along the lateral margin of the distal fibular metaphysis which could reflect underlying osteomyelitis- MRI would be helpful in further evaluation. No fracture. Chronic periosteal new bone formation   along the medial margin of the distal fibular shaft. Extensive neuropathic changes throughout the  midfoot including lateral dislocation of the 2nd through 5th metatarsals. Mild degenerative changes at the 1st MTP joint. Moderate-sized plantar calcaneal   spur.   XR Tibia & Fibula Right 2 Views    Narrative    EXAM: XR ANKLE RIGHT G/E 3 VIEWS, XR TIBIA AND FIBULA RIGHT 2 VIEWS, XR FOOT RIGHT G/E 3 VIEWS  LOCATION: [facility]  DATE: 11/23/2023    INDICATION: Right foot infection, fever, sepsis.  COMPARISON: None.      Impression    IMPRESSION: There is some periosteal new bone formation along the lateral margin of the distal fibular metaphysis which could reflect underlying osteomyelitis- MRI would be helpful in further evaluation. No fracture. Chronic periosteal new bone formation   along the medial margin of the distal fibular shaft. Extensive neuropathic changes throughout the midfoot including lateral dislocation of the 2nd through 5th metatarsals. Mild degenerative changes at the 1st MTP joint. Moderate-sized plantar calcaneal   spur.

## 2023-11-24 NOTE — CONSULTS
General ID [facility] Service: Consult Note     Patient:  Nehemiah Magallon, Date of birth 1965, Medical record number [id number]  Date of Visit:  November 24, 2023  Reason for consult: bacteremia         Assessment and Recommendations:     ID Problem list:  1. Staph aureus bacteremia (negative MecA gene based on Verigene, culture susceptibility pending)  2. Fevers/chills  3. Right foot chronic osteomyelitis  - Worsening chronic osteomyelitis noted on CT 10/24/2023 with soft tissue swelling tibiotalar fusion and loosening of hardware plus Lisfranc deformity and ill-defined erosion of the lateral talus/distal fibula  - 11/2 s/p I&D for right ankle and foot hardware removal, OR cultures +Candida albicans  - 11/8 s/p I&D, antibiotic beads removal, wound vac placement - no cultures nor pathology were sent  - was discharged on at least 6-week course of fluconazole until follows up with Dr. Argueta on 12/18/23  4. History of right Charcot foot reconstruction 11/17/22 c/b post-op polymicrobial wound infection   - 1/20/23 - started on Pip/Tazobactam and Vancomycin IV  - 1/21/23 -  s/p I+D of right foot, placement of wound vac and antibiotic beads. Staph epidermidis x2 (oxacillin Resistant) and Staph caprae. (Each S to doxycycline)  - 2/15/23 - Vancomycin IV and Rifampin were stopped and started on Doxycycline  - 2/24/23 - Doxycycline was stopped (papular lesions on his body, back, arms, associated with itching)  5. Severe peripheral neuropathy  6. T2DM  7. CKD      Discussion:  Findings overall consistent with Staph aureus bacteremia (likely MSSA based on Verigene with negative MecA) from likely right foot wound infection source. Agree with ortho consult as likely will need further surgical management for source control - please send deep tissue cultures if I&D is performed to help further guide antibiotics.    Recs:  1. Can narrow empiric zosyn to IV cefazolin 2g q8hr to better target presumed MSSA   2. Can continue  empiric IV vancomycin (dosed by pharmacy) while awaiting final Staph aureus susceptibilities  3. Follow up pending blood cultures  4. Please check daily peripheral blood cultures (including today) until negative x72 hrs  5. Check TTE to evaluate for vegetations   8. Agree with ortho consult for surgical management for source control - please send deep tissue cultures if I&D is performed to help further guide antibiotics      Recs paged to primary team. Thank you for allowing us to participate in the care of this patient. ID will continue to follow with weekend/holiday coverage until Dr. Adorno assumes care of [facility] service on Monday 11/27; can see [facility] schedule for paging details if questions.      80 minutes spent by me on the date of the encounter doing chart review, history and exam, documentation and further activities per the note    Cheko Whipple MD    Infectious Diseases   11/24/2023           History of Present Illness:     58M with PMH including DM2, severe peripheral neuropathy (no sensation of feet), diabetic retinopathy, history of thyroid cancer, CKD, history of Hep C (treated in 2017), right Charcot foot reconstruction (11/17/22) complicated by chronic wound infection, and recent admission (11/2-11/13) for right foot osteo (s/p 11/2 hardware removal, on oral fluconazole for +C.albicans in OR culture) who was admitted 11/23 due to 5 days of fevers/chills.     Per patient report, approximately 5-6 days prior to admission he had new onset fevers/chills, nausea/vomiting, weakness, and right leg pain (says he can only feel dull pain just below his right mid-leg, beyond that is numb). He says that between his home nurse wound vac changes there was some purulent drainage from the right foot wounds. He presented to the ED on 11/23 and was admitted to [facility] for further management. He was started on empiric IV vancomycin and IV zosyn and was continued on prior PO fluconazole. Admission  blood cultures were found to be positive for GPCs (Staph aureus on verigene) and ID was consulted for further antibiotic recommendations. Aside from right leg pain, he denies any other focal pain elsewhere. No back pain.          Review of Systems:   ROS obtained, pertinent positives and negatives as above.       Past Medical History:     Past Medical History:   Diagnosis Date     Chronic pain syndrome 10/24/2014     Diabetes mellitus, type 2 (H)      Diabetic Charcot foot (H)      Diabetic retinopathy associated with diabetes mellitus due to underlying condition (H)      Diverticulitis of colon      Gastroesophageal reflux disease      Hepatitis C 2017    treated     History of skin cancer      Hypertension      Hypothyroidism      Legally blind      Midfoot collapse of right lower extremity      Migraine      NAFLD (nonalcoholic fatty liver disease)      Nephrolithiasis      Neuropathy      MARGARITO (obstructive sleep apnea)      Rib pain 10/24/2014     S/P colectomy 10/14/2014     Thyroid cancer (H) 10/14/2014     Past Surgical History:   Procedure Laterality Date     ARTHRODESIS FOOT Right 11/17/2022    Procedure: Right midfoot/talonavicular osteotomy and reduction of deformity Right midfoot/talonavicular arthrodesis, achilles lengthening;  Surgeon: Bryon Starr MD;  Location: [facility] OR     CHOLECYSTECTOMY  1986     COLECTOMY      @ 6 years ago, sigmoid     COLONOSCOPY  2016     FOOT SURGERY Left 2021     IRRIGATION AND DEBRIDEMENT FOOT, COMBINED Right 1/21/2023    Procedure: IRRIGATION AND DEBRIDEMENT, FOOT, RIGHT, Placement of Wound Vac and Antibiotic Beads.;  Surgeon: Bryon Starr MD;  Location: [facility] OR     IRRIGATION AND DEBRIDEMENT FOOT, COMBINED Right 11/2/2023    Procedure: Debridement and Irrigation of Right Ankle and Foot;  Surgeon: Bryon Starr MD;  Location: [facility] OR     IRRIGATION AND DEBRIDEMENT FOOT, COMBINED Right 11/8/2023    Procedure: IRRIGATION AND DEBRIDEMENT, RIGHT FOOT, WOUND VAC APPLICATION;   Surgeon: Bryon Starr MD;  Location: [facility] OR     LENGTHEN TENDON ACHILLES Right 11/17/2022    Procedure: LENGTHENING, TENDON, ACHILLES;  Surgeon: Bryon Starr MD;  Location: [facility] OR     REMOVE HARDWARE FOOT Right 11/2/2023    Procedure: Remove Hardware Right Foot;  Surgeon: Bryon Starr MD;  Location: [facility] OR     Otherwise as per HPI      Allergies:      Allergies   Allergen Reactions     Doxycycline Rash     Very extensive full body rash lasting for several months. Given at same time as Rifampin.     Rifampin Rash     Very extensive full body rash lasting for several months. Given at the same time as doxycycline.     Atorvastatin      Other reaction(s): Hyperglycemia  pt also lists simvastatin?     Celebrex [Celecoxib] Other (See Comments)     De[facility]            Current Antimicrobials:   IV vancomycin  IV zosyn  Oral fluconazole       Family History:   Reviewed and noncontributory       Social History:     Social History     Tobacco Use     Smoking status: Never     Smokeless tobacco: Never   Substance Use Topics     Alcohol use: Not Currently     Comment: rarely     Drug use: No     Otherwise as per HPI         Physical Exam:   Ranges forvital signs:  Temp:  [99.1  F (37.3  C)-99.6  F (37.6  C)] 99.6  F (37.6  C)  Pulse:  [100-112] 101  Resp:  [12-16] 16  BP: (119-167)/(60-89) 119/64  SpO2:  [95 %-98 %] 97 %  GENERAL:  Adult male, sitting up in bed in no acute distress.   ENT:  Head is normocephalic, atraumatic. Oropharynx is moist appearing  EYES:  No conjunctival injection.  LUNGS:  Unlabored breathing on room air.  ABDOMEN:  Non-distended, soft, nontender.  MSK/EXT/SKIN: +RLE with medial and lateral surgical wounds, both with tan/bloody drainage, both probe deep >1cm, +surrounding erythema, no pain/sensation below his ankles  NEUROLOGIC:  Awake, alert, interactive.         Laboratory Data:     Inflammatory Markers    Recent Labs   Lab Test 11/24/23  1042 11/24/23  0743 11/23/23  1408  11/11/23  0802 11/09/23  0530 11/07/23  0709 11/05/23  0549 11/03/23  0652 10/30/23  0912 05/12/23  0907 01/30/23  1430 01/20/23  1211   SED 71*  --   --   --   --   --   --   --  44* 7  --  59*   CRPI  --  268.66* 339.00* 23.58* 71.83* 30.94* 22.51* 37.33* 74.50* <3.00   < >  --     < > = values in this interval not displayed.       Hematology Studies    Recent Labs   Lab Test 11/24/23  0743 11/23/23  1408 11/11/23  0802 11/08/23  0745 11/05/23  0549 11/04/23  0851 11/03/23  0652 10/30/23  0912   WBC 15.0* 19.8* 9.3 10.3 8.2  --   --  9.2   HGB 9.5* 11.2* 10.1* 9.9* 9.8* 10.2*   < > 11.5*   MCV 78 77* 77* 76* 78  --   --  76*    257 415 373 324  --   --  327    < > = values in this interval not displayed.       Metabolic Studies     Recent Labs   Lab Test 11/24/23  0743 11/23/23  1408 11/09/23  0530 11/08/23  0745 11/05/23  0549   * 135 137 136 140   POTASSIUM 4.2 4.5 4.5 4.2 4.5   CHLORIDE 99 98 101 102 107   CO2 22 21* 26 26 27   BUN 19.2 21.9 23.0 19.2 12.0   CR 1.35* 1.39* 1.16 1.09 1.27*   GFRESTIMATED 60* 58* 73 78 65       Hepatic Studies    Recent Labs   Lab Test 11/23/23  1408 11/05/23  0549 03/08/23  0941 02/13/23  1425 02/06/23  1430 01/30/23  1430   BILITOTAL 0.7 <0.2 0.2 <0.2 0.4 0.2   ALKPHOS 78 58 61 71 81 86   ALBUMIN 4.0 3.4* 4.4 4.1 3.4 3.8   AST 13 8 28 23 13 13   ALT 10 5 36 17 21 13       Microbiology:  Culture   Date Value Ref Range Status   11/23/2023 No Growth  Final   11/23/2023 No growth after 1 day  Preliminary   11/23/2023 Positive on the 1st day of incubation (A)  Preliminary   11/23/2023 Gram positive cocci (AA)  Preliminary     Comment:     1 of 2 bottles   11/02/2023 No anaerobic organisms isolated  Final   11/02/2023 Yeast (A)  Preliminary   11/02/2023 1+ Candida albicans (A)  Final     Comment:     Susceptibilities not routinely done, refer to antibiogram to view typical susceptibility profiles   11/02/2023 No anaerobic organisms isolated  Final   11/02/2023 Yeast (A)   Preliminary   11/02/2023 1+ Candida albicans (A)  Corrected   11/02/2023 No anaerobic organisms isolated  Final   11/02/2023 Yeast (A)  Preliminary   11/02/2023 1+ Candida albicans (A)  Final     Comment:     Susceptibilities not routinely done, refer to antibiogram to view typical susceptibility profiles   04/26/2023 3+ Normal amish  Final   03/29/2023 1+ Candida albicans (A)  Final     Comment:     Susceptibilities not routinely done, refer to antibiogram to view typical susceptibility profiles   03/08/2023 2+ Staphylococcus lugdunensis (A)  Final   03/08/2023 2+ Normal amish  Final   01/21/2023 No anaerobic organisms isolated  Final   01/21/2023 1+ Staphylococcus caprae (A)  Corrected     Comment:     Identification obtained by MALDI-TOF mass spectrometry research use only database. Test characteristics determined and verified by the [facility].   01/21/2023 1+ Staphylococcus epidermidis (A)  Corrected   01/21/2023 1+ Staphylococcus epidermidis (A)  Corrected   01/21/2023 No anaerobic organisms isolated  Final   01/21/2023 1+ Staphylococcus caprae (A)  Corrected     Comment:     Identification obtained by MALDI-TOF mass spectrometry research use only database. Test characteristics determined and verified by the Infect[facility] Diseases Diagnostic Laboratory.Susceptibilities done on previous cultures   01/21/2023 1+ Staphylococcus epidermidis (A)  Corrected     Comment:     Susceptibilities done on previous cultures   01/21/2023 1+ Staphylococcus epidermidis (A)  Corrected     Comment:     Susceptibilities done on previous cultures   01/20/2023 1+ Staphylococcus epidermidis (A)  Corrected     Comment:     Susceptibilities done on previous cultures   01/20/2023 1+ Staphylococcus caprae (A)  Corrected     Comment:     Identification obtained by MALDI-TOF mass spectrometry research use only database. Test characteristics determined and verified by the Infectious Diseases Diagnostic  Laboratory.Susceptibilities done on previous cultures   2023 No Growth  Final   2023 No Growth  Final       Urine Studies    Recent Labs   Lab Test 23  1908   LEUKEST Negative   WBCU 5       Vancomycin Levels    Recent Labs   Lab Test 23  0851 23  1425 23  1430   VANCOMYCIN 15.6 15.3 14.7            Imagin/23/23 [facility] xray report:  IMPRESSION: There is some periosteal new bone formation along the lateral margin of the distal fibular metaphysis which could reflect underlying osteomyelitis- MRI would be helpful in further evaluation. No fracture. Chronic periosteal new bone formation along the medial margin of the distal fibular shaft. Extensive neuropathic changes throughout the midfoot including lateral dislocation of the 2nd through 5th metatarsals. Mild degenerative changes at the 1st MTP joint. Moderate-sized plantar calcaneal spur.

## 2023-11-24 NOTE — CARE PLAN
PT: Orders received. Chart reviewed and discussed with care team.  PT not indicated due to Pt NWB R foot, able to bear weight to transfer, which he is able to do as per his baseline. Awaiting further POC.  Will defer mob bed to  to nursing for SBA to manage any lines/tubes  Defer discharge recommendations to medical team.  Will complete orders.  Please reorder if new changes with appropriate WB status included.

## 2023-11-24 NOTE — CONSULTS
[facility]  Orthopedic Surgery Consult    Name: Nehemiah Magallon  Age: 58 year old  MRN: [id number]  YOB: 1965    Reason for Consult: Right foot osteomyelitis concerning for worsening infection    Requesting Provider: Wale Pal PA-C    Assessment and Plan:     Assessment:  Nehemiah Magallon is a 58 year old male with a history significant for with T2DM with severe peripheral neuropathy with past right Charcot foot reconstruction in Jan-2022 complicated by post-operative wound infection with multiple recent I&D of the right foot with subsequent wound vac exchanges with positive cultures for candida with Dr. Starr on 11/02/23 and 11/08/23 who returns meeting sepsis criteria and concern for worsening right foot infection secondary to osteomyelitis. Patient presented to the hospital with fever or 102.7, tachycardic, with a leukocytosis of 19. This AM he remains mildly tachycardic, but afebrile with an improvement in WBC to 15 and improving CRP after initiating antibiotic therapy. On exam, he has turbulent fluid concerning for deep infection without any palpable fluctuance and otherwise well appearing wounds. There is not an urgent need for debridement, but this may be required in the coming days. Will plan to obtain an MRI for more information regarding worsening of his known osteomyelitis with plan to continue to treat with broad spectrum antibiotics and WOC involvement in the interim. Placed a wet to dry dressing over the incisions in the interim prior to woc evaluation. Will discuss with staff to determine next steps and contact Dr. Starr to alert him of the patient's admission.     Plan:  Medicine primary  - Plan for OR: Possible I&D, wound VAC exchange in the operating room, pending evaluation of advanced imaging  - Anticoagulation/DVT prophylaxis: Per primary, okay for anticoagulation at this time.  Plan to hold overnight in the event of OR tomorrow.  - Antibiotics: Per primary,  agree with Vanc/Zosyn along with fluconazole at this time for broad coverage, appreciate ID involvement for narrowing antibiotics  - Imaging: Xrays completed; MRI of the right tib-fib and foot for further evaluation  - Activity: Up as tolerated with weightbearing restrictions  - Weight bearing: NWB RLE  - Pain control: Per primary recommend multimodal  - Labs: Recommend trending CRP, WBC  - Diet: Ok for a diet from an orthopedic perspective, n.p.o. at midnight pending possible OR tomorrow  - Consults: WOC for wound cares and vac changes, ID for antibiotic choice  - Follow-up: TBD  - Disposition: TBD, likely pending medical improvement    Staff: Blake Oliva MD    Respectfully,    Henry Vieyra MD  Orthopedic Surgery PGY1  [phone]    Please page me directly with any questions/concerns during regular weekday hours before 5 pm. If there is no response, if it is a weekend, or if it is during evening hours then please page the orthopedic surgery resident on call.    History of Present Illness:     Patient was seen and examined by me. History, PMH, Meds, SH, complete ROS (10 organ systems) and PE reviewed with patient and prior medical records.      Nehemiah Magallon is a 58 year old male with a history significant for with T2DM with severe peripheral neuropathy; papillary thyroid malignancy in remission s/p thyroidectomy and with post-operative hypothyroidism; hypertension; anemia;  chronic pain; past right Charcot foot reconstruction in Jan-2022 complicated by post-operative wound infection with multiple recent I&D of the right foot with subsequent wound vac exchanges with Dr. Starr on 11/02/23 and 11/08/23 who was admitted to the hospital with worsening right foot pain and infectious symptoms meeting SIRS criteria. Patient had been feeling well and doing regular wound vac changes at home with home nursing assist without complication. He states that he was seen in wound clinic to have stitches removed and at that  time there was no concern for worsening infection in his foot. Over the past few days he progressively has worsening distal lower leg pain with sudden development of fever, nausea, vomiting, and loss of coordination. On 11/23/23 he was extremely fatigued and sleeping throughout the day, causing his wife concern to call EMS who brought him into the hospital. His blood sugars over the past 4 days have been unusually elevated into the 300s. Was started on antibiotics and currently is feeling somewhat better without nausea. Able to move his foot without pain and denies any pain without the foot itself. The day he came into the hospital he noted an unusual increase in output within his wound vac. Denies any fevers or chills currently. Denies any sensory changes to his feet. Last PO intake was last evening.      Past Medical History:     Past Medical History:   Diagnosis Date    Chronic pain syndrome 10/24/2014    Diabetes mellitus, type 2 (H)     Diabetic Charcot foot (H)     Diabetic retinopathy associated with diabetes mellitus due to underlying condition (H)     Diverticulitis of colon     Gastroesophageal reflux disease     Hepatitis C 2017    treated    History of skin cancer     Hypertension     Hypothyroidism     Legally blind     Midfoot collapse of right lower extremity     Migraine     NAFLD (nonalcoholic fatty liver disease)     Nephrolithiasis     Neuropathy     MARGARITO (obstructive sleep apnea)     Rib pain 10/24/2014    S/P colectomy 10/14/2014    Thyroid cancer (H) 10/14/2014       Past Surgical History:     Past Surgical History:   Procedure Laterality Date    ARTHRODESIS FOOT Right 11/17/2022    Procedure: Right midfoot/talonavicular osteotomy and reduction of deformity Right midfoot/talonavicular arthrodesis, achilles lengthening;  Surgeon: Bryon Starr MD;  Location: [facility] OR    CHOLECYSTECTOMY  1986    COLECTOMY      @ 6 years ago, sigmoid    COLONOSCOPY  2016    FOOT SURGERY Left 2021    IRRIGATION AND  DEBRIDEMENT FOOT, COMBINED Right 1/21/2023    Procedure: IRRIGATION AND DEBRIDEMENT, FOOT, RIGHT, Placement of Wound Vac and Antibiotic Beads.;  Surgeon: Bryon Starr MD;  Location: [facility] OR    IRRIGATION AND DEBRIDEMENT FOOT, COMBINED Right 11/2/2023    Procedure: Debridement and Irrigation of Right Ankle and Foot;  Surgeon: Bryon Starr MD;  Location: [facility] OR    IRRIGATION AND DEBRIDEMENT FOOT, COMBINED Right 11/8/2023    Procedure: IRRIGATION AND DEBRIDEMENT, RIGHT FOOT, WOUND VAC APPLICATION;  Surgeon: Bryon Starr MD;  Location: [facility] OR    LENGTHEN TENDON ACHILLES Right 11/17/2022    Procedure: LENGTHENING, TENDON, ACHILLES;  Surgeon: Bryon Starr MD;  Location: [facility] OR    REMOVE HARDWARE FOOT Right 11/2/2023    Procedure: Remove Hardware Right Foot;  Surgeon: Bryon Starr MD;  Location: [facility] OR       Social History:     Social History     Socioeconomic History    Marital status:      Spouse name: None    Number of children: None    Years of education: None    Highest education level: None   Tobacco Use    Smoking status: Never    Smokeless tobacco: Never   Substance and Sexual Activity    Alcohol use: Not Currently     Comment: rarely    Drug use: No    Sexual activity: Not Currently     Partners: Female       Family History:     Family History   Problem Relation Age of Onset    Diabetes Mother     Cancer Mother     Diabetes Father     Heart Disease Father     Anesthesia Reaction No family hx of     Clotting Disorder No family hx of     Glaucoma No family hx of     Macular Degeneration No family hx of        Medications:     Current Facility-Administered Medications   Medication    acetaminophen (TYLENOL) tablet 650 mg    Or    acetaminophen (TYLENOL) Suppository 650 mg    calcium carbonate (TUMS) chewable tablet 1,000 mg    carboxymethylcellulose PF (REFRESH PLUS) 0.5 % ophthalmic solution 1 drop    cetirizine (zyrTEC) tablet 10 mg    cyanocobalamin (VITAMIN B-12) tablet 2,000 mcg    glucose gel 15-30 g     Or    dextrose 50 % injection 25-50 mL    Or    glucagon injection 1 mg    diclofenac (VOLTAREN) 1 % topical gel 4 g    fluconazole (DIFLUCAN) tablet 400 mg    fluticasone (FLONASE) 50 MCG/ACT spray 2 spray    folic acid (FOLVITE) tablet 1 mg    gabapentin (NEURONTIN) tablet 600 mg    insulin aspart (NovoLOG) injection (RAPID ACTING)    insulin aspart (NovoLOG) injection (RAPID ACTING)    levothyroxine (SYNTHROID/LEVOTHROID) tablet 150 mcg    Lidocaine (LIDOCARE) 4 % Patch 3 patch    lidocaine (LMX4) cream    lidocaine 1 % 0.1-1 mL    linaclotide (LINZESS) capsule 290 mcg    ondansetron (ZOFRAN ODT) ODT tab 4 mg    Or    ondansetron (ZOFRAN) injection 4 mg    ondansetron (ZOFRAN) injection 4 mg    pantoprazole (PROTONIX) EC tablet 40 mg    piperacillin-tazobactam (ZOSYN) 4.5 g vial to attach to  mL bag    polyethylene glycol (MIRALAX) Packet 17 g    pravastatin (PRAVACHOL) tablet 40 mg    senna-docusate (SENOKOT-S/PERICOLACE) 8.6-50 MG per tablet 1 tablet    Or    senna-docusate (SENOKOT-S/PERICOLACE) 8.6-50 MG per tablet 2 tablet    sodium chloride (PF) 0.9% PF flush 3 mL    sodium chloride (PF) 0.9% PF flush 3 mL    topiramate (TOPAMAX) tablet 150 mg    vancomycin (VANCOCIN) 1,500 mg in 0.9% NaCl 250 mL intermittent infusion    Vitamin D3 (CHOLECALCIFEROL) tablet 50 mcg       Allergies:     Allergies   Allergen Reactions    Doxycycline Rash     Very extensive full body rash lasting for several months. Given at same time as Rifampin.    Rifampin Rash     Very extensive full body rash lasting for several months. Given at the same time as doxycycline.    Atorvastatin      Other reaction(s): Hyperglycemia  pt also lists simvastatin?    Celebrex [Celecoxib] Other (See Comments)     [facility]       Review of Systems:     A comprehensive 10 point review of systems (constitutional, ENT, cardiac, peripheral vascular, respiratory, GI, , musculoskeletal, skin, neurological) was performed and found  "to be negative except as described in this note.      Physical Exam:     Vital Signs: /64 (BP Location: Right arm)   Pulse 101   Temp 99.6  F (37.6  C) (Oral)   Resp 16   Ht 1.778 m (5' 10\")   Wt 87.2 kg (192 lb 3.9 oz)   SpO2 97%   BMI 27.58 kg/m    General: Resting comfortably in bed, awake, alert, no apparent distress, appears stated age.    Musculoskeletal:  RLE: ACE with two wound vac sponges in place. Wound vac removed. Skin surrounding incisions without significant erythema or induration. No palpable fluctuance. There is a mild turbulent appearing ooze from both the medial and lateral wounds, no increase in expressible fluid with pressure. Nontender to the foot and surrounding tissue. Mild tenderness over the distal fibula and surrounding tissue. Fires TA/Gastroc/EHL/FHL with 5/5 strength. SILT in sural, saphenous, deep peroneal, superficial peroneal, and tibial nerve distributions. Dorsalis pedis 2+.      Imaging:     Xrays of the right foot, ankle and tibia demonstrate periosteal reaction over the lateral aspect of the distal fibula new from prior imaging. There is a new circular lucency within the midshaft of the 1st metatarsal. Chronic appearing periosteal reaction over the midshaft of the fibula.     Data:     CBC:  Lab Results   Component Value Date    WBC 15.0 (H) 11/24/2023    HGB 9.5 (L) 11/24/2023     11/24/2023     BMP:  Lab Results   Component Value Date     (L) 11/24/2023    POTASSIUM 4.2 11/24/2023    CHLORIDE 99 11/24/2023    CO2 22 11/24/2023    BUN 19.2 11/24/2023    CR 1.35 (H) 11/24/2023    ANIONGAP 12 11/24/2023    ALICE 8.7 11/24/2023     (H) 11/24/2023     Inflammatory Markers:  CRP: 339.00 > 268.66  Blood cultures: NGTD    "

## 2023-11-25 NOTE — PLAN OF CARE
Goal Outcome Evaluation:      Plan of Care Reviewed With: patient    Overall Patient Progress: improvingOverall Patient Progress: improving    Outcome Evaluation: patient alert and oriented x4, calm and cooperative. Patient state he is doing well overall, has gain more muscle coordination and strenght. Patient denies SOB, chest pain, and nausea. VSS on RA Patient rating pain 4/10, declined intervention. Patient is SBA, NWB on RLL extremities, bedside commode in room, voiding without difficulty, one small BM this shift, per pt, he had a normal BM two days ago. Patient RLL wound dressing- CDI. Left forearm PIV SL. Patient has no sensation in BL lower extremities d/t neuropathy.    Per report, patient is schedule for possible MRI on 11/25.    Patient is NPO, for possible irrigation and debridement on 11/25.

## 2023-11-25 NOTE — PLAN OF CARE
"  VS: /76 (BP Location: Right arm)   Pulse 91   Temp 98.5  F (36.9  C) (Oral)   Resp 16   Ht 1.778 m (5' 10\")   Wt 87.2 kg (192 lb 3.9 oz)   SpO2 97%   BMI 27.58 kg/m     Output/Last BM: Voids spontaneously, LBM 11/25   Activity: NWB to RLE, wheelchair based   Skin/Dressing: R Foot/Ankle Wounds, cares completed   Pain: Ongoing RLE pain   CMS: A/Ox4, N/T to BLE   Diet: Regular   LDA: PIV to LUE, saline locked bw abx   Equipment:    Plan:    Additional Info: MRI completed. Echo on EB to be completed likely 11/26     Goal Outcome Evaluation:  Plan of Care Reviewed With: patient  Overall Patient Progress: No Change        "

## 2023-11-25 NOTE — PROGRESS NOTES
[facility]    Medicine Progress Note - Hospitalist Service, GOLD TEAM 18    Date of Admission:  11/23/2023    Assessment & Plan   58 year old male with a pmh of T2DM with severe peripheral neuropathy, papillary thyroid malignancy in remission s/p thyroidectomy, HLD, and right Charcot foot reconstruction in 1/2022 complicated by post operative wound infection progressing to osteomyelitis.  Patient was admitted to medical floor. Bcx growing G + cocci.      # Right Foot Osteomyelitis  # Sepsis  # Candida Albicans infection  # Right Charcot foot reconstruction 1/2022  - Initially had Charcot right foot reconstruction in 1/2022. Patient was later hospitalized and I&D in 1/2023 had intra operative cultures growing Staphylococcus epidermis (oxacillin resistant) and Staphylococcus caprae.  During most recent admission in 11/2023 I & D cultures grew Candida albicans and he was discharged on 6 weeks of fluconazole.  - Continue on broad spectrum ABX. ID consult. Continue trending WBC. Follow-up cultures.   - WOC consult.   - Orthopedic surgery following the patient, waiting for foot MRI to determine if patient needs to go back to the OR. No medical contraindications for surgery at this moment, medically optimized.      ID following the patient.   Recs:  Can narrow empiric zosyn to IV cefazolin 2g q8hr to better target presumed MSSA (ordered)  Can continue empiric IV vancomycin (dosed by pharmacy) while awaiting final Staph aureus susceptibilities  Follow up pending blood cultures   Please check daily peripheral blood cultures (including today) until negative x72 hrs (ordered)  Check TTE to evaluate for vegetations (ordered)  Agree with ortho consult for surgical management for source control - please send deep tissue cultures if I&D is performed to help further guide antibiotics        # T2DM  # Severe Peripheral Neuropathy  - Hyperglycemia noted.    - will continue outpatient  "gabapentin  - Continue on sliding scale insulin, add carb count. Continue on metformin. Restart NPH insulin.     - As an outpatient he is on jardiance, metformin, and Ozempic     # Papillary thyroid malignancy in remission s/p thyroidectomy with associated hypothyroidism  - Continue on PTA levothyroxine     # Nausea  # Vomiting  - Resolved.      # CKD stage 2  - Monitor renal function.      # HLD  - PTA pravastatin     # MARGARITO  - Patient refuses CPAP due to claustrophobia  - Currently scheduled for inspire in December     # GERD  - Continue PTA omeprazole    # Chronic anemia   - Iron panel ordered and consistent with anemia of chronic disease.   - No need for transfusion at this time. Continue monitoring HGB.         Diet: Combination Diet Regular Diet    DVT Prophylaxis: Pneumatic Compression Devices and Anti-embolisim stockings (TEDs)  Jones Catheter: Not present  Lines: None     Cardiac Monitoring: None  Code Status: Full Code      Clinically Significant Risk Factors               # Coagulation Defect: INR = 1.29 (Ref range: 0.85 - 1.15) and/or PTT = 35 Seconds (Ref range: 22 - 38 Seconds), will monitor for bleeding   # Acute Kidney Injury, unspecified: based on a >150% or 0.3 mg/dL increase in last creatinine compared to past 90 day average, will monitor renal function        # DMII: A1C = 7.3 % (Ref range: <5.7 %) within past 6 months, PRESENT ON ADMISSION  # Overweight: Estimated body mass index is 27.58 kg/m  as calculated from the following:    Height as of this encounter: 1.778 m (5' 10\").    Weight as of this encounter: 87.2 kg (192 lb 3.9 oz)., PRESENT ON ADMISSION       # Financial/Environmental Concerns: none         Disposition Plan      Expected Discharge Date: 11/26/2023      Destination: home with help/services              Damaso Jacinto MD  Hospitalist Service, GOLD TEAM 18  [facility]  Securely message with [employer] (more info)  Text page via [employer] " Paging/Directory   See signed in provider for up to date coverage information  ______________________________________________________________________    Interval History     No acute events overnight.   He was pleasant.   He had one episode of fever last night.   No chest pain, palpitations, shortness of breath, nausea, vomit, fever or chills.     Physical Exam   Vital Signs: Temp: 98.5  F (36.9  C) Temp src: Oral BP: 134/76 Pulse: 91   Resp: 16 SpO2: 97 % O2 Device: None (Room air)    Weight: 192 lbs 3.86 oz  Gen: well nourished, no acute distress, room air.   Cardiac: RRR with no murmurs or rubs  Pulm: Normal respiratory effort, clear lungs.   Abd: non distended and non tender to palpation, + BS  Extremities: Right lower leg and ankle wrapped with a wound vac in place.  Neuro: CN 2-12 intact, bilateral lower extremity neuropathy  Integumentary: Right ankle wrapped; left mid shin ulceration/scab  Psych: appropriate affect    Medical Decision Making       45 MINUTES SPENT BY ME on the date of service doing chart review, history, exam, documentation & further activities per the note.      Data     I have personally reviewed the following data over the past 24 hrs:    9.3  \   8.7 (L)   / 195     N/A N/A N/A /  339 (H)   N/A N/A 1.57 (H) \     Procal: N/A CRP: 224.40 (H) Lactic Acid: N/A       Ferritin:  182 % Retic:  N/A LDH:  N/A       Imaging results reviewed over the past 24 hrs:   No results found for this or any previous visit (from the past 24 hour(s)).

## 2023-11-25 NOTE — PLAN OF CARE
Goal Outcome Evaluation:      Plan of Care Reviewed With: patient    Overall Patient Progress: improvingOverall Patient Progress: improving    VS: VSS   Output/Last BM: Voids spontaneously to BS commode    Activity: Wheelchair   Skin/Dressing: R Foot DM wounds - see WOC RN note   Pain: Ongoing R foot pain   CMS: A/Ox4, chronic BLE Neuropathy   Diet: NPO after MN    LDA: PIV to RUE SL    Equipment:  IV pole    Plan:  Procedure to R foot in AM 11/25/23?   Additional Info: Ordered MRI for R leg and foot will be done sometime 11/25/23. Unable to compete tonight due to ED needs

## 2023-11-25 NOTE — CONSULTS
Care Management Initial Consult    General Information  Assessment completed with: Patient,    Type of CM/SW Visit: Initial Assessment    Primary Care Provider verified and updated as needed: Yes   Readmission within the last 30 days: other (see comments)   Return Category: Progression of disease  Reason for Consult: discharge planning  Advance Care Planning: Advance Care Planning Reviewed: other (see comments) (Pt states that he has a HCD at home)          Communication Assessment  Patient's communication style: spoken language (English or Bilingual)    Hearing Difficulty or Deaf: yes   Wear Glasses or Blind: yes    Cognitive  Cognitive/Neuro/Behavioral: WDL  Level of Consciousness: alert  Arousal Level: opens eyes spontaneously  Orientation: oriented x 4  Mood/Behavior: calm, cooperative  Best Language: 0 - No aphasia  Speech: clear, spontaneous, logical    Living Environment:   People in home: alone, spouse     Current living Arrangements: apartment      Able to return to prior arrangements: yes       Family/Social Support:  Care provided by: self  Provides care for: no one  Marital Status:   Wife          Description of Support System: Supportive    Support Assessment: Adequate family and caregiver support    Current Resources:   Patient receiving home care services: Yes  Skilled Home Care Services: Skilled Nursing  Community Resources: DME, Home Care  Equipment currently used at home: wheelchair, manual, walker  Supplies currently used at home: Wound Care Supplies, Diabetic Supplies    Employment/Financial:  Employment Status: disabled, retired        Financial Concerns: none           Does the patient's insurance plan have a 3 day qualifying hospital stay waiver?  No    Lifestyle & Psychosocial Needs:  Social Determinants of Health     Food Insecurity: Not on file   Depression: Not at risk (5/12/2023)    PHQ-2     PHQ-2 Score: 0   Housing Stability: Not on file   Tobacco Use: Low Risk  (11/23/2023)     Patient History     Smoking Tobacco Use: Never     Smokeless Tobacco Use: Never     Passive Exposure: Not on file   Financial Resource Strain: Not on file   Alcohol Use: Not on file   Transportation Needs: Not on file   Physical Activity: Not on file   Interpersonal Safety: Not on file   Stress: Not on file   Social Connections: Not on file       Functional Status:  Prior to admission patient needed assistance:       Yes, pt states that the infection made him weak, however he was  able to provide care for himself still. The pt states that his wife helped him some but she has her own health issues. Pt states he is stronger now.       Mental Health Status:          Chemical Dependency Status:                Values/Beliefs:  Spiritual, Cultural Beliefs, Temple Practices, Values that affect care:                 Additional Information:  RNCC contacted the pt and described the role. PCP verified. EHO needed. Prior to admission the pt was on service with [facility] and also had a wound vac. Resumption orders placed. If the pt needs IV abx at discharge. He would like to perform this at home, referral placed. The pt denies any other needs. RNCC will continue to monitor for needs and will follow up on [facility] referral, if needed.   Tanya Bragg, RN, BSN, PHN  Weekend/Holiday RNCC Pager [phone]

## 2023-11-25 NOTE — PROGRESS NOTES
"Orthopedic Surgery Progress Note: 11/25/2023    Subjective:   No acute events overnight. MRI not completed due to ED needs. Pain well-controlled. Feels significantly improved today. No concerns today.     Objective:   /66 (BP Location: Right arm, Patient Position: Semi-Gonsalves's, Cuff Size: Adult Regular)   Pulse 89   Temp 98.9  F (37.2  C) (Oral)   Resp 18   Ht 1.778 m (5' 10\")   Wt 87.2 kg (192 lb 3.9 oz)   SpO2 99%   BMI 27.58 kg/m    No intake/output data recorded.  General: NAD. Resting comfortably in bed.  Respiratory: Nonlabored breathing  Musculoskeletal:  RLE: ACE over wounds is C/D/I. Decreased sensation to light touch consistent with peripheral neuropathy in the foot. Fires TA/GSC/FHL/EHL. 2+ DP pulse.     Laboratory Data:  Lab Results   Component Value Date    WBC 15.0 (H) 11/24/2023    HGB 9.5 (L) 11/24/2023     11/24/2023    INR 1.29 (H) 11/24/2023         Assessment & Plan:   Nehemiah Magallon is a 58 year old male with a history significant for with T2DM with severe peripheral neuropathy with past right Charcot foot reconstruction in Jan-2022 complicated by post-operative wound infection with multiple recent I&D of the right foot with subsequent wound vac exchanges with positive cultures for candida with Dr. Starr on 11/02/23 and 11/08/23 admitted for sepsis and concern for worsening RLE osteomyelitis.     Plan for Today:  - MRI of the right tib fib and foot  - Pain control  - wound cares  - Continue antibiotics per ID, currently on Ancef and Vanc    Medicine primary  - Plan for OR: Possible I&D, wound VAC exchange in the operating room, pending evaluation of advanced imaging  - Anticoagulation/DVT prophylaxis:Hold pending OR decision  - Antibiotics: Per primary, agree with Vanc/Ancef along with fluconazole at this time for broad coverage, appreciate ID involvement for narrowing antibiotics  - Imaging: Xrays completed; MRI of the right tib-fib and foot for further evaluation  - Activity: " Up as tolerated with weightbearing restrictions  - Weight bearing: NWB RLE  - Pain control: Per primary recommend multimodal  - Labs: Recommend trending CRP, WBC  - Diet: Ok for a diet from an orthopedic perspective, n.p.o. at midnight pending possible OR tomorrow  - Consults: WOC for wound cares and vac changes, ID for antibiotic choice  - Follow-up: TBD  - Disposition: TBD, likely pending medical improvement    Orthopedic surgery staff for this patient is Dr. Oliva.    ------------------------------------------------------------------------------------------    Respectfully,    Henry Vieyra MD  Orthopedic Surgery PGY1  [phone]    Please page me directly with any questions/concerns during regular weekday hours before 5 pm. If there is no response, if it is a weekend, or if it is during evening hours then please page the orthopedic surgery resident on call.      FOLLOWUP:    Future Appointments   Date Time Provider Department Center   12/15/2023  8:20 AM Bryon Starr MD [facility]   12/18/2023  3:00 PM Nirali Argueta MD [facility]

## 2023-11-25 NOTE — PHARMACY-VANCOMYCIN DOSING SERVICE
"Pharmacy Vancomycin Note  Date of Service 2023  Patient's  1965   58 year old, male    Indication: Bacteremia  Day of Therapy: Started 23  Current vancomycin regimen:  1500 mg IV q24h  Current vancomycin monitoring method: AUC  Current vancomycin therapeutic monitoring goal: 400-600 mg*h/L    [employer] Prediction of Current Vancomycin Regimen  Regimen: 1500 mg IV every 24 hours.  Start time: 17:08 on 2023  Exposure target: AUC24 (range)400-600 mg/L.hr   AUC24,ss: 532 mg/L.hr  Probability of AUC24 > 400: 93 %  Ctrough,ss: 15.9 mg/L  Probability of Ctrough,ss > 20: 22 %  Probability of nephrotoxicity (Lodise CANELO ): 11 %      Current estimated CrCl = Estimated Creatinine Clearance: 62.5 mL/min (A) (based on SCr of 1.57 mg/dL (H)).    Creatinine for last 3 days  2023:  2:08 PM Creatinine 1.39 mg/dL  2023:  7:43 AM Creatinine 1.35 mg/dL  2023:  5:59 AM Creatinine 1.57 mg/dL    Recent Vancomycin Levels (past 3 days)  2023:  7:55 AM Vancomycin 15.0 ug/mL    Vancomycin IV Administrations (past 72 hours)                     vancomycin (VANCOCIN) 1,500 mg in 0.9% NaCl 250 mL intermittent infusion (mg) 1,500 mg New Bag 23 1708    vancomycin (VANCOCIN) 2,250 mg in sodium chloride 0.9 % 500 mL intermittent infusion (mg) 2,250 mg New Bag 23 1629                    Nephrotoxins and other renal medications (From now, onward)      Start     Dose/Rate Route Frequency Ordered Stop    23 1600  vancomycin (VANCOCIN) 1,500 mg in 0.9% NaCl 250 mL intermittent infusion         1,500 mg  over 90 Minutes Intravenous EVERY 24 HOURS 23 1548      23 2100  piperacillin-tazobactam (ZOSYN) 4.5 g vial to attach to  mL bag        Note to Pharmacy: For [facility], [facility] and [facility]: For Zosyn-naive patients, use the \"Zosyn initial dose + extended infusion\" order panel.    4.5 g  over 30 Minutes Intravenous EVERY 6 HOURS 23 1306                 Contrast Orders - " past 72 hours (72h ago, onward)      None            Interpretation of levels and current regimen:  Vancomycin level is reflective of -600    Has serum creatinine changed greater than 50% in last 72 hours: No    Urine output:  unable to determine    Renal Function: Worsening      Plan:  Continue Current Dose  Vancomycin monitoring method: AUC  Vancomycin therapeutic monitoring goal: 400-600 mg*h/L  Pharmacy will check vancomycin levels as appropriate in 1-3 Days.  Serum creatinine levels will be ordered daily for the first week of therapy and at least twice weekly for subsequent weeks.    Blake Mercedes [facility]

## 2023-11-25 NOTE — PROGRESS NOTES
[facility] contacted [facility], they are unable  to run benefits until Monday, as the pt's [facility] is through [facility] and they are not open on the weekend.   Tanya Bragg RN, BSN, PHN  Weekend/Holiday RN[facility] Pager [phone]

## 2023-11-26 NOTE — PLAN OF CARE
"Shift Report: 0700-2330    VS: /71 (BP Location: Right arm)   Pulse 86   Temp 98.6  F (37  C) (Oral)   Resp 18   Ht 1.778 m (5' 10\")   Wt 87.2 kg (192 lb 3.9 oz)   SpO2 96%   BMI 27.58 kg/m     Output/Last BM: Voids spontaneously   Activity: SBA to bedside commode   Skin/Dressing: R Foot/Ankle wound, dressings changed   Pain: Ongoing RLE pain, Voltaren cream    CMS: A/Ox4, chronic N/T to BLE   Diet: Regular diet, good appetite   LDA: PIV to LUE, saline locked bw abx   Equipment:    Plan: I&D plan for 11/27, NPO at midnight, pt aware of plan   Additional Info:      Goal Outcome Evaluation:  Plan of Care Reviewed With: patient  Overall Patient Progress: No Change      "

## 2023-11-26 NOTE — PLAN OF CARE
VS:    O2: Sating >90% on RA. Lung sounds clear. Denies chest pain and SOB.   Output: Voids spontaneously and adequately.    Last BM:  Bowel sounds active x4. Passing flatus.    Activity: Up with standby assist,     Skin: R foot Osteomyelitic    Pain: Pain was managed . with Tylenol   CMS: A&Ox4. Denies N/T.    Dressing: Dressing to L C/D/I.   Diet: Regular. Appetite was good. . Denies N/V.    LDA: PIV to L is S/L.    Equipment: IV pole, and personal belongings. Call light within reach and uses appropriately.   Plan:  TBD   Additional Info: Wound care done on Continue POC

## 2023-11-26 NOTE — PROGRESS NOTES
"Orthopedic Surgery Progress Note: 11/26/2023    Subjective:   No acute events overnight. Pain well-controlled. No concerns today.     Objective:   /70 (BP Location: Right arm, Patient Position: Supine)   Pulse 92   Temp 99.1  F (37.3  C) (Oral)   Resp 18   Ht 1.778 m (5' 10\")   Wt 87.2 kg (192 lb 3.9 oz)   SpO2 94%   BMI 27.58 kg/m    No intake/output data recorded.  General: NAD. Resting comfortably in bed.  Respiratory: Nonlabored breathing  Musculoskeletal:  RLE: ACE over wounds is C/D/I. Decreased sensation to light touch consistent with peripheral neuropathy in the foot. Fires TA/GSC/FHL/EHL. 2+ DP pulse.      Laboratory Data:  Lab Results   Component Value Date    WBC 9.3 11/25/2023    HGB 8.7 (L) 11/25/2023     11/25/2023    INR 1.29 (H) 11/24/2023     CRP: 224.4 < 268.66 < 339.0    1/2 blood cultures from 11/23/23: positive for MSSA    MRI of the right foot and ankle demonstrates loculated appearing fluid collection posterior to the medial malleolus that could represent an abscess. There is edema present within the distal tibia and fibula.     Assessment & Plan:   Nehemiah Magallon is a 58 year old male with a history significant for with T2DM with severe peripheral neuropathy with past right Charcot foot reconstruction in Jan-2022 complicated by post-operative wound infection with multiple recent I&D of the right foot with subsequent wound vac exchanges with positive cultures for candida with Dr. Starr on 11/02/23 and 11/08/23 admitted for sepsis and concern for worsening RLE osteomyelitis.      Plan for Today:  - Will review MRI with Dr Starr to see if indication for possible operative planning  - Pain control  - wound cares  - Continue antibiotics per Medicine/ID  - CRP tomorrow for trending  - NPO at midnight for possibility of sweeper room tomorrow AM    Medicine primary  - Plan for OR: Possible I&D, wound VAC exchange in the operating room, pending evaluation of advanced imaging  - " Anticoagulation/DVT prophylaxis:Per primary  - Antibiotics: Per primary, agree with Vanc/Ancef along with fluconazole at this time for broad coverage, appreciate ID involvement for narrowing antibiotics  - Imaging: Xrays, MRI completed  - Activity: Up as tolerated with weightbearing restrictions  - Weight bearing: NWB RLE  - Pain control: Per primary recommend multimodal  - Labs: Recommend trending CRP q48H, WBC  - Diet: Ok for a diet from an orthopedic perspective  - Consults: WOC for wound cares and vac changes, ID for antibiotic choice  - Follow-up: TBD  - Disposition: TBD, likely pending medical improvement    Orthopedic surgery staff for this patient is Dr. Oliva.    ------------------------------------------------------------------------------------------    Respectfully,    Henry Vieyra MD  Orthopedic Surgery PGY1  [phone]    Please page me directly with any questions/concerns during regular weekday hours before 5 pm. If there is no response, if it is a weekend, or if it is during evening hours then please page the orthopedic surgery resident on call.      FOLLOWUP:    Future Appointments   Date Time Provider Department Center   12/15/2023  8:20 AM [facility]on [facility]   12/18/2023  3:00 PM Nirali Argueta MD [facility]

## 2023-11-26 NOTE — PROGRESS NOTES
Brief update    Discussed patient with Dr. Starr. Will plan for I&D of the right foot, wound vac placement, possible antibiotic beads. Plan to hold anticoagulation overnight and patient to be NPO at midnight for likely 7:30 am case.     Henry Vieyra MD  Orthopedic Surgery Resident

## 2023-11-26 NOTE — PLAN OF CARE
"Goal Outcome Evaluation:         VS: /70 (BP Location: Right arm, Patient Position: Supine)   Pulse 92   Temp 99.1  F (37.3  C) (Oral)   Resp 18   Ht 1.778 m (5' 10\")   Wt 87.2 kg (192 lb 3.9 oz)   SpO2 94%   BMI 27.58 kg/m       Output/Last BM: Continent of bowel and bladder LBM 11/25   Activity: A1 wheelchair   Skin/Dressing: Wounds: R foot/ankle   Pain: Denied at night   CMS: Alert and orientedx4, BLE numbness/tingling   Diet: Reg   LDA: L PIV   Equipment: IV pole   Plan: Continue with POC   Additional Info:                "

## 2023-11-26 NOTE — PROGRESS NOTES
[facility]    Medicine Progress Note - Hospitalist Service, GOLD TEAM 18    Date of Admission:  11/23/2023    Assessment & Plan   58 year old male with a pmh of T2DM with severe peripheral neuropathy, papillary thyroid malignancy in remission s/p thyroidectomy, HLD, and right Charcot foot reconstruction in 1/2022 complicated by post operative wound infection progressing to osteomyelitis.  Patient was admitted to medical floor. Bcx growing G + cocci.      # Right Foot Osteomyelitis  # Sepsis  # Candida Albicans infection  # Right Charcot foot reconstruction 1/2022  - Initially had Charcot right foot reconstruction in 1/2022. Patient was later hospitalized and I&D in 1/2023 had intra operative cultures growing Staphylococcus epidermis (oxacillin resistant) and Staphylococcus caprae.  During most recent admission in 11/2023 I & D cultures grew Candida albicans and he was discharged on 6 weeks of fluconazole.  - Continue on broad spectrum ABX. ID consult. Continue trending WBC. Follow-up cultures.   - WOC consult.   - Orthopedic surgery following the patient, he is going back to the OR tomorrow. NPO after midnight. No medical contraindications for surgery at this moment, medically optimized.       ID following the patient.   Recs:  Can narrow empiric zosyn to IV cefazolin 2g q8hr to better target presumed MSSA (ordered)  Can continue empiric IV vancomycin (dosed by pharmacy) while awaiting final Staph aureus susceptibilities  Follow up pending blood cultures   Please check daily peripheral blood cultures (including today) until negative x72 hrs (ordered)  Check TTE to evaluate for vegetations (ordered)  Agree with ortho consult for surgical management for source control - please send deep tissue cultures if I&D is performed to help further guide antibiotics        # T2DM  # Severe Peripheral Neuropathy  - Hyperglycemia noted.    - will continue outpatient gabapentin  -  "Continue on sliding scale insulin, increase carb count 1:12. Continue on metformin. Restart NPH insulin.     - As an outpatient he is on jardiance, metformin, and Ozempic     # Papillary thyroid malignancy in remission s/p thyroidectomy with associated hypothyroidism  - Continue on PTA levothyroxine     # Nausea  # Vomiting  - Resolved.      # CKD stage 2  - Monitor renal function. Renal function stable.      # HLD  - PTA pravastatin     # MARGARITO  - Patient refuses CPAP due to claustrophobia  - Currently scheduled for inspire in December     # GERD  - Continue PTA omeprazole    # Chronic anemia   - Iron panel ordered and consistent with anemia of chronic disease.   - No need for transfusion at this time. Continue monitoring HGB.         Diet: Combination Diet Regular Diet  NPO per Anesthesia Guidelines for Procedure/Surgery Except for: Meds    DVT Prophylaxis: Pneumatic Compression Devices and Anti-embolisim stockings (TEDs)  Jones Catheter: Not present  Lines: None     Cardiac Monitoring: None  Code Status: Full Code      Clinically Significant Risk Factors               # Coagulation Defect: INR = 1.29 (Ref range: 0.85 - 1.15) and/or PTT = 35 Seconds (Ref range: 22 - 38 Seconds), will monitor for bleeding          # DMII: A1C = 7.3 % (Ref range: <5.7 %) within past 6 months, PRESENT ON ADMISSION  # Overweight: Estimated body mass index is 27.58 kg/m  as calculated from the following:    Height as of this encounter: 1.778 m (5' 10\").    Weight as of this encounter: 87.2 kg (192 lb 3.9 oz)., PRESENT ON ADMISSION       # Financial/Environmental Concerns: none         Disposition Plan      Expected Discharge Date: 11/28/2023      Destination: home with help/services              Damaso Jacinto MD  Hospitalist Service, GOLD TEAM 18  [facility]  Securely message with [employer] (more info)  Text page via [facility] Paging/Directory   See signed in provider for up to date coverage " information  ______________________________________________________________________    Interval History     No acute events overnight.   He was pleasant.   Fever resolved.   No chest pain, palpitations, shortness of breath, nausea, vomit, fever or chills.     Physical Exam   Vital Signs: Temp: 98.6  F (37  C) Temp src: Oral BP: 129/71 Pulse: 86   Resp: 18 SpO2: 96 % O2 Device: None (Room air)    Weight: 192 lbs 3.86 oz  Gen: well nourished, no acute distress, room air.   Cardiac: RRR with no murmurs or rubs  Pulm: Normal respiratory effort, clear lungs.   Abd: non distended and non tender to palpation, + BS  Extremities: Right lower leg and ankle wrapped with a wound vac in place.  Neuro: CN 2-12 intact, bilateral lower extremity neuropathy  Integumentary: Right ankle wrapped; left mid shin ulceration/scab  Psych: appropriate affect    Medical Decision Making       45 MINUTES SPENT BY ME on the date of service doing chart review, history, exam, documentation & further activities per the note.      Data     I have personally reviewed the following data over the past 24 hrs:    N/A  \   N/A   / N/A     N/A N/A N/A /  298 (H)   N/A N/A 1.23 (H) \       Imaging results reviewed over the past 24 hrs:   Recent Results (from the past 24 hour(s))   MR Ankle Right w/o & w Contrast    Narrative    EXAM: MR ANKLE RIGHT W/O AND W CONTRAST  LOCATION: [facility]  DATE: 11/25/2023    INDICATION: History of osteomyelitis, status post multiple I and D, concern for new infection or worsening osteo.  COMPARISON: 10/25/2023 CT, radiograph dated 11/23/2023.  TECHNIQUE: Routine. Additional postgadolinium T1 sequences were obtained.  IV CONTRAST: 8.7 ml Gadavist.    FINDINGS:   Extensive enhancement, destructive change and marrow replacement involving the distal tibia, talus as well as portions of the calcaneus, navicula. There is fragmentation and fracturing of the navicula and the calcaneus  is not well identified due to prior   surgery and/or destructive change. Considerable bony fragmentation and erosive change within the ankle, hindfoot and midfoot is again seen.     There is a conglomeration of joint fluid/synovitis within the tibiotalar joint, hindfoot/subtalar joint as well as the midtarsal/midfoot region. There is a rim-enhancing fluid collection along the posterior aspect of the ankle and distal tibia tracking   deep to the flexor hallucis longus tendon measuring approximately 3.4 x 1.4 x 5.5 cm which probably communicates with the posterior aspect of the tibiotalar joint and subtalar recess given the location. There is adjacent medial flexor tenosynovitis.    There is a small amount of complex fluid along the medial aspect of the hindfoot near the fragmented and destroyed talonavicular joint.     Additionally, there is a soft tissue ulceration versus surgical defect along the anterolateral aspect of the ankle with peripherally enhancing granulation tissue versus infection.    There is evidence of prior surgery and fusion across the first TMT joint and first metatarsal with subsequent hardware removal.    Redundancy of the Achilles tendon may be related to prior injury and laxity although this is nonspecific. There is partial fatty atrophy of the intrinsic muscles of the foot and ankle.      Impression    IMPRESSION:  1.  Severe and extensive Charcot arthropathy with cortical destructive change, fragmentation and fracturing most pronounced in the midtarsal region. There is extensive erosive change throughout the ankle, hindfoot and midfoot.    2.  Associated versus superimposed on this severe Charcot arthropathy, is extensive bone marrow edema, enhancement and marrow replacement throughout the ankle, hindfoot and portions of the midfoot. There is also conglomeration of joint fluid/synovitis   within the tibiotalar joint, hindfoot/subtalar joint as well as the midtarsal/midfoot region. While this  may reflect severe reactive/inflammatory changes from Charcot arthropathy, superimposed septic arthritis and osteomyelitis could cause this   appearance as well.    3.  Large rim-enhancing fluid collection measuring up to 5.5 cm along the posterior aspect of the distal tibia/ankle is concerning for abscess until proved otherwise.    4.  Deep soft tissue ulceration versus surgical defect along the anterolateral aspect of the ankle with peripherally enhancing granulation tissue versus infection.    5.  Additional soft tissue ulceration versus surgical defect along the medial aspect of the hindfoot where there is a small amount of irregular rim-enhancing fluid which may reflect inflammation or small abscess in the distorted area of the talonavicular   joint.      NOTE: ABNORMAL REPORT    THE DICTATION ABOVE DESCRIBES AN ABNORMALITY FOR WHICH FOLLOW-UP IS NEEDED.

## 2023-11-27 NOTE — ANESTHESIA POSTPROCEDURE EVALUATION
Patient: Nehemiah Magallon    Procedure: Procedure(s):  Irrigation And Debridement Foot, Wound vac placement and antibiotic bead placement right ankle and foot       Anesthesia Type:  General    Note:  Disposition: Outpatient   Postop Pain Control: Uneventful            Sign Out: Well controlled pain   PONV: No   Neuro/Psych: Uneventful            Sign Out: Acceptable/Baseline neuro status   Airway/Respiratory: Uneventful            Sign Out: Acceptable/Baseline resp. status   CV/Hemodynamics: Uneventful            Sign Out: Acceptable CV status   Other NRE: NONE   DID A NON-ROUTINE EVENT OCCUR? No           Last vitals:  Vitals Value Taken Time   /58 11/27/23 1030   Temp 36.8  C (98.3  F) 11/27/23 1030   Pulse 88 11/27/23 1030   Resp 14 11/27/23 1030   SpO2 93 % 11/27/23 1038   Vitals shown include unfiled device data.    Electronically Signed By: Christopher J. Behrens, MD  November 27, 2023  12:21 PM

## 2023-11-27 NOTE — ANESTHESIA PREPROCEDURE EVALUATION
Anesthesia Pre-Procedure Evaluation    Patient: Nehemiah Magallon   MRN: [id number] : 1965        Procedure : Procedure(s):  Debridement and Irrigation of Right Ankle and Foot  Remove Hardware Right Foot          Past Medical History:   Diagnosis Date    Chronic pain syndrome 10/24/2014    Diabetes mellitus, type 2 (H)     Diabetic Charcot foot (H)     Diabetic retinopathy associated with diabetes mellitus due to underlying condition (H)     Diverticulitis of colon     Gastroesophageal reflux disease     Hepatitis C 2017    treated    History of skin cancer     Hypertension     Hypothyroidism     Legally blind     Midfoot collapse of right lower extremity     Migraine     NAFLD (nonalcoholic fatty liver disease)     Nephrolithiasis     Neuropathy     MARGARITO (obstructive sleep apnea)     Rib pain 10/24/2014    S/P colectomy 10/14/2014    Thyroid cancer (H) 10/14/2014      Past Surgical History:   Procedure Laterality Date    ARTHRODESIS FOOT Right 2022    Procedure: Right midfoot/talonavicular osteotomy and reduction of deformity Right midfoot/talonavicular arthrodesis, achilles lengthening;  Surgeon: Bryon Starr MD;  [facility]: [facility]    CHOLECYSTECTOMY      COLECTOMY      @ 6 years ago, sigmoid    COLONOSCOPY      FOOT SURGERY Left     IRRIGATION AND DEBRIDEMENT FOOT, COMBINED Right 2023    Procedure: IRRIGATION AND DEBRIDEMENT, FOOT, RIGHT, Placement of Wound Vac and Antibiotic Beads.;  Surgeon: Bryon Starr MD;  [facility]    IRRIGATION AND DEBRIDEMENT FOOT, COMBINED Right 2023    Procedure: Debridement and Irrigation of Right Ankle and Foot;  Surgeon: Bryon Starr MD;  [facility]    IRRIGATION AND DEBRIDEMENT FOOT, COMBINED Right 2023    Procedure: IRRIGATION AND DEBRIDEMENT, RIGHT FOOT, WOUND VAC APPLICATION;  Surgeon: Bryon Starr MD;  [facility]    LENGTHEN TENDON ACHILLES Right 2022    Procedure: LENGTHENING, TENDON, ACHILLES;  Surgeon: Ro  MD Bryon;  [facility]: [facility]    REMOVE HARDWARE FOOT Right 11/2/2023    Procedure: Remove Hardware Right Foot;  Surgeon: Bryon Starr MD;  [facility]: [facility]      Allergies   Allergen Reactions    Doxycycline Rash     Very extensive full body rash lasting for several months. Given at same time as Rifampin.    Rifampin Rash     Very extensive full body rash lasting for several months. Given at the same time as doxycycline.    Atorvastatin      Other reaction(s): Hyperglycemia  pt also lists simvastatin?    Celebrex [Celecoxib] Other (See Comments)     [facility]hydration [facility] records      Social History     Tobacco Use    Smoking status: Never    Smokeless tobacco: Never   Substance Use Topics    Alcohol use: Not Currently     Comment: rarely      Wt Readings from Last 1 Encounters:   11/24/23 87.2 kg (192 lb 3.9 oz)        Anesthesia Evaluation   Pt has had prior anesthetic. Type: General.    No history of anesthetic complications       ROS/MED HX  ENT/Pulmonary:     (+) sleep apnea, doesn't use CPAP,                                     Neurologic:     (+)    peripheral neuropathy, - hands and legs.                           Cardiovascular:    (-) hypertension (resolved with weight loss)   METS/Exercise Tolerance:     Hematologic:     (+)      anemia (hgb 11),          Musculoskeletal:       GI/Hepatic:     (+)           hepatitis type C, liver disease (NAFLD),       Renal/Genitourinary:     (+) renal disease (cr 1.3), type: CRI, Pt does not require dialysis,    Nephrolithiasis ,       Endo:     (+)  type II DM, Last HgA1c: 7.3, date: 10/30/23,     Diabetic complications: neuropathy retinopathy. thyroid problem, hypothyroidism,           Psychiatric/Substance Use:       Infectious Disease:       Malignancy:   (+) Malignancy, History of Other.Other CA thyroid Remission status post.    Other:      (+)  , H/O Chronic Pain (ulnar nerve and carpal tunnel pain),, other significant disability Wheelchair bound and Blind    "      Physical Exam    Airway  airway exam normal      Mallampati: II       Respiratory Devices and Support         Dental       (+) Minor Abnormalities - some fillings, tiny chips      Cardiovascular   cardiovascular exam normal          Pulmonary   pulmonary exam normal                OUTSIDE LABS:  CBC:   Lab Results   Component Value Date    WBC 9.3 11/25/2023    WBC 15.0 (H) 11/24/2023    HGB 8.7 (L) 11/25/2023    HGB 9.5 (L) 11/24/2023    HCT 28.1 (L) 11/25/2023    HCT 30.9 (L) 11/24/2023     11/25/2023     11/24/2023     BMP:   Lab Results   Component Value Date     (L) 11/24/2023     11/23/2023    POTASSIUM 4.2 11/24/2023    POTASSIUM 4.5 11/23/2023    CHLORIDE 99 11/24/2023    CHLORIDE 98 11/23/2023    CO2 22 11/24/2023    CO2 21 (L) 11/23/2023    BUN 19.2 11/24/2023    BUN 21.9 11/23/2023    CR 1.23 (H) 11/26/2023    CR 1.57 (H) 11/25/2023     (H) 11/27/2023     (H) 11/26/2023     COAGS:   Lab Results   Component Value Date    PTT 35 11/24/2023    INR 1.29 (H) 11/24/2023     POC: No results found for: \"BGM\", \"HCG\", \"HCGS\"  HEPATIC:   Lab Results   Component Value Date    ALBUMIN 4.0 11/23/2023    PROTTOTAL 8.0 11/23/2023    ALT 10 11/23/2023    AST 13 11/23/2023    ALKPHOS 78 11/23/2023    BILITOTAL 0.7 11/23/2023     OTHER:   Lab Results   Component Value Date    LACT 0.8 11/24/2023    A1C 7.3 (H) 10/30/2023    ALICE 8.7 11/24/2023    MAG 2.0 01/21/2023    TSH 1.80 01/21/2023    CRP <2.9 02/06/2023    SED 71 (H) 11/24/2023       Anesthesia Plan    ASA Status:  3    NPO Status:  NPO Appropriate    Anesthesia Type: General.     - Airway: ETT   Induction: Intravenous.   Maintenance: Balanced.   Techniques and Equipment:     - Airway: Video-Laryngoscope     - Lines/Monitors: BIS     Consents    Anesthesia Plan(s) and associated risks, benefits, and realistic alternatives discussed. Questions answered and patient/representative(s) expressed understanding.     - Discussed: " Risks, Benefits and Alternatives for the PROCEDURE were discussed     - Discussed with:  Patient      - Extended Intubation/Ventilatory Support Discussed: No.      - Patient is DNR/DNI Status: No     Use of blood products discussed: No .     Postoperative Care    Pain management: IV analgesics, Oral pain medications, Multi-modal analgesia.   PONV prophylaxis: Ondansetron (or other 5HT-3), Dexamethasone or Solumedrol     Comments:    Other Comments: On Ozempic-check last dose            Christopher J. Behrens, MD

## 2023-11-27 NOTE — OR NURSING
Dr. Behrens aware that blood sugar is 222 on arrival to PACU. No interventions needed at this time

## 2023-11-27 NOTE — BRIEF OP NOTE
[facility]    Brief Operative Note    Pre-operative diagnosis: Right foot infection [L08.9]  Post-operative diagnosis Same as pre-operative diagnosis    Procedure: Irrigation And Debridement Foot, Wound vac placement and antibiotic bead placement right ankle and foot, Right - Foot    Surgeon: Surgeon(s) and Role:     * Bryon Starr MD - Primary     * Joesph Smith MD - Resident - Assisting  Anesthesia: General   Estimated Blood Loss: 15 mL.    Drains: Two wound VAC sponges/tubes  Specimens:   ID Type Source Tests Collected by Time Destination   A : Specimen source Right Posterior ankle Tissue Leg, Right ANAEROBIC BACTERIAL CULTURE ROUTINE, FUNGAL OR YEAST CULTURE ROUTINE, AEROBIC BACTERIAL CULTURE ROUTINE Bryon Starr MD 11/27/2023  8:28 AM    B : Specimen Source: Right Posterior ankle Aspirate Leg, Right ANAEROBIC BACTERIAL CULTURE ROUTINE, FUNGAL OR YEAST CULTURE ROUTINE, AEROBIC BACTERIAL CULTURE ROUTINE Bryon Starr MD 11/27/2023  8:28 AM      Findings:   Grossly apparent purulent fluid/material posterior to and involving right ankle joint. Previous anterolateral ankle and medial foot wounds re-explored without obvious purulence Two strands of ten antibiotic beads (tobra/vanco) placed, one medial foot/ankle, one anterolateral ankle, with twenty beads total .  Complications: None noted intraoperatively or immediately postoperatively .  Implants:   Implant Name Type Inv. Item Serial No.  Lot No. LRB No. Used Action   BONE CEMENT SIMPLEX W/TOBRAMYCIN 6197-9-001 - [id number] Cement, Bone BONE CEMENT SIMPLEX W/TOBRAMYCIN 6197-9-001  [employer] [id number] Right 1 Implanted       POSTOPERATIVE PLAN:     Admit:  [place], medicine primary with Orthopaedic and ID consultation.  Diet:  Clear liquids and advance as tolerated to regular.  Antibiotics:  Continue current antibiotic regimen, modify if/as directed by Infectious Disease.  Follow new cultures  (tissue and fluid cultures from right posterior ankle, taken intraoperatively today).  Pain management:  Multimodal. Wean from IV to oral medications.  Weightbearing status:  Strict nonweightbearing on the right lower extremity.  Use appropriate assistive gait devices and assistance (nursing/PT) for ambulation.  Activity:  May be up ad pancho for ADLs, PT, diagnostic tests, etc., but encourage elevation of the right foot/ankle above the level of the heart.  Float heels off of the bed.  Up with assistance until independent.  PT/OT:  Gait training, transfers, ADLs.  Labs:  Follow culture results. Trend acute phase reactants.  Xrays:  None at this time.  Immobilization:  None at this time.  Dressings: Keep right foot dressings clean, dry, and intact.  Maintain VAC machine at 125 mm Hg continuous.  DVT prophylaxis:  Deferred to primary team. Recommend at least mechanical calf SCD's and ambulation with NWB precautions to R LE..  Disposition:  Pending at this time.  Will need next repeat I&D / VAC change / bead removal vs exchange in the next several days.    Bryon Starr MD  Attending surgeon  Orthopaedic Surgery      Addendum:  I spoke with the patient's spouse Wiliam by phone at [phone] immediately postoperatively this morning.  Findings and treatment plan discussed.  All questions answered.  Bryon Starr MD  11/27/2023

## 2023-11-27 NOTE — OP NOTE
DATE OF SURGERY:  11/27/2023.     PREOPERATIVE DIAGNOSIS:  Right fot/ankle surgical site infection.    POSTOPERATIVE DIAGNOSIS:  Right foot/ankle surgical site infection.     PROCEDURE:  Irrigation and debridement of right foot and ankle, VAC dressing application, and antibiotic bead placement.     PRIMARY SURGEON:  Bryon Starr MD.     ASSISTANT SURGEON:  Joesph Smith MD.     ANESTHESIA:  General endotracheal.     COMPLICATIONS:  None apparent intraoperatively or immediately postoperatively.    IMPLANTED DEVICES:  Two strands of ten antibiotic (vancomycin and tobramycin) beads each (twenty total beads), one strand of ten in posteromedial ankle and one strand of ten in anterolateral ankle wound.  Beads were made by hand intraoperatively at this procedure and placed in the deep/subfascial space adjacent to bone.     SIGNIFICANT FINDINGS:  Serosanguinous fluid and purulence in posteromedial right ankle directly posterior to the distal tibia, communicating with the ankle joint.  Apparent bony destruction in distal  tibia and ankle joint.  Prior anterolateral ankle and medial foot wounds explored to bone and appeared clean.  Prior anterolateral ankle wound did communicate with the posteromedial ankle wound.  Technique:  Cutting, rongeuring, scrubbing.  Instruments:  Scalpel, rongeur, curet.  Nature of debrided tissue:  Friable, loosely attached, nonviable tissue.  Appearance of wound after:  Down to healthy, bleeding tissue/bone.  Area of debridement:  Posteromedial ankle wound / medial  foot wound: Approximately 14 cm x 2 cm x 2.5 cm, oriented with the long axis longitudinally behind the posteromedial right ankle and incorporating the previous medial foot wound.  Anterolaeral right ankle wound: Approximately 4 cm proximo-distal x 2 cm medio-lateral x 1.5 cm deep.  Depth of debridement:  Down to and including bone (distal tibia and ankle joint).     SPECIMENS SENT:  Tissue and fluid specimens from right posteromedial  ankle sent to microbiology for gram stain, fungal culture, aerobic culture, and anaerobic culture.    DRAINS:  Two VAC sponges and tubes to DME VAC machine at -125 mm continuous.     ESTIMATED BLOOD LOSS:  Approximately 15 mL.    TOURNIQUET TIME:  0 minutes.      INDICATIONS:                          Nehemiah Magallon is a very pleasant 58 year old male patient well known to me with a history of diabetes mellitus, peripheral neuropathy, and right foot Charcot neuroarthropathy, and who underwent a right foot Charcot reconstruction which then developed a surgical site infection which was debrided 01/21/2023 and re-debrided 11/02/2023 and 11/08/2023.  He has had hardware removed and VAC therapy with the wounds ostensibly improving enough to discontinue VAC therapy.  He has been on a 6 week antifungal course for  C. albicans infection which grew from the 11/02/2023 cultures.  He then re-presented with signs of worsening infection with an MRI scan showing an apparent fluid collection/abscess in the posterior right ankle region.  The recommendation was made for debridement and irrigation with possible VAC dressing application and antibiotic bead placement.  The diagnosis, nature of the recommended procedure, the risks, benefits, and alternatives, and the postoperative plan were all discussed with the patient in layman's terms.  No guarantees were expressed or implied as to outcome.  The likelihood for multiple future planned procedures with no guarantee of success for controlling the infection or limb salvage was discussed.  The patient had the opportunity to have all questions he had at the time asked and answered, verbalized his understanding of this discussion, and verbalized his wish to proceed with the planned procedure.  Written informed consent was obtained.     DESCRIPTION OF PROCEDURE:             The patient, Nehemiah Magallon, was met in the preoperative area where the correct surgical site was identified with patient  participation and marked by the primary surgeon.  All questions were answered.  The patient was then brought to the operating room, where he was carefully transferred to the operating room table in the supine position with all bony prominences well padded and a safety strap across the waist.  He was placed under general anesthesia by the anesthesia team, and an endotracheal tube was successfully placed.  Venous thromboembolic prophylaxis was performed with a sequential compression device on the contralateral nonoperatvive lower extremity.  The patient's operative lower extremity was then prepped with Betadine scrub and paint and draped free in the usual sterile fashion.  Prior to proceeding with the operation, a timeout was held per hospital policy correctly identifying the patient, procedure to be performed, and operative site including laterality.  All in the room agreed.     A curvilinear incision oriented proximal distal was made over the posterior medial right ankle connecting the proximal aspect of the existing medial midfoot wound, directly posterior to the medial malleolus, and along the posterior aspect of the medial subcutaneous border of the distal tibia.  Careful dissection was performed through the subcutaneous tissues, and meticulous hemostasis was obtained with electrocautery.   The posterior tibial tendon was gently retracted posteriorly with blunt retraction.  There was a collection of serosanguineous and purulent appearing fluid in the deep subfascial space directly adjacent to the posterior tibia and posterior ankle joint.  This was evacuated and sent for fluid cultures.  There was also purulent appearing material and friable, nonviable deep soft tissue which was also thoroughly rongeured and sent to microbiology for cultures as well.  Once the cultures were taken Ancef was administered.  The wound was thoroughly explored and probed in its entirety, and did communicate directly with the posterior  aspect of the tibiotalar/ankle joint.  This exploration also showed that there was apparent bony destruction in the posterior aspect of the distal tibia/ankle joint with soft appearing bone, which was thoroughly curetted out.  Bone curettage was included with the tissue cultures.  No additional sinus tracts or pockets of purulence were noted.  Care was taken to protect the region around and including the posteromedial neurovascular bundle and avoid any dissection directly around the neurovascular bundle.  The wound was debrided to healthy, bleeding tissues and bone.    Attention was turned to the distal portion of this wound with the contiguous medial midfoot wound.  The wound was thoroughly explored down to and including bone.  Scalpel, rongeur, and curette were used to remove small amounts of nonviable/friable subcutaneous tissue, fascia, and bone.  Debridement was carried down to healthy bleeding base of soft tissue and bone.  The majority of the wound did appear to be covered by granulation tissue.  There was no obvious purulence in this portion of the wound.  The previous nonunion site was included as part of the debridement.  No new sinus tracts were found upon careful probing of the wound.    The previous anterolateral right ankle wound was now reexplored.  Like the medial foot wound, it appears to be covered substantially by healthy appearing granulation tissue, with no obvious purulence in this portion of the wound.  The wound was thoroughly explored, and as before, it did communicate directly with the ankle/tibiotalar joint.  A scalpel, rongeur, and curette were used to remove small amounts of nonviable/friable subcutaneous tissue and fascia.  The bone surface (distal tibia) was curetted, but the bone did appear to be healthy  In this portion of the wound.  Debridement carried down to healthy bleeding base of soft tissue and bone.  No new sinus tracts were found upon careful probing.    Both wounds, the  pre-existing anterolateral right ankle wound, as well as the larger new posterior medial right ankle wound contiguous with the pre-existing medial foot wound, were then thoroughly irrigated with a total of 7 L of normal saline with cystoscopy tubing and bulb irrigation.  Visible flow through a fluid was noted between the 2 wounds, communicating through the ankle joint.    There was no evidence for any vascular injury during this procedure. Meticulous hemostasis was achieved.  2 strands of 10 each (20 total) antibiotic cement beads were then made by hand intraoperatively at this time, containing vancomycin and tobramycin, and placed on FiberWire suture.  One strand was placed into each incision, in the deep/subfascial space, adjacent to bone.  The skin was then carefully cleansed with sterile saline and then dried, and the alan-incisional skin was treated with Mastisol.  A VAC sponge was placed into each incision (2 total VAC sponges), covered with clear VAC dressings with an excellent seal, with 1 tube attached to each VAC dressing, and the 2 tubes were then attached via a Y connector to a new DME VAC machine with -125 mmHg continuous pressure.  Soft dressings consisting of sterile cast padding as well as an Ace wrap were then applied.     All surgical counts were reported as correct at the end of the case. The patient was then carefully and safely transferred to the [facility] [place] in the supine position, and then taken to the recovery room extubated and in stable condition.     I was present and scrubbed in for the entire case.        Bryon Starr MD  Attending surgeon  Orthopaedic Surgery

## 2023-11-27 NOTE — PROGRESS NOTES
"Care Management Follow Up    Length of Stay (days): 4    Expected Discharge Date: 11/29/2023     Concerns to be Addressed: discharge planning     Patient plan of care discussed at interdisciplinary rounds: No - provider did not attend rounds    Anticipated Discharge Disposition: Home Care, Home, Home Infusion     Anticipated Discharge Services: None  Anticipated Discharge DME:  TBD (likely wound vac)    Patient/family educated on Medicare website which has current facility and service quality ratings: no  Education Provided on the Discharge Plan: yes (not by this writer)   Patient/Family in Agreement with the Plan: yes    Referrals Placed by CM/SW: External Care Coordination; [facility]  Private pay costs discussed: Not applicable    Additional Information:  [facility] liaison informed this writer that pt will need [facility] auth completed by [facility] if he is to receive home infusion services - she sent auth paperwork to this writer via email.    Finalized ID plan is not available at this time, which the VA auth form requires in order to complete it.  Form specifies that this writer \"must speak\" to a Home and Community Care RN regarding this pt.    2:58pm - Called Dara Munoz (Home/Community Care RN), her  greeting stated she is not in office (did not list return date), requested any home care needs be directed to Danielle at [phone].  Left  informing her of need for home infusion, requested call back.  Subsequently called Danielle left  stating same info that was left in VM to Dara.    Received call back from Danielle, she explained that in order to submit the auth form, the finalized ID plan must be known so that a specific drug/dose/frequency/therapy duration can be ordered from the VA side.  [facility] to fax completed form to [phone] once final ID plan is determined.  She stated that once they acquire the form, it takes roughly an hour to approve things, and they would coordinate w/ I " directly.    Updated [facility] liaison.          Care mgmt will continue to follow.    EMMANUELLE Hand  [facility]  Units 8M/S & 10 ICU  Pager: [phone]  Phone: [phone]

## 2023-11-27 NOTE — PROGRESS NOTES
PACU to Inpatient Nursing Handoff    Patient Nehemiah Magallon is a 58 year old male who speaks English.   Procedure Procedure(s):  Irrigation And Debridement Foot, Wound vac placement and antibiotic bead placement right ankle and foot   Surgeon(s) Primary: Bryon Starr MD  Resident - Assisting: Joesph Smith MD     Allergies   Allergen Reactions    Doxycycline Rash     Very extensive full body rash lasting for several months. Given at same time as Rifampin.    Rifampin Rash     Very extensive full body rash lasting for several months. Given at the same time as doxycycline.    Atorvastatin      Other reaction(s): Hyperglycemia  pt also lists simvastatin?    Celebrex [Celecoxib] Other (See Comments)     [facility]       Isolation  No active isolations     Past Medical History   has a past medical history of Chronic pain syndrome (10/24/2014), Diabetes mellitus, type 2 (H), Diabetic Charcot foot (H), Diabetic retinopathy associated with diabetes mellitus due to underlying condition (H), Diverticulitis of colon, Gastroesophageal reflux disease, Hepatitis C (2017), History of skin cancer, Hypertension, Hypothyroidism, Legally blind, Midfoot collapse of right lower extremity, Migraine, NAFLD (nonalcoholic fatty liver disease), Nephrolithiasis, Neuropathy, MARGARITO (obstructive sleep apnea), Rib pain (10/24/2014), S/P colectomy (10/14/2014), and Thyroid cancer (H) (10/14/2014).    Anesthesia General   Dermatome Level     Preop Meds versed - time given: 0739   Nerve block Not applicable   Intraop Meds dexamethasone (Decadron)  fentanyl (Sublimaze): 100 mcg total  ondansetron (Zofran): last given at 0743  phenyphrine   Local Meds No   Antibiotics cefazolin (Ancef) - last given at 0824     Pain Patient Currently in Pain: denies   PACU meds  Not applicable   PCA / epidural No   Capnography     Telemetry ECG Rhythm: Normal sinus rhythm   Inpatient Telemetry Monitor Ordered? No        Labs Glucose Lab Results    Component Value Date     11/27/2023     11/27/2023     02/06/2023       Hgb Lab Results   Component Value Date    HGB 8.9 11/27/2023       INR Lab Results   Component Value Date    INR 1.19 11/27/2023      PACU Imaging Not applicable     Wound/Incision Negative Pressure Wound Therapy Ankle Right;Lateral;Medial (Active)   Wound Type Surgical 11/27/23 0944   Dressing Pieces Applied (# of Each Type) Black foam 11/27/23 0944   Cycle Continuous 11/27/23 0944   Target Pressure (mmHg) 125 11/27/23 0944   Number of days: 0       Incision/Surgical Site 11/17/22 Right;Lateral;Outer Foot (Active)   Incision Assessment UTV 11/27/23 0944   Dressing Per Plan of Care 11/26/23 0836   Trina-Incision Assessment UTV 11/13/23 0900   Incision Drainage Amount None 11/25/23 0147   Dressing Intervention Clean, dry, intact 11/27/23 0944   Number of days: 375       Incision/Surgical Site 11/02/23 Right;Medial Foot (Active)   Incision Assessment UTV 11/27/23 0944   Dressing Per Plan of Care 11/26/23 0836   Trina-Incision Assessment UTV 11/25/23 0147   Closure DOMINGO 11/25/23 0147   Incision Drainage Amount None 11/25/23 0147   Dressing Intervention Clean, dry, intact 11/27/23 0944   Number of days: 25       Incision/Surgical Site 11/08/23 Right Foot (Active)   Incision Assessment UTV 11/27/23 0944   Dressing Per Plan of Care 11/26/23 0836   Trina-Incision Assessment UTV 11/13/23 0900   Incision Drainage Amount None 11/25/23 0147   Dressing Intervention Clean, dry, intact 11/27/23 0944   Number of days: 19       Incision/Surgical Site 11/27/23 Outer;Right Ankle (Active)   Incision Assessment WDL 11/27/23 0944   Dressing Foam;Vacuum based 11/27/23 0944   Incision Care Therapy - negative pressure 11/27/23 0944   Dressing Intervention Clean, dry, intact 11/27/23 0944   Number of days: 0       Incision/Surgical Site 11/27/23 Medial;Right Ankle (Active)   Incision Assessment WDL 11/27/23 0944   Dressing Vacuum based 11/27/23 0944    Incision Care Therapy - negative pressure 11/27/23 0944   Dressing Intervention Clean, dry, intact 11/27/23 0944   Number of days: 0      CMS        Equipment Not applicable   Other LDA       IV Access Peripheral IV 11/26/23 Anterior;Right Upper forearm (Active)   Site Assessment WDL 11/27/23 0944   Line Status Saline locked 11/27/23 0944   Dressing Transparent 11/27/23 0944   Dressing Status clean;dry;intact 11/27/23 0944   Line Intervention Flushed 11/26/23 1909   Phlebitis Scale 0-->no symptoms 11/27/23 0944   Number of days: 1      Blood Products Not applicable EBL 15 mL   Intake/Output Date 11/27/23 0700 - 11/28/23 0659   Shift 4782-6960 9180-6981 1374-2351 24 Hour Total   INTAKE   I.V. 700   700   Shift Total(mL/kg) 700(8.03)   700(8.03)   OUTPUT   Blood 15   15   Shift Total(mL/kg) 15(0.17)   15(0.17)   Weight (kg) 87.2 87.2 87.2 87.2      Drains / Jones Negative Pressure Wound Therapy Ankle Right;Lateral;Medial (Active)   Wound Type Surgical 11/27/23 0944   Dressing Pieces Applied (# of Each Type) Black foam 11/27/23 0944   Cycle Continuous 11/27/23 0944   Target Pressure (mmHg) 125 11/27/23 0944   Number of days: 0      Time of void PreOp Time of Void Prior to Procedure: 0630 (11/27/23 0628)    PostOp Voided (mL): 300 mL (11/24/23 0800)  Urine Occurrence: 1 (11/26/23 1900)    Diapered? No   Bladder Scan      mL (11/26/23 1900)  tolerating sips     Vitals    B/P: 119/67  T: 99.5  F (37.5  C)    Temp src: Oral  P:  Pulse: 84 (11/27/23 1000)          R: 16  O2:  SpO2: 97 %    O2 Device: Simple face mask (11/27/23 0944)    Oxygen Delivery: 8 LPM (11/27/23 0944)         Family/support present none   Patient belongings     Patient transported on cart   DC meds/scripts (obs/outpt) Not applicable   Inpatient Pain Meds Released? Yes       Special needs/considerations Blood glucose was 222, no intreventions per MDA   Tasks needing completion None       [facility] [place], [place]  ASCOM [id number]

## 2023-11-27 NOTE — PROGRESS NOTES
"Orthopedic Surgery Progress Note: 11/27/2023    Subjective:   Tmax 100.4 overnight. Pain well-controlled. Denies nausea or chills. No concerns today. Discussed plans for surgery this morning and consent obtained.     Objective:   BP (!) 142/77 (BP Location: Left arm)   Pulse 93   Temp 100.4  F (38  C) (Oral)   Resp 18   Ht 1.778 m (5' 10\")   Wt 87.2 kg (192 lb 3.9 oz)   SpO2 95%   BMI 27.58 kg/m    No intake/output data recorded.  General: NAD. Resting comfortably in bed.  Respiratory: Nonlabored breathing  Musculoskeletal:  RLE: ACE over wounds is C/D/I. Decreased sensation to light touch consistent with peripheral neuropathy in the foot. Fires TA/GSC/FHL/EHL. 2+ DP pulse.      Laboratory Data:  Lab Results   Component Value Date    WBC 9.3 11/25/2023    HGB 8.7 (L) 11/25/2023     11/25/2023    INR 1.29 (H) 11/24/2023     CRP: 224.4 < 268.66 < 339.0    1/2 blood cultures from 11/23/23: positive for MSSA    MRI of the right foot and ankle demonstrates loculated appearing fluid collection posterior to the medial malleolus that could represent an abscess. There is edema present within the distal tibia and fibula.     Assessment & Plan:   Nehemiah Magallon is a 58 year old male with a history significant for with T2DM with severe peripheral neuropathy with past right Charcot foot reconstruction in Jan-2022 complicated by post-operative wound infection with multiple recent I&D of the right foot with subsequent wound vac exchanges with positive cultures for candida with Dr. Starr on 11/02/23 and 11/08/23 admitted for sepsis and concern for worsening RLE osteomyelitis.      Plan for Today:  - OR for I&D RLE     Medicine primary  - Plan for OR:  I&D of the right foot, wound vac placement, possible antibiotic beads   - Anticoagulation/DVT prophylaxis: holding for OR   - Antibiotics: Per primary, agree with Vanc/Ancef along with fluconazole at this time for broad coverage, appreciate ID involvement for narrowing " antibiotics  - Imaging: Xrays, MRI completed  - Activity: Up as tolerated with weightbearing restrictions  - Weight bearing: NWB RLE  - Pain control: Per primary recommend multimodal  - Labs: Recommend trending CRP q48H, WBC  - Diet: NPO for OR   - Consults: WOC, ID for antibiotic choice  - Follow-up: TBD  - Disposition: RUST    Orthopedic surgery staff for this patient is Dr. Starr    ------------------------------------------------------------------------------------------    Floridalma Araya MD, PGY4  Orthopedic Surgery       FOLLOWUP:    Future Appointments   Date Time Provider Department Center   12/15/2023  8:20 AM Bryon Starr MD [facility]   12/18/2023  3:00 PM iNrali Argueta MD [facility]

## 2023-11-27 NOTE — PROGRESS NOTES
Orthopaedic Surgery Attending    I personally saw and examined  preoperatively this morning. Relevant portions of the chart, imaging including his 11/25/2023 right ankle MRI scan, and labs were reviewed. The plan for repeat I&D and VAC change, including the nature of the surgery, the risks, benefits, and alternatives, and postoperative plan, as well as expectations for short and long term outcome, were discussed in layman's terms. The potential for multiple future surgeries ultimately leading to failure to control infection and resulting in an amputation was discussed. He verbalized his wish to avoid amputation for now. All questions were answered appropriately. Mr. Magallon verbalized understanding of our discussion and agreement with the plan. I marked the surgical site with his participation.      Addendum:  Mr. Magallon asked that I speak with his spouse Wiliam preoperatively as well which I did by phone at [phone]. Findings and plan for repeat I&D discussed. She expressed her opinion that an amputation would be best for him which I explained is a very reasonable option, and which may or may not be a future possibility depending on his wishes and his body's response to the infection treatments, but is not the plan for today's surgery.

## 2023-11-27 NOTE — PLAN OF CARE
"Goal Outcome Evaluation:    VS: /70 (BP Location: Right arm)   Pulse 96   Temp 97.7  F (36.5  C) (Oral)   Resp 18   Ht 1.778 m (5' 10\")   Wt 87.2 kg (192 lb 3.9 oz)   SpO2 96%   BMI 27.58 kg/m       O2: >92% on RA.    Neuro: A&Ox4.   Bowel/Bladder: Continent B&B. Voids spontaneously without difficulty.    Diet: Advance diet as tolerated: Regular diet. BG checks this shift 222, 305, and 396.   Skin/Dressing: Wounds: R foot/ankle   Pain: Denies pain.   Activity: SBA to bedside commode.    LDA: R PIV SL. Negative pressure wound drain on R Foot/Ankle.    Equipment: IV Pole.    Plan: Continue IV antibiotics.      Link Escudero RN    "

## 2023-11-27 NOTE — ANESTHESIA PROCEDURE NOTES
Airway       Patient location during procedure: OR       Procedure Start/Stop Times: 11/27/2023 7:52 AM  Staff -        Anesthesiologist:  Behrens, Christopher J, MD       CRNA: Dara Ward APRN CRNA       Performed By: CRNA  Consent for Airway        Urgency: elective  Indications and Patient Condition       Indications for airway management: alan-procedural       Induction type:intravenous       Mask difficulty assessment: 2 - vent by mask + OA or adjuvant +/- NMBA    Final Airway Details       Final airway type: endotracheal airway       Successful airway: ETT - single  Endotracheal Airway Details        ETT size (mm): 7.5       Cuffed: yes       Inital cuff pressure (cm H2O): 28       Successful intubation technique: video laryngoscopy       VL Blade Size: Glidescope 4       Grade View of Cords: 1       Adjucts: stylet       Position: Right       Measured from: lips       Secured at (cm): 22       Bite block used: None    Post intubation assessment        Placement verified by: capnometry, equal breath sounds and chest rise        Number of attempts at approach: 1       Ease of procedure: easy       Dentition: Lips/oral mucosa injury    Medication(s) Administered   Medication Administration Time: 11/27/2023 7:52 AM    Additional Comments       Patient intubated by medical student - Nehemiah Buckner.  Small cut inside of lower lip on R side.

## 2023-11-27 NOTE — ANESTHESIA CARE TRANSFER NOTE
Patient: Nehemiah Magallon    Procedure: Procedure(s):  Irrigation And Debridement Foot, Wound vac placement and antibiotic bead placement right ankle and foot       Diagnosis: Right foot infection [L08.9]  Diagnosis Additional Information: No value filed.    Anesthesia Type:   General     Note:    Oropharynx: oropharynx clear of all foreign objects and spontaneously breathing  Level of Consciousness: drowsy  Oxygen Supplementation: face mask  Level of Supplemental Oxygen (L/min / FiO2): 8  Independent Airway: airway patency satisfactory and stable  Dentition: dentition unchanged  Vital Signs Stable: post-procedure vital signs reviewed and stable  Report to RN Given: handoff report given  Patient transferred to: PACU    Handoff Report: Identifed the Patient, Identified the Reponsible Provider, Reviewed the pertinent medical history, Discussed the surgical course, Reviewed Intra-OP anesthesia mangement and issues during anesthesia, Set expectations for post-procedure period and Allowed opportunity for questions and acknowledgement of understanding    Vitals:  Vitals Value Taken Time   /70 11/27/23 0944   Temp     Pulse 86 11/27/23 0952   Resp 11 11/27/23 0952   SpO2 96 % 11/27/23 0952   Vitals shown include unfiled device data.    Electronically Signed By: CARLITO Carbone CRNA  November 27, 2023  9:53 AM

## 2023-11-27 NOTE — PROGRESS NOTES
[facility] [place] [facility]: Progress Note     Patient:  Nehemiah Magallon, Date of birth 1965, Medical record number [id number]  Date of Visit:  11/27/2023   Reason for consult: bacteremia         Assessment and Recommendations:     ID Problem list:  MSSA bacteremia 11/23/23. 11/25/23 cultures negative to date.   -TTE 11/25/23 without evidence of endocarditis.   Fevers - improving (Fevers up to 103 on admission, now Tmax 100.4)  Right foot septic arthritis of ankle   -11/27/23 I&D with purulent material involving right ankle joint  - 11/27/23 Antibiotic bead placement (vancomycin), wound vac placed. Cultures pending.  Right foot chronic osteomyelitis   - Worsening chronic osteomyelitis noted on CT 10/24/2023 with soft tissue swelling tibiotalar fusion and loosening of hardware plus Lisfranc deformity and ill-defined erosion of the lateral talus/distal fibula  - 11/2 s/p I&D for right ankle and foot hardware removal, OR cultures +Candida albicans  - 11/8 s/p I&D, antibiotic beads removal, wound vac placement - no cultures nor pathology were sent  - was discharged on at least 6-week course of fluconazole until follows up with Dr. Argueta on 12/18/23  History of right Charcot foot reconstruction 11/17/22 c/b post-op polymicrobial wound infection   - 1/20/23 - started on Pip/Tazobactam and Vancomycin IV  - 1/21/23 -  s/p I+D of right foot, placement of wound vac and antibiotic beads. Staph epidermidis x2 (oxacillin Resistant) and Staph caprae. (Each S to doxycycline)  - 2/15/23 - Vancomycin IV and Rifampin were stopped and started on Doxycycline  - 2/24/23 - Doxycycline was stopped (papular lesions on his body, back, arms, associated with itching)  Severe peripheral neuropathy  T2DM  CKD    Discussion:  Findings overall consistent with MSSA bacteremia from likely right foot wound infection source. Await repeat blood cultures and intra-op cultures from I&D today. TTE without endocarditis. For now, continue  "cefazolin and oral fluconazole unchanged.     Recs:  Continue cefazolin unchanged - anticipate needing IV therapy upon discharge  Ok to place PICC any time starting 11/28 if 11/25 blood cultures remain negative  Continue oral fluconazole unchanged    Jaclyn Adorno MD  Infectious Diseases  Pager [phone] or Vocera          Interval History:    Reports that he did \"fine\" with surgery today. Notes that he has been through all of this so many times it's \"rinse and repeat.\" Has absolutely no questions since he's had so many infections previously. No problems with cefazolin and fluconazole currently. Operative cultures from today pending.        History of Present Illness:   58M with PMH including DM2, severe peripheral neuropathy (no sensation of feet), diabetic retinopathy, history of thyroid cancer, CKD, history of Hep C (treated in 2017), right Charcot foot reconstruction (11/17/22) complicated by chronic wound infection, and recent admission (11/2-11/13) for right foot osteo (s/p 11/2 hardware removal, on oral fluconazole for +C.albicans in OR culture) who was admitted 11/23 due to 5 days of fevers/chills.     Per patient report, approximately 5-6 days prior to admission he had new onset fevers/chills, nausea/vomiting, weakness, and right leg pain (says he can only feel dull pain just below his right mid-leg, beyond that is numb). He says that between his home nurse wound vac changes there was some purulent drainage from the right foot wounds. He presented to the ED on 11/23 and was admitted to [facility] for further management. He was started on empiric IV vancomycin and IV zosyn and was continued on prior PO fluconazole. Admission blood cultures were found to be positive for GPCs (Staph aureus on verigene) and ID was consulted for further antibiotic recommendations. Aside from right leg pain, he denies any other focal pain elsewhere. No back pain.          Review of Systems:   ROS obtained, pertinent positives " and negatives as above.      Allergies:      Allergies   Allergen Reactions    Doxycycline Rash     Very extensive full body rash lasting for several months. Given at same time as Rifampin.    Rifampin Rash     Very extensive full body rash lasting for several months. Given at the same time as doxycycline.    Atorvastatin      Other reaction(s): Hyperglycemia  pt also lists simvastatin?    Celebrex [Celecoxib] Other (See Comments)     [facility]          Current Antimicrobials:   IV cefazolin  Oral fluconazole         Physical Exam:   Ranges forvital signs:  Temp:  [98.3  F (36.8  C)-100.4  F (38  C)] 98.3  F (36.8  C)  Pulse:  [84-93] 89  Resp:  [10-20] 20  BP: (115-147)/(58-79) 138/71  SpO2:  [86 %-98 %] 96 %  GENERAL:  Adult male, sitting up in bed in no acute distress.   ENT:  Head is normocephalic, atraumatic. Oropharynx is moist appearing  EYES:  No conjunctival injection.  LUNGS:  Unlabored breathing on room air.  ABDOMEN:  Non-distended, soft, nontender.  MSK/EXT/SKIN: +RLE with operative dressing in place  NEUROLOGIC:  Awake, alert, interactive.         Laboratory Data:     Inflammatory Markers    Recent Labs   Lab Test 11/27/23  0721 11/25/23  0559 11/24/23  1042 11/24/23  0743 11/23/23  1408 11/11/23  0802 11/09/23  0530 11/07/23  0709 11/05/23  0549 11/03/23  0652 10/30/23  0912 05/12/23  0907 01/30/23  1430 01/20/23  1211   SED  --   --  71*  --   --   --   --   --   --   --  44* 7  --  59*   CRPI 122.10* 224.40*  --  268.66* 339.00* 23.58* 71.83* 30.94* 22.51*   < > 74.50* <3.00   < >  --     < > = values in this interval not displayed.       Hematology Studies    Recent Labs   Lab Test 11/27/23  0721 11/25/23  0559 11/24/23  0743 11/23/23  1408 11/11/23  0802 11/08/23  0745   WBC 7.8 9.3 15.0* 19.8* 9.3 10.3   HGB 8.9* 8.7* 9.5* 11.2* 10.1* 9.9*   MCV 75* 77* 78 77* 77* 76*    195 199 257 415 373       Metabolic Studies     Recent Labs   Lab Test 11/27/23  0721 11/26/23  0793  11/25/23  0559 11/24/23  0743 11/23/23  1408 11/09/23  0530 11/08/23  0745   *  --   --  133* 135 137 136   POTASSIUM 3.5  --   --  4.2 4.5 4.5 4.2   CHLORIDE 98  --   --  99 98 101 102   CO2 26  --   --  22 21* 26 26   BUN 11.6  --   --  19.2 21.9 23.0 19.2   CR 1.17 1.23* 1.57* 1.35* 1.39* 1.16 1.09   GFRESTIMATED 72 68 50* 60* 58* 73 78       Hepatic Studies    Recent Labs   Lab Test 11/23/23  1408 11/05/23  0549 03/08/23  0941 02/13/23  1425 02/06/23  1430 01/30/23  1430   BILITOTAL 0.7 <0.2 0.2 <0.2 0.4 0.2   ALKPHOS 78 58 61 71 81 86   ALBUMIN 4.0 3.4* 4.4 4.1 3.4 3.8   AST 13 8 28 23 13 13   ALT 10 5 36 17 21 13       Microbiology:  Culture   Date Value Ref Range Status   11/26/2023 No growth after 12 hours  Preliminary   11/26/2023 No growth after 12 hours  Preliminary   11/25/2023 No growth after 1 day  Preliminary   11/25/2023 No growth after 1 day  Preliminary   11/23/2023 No Growth  Final   11/23/2023 No growth after 3 days  Preliminary   11/23/2023 Positive on the 1st day of incubation (A)  Final   11/23/2023 Staphylococcus aureus (AA)  Final     Comment:     1 of 2 bottles   11/02/2023 No anaerobic organisms isolated  Final   11/02/2023 Yeast (A)  Preliminary   11/02/2023 1+ Candida albicans (A)  Final     Comment:     Susceptibilities not routinely done, refer to antibiogram to view typical susceptibility profiles   11/02/2023 No anaerobic organisms isolated  Final   11/02/2023 Yeast (A)  Preliminary   11/02/2023 1+ Candida albicans (A)  Corrected   11/02/2023 No anaerobic organisms isolated  Final   11/02/2023 Yeast (A)  Preliminary   11/02/2023 1+ Candida albicans (A)  Final     Comment:     Susceptibilities not routinely done, refer to antibiogram to view typical susceptibility profiles   04/26/2023 3+ Normal amish  Final   03/29/2023 1+ Candida albicans (A)  Final     Comment:     Susceptibilities not routinely done, refer to antibiogram to view typical susceptibility profiles   03/08/2023  2+ Staphylococcus lugdunensis (A)  Final   2023 2+ Normal amish  Final   2023 No anaerobic organisms isolated  Final   2023 1+ Staphylococcus caprae (A)  Corrected     Comment:     Identification obtained by MALDI-TOF mass spectrometry research use only database. Test characteristics determined and verified by the Infect[facility] Diseases Diagnostic Laboratory.   2023 1+ Staphylococcus epidermidis (A)  Corrected   2023 1+ Staphylococcus epidermidis (A)  Corrected   2023 No anaerobic organisms isolated  Final   2023 1+ Staphylococcus caprae (A)  Corrected     Comment:     Identification obtained by MALDI-TOF mass spectrometry research use only database. Test characteristics determined and verified by the Infect[facility] Diseases Diagnostic Laboratory.Susceptibilities done on previous cultures   2023 1+ Staphylococcus epidermidis (A)  Corrected     Comment:     Susceptibilities done on previous cultures   2023 1+ Staphylococcus epidermidis (A)  Corrected     Comment:     Susceptibilities done on previous cultures   2023 1+ Staphylococcus epidermidis (A)  Corrected     Comment:     Susceptibilities done on previous cultures   2023 1+ Staphylococcus caprae (A)  Corrected     Comment:     Identification obtained by MALDI-TOF mass spectrometry research use only database. Test characteristics determined and verified by the Infect[facility] Diseases Diagnostic Laboratory.Susceptibilities done on previous cultures   2023 No Growth  Final   2023 No Growth  Final       Urine Studies    Recent Labs   Lab Test 23  1908   LEUKEST Negative   WBCU 5       Vancomycin Levels    Recent Labs   Lab Test 23  0755 23  0851 23  1425   VANCOMYCIN 15.0 15.6 15.3            Imagin/23/23 RLE xray report:  IMPRESSION: There is some periosteal new bone formation along the lateral margin of the distal fibular metaphysis which could reflect underlying  osteomyelitis- MRI would be helpful in further evaluation. No fracture. Chronic periosteal new bone formation along the medial margin of the distal fibular shaft. Extensive neuropathic changes throughout the midfoot including lateral dislocation of the 2nd through 5th metatarsals. Mild degenerative changes at the 1st MTP joint. Moderate-sized plantar calcaneal spur.

## 2023-11-27 NOTE — PROGRESS NOTES
[facility]    Medicine Progress Note - Hospitalist Service, GOLD TEAM 18    Date of Admission:  11/23/2023    Assessment & Plan   58 year old male with a pmh of T2DM with severe peripheral neuropathy, papillary thyroid malignancy in remission s/p thyroidectomy, HLD, and right Charcot foot reconstruction in 1/2022 complicated by post operative wound infection progressing to osteomyelitis.  Patient was admitted to medical floor. Bcx growing G + cocci.      # Right Foot Osteomyelitis  # Sepsis  # Candida Albicans infection  # Right Charcot foot reconstruction 1/2022  - Initially had Charcot right foot reconstruction in 1/2022. Patient was later hospitalized and I&D in 1/2023 had intra operative cultures growing Staphylococcus epidermis (oxacillin resistant) and Staphylococcus caprae.  During most recent admission in 11/2023 I & D cultures grew Candida albicans and he was discharged on 6 weeks of fluconazole.  - Continue on broad spectrum ABX. ID consult. Continue trending WBC. Follow-up cultures.   - WOC consult.   - Orthopedic surgery following the patient. POD # 0 Irrigation and debridement of right foot and ankle, VAC dressing application, and antibiotic bead placement.        ID following the patient.   Recs:  Can narrow empiric zosyn to IV cefazolin 2g q8hr to better target presumed MSSA (ordered)  Can continue empiric IV vancomycin (dosed by pharmacy) while awaiting final Staph aureus susceptibilities  Follow up pending blood cultures   Please check daily peripheral blood cultures (including today) until negative x72 hrs (ordered)  Check TTE to evaluate for vegetations (ordered)  Agree with ortho consult for surgical management for source control - please send deep tissue cultures if I&D is performed to help further guide antibiotics    Bcx ngtd. TTE was negative, defer SHIVANI to ID.         # T2DM  # Severe Peripheral Neuropathy  - Hyperglycemia noted.    - will  "continue outpatient gabapentin  - Continue on sliding scale insulin, increase carb count 1:12. Continue on metformin. Increase NPH 12 units.     - As an outpatient he is on jardiance, metformin, and Ozempic     # Papillary thyroid malignancy in remission s/p thyroidectomy with associated hypothyroidism  - Continue on PTA levothyroxine     # Nausea  # Vomiting  - Resolved.      # CKD stage 2  - Monitor renal function. Renal function stable.      # HLD  - PTA pravastatin     # MARGARITO  - Patient refuses CPAP due to claustrophobia  - Currently scheduled for inspire in December     # GERD  - Continue PTA omeprazole    # Chronic anemia   - Iron panel ordered and consistent with anemia of chronic disease.   - No need for transfusion at this time. Continue monitoring HGB.         Diet: Advance Diet as Tolerated: Regular Diet Adult    DVT Prophylaxis: Pneumatic Compression Devices and Anti-embolisim stockings (TEDs)  Jones Catheter: Not present  Lines: None     Cardiac Monitoring: None  Code Status: Full Code      Clinically Significant Risk Factors               # Coagulation Defect: INR = 1.19 (Ref range: 0.85 - 1.15) and/or PTT = 31 Seconds (Ref range: 22 - 38 Seconds), will monitor for bleeding          # DMII: A1C = 7.3 % (Ref range: <5.7 %) within past 6 months, PRESENT ON ADMISSION  # Overweight: Estimated body mass index is 27.58 kg/m  as calculated from the following:    Height as of this encounter: 1.778 m (5' 10\").    Weight as of this encounter: 87.2 kg (192 lb 3.9 oz)., PRESENT ON ADMISSION       # Financial/Environmental Concerns: none         Disposition Plan      Expected Discharge Date: 11/29/2023      Destination: home with help/services              Damaso Jacinto MD  Hospitalist Service, GOLD TEAM 18  [facility]  Securely message with Yip[employer] (more info)  Text page via [facility] Paging/Directory   See signed in provider for up to date coverage " information  ______________________________________________________________________    Interval History     No acute events overnight.   I saw the patient after surgery.   He was pleasant.   No chest pain, palpitations, shortness of breath, nausea, vomit, fever or chills.     Physical Exam   Vital Signs: Temp: 98.3  F (36.8  C) Temp src: Oral BP: 138/71 Pulse: 89   Resp: 20 SpO2: 96 % O2 Device: Nasal cannula Oxygen Delivery: 3 LPM  Weight: 192 lbs 3.86 oz  Gen: well nourished, no acute distress, room air.   Cardiac: RRR with no murmurs or rubs  Pulm: Normal respiratory effort, clear lungs.   Abd: non distended and non tender to palpation, + BS  Extremities: Right lower leg and ankle wrapped with a wound vac in place.  Neuro: CN 2-12 intact, bilateral lower extremity neuropathy  Integumentary: Right ankle wrapped; left mid shin ulceration/scab  Psych: appropriate affect    Medical Decision Making       45 MINUTES SPENT BY ME on the date of service doing chart review, history, exam, documentation & further activities per the note.      Data     I have personally reviewed the following data over the past 24 hrs:    7.8  \   8.9 (L)   / 196     132 (L) 98 11.6 /  222 (H)   3.5 26 1.17 \     Procal: N/A CRP: 122.10 (H) Lactic Acid: N/A       INR:  1.19 (H) PTT:  31   D-dimer:  N/A Fibrinogen:  N/A       Imaging results reviewed over the past 24 hrs:   No results found for this or any previous visit (from the past 24 hour(s)).

## 2023-11-28 NOTE — PROGRESS NOTES
[facility] Service: Progress Note     Patient:  Nehemiah Magallon, Date of birth 1965, Medical record number [id number]  Date of Visit:  11/28/2023   Reason for consult: bacteremia         Assessment and Recommendations:     ID Problem list:  MSSA bacteremia 11/23/23. 11/25/23 cultures negative to date.   -TTE 11/25/23 without evidence of endocarditis.   Fevers - improving (Fevers up to 103 on admission, now Tmax 100.4)  Right foot septic arthritis of ankle   -11/27/23 I&D with purulent material involving right ankle joint  - 11/27/23 Antibiotic bead placement (vancomycin), wound vac placed. Cultures with S aureus so far.   Right foot chronic osteomyelitis   - Worsening chronic osteomyelitis noted on CT 10/24/2023 with soft tissue swelling tibiotalar fusion and loosening of hardware plus Lisfranc deformity and ill-defined erosion of the lateral talus/distal fibula  - 11/2 s/p I&D for right ankle and foot hardware removal, OR cultures +Candida albicans  - 11/8 s/p I&D, antibiotic beads removal, wound vac placement - no cultures nor pathology were sent  - was discharged on at least 6-week course of fluconazole until follows up with Dr. Argueta on 12/18/23  History of right Charcot foot reconstruction 11/17/22 c/b post-op polymicrobial wound infection   - 1/20/23 - started on Pip/Tazobactam and Vancomycin IV  - 1/21/23 -  s/p I+D of right foot, placement of wound vac and antibiotic beads. Staph epidermidis x2 (oxacillin Resistant) and Staph caprae. (Each S to doxycycline)  - 2/15/23 - Vancomycin IV and Rifampin were stopped and started on Doxycycline  - 2/24/23 - Doxycycline was stopped (papular lesions on his body, back, arms, associated with itching)  Severe peripheral neuropathy  T2DM  CKD    Discussion:  Findings overall consistent with MSSA bacteremia from right foot wound infection source. TTE without endocarditis. Continue cefazolin and oral fluconazole unchanged. Planning 6 week course for  cefazolin. I'm concerned about the number of different pathogens he's had in his foot and ongoing infections despite previous hardware removal. I'm unsure whether we will be able to fully sterilize the abnormal bones in his foot. Thus far the plan is still for curative antibiotics without consideration of amputation. Plan for 6 weeks cefazolin, continuing fluconazole as per previous plan, and assess how well he does with wound healing, etc prior to consideration of whether even more prolonged course (perhaps with conversion to orals) or even oral suppression for MSSA and/or Candida might be useful in the future.     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 Doing fine today. No new issues. Tolerating cefazolin and fluconazole.        History of Present Illness:   58M with PMH including DM2, severe peripheral neuropathy (no sensation of feet), diabetic retinopathy, history of thyroid cancer, CKD, history of Hep C (treated in 2017), right Charcot foot reconstruction (11/17/22) complicated by chronic wound infection, and recent admission (11/2-11/13) for right foot osteo (s/p 11/2 hardware removal, on oral fluconazole for +C.albicans in OR culture) who was admitted 11/23 due to 5 days of fevers/chills.     Per patient report, approximately 5-6 days prior to admission he had new onset fevers/chills, nausea/vomiting, weakness, and right leg pain (says he can only feel dull pain just below his right mid-leg, beyond that is numb). He says that between his home nurse wound vac changes there was some purulent drainage from the right foot wounds. He presented to the ED on 11/23 and  was admitted to [facility] for further management. He was started on empiric IV vancomycin and IV zosyn and was continued on prior PO fluconazole. Admission blood cultures were found to be positive for GPCs (Staph aureus on verigene) and ID was consulted for further antibiotic recommendations. Aside from right leg pain, he denies any other focal pain elsewhere. No back pain.       Allergies:      Allergies   Allergen Reactions    Doxycycline Rash     Very extensive full body rash lasting for several months. Given at same time as Rifampin.    Rifampin Rash     Very extensive full body rash lasting for several months. Given at the same time as doxycycline.    Atorvastatin      Other reaction(s): Hyperglycemia  pt also lists simvastatin?    Celebrex [Celecoxib] Other (See Comments)     De[facility] records          Current Antimicrobials:   IV cefazolin  Oral fluconazole         Physical Exam:   Ranges forvital signs:  Temp:  [97.3  F (36.3  C)-97.7  F (36.5  C)] 97.7  F (36.5  C)  Pulse:  [76-85] 85  Resp:  [16] 16  BP: (116-142)/(75-78) 142/78  SpO2:  [96 %-97 %] 97 %  GENERAL:  Adult male in no acute distress.   ENT:  Head is normocephalic, atraumatic. Oropharynx is moist appearing  EYES:  No conjunctival injection.  LUNGS:  Unlabored breathing on room air.  MSK/EXT/SKIN: +RLE with VAC in place  NEUROLOGIC:  Awake, alert, interactive.         Laboratory Data:     Inflammatory Markers    Recent Labs   Lab Test 11/28/23  0729 11/27/23  0721 11/25/23  0559 11/24/23  1042 11/24/23  0743 11/23/23  1408 11/11/23  0802 11/09/23  0530 11/07/23  0709 11/03/23  0652 10/30/23  0912 05/12/23  0907 01/30/23  1430 01/20/23  1211   SED  --   --   --  71*  --   --   --   --   --   --  44* 7  --  59*   CRPI 105.79* 122.10* 224.40*  --  268.66* 339.00* 23.58* 71.83* 30.94*   < > 74.50* <3.00   < >  --     < > = values in this interval not displayed.       Hematology Studies    Recent Labs   Lab Test 11/27/23  8615  11/25/23  0559 11/24/23  0743 11/23/23  1408 11/11/23  0802 11/08/23  0745   WBC 7.8 9.3 15.0* 19.8* 9.3 10.3   HGB 8.9* 8.7* 9.5* 11.2* 10.1* 9.9*   MCV 75* 77* 78 77* 77* 76*    195 199 257 415 373       Metabolic Studies     Recent Labs   Lab Test 11/27/23  0721 11/26/23  0725 11/25/23  0559 11/24/23  0743 11/23/23  1408 11/09/23  0530 11/08/23  0745   *  --   --  133* 135 137 136   POTASSIUM 3.5  --   --  4.2 4.5 4.5 4.2   CHLORIDE 98  --   --  99 98 101 102   CO2 26  --   --  22 21* 26 26   BUN 11.6  --   --  19.2 21.9 23.0 19.2   CR 1.17 1.23* 1.57* 1.35* 1.39* 1.16 1.09   GFRESTIMATED 72 68 50* 60* 58* 73 78       Hepatic Studies    Recent Labs   Lab Test 11/23/23  1408 11/05/23  0549 03/08/23  0941 02/13/23  1425 02/06/23  1430 01/30/23  1430   BILITOTAL 0.7 <0.2 0.2 <0.2 0.4 0.2   ALKPHOS 78 58 61 71 81 86   ALBUMIN 4.0 3.4* 4.4 4.1 3.4 3.8   AST 13 8 28 23 13 13   ALT 10 5 36 17 21 13       Microbiology:  Culture   Date Value Ref Range Status   11/27/2023 No anaerobic organisms isolated after 1 day  Preliminary   11/27/2023 No anaerobic organisms isolated after 1 day  Preliminary   11/27/2023 No growth after 1 day  Preliminary   11/27/2023 No growth after 1 day  Preliminary   11/27/2023 Culture in progress  Preliminary   11/27/2023 2+ Staphylococcus aureus (A)  Preliminary   11/27/2023 No growth, less than 1 day  Preliminary   11/26/2023 No growth after 2 days  Preliminary   11/26/2023 No growth after 2 days  Preliminary   11/25/2023 No growth after 3 days  Preliminary   11/25/2023 No growth after 3 days  Preliminary   11/23/2023 No Growth  Final   11/23/2023 No Growth  Final   11/23/2023 Positive on the 1st day of incubation (A)  Final   11/23/2023 Staphylococcus aureus (AA)  Final     Comment:     1 of 2 bottles   11/02/2023 No anaerobic organisms isolated  Final   11/02/2023 Yeast (A)  Preliminary   11/02/2023 1+ Candida albicans (A)  Final     Comment:     Susceptibilities not routinely  done, refer to antibiogram to view typical susceptibility profiles   11/02/2023 No anaerobic organisms isolated  Final   11/02/2023 Yeast (A)  Preliminary   11/02/2023 1+ Candida albicans (A)  Corrected   11/02/2023 No anaerobic organisms isolated  Final   11/02/2023 Yeast (A)  Preliminary   11/02/2023 1+ Candida albicans (A)  Final     Comment:     Susceptibilities not routinely done, refer to antibiogram to view typical susceptibility profiles   04/26/2023 3+ Normal amish  Final   03/29/2023 1+ Candida albicans (A)  Final     Comment:     Susceptibilities not routinely done, refer to antibiogram to view typical susceptibility profiles   03/08/2023 2+ Staphylococcus lugdunensis (A)  Final   03/08/2023 2+ Normal amish  Final   01/21/2023 No anaerobic organisms isolated  Final   01/21/2023 1+ Staphylococcus caprae (A)  Corrected     Comment:     Identification obtained by MALDI-TOF mass spectrometry research use only database. Test characteristics determined and verified by the Infect[facility] Diseases Diagnostic Laboratory.   01/21/2023 1+ Staphylococcus epidermidis (A)  Corrected   01/21/2023 1+ Staphylococcus epidermidis (A)  Corrected   01/21/2023 No anaerobic organisms isolated  Final   01/21/2023 1+ Staphylococcus caprae (A)  Corrected     Comment:     Identification obtained by MALDI-TOF mass spectrometry research use only database. Test characteristics determined and verified by the Infect[facility] Diseases Diagnostic Laboratory.Susceptibilities done on previous cultures   01/21/2023 1+ Staphylococcus epidermidis (A)  Corrected     Comment:     Susceptibilities done on previous cultures   01/21/2023 1+ Staphylococcus epidermidis (A)  Corrected     Comment:     Susceptibilities done on previous cultures   01/20/2023 1+ Staphylococcus epidermidis (A)  Corrected     Comment:     Susceptibilities done on previous cultures   01/20/2023 1+ Staphylococcus caprae (A)  Corrected     Comment:     Identification obtained by  MALDI-TOF mass spectrometry research use only database. Test characteristics determined and verified by the Infectious Diseases Diagnostic Laboratory.Susceptibilities done on previous cultures   2023 No Growth  Final   2023 No Growth  Final       Urine Studies    Recent Labs   Lab Test 23  1908   LEUKEST Negative   WBCU 5       Vancomycin Levels    Recent Labs   Lab Test 23  0755 23  0851 23  1425   VANCOMYCIN 15.0 15.6 15.3            Imagin/23/23 RLE xray report:  IMPRESSION: There is some periosteal new bone formation along the lateral margin of the distal fibular metaphysis which could reflect underlying osteomyelitis- MRI would be helpful in further evaluation. No fracture. Chronic periosteal new bone formation along the medial margin of the distal fibular shaft. Extensive neuropathic changes throughout the midfoot including lateral dislocation of the 2nd through 5th metatarsals. Mild degenerative changes at the 1st MTP joint. Moderate-sized plantar calcaneal spur.

## 2023-11-28 NOTE — PROGRESS NOTES
"CLINICAL NUTRITION SERVICES - ASSESSMENT NOTE     Nutrition Prescription    RECOMMENDATIONS FOR MDs/PROVIDERS TO ORDER:  Pt would like the order for multivit w/ minerals, Vitamin C and Zinc (if discharged before finishing the 10 day dose) included at discharge and/or included with his discharge summary for his [facility] provider to prescribe.  He only eats one meal per day with minimal fruits, vegetables and likely inadequate protein intake (lost a significant amount of wt by eating this way and not interested in much change.  He is willing to take Ensure Max Protein (30 g protein with low kcal/CHO) q day to meet his protein needs.  He's hoping his [facility] provider can get this ordered for him to cover the cost.    Malnutrition Status:    Moderate malnutrition in the context of chronic illness    Recommendations already ordered by Registered Dietitian (RD):  - trial chocolate Max protein with dinner.   - Do to pt's usual low vitamin/mineral intake PTA and nonhealing surgical wounds will order the following:  - Vitamin C 500 mg q day  - Multivitamin with minerals q day  - 220 mg Zinc sulfate x 10 days only (to correct any current deficiencies).    Future/Additional Recommendations:  - Follow intake, blood sugars, labs.      REASON FOR ASSESSMENT  Nehemiah Magallon is a/an 58 year old male assessed by the dietitian for Pressure Injury and Provider Order - \"hyponatremia, diminished appetite.\"    Chart reviewed.  Per recent RD note 11/10/23: History of diabetes mellitus type II with retinopathy, legal blindness and severe peripheral neuropathy; papillary thyroid malignancy in remission s/p thyroidectomy and with post-operative hypothyroidism; GERD; obstructive sleep apnea not on CPAP; chronic kidney disease; hepatitis C s/p treatment in 2017; hypertension; migraines; anemia; non-alcoholic fatty liver disease; past colectomy for diverticulitis in 2014; past cholecystectomy; bilateral hearing loss and using bilateral hearing aids; chronic " "pain; past right Charcot foot reconstruction in Jan-2022 complicated by post-operative wound infection progressing to osteomyelitis. S/p I&D right ankle and foot with hardware removal on 02-Nov-2023 and I&D right foot and wound vac application right foot for necrosis of surgical wound earlier on 08-Nov-2023.      NUTRITION HISTORY  Pt states he only eats one meal per day in midafternoon--usually meat and starch.  He rarely snacks and credits this restrictive diet with helping him lose a significant amount of weight (along with Ozempic).  He states he used to weigh over 300 lb.  His diet has minimal fruits and vegetables.  He states his wife often nags him that he needs to eat more--especially protein.  He doesn't take any general multivitamin--just folic acid, high dose B12 and Vitamin D3.   Nehemiah states that his blood sugars were always well controlled between 120 to 140 per his CGM prior to his foot infection in October.      CURRENT NUTRITION ORDERS  Diet: Regular  Intake/Tolerance:   Based on pt's carb based insulin dosing he is eating a large amount. Ate 96 g CHO w/ breakfast today, 120 g with lunch and past days have varied from 60 to 132 g of CHO/meal.      Ironically, pt feels that he's \"forced\" to eat three meals a day and then orders meals which often have a very high carb load.      LABS  Labs reviewed   Latest Reference Range & Units 11/23/23 14:08 11/24/23 07:43 11/25/23 05:59 11/25/23 07:55 11/26/23 07:25 11/27/23 07:21 11/28/23 07:29   Sodium 135 - 145 mmol/L 135 133 (L)    132 (L)    Potassium 3.4 - 5.3 mmol/L 4.5 4.2    3.5    Chloride 98 - 107 mmol/L 98 99    98    Carbon Dioxide (CO2) 22 - 29 mmol/L 21 (L) 22    26    Urea Nitrogen 8.0 - 23.0 mg/dL 21.9 19.2    11.6    Creatinine 0.67 - 1.17 mg/dL 1.39 (H) 1.35 (H) 1.57 (H)  1.23 (H) 1.17    GFR Estimate >60 mL/min/1.73m2 58 (L) 60 (L) 50 (L)  68 72    Calcium 8.6 - 10.0 mg/dL 9.8 8.7    9.0    Anion Gap 7 - 15 mmol/L 16 (H) 12    8    Albumin 3.5 " "- 5.2 g/dL 4.0         Protein Total 6.4 - 8.3 g/dL 8.0         Alkaline Phosphatase 40 - 150 U/L 78         ALT 0 - 70 U/L 10         AST 0 - 45 U/L 13         Bilirubin Total <=1.2 mg/dL 0.7         CRP Inflammation <5.00 mg/L 339.00 (H) 268.66 (H) 224.40 (H)   122.10 (H) 105.79 (H)   Ferritin 31 - 409 ng/mL    182      Glucose 70 - 99 mg/dL 233 (H) 135 (H)    241 (H)    Iron 61 - 157 ug/dL    10 (L)      Iron Binding Capacity 240 - 430 ug/dL    213 (L)      Iron Sat Index 15 - 46 %    5 (L)      (L): Data is abnormally low  (H): Data is abnormally high    Suspect some of lower Na levels related to high blood sugars.    A1c= 7.3% 10/30/23 with Hgb of only 11.5.  A1c likely skewed lower with anemia.     MEDICATIONS  Medications reviewed  Vitamin B12 2000 mcg q am, diflucan, folic acid, levothyroxine, linzess q AM, Metformin XR 1000 mg BID, protonix EC, miralax, 50 mcg Vitamin D3.  Noted pt received dexamethasone .    Medium resistance scale novolog ac and hs plus 1 unit novolog CHO w/ meals and 14 units NPH BID started  evening.     ANTHROPOMETRICS  Height: 177.8 cm (5' 10\")  Most Recent Weight: 87.6 kg (193 lb 2 oz)    IBW: 75.5 kg (116% IBW)  BMI: Overweight BMI 25-29.9  Weight History:   Wt Readings from Last 20 Encounters:   23 87.6 kg (193 lb 2 oz) bed scale   11/10/23 88.7 kg (195 lb 9.6 oz)   10/30/23 86.2 kg (190 lb)   23 98.9 kg (218 lb)   02/10/23 96.2 kg (212 lb)   23 95.3 kg (210 lb)   22 100.2 kg (221 lb)   22 95.3 kg (210 lb)     Weight change: Weight stable over past month. Significant wt loss of 11% in the past 6 months (all within 5 months). 8% wt loss within the past year (no older weights within past year beyond 23)--not significant.      Dosing Weight: 87.6 kg    ASSESSED NUTRITION NEEDS  Estimated Energy Needs: 2190 - 2628 kcals/day (25 - 30 kcals/kg)  Justification: Maintenance  Estimated Protein Needs: 105 - 123 grams protein/day (1.2 - 1.4 g " Pro/kg)  Justification: Repletion and Wound healing   Estimated Fluid Needs: 2628+ mL/day (30 + ml/kg)   Justification: Increased needs w/ high blood sugars and Per provider pending fluid status    PHYSICAL FINDINGS  See malnutrition section below.    MALNUTRITION  % Intake: < 75% for >/= 1 month (moderate)  % Weight Loss: > 10% in 6 months (severe)  Subcutaneous Fat Loss: Unable to assess--pt received tray and pt wished to eat so unable to complete. None noted with visual assessment.  Muscle Loss: Unable to assess----pt received tray and pt wished to eat so unable to complete. None noted with visual assessment.  Fluid Accumulation/Edema: Mild per nursing charting  Malnutrition Diagnosis: Moderate malnutrition in the context of chronic illness    NUTRITION DIAGNOSIS  Inadequate oral intake PTA related to decreased appetite w/ Ozempic and pt's desire to maintain weight loss as evidenced by pt report of eating only one meal/day with meat and side dish.       INTERVENTIONS  Implementation  Nutrition Education: Provided education on diet for wound healing. Provided Pressure Injury Nutrition Therapy handout. Discussed need for vitamin/mineral supplements d/t to his restrictive diet and increased needs with wound healing.    Collaboration with other providers  Medical food supplement therapy  Multivitamin/mineral supplement therapy     Goals  Patient to consume % of nutritionally adequate meal trays TID, or the equivalent with supplements/snacks.     Monitoring/Evaluation  Progress toward goals will be monitored and evaluated per protocol.  Cindy Meeks (Becky) RD, LD   6B and 8A Med/surg (M-F) Pager: [phone]  Weekend/holiday pager: [phone]

## 2023-11-28 NOTE — PROGRESS NOTES
"Orthopedic Surgery Progress Note: 11/28/2023    Subjective:   AFVSS. Pain well-controlled. No concerns today. Discussed findings during surgery and plan moving forward.     Objective:   /75 (BP Location: Right arm)   Pulse 76   Temp 97.3  F (36.3  C) (Oral)   Resp 16   Ht 1.778 m (5' 10\")   Wt 87.2 kg (192 lb 3.9 oz)   SpO2 96%   BMI 27.58 kg/m    No intake/output data recorded.  General: NAD. Resting comfortably in bed.  Respiratory: Nonlabored breathing  Musculoskeletal:  RLE: Dressing c/d/I. Vac in place, holding suction. Minimal output in cannister. No sensation in foot (baseline for patient)    Laboratory Data:  Lab Results   Component Value Date    WBC 7.8 11/27/2023    HGB 8.9 (L) 11/27/2023     11/27/2023    INR 1.19 (H) 11/27/2023     CRP: 122.10 < 224.4 < 268.66 < 339.0    1/2 blood cultures from 11/23/23: positive for MSSA    Tissue cultures from OR 11/27 pending       Assessment & Plan:   Nehemiah Magallon is a 58 year old male with a history significant for with T2DM with severe peripheral neuropathy with past right Charcot foot reconstruction in Jan-2022 complicated by post-operative wound infection with multiple recent I&D of the right foot with subsequent wound vac exchanges with positive cultures for candida with Dr. Starr on 11/02/23 and 11/08/23 admitted for sepsis and concern for worsening RLE osteomyelitis.      Plan for Today:  - Monitor cultures  - Continue antibiotics  - OR planning for 11/30/2023    Medicine primary  Admit:  [facility], medicine primary with Orthopaedic and ID consultation.  Diet:  Clear liquids and advance as tolerated to regular.  Antibiotics:  Continue current antibiotic regimen, modify if/as directed by Infectious Disease.  Follow new cultures (tissue and fluid cultures from right posterior ankle, taken intraoperatively today).  Pain management:  Multimodal. Wean from IV to oral medications.  Weightbearing status:  Strict nonweightbearing on the right lower " extremity.  Use appropriate assistive gait devices and assistance (nursing/PT) for ambulation.  Activity:  May be up ad pancho for ADLs, PT, diagnostic tests, etc., but encourage elevation of the right foot/ankle above the level of the heart.  Float heels off of the bed.  Up with assistance until independent.  PT/OT:  Gait training, transfers, ADLs.  Labs:  Follow culture results. Trend acute phase reactants.  Xrays:  None at this time.  Immobilization:  None at this time.  Dressings: Keep right foot dressings clean, dry, and intact.  Maintain VAC machine at 125 mm Hg continuous.  DVT prophylaxis:  Deferred to primary team. Recommend at least mechanical calf SCD's and ambulation with NWB precautions to R LE..  Disposition:  Pending at this time.  Will need next repeat I&D / VAC change / bead removal vs exchange in the next several days.    Orthopedic surgery staff for this patient is Dr. tSarr    ------------------------------------------------------------------------------------------    Joesph Smith MD  Orthopedic Surgery PGY4        FOLLOWUP:    Future Appointments   Date Time Provider Department Center   12/15/2023  8:20 AM Bryno Starr MD [facility]   12/18/2023  3:00 PM Nirali Argueta MD [facility]

## 2023-11-28 NOTE — PROGRESS NOTES
"Care Management Follow Up    Length of Stay (days): 5    Expected Discharge Date: 11/30/2023     Concerns to be Addressed: discharge planning     Patient plan of care discussed at interdisciplinary rounds: Yes    Anticipated Discharge Disposition: Home Care (open to [facility] prior to admission for wound vac dressing changes), Home Infusion     Anticipated Discharge Services: None  Anticipated Discharge DME:  wound vac    Patient/family educated on Medicare website which has current facility and service quality ratings: no  Education Provided on the Discharge Plan: yes   Patient/Family in Agreement with the Plan: yes    Referrals Placed by CM/SW: External Care Coordination; [facility]  Private pay costs discussed: Not applicable    Additional Information:  Met w/ pt at bedside, introduced self and role, and discussed discharge planning.  Pt confirmed the 3M home wound vac that he was provided at previous discharge is currently sitting in his backpack in his hospital room.  He currently has a hospital vac on.  Discussed home infusion, informed pt that [facility], informed him of [facility] process that [facility] must complete once final antibiotic plan is known.  Pt expressed understanding, was appreciative for this writer speaking bam Santos.  Pt explained Danielle has been heavily involved in his case \"...from the beginning\" and that she is very responsive and good to work with.  Regarding home care, pt stated that his wife does not like [facility], as she believes they were performing wound cares in a non-sterile manner.  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  Informed pt that if [facility] is providing infusion services post-discharge, they would be able to help switch home care agencies in the event he changes his mind, " pt expressed understanding.  Pt shared about his experience today w/ the [facility] going above and beyond to ensure his care needs are met.  He informed this writer that other aides had not been as involved in meeting his care needs, and while it has been disappointing, he explicitly stated that he does not want this writer to submit a complaint of any sort regarding this, nor does he wish to do so himself at this time.  Offered pt the Patient Relations number in the event he would like to discuss the experience at some point w/ them, pt declined the offer, stated he has the number from a previous admission.  Pt had no questions/concerns for this writer.  Advised pt to alert bedside RN in the event anything comes up that he would like to speak w/ care coordination team about, pt expressed understanding and was appreciative for the visit.    Documentation indicates pt will return to OR on 11/30/23.    Provided general update to [facility] liaison.          Care mgmt will continue to follow.    EMMANUELLE Hand  [facility]  Units 8M/S & 10 ICU  Pager: [phone]  Phone: [phone]

## 2023-11-28 NOTE — PROGRESS NOTES
[facility]    Medicine Progress Note - Hospitalist Service, GOLD TEAM 18    Date of Admission:  11/23/2023    Assessment & Plan   58 year old male with a pmh of T2DM with severe peripheral neuropathy, papillary thyroid malignancy in remission s/p thyroidectomy, HLD, and right Charcot foot reconstruction in 1/2022 complicated by post operative wound infection progressing to osteomyelitis.  Patient was admitted to medical floor. Bcx growing G + cocci.      #Right foot osteomyelitis  #Sepsis  #Candida albicans infection  #Right Charcot foot reconstruction 1/2022  - Initially had Charcot right foot reconstruction in 1/2022. Patient was later hospitalized and I&D in 1/2023 had intra operative cultures growing Staphylococcus epidermis (oxacillin resistant) and Staphylococcus caprae.  During most recent admission in 11/2023 I & D cultures grew Candida albicans and he was discharged on 6 weeks of fluconazole.  - Continue on broad spectrum ABX. ID consult. Continue trending WBC. Follow-up cultures.   - WOC consult.   - Orthopedic surgery following the patient. S/p irrigation and debridement of right foot and ankle, VAC dressing application, and antibiotic bead placement.        ID following the patient.   Recs:  Can narrow empiric zosyn to IV cefazolin 2g q8hr to better target presumed MSSA (ordered)  Can continue empiric IV vancomycin (dosed by pharmacy) while awaiting final Staph aureus susceptibilities  Follow up pending blood cultures   Please check daily peripheral blood cultures (including today) until negative x72 hrs (ordered)  Check TTE to evaluate for vegetations (ordered)  Agree with ortho consult for surgical management for source control - please send deep tissue cultures if I&D is performed to help further guide antibiotics    Bcx ngtd. TTE was negative, defer SHIVANI to ID.         #T2DM  #Severe peripheral neuropathy  - Hyperglycemia noted.    - Will continue  "outpatient gabapentin  - Continue on sliding scale insulin, increase carb count 1:12. Continue on metformin. Increase NPH 12 units.     - As an outpatient he is on jardiance, metformin, and Ozempic     #Papillary thyroid malignancy in remission s/p thyroidectomy with associated hypothyroidism  - Continue on PTA levothyroxine     #Nausea  #Vomiting  - Resolved      #CKD, stage 2  - Monitor renal function. Renal function stable.      #HLD  - PTA pravastatin     #MARGARITO  - Patient refuses CPAP due to claustrophobia  - Currently scheduled for inspire in December     #GERD  - Continue PTA omeprazole    #Chronic anemia  - Iron panel ordered and consistent with anemia of chronic disease.   - No need for transfusion at this time. Continue monitoring HGB.     #Hyponatremia   - Likely attributable to diminished appetite  - Continue to monitor  - Nutrition consult          Diet: Advance Diet as Tolerated: Regular Diet Adult    DVT Prophylaxis: Pneumatic Compression Devices  Jones Catheter: Not present  Lines: None     Cardiac Monitoring: None  Code Status: Full Code      Clinically Significant Risk Factors               # Coagulation Defect: INR = 1.19 (Ref range: 0.85 - 1.15) and/or PTT = 31 Seconds (Ref range: 22 - 38 Seconds), will monitor for bleeding          # DMII: A1C = 7.3 % (Ref range: <5.7 %) within past 6 months   # Overweight: Estimated body mass index is 27.71 kg/m  as calculated from the following:    Height as of this encounter: 1.778 m (5' 10\").    Weight as of this encounter: 87.6 kg (193 lb 2 oz).      # Financial/Environmental Concerns: none         Disposition Plan      Expected Discharge Date: 11/30/2023      Destination: home with help/services              Aroldo David DO, S  Hospitalist Service, [address]  Securely message with [facility] (more info)  Text page via [facility] Paging/Directory   See signed in provider for up to date coverage " information  ______________________________________________________________________    Interval History   Patient finishing on the commode upon entering room.  Patient endorses no specific complaints.  Patient reports no pain in the right foot.  Patient reports minimal appetite.  Patient reports follow up surgery Thursday, Nov 30.    Physical Exam   Vital Signs: Temp: 97.7  F (36.5  C) Temp src: Oral BP: (!) 142/78 Pulse: 85   Resp: 16 SpO2: 97 % O2 Device: None (Room air) Oxygen Delivery: 3 LPM  Weight: 193 lbs 1.97 oz    GENERAL: Alert and oriented x 3; no acute distress; well-nourished.  HEENT: Normocephalic; atraumatic; PERRLA; MMM.  CV: RRR; normal S1, S2; no rubs, murmurs, or gallops.  RESP: Lung fields clear to aucultation B/L; no wheezing or crepitations.  GI: Abdomen is soft, nontender, nondistended; no organomegaly; normal bowel sounds.  : Deferred genital examination.   MSK: Right foot wrapped with drains.  DERM: Skin is intact; no rash, lesions, or skin breakdown.  NEURO: No focal deficits appreciated; strength & sensorium are grossly intact.  PSYCH: No active hallucinations; affect, insight appear within normal limits.    Medical Decision Making       55 MINUTES SPENT BY ME on the date of service doing chart review, history, exam, documentation & further activities per the note.      Data     I have personally reviewed the following data over the past 24 hrs:    Procal: N/A CRP: 105.79 (H) Lactic Acid: N/A         Imaging results reviewed over the past 24 hrs:   No results found for this or any previous visit (from the past 24 hour(s)).

## 2023-11-28 NOTE — PLAN OF CARE
Goal Outcome Evaluation:      Plan of Care Reviewed With: patient    Overall Patient Progress: improving       VS: Temp: 97.3  F (36.3  C) Temp src: Oral BP: 116/75 Pulse: 76   Resp: 16 SpO2: 96 % O2 Device: None (Room air)    O2: >93% on RA. Denies CP and SOB.   Output: Continent B/B. Uses BSC independently.   Last BM: 11/27/2023   Activity: NWB to RLE. Transfers independently in room.   Skin: Wound to RLE.   Pain: No complaints of pain this shift.   Neuro/CMS: A&Ox4. Denies new numbness and tingling.   Dressing: RLE ace wrap - CDI. Wound vac in place.   Diet: Regular diet, thin liquids, pills whole. Denies N/V.   LDA: R PIV - TKO.   Plan: Continue IV ABX.   Additional Info:

## 2023-11-28 NOTE — PLAN OF CARE
Goal Outcome Evaluation:      Plan of Care Reviewed With: patient    Overall Patient Progress: improvingOverall Patient Progress: improving     Patient is A & O x 4; coherent of speech, and able to make his needs known to staff; SBA to transfer to the commode; received scheduled medications, and insulin per carb count and sliding scale; wound vac connected to right foot wound is intact, and patent; denied SOB, pain, and discomfort; will continue with POC. Call light is within reach.

## 2023-11-29 NOTE — PROGRESS NOTES
[facility]  Nurse Liaison    Received referral from Mikhail TOVAR for abx therapy.  Benefits -awaiting [facility] authorization, will communicate with patient when auth is received.  Met with patient  to Introduced home infusion services, review benefits and offer choice of providers. He wishes to stay with [facility] and has chosen \[facility]\"".  Provided info on \[facility]\"" Services, Including, RN visits, RPH/RN on call 24/7, supplies, and delivery process to home. Educated on how to contact \[facility]\"". He has done Home infusions abx in the past, and is in agreement with plan and willing and able to learn. He stated they are will be comfortable doing infusion in home environment.  I Liaison will continue to follow until DC for any changes or additional needs. This RN provided patient with \[facility]\"" Phone number. and will continue to follow through discharge.    [facility]-(I)  NURSE LIAISON NOTE  He is expected to DC in the next day or two. Educated on \[facility]\"" role in Pt DC to home.  He states they are comfortable doing home infusion and is able to infusion independently.  IVP  Mock Teach, Reviewd  syringe-air removal with patient.  He went through all steps of IVP ABX administration, flushing and proper sequence of IVP ABX step for administration. He has good understanding, as he is an RN. He agrees to contact \[facility]\"" for any questions, clarification or further education needs.  Educated to keep dressing CDI, and to cover dressing during bathing. Encouraged to call \[facility]\"" for any questions, clarifications or problems.  Educated on Deliveries, importance of refrigerating medications. He was engaged during this RN Visit in hospital room.  He understand to expect possible phone calls from \[facility]\"".  Education provided on RN/RPH on call 24/7, phone number provided.  Educated to review Green \[facility]\"" folder and contents, including Service Agreement and Consents, and educational Materials.  He verbalizes understanding of above.  DC Fri/Sat.  DC therapies:  IVabx- ancef every 8h,  will dose at home 06/14/22Wound vac MWF  LINE:  PICC tbd  DELIVERY: to home  AGENCY: [facility]  SNV: MWF wound vac chg.       Leanne Torres [facility]-Nurse Liaison  [email or web address]  My Cell:  [phone]  M-F  [facility] OFFICE  24/hrs  [phone]

## 2023-11-29 NOTE — PLAN OF CARE
Goal Outcome Evaluation:      Plan of Care Reviewed With: patient    Overall Patient Progress: improving       Temp: 98.1  F (36.7  C) Temp src: Oral BP: 116/61 Pulse: 87   Resp: 16 SpO2: 95 % O2 Device: None (Room air)  Denies CP and SOB.    Pt A&Ox4, transfers independently in room, NWB to E, continent B/B - uses BSC. Regular diet, thin liquids, pills whole. Denies N/V.  and 235 overnight, carb coverage w/ meals and sliding scale. R PIV - SL. Woundvac/ACE wrap to R foot wounds - CDI.    No acute issues overnight. Call light within reach. Continue with POC - IV ABX.

## 2023-11-29 NOTE — PLAN OF CARE
"Goal Outcome Evaluation:      Plan of Care Reviewed With: patient    Overall Patient Progress: improvingOverall Patient Progress: improving     Patient is A & O x 4; coherent of speech, and able to make his needs known to staff; stayed in bed throughout the shift; received scheduled medications; insulin per sliding scale and carb count, and IV ABX; IND with turn, reposition, and bed mobility, and getting OOB, and using commode by the bedside; denied SOB, N/V, pain, and discomfort. Will follow POC. Call light is within reach,  BP (!) 144/75 (BP Location: Right arm)   Pulse 94   Temp 98.7  F (37.1  C) (Oral)   Resp 16   Ht 1.778 m (5' 10\")   Wt 87.6 kg (193 lb 2 oz)   SpO2 97%   BMI 27.71 kg/m      Sebastien Ortiz RN        "

## 2023-11-29 NOTE — PROGRESS NOTES
"Orthopedic Surgery Progress Note: 11/29/2023    Subjective:   AFVSS. Pain well-controlled. No concerns today. Discussed culture results.     Objective:   /61 (BP Location: Right arm)   Pulse 87   Temp 98.1  F (36.7  C) (Oral)   Resp 16   Ht 1.778 m (5' 10\")   Wt 87.6 kg (193 lb 2 oz)   SpO2 95%   BMI 27.71 kg/m    No intake/output data recorded.  General: NAD. Resting comfortably in bed.  Respiratory: Nonlabored breathing  Musculoskeletal:  RLE: Dressing c/d/I. Vac in place, holding suction. Minimal output in cannister. No sensation in foot (baseline for patient)    Laboratory Data:  Lab Results   Component Value Date    WBC 7.8 11/27/2023    HGB 8.9 (L) 11/27/2023     11/27/2023    INR 1.19 (H) 11/27/2023     CRP: 122.10 < 224.4 < 268.66 < 339.0    1/2 blood cultures from 11/23/23: positive for MSSA    Tissue cultures from OR 11/27 2+ staph aureus, awaiting sensitivities.        Assessment & Plan:   Nehemiah Magallon is a 58 year old male with a history significant for with T2DM with severe peripheral neuropathy with past right Charcot foot reconstruction in Jan-2022 complicated by post-operative wound infection with multiple recent I&D of the right foot with subsequent wound vac exchanges with positive cultures for candida with Dr. Starr on 11/02/23 and 11/08/23 admitted for sepsis and concern for worsening RLE osteomyelitis.      Plan for Today:  - Monitor cultures  - Continue antibiotics  - OR planning for 11/30/2023  - NPO midnight     Medicine primary  Admit:  [facility], medicine primary with Orthopaedic and ID consultation.  Diet:  Clear liquids and advance as tolerated to regular. NPO at midnight   Antibiotics:  Continue current antibiotic regimen, modify if/as directed by Infectious Disease.  Follow new cultures (tissue and fluid cultures from right posterior ankle, taken intraoperatively today).  Pain management:  Multimodal. Wean from IV to oral medications.  Weightbearing status:  Strict " nonweightbearing on the right lower extremity.  Use appropriate assistive gait devices and assistance (nursing/PT) for ambulation.  Activity:  May be up ad pancho for ADLs, PT, diagnostic tests, etc., but encourage elevation of the right foot/ankle above the level of the heart.  Float heels off of the bed.  Up with assistance until independent.  PT/OT:  Gait training, transfers, ADLs.  Labs:  Follow culture results. Trend acute phase reactants.  Xrays:  None at this time.  Immobilization:  None at this time.  Dressings: Keep right foot dressings clean, dry, and intact.  Maintain VAC machine at 125 mm Hg continuous.  DVT prophylaxis:  Deferred to primary team. Recommend at least mechanical calf SCD's and ambulation with NWB precautions to R LE.  Disposition:  Pending at this time.  Will need next repeat I&D / VAC change / bead removal vs exchange on 11/30     Orthopedic surgery staff for this patient is Dr. Starr    ------------------------------------------------------------------------------------------    Joesph Smith MD  Orthopedic Surgery PGY4        FOLLOWUP:    Future Appointments   Date Time Provider Department Center   12/15/2023  8:20 AM Bryon Starr MD [facility]   12/18/2023  3:00 PM Nirali Argueta MD [facility]

## 2023-11-29 NOTE — PROGRESS NOTES
[facility]    Medicine Progress Note - Hospitalist Service, GOLD TEAM 18    Date of Admission:  11/23/2023    Assessment & Plan   58 year old male with a pmh of T2DM with severe peripheral neuropathy, papillary thyroid malignancy in remission s/p thyroidectomy, HLD, and right Charcot foot reconstruction in 1/2022 complicated by post operative wound infection progressing to osteomyelitis. Patient was admitted to medical floor. Bcx growing G + cocci.      #Right foot osteomyelitis  #Sepsis  #Candida albicans infection  #Right Charcot foot reconstruction 1/2022  - Initially had Charcot right foot reconstruction in 1/2022. Patient was later hospitalized and I&D in 1/2023 had intra operative cultures growing Staphylococcus epidermis (oxacillin resistant) and Staphylococcus caprae.  During most recent admission in 11/2023 I & D cultures grew Candida albicans and he was discharged on 6 weeks of fluconazole.  - Continue on broad spectrum ABX. ID consult. Continue trending WBC. Follow-up cultures.   - WOC consult.   - Orthopedic surgery following the patient. S/p irrigation and debridement of right foot and ankle, VAC dressing application, and antibiotic bead placement.        ID following the patient.   Recs:  Can narrow empiric zosyn to IV cefazolin 2g q8hr to better target presumed MSSA (ordered)  Can continue empiric IV vancomycin (dosed by pharmacy) while awaiting final Staph aureus susceptibilities  Follow up pending blood cultures   Please check daily peripheral blood cultures (including today) until negative x72 hrs (ordered)  Check TTE to evaluate for vegetations (ordered)  Agree with ortho consult for surgical management for source control - please send deep tissue cultures if I&D is performed to help further guide antibiotics    Bcx ngtd. TTE was negative, defer SHIVANI to ID.         #T2DM  #Severe peripheral neuropathy  - Hyperglycemia noted.    - Will continue  "outpatient gabapentin  - Continue on sliding scale insulin, increase carb count 1:12. Continue on metformin.   - Serum glucose appears suboptimally controlled  - Increase insulin NPH to 18 units subcutaneous twice daily  - As an outpatient he is on jardiance, metformin, and Ozempic     #Papillary thyroid malignancy in remission s/p thyroidectomy with associated hypothyroidism  - Continue on PTA levothyroxine     #Nausea  #Vomiting  - Resolved      #CKD, stage 2  - Monitor renal function. Renal function stable.      #HLD  - PTA pravastatin     #MARGARITO  - Patient refuses CPAP due to claustrophobia  - Currently scheduled for inspire in December     #GERD  - Continue PTA omeprazole    #Chronic anemia  - Iron panel ordered and consistent with anemia of chronic disease.   - No need for transfusion at this time. Continue monitoring HGB.     #Hyponatremia   - Likely attributable to diminished appetite  - Continue to monitor  - Nutrition consult          Diet: Advance Diet as Tolerated: Regular Diet Adult  Snacks/Supplements Adult: Ensure Max Protein (bariatric); With Meals    DVT Prophylaxis: Pneumatic Compression Devices  Jones Catheter: Not present  Lines: None     Cardiac Monitoring: None  Code Status: Full Code      Clinically Significant Risk Factors               # Coagulation Defect: INR = 1.19 (Ref range: 0.85 - 1.15) and/or PTT = 31 Seconds (Ref range: 22 - 38 Seconds), will monitor for bleeding          # DMII: A1C = 7.3 % (Ref range: <5.7 %) within past 6 months   # Overweight: Estimated body mass index is 27.71 kg/m  as calculated from the following:    Height as of this encounter: 1.778 m (5' 10\").    Weight as of this encounter: 87.6 kg (193 lb 2 oz).      # Financial/Environmental Concerns: none         Disposition Plan      Expected Discharge Date: 11/30/2023      Destination: home with help/services              Aroldo David DO, MHS  Hospitalist Service, GOLD TEAM 18  [facility] " [facility]  Securely message with "[employer]" (more info)  Text page via [facility] Paging/Directory   See signed in provider for up to date coverage information  ______________________________________________________________________    Interval History   Patient is in excellent spirits today, per usual.  Patient endorses no specific symptomatology.  Patient's blood glucose is being tested at bedside by nursing staff.  Serum glucose.  Suboptimally controlled.  Will increase insulin NPH dose to 18 units subcutaneous twice daily.  Further orthopedic surgical intervention slated for tomorrow, 11/30.    Physical Exam   Vital Signs: Temp: 98.1  F (36.7  C) Temp src: Oral BP: 116/61 Pulse: 87   Resp: 16 SpO2: 95 % O2 Device: None (Room air)    Weight: 193 lbs 1.97 oz    GENERAL: Alert and oriented x 3; no acute distress; well-nourished.  HEENT: Normocephalic; atraumatic; PERRLA; MMM.  CV: RRR; normal S1, S2; no rubs, murmurs, or gallops.  RESP: Lung fields clear to aucultation B/L; no wheezing or crepitations.  GI: Abdomen is soft, nontender, nondistended; no organomegaly; normal bowel sounds.  : Deferred genital examination.   MSK: Right lower extremity wrapped with drains; red shoe on left foot.  DERM: Skin is intact; no rash, lesions, or skin breakdown.  NEURO: No focal deficits appreciated; strength & sensorium are grossly intact.  PSYCH: No active hallucinations; affect, insight appear within normal limits.      Medical Decision Making       55 MINUTES SPENT BY ME on the date of service doing chart review, history, exam, documentation & further activities per the note.      Data     I have personally reviewed the following data over the past 24 hrs:    10.2  \   8.8 (L)   / 267     137 102 26.0 (H) /  175 (H)   4.1 28 1.04 \     Procal: N/A CRP: 53.40 (H) Lactic Acid: N/A         Imaging results reviewed over the past 24 hrs:   No results found for this or any previous visit (from the past 24 hour(s)).

## 2023-11-29 NOTE — PROGRESS NOTES
"Ortho staff  Pt seen and examined in his room on [address] (8th floor)  Awake, alert, pleasant, cooperative, appropriately interactive & conversant, resting in bed in NAD  Breathing pattern nonlabored  BP (!) 144/75 (BP Location: Right arm)   Pulse 94   Temp 98.7  F (37.1  C) (Oral)   Resp 16   Ht 1.778 m (5' 10\")   Wt 87.6 kg (193 lb 2 oz)   SpO2 97%   BMI 27.71 kg/m    R foot/ankle dressings appear clean, dry  VAC appears to be functioning well without obvious major leaks  CRP downtrending from recent peak of 339.00 (11/23) --> 224.40 (11/25) --> 105.79 (yesterday) --> 53.40 (today).  11/27 culture results 1+ S aureus, 1+ C albicans  Findings/plan d/w patient  Recommend continued operative I&D and VAC changes, possible bead exchange versus removal, with the next one scheduled for tomorrow  Discussed the possibility of an amputation, whether at this point or in the future, should he wish to do so for quality of life purposes and not undergo multiple planned future procedures without guarantee of success. However, I did also tell him it is also very reasonable to continue with attempts at limb salvage provided he is not recurrently septic.  All questions answered.  "

## 2023-11-30 NOTE — PROGRESS NOTES
[facility] Service: Progress Note     Patient:  Nehemiah Magallon, Date of birth 1965, Medical record number [id number]  Date of Visit:  11/29/2023   Reason for consult: bacteremia         Assessment and Recommendations:     ID Problem list:  MSSA bacteremia 11/23/23. 11/25/23 cultures negative to date.   -TTE 11/25/23 without evidence of endocarditis.   Fevers - improving (Fevers up to 103 on admission, now Tmax 100.4)  Right foot septic arthritis of ankle   -11/27/23 I&D with purulent material involving right ankle joint  - 11/27/23 Antibiotic bead placement (vancomycin), wound vac placed. Cultures with S aureus so far.   Right foot chronic osteomyelitis   - Worsening chronic osteomyelitis noted on CT 10/24/2023 with soft tissue swelling tibiotalar fusion and loosening of hardware plus Lisfranc deformity and ill-defined erosion of the lateral talus/distal fibula  - 11/2 s/p I&D for right ankle and foot hardware removal, OR cultures +Candida albicans  - 11/8 s/p I&D, antibiotic beads removal, wound vac placement - no cultures nor pathology were sent  - was discharged on at least 6-week course of fluconazole until follows up with Dr. Argueta on 12/18/23  History of right Charcot foot reconstruction 11/17/22 c/b post-op polymicrobial wound infection   - 1/20/23 - started on Pip/Tazobactam and Vancomycin IV  - 1/21/23 -  s/p I+D of right foot, placement of wound vac and antibiotic beads. Staph epidermidis x2 (oxacillin Resistant) and Staph caprae. (Each S to doxycycline)  - 2/15/23 - Vancomycin IV and Rifampin were stopped and started on Doxycycline  - 2/24/23 - Doxycycline was stopped (papular lesions on his body, back, arms, associated with itching)  Severe peripheral neuropathy  T2DM  CKD    Discussion:  Findings overall consistent with MSSA bacteremia from right foot wound infection source. TTE without endocarditis. Continue cefazolin and oral fluconazole unchanged. Planning 6 week course for  cefazolin. I'm concerned about the number of different pathogens he's had in his foot and ongoing infections despite previous hardware removal. I'm unsure whether we will be able to fully sterilize the abnormal bones in his foot. Current plan is for curative/suppressive antibiotics. Plan for 6 weeks cefazolin, continuing fluconazole as per previous plan, and assess how well he does with wound healing, etc prior to consideration of whether even more prolonged course (perhaps with conversion to orals) or even oral suppression for MSSA and/or Candida might be useful in the future.     Recs:  Continue cefazolin with planned duration 6 weeks  Patient will need PICC. Can place any time.  Continue oral fluconazole unchanged while awaiting sensitivities. If still sensitive to fluconazole, may consider increasing dose to 600 or 800 mg daily given persistence of organism while on fluconazole    Current antibiotic plan for discharge planning purposes:   Current plan is for cefazolin 2g IV Q8H for 6 weeks from most recent surgery (11/30/23-1/11/24).   Dr. Nirali Argueta will be following patient's labs after discharge (fax number [phone]) and has appointment set up with patient for 12/18/23.     ID will continue to follow while inpatient.     Jaclyn Adorno MD  Infectious Diseases  Pager [phone] or Vocera          Interval History:   Tolerating cefazolin and fluconazole. Surgical cultures with MSSA and now C albicans as well. No new problems today. Plan for return to OR tomorrow for bead removal/exchange.        History of Present Illness:   58M with PMH including DM2, severe peripheral neuropathy (no sensation of feet), diabetic retinopathy, history of thyroid cancer, CKD, history of Hep C (treated in 2017), right Charcot foot reconstruction (11/17/22) complicated by chronic wound infection, and recent admission (11/2-11/13) for right foot osteo (s/p 11/2 hardware removal, on oral fluconazole for +C.albicans in OR  culture) who was admitted 11/23 due to 5 days of fevers/chills.     Per patient report, approximately 5-6 days prior to admission he had new onset fevers/chills, nausea/vomiting, weakness, and right leg pain (says he can only feel dull pain just below his right mid-leg, beyond that is numb). He says that between his home nurse wound vac changes there was some purulent drainage from the right foot wounds. He presented to the ED on 11/23 and was admitted to [facility] for further management. He was started on empiric IV vancomycin and IV zosyn and was continued on prior PO fluconazole. Admission blood cultures were found to be positive for GPCs (Staph aureus on verigene) and ID was consulted for further antibiotic recommendations. Aside from right leg pain, he denies any other focal pain elsewhere. No back pain.       Allergies:      Allergies   Allergen Reactions    Doxycycline Rash     Very extensive full body rash lasting for several months. Given at same time as Rifampin.    Rifampin Rash     Very extensive full body rash lasting for several months. Given at the same time as doxycycline.    Atorvastatin      Other reaction(s): Hyperglycemia  pt also lists simvastatin?    Celebrex [Celecoxib] Other (See Comments)     [facility]          Current Antimicrobials:   IV cefazolin  Oral fluconazole         Physical Exam:   Ranges forvital signs:  Temp:  [98.1  F (36.7  C)-98.7  F (37.1  C)] 98.7  F (37.1  C)  Pulse:  [87-94] 94  Resp:  [16] 16  BP: (116-144)/(61-75) 144/75  SpO2:  [95 %-97 %] 97 %  GENERAL:  Adult male in no acute distress.   ENT:  Head is normocephalic, atraumatic. Oropharynx is moist appearing  EYES:  No conjunctival injection.  LUNGS:  Unlabored breathing on room air.  MSK/EXT/SKIN: +RLE with VAC in place  NEUROLOGIC:  Awake, alert, interactive.         Laboratory Data:     Inflammatory Markers    Recent Labs   Lab Test 11/29/23  0713 11/28/23  0729 11/27/23  0721 11/25/23  0559  11/24/23  1042 11/24/23  0743 11/23/23  1408 11/11/23  0802 11/09/23  0530 11/03/23  0652 10/30/23  0912 05/12/23  0907 01/30/23  1430 01/20/23  1211   SED  --   --   --   --  71*  --   --   --   --   --  44* 7  --  59*   CRPI 53.40* 105.79* 122.10* 224.40*  --  268.66* 339.00* 23.58* 71.83*   < > 74.50* <3.00   < >  --     < > = values in this interval not displayed.       Hematology Studies    Recent Labs   Lab Test 11/29/23  0713 11/27/23  0721 11/25/23  0559 11/24/23  0743 11/23/23  1408 11/11/23  0802   WBC 10.2 7.8 9.3 15.0* 19.8* 9.3   HGB 8.8* 8.9* 8.7* 9.5* 11.2* 10.1*   MCV 76* 75* 77* 78 77* 77*    196 195 199 257 415       Metabolic Studies     Recent Labs   Lab Test 11/29/23  1000 11/29/23  0713 11/27/23  0721 11/26/23  0725 11/25/23  0559 11/24/23  0743 11/23/23  1408 11/09/23  0530   NA  --  137 132*  --   --  133* 135 137   POTASSIUM 4.1 4.1 3.5  --   --  4.2 4.5 4.5   CHLORIDE  --  102 98  --   --  99 98 101   CO2  --  28 26  --   --  22 21* 26   BUN  --  26.0* 11.6  --   --  19.2 21.9 23.0   CR  --  1.04 1.17 1.23* 1.57* 1.35* 1.39* 1.16   GFRESTIMATED  --  83 72 68 50* 60* 58* 73       Hepatic Studies    Recent Labs   Lab Test 11/23/23  1408 11/05/23  0549 03/08/23  0941 02/13/23  1425 02/06/23  1430 01/30/23  1430   BILITOTAL 0.7 <0.2 0.2 <0.2 0.4 0.2   ALKPHOS 78 58 61 71 81 86   ALBUMIN 4.0 3.4* 4.4 4.1 3.4 3.8   AST 13 8 28 23 13 13   ALT 10 5 36 17 21 13       Microbiology:  Culture   Date Value Ref Range Status   11/27/2023 No anaerobic organisms isolated after 2 days  Preliminary   11/27/2023 No anaerobic organisms isolated after 1 day  Preliminary   11/27/2023 No growth after 2 days  Preliminary   11/27/2023 No growth after 2 days  Preliminary   11/27/2023 2+ Staphylococcus aureus (A)  Preliminary   11/27/2023 Culture in progress  Preliminary   11/27/2023 1+ Staphylococcus aureus (A)  Preliminary   11/27/2023 1+ Candida albicans (A)  Preliminary     Comment:     Susceptibilities not  routinely done, refer to antibiogram to view typical susceptibility profiles   11/26/2023 No growth after 3 days  Preliminary   11/26/2023 No growth after 3 days  Preliminary   11/25/2023 No growth after 4 days  Preliminary   11/25/2023 No growth after 4 days  Preliminary   11/23/2023 No Growth  Final   11/23/2023 No Growth  Final   11/23/2023 Positive on the 1st day of incubation (A)  Final   11/23/2023 Staphylococcus aureus (AA)  Final     Comment:     1 of 2 bottles   11/02/2023 No anaerobic organisms isolated  Final   11/02/2023 Yeast (A)  Preliminary   11/02/2023 1+ Candida albicans (A)  Final     Comment:     Susceptibilities not routinely done, refer to antibiogram to view typical susceptibility profiles   11/02/2023 No anaerobic organisms isolated  Final   11/02/2023 Yeast (A)  Preliminary   11/02/2023 1+ Candida albicans (A)  Corrected   11/02/2023 No anaerobic organisms isolated  Final   11/02/2023 Yeast (A)  Preliminary   11/02/2023 1+ Candida albicans (A)  Final     Comment:     Susceptibilities not routinely done, refer to antibiogram to view typical susceptibility profiles   04/26/2023 3+ Normal amish  Final   03/29/2023 1+ Candida albicans (A)  Final     Comment:     Susceptibilities not routinely done, refer to antibiogram to view typical susceptibility profiles   03/08/2023 2+ Staphylococcus lugdunensis (A)  Final   03/08/2023 2+ Normal amish  Final   01/21/2023 No anaerobic organisms isolated  Final   01/21/2023 1+ Staphylococcus caprae (A)  Corrected     Comment:     Identification obtained by MALDI-TOF mass spectrometry research use only database. Test characteristics determined and verified by the Infectious Diseases Diagnostic Laboratory.   01/21/2023 1+ Staphylococcus epidermidis (A)  Corrected   01/21/2023 1+ Staphylococcus epidermidis (A)  Corrected   01/21/2023 No anaerobic organisms isolated  Final   01/21/2023 1+ Staphylococcus caprae (A)  Corrected     Comment:     Identification  obtained by MALDI-TOF mass spectrometry research use only database. Test characteristics determined and verified by the Infectious Diseases Diagnostic Laboratory.Susceptibilities done on previous cultures   2023 1+ Staphylococcus epidermidis (A)  Corrected     Comment:     Susceptibilities done on previous cultures   2023 1+ Staphylococcus epidermidis (A)  Corrected     Comment:     Susceptibilities done on previous cultures   2023 1+ Staphylococcus epidermidis (A)  Corrected     Comment:     Susceptibilities done on previous cultures   2023 1+ Staphylococcus caprae (A)  Corrected     Comment:     Identification obtained by MALDI-TOF mass spectrometry research use only database. Test characteristics determined and verified by the Infectious Diseases Diagnostic Laboratory.Susceptibilities done on previous cultures   2023 No Growth  Final   2023 No Growth  Final       Urine Studies    Recent Labs   Lab Test 23  1908   LEUKEST Negative   WBCU 5       Vancomycin Levels    Recent Labs   Lab Test 23  0755 23  0851 23  1425   VANCOMYCIN 15.0 15.6 15.3            Imagin/23/23 RLE xray report:  IMPRESSION: There is some periosteal new bone formation along the lateral margin of the distal fibular metaphysis which could reflect underlying osteomyelitis- MRI would be helpful in further evaluation. No fracture. Chronic periosteal new bone formation along the medial margin of the distal fibular shaft. Extensive neuropathic changes throughout the midfoot including lateral dislocation of the 2nd through 5th metatarsals. Mild degenerative changes at the 1st MTP joint. Moderate-sized plantar calcaneal spur.

## 2023-11-30 NOTE — PROGRESS NOTES
WOC previously consult for right medial and lateral foot.    Pt has been to OR once, and plan to go to OR again today for I&D and VAC exchange.     WOC will sign off and defer to orthopedics at this time.

## 2023-11-30 NOTE — PLAN OF CARE
Goal Outcome Evaluation:      Plan of Care Reviewed With: patient    Overall Patient Progress: improvingOverall Patient Progress: improving    Outcome Evaluation: Pt eating well here but PTA only eats 1 meal/day w/ likely vitamin/mineral deficiencies and inadequate protein

## 2023-11-30 NOTE — PROGRESS NOTES
"Orthopedic Surgery Progress Note: 11/30/2023    Subjective:   AFVSS. Pain well-controlled. No concerns today. Discussed POC.     Objective:   /69 (BP Location: Right arm, Patient Position: Semi-Gonsalves's, Cuff Size: Adult Regular)   Pulse 91   Temp 98.3  F (36.8  C) (Oral)   Resp 16   Ht 1.778 m (5' 10\")   Wt 87.6 kg (193 lb 2 oz)   SpO2 93%   BMI 27.71 kg/m    No intake/output data recorded.  General: NAD. Resting comfortably in bed.  Respiratory: Nonlabored breathing  Musculoskeletal:  RLE: Dressing c/d/I. Vac in place, holding suction. Minimal output in cannister. No sensation in foot (baseline for patient)    Laboratory Data:  Lab Results   Component Value Date    WBC 10.2 11/29/2023    HGB 8.8 (L) 11/29/2023     11/29/2023    INR 1.10 11/29/2023     CRP: 53 < 122.10 < 224.4 < 268.66 < 339.0    1/2 blood cultures from 11/23/23: positive for MSSA    Tissue cultures from OR 11/27 MSSA and candida,        Assessment & Plan:   Nehemiah Magallon is a 58 year old male with a history significant for with T2DM with severe peripheral neuropathy with past right Charcot foot reconstruction in Jan-2022 complicated by post-operative wound infection with multiple recent I&D of the right foot with subsequent wound vac exchanges with positive cultures for candida with Dr. Starr on 11/02/23 and 11/08/23 admitted for sepsis and concern for worsening RLE osteomyelitis.      Plan for Today:  - RTOR for I&D, vac exchange today.  - NPO for OR    Medicine primary  Admit:  [place], medicine primary with Orthopaedic and ID consultation.  Diet:  NPO for OR  Antibiotics:  Continue current antibiotic regimen, modify if/as directed by Infectious Disease.  Follow new cultures (tissue and fluid cultures from right posterior ankle, taken intraoperatively today).  Pain management:  Multimodal. Wean from IV to oral medications.  Weightbearing status:  Strict nonweightbearing on the right lower extremity.  Use appropriate assistive gait " devices and assistance (nursing/PT) for ambulation.  Activity:  May be up ad pancho for ADLs, PT, diagnostic tests, etc., but encourage elevation of the right foot/ankle above the level of the heart.  Float heels off of the bed.  Up with assistance until independent.  PT/OT:  Gait training, transfers, ADLs.  Labs:  Follow culture results. Trend acute phase reactants.  Xrays:  None at this time.  Immobilization:  None at this time.  Dressings: Keep right foot dressings clean, dry, and intact.  Maintain VAC machine at 125 mm Hg continuous.  DVT prophylaxis:  Deferred to primary team. Recommend at least mechanical calf SCD's and ambulation with NWB precautions to R LE.  Disposition:  Pending at this time.  Will need next repeat I&D / VAC change / bead removal vs exchange on 11/30     Orthopedic surgery staff for this patient is Dr. Starr    ------------------------------------------------------------------------------------------    Joesph Smith MD  Orthopedic Surgery PGY4        FOLLOWUP:    Future Appointments   Date Time Provider Department Center   12/15/2023  8:20 AM Bryon Starr MD [facility]   12/18/2023  3:00 PM Nirali Argueta MD [facility]

## 2023-11-30 NOTE — PROGRESS NOTES
[facility]    Medicine Progress Note - Hospitalist Service, GOLD TEAM 18    Date of Admission:  11/23/2023    Assessment & Plan   58 year old male with a pmh of T2DM with severe peripheral neuropathy, papillary thyroid malignancy in remission s/p thyroidectomy, HLD, and right Charcot foot reconstruction in 1/2022 complicated by post operative wound infection progressing to osteomyelitis. Patient was admitted to medical floor. Bcx growing G + cocci.      #Right foot osteomyelitis  #Sepsis  #Candida albicans infection  #Right Charcot foot reconstruction 1/2022  - Initially had Charcot right foot reconstruction in 1/2022. Patient was later hospitalized and I&D in 1/2023 had intra operative cultures growing Staphylococcus epidermis (oxacillin resistant) and Staphylococcus caprae.  During most recent admission in 11/2023 I & D cultures grew Candida albicans and he was discharged on 6 weeks of fluconazole.  - Continue on broad spectrum ABX. ID consult. Continue trending WBC. Follow-up cultures.   - WOC consult.   - Orthopedic surgery following the patient. S/p irrigation and debridement of right foot and ankle, VAC dressing application, and antibiotic bead placement.        ID following the patient.   Recs:  Can narrow empiric zosyn to IV cefazolin 2g q8hr to better target presumed MSSA (ordered)  Can continue empiric IV vancomycin (dosed by pharmacy) while awaiting final Staph aureus susceptibilities  Follow up pending blood cultures   Please check daily peripheral blood cultures (including today) until negative x72 hrs (ordered)  Check TTE to evaluate for vegetations (ordered)  Agree with ortho consult for surgical management for source control - please send deep tissue cultures if I&D is performed to help further guide antibiotics    Bcx ngtd. TTE was negative, defer SHIVANI to ID.         #T2DM  #Severe peripheral neuropathy  - Hyperglycemia noted.    - Will continue  "outpatient gabapentin  - Continue on sliding scale insulin, increase carb count 1:12. Continue on metformin.   - Serum glucose appears suboptimally controlled  - Increase insulin NPH to 18 units subcutaneous twice daily  - As an outpatient he is on jardiance, metformin, and Ozempic     #Papillary thyroid malignancy in remission s/p thyroidectomy with associated hypothyroidism  - Continue on PTA levothyroxine     #Nausea  #Vomiting  - Resolved      #CKD, stage 2  - Monitor renal function. Renal function stable.      #HLD  - PTA pravastatin     #MARGARITO  - Patient refuses CPAP due to claustrophobia  - Currently scheduled for inspire in December     #GERD  - Continue PTA omeprazole    #Chronic anemia  - Iron panel ordered and consistent with anemia of chronic disease.   - No need for transfusion at this time. Continue monitoring HGB.     #Hyponatremia   - Likely attributable to diminished appetite  - Continue to monitor  - Nutrition consult          Diet: Snacks/Supplements Adult: Ensure Max Protein (bariatric); With Meals  NPO per Anesthesia Guidelines for Procedure/Surgery Except for: Meds    DVT Prophylaxis: Pneumatic Compression Devices  Jones Catheter: Not present  Lines: None     Cardiac Monitoring: None  Code Status: Full Code      Clinically Significant Risk Factors                        # DMII: A1C = 7.3 % (Ref range: <5.7 %) within past 6 months   # Overweight: Estimated body mass index is 27.71 kg/m  as calculated from the following:    Height as of this encounter: 1.778 m (5' 10\").    Weight as of this encounter: 87.6 kg (193 lb 2 oz).   # Moderate Malnutrition: based on nutrition assessment    # Financial/Environmental Concerns: none         Disposition Plan     Expected Discharge Date: 12/02/2023      Destination: home with help/services              Aroldo David DO, S  Hospitalist Service, GOLD TEAM [address] [place] [address]  Securely message with [employer] (more info)  Text " page via [facility] Paging/Directory   See signed in provider for up to date coverage information  ______________________________________________________________________    Interval History   Patient is in good spirits today.  Patient is on the phone with family.  Patient endorses no specific symptomatology.  Per nursing staff, no acute issues or clinical concerns.  Patient is returning to the OR today.  Case discussed with orthopedic surgery.    Physical Exam   Vital Signs: Temp: 99.6  F (37.6  C) Temp src: Oral BP: 131/76 Pulse: 87   Resp: 16 SpO2: 91 % O2 Device: None (Room air)    Weight: 193 lbs 1.97 oz    GENERAL: Alert and oriented x 3; no acute distress; well-nourished.  HEENT: Normocephalic; atraumatic; PERRLA; MMM.  CV: RRR; normal S1, S2; no rubs, murmurs, or gallops.  RESP: Lung fields clear to aucultation B/L; no wheezing or crepitations.  GI: Abdomen is soft, nontender, nondistended; no organomegaly; normal bowel sounds.  : Deferred genital examination.   MSK: Right lower extremity wrapped with drains.  DERM: Skin is intact; no rash, lesions, or skin breakdown.  NEURO: No focal deficits appreciated; strength & sensorium are grossly intact.  PSYCH: No active hallucinations; affect, insight appear within normal limits.    Medical Decision Making       45 MINUTES SPENT BY ME on the date of service doing chart review, history, exam, documentation & further activities per the note.      Data     I have personally reviewed the following data over the past 24 hrs:    7.9  \   9.4 (L)   / 322     139 101 20.0 /  120 (H)   4.0 28 1.09 \     Procal: N/A CRP: 42.27 (H) Lactic Acid: N/A         Imaging results reviewed over the past 24 hrs:   No results found for this or any previous visit (from the past 24 hour(s)).

## 2023-11-30 NOTE — ANESTHESIA PREPROCEDURE EVALUATION
Anesthesia Pre-Procedure Evaluation    Patient: Nehemiah Magallon   MRN: [id number] : 1965        Procedure : Procedure(s):  Debridement and Irrigation of Right Ankle and Foot  Remove Hardware Right Foot          Past Medical History:   Diagnosis Date    Chronic pain syndrome 10/24/2014    Diabetes mellitus, type 2 (H)     Diabetic Charcot foot (H)     Diabetic retinopathy associated with diabetes mellitus due to underlying condition (H)     Diverticulitis of colon     Gastroesophageal reflux disease     Hepatitis C 2017    treated    History of skin cancer     Hypertension     Hypothyroidism     Legally blind     Midfoot collapse of right lower extremity     Migraine     NAFLD (nonalcoholic fatty liver disease)     Nephrolithiasis     Neuropathy     MARGARITO (obstructive sleep apnea)     Rib pain 10/24/2014    S/P colectomy 10/14/2014    Thyroid cancer (H) 10/14/2014      Past Surgical History:   Procedure Laterality Date    ARTHRODESIS FOOT Right 2022    Procedure: Right midfoot/talonavicular osteotomy and reduction of deformity Right midfoot/talonavicular arthrodesis, achilles lengthening;  Surgeon: Bryon Starr MD;  Location: [facility]    CHOLECYSTECTOMY      COLECTOMY      @ 6 years ago, sigmoid    COLONOSCOPY      FOOT SURGERY Left     IRRIGATION AND DEBRIDEMENT FOOT, COMBINED Right 2023    Procedure: IRRIGATION AND DEBRIDEMENT, FOOT, RIGHT, Placement of Wound Vac and Antibiotic Beads.;  Surgeon: Bryon Starr MD;  [facility]    IRRIGATION AND DEBRIDEMENT FOOT, COMBINED Right 2023    Procedure: Debridement and Irrigation of Right Ankle and Foot;  Surgeon: Bryon Starr MD;  [facility]    IRRIGATION AND DEBRIDEMENT FOOT, COMBINED Right 2023    Procedure: IRRIGATION AND DEBRIDEMENT, RIGHT FOOT, WOUND VAC APPLICATION;  Surgeon: Bryon Starr MD;  [facility]    IRRIGATION AND DEBRIDEMENT FOOT, COMBINED Right 2023    Procedure: Irrigation And Debridement Right  Foot, Wound vac placement and antibiotic bead placement right ankle and foot;  Surgeon: Bryon Starr MD;  [facility]    LENGTHEN TENDON ACHILLES Right 11/17/2022    Procedure: LENGTHENING, TENDON, ACHILLES;  Surgeon: Bryon Starr MD;  [facility]    REMOVE HARDWARE FOOT Right 11/2/2023    Procedure: Remove Hardware Right Foot;  Surgeon: Bryon Starr MD;  [facility]: [facility]      Allergies   Allergen Reactions    Doxycycline Rash     Very extensive full body rash lasting for several months. Given at same time as Rifampin.    Rifampin Rash     Very extensive full body rash lasting for several months. Given at the same time as doxycycline.    Atorvastatin      Other reaction(s): Hyperglycemia  pt also lists simvastatin?    Celebrex [Celecoxib] Other (See Comments)     [facility]      Social History     Tobacco Use    Smoking status: Never    Smokeless tobacco: Never   Substance Use Topics    Alcohol use: Not Currently     Comment: rarely      Wt Readings from Last 1 Encounters:   11/28/23 87.6 kg (193 lb 2 oz)        Anesthesia Evaluation   Pt has had prior anesthetic. Type: General.    No history of anesthetic complications       ROS/MED HX  ENT/Pulmonary:     (+) sleep apnea, doesn't use CPAP,                                     Neurologic:     (+)    peripheral neuropathy, - hands and legs.                           Cardiovascular:    (-) hypertension (resolved with weight loss)   METS/Exercise Tolerance:     Hematologic:     (+)      anemia,          Musculoskeletal:       GI/Hepatic:     (+)           hepatitis type C, liver disease (NAFLD),       Renal/Genitourinary:     (+) renal disease, type: CRI, Pt does not require dialysis,    Nephrolithiasis ,       Endo:     (+)  type II DM, Last HgA1c: 7.3, date: 10/30/23,     Diabetic complications: neuropathy retinopathy. thyroid problem, hypothyroidism,           Psychiatric/Substance Use:       Infectious Disease:       Malignancy:   (+)  "Malignancy, History of Other.Other CA thyroid Remission status post.    Other:      (+)  , H/O Chronic Pain (ulnar nerve and carpal tunnel pain),, other significant disability Wheelchair bound and Blind         Physical Exam    Airway  airway exam normal      Mallampati: II   TM distance: > 3 FB   Neck ROM: full   Mouth opening: > 3 cm    Respiratory Devices and Support         Dental       (+) Minor Abnormalities - some fillings, tiny chips      Cardiovascular   cardiovascular exam normal          Pulmonary   pulmonary exam normal                OUTSIDE LABS:  CBC:   Lab Results   Component Value Date    WBC 7.9 11/30/2023    WBC 10.2 11/29/2023    HGB 9.4 (L) 11/30/2023    HGB 8.8 (L) 11/29/2023    HCT 30.4 (L) 11/30/2023    HCT 28.3 (L) 11/29/2023     11/30/2023     11/29/2023     BMP:   Lab Results   Component Value Date     11/30/2023     11/29/2023    POTASSIUM 4.0 11/30/2023    POTASSIUM 4.1 11/29/2023    CHLORIDE 101 11/30/2023    CHLORIDE 102 11/29/2023    CO2 28 11/30/2023    CO2 28 11/29/2023    BUN 20.0 11/30/2023    BUN 26.0 (H) 11/29/2023    CR 1.09 11/30/2023    CR 1.04 11/29/2023     (H) 11/30/2023     (H) 11/30/2023     COAGS:   Lab Results   Component Value Date    PTT 31 11/27/2023    INR 1.10 11/29/2023     POC: No results found for: \"BGM\", \"HCG\", \"HCGS\"  HEPATIC:   Lab Results   Component Value Date    ALBUMIN 4.0 11/23/2023    PROTTOTAL 8.0 11/23/2023    ALT 10 11/23/2023    AST 13 11/23/2023    ALKPHOS 78 11/23/2023    BILITOTAL 0.7 11/23/2023     OTHER:   Lab Results   Component Value Date    LACT 0.8 11/24/2023    A1C 7.3 (H) 10/30/2023    ALICE 9.3 11/30/2023    MAG 2.0 01/21/2023    TSH 1.80 01/21/2023    CRP <2.9 02/06/2023    SED 71 (H) 11/24/2023       Anesthesia Plan    ASA Status:  3    NPO Status:  NPO Appropriate    Anesthesia Type: General.     - Airway: ETT   Induction: Intravenous.   Maintenance: Balanced.   Techniques and Equipment:     - " Airway: Video-Laryngoscope     - Lines/Monitors: BIS     Consents    Anesthesia Plan(s) and associated risks, benefits, and realistic alternatives discussed. Questions answered and patient/representative(s) expressed understanding.     - Discussed: Risks, Benefits and Alternatives for the PROCEDURE were discussed     - Discussed with:  Patient      - Extended Intubation/Ventilatory Support Discussed: No.      - Patient is DNR/DNI Status: No     Use of blood products discussed: No .     Postoperative Care    Pain management: IV analgesics, Oral pain medications, Multi-modal analgesia.   PONV prophylaxis: Ondansetron (or other 5HT-3), Dexamethasone or Solumedrol     Comments:           H&P reviewed: Unable to attach VIRTUAL H&P to encounter due to EHR limitations. Appropriate H&P reviewed. The physical exam performed by anesthesia during this surgical encounter serves as the physical portion of that virtual H&P.  Any significant changes noted within this preop evaluation.          Lizeth Yu MD

## 2023-11-30 NOTE — PLAN OF CARE
"Goal Outcome Evaluation:    Plan of Care Reviewed With: patient  Overall Patient Progress: no change  Outcome Evaluation: Pt A&Ox4 and VSS.    Pt here for I&D of R foot. Pt schedule for I&D today and is receiving antibiotics via L PIV.     Assessment: Pt A&Ox4 and able to make needs known. Pt stating anger regarding being NPO since midnight, MD surgeon notified on if NPO can be discontinued for few hours so pt can eat, MD stated verbally over phone that NPO needs to stay active. Pt assist of 1 w/ belt and pivots to bedside commode. LBM: 11/30. Pt on RN managed Potassium; not replaced during shift. Pt has L PIV used for IV antibiotics and has wound vac on RLE running continuous suction at 125. Pt state numbness in all extremities which is baseline. Report given to Karoline TOVAR in PACU @1833.    Major Shift Changes: Pt stating during shift during each encounter remarks belittling staff and making verbally abusive statement such as \"A nice, empathetic and considering nurse would get me water!\" (after being told pt still NPO) and \"Do you not know how to work a wound vac? Even I can do that\".  Pt refused skin assessment and CHG wipes for procedure.    Plan:   - Go to I&D procedure @1830        "

## 2023-12-01 NOTE — PROGRESS NOTES
[facility]    Medicine Progress Note - Hospitalist Service, GOLD TEAM 18    Date of Admission:  11/23/2023    Assessment & Plan   58 year old male with a pmh of T2DM with severe peripheral neuropathy, papillary thyroid malignancy in remission s/p thyroidectomy, HLD, and right Charcot foot reconstruction in 1/2022 complicated by post operative wound infection progressing to osteomyelitis. Patient was admitted to medical floor. Bcx growing G + cocci.      #Right foot osteomyelitis  #Sepsis  #Candida albicans infection  #Right Charcot foot reconstruction 1/2022  - Initially had Charcot right foot reconstruction in 1/2022. Patient was later hospitalized and I&D in 1/2023 had intra operative cultures growing Staphylococcus epidermis (oxacillin resistant) and Staphylococcus caprae.  During most recent admission in 11/2023 I & D cultures grew Candida albicans and he was discharged on 6 weeks of fluconazole.  - Continue on broad spectrum ABX. ID consult. Continue trending WBC. Follow-up cultures.   - WOC consult.   - Orthopedic surgery following the patient. S/p irrigation and debridement of right foot and ankle, VAC dressing application, and antibiotic bead placement.        ID following the patient.   Recs:  Can narrow empiric zosyn to IV cefazolin 2g q8hr to better target presumed MSSA (ordered)  Can continue empiric IV vancomycin (dosed by pharmacy) while awaiting final Staph aureus susceptibilities  Follow up pending blood cultures   Please check daily peripheral blood cultures (including today) until negative x72 hrs (ordered)  Check TTE to evaluate for vegetations (ordered)  Agree with ortho consult for surgical management for source control - please send deep tissue cultures if I&D is performed to help further guide antibiotics    Bcx ngtd. TTE was negative, defer SHIVANI to ID.       #T2DM  #Severe peripheral neuropathy  - Hyperglycemia noted.    - Will continue outpatient  "gabapentin  - Continue on sliding scale insulin, increase carb count 1:12. Continue on metformin.   - Serum glucose appears suboptimally controlled  - Increase insulin NPH to 18 units subcutaneous twice daily  - As an outpatient he is on jardiance, metformin, and Ozempic     #Papillary thyroid malignancy in remission s/p thyroidectomy with associated hypothyroidism  - Continue on PTA levothyroxine     #Nausea  #Vomiting  - Resolved      #CKD, stage 2  - Monitor renal function. Renal function stable.      #HLD  - PTA pravastatin     #MARGARITO  - Patient refuses CPAP due to claustrophobia  - Currently scheduled for inspire in December     #GERD  - Continue PTA omeprazole    #Chronic anemia  - Iron panel ordered and consistent with anemia of chronic disease.   - No need for transfusion at this time. Continue monitoring HGB.     #Hyponatremia   - Likely attributable to diminished appetite  - Continue to monitor  - Nutrition consult          Diet: Snacks/Supplements Adult: Ensure Max Protein (bariatric); With Meals  Regular Diet Adult    DVT Prophylaxis: Pneumatic Compression Devices  Jones Catheter: Not present  Lines: None     Cardiac Monitoring: None  Code Status: Full Code      Clinically Significant Risk Factors                        # DMII: A1C = 7.3 % (Ref range: <5.7 %) within past 6 months   # Overweight: Estimated body mass index is 27.71 kg/m  as calculated from the following:    Height as of this encounter: 1.778 m (5' 10\").    Weight as of this encounter: 87.6 kg (193 lb 2 oz).   # Moderate Malnutrition: based on nutrition assessment    # Financial/Environmental Concerns: none         Disposition Plan      Expected Discharge Date: 12/02/2023      Destination: home with help/services              Aroldo David DO, MHS  Hospitalist Service, GOLD TEAM 18  [facility]  Securely message with [employer] (more info)  Text page via [facility] Paging/Directory   See signed in provider " for up to date coverage information  ______________________________________________________________________    Interval History   Surgery yesterday was reportedly deferred secondary to staffing issues.  Discussed treatment plan with patient at length.  Surgery is reportedly rescheduled for 12/4/2023.  Patient reports right foot discomfort is tolerable at present.    Physical Exam   Vital Signs: Temp: 97.6  F (36.4  C) Temp src: Oral BP: 121/67 Pulse: 82   Resp: 18 SpO2: 97 % O2 Device: None (Room air)    Weight: 193 lbs 1.97 oz    GENERAL: Alert and oriented x 3; no acute distress; well-nourished.  HEENT: Normocephalic; atraumatic; PERRLA; MMM.  CV: RRR; normal S1, S2; no rubs, murmurs, or gallops.  RESP: Lung fields clear to aucultation B/L; no wheezing or crepitations.  GI: Abdomen is soft, nontender, nondistended; no organomegaly; normal bowel sounds.  : Deferred genital examination.   MSK: Right lower extremity wrapped with drains; wound VAC in place.  DERM: Skin is intact; no rash, lesions, or skin breakdown.  NEURO: No focal deficits appreciated; strength & sensorium are grossly intact.  PSYCH: No active hallucinations; affect, insight appear within normal limits.    Medical Decision Making       45 MINUTES SPENT BY ME on the date of service doing chart review, history, exam, documentation & further activities per the note.      Data     I have personally reviewed the following data over the past 24 hrs:    N/A  \   N/A   / N/A     135 101 15.8 /  152 (H)   3.9 28 1.06 \     Procal: N/A CRP: 34.80 (H) Lactic Acid: N/A         Imaging results reviewed over the past 24 hrs:   No results found for this or any previous visit (from the past 24 hour(s)).

## 2023-12-01 NOTE — PROGRESS NOTES
Overall Patient Progress: improving     Patient is A&O x 4, and able to make his needs known to staff.   Independent with turn, reposition, and bed mobility, and getting OOB, and using commode by the bedside.  Denied SOB, N/V, or chest pain.  Wound vac to RLE patent. Surgery cancelled last evening with plan for surgery this morning by a different provider than his usual provider.  Patient declined to have surgery by any other ortho provider besides his usual provider. Left NPO overnight pending further discussions with ortho this morning.  Monitor.

## 2023-12-01 NOTE — PLAN OF CARE
"    /58 (BP Location: Right arm)   Pulse 84   Temp 98.1  F (36.7  C) (Oral)   Resp 16   Ht 1.778 m (5' 10\")   Wt 87.6 kg (193 lb 2 oz)   SpO2 96%   BMI 27.71 kg/m      Patient returned to unit. Surgery scheduled tomorrow night  Ace wrap on left foot with Hemovac. Moderate output in canister. Denies pain. . No nigh time coverage given.   Water and Call light within reach. No acute changes at this time.  "

## 2023-12-01 NOTE — PROGRESS NOTES
"Orthopedic Surgery Progress Note: 12/01/2023    Subjective:   AFVSS. OR cancelled yesterday due to lack of OR availability. Plan for return to OR 12/4.    Objective:   BP 96/51 (BP Location: Right arm)   Pulse 81   Temp 97.4  F (36.3  C) (Oral)   Resp 16   Ht 1.778 m (5' 10\")   Wt 87.6 kg (193 lb 2 oz)   SpO2 97%   BMI 27.71 kg/m    No intake/output data recorded.  General: NAD. Resting comfortably in bed.  Respiratory: Nonlabored breathing  Musculoskeletal:  RLE: Dressing c/d/I. Vac in place, holding suction. Minimal output in cannister. No sensation in foot (baseline for patient)    Laboratory Data:  Lab Results   Component Value Date    WBC 7.9 11/30/2023    HGB 9.4 (L) 11/30/2023     11/30/2023    INR 1.10 11/29/2023     CRP: 53 < 122.10 < 224.4 < 268.66 < 339.0    1/2 blood cultures from 11/23/23: positive for MSSA    Tissue cultures from OR 11/27 MSSA and candida,        Assessment & Plan:   Nehemiah Magallon is a 58 year old male with a history significant for with T2DM with severe peripheral neuropathy with past right Charcot foot reconstruction in Jan-2022 complicated by post-operative wound infection with multiple recent I&D of the right foot with subsequent wound vac exchanges with positive cultures for candida with Dr. Starr on 11/02/23 and 11/08/23 admitted for sepsis and concern for worsening RLE osteomyelitis.      Plan for Today:  - PT/OT  - ABX  - OR planning for Monday, 12/4    Medicine primary  Admit:  [facility], medicine primary with Orthopaedic and ID consultation.  Diet:  regular  Antibiotics:  Continue current antibiotic regimen, modify if/as directed by Infectious Disease.  Follow new cultures (tissue and fluid cultures from right posterior ankle, taken intraoperatively today).  Pain management:  Multimodal. Wean from IV to oral medications.  Weightbearing status:  Strict nonweightbearing on the right lower extremity.  Use appropriate assistive gait devices and assistance (nursing/PT) for " ambulation.  Activity:  May be up ad pancho for ADLs, PT, diagnostic tests, etc., but encourage elevation of the right foot/ankle above the level of the heart.  Float heels off of the bed.  Up with assistance until independent.  PT/OT:  Gait training, transfers, ADLs.  Labs:  Follow culture results. Trend acute phase reactants.  Xrays:  None at this time.  Immobilization:  None at this time.  Dressings: Keep right foot dressings clean, dry, and intact.  Maintain VAC machine at 125 mm Hg continuous.  DVT prophylaxis:  Deferred to primary team. Recommend at least mechanical calf SCD's and ambulation with NWB precautions to R LE.  Disposition:  Pending at this time.  Will need next repeat I&D / VAC change / bead removal vs exchange on 12/4    Orthopedic surgery staff for this patient is Dr. Starr    ------------------------------------------------------------------------------------------    Joesph Smith MD  Orthopedic Surgery PGY4        FOLLOWUP:    Future Appointments   Date Time Provider Department Center   12/15/2023  8:20 AM Bryon Starr MD [facility]   12/18/2023  3:00 PM Nirali Argueta MD [facility]

## 2023-12-01 NOTE — PROGRESS NOTES
"Care Management Follow Up    Length of Stay (days): 8    Expected Discharge Date: 12/02/2023     Concerns to be Addressed: discharge planning     Patient plan of care discussed at interdisciplinary rounds: Yes    Anticipated Discharge Disposition: Home Care (open to [facility] prior to admission for wound vac dressing changes), Home Infusion     Anticipated Discharge Services: None  Anticipated Discharge DME:  wound vac    Patient/family educated on Medicare website which has current facility and service quality ratings: no  Education Provided on the Discharge Plan: yes   Patient/Family in Agreement with the Plan: yes    Referrals Placed by CM/SW: External Care Coordination; [facility]  Private pay costs discussed: Not applicable    Additional Information:    Relevant Contacts:  [facility] coordinator for [facility]  Ph: [phone]   ___________________________________________     This AM, [facility] liaison reached out to this writer for update.  Informed her that pt is expected to return to OR on Monday, will need IV abx at discharge, BRANDIE TBD.    3:40pm - [facility], she stated she heard from [facility] that pt is discharging soon but there is no discharge date, was unsure if she should be planning for a weekend discharge.  This writer clarified that pt will remain in-hospital, return to OR on Monday, and beyond that, this writer does not currently know if pt would be ready to discharge shortly thereafter or require more surgical intervention.  She expressed understanding, requested to be updated when discharge date is known and to receive [facility] auth form for IV abx when it can be completely filled out.    Regarding details that can currently be added to auth form, Dr Adorno provided specifics in her note, date of service 11/29/23:  \"1. Current plan is for cefazolin 2g IV Q8H for 6 weeks from most recent surgery (11/30/23-1/11/24).   2. Dr. Nirali Argueta will " "be following patient's labs after discharge (fax number [phone]) and has appointment set up with patient for 12/18/23.\"    Require discharge date/start of home therapy date to complete form.        Care mgmt will continue to follow.    EMMANUELLE Hand  [facility]  Units 8M/S & 10 ICU  Pager: [phone]  Phone: [phone]    "

## 2023-12-01 NOTE — PROGRESS NOTES
[facility]     Patient:  Nehemiah Magallon, Date of birth 1965, Medical record number [id number]  Date of Visit:  12/01/2023   Reason for consult: bacteremia         Assessment and Recommendations:     ID Problem list:  MSSA bacteremia 11/23/23. 11/25/23 cultures negative.  -TTE 11/25/23 without evidence of endocarditis.   -SHIVANI not pursued at this time given brief bacteremia and planned long course of cefazolin for osteomyelitis  Right foot septic arthritis of ankle   -11/27/23 I&D with purulent material involving right ankle joint  - 11/27/23 Antibiotic bead placement (vancomycin), wound vac placed. Cultures with MSSA and C albicans. Repeat sensitivities on C albicans pending. Currently on cefazolin and oral fluconazole 400 mg daily  - Return to OR planned for 12/4/23  Right foot chronic osteomyelitis   - Worsening chronic osteomyelitis noted on CT 10/24/2023 with soft tissue swelling tibiotalar fusion and loosening of hardware plus Lisfranc deformity and ill-defined erosion of the lateral talus/distal fibula  - 11/2 s/p I&D for right ankle and foot hardware removal, OR cultures +Candida albicans  - 11/8 s/p I&D, antibiotic beads removal, wound vac placement - no cultures nor pathology were sent  - was discharged on at least 6-week course of fluconazole until follows up with Dr. Argueta on 12/18/23  History of right Charcot foot reconstruction 11/17/22 c/b post-op polymicrobial wound infection   - 1/20/23 - started on Pip/Tazobactam and Vancomycin IV  - 1/21/23 -  s/p I+D of right foot, placement of wound vac and antibiotic beads. Staph epidermidis x2 (oxacillin Resistant) and Staph caprae. (Each S to doxycycline)  - 2/15/23 - Vancomycin IV and Rifampin were stopped and started on Doxycycline  - 2/24/23 - Doxycycline was stopped (papular lesions on his body, back, arms, associated with itching)  Severe peripheral neuropathy  T2DM  CKD    Discussion:  Findings overall  consistent with MSSA bacteremia from right foot wound infection source. TTE without endocarditis. Continue cefazolin and oral fluconazole unchanged. Planning 6 week course for cefazolin. I'm concerned about the number of different pathogens he's had in his foot and ongoing infections despite previous hardware removal. I'm unsure whether we will be able to fully sterilize the abnormal bones in his foot. Current plan is for curative antibiotics. Plan for 6 weeks cefazolin and fluconazole as per previous plan, and assess how well he does with wound healing. May need antibiotics/antifungals for course more prolonged than 6 weeks and/or consideration of ongoing suppression.     Recs:  Continue cefazolin with planned duration 6 weeks from last positive intraoperative culture. Additional surgery planned for 12/4.   Patient will need PICC. Can place any time.  Continue oral fluconazole unchanged while awaiting sensitivities. If still sensitive to fluconazole, may consider increasing dose to 600 or 800 mg daily given persistence of organism while on fluconazole    Current IV antibiotic plan for discharge planning purposes (requested by care coordinator):   Current plan is for cefazolin 2g IV Q8H for 6 weeks from most recent surgery (11/30/23-1/11/24).   Dr. Nirali Argueta will be following patient's labs after discharge (fax number [phone]) and has appointment set up with patient for 12/18/23.     ID will continue to follow this patient but won't see over the weekend unless called. Please see [facility] for ID coverage over the weekend and next week.     Jaclyn Adorno MD  Infectious Diseases  Pager [phone] or Vocera       Interval History:   Tolerating cefazolin and fluconazole. Surgical cultures with MSSA and now C albicans as well. No new problems today. Wound vac still in place. Return to OR was delayed until 12/4/23.        History of Present Illness:   58M with PMH including DM2, severe peripheral neuropathy (no  sensation of feet), diabetic retinopathy, history of thyroid cancer, CKD, history of Hep C (treated in 2017), right Charcot foot reconstruction (11/17/22) complicated by chronic wound infection, and recent admission (11/2-11/13) for right foot osteo (s/p 11/2 hardware removal, on oral fluconazole for +C.albicans in OR culture) who was admitted 11/23 due to 5 days of fevers/chills.     Per patient report, approximately 5-6 days prior to admission he had new onset fevers/chills, nausea/vomiting, weakness, and right leg pain (says he can only feel dull pain just below his right mid-leg, beyond that is numb). He says that between his home nurse wound vac changes there was some purulent drainage from the right foot wounds. He presented to the ED on 11/23 and was admitted to [facility] for further management. He was started on empiric IV vancomycin and IV zosyn and was continued on prior PO fluconazole. Admission blood cultures were found to be positive for GPCs (Staph aureus on verigene) and ID was consulted for further antibiotic recommendations. Aside from right leg pain, he denies any other focal pain elsewhere. No back pain.       Allergies:      Allergies   Allergen Reactions    Doxycycline Rash     Very extensive full body rash lasting for several months. Given at same time as Rifampin.    Rifampin Rash     Very extensive full body rash lasting for several months. Given at the same time as doxycycline.    Atorvastatin      Other reaction(s): Hyperglycemia  pt also lists simvastatin?    Celebrex [Celecoxib] Other (See Comments)     De[facility] per [facility] records          Current Antimicrobials:   IV cefazolin  Oral fluconazole         Physical Exam:   Ranges forvital signs:  Temp:  [97.4  F (36.3  C)-98.1  F (36.7  C)] 97.4  F (36.3  C)  Pulse:  [81-89] 89  Resp:  [16-18] 18  BP: ()/(51-67) 124/61  SpO2:  [96 %-97 %] 96 %  GENERAL:  Adult male in no acute distress.   ENT:  Head is normocephalic, atraumatic.  Oropharynx is moist appearing  EYES:  No conjunctival injection.  LUNGS:  Unlabored breathing on room air.  MSK/EXT/SKIN: +RLE with VAC in place  NEUROLOGIC:  Awake, alert, interactive.         Laboratory Data:     Inflammatory Markers    Recent Labs   Lab Test 12/01/23  0746 11/30/23  0708 11/29/23  0713 11/28/23  0729 11/27/23  0721 11/25/23  0559 11/24/23  1042 11/24/23  0743 11/23/23  1408 11/03/23  0652 10/30/23  0912 05/12/23  0907 01/30/23  1430 01/20/23  1211   SED  --   --   --   --   --   --  71*  --   --   --  44* 7  --  59*   CRPI 34.80* 42.27* 53.40* 105.79* 122.10* 224.40*  --  268.66* 339.00*   < > 74.50* <3.00   < >  --     < > = values in this interval not displayed.       Hematology Studies    Recent Labs   Lab Test 11/30/23  0708 11/29/23  0713 11/27/23  0721 11/25/23  0559 11/24/23  0743 11/23/23  1408   WBC 7.9 10.2 7.8 9.3 15.0* 19.8*   ANEU 6.0  --   --   --   --   --    AEOS 0.0  --   --   --   --   --    HGB 9.4* 8.8* 8.9* 8.7* 9.5* 11.2*   MCV 77* 76* 75* 77* 78 77*    267 196 195 199 257       Metabolic Studies     Recent Labs   Lab Test 12/01/23  0746 11/30/23  0708 11/29/23  1000 11/29/23  0713 11/27/23  0721 11/26/23  0725 11/25/23  0559 11/24/23  0743    139  --  137 132*  --   --  133*   POTASSIUM 3.9 4.0 4.1 4.1 3.5  --   --  4.2   CHLORIDE 101 101  --  102 98  --   --  99   CO2 28 28  --  28 26  --   --  22   BUN 15.8 20.0  --  26.0* 11.6  --   --  19.2   CR 1.06 1.09  --  1.04 1.17 1.23*   < > 1.35*   GFRESTIMATED 81 78  --  83 72 68   < > 60*    < > = values in this interval not displayed.       Hepatic Studies    Recent Labs   Lab Test 11/23/23  1408 11/05/23  0549 03/08/23  0941 02/13/23  1425 02/06/23  1430 01/30/23  1430   BILITOTAL 0.7 <0.2 0.2 <0.2 0.4 0.2   ALKPHOS 78 58 61 71 81 86   ALBUMIN 4.0 3.4* 4.4 4.1 3.4 3.8   AST 13 8 28 23 13 13   ALT 10 5 36 17 21 13       Microbiology:  Culture   Date Value Ref Range Status   11/27/2023 No anaerobic organisms  isolated after 4 days  Preliminary   11/27/2023 No anaerobic organisms isolated after 1 day  Preliminary   11/27/2023 No growth after 4 days  Preliminary   11/27/2023 Yeast (A)  Preliminary   11/27/2023 2+ Staphylococcus aureus (A)  Final   11/27/2023 1+ Staphylococcus aureus (A)  Preliminary     Comment:     Susceptibilities done on previous cultures   11/27/2023 1+ Candida albicans (A)  Preliminary     Comment:     Susceptibilities not routinely done, refer to antibiogram to view typical susceptibility profiles   11/26/2023 No Growth  Final   11/26/2023 No Growth  Final   11/25/2023 No Growth  Final   11/25/2023 No Growth  Final   11/23/2023 No Growth  Final   11/23/2023 No Growth  Final   11/23/2023 Positive on the 1st day of incubation (A)  Final   11/23/2023 Staphylococcus aureus (AA)  Final     Comment:     1 of 2 bottles   11/02/2023 No anaerobic organisms isolated  Final   11/02/2023 Candida albicans (A)  Final   11/02/2023 1+ Candida albicans (A)  Final     Comment:     Susceptibilities not routinely done, refer to antibiogram to view typical susceptibility profiles   11/02/2023 No anaerobic organisms isolated  Final   11/02/2023 Candida albicans (A)  Final   11/02/2023 1+ Candida albicans (A)  Corrected   11/02/2023 No anaerobic organisms isolated  Final   11/02/2023 Candida albicans (A)  Final   11/02/2023 1+ Candida albicans (A)  Final     Comment:     Susceptibilities not routinely done, refer to antibiogram to view typical susceptibility profiles   04/26/2023 3+ Normal amihs  Final   03/29/2023 1+ Candida albicans (A)  Final     Comment:     Susceptibilities not routinely done, refer to antibiogram to view typical susceptibility profiles   03/08/2023 2+ Staphylococcus lugdunensis (A)  Final   03/08/2023 2+ Normal amish  Final   01/21/2023 No anaerobic organisms isolated  Final   01/21/2023 1+ Staphylococcus caprae (A)  Corrected     Comment:     Identification obtained by MALDI-TOF mass spectrometry  research use only database. Test characteristics determined and verified by the [facility] Diseases Diagnostic [facility].   2023 1+ Staphylococcus epidermidis (A)  Corrected   2023 1+ Staphylococcus epidermidis (A)  Corrected   2023 No anaerobic organisms isolated  Final   2023 1+ Staphylococcus caprae (A)  Corrected     Comment:     Identification obtained by MALDI-TOF mass spectrometry research use only database. Test characteristics determined and verified by the Infect[facility] Diseases Diagnostic Laboratory.Susceptibilities done on previous cultures   2023 1+ Staphylococcus epidermidis (A)  Corrected     Comment:     Susceptibilities done on previous cultures   2023 1+ Staphylococcus epidermidis (A)  Corrected     Comment:     Susceptibilities done on previous cultures   2023 1+ Staphylococcus epidermidis (A)  Corrected     Comment:     Susceptibilities done on previous cultures   2023 1+ Staphylococcus caprae (A)  Corrected     Comment:     Identification obtained by MALDI-TOF mass spectrometry research use only database. Test characteristics determined and verified by the Infectious Diseases Diagnostic Laboratory.Susceptibilities done on previous cultures   2023 No Growth  Final   2023 No Growth  Final       Urine Studies    Recent Labs   Lab Test 23  1908   LEUKEST Negative   WBCU 5       Vancomycin Levels    Recent Labs   Lab Test 23  0755 23  0851 23  1425   VANCOMYCIN 15.0 15.6 15.3            Imagin/23/23 RLE xray report:  IMPRESSION: There is some periosteal new bone formation along the lateral margin of the distal fibular metaphysis which could reflect underlying osteomyelitis- MRI would be helpful in further evaluation. No fracture. Chronic periosteal new bone formation along the medial margin of the distal fibular shaft. Extensive neuropathic changes throughout the midfoot including lateral dislocation of the 2nd  through 5th metatarsals. Mild degenerative changes at the 1st MTP joint. Moderate-sized plantar calcaneal spur.

## 2023-12-01 NOTE — PROGRESS NOTES
VS:       Pt A/O X 4. Afebrile. VSS. Lungs clear bilaterally both       anterior and posteriorly. IS encouraged. Denies nausea, shortness of breath, and chest pain.     Output:       Bowel sounds active in all four quadrants passing flatus and having bowel movements. Voids spontaneously and without difficulty in the commode.     Activity:       Pt up to commode independently.     Skin:   Nothing to note outside of wound on Right foot .     Pain:       Patient denies pain     CMS:       Numbness experienced in the lower extremities from the knee down.      Dressing:       Drainage tube / wound vac in place on the right foot with two sites draining into the same vac collection.      Diet:       Pt is on a Regular diet and appetite was fair this shift.       LDA:       PIV is patent in the Right Peripheral.      Equipment:       Negative pressure wound therapy on the R foot.      Plan:       Pt is able to make needs known and the call light is within the pt's reach. Continue to monitor.       Additional Info:       Appetite poor at baseline. Only eats 1x day. I & D rescheduled from this morning to 12/4. Pts goals for the next few days is to shower. Blood sugars under control today ranging from 101-161.

## 2023-12-01 NOTE — OR NURSING
Pt's surgery cancelled, pt returned to  Med/surg via transport around 2115. RN face to face time 30 minutes.

## 2023-12-01 NOTE — PROGRESS NOTES
Brief Ortho Update:     Patient unable to proceed to OR tonight for scheduled I&D given OR availability. Will plan for RTOR tomorrow 12/1 for I&D and vac exchange, exact timing TBD. I placed a diet order for patient this evening. Should be NPO at midnight.     Diana Jacobsen MD, PGY2  Orthopaedic Surgery  [phone]

## 2023-12-02 NOTE — PROGRESS NOTES
"Orthopedic Surgery Progress Note: 12/02/2023    Subjective:   AFVSS. OR cancelled yesterday due to lack of OR availability. Plan for return to OR 12/4.     Objective:   /56 (BP Location: Left arm, Patient Position: Semi-Gonsalves's, Cuff Size: Adult Regular)   Pulse 74   Temp 99.1  F (37.3  C) (Oral)   Resp 18   Ht 1.778 m (5' 10\")   Wt 87.6 kg (193 lb 2 oz)   SpO2 96%   BMI 27.71 kg/m    No intake/output data recorded.  General: NAD. Resting comfortably in bed.  Respiratory: Nonlabored breathing  Musculoskeletal:  RLE: Dressing c/d/I. Vac in place, holding suction. Minimal output in cannister. No sensation in foot (baseline for patient)     Laboratory Data:  Lab Results   Component Value Date    WBC 7.9 11/30/2023    HGB 9.4 (L) 11/30/2023     11/30/2023    INR 1.10 11/29/2023     CRP: 22.83 < 34.8 < 42.27 < 53 < 122.10 < 224.4 < 268.66 < 339.0     1/2 blood cultures from 11/23/23: positive for MSSA     Tissue cultures from OR 11/27 MSSA and candida,     Assessment & Plan:   Nehemiah Magallon is a 58 year old male with a history significant for with T2DM with severe peripheral neuropathy with past right Charcot foot reconstruction in Jan-2022 complicated by post-operative wound infection with multiple recent I&D of the right foot with subsequent wound vac exchanges with positive cultures for candida with Dr. Starr on 11/02/23 and 11/08/23 admitted for sepsis and concern for worsening RLE osteomyelitis.     Plan for Today:  - PT/OT  - ABX, Place PICC today if timing allows  - OR planning for Monday, 12/4    Medicine primary  Admit:  [place], medicine primary with Orthopaedic and ID consultation.  Diet:  regular  Antibiotics:  Continue current antibiotic regimen, modify if/as directed by Infectious Disease.  Follow new cultures (tissue and fluid cultures from right posterior ankle, taken intraoperatively today). Patient will require a PICC line.   Pain management:  Multimodal. Wean from IV to oral " medications.  Weightbearing status:  Strict nonweightbearing on the right lower extremity.  Use appropriate assistive gait devices and assistance (nursing/PT) for ambulation.  Activity:  May be up ad pancho for ADLs, PT, diagnostic tests, etc., but encourage elevation of the right foot/ankle above the level of the heart.  Float heels off of the bed.  Up with assistance until independent.  PT/OT:  Gait training, transfers, ADLs.  Labs:  Follow culture results. Trend acute phase reactants.  Xrays:  None at this time.  Immobilization:  None at this time.  Dressings: Keep right foot dressings clean, dry, and intact.  Maintain VAC machine at 125 mm Hg continuous.  DVT prophylaxis:  Deferred to primary team. Recommend at least mechanical calf SCD's and ambulation with NWB precautions to R LE.  Disposition:  Pending at this time.  Will need next repeat I&D / VAC change / bead removal vs exchange on 12/4     Orthopedic surgery staff for this patient is Dr. Starr    ------------------------------------------------------------------------------------------    Respectfully,    Henry Vieyra MD  Orthopedic Surgery PGY1  [phone]    Please page me directly with any questions/concerns during regular weekday hours before 5 pm. If there is no response, if it is a weekend, or if it is during evening hours then please page the orthopedic surgery resident on call.      FOLLOWUP:    Future Appointments   Date Time Provider Department Center   12/15/2023  8:20 AM Bryon Starr MD [facility]   12/18/2023  3:00 PM Nirali Argueta MD [facility]

## 2023-12-02 NOTE — PLAN OF CARE
Shift 8511-8469:  pt AXO X4. wound vac on right foot count on 125, denies pain, no sensation below knee. NWB RLE. Right PIV saline locked. plan for I&D surgery Monday. Using bedside commode. call light miguel reach     Goal to shower today.

## 2023-12-02 NOTE — PROCEDURES
[facility]    Single Lumen PICC Placement    Date/Time: 12/2/2023 12:06 PM    Performed by: Brandy Conti RN  Authorized by: Henry Vieyra MD  Indications: vascular access      UNIVERSAL PROTOCOL   Site Marked: Yes  Prior Images Obtained and Reviewed:  Yes  Required items: Required blood products, implants, devices and special equipment available    Patient identity confirmed:  Verbally with patient, arm band, hospital-assigned identification number and provided demographic data  NA - No sedation, light sedation, or local anesthesia  Confirmation Checklist:  Patient's identity using two indicators, relevant allergies, procedure was appropriate and matched the consent or emergent situation and correct equipment/implants were available  Time out: Immediately prior to the procedure a time out was called    Universal Protocol: the Joint Commission Universal Protocol was followed    Preparation: Patient was prepped and draped in usual sterile fashion    ESBL (mL):  1     ANESTHESIA    Anesthesia:  Local infiltration  Local Anesthetic:  Lidocaine 1% without epinephrine  Anesthetic Total (mL):  2      SEDATION    Patient Sedated: No        Preparation: skin prepped with 2% chlorhexidine and skin prepped with ChloraPrep  Skin prep agent: skin prep agent completely dried prior to procedure  Sterile barriers: maximum sterile barriers were used: cap, mask, sterile gown, sterile gloves, and large sterile sheet  Hand hygiene: hand hygiene performed prior to central venous catheter insertion  Type of line used: PICC  Catheter type: single lumen  Lumen type: non-valved and power PICC  Catheter size: 3 Fr  Brand: Localist  Lot number: [id number]  Placement method: venipuncture, MST, ultrasound and tip navigation system  Number of attempts: 1  Difficulty threading catheter: no  Successful placement: yes  Orientation: right  Catheter to Vein (%): 24  Location: basilic vein  Tip Location:  SVC/RA Junction  Arm circumference: adults 10 cm  Extremity circumference: 31  Visible catheter length: 3  Total catheter length: 47  Dressing and securement: alcohol impregnated caps, blood cleaned with CHG, blood removed, chlorhexidine disc applied, dressing applied, line secured, occlusive dressing applied, subcutaneous anchor securement system, sterile dressing applied and transparent securement dressing  Post procedure assessment: blood return through all ports, placement verified by 3CG technology and free fluid flow  PROCEDURE   Patient Tolerance:  Patient tolerated the procedure well with no immediate complicationsDescribe Procedure: Single lumen picc placed in the right upper extremity without complication. Per ECG technology, picc tip is located in the low svc/caj. Picc is ready for use.  Disposal: sharps and needle count correct at the end of procedure, needles and guidewire disposed in sharps container

## 2023-12-02 NOTE — PLAN OF CARE
Goal Outcome Evaluation:      Plan of Care Reviewed With: patient  Outcome Evaluation: pt AXO X4. vital stable.wound vac on right foot count on 125, denies pain, no sensation below knee. NWB RLE. Right PIV saline locked. plan  for I&D surgery Monday. Using bedside commode.BG at 10 pm 112,makes needs known. call light witin reach

## 2023-12-02 NOTE — PROGRESS NOTES
[facility]    Medicine Progress Note - Hospitalist Service, GOLD TEAM 18    Date of Admission:  11/23/2023    Assessment & Plan   58 year old male with a pmh of T2DM with severe peripheral neuropathy, papillary thyroid malignancy in remission s/p thyroidectomy, HLD, and right Charcot foot reconstruction in 1/2022 complicated by post operative wound infection progressing to osteomyelitis. Patient was admitted to medical floor. Bcx growing G + cocci.      #Right foot osteomyelitis  #Sepsis  #Candida albicans infection  #Right Charcot foot reconstruction 1/2022  - Initially had Charcot right foot reconstruction in 1/2022. Patient was later hospitalized and I&D in 1/2023 had intra operative cultures growing Staphylococcus epidermis (oxacillin resistant) and Staphylococcus caprae.  During most recent admission in 11/2023 I & D cultures grew Candida albicans and he was discharged on 6 weeks of fluconazole.  - Continue on broad spectrum ABX. ID consult. Continue trending WBC. Follow-up cultures.   - WOC consult.   - Orthopedic surgery following the patient. S/p irrigation and debridement of right foot and ankle, VAC dressing application, and antibiotic bead placement.        ID following the patient.   Recs:  Can narrow empiric zosyn to IV cefazolin 2g q8hr to better target presumed MSSA (ordered)  Can continue empiric IV vancomycin (dosed by pharmacy) while awaiting final Staph aureus susceptibilities  Follow up pending blood cultures   Please check daily peripheral blood cultures (including today) until negative x72 hrs (ordered)  Check TTE to evaluate for vegetations (ordered)  Agree with ortho consult for surgical management for source control - please send deep tissue cultures if I&D is performed to help further guide antibiotics    Bcx ngtd. TTE was negative, defer SHIVANI to ID.       #T2DM  #Severe peripheral neuropathy  - Hyperglycemia noted.    - Will continue outpatient  "gabapentin  - Continue on sliding scale insulin, increase carb count 1:12. Continue on metformin.   - Serum glucose appears suboptimally controlled  - Increase insulin NPH to 18 units subcutaneous twice daily  - As an outpatient he is on jardiance, metformin, and Ozempic     #Papillary thyroid malignancy in remission s/p thyroidectomy with associated hypothyroidism  - Continue on PTA levothyroxine     #Nausea  #Vomiting  - Resolved      #CKD, stage 2  - Monitor renal function. Renal function stable.      #HLD  - PTA pravastatin     #MARGARITO  - Patient refuses CPAP due to claustrophobia  - Currently scheduled for inspire in December     #GERD  - Continue PTA omeprazole    #Chronic anemia  - Iron panel ordered and consistent with anemia of chronic disease.   - No need for transfusion at this time. Continue monitoring HGB.     #Hyponatremia   - Likely attributable to diminished appetite  - Continue to monitor  - Nutrition consult          Diet: Snacks/Supplements Adult: Ensure Max Protein (bariatric); With Meals  Regular Diet Adult    DVT Prophylaxis: Pneumatic Compression Devices  Jones Catheter: Not present  Lines: PRESENT      PICC 12/02/23 Single Lumen Right Basilic antibiotics-Site Assessment: WDL      Cardiac Monitoring: None  Code Status: Full Code      Clinically Significant Risk Factors                        # DMII: A1C = 7.3 % (Ref range: <5.7 %) within past 6 months   # Overweight: Estimated body mass index is 27.71 kg/m  as calculated from the following:    Height as of this encounter: 1.778 m (5' 10\").    Weight as of this encounter: 87.6 kg (193 lb 2 oz).   # Moderate Malnutrition: based on nutrition assessment    # Financial/Environmental Concerns: none         Disposition Plan     Expected Discharge Date: 12/02/2023      Destination: home with help/services              Aroldo David DO, MHS  Hospitalist Service, GOLD TEAM 18  M [facility]  Securely message with " [facility] (more info)  Text page via [facility] Paging/Directory   See signed in provider for up to date coverage information  ______________________________________________________________________    Interval History   Patient remains in excellent spirits today.  Return to the operating room tentatively scheduled for 12/4/2023.  PICC line ordered per infectious disease recommendation.  Patient denies any specific symptomatology at this time.    Physical Exam   Vital Signs: Temp: 99.2  F (37.3  C) Temp src: Oral BP: 116/61 Pulse: 85   Resp: 18 SpO2: 98 % O2 Device: None (Room air)    Weight: 193 lbs 1.97 oz    GENERAL: Alert and oriented x 3; no acute distress; well-nourished.  HEENT: Normocephalic; atraumatic; PERRLA; MMM.  CV: RRR; normal S1, S2; no rubs, murmurs, or gallops.  RESP: Lung fields clear to aucultation B/L; no wheezing or crepitations.  GI: Abdomen is soft, nontender, nondistended; no organomegaly; normal bowel sounds.  : Deferred genital examination.   MSK: Right foot wrapped with drains.  DERM: Skin is intact; no rash, lesions, or skin breakdown.  NEURO: No focal deficits appreciated; strength & sensorium are grossly intact.  PSYCH: No active hallucinations; affect, insight appear within normal limits.    Medical Decision Making       45 MINUTES SPENT BY ME on the date of service doing chart review, history, exam, documentation & further activities per the note.      Data     I have personally reviewed the following data over the past 24 hrs:    N/A  \   N/A   / N/A     135 101 12.8 /  162 (H)   3.9 28 1.09 \     Procal: N/A CRP: 22.83 (H) Lactic Acid: N/A         Imaging results reviewed over the past 24 hrs:   No results found for this or any previous visit (from the past 24 hour(s)).

## 2023-12-02 NOTE — CARE PLAN
VS:       Pt A/O X 4. Afebrile. VSS. Lungs sound clear bilaterally but slightly diminished. IS encouraged. Denies nausea, shortness of breath, and chest pain.     Output:       Bowel sounds active in all four quadrants. Voids spontaneously without difficulty in the commode.      Activity:       Pt up independently. Uses wheelchair at home and stand / pivot transfers himself.      Skin:   No skin concerns noted outside of his R foot wound.      Pain:       Pt denies any pain.      CMS:       Pt experiences numbness in Lower extremities at baseline.       Dressing:       R foot dressing is intact and draining at 125 mmhg     Diet:       Pt is on a Regular diet and appetite was good this shift.       LDA:       PICC placed today 12/2 on R upper arm.       Equipment:       Pt uses wheelchair due to lower extremity weakness.      Plan:       Pt is able to make needs known and the call light is within the pt's reach. Continue to monitor. Plan is for I & D on Monday 12/4      Additional Info:     Pt able to shower today.

## 2023-12-03 NOTE — CARE PLAN
VS:        Pt A/O X 4. Afebrile. VSS. Lungs sound clear bilaterally but slightly diminished. IS encouraged. Denies nausea, shortness of breath, and chest pain.      Output:        Bowel sounds active in all four quadrants. Voids spontaneously without difficulty in the commode. Large BM today      Activity:        Pt up independently. Uses wheelchair at home and stand / pivot transfers himself.       Skin:    No skin concerns noted outside of his R foot wound.       Pain:        No pain reported today.        CMS:        Pt experiences numbness in Lower extremities at baseline.       Dressing:        R foot dressing is intact and draining at 125 mmHg      Diet:        Pt is on a Regular diet and appetite was good this shift.        LDA:        PICC placed 12/2 on R upper arm.        Equipment:        Pt uses wheelchair due to lower extremity weakness.       Plan:        Pt is able to make needs known and the call light is within the pt's reach. Continue to monitor. Plan is for I & D on Monday 12/4      Additional Info:        pt to be NPO after midnight.

## 2023-12-03 NOTE — PROGRESS NOTES
"Orthopedic Surgery Progress Note: 12/03/2023    Subjective:   No acute events overnight. PICC line placed. Pain well-controlled. No concerns. Plan for sweeper tomorrow barring any cases bumping this overnight.     Objective:   /65 (BP Location: Left arm)   Pulse 86   Temp 98.4  F (36.9  C) (Oral)   Resp 18   Ht 1.778 m (5' 10\")   Wt 87.6 kg (193 lb 2 oz)   SpO2 95%   BMI 27.71 kg/m    No intake/output data recorded.  General: NAD. Resting comfortably in bed.  Respiratory: Nonlabored breathing  Musculoskeletal:  RLE: Dressing c/d/I. Vac in place, holding suction. Minimal output in cannister. No sensation in foot (baseline for patient)     Laboratory Data:  Lab Results   Component Value Date    WBC 7.9 11/30/2023    HGB 9.4 (L) 11/30/2023     11/30/2023    INR 1.10 11/29/2023     CRP: 21.38 < 22.83 < 34.8 < 42.27 < 53 < 122.10 < 224.4 < 268.66 < 339.0     1/2 blood cultures from 11/23/23: positive for MSSA     Tissue cultures from OR 11/27 MSSA and candida,     Assessment & Plan:   Nehemiah Magallon is a 58 year old male with a history significant for with T2DM with severe peripheral neuropathy with past right Charcot foot reconstruction in Jan-2022 complicated by post-operative wound infection with multiple recent I&D of the right foot with subsequent wound vac exchanges with positive cultures for candida with Dr. Starr on 11/02/23 and 11/08/23 admitted for sepsis and concern for worsening RLE osteomyelitis.      Plan for Today:  - PT/OT  - ABX  - OR planning for Monday, 12/4, plan for 7:30 AM vs add on  - NPO at midnight for OR tomorrow    Medicine primary  Admit:  [place], medicine primary with Orthopaedic and ID consultation.  Diet:  regular  Antibiotics:  Continue current antibiotic regimen, modify if/as directed by Infectious Disease.  Follow new cultures (tissue and fluid cultures from right posterior ankle, taken intraoperatively today). Patient will require a PICC line.   Pain management:  " Multimodal. Wean from IV to oral medications.  Weightbearing status:  Strict nonweightbearing on the right lower extremity.  Use appropriate assistive gait devices and assistance (nursing/PT) for ambulation.  Activity:  May be up ad pancho for ADLs, PT, diagnostic tests, etc., but encourage elevation of the right foot/ankle above the level of the heart.  Float heels off of the bed.  Up with assistance until independent.  PT/OT:  Gait training, transfers, ADLs.  Labs:  Follow culture results. Trend acute phase reactants.  Xrays:  None at this time.  Immobilization:  None at this time.  Dressings: Keep right foot dressings clean, dry, and intact.  Maintain VAC machine at 125 mm Hg continuous.  DVT prophylaxis:  Deferred to primary team. Recommend at least mechanical calf SCD's and ambulation with NWB precautions to R LE.  Disposition:  Pending at this time.  Will need next repeat I&D / VAC change / bead removal vs exchange on 12/4     Orthopedic surgery staff for this patient is Dr. Starr    ------------------------------------------------------------------------------------------    Respectfully,    Henry Vieyra MD  Orthopedic Surgery PGY1  [phone]    Please page me directly with any questions/concerns during regular weekday hours before 5 pm. If there is no response, if it is a weekend, or if it is during evening hours then please page the orthopedic surgery resident on call.      FOLLOWUP:    Future Appointments   Date Time Provider Department Center   12/15/2023  8:20 AM Bryon Starr MD [facility]   12/18/2023  3:00 PM Nirali Argueta MD [facility]

## 2023-12-03 NOTE — PROGRESS NOTES
[facility]    Medicine Progress Note - Hospitalist Service, GOLD TEAM 18    Date of Admission:  11/23/2023    Assessment & Plan   58 year old male with a pmh of T2DM with severe peripheral neuropathy, papillary thyroid malignancy in remission s/p thyroidectomy, HLD, and right Charcot foot reconstruction in 1/2022 complicated by post operative wound infection progressing to osteomyelitis. Patient was admitted to medical floor. Bcx growing G + cocci.      #Right foot osteomyelitis  #Sepsis  #Candida albicans infection  #Right Charcot foot reconstruction 1/2022  - Initially had Charcot right foot reconstruction in 1/2022. Patient was later hospitalized and I&D in 1/2023 had intra operative cultures growing Staphylococcus epidermis (oxacillin resistant) and Staphylococcus caprae.  During most recent admission in 11/2023 I & D cultures grew Candida albicans and he was discharged on 6 weeks of fluconazole.  - Continue on broad spectrum ABX. ID consult. Continue trending WBC. Follow-up cultures.   - WOC consult.   - Orthopedic surgery following the patient. S/p irrigation and debridement of right foot and ankle, VAC dressing application, and antibiotic bead placement.        ID following the patient.   Recs:  Can narrow empiric zosyn to IV cefazolin 2g q8hr to better target presumed MSSA (ordered)  Can continue empiric IV vancomycin (dosed by pharmacy) while awaiting final Staph aureus susceptibilities  Follow up pending blood cultures   Please check daily peripheral blood cultures (including today) until negative x72 hrs (ordered)  Check TTE to evaluate for vegetations (ordered)  Agree with ortho consult for surgical management for source control - please send deep tissue cultures if I&D is performed to help further guide antibiotics    Bcx ngtd. TTE was negative, defer SHIVANI to ID.       #T2DM  #Severe peripheral neuropathy  - Hyperglycemia noted.    - Will continue outpatient  "gabapentin  - Continue on sliding scale insulin, increase carb count 1:12. Continue on metformin.   - Serum glucose appears suboptimally controlled  - Increase insulin NPH to 18 units subcutaneous twice daily  - As an outpatient he is on jardiance, metformin, and Ozempic     #Papillary thyroid malignancy in remission s/p thyroidectomy with associated hypothyroidism  - Continue on PTA levothyroxine     #Nausea  #Vomiting  - Resolved      #CKD, stage 2  - Monitor renal function. Renal function stable.      #HLD  - PTA pravastatin     #MARGARITO  - Patient refuses CPAP due to claustrophobia  - Currently scheduled for inspire in December     #GERD  - Continue PTA omeprazole    #Chronic anemia  - Iron panel ordered and consistent with anemia of chronic disease.   - No need for transfusion at this time. Continue monitoring HGB.     #Hyponatremia   - Likely attributable to diminished appetite  - Continue to monitor  - Nutrition consult          Diet: Snacks/Supplements Adult: Ensure Max Protein (bariatric); With Meals  Regular Diet Adult  NPO per Anesthesia Guidelines for Procedure/Surgery Except for: Meds    DVT Prophylaxis: Pneumatic Compression Devices  Jones Catheter: Not present  Lines: PRESENT      PICC 12/02/23 Single Lumen Right Basilic antibiotics-Site Assessment: WDL      Cardiac Monitoring: None  Code Status: Full Code      Clinically Significant Risk Factors                        # DMII: A1C = 7.3 % (Ref range: <5.7 %) within past 6 months   # Overweight: Estimated body mass index is 27.71 kg/m  as calculated from the following:    Height as of this encounter: 1.778 m (5' 10\").    Weight as of this encounter: 87.6 kg (193 lb 2 oz).   # Moderate Malnutrition: based on nutrition assessment    # Financial/Environmental Concerns: none         Disposition Plan        Back to the OR tomorrow, December 4.  Further surgery planning TBD.    Aroldo David DO, MHS  Hospitalist Service, GOLD TEAM 18  M [facility] " [facility]  Securely message with [employer] (more info)  Text page via [facility] Paging/Directory   See signed in provider for up to date coverage information  ______________________________________________________________________    Interval History   Patient remains in great spirits today.  Patient has a remarkably positive attitude.  Per nursing staff, patient is requesting probiotic; happy to oblige.  Back to the OR tomorrow, December 4.  Further surgery planning TBD.    Physical Exam   Vital Signs: Temp: 98.4  F (36.9  C) Temp src: Oral BP: 122/65 Pulse: 86   Resp: 18 SpO2: 95 % O2 Device: None (Room air)    Weight: 193 lbs 1.97 oz    GENERAL: Alert and oriented x 3; no acute distress; well-nourished.  HEENT: Normocephalic; atraumatic; PERRLA; MMM.  CV: RRR; normal S1, S2; no rubs, murmurs, or gallops.  RESP: Lung fields clear to aucultation B/L; no wheezing or crepitations.  GI: Abdomen is soft, nontender, nondistended; no organomegaly; normal bowel sounds.  : Deferred genital examination.   MSK: No clubbing, cyanosis, or edema.  DERM: Skin is intact; no rash, lesions, or skin breakdown.  NEURO: No focal deficits appreciated; strength & sensorium are grossly intact.  PSYCH: No active hallucinations; affect, insight appear within normal limits.    Medical Decision Making       45 MINUTES SPENT BY ME on the date of service doing chart review, history, exam, documentation & further activities per the note.      Data     I have personally reviewed the following data over the past 24 hrs:    N/A  \   N/A   / N/A     139 103 13.3 /  114 (H)   4.1 28 0.97 \     Procal: N/A CRP: 21.38 (H) Lactic Acid: N/A         Imaging results reviewed over the past 24 hrs:   No results found for this or any previous visit (from the past 24 hour(s)).

## 2023-12-03 NOTE — PLAN OF CARE
Shift 3602-6899:  pt AXO X4. wound vac on right foot count on 125, denies pain, decreased sensation below knee. Pt reported neuropathic pain below knee. NWB RLE. PICC. plan for I&D surgery Monday. Use bedside commode independently pivot transfers. Call light juanin garcia.

## 2023-12-04 NOTE — OR NURSING
PACU to Inpatient Nursing Handoff    Patient Nehemiah Magallon is a 58 year old male who speaks English.   Procedure Procedure(s):  Irrigation and debridement right foot and ankle, combined   Surgeon(s) Primary: Bryon Starr MD     Allergies   Allergen Reactions    Doxycycline Rash     Very extensive full body rash lasting for several months. Given at same time as Rifampin.    Rifampin Rash     Very extensive full body rash lasting for several months. Given at the same time as doxycycline.    Atorvastatin      Other reaction(s): Hyperglycemia  pt also lists simvastatin?    Celebrex [Celecoxib] Other (See Comments)     [facility]       Isolation  [unfilled]     Past Medical History   has a past medical history of Chronic pain syndrome (10/24/2014), Diabetes mellitus, type 2 (H), Diabetic Charcot foot (H), Diabetic retinopathy associated with diabetes mellitus due to underlying condition (H), Diverticulitis of colon, Gastroesophageal reflux disease, Hepatitis C (2017), History of skin cancer, Hypertension, Hypothyroidism, Legally blind, Midfoot collapse of right lower extremity, Migraine, NAFLD (nonalcoholic fatty liver disease), Nephrolithiasis, Neuropathy, MARGARITO (obstructive sleep apnea), Rib pain (10/24/2014), S/P colectomy (10/14/2014), and Thyroid cancer (H) (10/14/2014).    Anesthesia General   Dermatome Level     Preop Meds Not applicable   Nerve block Not applicable   Intraop Meds fentanyl (Sublimaze): 50 mcg total  ondansetron (Zofran): last given at 0920  versed   Local Meds No   Antibiotics cefazolin (Ancef) - last given at 0805     Pain Patient Currently in Pain: denies   PACU meds  Not applicable   PCA / epidural No   Capnography     Telemetry ECG Rhythm: Sinus rhythm   Inpatient Telemetry Monitor Ordered? No        Labs Glucose Lab Results   Component Value Date     12/04/2023     12/04/2023     02/06/2023       Hgb Lab Results   Component Value Date    HGB 8.8 12/04/2023        INR Lab Results   Component Value Date    INR 1.09 12/04/2023      PACU Imaging Not applicable     Wound/Incision Negative Pressure Wound Therapy Ankle Right;Lateral;Medial (Active)   Wound Type Surgical 12/04/23 0938   Dressing Pieces Removed (# of Each Type) 2 12/04/23 0657   Dressing Pieces Applied (# of Each Type) Black foam 12/01/23 0000   Cycle Continuous 12/04/23 0938   Target Pressure (mmHg) 125 12/04/23 0938   Cannister changed? Yes 12/04/23 0657   Number of days: 7       Incision/Surgical Site 11/17/22 Right;Lateral;Outer Foot (Active)   Incision Assessment UTV 12/01/23 2200   Dressing Per Plan of Care 11/26/23 0836   Trina-Incision Assessment UTV 11/13/23 0900   Incision Drainage Amount None 11/25/23 0147   Dressing Intervention Clean, dry, intact 12/01/23 2200   Number of days: 382       Incision/Surgical Site 11/02/23 Right;Medial Foot (Active)   Incision Assessment UTV 12/04/23 0657   Dressing Per Plan of Care 11/26/23 0836   Trina-Incision Assessment UTV 12/04/23 0657   Closure DOMINGO 12/04/23 0657   Incision Drainage Amount None 11/25/23 0147   Dressing Intervention Clean, dry, intact 12/04/23 0657   Number of days: 32       Incision/Surgical Site 11/08/23 Right Foot (Active)   Incision Assessment Other (Comment) 12/01/23 2200   Dressing Per Plan of Care 11/26/23 0836   Trina-Incision Assessment UTV 11/13/23 0900   Incision Drainage Amount None 11/25/23 0147   Dressing Intervention Clean, dry, intact 12/01/23 2200   Number of days: 26       Incision/Surgical Site 11/27/23 Outer;Right Ankle (Active)   Incision Assessment UTV 12/04/23 0938   Dressing Vacuum based 12/04/23 0938   Closure DOMINGO 12/04/23 0938   Incision Drainage Amount UTV 12/04/23 0938   Incision Care Therapy - negative pressure 12/04/23 0938   Dressing Intervention Clean, dry, intact 12/04/23 0938   Number of days: 7       Incision/Surgical Site 11/27/23 Medial;Right Ankle (Active)   Incision Assessment UTV 12/02/23 2100   Dressing Vacuum  based 12/02/23 2100   Incision Care Therapy - negative pressure 12/02/23 2100   Dressing Intervention Clean, dry, intact 12/02/23 2100   Number of days: 7      CMS        Equipment Wound vac   Other LDA       IV Access PICC 12/02/23 Single Lumen Right Basilic antibiotics (Active)   Site Assessment WDL 12/04/23 0938   External Cath Length (cm) 3 cm 12/02/23 1211   Extremity Circumference (cm) 31 cm 12/02/23 1211   Dressing Transparent;Chlorhexidine disk 12/04/23 0938   Dressing Status clean;dry;intact 12/04/23 0938   Dressing Change Due 12/09/23 12/03/23 1028   Line Necessity Yes, meets criteria 12/04/23 0938   Status blood return noted;saline locked 12/02/23 1211   Phlebitis Scale 0-->no symptoms 12/04/23 0938   Infiltration? no 12/04/23 0700   PICC Comment no s/s of VAD complication 12/03/23 1028   Number of days: 2      Blood Products Not applicable EBL 10 mL   Intake/Output Date 12/04/23 0700 - 12/05/23 0659   Shift 1153-4198 5598-8475 8706-0778 24 Hour Total   INTAKE   P.O. 120   120   I.V. 800   800   Shift Total(mL/kg) 920(10.5)   920(10.5)   OUTPUT   Blood 10   10   Shift Total(mL/kg) 10(0.11)   10(0.11)   Weight (kg) 87.6 87.6 87.6 87.6      Drains / Jones Negative Pressure Wound Therapy Ankle Right;Lateral;Medial (Active)   Wound Type Surgical 12/04/23 0938   Dressing Pieces Removed (# of Each Type) 2 12/04/23 0657   Dressing Pieces Applied (# of Each Type) Black foam 12/01/23 0000   Cycle Continuous 12/04/23 0938   Target Pressure (mmHg) 125 12/04/23 0938   Cannister changed? Yes 12/04/23 0657   Number of days: 7      Time of void PreOp Time of Void Prior to Procedure: 1830 (11/30/23 1907)    PostOp Voided (mL): 1800 mL (11/29/23 1500)  Urine Occurrence: 1 (12/02/23 1829)    Diapered? No   Bladder Scan      mL (water) (12/04/23 0955)  water     Vitals    B/P: 110/64  T: 97.5  F (36.4  C)    Temp src: Oral  P:  Pulse: 85 (12/04/23 0945)          R: (!) 9  O2:  SpO2: 97 %    O2 Device: None (Room air)  (12/04/23 6459)    Oxygen Delivery: 4 LPM (12/04/23 1308)         Family/support present No family waiting   Patient belongings     Patient transported on cart and air mat   DC meds/scripts (obs/outpt) Not applicable   Inpatient Pain Meds Released? NA       Special needs/considerations None   Tasks needing completion NA       Mallika Coronado RN  AS[facility] *[phone]

## 2023-12-04 NOTE — PLAN OF CARE
Goal Outcome Evaluation:    Plan of Care Reviewed With: patient  Overall Patient Progress: no change  Outcome Evaluation: Pt stated no pain after procedure this morning.    Pt admitted on 11/23 for I&D on R foot. Pt had I&D procedure this morning and arrived back to unit @1100. Pt is diabetic and peripheral neuropathy in all extremities.    Assessment: Pt A&Ox4 and able to make needs known. Pt is standby w/ pivot. Pt is not weight bearing on RLE and has wound vac with continuous at 125 mmHg. Dressing on RLE c/d/I. Pt has RN managed Potassium; did not need replacing during shift (Pot 4.4). Pt has R PICC in place and patent.     Major Shift Changes: Pt had I&D during shift. Pt VS stable and resting in bed.     Plan:   - Continue to encourage Ensure and vitamins (pt has been refusing)

## 2023-12-04 NOTE — BRIEF OP NOTE
[facility]    Brief Operative Note    Pre-operative diagnosis: Osteomyelitis of ankle or foot, right, acute (H) [M86.171]  Post-operative diagnosis Same as pre-operative diagnosis    Procedure: Irrigation and debridement right foot and ankle, combined, Right - Foot    Surgeon: Surgeon(s) and Role:     * Bryon Starr MD - Primary  Anesthesia: General   Estimated Blood Loss: 10 mL from 12/4/2023  7:46 AM to 12/4/2023  9:37 AM      Drains: Two VAC sponges with two tubes.  Specimens: * No specimens in log *  Findings:   Decreased purulence from previous I&D though still some present. Medial wound 4 cm x 1.5 cm x 2 cm deep. Lateral wound 11 cm x 3 cm x 4 cm deep . Two strands of ten antibiotic beads each (tobramycin and vancomycin), one to each wound, with 20 beads total.  Complications: None.  Implants:   Implant Name Type Inv. Item Serial No.  Lot No. LRB No. Used Action   BONE CEMENT SIMPLEX W/TOBRAMYCIN 6197-9-001 - [id number] Cement, Bone BONE CEMENT SIMPLEX W/TOBRAMYCIN 6197-9-001  [employer] [id number] 1 Implanted   BONE CEMENT SIMPLEX W/TOBRAMYCIN 6197-9-001 - [id number] Cement, Bone BONE CEMENT SIMPLEX W/TOBRAMYCIN 6197-9-001  [employer] [id number] 1 Explanted     EBL: 15 mL.    I spoke with Wiliam immediately postoperatively by phone at [phone]. Findings and plan discussed; questions answered.

## 2023-12-04 NOTE — ANESTHESIA PREPROCEDURE EVALUATION
Anesthesia Pre-Procedure Evaluation    Patient: Nehemiah Magallon   MRN: [id number] : 1965        Procedure : Procedure(s):  Irrigation and debridement right foot and ankle, combined          Past Medical History:   Diagnosis Date    Chronic pain syndrome 10/24/2014    Diabetes mellitus, type 2 (H)     Diabetic Charcot foot (H)     Diabetic retinopathy associated with diabetes mellitus due to underlying condition (H)     Diverticulitis of colon     Gastroesophageal reflux disease     Hepatitis C 2017    treated    History of skin cancer     Hypertension     Hypothyroidism     Legally blind     Midfoot collapse of right lower extremity     Migraine     NAFLD (nonalcoholic fatty liver disease)     Nephrolithiasis     Neuropathy     MARGARITO (obstructive sleep apnea)     Rib pain 10/24/2014    S/P colectomy 10/14/2014    Thyroid cancer (H) 10/14/2014      Past Surgical History:   Procedure Laterality Date    ARTHRODESIS FOOT Right 2022    Procedure: Right midfoot/talonavicular osteotomy and reduction of deformity Right midfoot/talonavicular arthrodesis, achilles lengthening;  Surgeon: Bryon Starr MD;  Location: [facility]    CHOLECYSTECTOMY  1986    COLECTOMY      @ 6 years ago, sigmoid    COLONOSCOPY      FOOT SURGERY Left     IRRIGATION AND DEBRIDEMENT FOOT, COMBINED Right 2023    Procedure: IRRIGATION AND DEBRIDEMENT, FOOT, RIGHT, Placement of Wound Vac and Antibiotic Beads.;  Surgeon: Bryon Starr MD;  [facility]: [facility]    IRRIGATION AND DEBRIDEMENT FOOT, COMBINED Right 2023    Procedure: Debridement and Irrigation of Right Ankle and Foot;  Surgeon: Bryon Starr MD;  [facility]: [facility]    IRRIGATION AND DEBRIDEMENT FOOT, COMBINED Right 2023    Procedure: IRRIGATION AND DEBRIDEMENT, RIGHT FOOT, WOUND VAC APPLICATION;  Surgeon: Bryon Starr MD;  [facility]    IRRIGATION AND DEBRIDEMENT FOOT, COMBINED Right 2023    Procedure: Irrigation And Debridement Right Foot, Wound vac  placement and antibiotic bead placement right ankle and foot;  Surgeon: Bryon Starr MD;  [facility]    LENGTHEN TENDON ACHILLES Right 11/17/2022    Procedure: LENGTHENING, TENDON, ACHILLES;  Surgeon: Bryon Starr MD;  [facility]    REMOVE HARDWARE FOOT Right 11/2/2023    Procedure: Remove Hardware Right Foot;  Surgeon: Bryon Starr MD;  [facility]: [facility]      Allergies   Allergen Reactions    Doxycycline Rash     Very extensive full body rash lasting for several months. Given at same time as Rifampin.    Rifampin Rash     Very extensive full body rash lasting for several months. Given at the same time as doxycycline.    Atorvastatin      Other reaction(s): Hyperglycemia  pt also lists simvastatin?    Celebrex [Celecoxib] Other (See Comments)     [facility]      Social History     Tobacco Use    Smoking status: Never    Smokeless tobacco: Never   Substance Use Topics    Alcohol use: Not Currently     Comment: rarely      Wt Readings from Last 1 Encounters:   11/28/23 87.6 kg (193 lb 2 oz)        Anesthesia Evaluation   Pt has had prior anesthetic. Type: General.    No history of anesthetic complications       ROS/MED HX  ENT/Pulmonary:     (+) sleep apnea, doesn't use CPAP,                                     Neurologic:     (+)    peripheral neuropathy, - hands and legs.                           Cardiovascular:    (-) hypertension (resolved with weight loss)   METS/Exercise Tolerance:     Hematologic:     (+)      anemia,          Musculoskeletal:       GI/Hepatic:     (+)           hepatitis type C, liver disease (NAFLD),       Renal/Genitourinary:     (+) renal disease, type: CRI, Pt does not require dialysis,    Nephrolithiasis ,       Endo:     (+)  type II DM, Last HgA1c: 7.3, date: 10/30/23,     Diabetic complications: neuropathy retinopathy. thyroid problem, hypothyroidism,           Psychiatric/Substance Use:       Infectious Disease:       Malignancy:   (+) Malignancy,  "History of Other.Other CA thyroid Remission status post.    Other:      (+)  , H/O Chronic Pain (ulnar nerve and carpal tunnel pain),, other significant disability Wheelchair bound and Blind         Physical Exam    Airway  airway exam normal      Mallampati: II   TM distance: > 3 FB   Neck ROM: full   Mouth opening: > 3 cm    Respiratory Devices and Support         Dental       (+) Minor Abnormalities - some fillings, tiny chips      Cardiovascular   cardiovascular exam normal          Pulmonary   pulmonary exam normal                OUTSIDE LABS:  CBC:   Lab Results   Component Value Date    WBC 10.2 12/04/2023    WBC 7.9 11/30/2023    HGB 8.8 (L) 12/04/2023    HGB 9.4 (L) 11/30/2023    HCT 28.8 (L) 12/04/2023    HCT 30.4 (L) 11/30/2023     12/04/2023     11/30/2023     BMP:   Lab Results   Component Value Date     12/04/2023     12/03/2023    POTASSIUM 4.4 12/04/2023    POTASSIUM 4.1 12/03/2023    CHLORIDE 104 12/04/2023    CHLORIDE 103 12/03/2023    CO2 28 12/04/2023    CO2 28 12/03/2023    BUN 13.3 12/04/2023    BUN 13.3 12/03/2023    CR 1.01 12/04/2023    CR 0.97 12/03/2023     (H) 12/04/2023     (H) 12/04/2023     COAGS:   Lab Results   Component Value Date    PTT 31 11/27/2023    INR 1.10 11/29/2023     POC: No results found for: \"BGM\", \"HCG\", \"HCGS\"  HEPATIC:   Lab Results   Component Value Date    ALBUMIN 4.0 11/23/2023    PROTTOTAL 8.0 11/23/2023    ALT 10 11/23/2023    AST 13 11/23/2023    ALKPHOS 78 11/23/2023    BILITOTAL 0.7 11/23/2023     OTHER:   Lab Results   Component Value Date    LACT 0.8 11/24/2023    A1C 7.3 (H) 10/30/2023    ALICE 9.0 12/04/2023    MAG 2.0 01/21/2023    TSH 1.80 01/21/2023    CRP <2.9 02/06/2023    SED 71 (H) 11/24/2023       Anesthesia Plan    ASA Status:  3    NPO Status:  NPO Appropriate    Anesthesia Type: General.     - Airway: ETT   Induction: Intravenous.   Maintenance: Balanced.   Techniques and Equipment:     - Lines/Monitors: " BIS     Consents    Anesthesia Plan(s) and associated risks, benefits, and realistic alternatives discussed. Questions answered and patient/representative(s) expressed understanding.     - Discussed:     - Discussed with:  Patient      - Extended Intubation/Ventilatory Support Discussed: No.      - Patient is DNR/DNI Status: No     Use of blood products discussed: No .     Postoperative Care    Pain management: IV analgesics, Oral pain medications, Multi-modal analgesia.   PONV prophylaxis: Ondansetron (or other 5HT-3), Dexamethasone or Solumedrol     Comments:           H&P reviewed: Unable to attach VIRTUAL H&P to encounter due to EHR limitations. Appropriate H&P reviewed. The physical exam performed by anesthesia during this surgical encounter serves as the physical portion of that virtual H&P.  Any significant changes noted within this preop evaluation.         Katina Sebastian MD    I have reviewed the pertinent notes and labs in the chart from the past 30 days and (re)examined the patient.  Any updates or changes from those notes are reflected in this note.

## 2023-12-04 NOTE — PLAN OF CARE
1930-0630:  NPO at midnight. refused CHG wipe bath at first, said he would do on picc arm after asking again. he later stated he washed his whole body with it afterall. one CHG done per Pt. pt AXO X4. wound vac on right foot count on 125, decreased sensation below knee. Pt reported neuropathic pain below knee, scheduled gabapentin and Voltaren gel given. NWB RLE. PICC. plan for I&D surgery 12/4 0730. Use bedside commode independently pivot transfers. Call light miguel zelaya.

## 2023-12-04 NOTE — ANESTHESIA CARE TRANSFER NOTE
Patient: Nehemiah Magallon    Procedure: Procedure(s):  Irrigation and debridement right foot and ankle, combined       Diagnosis: Osteomyelitis of ankle or foot, right, acute (H) [M86.171]  Diagnosis Additional Information: No value filed.    Anesthesia Type:   General     Note:    Oropharynx: oropharynx clear of all foreign objects and spontaneously breathing  Level of Consciousness: awake  Oxygen Supplementation: face mask  Level of Supplemental Oxygen (L/min / FiO2): 8  Independent Airway: airway patency satisfactory and stable  Dentition: dentition unchanged  Vital Signs Stable: post-procedure vital signs reviewed and stable  Report to RN Given: handoff report given  Patient transferred to: PACU    Handoff Report: Identifed the Patient, Identified the Reponsible Provider, Reviewed the pertinent medical history, Discussed the surgical course, Reviewed Intra-OP anesthesia mangement and issues during anesthesia, Set expectations for post-procedure period and Allowed opportunity for questions and acknowledgement of understanding    Vitals:  Vitals Value Taken Time   /64 12/04/23 0945   Temp 36.4  C (97.5  F) 12/04/23 0938   Pulse 84 12/04/23 0949   Resp 22 12/04/23 0949   SpO2 95 % 12/04/23 0949   Vitals shown include unfiled device data.    Electronically Signed By: CARLITO De La Torre CRNA  December 4, 2023  9:51 AM

## 2023-12-04 NOTE — PROGRESS NOTES
[facility]    Medicine Progress Note - Hospitalist Service, GOLD TEAM 18    Date of Admission:  11/23/2023    Assessment & Plan   58 year old male with a pmh of T2DM with severe peripheral neuropathy, papillary thyroid malignancy in remission s/p thyroidectomy, HLD, and right Charcot foot reconstruction in 1/2022 complicated by post operative wound infection progressing to osteomyelitis. Patient was admitted to medical floor. Bcx growing G + cocci.      #Right foot osteomyelitis  #Sepsis  #Candida albicans infection  #Right Charcot foot reconstruction 1/2022  - Initially had Charcot right foot reconstruction in 1/2022. Patient was later hospitalized and I&D in 1/2023 had intra operative cultures growing Staphylococcus epidermis (oxacillin resistant) and Staphylococcus caprae.  During most recent admission in 11/2023 I & D cultures grew Candida albicans and he was discharged on 6 weeks of fluconazole.  - Continue on broad spectrum ABX. ID consult. Continue trending WBC. Follow-up cultures.   - WOC consult.   - Orthopedic surgery following the patient. S/p irrigation and debridement of right foot and ankle, VAC dressing application, and antibiotic bead placement.        ID following the patient.   Recs:  Can narrow empiric zosyn to IV cefazolin 2g q8hr to better target presumed MSSA (ordered)  Can continue empiric IV vancomycin (dosed by pharmacy) while awaiting final Staph aureus susceptibilities  Follow up pending blood cultures   Please check daily peripheral blood cultures (including today) until negative x72 hrs (ordered)  Check TTE to evaluate for vegetations (ordered)  Agree with ortho consult for surgical management for source control - please send deep tissue cultures if I&D is performed to help further guide antibiotics    Bcx ngtd. TTE was negative, defer SHIVANI to ID.       #T2DM  #Severe peripheral neuropathy  - Hyperglycemia noted.    - Will continue outpatient  "gabapentin  - Continue on sliding scale insulin, increase carb count 1:12. Continue on metformin.   - Serum glucose appears suboptimally controlled  - Increase insulin NPH to 18 units subcutaneous twice daily  - As an outpatient he is on jardiance, metformin, and Ozempic     #Papillary thyroid malignancy in remission s/p thyroidectomy with associated hypothyroidism  - Continue on PTA levothyroxine     #Nausea  #Vomiting  - Resolved      #CKD, stage 2  - Monitor renal function. Renal function stable.      #HLD  - PTA pravastatin     #MARGARITO  - Patient refuses CPAP due to claustrophobia  - Currently scheduled for inspire in December     #GERD  - Continue PTA omeprazole    #Chronic anemia  - Iron panel ordered and consistent with anemia of chronic disease.   - No need for transfusion at this time. Continue monitoring HGB.     #Hyponatremia   - Likely attributable to diminished appetite  - Continue to monitor  - Nutrition consult          Diet: Snacks/Supplements Adult: Ensure Max Protein (bariatric); With Meals  Regular Diet Adult    DVT Prophylaxis: Pneumatic Compression Devices  Jones Catheter: Not present  Lines: PRESENT      PICC 12/02/23 Single Lumen Right Basilic antibiotics-Site Assessment: WDL      Cardiac Monitoring: None  Code Status: Full Code      Clinically Significant Risk Factors                        # DMII: A1C = 7.3 % (Ref range: <5.7 %) within past 6 months   # Overweight: Estimated body mass index is 27.71 kg/m  as calculated from the following:    Height as of this encounter: 1.778 m (5' 10\").    Weight as of this encounter: 87.6 kg (193 lb 2 oz).   # Moderate Malnutrition: based on nutrition assessment    # Financial/Environmental Concerns: none         Disposition Plan      Expected Discharge Date: 12/06/2023      Destination: home with help/services              Aroldo David DO, MHS  Hospitalist Service, GOLD TEAM 18  [facility]  Securely message with " Mark Anthony (more info)  Text page via [facility] Paging/Directory   See signed in provider for up to date coverage information  ______________________________________________________________________    Interval History   Patient is in excellent spirits today.  Patient underwent surgery with Dr. Starr this morning.  Patient reports pain is tolerable.  [facility]  Patient has additional surgeries pending.    Physical Exam   Vital Signs: Temp: 97.5  F (36.4  C) Temp src: Oral BP: 110/61 Pulse: 81   Resp: 15 SpO2: 98 % O2 Device: None (Room air) Oxygen Delivery: 4 LPM  Weight: 193 lbs 1.97 oz    GENERAL: Alert and oriented x 3; no acute distress; well-nourished.  HEENT: Normocephalic; atraumatic; PERRLA; MMM.  CV: RRR; normal S1, S2; no rubs, murmurs, or gallops.  RESP: Lung fields clear to aucultation B/L; no wheezing or crepitations.  GI: Abdomen is soft, nontender, nondistended; no organomegaly; normal bowel sounds.  : Deferred genital examination.   MSK: Right lower extremity wrapped; right shoe on left foot.  DERM: Skin is intact; no rash, lesions, or skin breakdown.  NEURO: No focal deficits appreciated; strength & sensorium are grossly intact.  PSYCH: No active hallucinations; affect, insight appear within normal limits.    Medical Decision Making       55 MINUTES SPENT BY ME on the date of service doing chart review, history, exam, documentation & further activities per the note.      Data     I have personally reviewed the following data over the past 24 hrs:    10.2  \   8.8 (L)   / 423     138 104 13.3 /  143 (H)   4.4 28 1.01 \     Procal: N/A CRP: 18.88 (H) Lactic Acid: N/A       INR:  1.09 PTT:  31   D-dimer:  N/A Fibrinogen:  N/A       Imaging results reviewed over the past 24 hrs:   No results found for this or any previous visit (from the past 24 hour(s)).

## 2023-12-04 NOTE — ANESTHESIA POSTPROCEDURE EVALUATION
Patient: Nheemiah Magallon    Procedure: Procedure(s):  Irrigation and debridement right foot and ankle, combined       Anesthesia Type:  General    Note:  Disposition: Inpatient   Postop Pain Control: Uneventful            Sign Out: Well controlled pain   PONV: No   Neuro/Psych: Uneventful            Sign Out: Acceptable/Baseline neuro status   Airway/Respiratory: Uneventful            Sign Out: Acceptable/Baseline resp. status   CV/Hemodynamics: Uneventful            Sign Out: Acceptable CV status; No obvious hypovolemia; No obvious fluid overload   Other NRE: NONE   DID A NON-ROUTINE EVENT OCCUR? No           Last vitals:  Vitals Value Taken Time   /64 12/04/23 0945   Temp 36.4  C (97.5  F) 12/04/23 0938   Pulse 80 12/04/23 0959   Resp 0 12/04/23 0959   SpO2 97 % 12/04/23 0959   Vitals shown include unfiled device data.    Electronically Signed By: Katina Sebastian MD  December 4, 2023  10:00 AM

## 2023-12-04 NOTE — OP NOTE
DATE OF SURGERY:  12/04/2023.     PREOPERATIVE DIAGNOSIS:  Right fot/ankle surgical site infection.     POSTOPERATIVE DIAGNOSIS:  Right foot/ankle surgical site infection.     PROCEDURE:  Irrigation and debridement of right foot and ankle, VAC dressing exchange, and antibiotic bead exchange.     PRIMARY SURGEON:  Bryon Starr MD.    ANESTHESIA:  General laryngeal mask airway.     COMPLICATIONS:  None apparent intraoperatively or immediately postoperatively.    IMPLANTED DEVICES:  Two strands of ten antibiotic (vancomycin and tobramycin) beads each (twenty total beads), one strand of ten in posteromedial ankle / medial foot, and one strand of ten in anterolateral ankle.  Beads were made by hand intraoperatively at this procedure and placed in the deep/subfascial/intraarticular spaces adjacent to bone.     SIGNIFICANT FINDINGS:  Less purulence than at previous surgery 1 week ago, though still a small amount present in the posteromedial right ankle.  The remainder of the wounds appeared clean. The two wounds (anterolateral right ankle and posteromedial right ankle) did communicate with each other through the ankle joint.  Technique:  Cutting, scrubbing.  Instruments:  Scalpel, curet, periosteal elevator.  Nature of debrided tissue:  Friable, loosely attached, nonviable tissue.  Appearance of wound after:  Down to healthy, bleeding tissue/bone.  Area of debridement:  Posteromedial ankle wound / medial  foot wound: Approximately 11 cm x 3 cm x 4 cm.  Anterolateral right ankle wound: Approximately 4 cm  x 1.5 cm x 2 cm deep.  Depth of debridement:  Down to and including bone (distal tibia and ankle joint).     SPECIMENS SENT:  None.     DRAINS:  Two VAC sponges and two tubes to DME VAC machine at -125 mm continuous.     ESTIMATED BLOOD LOSS:  Approximately 15 mL.       INDICATIONS:                          Nehemiah Magallon has a surgical site infection of the right foot after Charcot reconstructive surgery, which has  undergone debridements 01/21/2023, 11/02/2023, 11/08/2023,and 11/27/2023.  He has been on a 6 week antifungal course for  C. albicans infection which grew from the 11/02/2023 operative cultures, and also cefazolin for the pansensitive S. aureus which grew from the 11/27/2023 operative cultures.  The patient returns to the OR at this time for planned, scheduled repeat debridement and irrigation with VAC dressing exchange and antibiotic bead removal versus exchange.  The diagnosis, nature of the recommended procedure, the risks, benefits, and alternatives, and the postoperative plan were all discussed with the patient in layman's terms.  No guarantees were expressed or implied as to outcome.  I again discussed that the planned procedure today was one of multiple likely planned procedures.  I discussed the possibility of failure to control the infection and/or heal the wounds and the potential for amputation in the future.  The patient had the opportunity to have all questions he had at the time asked and answered, verbalized his understanding of this discussion, and verbalized his wish to proceed with the planned procedure.  Written informed consent was obtained.     DESCRIPTION OF PROCEDURE:             The patient, Nehemiah Magallon, was met in the preoperative area where the correct surgical site was identified with patient participation and marked by the primary surgeon.  All questions were answered.  The patient was then brought to the operating room, where he was carefully transferred to the operating room table in the supine position with all bony prominences well padded and a safety strap across the waist.  He was placed under general anesthesia by the anesthesia team, and a laryngeal mask airway was successfully placed.  Venous thromboembolic prophylaxis was performed with a sequential compression device on the left lower extremity.  A soft bump was placed under the patient's right hip to help keep the toes on the  right foot pointing upwards.  The patient's two prior silver VAC sponges, dressings, and tubes were removed.  The patient's right lower extremity was then prepped with Betadine scrub and paint and draped free in the usual sterile fashion.  Prior to proceeding with the operation, a timeout was held per hospital policy correctly identifying the patient, procedure to be performed, and operative site including laterality.  All in the room agreed.    The two open wounds on the right foot/ankle were carefully inspected, both the smaller anterolateral ankle wound and the longer medial foot/posteromedial ankle wound.  Two strands of ten antibiotic beads each (twenty total) were removed, one from each wound.  The beads were carefully counted, and all twenty were passed off of the operative field.  Each wound was carefully explored in all directions.  There was much less purulence than present at the previous I&D a week ago.  There was still a small amount present in the posteromedial right ankle.  As before, care was taken to stay deep/anterior to the posterior tibial tendon and avoid injury to the posteromedial neurovascular bundle.  As before, the posteromedial ankle wound communicated with the anterolateral ankle wound through the ankle joint.  As before, the medial foot wound communicated with the nonunion site in the midfoot with a bony defect.  No new sinus tracts or pockets of purulence were noted.  The subcutaneous and deep tissues did appear to have good granulation tissue present, particularly in the anterolateral ankle wound and portions of the medial midfoot wound.  The skin margins all appeared to be healthy, bleeding, and viable.  Small amounts of friable, loose, nonviable subcutaneous tissue and deep fascia were sharply debrided with a scalpel.  All surfaces of both wounds were carefully scrubbed with a periosteal elevator and a curet, including the ankle joint and medial midfoot bony defect, and debrided to  healthy, bleeding tissues and bone.     Both wounds, the  anterolateral right ankle wound and the larger posteromedial right ankle / medial foot wound, were then thoroughly irrigated with a total of 7 L of normal saline with cystoscopy tubing and bulb irrigation.  Visible flow through a fluid was noted between the two wounds, communicating through the ankle joint.  The majority of the fluid was used to irrigate out the ankle joint from the inside out using the cysto tubing, as well as the medial midfoot bony defect.     There was no evidence for vascular injury. Meticulous hemostasis was achieved.  Two strands of 10 each (20 total) antibiotic cement beads containing vancomycin and tobramycin were made by hand intraoperatively, and placed on #2 FiberWire suture with a knot at each end to help prevent any bead loss off the suture.  One strand was placed into each incision, in the deep/subfascial and intraarticular spaces, adjacent to bone.  The skin was then carefully cleansed with sterile saline and then dried, and a silver VAC sponge was placed into each incision (two total VAC sponges), covered with clear VAC dressings.  One tube was then attached to each VAC dressing, and the two tubes were then attached via a Y connector to the patient's existing hospital DME VAC machine with -125 mmHg continuous pressure with an excellent seal.  Soft dressings consisting of sterile cast padding as well as an Ace wrap were then applied.     All surgical counts were reported as correct at the end of the case. The patient was then carefully and safely transferred to his [facility] in the supine position, and then taken to the recovery room with the LMA removed and in stable condition.     I was present and scrubbed in for the entire case.        POSTOPERATIVE PLAN:     Admit:  Return to [facility], medicine primary with Orthopaedic and Infectious Disease consultations.  Diet:  Clear liquids and advance as tolerated.  Antibiotics:   Continue current antibiotic regimen of cefazolin and fluconazole as directed by Infectious Disease.  Pain management:  Multimodal. Wean from IV to oral medications.  Weightbearing status:  Strict nonweightbearing on the right lower extremity.  Use appropriate assistive gait devices and assistance (nursing/PT) for ambulation.  Activity:  May be up ad pancho for ADLs, PT, diagnostic tests, etc., but encourage elevation of the right above the level of the heart.  Float heels off of the bed.  Up with assistance until independent.  PT/OT:  Gait training, transfers, ADLs.  Labs:  Per primary team.  Trend acute phase reactants.  Xrays:  None at this time.  Immobilization:  None at this time.  Dressings: Keep right foot dressings clean, dry, and intact.  Maintain VAC machine at 125 mm Hg continuous.  DVT prophylaxis:  Deferred to primary team.  Suggest at least mechanical SCDs.  Consultations:  PT, OT, Medicine, Orthopaedics, Infectious Disease.  Disposition:  Pending at this time.  Will likely need continuing ongoing operative debridements and VAC changes / possible bead exchanges, with next procedure recommended for sometime within the next several days to a week.      Bryon Starr MD  Attending surgeon  Orthopaedic Surgery

## 2023-12-04 NOTE — PROGRESS NOTES
Orthopedic surgery staff    I had the opportunity to see and examine this pleasant 58-year-old patient, who is well-known to me, preoperatively this morning in the preoperative area for scheduled repeat I&D and VAC change with removal versus reimplantation of antibiotic beads of his right foot.  The plan, risks, benefits, and alternatives were discussed.  The potential for failure to control the infection and or heal the wounds  resulting in amputation was discussed, though that is not the plan at this time.  All questions were answered.  The correct surgical site was marked.

## 2023-12-05 NOTE — PROGRESS NOTES
"  VS: /61 (BP Location: Left arm)   Pulse 94   Temp 97.3  F (36.3  C) (Oral)   Resp 18   Ht 1.778 m (5' 10\")   Wt 87.3 kg (192 lb 7.4 oz)   SpO2 96%   BMI 27.62 kg/m      O2: Stable on RA, no complaints of SOB or chest pain.   Output: Voiding w/o pain or difficulty to commode.   Last BM: 12/5/23.   Activity: Up ad llib, pivot turn to commode.    Skin: Intact except for R foot wound.   Pain: Denies pain this shift.   CMS: A&Ox4, patient has neuropathy at baseline   Dressing: RLE would dressing C/D/I   Wound Vac draining continuous at 125mmHg.   Diet: Regular. No complaints of nausea or vomiting.   LDA: LUE PICC, SL   Equipment: IV pole & Wheelchair   Plan: Continue POC   Additional Info: On sliding scale & carb coverage insulin.  RN managed potassium, did not need to replace this shift (@4.3)      "

## 2023-12-05 NOTE — PROGRESS NOTES
1500- 1900- Patient alert and oriented x4, calm and cooperative. Able to make needs known, call light within reach.Patient denies SOB, chest pain, pain, and nausea. Decrease sensation in all extremities. Per pt, he does not feel pain d/t neuropathy, but his body starts to shiver when he is in pain. VSS on RA. Right leg wound dressing- CDI, wound vac in place and continuous at 125, pt is NWB on that extremity. Patient is stand pivot to the bedside commode. Left PICC SL. Continue with plan of care.    Temp: 98.1  F (36.7  C) Temp src: Oral BP: 120/64 Pulse: 87   Resp: 18 SpO2: 98 % O2 Device: None (Room air)

## 2023-12-05 NOTE — PROGRESS NOTES
[facility]    Medicine Progress Note - Hospitalist Service, GOLD TEAM 18    Date of Admission:  11/23/2023    Assessment & Plan    58 year old male with a pmh of T2DM with severe peripheral neuropathy, papillary thyroid malignancy in remission s/p thyroidectomy, HLD, and right Charcot foot reconstruction in 1/2022 complicated by post operative wound infection progressing to osteomyelitis. Patient was admitted to medical floor. Bcx growing G + cocci.     Interval change, status post I&D on 12/4/2023.     #Right foot osteomyelitis  #Sepsis  #Candida albicans infection  #Right Charcot foot reconstruction 1/2022  - Initially had Charcot right foot reconstruction in 1/2022. Patient was later hospitalized and I&D in 1/2023 had intra operative cultures growing Staphylococcus epidermis (oxacillin resistant) and Staphylococcus caprae.  During most recent admission in 11/2023 I & D cultures grew Candida albicans and he was discharged on 6 weeks of fluconazole.  - Continue on broad spectrum ABX. ID consult. Continue trending WBC. Follow-up cultures.   - WOC consult.   - Orthopedic surgery following the patient. S/p irrigation and debridement of right foot and ankle, VAC dressing application, and antibiotic bead placement.      -Status post I&D on 12 4, pending repeat I&D possibly next week?     ID following the patient.   Recs:(12/4)  Can narrow empiric zosyn to IV cefazolin 2g q8hr to better target presumed MSSA (ordered)  Can continue empiric IV vancomycin (dosed by pharmacy) while awaiting final Staph aureus susceptibilities  Follow up pending blood cultures   Please check daily peripheral blood cultures (including today) until negative x72 hrs (ordered)  Check TTE to evaluate for vegetations (ordered)  Agree with ortho consult for surgical management for source control - please send deep tissue cultures if I&D is performed to help further guide antibiotics     Bcx ngtd. TTE was  "negative, defer SHIVANI to ID.       #T2DM  #Severe peripheral neuropathy  - Hyperglycemia noted.    - Will continue outpatient gabapentin  - Continue on sliding scale insulin, increase carb count 1:12. Continue on metformin.   - Serum glucose appears suboptimally controlled  - Increase insulin NPH to 18 units subcutaneous twice daily  - As an outpatient he is on jardiance, metformin, and Ozempic     #Papillary thyroid malignancy in remission s/p thyroidectomy with associated hypothyroidism  - Continue on PTA levothyroxine     #Nausea  #Vomiting  - Resolved      #CKD, stage 2  - Monitor renal function. Renal function stable.      #HLD  - PTA pravastatin     #MARGARITO  - Patient refuses CPAP due to claustrophobia  - Currently scheduled for inspire in December     #GERD  - Continue PTA omeprazole     #Chronic anemia  - Iron panel ordered and consistent with anemia of chronic disease.   - No need for transfusion at this time. Continue monitoring HGB.      #Hyponatremia   - Likely attributable to diminished appetite  - Continue to monitor  - Nutrition consult           Diet: Snacks/Supplements Adult: Ensure Max Protein (bariatric); With Meals  Regular Diet Adult    DVT Prophylaxis: Pneumatic Compression Devices  Jones Catheter: Not present  Lines: PRESENT      PICC 12/02/23 Single Lumen Right Basilic antibiotics-Site Assessment: WDL  Cardiac Monitoring: None  Code Status: Full Code      Clinically Significant Risk Factors                        # DMII: A1C = 7.3 % (Ref range: <5.7 %) within past 6 months   # Overweight: Estimated body mass index is 27.71 kg/m  as calculated from the following:    Height as of this encounter: 1.778 m (5' 10\").    Weight as of this encounter: 87.6 kg (193 lb 2 oz).   # Moderate Malnutrition: based on nutrition assessment    # Financial/Environmental Concerns: none         Disposition Plan     Expected Discharge Date: 12/06/2023      Destination: home with help/services              Ninoska BRIGHT" MD Eileen  Hospitalist Service, GOLD TEAM 18  [facility]  Securely message with [email or web address] (more info)  Text page via [employer] Paging/Directory   See signed in provider for up to date coverage information  ______________________________________________________________________    Interval History   Patient seen and examined at bedside no acute distress.  No overnight events.  Status post I&D on 12/4/2023.  Has no acute complaints although is bit frustrated his illness.    Physical Exam   Vital Signs: Temp: 98.3  F (36.8  C) Temp src: Oral BP: 128/68 Pulse: 84   Resp: 16 SpO2: 98 % O2 Device: None (Room air)    Weight: 193 lbs 1.97 oz    General Appearance: Appears generally comfortable  Respiratory: No obvious wheezes rhonchi or rales.  Cardiovascular: RRR, no murmurs  GI: Soft nontender nondistended  Skin: No obvious creations or bruises    Medical Decision Making       59 MINUTES SPENT BY ME on the date of service doing chart review, history, exam, documentation & further activities per the note.      Data     I have personally reviewed the following data over the past 24 hrs:    N/A  \   N/A   / N/A     138 105 13.5 /  146 (H)   4.3 29 0.91 \     Procal: N/A CRP: 23.86 (H) Lactic Acid: N/A

## 2023-12-05 NOTE — PLAN OF CARE
[id number]    Goal Outcome Evaluation:      Plan of Care Reviewed With: patient    Overall Patient Progress: no change    Outcome Evaluation: pt alert and oriented. Denies SOB, chest pain, dizziness. Pt reports baseline decreased sensation in all extremities. Pt reports he does not feel pain d/t neuropathy and his body starts to shiver when he is in pain. Pt pain managed with prn tylenol, pt states 650 mg is not effective, provider paged and increased tylenol dose to 975 mg q6h. Right LE wound dressing C/D/I, wound vac in place and at continuous rate of 125, NWB on RLE. Pt up stand pivot to bedside commode. LUE PICC saline locked. Resting comfortably in bed overnight. Able to make needs known. Call light within reach. Continue with plan of care.

## 2023-12-05 NOTE — PROGRESS NOTES
"Orthopedic Surgery Progress Note: 12/05/2023    Subjective:   No acute events overnight. OR yesterday for repeat I&D.     Objective:   /71 (BP Location: Right arm)   Pulse 91   Temp 99.9  F (37.7  C) (Oral)   Resp 18   Ht 1.778 m (5' 10\")   Wt 87.6 kg (193 lb 2 oz)   SpO2 98%   BMI 27.71 kg/m    I/O this shift:  In: 240 [P.O.:240]  Out: -   General: NAD. Resting comfortably in bed.  Respiratory: Nonlabored breathing  Musculoskeletal:  RLE: Dressing c/d/I. Vac in place, holding suction. Minimal output in cannister. No sensation in foot (baseline for patient)     Laboratory Data:  Lab Results   Component Value Date    WBC 10.2 12/04/2023    HGB 8.8 (L) 12/04/2023     12/04/2023    INR 1.09 12/04/2023       Tissue cultures from OR 11/27 MSSA and candida    Assessment & Plan:   Nehemiah Magallon is a 58 year old male with a history significant for with T2DM with severe peripheral neuropathy with past right Charcot foot reconstruction in Jan-2022 complicated by post-operative wound infection with multiple recent I&D of the right foot with subsequent wound vac exchanges with positive cultures for candida with Dr. Starr on 11/02/23 and 11/08/23 admitted for sepsis and concern for worsening RLE osteomyelitis.      Plan for Today:  - PT/OT  - ABX  - Plan for return to OR 12/7.       Medicine primary  Admit:  Return to [facility], medicine primary with Orthopaedic and Infectious Disease consultations.  Diet:  Clear liquids and advance as tolerated.  Antibiotics:  Continue current antibiotic regimen of cefazolin and fluconazole as directed by Infectious Disease.  Pain management:  Multimodal. Wean from IV to oral medications.  Weightbearing status:  Strict nonweightbearing on the right lower extremity.  Use appropriate assistive gait devices and assistance (nursing/PT) for ambulation.  Activity:  May be up ad pancho for ADLs, PT, diagnostic tests, etc., but encourage elevation of the right above the level of the heart.  " Float heels off of the bed.  Up with assistance until independent.  PT/OT:  Gait training, transfers, ADLs.  Labs:  Per primary team.  Trend acute phase reactants.  Xrays:  None at this time.  Immobilization:  None at this time.  Dressings: Keep right foot dressings clean, dry, and intact.  Maintain VAC machine at 125 mm Hg continuous.  DVT prophylaxis:  Deferred to primary team.  Suggest at least mechanical SCDs.  Consultations:  PT, OT, Medicine, Orthopaedics, Infectious Disease.  Disposition:  Pending at this time.  Will likely need continuing ongoing operative debridements and VAC changes / possible bead exchanges, with next procedure recommended for sometime within the next several days to a week.     Orthopedic surgery staff for this patient is Dr. Starr    ------------------------------------------------------------------------------------------    Respectfully,    Joesph Smith MD  Orthopedic Surgery PGY4

## 2023-12-05 NOTE — PROGRESS NOTES
CLINICAL NUTRITION SERVICES - REASSESSMENT NOTE     Nutrition Prescription    RECOMMENDATIONS FOR MDs/PROVIDERS TO ORDER:  None at present.    Malnutrition Status:    Moderate malnutrition in the context of chronic disease.    Recommendations already ordered by Registered Dietitian (RD):  Continue Ensure Max daily, send cold.    Future/Additional Recommendations:  Monitor oral intake, weight, supplement preferences.     EVALUATION OF THE PROGRESS TOWARD GOALS   Diet: Regular  - Ensure Max daily  Intake: Minimal intake recorded in flowsheet. 100% intake of 2 meals noted. Pt ordering 2-3 meals a day per [facility].       NEW FINDINGS   Met with pt. He reports that he's had a decreased appetite. He's had 4 surgeries, so he just doesn't feel hungry. He also finds the hospital food unappealing. He dislikes that a lot of it is fried or baked. His hot food comes up cold and his cold food comes up warm. He used to just eat 1 meal a day at home. Now, he feels that he eats more because he has to. He understands the importance of eating, but he doesn't want to force himself to eat. He prefers the Ensure Max cold, but it usually comes up warm so he doesn't drink it. He's willing to try it over ice.    Weight:  - Last wt (12/5): 87.3 kg  - Wt stable during admission  - 11.7% wt loss in 6 months (down from 98.9 kg on 5/31/23)    Labs: Glucose 127 (H). 10/30 A1C 7.3 (H).    Medications:  Ancef  Vitamin B12  Folic acid  Novolog - medium insulin resistance  NPH  Culturell  Levothyroxine  Metformin  Thera-vit-m  Protonix  Miralax  Ascorbic acid  Vitamin D3  Zinc sulfate  PRN tums, zofran, senna    GI: Stooling daily most days per I/O    Skin: s/p I&D of right foot and ankle. Orthopedic surgery following. WOC previously following, signed off 11/30.    MALNUTRITION  % Intake: < 75% for >/= 1 month (moderate) - improving  % Weight Loss: > 10% in 6 months (severe)  Subcutaneous Fat Loss: None observed - visualized  Muscle Loss: None  observed - visualized  Fluid Accumulation/Edema: Mild  Malnutrition Diagnosis: Moderate malnutrition in the context of chronic disease.    Previous Goals   Patient to consume % of nutritionally adequate meal trays TID, or the equivalent with supplements/snacks.  Evaluation: Progressing    Previous Nutrition Diagnosis  Inadequate oral intake PTA related to decreased appetite w/ Ozempic and pt's desire to maintain weight loss as evidenced by pt report of eating only one meal/day with meat and side dish.     Evaluation: Improving    CURRENT NUTRITION DIAGNOSIS  Inadequate oral intake related to decreased appetite and dislike of hospital food as evidenced by pt report.      INTERVENTIONS  Implementation  Medical food supplement therapy    Goals  Patient to consume % of nutritionally adequate meal trays TID, or the equivalent with supplements/snacks.    Monitoring/Evaluation  Progress toward goals will be monitored and evaluated per protocol.    Jovanny Pack RD, LD  6B/8A RD Pager: [phone]  [facility] Weekend/Holiday Pager: [phone]

## 2023-12-05 NOTE — PROGRESS NOTES
BLUE GENERAL INFECTIOUS DISEASES : PROGRESS NOTE  Nehemiah Magallon : 1965 Sex: male:   Medical record number [id number] Attending Physician: Hugo Kowalski*  Date of Service: 2023    ASSESSMENT :  MSSA bacteremia 23 (1/4+). 23 cultures negative.  -TTE 23 without evidence of endocarditis.   -SHIVANI not pursued at this time given brief bacteremia and planned long course of cefazolin for osteomyelitis  Right foot septic arthritis of ankle / foot osteomyelitis    - Worsening chronic osteomyelitis noted on CT 10/24/2023 with soft tissue swelling tibiotalar fusion and loosening of hardware plus Lisfranc deformity and ill-defined erosion of the lateral talus/distal fibula  -  s/p I&D for right ankle and foot hardware removal, OR cultures +Candida albicans  -  s/p I&D, antibiotic beads removal, wound vac placement - no cultures nor pathology were sent  - was discharged on at least 6-week course of fluconazole until ID follow-up on 23  -- 23  Irrigation And Debridement right Foot and ankle.   Serosanguinous fluid and purulence in posteromedial right ankle directly posterior to the distal tibia, communicating with the ankle joint.  Apparent bony destruction in distal  tibia and ankle joint.  Prior anterolateral ankle and medial foot wounds explored to bone and appeared clean.  Prior anterolateral ankle wound did communicate with the posteromedial ankle wound. two strands of ten antibiotic (vancomycin and tobramycin) beads each (twenty total beads), one strand of ten in posteromedial ankle and one strand of ten in anterolateral ankle wound.  Beads were made by hand intraoperatively at this procedure and placed in the deep/subfascial space adjacent to bone. Wound vac placement .    Cultures with MSSA and C albicans. Repeat sensitivities on C albicans - sensitive to Micafungin, Fluconazole, Ampbo B and Voriconazole   - 23 - Irrigation and debridement right foot  and ankle, combined. Intra-op findings:  Decreased purulence from previous I&D though still some present. Medial wound 4 cm x 1.5 cm x 2 cm deep. Lateral wound 11 cm x 3 cm x 4 cm deep . Two strands of ten antibiotic beads each (tobramycin and vancomycin), one to each wound, with 20 beads total.   3. History of right Charcot foot reconstruction 11/17/22 c/b post-op polymicrobial wound infection   - 1/20/23 - started on Pip/Tazobactam and Vancomycin IV  - 1/21/23 -  s/p I+D of right foot, placement of wound vac and antibiotic beads. Staph epidermidis x2 (oxacillin Resistant) and Staph caprae. (Each S to doxycycline)  - 2/15/23 - Vancomycin IV and Rifampin were stopped and started on Doxycycline  - 2/24/23 - Doxycycline was stopped (papular lesions on his body, back, arms, associated with itching)  4. Severe peripheral neuropathy  5 T2DM ( HbA1c 7.3 on 10/30/23)  CKD   (11/23/23 ) --> 18.8 (12/4/23)     RECOMMENDATIONS:   - continue Cefazolin and Fluconazole   - trend CP every 2-3 days while in hospital   - agree with repeat I+D until improvement noted . with next I+D, please send intra-op cultures for aerobic, anaerobic and fungal cultures   - repeat EKG to assess QTc due to high dose of Fluconazole use     ID will continue to follow    Pete Arambula MD,M.Med.Sc.  Infectious Diseases  Pager: [phone]     SUBJECTIVE:   He had a repeat I+D of right foot/ ankle today. Intra-op findings :  Decreased purulence from previous I&D though still some present. Medial wound 4 cm x 1.5 cm x 2 cm deep. Lateral wound 11 cm x 3 cm x 4 cm deep . Two strands of ten antibiotic beads each (tobramycin and vancomycin), one to each wound, with 20 beads total.   may have another I+D next week   denies pain due to neuropathy   afebrile and tolerates current antimicrobial therapy     ROS:  A five-point review of systems was obtained and was negative with the exception of that which is described above.  Allergies  "  Allergen Reactions    Doxycycline Rash     Very extensive full body rash lasting for several months. Given at same time as Rifampin.    Rifampin Rash     Very extensive full body rash lasting for several months. Given at the same time as doxycycline.    Atorvastatin      Other reaction(s): Hyperglycemia  pt also lists simvastatin?    Celebrex [Celecoxib] Other (See Comments)     [facility]   CURRENT ANTI-INFECTIVES:   Cefazolin   Fluconazole     EXAMINATION: (Recommend ? 5 systems)   Vital Signs: /64 (BP Location: Right arm)   Pulse 87   Temp 98.1  F (36.7  C) (Oral)   Resp 18   Ht 1.778 m (5' 10\")   Wt 87.6 kg (193 lb 2 oz)   SpO2 98%   BMI 27.71 kg/m     GENERAL:  well-developed, well-nourished, in bed in no acute distress.  HEENT:  Head is normocephalic, atraumatic   EYES:  Eyes have anicteric sclerae without conjunctival injection   NECK:  Supple.   LUNGS:  breathing comfortably on room air   SKIN:  No acute rashes. Right foot is wrapped    NEUROLOGIC:  Grossly nonfocal. Active x4 extremities  CURRENT LINES: PICC    NEW DATA/RESULTS:   Recent Labs   Lab Test 02/06/23  1430 01/26/23  0953 01/23/23  0546 01/22/23  0839 01/20/23  1211   CRP <2.9 7.4 14.0* 24.0* 69.0*     Recent Labs   Lab Test 12/04/23  0607 11/30/23  0708 11/29/23  0713 11/27/23  0721 11/25/23  0559 11/24/23  0743   WBC 10.2 7.9 10.2 7.8 9.3 15.0*     Recent Labs   Lab Test 12/04/23  0607 12/03/23  0720 12/02/23  0612 12/01/23  0746   CR 1.01 0.97 1.09 1.06   GFRESTIMATED 86 90 78 81     Hematology Studies  Recent Labs   Lab Test 12/04/23  0607 11/30/23  0708 11/29/23  0713 11/27/23  0721 11/25/23  0559 11/24/23  0743   WBC 10.2 7.9 10.2 7.8 9.3 15.0*   ANEU 6.9 6.0  --   --   --   --    AEOS 0.1 0.0  --   --   --   --    HGB 8.8* 9.4* 8.8* 8.9* 8.7* 9.5*   HCT 28.8* 30.4* 28.3* 28.7* 28.1* 30.9*    322 267 196 195 199     Metabolic  Recent Labs   Lab Test 12/04/23  0607 12/03/23  0720 12/02/23  0612    " 139 135   BUN 13.3 13.3 12.8   CO2 28 28 28   CR 1.01 0.97 1.09   GFRESTIMATED 86 90 78     Hepatic Studies  Recent Labs   Lab Test 11/23/23  1408 11/05/23  0549 03/08/23  0941   BILITOTAL 0.7 <0.2 0.2   ALKPHOS 78 58 61   ALBUMIN 4.0 3.4* 4.4   AST 13 8 28   ALT 10 5 36     Immunologlobulins  Recent Labs   Lab Test 11/24/23  1042 10/30/23  0912 05/12/23  0907   SED 71* 44* 7

## 2023-12-05 NOTE — PLAN OF CARE
Problem: Oral Intake Inadequate  Goal: Improved Oral Intake  Outcome: Progressing   Goal Outcome Evaluation:       Pt continues to have decreased appetite and dislikes hospital food, but is eating more than he was at home. Continue supplements. See RD note for full assessment and recommendations.

## 2023-12-05 NOTE — PROGRESS NOTES
BLUE GENERAL INFECTIOUS DISEASES : PROGRESS NOTE  Nehemiah Magallon : 1965 Sex: male:   Medical record number [id number] Attending Physician: Hugo Kowalski*  Date of Service: 2023    ASSESSMENT :  MSSA bacteremia 23 (1/4+). 23 cultures negative.  -TTE 23 without evidence of endocarditis.   -SHIVANI not pursued at this time given brief bacteremia and planned long course of cefazolin for osteomyelitis  Right foot septic arthritis of ankle / foot osteomyelitis    - Worsening chronic osteomyelitis noted on CT 10/24/2023 with soft tissue swelling tibiotalar fusion and loosening of hardware plus Lisfranc deformity and ill-defined erosion of the lateral talus/distal fibula  -  s/p I&D for right ankle and foot hardware removal, OR cultures +Candida albicans  -  s/p I&D, antibiotic beads removal, wound vac placement - no cultures nor pathology were sent  - was discharged on at least 6-week course of fluconazole until ID follow-up on 23  -- 23  Irrigation And Debridement right Foot and ankle.   Serosanguinous fluid and purulence in posteromedial right ankle directly posterior to the distal tibia, communicating with the ankle joint.  Apparent bony destruction in distal  tibia and ankle joint.  Prior anterolateral ankle and medial foot wounds explored to bone and appeared clean.  Prior anterolateral ankle wound did communicate with the posteromedial ankle wound. two strands of ten antibiotic (vancomycin and tobramycin) beads each (twenty total beads), one strand of ten in posteromedial ankle and one strand of ten in anterolateral ankle wound.  Beads were made by hand intraoperatively at this procedure and placed in the deep/subfascial space adjacent to bone. Wound vac placement .    Cultures with MSSA and C albicans. Repeat sensitivities on C albicans - sensitive to Micafungin, Fluconazole, Ampbo B and Voriconazole   - 23 - Irrigation and debridement right foot  and ankle, combined. Intra-op findings:  Decreased purulence from previous I&D though still some present. Medial wound 4 cm x 1.5 cm x 2 cm deep. Lateral wound 11 cm x 3 cm x 4 cm deep . Two strands of ten antibiotic beads each (tobramycin and vancomycin), one to each wound, with 20 beads total.   3. History of right Charcot foot reconstruction 11/17/22 c/b post-op polymicrobial wound infection   - 1/20/23 - started on Pip/Tazobactam and Vancomycin IV  - 1/21/23 -  s/p I+D of right foot, placement of wound vac and antibiotic beads. Staph epidermidis x2 (oxacillin Resistant) and Staph caprae. (Each S to doxycycline)  - 2/15/23 - Vancomycin IV and Rifampin were stopped and started on Doxycycline  - 2/24/23 - Doxycycline was stopped (papular lesions on his body, back, arms, associated with itching)  4. Severe peripheral neuropathy  5 T2DM ( HbA1c 7.3 on 10/30/23)  CKD   (11/23/23 ) --> 18.8 (12/4/23)     RECOMMENDATIONS:   - continue Cefazolin and Fluconazole   - trend CP every 2-3 days while in hospital   - agree with repeat I+D until improvement noted . with next I+D, please send intra-op cultures for aerobic, anaerobic and fungal cultures . Per Ortho - return to OR on 12/7/23   - repeat EKG to assess QTc due to high dose of Fluconazole use     ID will continue to follow    Pete Arambula MD,M.Med.Sc.  Infectious Diseases  Pager: [phone]     SUBJECTIVE:   remains stable. afebrile   ROS:  A five-point review of systems was obtained and was negative with the exception of that which is described above.  Allergies   Allergen Reactions    Doxycycline Rash     Very extensive full body rash lasting for several months. Given at same time as Rifampin.    Rifampin Rash     Very extensive full body rash lasting for several months. Given at the same time as doxycycline.    Atorvastatin      Other reaction(s): Hyperglycemia  pt also lists simvastatin?    Celebrex [Celecoxib] Other (See Comments)      "[facility]   CURRENT ANTI-INFECTIVES:   Cefazolin   Fluconazole     EXAMINATION: (Recommend ? 5 systems)   Vital Signs: /68 (BP Location: Left arm)   Pulse 84   Temp 98.3  F (36.8  C) (Oral)   Resp 16   Ht 1.778 m (5' 10\")   Wt 87.3 kg (192 lb 7.4 oz)   SpO2 98%   BMI 27.62 kg/m     GENERAL:  well-developed, well-nourished, in bed in no acute distress.  HEENT:  Head is normocephalic, atraumatic   EYES:  Eyes have anicteric sclerae without conjunctival injection   NECK:  Supple.   LUNGS:  breathing comfortably on room air   SKIN:  No acute rashes. Right foot is wrapped    NEUROLOGIC:  Grossly nonfocal. Active x4 extremities  CURRENT LINES: PICC    NEW DATA/RESULTS:   Recent Labs   Lab Test 02/06/23  1430 01/26/23  0953 01/23/23  0546 01/22/23  0839 01/20/23  1211   CRP <2.9 7.4 14.0* 24.0* 69.0*     Recent Labs   Lab Test 12/04/23  0607 11/30/23  0708 11/29/23  0713 11/27/23  0721 11/25/23  0559 11/24/23  0743   WBC 10.2 7.9 10.2 7.8 9.3 15.0*     Recent Labs   Lab Test 12/05/23  0627 12/04/23  0607 12/03/23  0720 12/02/23  0612   CR 0.91 1.01 0.97 1.09   GFRESTIMATED >90 86 90 78     Hematology Studies  Recent Labs   Lab Test 12/04/23  0607 11/30/23  0708 11/29/23  0713 11/27/23  0721 11/25/23  0559 11/24/23  0743   WBC 10.2 7.9 10.2 7.8 9.3 15.0*   ANEU 6.9 6.0  --   --   --   --    AEOS 0.1 0.0  --   --   --   --    HGB 8.8* 9.4* 8.8* 8.9* 8.7* 9.5*   HCT 28.8* 30.4* 28.3* 28.7* 28.1* 30.9*    322 267 196 195 199     Metabolic  Recent Labs   Lab Test 12/05/23  0627 12/04/23  0607 12/03/23  0720    138 139   BUN 13.5 13.3 13.3   CO2 29 28 28   CR 0.91 1.01 0.97   GFRESTIMATED >90 86 90     Hepatic Studies  Recent Labs   Lab Test 11/23/23  1408 11/05/23  0549 03/08/23  0941   BILITOTAL 0.7 <0.2 0.2   ALKPHOS 78 58 61   ALBUMIN 4.0 3.4* 4.4   AST 13 8 28   ALT 10 5 36     Immunologlobulins  Recent Labs   Lab Test 11/24/23  1042 10/30/23  0912 05/12/23  0907   SED 71* 44* 7 "

## 2023-12-06 NOTE — CARE PLAN
"VS: /69 (BP Location: Left arm)   Pulse 62   Temp 97.5  F (36.4  C) (Oral)   Resp 16   Ht 1.778 m (5' 10\")   Wt 87.3 kg (192 lb 7.4 oz)   SpO2 98%   BMI 27.62 kg/m      Orientation Pt is Alert and Oriented x 4   Mobility/ Activity Pt is able to transfer himself via stand and pivot to the commode and to his wheelchair.    Bowel/Bladder: Pt is continent of both bowel and bladder and uses the commode to eliminate.    IV /LDA Pt has R PICC line in place.    Diet Regular diet and appetite is good.    Skin / Wounds / Dressings: Wound Vac placed on R foot draining from two sites.    Pain Pt has denied pain this shift.    Equipment: Wound Vac in place R foot. Pt uses wheelchair at baseline.   Circulation/ Sensation Pt has numbness and tingling in both lower extremities at baseline.   PRNS N/A today.   Plan I&D planned for tomorrow 12/6   Additional info:      "

## 2023-12-06 NOTE — PROGRESS NOTES
"Orthopedic Surgery Progress Note: 12/06/2023    Subjective:   No acute events overnight.     Objective:   /69 (BP Location: Left arm, Patient Position: Semi-Gonsalves's)   Pulse 88   Temp 98.4  F (36.9  C) (Oral)   Resp 16   Ht 1.778 m (5' 10\")   Wt 87.3 kg (192 lb 7.4 oz)   SpO2 97%   BMI 27.62 kg/m    I/O this shift:  In: 240 [P.O.:240]  Out: -   General: NAD. Resting comfortably in bed.  Respiratory: Nonlabored breathing  Musculoskeletal:  RLE: Dressing c/d/I. Vac in place, holding suction. Minimal output in cannister. No sensation in foot (baseline for patient)     Laboratory Data:  Lab Results   Component Value Date    WBC 10.2 12/04/2023    HGB 8.8 (L) 12/04/2023     12/04/2023    INR 1.09 12/04/2023       Tissue cultures from OR 11/27 MSSA and candida    Assessment & Plan:   Nehemiah Magallon is a 58 year old male with a history significant for with T2DM with severe peripheral neuropathy with past right Charcot foot reconstruction in Jan-2022 complicated by post-operative wound infection with multiple recent I&D of the right foot with subsequent wound vac exchanges with positive cultures for candida with Dr. Starr on 11/02/23 and 11/08/23 admitted for sepsis and concern for worsening RLE osteomyelitis.      Plan for Today:  - PT/OT  - ABX  - Plan for return to OR 12/7.   - NPO midnight       Medicine primary  Admit:  Return to [facility], medicine primary with Orthopaedic and Infectious Disease consultations.  Diet:  NPO midnight   Antibiotics:  Continue current antibiotic regimen of cefazolin and fluconazole as directed by Infectious Disease.  Pain management:  Multimodal. Wean from IV to oral medications.  Weightbearing status:  Strict nonweightbearing on the right lower extremity.  Use appropriate assistive gait devices and assistance (nursing/PT) for ambulation.  Activity:  May be up ad pancho for ADLs, PT, diagnostic tests, etc., but encourage elevation of the right above the level of the heart.  Float " heels off of the bed.  Up with assistance until independent.  PT/OT:  Gait training, transfers, ADLs.  Labs:  Per primary team.  Trend acute phase reactants.  Xrays:  None at this time.  Immobilization:  None at this time.  Dressings: Keep right foot dressings clean, dry, and intact.  Maintain VAC machine at 125 mm Hg continuous.  DVT prophylaxis:  Deferred to primary team.  Suggest at least mechanical SCDs.  Consultations:  PT, OT, Medicine, Orthopaedics, Infectious Disease.  Disposition:  Pending at this time.  Will likely need continuing ongoing operative debridements and VAC changes / possible bead exchanges, with next procedure recommended for sometime within the next several days to a week.     Orthopedic surgery staff for this patient is Dr. Starr    ------------------------------------------------------------------------------------------    Respectfully,    Joesph Smith MD  Orthopedic Surgery PGY4

## 2023-12-06 NOTE — PLAN OF CARE
[id number]    Goal Outcome Evaluation:      Plan of Care Reviewed With: patient    Overall Patient Progress: no change    Outcome Evaluation: pt alert and oriented, neuropathy at baseline. Up pivot turn to commode. Right lower extremity wound, covered with ace, wound vac in place at continuous rate of 125. LUE PICC saline locked. RN managed potassium. Pain managed with tylenol x1 this shift. Regular diet, sliding scale and carb coverage. Resting comfortably in bed overnight. Able to make needs known. Call light within reach. Continue with plan of care.

## 2023-12-06 NOTE — PROGRESS NOTES
[facility]    Medicine Progress Note - Hospitalist Service, GOLD TEAM 18    Date of Admission:  11/23/2023    Assessment & Plan   58 year old male with a pmh of T2DM with severe peripheral neuropathy, papillary thyroid malignancy in remission s/p thyroidectomy, HLD, and right Charcot foot reconstruction in 1/2022 complicated by post operative wound infection progressing to osteomyelitis. Patient was admitted to medical floor. Bcx growing G + cocci.     Interval change, status post I&D on 12/4/2023.  Pending a repeat I&D possibly next several days or week according to orthopedic service note.     #Right foot osteomyelitis  #Sepsis  #Candida albicans infection  #Right Charcot foot reconstruction 1/2022  - Initially had Charcot right foot reconstruction in 1/2022. Patient was later hospitalized and I&D in 1/2023 had intra operative cultures growing Staphylococcus epidermis (oxacillin resistant) and Staphylococcus caprae.  During most recent admission in 11/2023 I & D cultures grew Candida albicans and he was discharged on 6 weeks of fluconazole.  - Continue on broad spectrum ABX. ID consult. Continue trending WBC. Follow-up cultures.   - WOC consult.   - Orthopedic surgery following the patient. S/p irrigation and debridement of right foot and ankle, VAC dressing application, and antibiotic bead placement.      -Status post I&D on 12/4, pending repeat I&D possibly next several days to week?     ID following the patient.   Recs:(12/4)  Can narrow empiric zosyn to IV cefazolin 2g q8hr to better target presumed MSSA (ordered)  Can continue empiric IV vancomycin (dosed by pharmacy) while awaiting final Staph aureus susceptibilities  Follow up pending blood cultures   Please check daily peripheral blood cultures (including today) until negative x72 hrs (ordered)  Check TTE to evaluate for vegetations (ordered)  Agree with ortho consult for surgical management for source control  "- please send deep tissue cultures if I&D is performed to help further guide antibiotics     Bcx ngtd. TTE was negative, defer SHIVANI to ID.       #T2DM  #Severe peripheral neuropathy  - Hyperglycemia noted.    - Will continue outpatient gabapentin  - Continue on sliding scale insulin, increase carb count 1:12. Continue on metformin.   - Serum glucose appears suboptimally controlled  - Increase insulin NPH to 18 units subcutaneous twice daily  - As an outpatient he is on jardiance, metformin, and Ozempic     #Papillary thyroid malignancy in remission s/p thyroidectomy with associated hypothyroidism  - Continue on PTA levothyroxine     #Nausea  #Vomiting  - Resolved      #CKD, stage 2  - Monitor renal function. Renal function stable.      #HLD  - PTA pravastatin     #MARGARITO  - Patient refuses CPAP due to claustrophobia  - Currently scheduled for inspire in December     #GERD  - Continue PTA omeprazole     #Chronic anemia  - Iron panel ordered and consistent with anemia of chronic disease.   - No need for transfusion at this time. Continue monitoring HGB.      #Hyponatremia   - Likely attributable to diminished appetite  - Continue to monitor  - Nutrition consult           Diet: Snacks/Supplements Adult: Ensure Max Protein (bariatric); With Meals  Regular Diet Adult    DVT Prophylaxis: Pneumatic Compression Devices  Jones Catheter: Not present  Lines: PRESENT      PICC 12/02/23 Single Lumen Right Basilic antibiotics-Site Assessment: WDL       Cardiac Monitoring: None  Code Status: Full Code      Clinically Significant Risk Factors              # Hypoalbuminemia: Lowest albumin = 3.2 g/dL at 12/6/2023  6:54 AM, will monitor as appropriate           # DMII: A1C = 7.3 % (Ref range: <5.7 %) within past 6 months   # Overweight: Estimated body mass index is 27.62 kg/m  as calculated from the following:    Height as of this encounter: 1.778 m (5' 10\").    Weight as of this encounter: 87.3 kg (192 lb 7.4 oz).   # Moderate " Malnutrition: based on nutrition assessment    # Financial/Environmental Concerns: none         Disposition Plan     Expected Discharge Date: 12/06/2023      Destination: home with help/services  Discharge Comments: TBD I & D 12/7            Ninoska Arellano MD  Hospitalist Service, GOLD TEAM 18  [facility]  Securely message with [employer] (more info)  Text page via [employer] Paging/Directory   See signed in provider for up to date coverage information  ______________________________________________________________________    Interval History   No acute events overnight.  No new complaints.    Physical Exam   Vital Signs: Temp: 97.5  F (36.4  C) Temp src: Oral BP: 125/69 Pulse: 62   Resp: 16 SpO2: 98 % O2 Device: None (Room air)    Weight: 192 lbs 7.39 oz    General Appearance:  Appears generally comfortable  Respiratory: No obvious wheezes rhonchi or rales.  Cardiovascular: RRR, no murmurs  GI: Soft nontender nondistended  Skin: No obvious creations or bruises       Medical Decision Making       59 MINUTES SPENT BY ME on the date of service doing chart review, history, exam, documentation & further activities per the note.      Data   ------------------------- PAST 24 HR DATA REVIEWED -----------------------------------------------

## 2023-12-07 NOTE — PROGRESS NOTES
[facility] INFECTIOUS DISEASES : PROGRESS NOTE  Nehemiah Magallon : 1965 Sex: male:   Medical record number [id number] Attending Physician: Hugo Kowalski*  Date of Service: 2023    ASSESSMENT :  MSSA bacteremia 23 (1/4+). 23 cultures negative.  -TTE 23 without evidence of endocarditis.   -SHIVANI not pursued at this time given brief bacteremia and planned long course of cefazolin for osteomyelitis  Right foot septic arthritis of ankle / foot osteomyelitis    - Worsening chronic osteomyelitis noted on CT 10/24/2023 with soft tissue swelling tibiotalar fusion and loosening of hardware plus Lisfranc deformity and ill-defined erosion of the lateral talus/distal fibula  -  s/p I&D for right ankle and foot hardware removal, OR cultures +Candida albicans  -  s/p I&D, antibiotic beads removal, wound vac placement - no cultures nor pathology were sent  - was discharged on at least 6-week course of fluconazole until ID follow-up on 23  -- 23  Irrigation And Debridement right Foot and ankle.   Serosanguinous fluid and purulence in posteromedial right ankle directly posterior to the distal tibia, communicating with the ankle joint.  Apparent bony destruction in distal  tibia and ankle joint.  Prior anterolateral ankle and medial foot wounds explored to bone and appeared clean.  Prior anterolateral ankle wound did communicate with the posteromedial ankle wound. two strands of ten antibiotic (vancomycin and tobramycin) beads each (twenty total beads), one strand of ten in posteromedial ankle and one strand of ten in anterolateral ankle wound.  Beads were made by hand intraoperatively at this procedure and placed in the deep/subfascial space adjacent to bone. Wound vac placement .    Cultures with MSSA and C albicans. Repeat sensitivities on C albicans - sensitive to Micafungin, Fluconazole, Ampbo B and Voriconazole   - 23 - Irrigation and debridement right foot  and ankle, combined. Intra-op findings:  Decreased purulence from previous I&D though still some present. Medial wound 4 cm x 1.5 cm x 2 cm deep. Lateral wound 11 cm x 3 cm x 4 cm deep . Two strands of ten antibiotic beads each (tobramycin and vancomycin), one to each wound, with 20 beads total.   3. History of right Charcot foot reconstruction 11/17/22 c/b post-op polymicrobial wound infection   - 1/20/23 - started on Pip/Tazobactam and Vancomycin IV  - 1/21/23 -  s/p I+D of right foot, placement of wound vac and antibiotic beads. Staph epidermidis x2 (oxacillin Resistant) and Staph caprae. (Each S to doxycycline)  - 2/15/23 - Vancomycin IV and Rifampin were stopped and started on Doxycycline  - 2/24/23 - Doxycycline was stopped (papular lesions on his body, back, arms, associated with itching)  4. Severe peripheral neuropathy  5. T2DM ( HbA1c 7.3 on 10/30/23)  6. CKD  7.  (11/23/23 ) --> 18. (12/7/23)     RECOMMENDATIONS:   - continue Cefazolin and Fluconazole   - trend CP every 2-3 days while in hospital   - agree with repeat I+D . with next I+D, please send intra-op cultures for aerobic, anaerobic and fungal cultures . Per Ortho - return to OR on 12/7/23     ID will continue to follow    Pete Arambula MD,M.Med.Sc.  Infectious Diseases  Pager: [phone]     SUBJECTIVE:   remains stable. afebrile. tolerates antibiotics    ROS:  A five-point review of systems was obtained and was negative with the exception of that which is described above.  Allergies   Allergen Reactions    Doxycycline Rash     Very extensive full body rash lasting for several months. Given at same time as Rifampin.    Rifampin Rash     Very extensive full body rash lasting for several months. Given at the same time as doxycycline.    Atorvastatin      Other reaction(s): Hyperglycemia  pt also lists simvastatin?    Celebrex [Celecoxib] Other (See Comments)     [facility]   CURRENT ANTI-INFECTIVES:   Cefazolin  "  Fluconazole     EXAMINATION: (Recommend ? 5 systems)   Vital Signs: /68 (BP Location: Left arm)   Pulse 83   Temp 98.4  F (36.9  C) (Oral)   Resp 16   Ht 1.778 m (5' 10\")   Wt 87.3 kg (192 lb 7.4 oz)   SpO2 97%   BMI 27.62 kg/m     GENERAL:  well-developed, well-nourished, in bed in no acute distress.  HEENT:  Head is normocephalic, atraumatic   EYES:  Eyes have anicteric sclerae without conjunctival injection   NECK:  Supple.   LUNGS:  breathing comfortably on room air   SKIN:  No acute rashes. Right foot is wrapped    NEUROLOGIC:  Grossly nonfocal. Active x4 extremities  CURRENT LINES: PICC    NEW DATA/RESULTS:   Recent Labs   Lab Test 02/06/23  1430 01/26/23  0953 01/23/23  0546 01/22/23  0839 01/20/23  1211   CRP <2.9 7.4 14.0* 24.0* 69.0*     Recent Labs   Lab Test 12/06/23  0654 12/04/23  0607 11/30/23  0708 11/29/23  0713 11/27/23  0721 11/25/23  0559   WBC 9.5 10.2 7.9 10.2 7.8 9.3     Recent Labs   Lab Test 12/07/23  0702 12/06/23  0654 12/05/23  0627 12/04/23  0607   CR 0.93 0.92 0.91 1.01   GFRESTIMATED >90 >90 >90 86     Hematology Studies  Recent Labs   Lab Test 12/06/23  0654 12/04/23  0607 11/30/23  0708 11/29/23  0713 11/27/23  0721 11/25/23  0559   WBC 9.5 10.2 7.9 10.2 7.8 9.3   ANEU  --  6.9 6.0  --   --   --    AEOS  --  0.1 0.0  --   --   --    HGB 8.7* 8.8* 9.4* 8.8* 8.9* 8.7*   HCT 28.9* 28.8* 30.4* 28.3* 28.7* 28.1*    423 322 267 196 195     Metabolic  Recent Labs   Lab Test 12/07/23  0702 12/06/23  0654 12/05/23  0627    139 138   BUN 17.4 15.0 13.5   CO2 28 28 29   CR 0.93 0.92 0.91   GFRESTIMATED >90 >90 >90     Hepatic Studies  Recent Labs   Lab Test 12/06/23  0654 11/23/23  1408 11/05/23  0549   BILITOTAL <0.2 0.7 <0.2   ALKPHOS 71 78 58   ALBUMIN 3.2* 4.0 3.4*   AST 13 13 8   ALT <5 10 5     Immunologlobulins  Recent Labs   Lab Test 11/24/23  1042 10/30/23  0912 05/12/23  0907   SED 71* 44* 7     "

## 2023-12-07 NOTE — PLAN OF CARE
[id number]    Goal Outcome Evaluation:      Plan of Care Reviewed With: patient    Overall Patient Progress: no change    Outcome Evaluation: pt alert and oriented. Up stand and pivot to bedside commode. Right upper extremity PICC saline locked. Wound vac to right lower extremity at continuous rate of 125. NPO at midnight for I & D on 12/7, patient aware, CHG wipes done. Resting comfortably in bed overnight. Able to make needs known. Call light within reach. Continue with plan of care.

## 2023-12-07 NOTE — PROGRESS NOTES
"Orthopedic Surgery Progress Note: 12/07/2023    Subjective:   No acute events overnight.     Objective:   /63 (BP Location: Left arm)   Pulse 76   Temp 97.8  F (36.6  C) (Oral)   Resp 16   Ht 1.778 m (5' 10\")   Wt 87.3 kg (192 lb 7.4 oz)   SpO2 97%   BMI 27.62 kg/m    No intake/output data recorded.  General: NAD. Resting comfortably in bed.  Respiratory: Nonlabored breathing  Musculoskeletal:  RLE: Dressing c/d/I. Vac in place, holding suction. Minimal output in cannister. No sensation in foot (baseline for patient)     Laboratory Data:  Lab Results   Component Value Date    WBC 9.5 12/06/2023    HGB 8.7 (L) 12/06/2023     12/06/2023    INR 1.09 12/04/2023       Tissue cultures from OR 11/27 MSSA and candida    Assessment & Plan:   Nehemiah Magallon is a 58 year old male with a history significant for with T2DM with severe peripheral neuropathy with past right Charcot foot reconstruction in Jan-2022 complicated by post-operative wound infection with multiple recent I&D of the right foot with subsequent wound vac exchanges with positive cultures for candida with Dr. Starr on 11/02/23 and 11/08/23 admitted for sepsis and concern for worsening RLE osteomyelitis.      Plan for Today:  - OR this afternoon, ok for breakfast this a.m., NPO at 9 am  - PT/OT  - ABX      Medicine primary  Admit:  [facility], medicine primary with Orthopaedic and Infectious Disease consultations.  Diet:  NPO 9 am  Antibiotics:  Continue current antibiotic regimen of cefazolin and fluconazole as directed by Infectious Disease.  Pain management:  Multimodal. Wean from IV to oral medications.  Weightbearing status:  Strict nonweightbearing on the right lower extremity.  Use appropriate assistive gait devices and assistance (nursing/PT) for ambulation.  Activity:  May be up ad pancho for ADLs, PT, diagnostic tests, etc., but encourage elevation of the right above the level of the heart.  Float heels off of the bed.  Up with " assistance until independent.  PT/OT:  Gait training, transfers, ADLs.  Labs:  Per primary team.  Trend acute phase reactants.  Xrays:  None at this time.  Immobilization:  None at this time.  Dressings: Keep right foot dressings clean, dry, and intact.  Maintain VAC machine at 125 mm Hg continuous.  DVT prophylaxis:  Deferred to primary team.  Suggest at least mechanical SCDs.  Consultations:  PT, OT, Medicine, Orthopaedics, Infectious Disease.  Disposition:  Pending at this time.  Will likely need continuing ongoing operative debridements and VAC changes / possible bead exchanges, with next procedure recommended for sometime within the next several days to a week.     Orthopedic surgery staff for this patient is Dr. Starr    ------------------------------------------------------------------------------------------    Respectfully,    Joesph Smith MD  Orthopedic Surgery PGY4

## 2023-12-07 NOTE — CARE PLAN
"VS: /68 (BP Location: Left arm)   Pulse 83   Temp 98.4  F (36.9  C) (Oral)   Resp 16   Ht 1.778 m (5' 10\")   Wt 87.3 kg (192 lb 7.4 oz)   SpO2 97%   BMI 27.62 kg/m     Pt vital signs stable throughout shift.    Orientation Pt is Alert and Oriented x 4   Mobility/ Activity Pt is independent in his room.    Bowel/Bladder: Continent of both bowel and bladder. Uses commode.    IV /LDA PICC in R arm. Dry and intact.    Diet Regular diet until 9 am today and then NPO.    Skin / Wounds / Dressings: Dressing dry and intact. In place on R foot. Wound vac draining from 2 sites on foot.    Pain Pt denies pain.    Equipment: Wound vac in place R foot.    Circulation/ Sensation Baseline numbness in lower extremities below the knee.    PRNS NA   Plan I&D planned for this evening. Pt was told operation would happen sometime between 3 and 7   Additional info: Pt had I&D rescheduled from 7am to the evening.      "

## 2023-12-07 NOTE — PROGRESS NOTES
[facility]    Medicine Progress Note - Hospitalist Service, GOLD TEAM 18    Date of Admission:  11/23/2023    Assessment & Plan   58 year old male with a pmh of T2DM with severe peripheral neuropathy, papillary thyroid malignancy in remission s/p thyroidectomy, HLD, and right Charcot foot reconstruction in 1/2022 complicated by post operative wound infection progressing to osteomyelitis. Patient was admitted to medical floor. Bcx growing G + cocci.        #Right foot osteomyelitis  #Sepsis-resloved  #Candida albicans infection  #Right Charcot foot reconstruction 1/2022  - Initially had Charcot right foot reconstruction in 1/2022. Patient was later hospitalized and I&D in 1/2023 had intra operative cultures growing Staphylococcus epidermis (oxacillin resistant) and Staphylococcus caprae.  During most recent admission in 11/2023 I & D cultures grew Candida albicans and he was discharged on 6 weeks of fluconazole.  - Continue on broad spectrum ABX. ID consult. Continue trending WBC. Follow-up cultures.   - WOC consult.   - Orthopedic surgery following the patient. S/p irrigation and debridement of right foot and ankle, VAC dressing application, and antibiotic bead placement.      -Status post I&D on 12/4,   - planned repeat I&D Today 12/7       ID following the patient.   Recs:(12/4)  Can narrow empiric zosyn to IV cefazolin 2g q8hr to better target presumed MSSA (ordered)  Can continue empiric IV vancomycin (dosed by pharmacy) while awaiting final Staph aureus susceptibilities  Follow up pending blood cultures   Please check daily peripheral blood cultures (including today) until negative x72 hrs (ordered)  Check TTE to evaluate for vegetations (ordered)  Agree with ortho consult for surgical management for source control - please send deep tissue cultures if I&D is performed to help further guide antibiotics     Bcx ngtd. TTE was negative, defer SHIVANI to ID.      "  #T2DM  #Severe peripheral neuropathy  - Hyperglycemia noted.    - Will continue outpatient gabapentin  - Continue on sliding scale insulin, increase carb count 1:12. Continue on metformin.   - Serum glucose appears suboptimally controlled  - Increase insulin NPH to 18 units subcutaneous twice daily  - As an outpatient he is on jardiance, metformin, and Ozempic     #Papillary thyroid malignancy in remission s/p thyroidectomy with associated hypothyroidism  - Continue on PTA levothyroxine     #Nausea  #Vomiting  - Resolved      #CKD, stage 2  - Monitor renal function. Renal function stable.      #HLD  - PTA pravastatin     #MARGARITO  - Patient refuses CPAP due to claustrophobia  - Currently scheduled for inspire in December     #GERD  - Continue PTA omeprazole     #Chronic anemia  - Iron panel ordered and consistent with anemia of chronic disease.   - No need for transfusion at this time. Continue monitoring HGB.      #Hyponatremia   - Likely attributable to diminished appetite  - Continue to monitor  - Nutrition consult           Diet: Snacks/Supplements Adult: Ensure Max Protein (bariatric); With Meals  Regular Diet Adult    DVT Prophylaxis: Pneumatic Compression Devices  Jones Catheter: Not present  Lines: PRESENT      PICC 12/02/23 Single Lumen Right Basilic antibiotics-Site Assessment: WDL       Cardiac Monitoring: None  Code Status: Full Code      Clinically Significant Risk Factors              # Hypoalbuminemia: Lowest albumin = 3.2 g/dL at 12/6/2023  6:54 AM, will monitor as appropriate           # DMII: A1C = 7.3 % (Ref range: <5.7 %) within past 6 months   # Overweight: Estimated body mass index is 27.62 kg/m  as calculated from the following:    Height as of this encounter: 1.778 m (5' 10\").    Weight as of this encounter: 87.3 kg (192 lb 7.4 oz).   # Moderate Malnutrition: based on nutrition assessment      # Financial/Environmental Concerns: none         Disposition Plan             Ninoska Arellano, " MD  Hospitalist Service, GOLD TEAM 18  [facility]  Securely message with [employer] (more info)  Text page via [employer] Paging/Directory   See signed in provider for up to date coverage information  ______________________________________________________________________    Interval History   No acute events overnight.  No new complaints.    Physical Exam   Vital Signs: Temp: 98.4  F (36.9  C) Temp src: Oral BP: 127/68 Pulse: 83   Resp: 16 SpO2: 97 % O2 Device: None (Room air)    Weight: 192 lbs 7.39 oz    General Appearance:  Appears generally comfortable  Respiratory: No obvious wheezes rhonchi or rales.  Cardiovascular: RRR, no murmurs  GI: Soft nontender nondistended  Skin: No obvious creations or bruises       Medical Decision Making       59 MINUTES SPENT BY ME on the date of service doing chart review, history, exam, documentation & further activities per the note.      Data   ------------------------- PAST 24 HR DATA REVIEWED -----------------------------------------------

## 2023-12-08 NOTE — PROGRESS NOTES
Orthopaedic Surgery Attending    I personally saw and examined this pleasant 58 year old patient this evening preoperatively prior to repeat I&D and VAC dressing change with antibiotic bead removal versus exchange, at the [facility] the [facility]. The nature of the surgery, as well as the risks, benefits, and alternatives, were discussed in layman's terms. The ongoing potential for future amputation, whether emergently due to sepsis, or electively due to failure to heal chronic infection/wounds and the desire to move on for quality of life, was discussed. All questions were answered appropriately. The patient verbalized understanding of our discussion and agreement with the plan. I marked the surgical site with patient participation.

## 2023-12-08 NOTE — ANESTHESIA CARE TRANSFER NOTE
Patient: Nehemiah Magallon    Procedure: Procedure(s):  IRRIGATION AND DEBRIDEMENT Right Foot, Wound Vac Placement,       Diagnosis: Osteomyelitis of ankle or foot, right, acute (H) [M86.171]  Diagnosis Additional Information: No value filed.    Anesthesia Type:   General     Note:    Oropharynx: spontaneously breathing  Level of Consciousness: awake  Oxygen Supplementation: room air    Independent Airway: airway patency satisfactory and stable  Dentition: dentition unchanged  Vital Signs Stable: post-procedure vital signs reviewed and stable  Report to RN Given: handoff report given  Patient transferred to: PACU    Handoff Report: Identifed the Patient, Identified the Reponsible Provider, Reviewed the pertinent medical history, Discussed the surgical course, Reviewed Intra-OP anesthesia mangement and issues during anesthesia, Set expectations for post-procedure period and Allowed opportunity for questions and acknowledgement of understanding      Vitals:  Vitals Value Taken Time   /56 12/07/23 2220   Temp     Pulse 84 12/07/23 2221   Resp 15 12/07/23 2221   SpO2 97 % 12/07/23 2221   Vitals shown include unfiled device data.    Electronically Signed By: CARLITO Yao CRNA  December 7, 2023  10:26 PM

## 2023-12-08 NOTE — ANESTHESIA POSTPROCEDURE EVALUATION
Patient: Nehemiah Magallon    Procedure: Procedure(s):  IRRIGATION AND DEBRIDEMENT Right Foot, Wound Vac Placement,       Anesthesia Type:  General    Note:  Disposition: Inpatient   Postop Pain Control: Uneventful            Sign Out: Well controlled pain   PONV: No   Neuro/Psych: Uneventful            Sign Out: Acceptable/Baseline neuro status   Airway/Respiratory: Uneventful            Sign Out: Acceptable/Baseline resp. status   CV/Hemodynamics: Uneventful            Sign Out: Acceptable CV status; No obvious hypovolemia; No obvious fluid overload   Other NRE:    DID A NON-ROUTINE EVENT OCCUR?        Last vitals:  Vitals Value Taken Time   BP     Temp     Pulse     Resp     SpO2         Electronically Signed By: Librado Nance MD  December 7, 2023  11:43 PM

## 2023-12-08 NOTE — PLAN OF CARE
Goal Outcome Evaluation: Pain, Safety    Plan of Care Reviewed With: patient  Overall Patient Progress: improving     A/O x4, up independently to chair, commode and back to bed. Denied pain. Has baseline numbness and tingling to BLE R/T neuropathy. NWB on RLE. Encouraged to keep RLE elevated on pillows. Edema on R foot has gone significantly compared on admission. Dressing CDI. WV functioning well at 125mmHg w/o drainage. Latest BG at lunch time was 340 and insulin was given based on ISS and carb count.

## 2023-12-08 NOTE — BRIEF OP NOTE
[facility]    Brief Operative Note    Pre-operative diagnosis: Osteomyelitis of ankle or foot, right, acute (H) [M86.171]  Post-operative diagnosis Same as pre-operative diagnosis    Procedure: IRRIGATION AND DEBRIDEMENT Right Foot, Wound Vac Placement,, Right - Leg    Surgeon: Surgeon(s) and Role:     * Bryon Starr MD - Primary     * Tim Santos MD - Resident - Assisting     * Diana Jacobsen MD - Resident - Assisting  Anesthesia: Choice   Estimated Blood Loss: 10 ml    Drains: None  Specimens: * No specimens in log *  Findings:   See operative.  Complications: None apparent.  Implants: 10 beads removed.      Medicine primary  Admit:  Return to [facility], medicine primary with Orthopaedic and Infectious Disease consultations.  Diet:  Reg  Antibiotics:  Continue current antibiotic regimen of cefazolin and fluconazole as directed by Infectious Disease.  Pain management:  Multimodal. Wean from IV to oral medications.  Weightbearing status:  Strict nonweightbearing on the right lower extremity.  Use appropriate assistive gait devices and assistance (nursing/PT) for ambulation.  Activity:  May be up ad pancho for ADLs, PT, diagnostic tests, etc., but encourage elevation of the right above the level of the heart.  Float heels off of the bed.  Up with assistance until independent.  PT/OT:  Gait training, transfers, ADLs.  Labs:  Per primary team.  Trend acute phase reactants.  Xrays:  None at this time.  Immobilization:  None at this time.  Dressings: Keep right foot dressings clean, dry, and intact.  Maintain VAC machine at 125 mm Hg continuous.  DVT prophylaxis:  Deferred to primary team.  Suggest at least mechanical SCDs.  Consultations:  PT, OT, Medicine, Orthopaedics, Infectious Disease.  Disposition:  Will need RTOR for repeat  debridement in this admission.  Vitaliy Santos MD  Orthopaedic Surgery PGY-4         Addendum (12/07/2023):  I spoke with the  patient's responsible party, his spouse Wiliam, immediately postoperatively by phone at [phone]. Findings and plan were discussed. All questions were answered.  Bryon Starr MD

## 2023-12-08 NOTE — ANESTHESIA PROCEDURE NOTES
Airway       Patient location during procedure: OR  Staff -        Anesthesiologist:  Dylan Childers MD       CRNA: Ramin Hernandez APRN CRNA       Performed By: CRNA  Consent for Airway        Urgency: elective  Indications and Patient Condition       Indications for airway management: alan-procedural       Induction type:intravenous       Mask difficulty assessment: 0 - not attempted    Final Airway Details       Final airway type: supraglottic airway    Supraglottic Airway Details        Type: LMA       Brand: Air-Q       LMA size: 4.5    Post intubation assessment        Placement verified by: capnometry, equal breath sounds and chest rise        Number of attempts at approach: 1       Number of other approaches attempted: 0       Secured with: tape       Ease of procedure: easy       Dentition: Intact and Unchanged

## 2023-12-08 NOTE — PROGRESS NOTES
[facility] INFECTIOUS DISEASES : PROGRESS NOTE  Nehemiah Magallon : 1965 Sex: male:   Medical record number [id number] Attending Physician: Hugo Kowalski*  Date of Service: 2023    ASSESSMENT :  MSSA bacteremia 23 (1/4+). 23 cultures negative.  -TTE 23 without evidence of endocarditis.   -SHIVANI not pursued at this time given brief bacteremia and planned long course of cefazolin for osteomyelitis  Right foot septic arthritis of ankle / foot osteomyelitis    - Worsening chronic osteomyelitis noted on CT 10/24/2023 with soft tissue swelling tibiotalar fusion and loosening of hardware plus Lisfranc deformity and ill-defined erosion of the lateral talus/distal fibula  -  s/p I&D for right ankle and foot hardware removal, OR cultures +Candida albicans  -  s/p I&D, antibiotic beads removal, wound vac placement - no cultures nor pathology were sent  - was discharged on at least 6-week course of fluconazole until ID follow-up on 23  -- 23  Irrigation And Debridement right Foot and ankle.   Serosanguinous fluid and purulence in posteromedial right ankle directly posterior to the distal tibia, communicating with the ankle joint.  Apparent bony destruction in distal  tibia and ankle joint.  Prior anterolateral ankle and medial foot wounds explored to bone and appeared clean.  Prior anterolateral ankle wound did communicate with the posteromedial ankle wound. two strands of ten antibiotic (vancomycin and tobramycin) beads each (twenty total beads), one strand of ten in posteromedial ankle and one strand of ten in anterolateral ankle wound.  Beads were made by hand intraoperatively at this procedure and placed in the deep/subfascial space adjacent to bone. Wound vac placement .    Cultures with MSSA and C albicans. Repeat sensitivities on C albicans - sensitive to Micafungin, Fluconazole, Ampbo B and Voriconazole   - 23 - Irrigation and debridement right foot  and ankle, combined. Intra-op findings:  Decreased purulence from previous I&D though still some present. Medial wound 4 cm x 1.5 cm x 2 cm deep. Lateral wound 11 cm x 3 cm x 4 cm deep . Two strands of ten antibiotic beads each (tobramycin and vancomycin), one to each wound, with 20 beads total.   - 12/7/23 - Irrigation and debridement of right foot and ankle, VAC dressing exchange, and antibiotic bead removal . findings: No visible purulence.  Increased granulation tissue.  Healthy appearing skin margins.  Serosanguinous fluid.  The two wounds (anterolateral right ankle and posteromedial right ankle) did communicate with each other through the ankle joint.  All prior beads removed, including from within the ankle joint and from inside the bony defect inside the medial midfoot.  No new beads placed.   3. History of right Charcot foot reconstruction 11/17/22 c/b post-op polymicrobial wound infection   - 1/20/23 - started on Pip/Tazobactam and Vancomycin IV  - 1/21/23 -  s/p I+D of right foot, placement of wound vac and antibiotic beads. Staph epidermidis x2 (oxacillin Resistant) and Staph caprae. (Each S to doxycycline)  - 2/15/23 - Vancomycin IV and Rifampin were stopped and started on Doxycycline  - 2/24/23 - Doxycycline was stopped (papular lesions on his body, back, arms, associated with itching)  4. Severe peripheral neuropathy  5. T2DM ( HbA1c 7.3 on 10/30/23)  6. CKD  7.  (11/23/23 ) --> 18. (12/7/23)     RECOMMENDATIONS:   - continue Cefazolin and Fluconazole   - trend CP every 2-3 days while in hospital   - clinically stable and improving. no changes to antimicrobials.     Video Follow-up with me on 12/20/23 at  10.30 am     Addendum: 8.56 PM  Per Ortho's note, patient will return to OR on 8/11/23    ID will follow peripherally. Please call  ID physician on call if you have any questions        Pete Arambula MD,M.Med.Sc.  Infectious Diseases  Pager: [phone]     Prolonged  "Parenteral/Oral Antibiotic Recommendations and ID Follow up  This template provides final ID recommendations as of this date. If there are clinical changes or questions please call the ID team.     Infectious Diseases Indication: MSSA bacteremia,  MSSA and Candida albicans right foot infection     Antibiotic Information  Name of Antibiotic Dose of Antibiotic1 Pharmacy to assist with dosing Y/N Anticipated duration Effective start date2 End date   Cefazolin   2 gram IV q8hr   N 6 weeks  ~ 12/7/23   TBD   Fluconazole   400 mg po daily  N 6 weeks ~ 12/7/23 TBD            1.Dose of antibiotic will need to be renally adjusted if creatinine clearance changes  2.Effective start date is the date of therapy with appropriate spectrum    Method of antibiotic delivery:PICC line. At the end of therapy should the line be removed? No. Selecting \"yes\" will function as written order to remove PICC line at the end of therapy.    Tentative plans for disposition: Home    Weekly labs required: CBC with diff, CMP, and CRP. Dr. Arambula will follow labs at discharge until ID follow up. Please have labs faxed to ID clinic.    Appointment to be scheduled: with Dr Arambula on 12/20/23 at 10.30 am     ID provider to route this note to the appropriate Epic pools: [facility] INFECTIOUS DISEASE ADULT [facility], PANDA, [facility] HOME INFUSION    [facility]/ID Clinic Information:  [facility], Clinic [address] [place] [address]  [address]  Phone: [phone]  Fax: [phone] (Attention Brayden Matthews)    SUBJECTIVE:   remains stable. afebrile. tolerates antibiotics    ROS:  A five-point review of systems was obtained and was negative with the exception of that which is described above.  Allergies   Allergen Reactions    Doxycycline Rash     Very extensive full body rash lasting for several months. Given at same time as Rifampin.    Rifampin Rash     Very extensive full body rash lasting for several months. Given at the same time as " "doxycycline.    Atorvastatin      Other reaction(s): Hyperglycemia  pt also lists simvastatin?    Celebrex [Celecoxib] Other (See Comments)     [facility]   CURRENT ANTI-INFECTIVES:   Cefazolin   Fluconazole     EXAMINATION: (Recommend ? 5 systems)   Vital Signs: /74 (BP Location: Left arm)   Pulse 90   Temp 98.4  F (36.9  C) (Oral)   Resp 16   Ht 1.778 m (5' 10\")   Wt 87.3 kg (192 lb 7.4 oz)   SpO2 96%   BMI 27.62 kg/m     GENERAL:  well-developed, well-nourished, in bed in no acute distress.  HEENT:  Head is normocephalic, atraumatic   EYES:  Eyes have anicteric sclerae without conjunctival injection   NECK:  Supple.   LUNGS:  breathing comfortably on room air   SKIN:  No acute rashes. Right foot is wrapped    NEUROLOGIC:  Grossly nonfocal. Active x4 extremities  CURRENT LINES: PICC    NEW DATA/RESULTS:   Recent Labs   Lab Test 02/06/23  1430 01/26/23  0953 01/23/23  0546 01/22/23  0839 01/20/23  1211   CRP <2.9 7.4 14.0* 24.0* 69.0*     Recent Labs   Lab Test 12/08/23  0858 12/06/23  0654 12/04/23  0607 11/30/23  0708 11/29/23  0713 11/27/23  0721   WBC 7.6 9.5 10.2 7.9 10.2 7.8     Recent Labs   Lab Test 12/08/23  0858 12/07/23  0702 12/06/23  0654 12/05/23  0627   CR 0.97 0.93 0.92 0.91   GFRESTIMATED 90 >90 >90 >90     Hematology Studies  Recent Labs   Lab Test 12/08/23  0858 12/06/23  0654 12/04/23  0607 11/30/23  0708 11/29/23  0713 11/27/23  0721   WBC 7.6 9.5 10.2 7.9 10.2 7.8   ANEU  --   --  6.9 6.0  --   --    AEOS  --   --  0.1 0.0  --   --    HGB 8.8* 8.7* 8.8* 9.4* 8.8* 8.9*   HCT 29.9* 28.9* 28.8* 30.4* 28.3* 28.7*    408 423 322 267 196     Metabolic  Recent Labs   Lab Test 12/08/23  0858 12/07/23  0702 12/06/23  0654   * 137 139   BUN 17.1 17.4 15.0   CO2 25 28 28   CR 0.97 0.93 0.92   GFRESTIMATED 90 >90 >90     Hepatic Studies  Recent Labs   Lab Test 12/06/23  0654 11/23/23  1408 11/05/23  0549   BILITOTAL <0.2 0.7 <0.2   ALKPHOS 71 78 58   ALBUMIN 3.2* " 4.0 3.4*   AST 13 13 8   ALT <5 10 5     Immunologlobulins  Recent Labs   Lab Test 11/24/23  1042 10/30/23  0912 05/12/23  0907   SED 71* 44* 7

## 2023-12-08 NOTE — PLAN OF CARE
Goal Outcome Evaluation:         Overall Patient Progress: no change    Outcome Evaluation: Pt A&O x4, able to make needs known. I/D done this evening, pt. returned to room around 0100. Reports no pain. Wound vac on RLE running continous 125mmHg. BLE numbness per baseline. R. PICC SL, LR discontinued. Pt. slept between cares.

## 2023-12-08 NOTE — OP NOTE
DATE OF SURGERY:  12/07/2023.     PREOPERATIVE DIAGNOSIS:  Right fot/ankle surgical site infection.     POSTOPERATIVE DIAGNOSIS:  Right foot/ankle surgical site infection.     PROCEDURE:  Irrigation and debridement of right foot and ankle, VAC dressing exchange, and antibiotic bead removal.     PRIMARY SURGEON:  Bryon Starr MD.    ASSISTANT SURGEONS:  Vitaliy Santos MD; Diana Jacobsen MD.     ANESTHESIA:  General laryngeal mask airway.     COMPLICATIONS:  None apparent intraoperatively or immediately postoperatively.     IMPLANTED DEVICES:  None.     SIGNIFICANT FINDINGS:  No visible purulence.  Increased granulation tissue.  Healthy appearing skin margins.  Serosanguinous fluid.  The two wounds (anterolateral right ankle and posteromedial right ankle) did communicate with each other through the ankle joint.  All prior beads removed, including from within the ankle joint and from inside the bony defect inside the medial midfoot.  No new beads placed.  Wound #1 (medial ankle/foot):  Technique:  Scrubbing.  Instruments:  Periosteal elevator.  Nature of debrided tissue:  Friable, loosely attached, nonviable tissue.  Appearance of wound after:  Down to healthy, bleeding tissue/bone.  Area of debridement:  11 cm x 3 cm x 4 cm deep.  Depth of debridement:  Down to and including bone (ankle joint).  Wound #2 (anterolateral ankle):  Technique: Scrubbing.  Instruments: Periosteal elevator.  Nature of debrided tissue:  Friable, loosely attached, nonviable tissue.  Appearance of wound after:  Down to healthy, bleeding tissue/bone.  Area of debridement:  4 cm  x 1 cm x 3 cm deep.  Depth of debridement:  Down to and including bone (distal tibia & fibula, ankle joint).     SPECIMENS SENT:  None.    DRAINS:  Two VAC sponges and two tubes to DME VAC machine at -125 mm continuous.    ESTIMATED BLOOD LOSS:  Approximately 10 mL.        INDICATIONS:                          Nehemiah Magallon is a very pleasant 58-year-old gentleman  who developed a surgical site infection of the right foot after Charcot reconstructive surgery, and underwent operative debridements 01/21/2023, 11/02/2023, 11/08/2023, 11/27/2023, and 12/04/2023.  He has been on a 6 week antifungal course for C. albicans infection which grew from the 11/02/2023 operative cultures, and also cefazolin for the pansensitive S. aureus which grew from the 11/27/2023 operative cultures.  The patient returns to the OR at this time for planned, scheduled repeat debridement and irrigation with VAC dressing exchange and antibiotic bead removal versus exchange.  The diagnosis, nature of the recommended procedure, the risks, benefits, and alternatives, and the postoperative plan were all discussed with the patient in layman's terms.  No guarantees were expressed or implied as to outcome. The likely need for additional procedures, as well as the possibility of future amputation, was discussed.  The patient had the opportunity to have all questions he had at the time asked and answered, verbalized his understanding of this discussion, and verbalized his wish to proceed with the planned procedure.  Written informed consent was obtained.     DESCRIPTION OF PROCEDURE:             The patient, Nehemiah Magallon, was met in the preoperative area where the correct surgical site was identified with patient participation and marked by the primary surgeon.  All questions were answered.  The patient was then brought to the operating room, where he was carefully transferred to the operating room table in the supine position with all bony prominences well padded and a safety strap across the waist.  The anesthesia team successfully induced general anesthesia and placed a laryngeal mask airway.  Venous thromboembolic prophylaxis was performed with a sequential compression device on the left lower extremity.  A soft bump was placed under the right hip to help keep the toes on the right foot pointing upwards.  The  patient's two prior silver VAC sponges, dressings, and tubes were removed.  The right lower extremity was then prepped with Betadine scrub and paint and draped free in the usual sterile fashion.  Prior to proceeding with the operation, a timeout was held per hospital policy correctly identifying the patient, procedure to be performed, and operative site including laterality.  All in the room agreed.     The two open wounds on the right foot/ankle were carefully inspected, both the smaller anterolateral ankle wound and the longer medial foot/posteromedial ankle wound.  Two strands of ten antibiotic beads each (twenty total) were removed, one from each wound.  The beads were carefully counted, and all twenty were passed off of the operative field.  Each wound was carefully explored in all directions.  There was no obvious visible purulence.  There was serosanguinous fluid present in both wounds.  It was deemed appropriate to not place additional antibiotic beads at this time.  As noted previously, the posteromedial ankle wound communicated with the anterolateral ankle wound through the ankle joint.   The medial foot wound communicated with the nonunion site in the midfoot with a bony defect.  No new sinus tracts or pockets of purulence were noted.  Both wounds appeared to have had the interim development of further granulation tissue covering the majority of the subcutaneous tissues and muscle.  Although the anterolateral ankle wound was close to being able to be passively approximated, there was still somewhat of a dead space in the anterolateral ankle wound between bone (ankle joint) and the subcutaneous layer.  The posterior tibial tendon was still exposed in the medial wound.  The skin margins all appeared to be healthy, bleeding, and viable.  Small amounts of friable, loose, nonviable subcutaneous tissue were debrided with an elevator in both wounds.  All surfaces of both wounds were carefully scrubbed with a  periosteal elevator, including the ankle joint as well as the medial midfoot bony defect, and debrided to healthy, bleeding tissues and bone.     Both wounds, the anterolateral right ankle wound and the larger posteromedial right ankle and medial foot wound, were thoroughly irrigated with a total of 7 L of normal saline using both cystoscopy tubing and bulb irrigation.  This included the bony defect in the medial midfoot, as well as the ankle joint as evidenced by visible flow through of irrigation fluid noted between the two wounds.  Care was taken to avoid injury to the posterior neurovascular bundle.  All ankle joint debridement was performed deep (anterior) to the posterior tibial tendon which was exposed in the wound.  The area around the neurovascular bundle was included in the irrigation, gently.     There was no evidence for vascular injury. Meticulous hemostasis was achieved.  The skin was carefully cleansed with sterile saline and dried, and a silver VAC sponge was placed into each incision (two total VAC sponges).  Portions of the medial VAC sponge were pedicled to insert into the medial midfoot bony defect and the posterior ankle joint.  The sponges were covered with clear VAC dressings.  One tube was then attached to each VAC dressing, and the two tubes were then attached via a Y connector to the patient's existing hospital DME VAC machine with -125 mmHg continuous pressure with an excellent seal.  An ace wrap was applied.     All surgical counts were reported as correct at the end of the case. The patient was taken to recovery room in stable condition.     I was present and scrubbed in for the entire case.      Bryon Starr MD

## 2023-12-08 NOTE — PROGRESS NOTES
[facility]    Medicine Progress Note - Hospitalist Service, GOLD TEAM 18    Date of Admission:  11/23/2023    Assessment & Plan   58 year old male with a pmh of T2DM with severe peripheral neuropathy, papillary thyroid malignancy in remission s/p thyroidectomy, HLD, and right Charcot foot reconstruction in 1/2022 complicated by post operative wound infection progressing to osteomyelitis. Patient was admitted to medical floor. Bcx growing G + cocci. S/p multiple I&D most recently 12/4 and 12/7, per op note no purulence and all beads were removed. Continuing antibiotics per prior to procedure.       #Right foot osteomyelitis  #Sepsis-resloved  #Candida albicans infection  #Right Charcot foot reconstruction 1/2022  - Initially had Charcot right foot reconstruction in 1/2022. Patient was later hospitalized and I&D in 1/2023 had intra operative cultures growing Staphylococcus epidermis (oxacillin resistant) and Staphylococcus caprae.  During most recent admission in 11/2023 I & D cultures grew Candida albicans and he was discharged on 6 weeks of fluconazole.  - Continue on broad spectrum ABX. ID consult. Continue trending WBC. Follow-up cultures.   - WOC consult.   - Orthopedic surgery following the patient. S/p irrigation and debridement of right foot and ankle, VAC dressing application, and antibiotic bead placement.      -Status post I&D on 12/4, 12/7  - planned repeat I&D Today 12/7       ID following the patient.   Recs:(12/4)  Can narrow empiric zosyn to IV cefazolin 2g q8hr to better target presumed MSSA (ordered)  Can continue empiric IV vancomycin (dosed by pharmacy) while awaiting final Staph aureus susceptibilities  Follow up pending blood cultures   Please check daily peripheral blood cultures (including today) until negative x72 hrs (ordered)  Check TTE to evaluate for vegetations (ordered)--> unremarkable  Agree with ortho consult for surgical management for  "source control - please send deep tissue cultures if I&D is performed to help further guide antibiotics     Bcx ngtd. TTE was negative, defer SHIVANI to ID.       #T2DM  #Severe peripheral neuropathy  - Hyperglycemia noted.    - Will continue outpatient gabapentin  - Continue on sliding scale insulin, increase carb count 1:12. Continue on metformin.   - Serum glucose appears suboptimally controlled  - Increase insulin NPH to 18 units subcutaneous twice daily  - As an outpatient he is on jardiance, metformin, and Ozempic     #Papillary thyroid malignancy in remission s/p thyroidectomy with associated hypothyroidism  - Continue on PTA levothyroxine     #Nausea  #Vomiting  - Resolved      #CKD, stage 2  - Monitor renal function. Renal function stable.      #HLD  - PTA pravastatin     #MARGARITO  - Patient refuses CPAP due to claustrophobia  - Currently scheduled for inspire in December     #GERD  - Continue PTA omeprazole     #Chronic anemia  - Iron panel ordered and consistent with anemia of chronic disease.   - No need for transfusion at this time. Continue monitoring HGB.      #Hyponatremia   - Likely attributable to diminished appetite  - Continue to monitor  - Nutrition consult           Diet: Snacks/Supplements Adult: Ensure Max Protein (bariatric); With Meals  Regular Diet Adult    DVT Prophylaxis: Pneumatic Compression Devices  Jones Catheter: Not present  Lines: PRESENT      PICC 12/02/23 Single Lumen Right Basilic antibiotics-Site Assessment: WDL       Cardiac Monitoring: None  Code Status: Full Code      Clinically Significant Risk Factors              # Hypoalbuminemia: Lowest albumin = 3.2 g/dL at 12/6/2023  6:54 AM, will monitor as appropriate           # DMII: A1C = 7.3 % (Ref range: <5.7 %) within past 6 months   # Overweight: Estimated body mass index is 27.62 kg/m  as calculated from the following:    Height as of this encounter: 1.778 m (5' 10\").    Weight as of this encounter: 87.3 kg (192 lb 7.4 oz).   # " Moderate Malnutrition: based on nutrition assessment      # Financial/Environmental Concerns: none         Disposition Plan             Ninoska Arellano MD  Hospitalist Service, GOLD TEAM 18  [facility]  Securely message with [employer] (more info)  Text page via [employer] Paging/Directory   See signed in provider for up to date coverage information  ______________________________________________________________________    Interval History   No acute events overnight.  No new complaints. S/p I&D yesterday. Feels about the same, expressed frustration regarding being npo until the late surgery yesterday. Denies fevers.     Physical Exam   Vital Signs: Temp: 98.4  F (36.9  C) Temp src: Oral BP: 131/74 Pulse: 90   Resp: 16 SpO2: 96 % O2 Device: None (Room air)    Weight: 192 lbs 7.39 oz    General Appearance:  Appears generally comfortable  Respiratory: No obvious wheezes rhonchi or rales.  Cardiovascular: RRR, no murmurs  GI: Soft nontender nondistended  Skin: No obvious creations or bruises       Medical Decision Making       59 MINUTES SPENT BY ME on the date of service doing chart review, history, exam, documentation & further activities per the note.      Data   ------------------------- PAST 24 HR DATA REVIEWED -----------------------------------------------   Plan: DISCONTINUE:  \\n\\n- products with dye and fragrance\\n- hand sanitizers \\n- dryer sheets, fabric softeners \\n- avoid soaps such as: dial, ivory, coast, lever, victor m spring, antibacterial soap \\n\\nRECOMMENDED:   \\n- Mild, tepid bathing; avoid washcloths \\n- Body and facial Soaps/Cleansers/Moisturizers: Dove, Cetaphil, CeraVe, Aveeno, Mae, or  Eucerin -Hypoallergenic laundry detergents such as: Tide free and gentle, All free and clear

## 2023-12-08 NOTE — PROGRESS NOTES
"Orthopedic Surgery Progress Note: 12/08/2023    Subjective:   No acute events overnight.     Objective:   /62 (BP Location: Left arm, Patient Position: Right side, Cuff Size: Adult Regular)   Pulse 74   Temp 98.4  F (36.9  C) (Oral)   Resp 16   Ht 1.778 m (5' 10\")   Wt 87.3 kg (192 lb 7.4 oz)   SpO2 96%   BMI 27.62 kg/m    No intake/output data recorded.  General: NAD. Resting comfortably in bed.  Respiratory: Nonlabored breathing  Musculoskeletal:  RLE: Dressing c/d/I. Vac in place, holding suction. Minimal output in cannister. No sensation in foot (baseline for patient)     Laboratory Data:  Lab Results   Component Value Date    WBC 7.6 12/08/2023    HGB 8.8 (L) 12/08/2023     12/08/2023    INR 1.09 12/04/2023       Tissue cultures from OR 11/27 MSSA and candida    Assessment & Plan:   Nehemiah Magaloln is a 58 year old male with a history significant for with T2DM with severe peripheral neuropathy with past right Charcot foot reconstruction in Jan-2022 complicated by post-operative wound infection with multiple recent I&D of the right foot with subsequent wound vac exchanges with positive cultures for candida with Dr. Starr on 11/02/23 and 11/08/23 admitted for sepsis and concern for worsening RLE osteomyelitis.      Plan for Today:  - OR planning for Monday, 12/11.   - PT/OT  - ABX      Medicine primary  Admit:  Return [facility], medicine primary with Orthopaedic and Infectious Disease consultations.  Diet:  Reg  Antibiotics:  Continue current antibiotic regimen of cefazolin and fluconazole as directed by Infectious Disease.  Pain management:  Multimodal. Wean from IV to oral medications.  Weightbearing status:  Strict nonweightbearing on the right lower extremity.  Use appropriate assistive gait devices and assistance (nursing/PT) for ambulation.  Activity:  May be up ad pancho for ADLs, PT, diagnostic tests, etc., but encourage elevation of the right above the level of the heart.  Float heels off of " the bed.  Up with assistance until independent.  PT/OT:  Gait training, transfers, ADLs.  Labs:  Per primary team.  Trend acute phase reactants.  Xrays:  None at this time.  Immobilization:  None at this time.  Dressings: Keep right foot dressings clean, dry, and intact.  Maintain VAC machine at 125 mm Hg continuous.  DVT prophylaxis:  Deferred to primary team.  Suggest at least mechanical SCDs.  Consultations:  PT, OT, Medicine, Orthopaedics, Infectious Disease.  Disposition:  Will need RTOR for repeat  debridement in this admission.     Orthopedic surgery staff for this patient is Dr. Starr    ------------------------------------------------------------------------------------------    Respectfully,    Joesph Smith MD  Orthopedic Surgery PGY4

## 2023-12-09 NOTE — PLAN OF CARE
Goal Outcome Evaluation:        Plan of Care Reviewed With: patient    Overall Patient Progress: improvingOverall Patient Progress: improving    Outcome Evaluation: Patient free of pain, afebrile, rested comfortably throughout the night.     A&Ox4.Has baseline numbness/tingling BLE. Denies nausea,vomiting, SOB, and chest pain.  Up to bedside commode, ind.  LBM: 12/8  Dressing RLE C/D/I, wound vac running at 125 mmHg  denies pain.  Continue with POC.

## 2023-12-09 NOTE — PLAN OF CARE
Goal Outcome Evaluation:  Vitally stable, denies pain. Ordered food and was administered insulin carb coverage. Alert and oriented X4. Bilaterally baseline numbness.

## 2023-12-09 NOTE — PROGRESS NOTES
[facility]    Medicine Progress Note - Hospitalist Service, GOLD TEAM 18    Date of Admission:  11/23/2023    Assessment & Plan   58 year old male with a pmh of T2DM with severe peripheral neuropathy, papillary thyroid malignancy in remission s/p thyroidectomy, HLD, and right Charcot foot reconstruction in 1/2022 complicated by post operative wound infection progressing to osteomyelitis. Patient was admitted to medical floor. Bcx growing G + cocci. S/p multiple I&D most recently 12/4 and 12/7, per op note no purulence and all beads were removed. Continuing antibiotics per prior to procedure.     #Right foot osteomyelitis  #Sepsis-resloved  #Candida albicans infection  #Right Charcot foot reconstruction 1/2022  - Initially had Charcot right foot reconstruction in 1/2022. Patient was later hospitalized and I&D in 1/2023 had intra operative cultures growing Staphylococcus epidermis (oxacillin resistant) and Staphylococcus caprae.  During most recent admission in 11/2023 I & D cultures grew Candida albicans and he was discharged on 6 weeks of fluconazole.  - Continue on broad spectrum ABX. ID consult. Continue trending WBC. Follow-up cultures.   - WOC consult.   - Orthopedic surgery following the patient. S/p irrigation and debridement of right foot and ankle, VAC dressing application, and antibiotic bead placement.      -Status post I&D on 12/4, 12/7  - planned repeat I&D Today 12/7       ID following the patient.   Recs:(12/4)  Can narrow empiric zosyn to IV cefazolin 2g q8hr to better target presumed MSSA (ordered)  Can continue empiric IV vancomycin (dosed by pharmacy) while awaiting final Staph aureus susceptibilities  Follow up pending blood cultures   Please check daily peripheral blood cultures (including today) until negative x72 hrs (ordered)  Check TTE to evaluate for vegetations (ordered)--> unremarkable  Agree with ortho consult for surgical management for  source control - please send deep tissue cultures if I&D is performed to help further guide antibiotics     Bcx ngtd. TTE was negative, defer SHIVANI to ID.       #T2DM  #Severe peripheral neuropathy  - Hyperglycemia noted.    - Will continue outpatient gabapentin  - Continue on sliding scale insulin, increase carb count 1:12. Continue on metformin.   - Serum glucose appears suboptimally controlled  - Increase insulin NPH to 18 units subcutaneous twice daily  - As an outpatient he is on jardiance, metformin, and Ozempic     #Papillary thyroid malignancy in remission s/p thyroidectomy with associated hypothyroidism  - Continue on PTA levothyroxine     #Nausea  #Vomiting  - Resolved      #CKD, stage 2  - Monitor renal function. Renal function stable.      #HLD  - PTA pravastatin     #MARGARITO  - Patient refuses CPAP due to claustrophobia  - Currently scheduled for inspire in December     #GERD  - Continue PTA omeprazole     #Chronic anemia  - Iron panel ordered and consistent with anemia of chronic disease.   - No need for transfusion at this time. Continue monitoring HGB.      #Hyponatremia   - Likely attributable to diminished appetite  - Continue to monitor  - Nutrition consult           Diet: Snacks/Supplements Adult: Ensure Max Protein (bariatric); With Meals  Regular Diet Adult    DVT Prophylaxis: Pneumatic Compression Devices  Jones Catheter: Not present  Lines: PRESENT      PICC 12/02/23 Single Lumen Right Basilic antibiotics-Site Assessment: WDL          Diet: Snacks/Supplements Adult: Ensure Max Protein (bariatric); With Meals  Advance Diet as Tolerated: Regular Diet Adult    DVT Prophylaxis: Pneumatic Compression Devices  Jones Catheter: Not present  Lines: PRESENT      PICC 12/02/23 Single Lumen Right Basilic antibiotics-Site Assessment: WDL      Cardiac Monitoring: None  Code Status: Full Code      Clinically Significant Risk Factors              # Hypoalbuminemia: Lowest albumin = 3.2 g/dL at 12/6/2023  6:54 AM,  "will monitor as appropriate           # DMII: A1C = 7.3 % (Ref range: <5.7 %) within past 6 months   # Overweight: Estimated body mass index is 27.62 kg/m  as calculated from the following:    Height as of this encounter: 1.778 m (5' 10\").    Weight as of this encounter: 87.3 kg (192 lb 7.4 oz).   # Moderate Malnutrition: based on nutrition assessment    # Financial/Environmental Concerns: none         Disposition Plan      Expected Discharge Date: 12/11/2023      Destination: home with help/services  Discharge Comments: Pend repeat RTOR Mon 12/11            Aroldo David DO, MHS  Hospitalist Service, GOLD TEAM 18  [facility]  Securely message with [employer] (more info)  Text page via [facility] Paging/Directory   See signed in provider for up to date coverage information  ______________________________________________________________________    Interval History   Patient was frustrated with his diet order this morning.  Patient appears overwhelmed with clinical course.  Patient has another surgery scheduled for Monday, 12/11.  Patient denies pain in the right lower extremity.  Patient denies chest pain, SOB.  Patient reports eating and drinking without difficulty.    Physical Exam   Vital Signs: Temp: 98.5  F (36.9  C) Temp src: Oral BP: 122/64 Pulse: 83   Resp: 16 SpO2: 97 % O2 Device: None (Room air)    Weight: 192 lbs 7.39 oz    GENERAL: Alert and oriented x 3; no acute distress; well-nourished.  HEENT: Normocephalic; atraumatic; PERRLA; MMM.  CV: RRR; normal S1, S2; no rubs, murmurs, or gallops.  RESP: Lung fields clear to aucultation B/L; no wheezing or crepitations.  GI: Abdomen is soft, nontender, nondistended; no organomegaly; normal bowel sounds.  : Deferred genital examination.   MSK: RLE wrapped with drains; red shoe on left foot.  DERM: Skin is intact; no rash, lesions, or skin breakdown.  NEURO: No focal deficits appreciated; strength & sensorium are grossly " intact.  PSYCH: No active hallucinations; affect, insight appear within normal limits.    Medical Decision Making       45 MINUTES SPENT BY ME on the date of service doing chart review, history, exam, documentation & further activities per the note.      Data     I have personally reviewed the following data over the past 24 hrs:    N/A  \   N/A   / N/A     136 103 20.9 /  124 (H)   4.2 27 0.92 \     Procal: N/A CRP: 7.54 (H) Lactic Acid: N/A         Imaging results reviewed over the past 24 hrs:   No results found for this or any previous visit (from the past 24 hour(s)).

## 2023-12-09 NOTE — PLAN OF CARE
VS:       Pt A/O X 4. Afebrile. VSS. Lungs- clear bilaterally with both       anterior and posterior. IS encouraged. Denies nausea, shortness of breath, and chest pain.     Output:       Bowels- present in all four quadrants. Voids spontaneously without   difficulty in the commode.      Activity:       Pt up SBA.     Skin: Wound on RLE.     Pain:       Denies pain.     CMS:       CMS and Neuro's are intact. Numbness and tingling in RLE baseline   Dressing:       RLE incisional dressing is clean, dry ad intact.      Diet:       Pt is on a regular diet and appetite was good this shift.       LDA:       PICC is patent in the RUE and saline locked.      Equipment:       Bilateral heels are elevated off the bed.     Plan:       Pt is able to make needs known and the call light is within the pt's reach. Continue to monitor.       Additional Info:       Pt had a shower today. LBM 12/09/23

## 2023-12-10 NOTE — PROGRESS NOTES
[facility]    Medicine Progress Note - Hospitalist Service, GOLD TEAM 18    Date of Admission:  11/23/2023    Assessment & Plan   58 year old male with a pmh of T2DM with severe peripheral neuropathy, papillary thyroid malignancy in remission s/p thyroidectomy, HLD, and right Charcot foot reconstruction in 1/2022 complicated by post operative wound infection progressing to osteomyelitis. Patient was admitted to medical floor. Bcx growing G + cocci. S/p multiple I&D most recently 12/4 and 12/7, per op note no purulence and all beads were removed. Continuing antibiotics per prior to procedure.     #Right foot osteomyelitis  #Sepsis-resloved  #Candida albicans infection  #Right Charcot foot reconstruction 1/2022  - Initially had Charcot right foot reconstruction in 1/2022. Patient was later hospitalized and I&D in 1/2023 had intra operative cultures growing Staphylococcus epidermis (oxacillin resistant) and Staphylococcus caprae.  During most recent admission in 11/2023 I & D cultures grew Candida albicans and he was discharged on 6 weeks of fluconazole.  - Continue on broad spectrum ABX. ID consult. Continue trending WBC. Follow-up cultures.   - WOC consult.   - Orthopedic surgery following the patient. S/p irrigation and debridement of right foot and ankle, VAC dressing application, and antibiotic bead placement.      - Status post I&D on 12/4, 12/7  - Planned repeat I&D Today 12/11     ID following the patient.   Recs:(12/4)  Can narrow empiric zosyn to IV cefazolin 2g q8hr to better target presumed MSSA (ordered)  Can continue empiric IV vancomycin (dosed by pharmacy) while awaiting final Staph aureus susceptibilities  Follow up pending blood cultures   Please check daily peripheral blood cultures (including today) until negative x72 hrs (ordered)  Check TTE to evaluate for vegetations (ordered)--> unremarkable  Agree with ortho consult for surgical management for  source control - please send deep tissue cultures if I&D is performed to help further guide antibiotics     Bcx ngtd. TTE was negative, defer SHIVANI to ID.       #T2DM  #Severe peripheral neuropathy  - Hyperglycemia noted.    - Will continue outpatient gabapentin  - Continue on sliding scale insulin, increase carb count 1:12. Continue on metformin.   - Serum glucose appears suboptimally controlled  - Increase insulin NPH to 18 units subcutaneous twice daily  - As an outpatient he is on jardiance, metformin, and Ozempic     #Papillary thyroid malignancy in remission s/p thyroidectomy with associated hypothyroidism  - Continue on PTA levothyroxine     #Nausea  #Vomiting (resolved)  #CKD, stage 2  - Monitor renal function. Renal function stable.      #HLD  - PTA pravastatin     #MARGARITO  - Patient refuses CPAP due to claustrophobia  - Currently scheduled for inspire in December     #GERD  - Continue PTA omeprazole     #Chronic anemia  - Iron panel ordered and consistent with anemia of chronic disease.   - No need for transfusion at this time. Continue monitoring HGB.      #Hyponatremia   - Likely attributable to diminished appetite  - Continue to monitor  - Nutrition consult           Diet: Snacks/Supplements Adult: Ensure Max Protein (bariatric); With Meals  Advance Diet as Tolerated: Regular Diet Adult  NPO per Anesthesia Guidelines for Procedure/Surgery Except for: Meds    DVT Prophylaxis: Pneumatic Compression Devices  Jones Catheter: Not present  Lines: PRESENT      PICC 12/02/23 Single Lumen Right Basilic antibiotics-Site Assessment: WDL      Cardiac Monitoring: None  Code Status: Full Code      Clinically Significant Risk Factors              # Hypoalbuminemia: Lowest albumin = 3.2 g/dL at 12/6/2023  6:54 AM, will monitor as appropriate           # DMII: A1C = 7.3 % (Ref range: <5.7 %) within past 6 months   # Overweight: Estimated body mass index is 27.62 kg/m  as calculated from the following:    Height as of this  "encounter: 1.778 m (5' 10\").    Weight as of this encounter: 87.3 kg (192 lb 7.4 oz).   # Moderate Malnutrition: based on nutrition assessment    # Financial/Environmental Concerns: none         Disposition Plan      Expected Discharge Date: 12/11/2023      Destination: home with help/services  Discharge Comments: Pend repeat RTOR Mon 12/11            Aroldo David DO, MHS  Hospitalist Service, GOLD TEAM 18  [facility]  Securely message with [employer] (more info)  Text page via [facility] Paging/Directory   See signed in provider for up to date coverage information  ______________________________________________________________________    Interval History   Patient is in excellent spirits.  Patient is reportedly returning to the OR tomorrow, 12/11.  Patient denies any specific symptomatology.  Patient reports eating and drinking without difficulty.    Physical Exam   Vital Signs: Temp: 98.7  F (37.1  C) Temp src: Oral BP: 135/74 Pulse: 86   Resp: 18 SpO2: 97 % O2 Device: None (Room air)    Weight: 192 lbs 7.39 oz    GENERAL: Alert and oriented x 3; no acute distress; well-nourished.  HEENT: Normocephalic; atraumatic; PERRLA; MMM.  CV: RRR; normal S1, S2; no rubs, murmurs, or gallops.  RESP: Lung fields clear to aucultation B/L; no wheezing or crepitations.  GI: Abdomen is soft, nontender, nondistended; no organomegaly; normal bowel sounds.  : Deferred genital examination.   MSK: Right foot wrapped with drains; red shoe on left foot.  DERM: Skin is intact; no rash, lesions, or skin breakdown.  NEURO: No focal deficits appreciated; strength & sensorium are grossly intact.  PSYCH: No active hallucinations; affect, insight appear within normal limits.    Medical Decision Making       45 MINUTES SPENT BY ME on the date of service doing chart review, history, exam, documentation & further activities per the note.      Data     I have personally reviewed the following data over the " past 24 hrs:    8.3  \   9.0 (L)   / 337     140 104 24.5 (H) /  128 (H)   4.5 29 0.96 \     Procal: N/A CRP: 6.12 (H) Lactic Acid: N/A       INR:  1.05 PTT:  N/A   D-dimer:  N/A Fibrinogen:  N/A       Imaging results reviewed over the past 24 hrs:   No results found for this or any previous visit (from the past 24 hour(s)).

## 2023-12-10 NOTE — PROGRESS NOTES
"Orthopedic Surgery Progress Note: 12/10/2023    Subjective:   No acute events overnight.     Objective:   /66 (BP Location: Right arm)   Pulse 95   Temp 98.7  F (37.1  C) (Oral)   Resp 16   Ht 1.778 m (5' 10\")   Wt 87.3 kg (192 lb 7.4 oz)   SpO2 96%   BMI 27.62 kg/m    No intake/output data recorded.  General: NAD. Resting comfortably in bed.  Respiratory: Nonlabored breathing  Musculoskeletal:  RLE: Dressing c/d/I. Vac in place, holding suction. Minimal output in cannister. No sensation in foot (baseline for patient)     Laboratory Data:  Lab Results   Component Value Date    WBC 7.6 12/08/2023    HGB 8.8 (L) 12/08/2023     12/08/2023    INR 1.09 12/04/2023       Tissue cultures from OR 11/27 MSSA and candida    Assessment & Plan:   Nehemiah Magallon is a 58 year old male with a history significant for with T2DM with severe peripheral neuropathy with past right Charcot foot reconstruction in Jan-2022 complicated by post-operative wound infection with multiple recent I&D of the right foot with subsequent wound vac exchanges with positive cultures for candida with Dr. Starr on 11/02/23 and 11/08/23 admitted for sepsis and concern for worsening RLE osteomyelitis.      Plan for Today:  - NPO at midnight for OR tomorrow a.m.  - PT/OT  - ABX      Medicine primary  Admit:  Return to [facility], medicine primary with Orthopaedic and Infectious Disease consultations.  Diet:  NPO midnight   Antibiotics:  Continue current antibiotic regimen of cefazolin and fluconazole as directed by Infectious Disease.  Pain management:  Multimodal. Wean from IV to oral medications.  Weightbearing status:  Strict nonweightbearing on the right lower extremity.  Use appropriate assistive gait devices and assistance (nursing/PT) for ambulation.  Activity:  May be up ad pancho for ADLs, PT, diagnostic tests, etc., but encourage elevation of the right above the level of the heart.  Float heels off of the bed.  Up with assistance until " independent.  PT/OT:  Gait training, transfers, ADLs.  Labs:  Per primary team.  Trend acute phase reactants.  Xrays:  None at this time.  Immobilization:  None at this time.  Dressings: Keep right foot dressings clean, dry, and intact.  Maintain VAC machine at 125 mm Hg continuous.  DVT prophylaxis:  Deferred to primary team.  Suggest at least mechanical SCDs.  Consultations:  PT, OT, Medicine, Orthopaedics, Infectious Disease.  Disposition:  Pending at this time.  Will likely need continuing ongoing operative debridements and VAC changes / possible bead exchanges, with next procedure 12/11.     Orthopedic surgery staff for this patient is Dr. Starr    ------------------------------------------------------------------------------------------    Respectfully,    Joesph Smith MD  Orthopedic Surgery PGY4

## 2023-12-10 NOTE — PLAN OF CARE
Goal Outcome Evaluation:      Plan of Care Reviewed With: patient    Overall Patient Progress: improvingOverall Patient Progress: improving  VS:       Pt A/O X 4. Afebrile. VSS. Lungs- clear bilaterally with both       anterior and posterior. IS encouraged. Denies nausea, shortness of breath, and chest pain.     Output:       Bowels- audible in all four quadrants. Voids spontaneously without   difficulty in the commode.      Activity:       Pt up with SBA with wheelchair.     Skin:   Visible skin intact, RLE surgical wound wrapped with ace .     Pain:       Denies pain.   CMS:       CMS and Neuro's are intact. Denies numbness and tingling in all extremities.      Dressing:       RLE incisional dressing is clean, dry and intact.      Diet:       Pt is on a regular diet and appetite was good this shift.       LDA:     PICC is patent in the R arm.      Equipment:       Bilateral heels are elevated off the bed.     Plan:       Pt is able to make needs known and the call light is within the pt's reach. Continue to monitor.       Additional Info:       Pt has surgery tomorrow. NPO by midnight.

## 2023-12-10 NOTE — PLAN OF CARE
Goal Outcome Evaluation:      Plan of Care Reviewed With: patient    Overall Patient Progress: improvingOverall Patient Progress: improving    Outcome Evaluation: Patient denies pain, afebrile, rested comfortably throughout the night. NPO at midnight tonight for surgery on 12/11/23.      A&Ox4.Has baseline numbness/tingling BLE. Denies nausea,vomiting, SOB, and chest pain.  Up to bedside commode, ind.  LBM: 12/8  Dressing R/foot C/D/I, wound vac running at 125 mmHg. Some new serosanguinous drainage in canister marked for proper output to be entered.  Continue with POC.

## 2023-12-11 NOTE — PROGRESS NOTES
Orthopaedic Surgery Attending    I personally saw and examined Mr. Magallon in the preoperative area at the [facility] this morning preoperatively. Relevant portions of the chart, imaging, and labs were reviewed. The plan for repeat I&D of his right ankle / foot with VAC dressing change, including the nature of the surgery, the risks, benefits, and alternatives, and postoperative plan, including the expected need for multiple additional procedures and the possible need for future amputation, were discussed in layman's terms. All questions were answered appropriately. The patient verbalized understanding of our discussion and agreement with the plan. I marked the correct surgical site.

## 2023-12-11 NOTE — ANESTHESIA CARE TRANSFER NOTE
Patient: Nehemiah Magallon    Procedure: Procedure(s):  Irrigation And Debridement Right Foot, Wound Vac exchange, partial closure       Diagnosis: Right foot infection [L08.9]  Diagnosis Additional Information: No value filed.    Anesthesia Type:   General     Note:    Oropharynx: oropharynx clear of all foreign objects and spontaneously breathing  Level of Consciousness: awake  Oxygen Supplementation: nasal cannula  Level of Supplemental Oxygen (L/min / FiO2): 4  Independent Airway: airway patency not satisfactory and stable  Dentition: dentition changed  Vital Signs Stable: post-procedure vital signs reviewed and stable  Report to RN Given: handoff report given  Patient transferred to: PACU    Handoff Report: Identifed the Patient, Identified the Reponsible Provider, Reviewed the pertinent medical history, Discussed the surgical course, Reviewed Intra-OP anesthesia mangement and issues during anesthesia, Set expectations for post-procedure period and Allowed opportunity for questions and acknowledgement of understanding      Vitals:  Vitals Value Taken Time   /72 12/11/23 0842   Temp 36.7    Pulse 84 12/11/23 0842   Resp 18    SpO2 99 % 12/11/23 0843   Vitals shown include unfiled device data.    Electronically Signed By: CARLITO Ashley CRNA  December 11, 2023  8:44 AM

## 2023-12-11 NOTE — PROGRESS NOTES
[facility]    Medicine Progress Note - Hospitalist Service, GOLD TEAM 18    Date of Admission:  11/23/2023    Assessment & Plan   58 year old male with a pmh of T2DM with severe peripheral neuropathy, papillary thyroid malignancy in remission s/p thyroidectomy, HLD, and right Charcot foot reconstruction in 1/2022 complicated by post operative wound infection progressing to osteomyelitis. Patient was admitted to medical floor. Bcx growing G + cocci. S/p multiple I&D most recently 12/4 and 12/7, per op note no purulence and all beads were removed. Continuing antibiotics per prior to procedure.     #Right foot osteomyelitis  #Sepsis-resloved  #Candida albicans infection  #Right Charcot foot reconstruction 1/2022  - Initially had Charcot right foot reconstruction in 1/2022. Patient was later hospitalized and I&D in 1/2023 had intra operative cultures growing Staphylococcus epidermis (oxacillin resistant) and Staphylococcus caprae.  During most recent admission in 11/2023 I & D cultures grew Candida albicans and he was discharged on 6 weeks of fluconazole.  - Continue on broad spectrum ABX. ID consult. Continue trending WBC. Follow-up cultures.   - WOC consult.   - Orthopedic surgery following the patient. S/p irrigation and debridement of right foot and ankle, VAC dressing application, and antibiotic bead placement.      - Status post I&D on 12/4, 12/7  - Planned repeat I&D Today 12/11     ID following the patient.   Recs:(12/4)  Can narrow empiric zosyn to IV cefazolin 2g q8hr to better target presumed MSSA (ordered)  Can continue empiric IV vancomycin (dosed by pharmacy) while awaiting final Staph aureus susceptibilities  Follow up pending blood cultures   Please check daily peripheral blood cultures (including today) until negative x72 hrs (ordered)  Check TTE to evaluate for vegetations (ordered)--> unremarkable  Agree with ortho consult for surgical management for  "source control - please send deep tissue cultures if I&D is performed to help further guide antibiotics     Bcx ngtd. TTE was negative, defer SHIVANI to ID.       #T2DM  #Severe peripheral neuropathy  - Hyperglycemia noted.    - Will continue outpatient gabapentin  - Continue on sliding scale insulin, increase carb count 1:12. Continue on metformin.   - Serum glucose appears suboptimally controlled  - Increase insulin NPH to 18 units subcutaneous twice daily  - As an outpatient he is on jardiance, metformin, and Ozempic     #Papillary thyroid malignancy in remission s/p thyroidectomy with associated hypothyroidism  - Continue on PTA levothyroxine     #Nausea  #Vomiting (resolved)  #CKD, stage 2  - Monitor renal function. Renal function stable.      #HLD  - PTA pravastatin     #MARGARITO  - Patient refuses CPAP due to claustrophobia  - Currently scheduled for inspire in December     #GERD  - Continue PTA omeprazole     #Chronic anemia  - Iron panel ordered and consistent with anemia of chronic disease.   - No need for transfusion at this time. Continue monitoring HGB.      #Hyponatremia   - Likely attributable to diminished appetite  - Continue to monitor  - Nutrition consult           Diet: Snacks/Supplements Adult: Ensure Max Protein (bariatric); With Meals  Regular Diet Adult    DVT Prophylaxis: Pneumatic Compression Devices  Jones Catheter: Not present  Lines: PRESENT      PICC 12/02/23 Single Lumen Right Basilic antibiotics-Site Assessment: WDL      Cardiac Monitoring: None  Code Status: Full Code      Clinically Significant Risk Factors              # Hypoalbuminemia: Lowest albumin = 3.2 g/dL at 12/6/2023  6:54 AM, will monitor as appropriate           # DMII: A1C = 7.3 % (Ref range: <5.7 %) within past 6 months   # Overweight: Estimated body mass index is 27.62 kg/m  as calculated from the following:    Height as of this encounter: 1.778 m (5' 10\").    Weight as of this encounter: 87.3 kg (192 lb 7.4 oz).   # Moderate " Malnutrition: based on nutrition assessment    # Financial/Environmental Concerns: none         Disposition Plan      Expected Discharge Date: 12/12/2023      Destination: home with help/services  Discharge Comments: Pend repeat RTOR Mon 12/11          Patient will be returning to the OR for additional surgical interventions.    Aroldo David DO  Hospitalist Service, GOLD TEAM 18  [facility]  Securely message with [email or web address] (more info)  Text page via [facility] Paging/Directory   See signed in provider for up to date coverage information  ______________________________________________________________________    Interval History   Patient was frustrated regarding consistent carbohydrate diet.  Diet transition to regular diet.  Patient had irrigation debridement this morning.  Per orthopedic surgery documentation, patient will be returning to the OR in the future.    Physical Exam   Vital Signs: Temp: 98.1  F (36.7  C) Temp src: Oral BP: 123/72 Pulse: 87   Resp: 16 SpO2: 96 % O2 Device: None (Room air) Oxygen Delivery: 2 LPM  Weight: 192 lbs 7.39 oz    GENERAL: Alert and oriented x 3; no acute distress; well-nourished.  HEENT: Normocephalic; atraumatic; PERRLA; MMM.  CV: RRR; normal S1, S2; no rubs, murmurs, or gallops.  RESP: Lung fields clear to aucultation B/L; no wheezing or crepitations.  GI: Abdomen is soft, nontender, nondistended; no organomegaly; normal bowel sounds.  : Deferred genital examination.   MSK: Right lower extremity wrapped with 1 drain; red show on the left foot.  DERM: Skin is intact; no rash, lesions, or skin breakdown.  NEURO: No focal deficits appreciated; strength & sensorium are grossly intact.  PSYCH: No active hallucinations; affect, insight appear within normal limits.    Medical Decision Making       45 MINUTES SPENT BY ME on the date of service doing chart review, history, exam, documentation & further activities per the note.      Data     I have  personally reviewed the following data over the past 24 hrs:    7.7  \   8.8 (L)   / 317     138 105 21.3 /  201 (H)   4.0 28 0.91 \     Procal: N/A CRP: 6.32 (H) Lactic Acid: N/A         Imaging results reviewed over the past 24 hrs:   No results found for this or any previous visit (from the past 24 hour(s)).

## 2023-12-11 NOTE — BRIEF OP NOTE
[facility]    Brief Operative Note    Pre-operative diagnosis: Right foot infection [L08.9]  Post-operative diagnosis Same as pre-operative diagnosis    Procedure: Irrigation And Debridement Right Foot, Wound Vac exchange, partial closure, Right - Foot    Surgeon: Surgeon(s) and Role:     * Bryon Starr MD - Primary     * Joesph Smith MD - Resident - Assisting  Anesthesia: General   Estimated Blood Loss: Less than 10 ml    Drains: Wound vac for medial ankle  Specimens: * No specimens in log *  Findings:   See dictated operative report   Complications: None.  Implants: * No implants in log *        Medicine primary  Admit:  Return to [facility], medicine primary with Orthopaedic and Infectious Disease consultations.  Diet:  Advance diet as tolerated   Antibiotics:  Continue current antibiotic regimen of cefazolin and fluconazole as directed by Infectious Disease.  Pain management:  Multimodal. Wean from IV to oral medications.  Weightbearing status:  Strict nonweightbearing on the right lower extremity.  Use appropriate assistive gait devices and assistance (nursing/PT) for ambulation.  Activity:  May be up ad pancho for ADLs, PT, diagnostic tests, etc., but encourage elevation of the right above the level of the heart.  Float heels off of the bed.  Up with assistance until independent.  PT/OT:  Gait training, transfers, ADLs.  Labs:  Per primary team.  Trend acute phase reactants.  Xrays:  None at this time.  Immobilization:  None at this time.  Dressings: Keep right foot dressings clean, dry, and intact.  Maintain VAC machine at 125 mm Hg continuous.  DVT prophylaxis:  Deferred to primary team.  Suggest at least mechanical SCDs.  Consultations:  PT, OT, Medicine, Orthopaedics, Infectious Disease.  Disposition:  Will need RTOR for repeat  debridement in this admission.    Joesph Smith MD  Orthopedic Surgery PGY4      Addendum:  I spoke with the patient's responsible party,  his spouse Wiliam, immediately postoperatively by phone at [phone]. Findings and plan discussed, all questions answered.  Bryon Starr MD  Attending surgeon  Orthopaedic Surgery  Date of Addendum: 12/11/23, 9:01 AM

## 2023-12-11 NOTE — ANESTHESIA PROCEDURE NOTES
Airway       Patient location during procedure: OR  Staff -        Anesthesiologist:  Janna Arias MD       CRNA: Fouzia Ireland APRN CRNA       Performed By: CRNA  Consent for Airway        Urgency: elective  Indications and Patient Condition       Indications for airway management: alan-procedural       Induction type:intravenous       Mask difficulty assessment: 1 - vent by mask    Final Airway Details       Final airway type: supraglottic airway    Supraglottic Airway Details        Type: LMA       Brand: Air-Q       LMA size: 4.5    Post intubation assessment        Placement verified by: capnometry, equal breath sounds and chest rise        Number of attempts at approach: 1       Number of other approaches attempted: 0       Secured with: tape       Ease of procedure: easy       Dentition: Intact and Unchanged

## 2023-12-11 NOTE — PROGRESS NOTES
[facility]  Patient is currently open to home care services with [facility]. The patient is currently receiving RN services.  [facility]  and team have been notified of patient admission.  [facility] liaison will continue to follow patient during stay.  If appropriate provide orders to resume home care at time of discharge.

## 2023-12-11 NOTE — OR NURSING
PACU to Inpatient Nursing Handoff    Patient Nehemiah Magallon is a 58 year old male who speaks English.   Procedure Procedure(s):  Irrigation And Debridement Right Foot, Wound Vac exchange, partial closure   Surgeon(s) Primary: Bryon Starr MD  Resident - Assisting: Joesph Smith MD     Allergies   Allergen Reactions    Doxycycline Rash     Very extensive full body rash lasting for several months. Given at same time as Rifampin.    Rifampin Rash     Very extensive full body rash lasting for several months. Given at the same time as doxycycline.    Atorvastatin      Other reaction(s): Hyperglycemia  pt also lists simvastatin?    Celebrex [Celecoxib] Other (See Comments)     [facility]       Isolation  No active isolations     Past Medical History   has a past medical history of Chronic pain syndrome (10/24/2014), Diabetes mellitus, type 2 (H), Diabetic Charcot foot (H), Diabetic retinopathy associated with diabetes mellitus due to underlying condition (H), Diverticulitis of colon, Gastroesophageal reflux disease, Hepatitis C (2017), History of skin cancer, Hypertension, Hypothyroidism, Legally blind, Midfoot collapse of right lower extremity, Migraine, NAFLD (nonalcoholic fatty liver disease), Nephrolithiasis, Neuropathy, MARGARITO (obstructive sleep apnea), Rib pain (10/24/2014), S/P colectomy (10/14/2014), and Thyroid cancer (H) (10/14/2014).    Anesthesia General   Dermatome Level     Preop Meds Not applicable   Nerve block Not applicable   Intraop Meds dexamethasone (Decadron)  fentanyl (Sublimaze): 75 mcg total  ondansetron (Zofran): last given at 0820   Local Meds No   Antibiotics Not applicable     Pain Patient Currently in Pain: denies   PACU meds  Not applicable   PCA / epidural No   Capnography     Telemetry ECG Rhythm: Normal sinus rhythm   Inpatient Telemetry Monitor Ordered? No        Labs Glucose Lab Results   Component Value Date     12/11/2023     12/10/2023     02/06/2023        Hgb Lab Results   Component Value Date    HGB 8.8 12/11/2023       INR Lab Results   Component Value Date    INR 1.05 12/10/2023      PACU Imaging Not applicable     Wound/Incision Negative Pressure Wound Therapy Ankle Right;Lateral;Medial (Active)   Wound Type Surgical 12/11/23 0845   Unit Type Ultra 12/08/23 1500   Dressing Pieces Removed (# of Each Type) 2 12/11/23 0845   Dressing Pieces Applied (# of Each Type) Silver foam 12/11/23 0845   Cycle Continuous 12/10/23 2240   Target Pressure (mmHg) 125 12/10/23 2240   Drainage Color/Characteristics Serosanguineous 12/11/23 0845   Cannister changed? No 12/10/23 2240   Output (ml) 0 ml 12/09/23 0043   Number of days: 14       Incision/Surgical Site 12/07/23 Right;Lateral Ankle (Active)   Incision Assessment UTV 12/11/23 0906   Dressing Xeroform 12/11/23 0845   Closure DOMINGO 12/11/23 0906   Incision Drainage Amount UTV 12/11/23 0906   Incision Care Therapy - negative pressure 12/11/23 0107   Dressing Intervention Clean, dry, intact 12/11/23 0906   Number of days: 4       Incision/Surgical Site 12/07/23 Medial;Right Ankle (Active)   Incision Assessment UTV 12/11/23 0906   Dressing Vacuum based 12/10/23 2240   Closure DOMINGO 12/11/23 0906   Incision Drainage Amount UTV 12/11/23 0906   Incision Care Therapy - negative pressure 12/11/23 0906   Dressing Intervention Clean, dry, intact 12/11/23 0906   Number of days: 4      CMS        Equipment Wound vac   Other LDA       IV Access PICC 12/02/23 Single Lumen Right Basilic antibiotics (Active)   Site Assessment WDL 12/11/23 0845   External Cath Length (cm) 3 cm 12/10/23 1200   Extremity Circumference (cm) 31 cm 12/02/23 1211   Dressing Chlorhexidine disk 12/11/23 0100   Dressing Status clean;dry;intact 12/11/23 0100   Dressing Change Due 12/09/23 12/03/23 1028   Line Necessity Yes, meets criteria 12/11/23 0100   Status infusing 12/11/23 0845   Phlebitis Scale 0-->no symptoms 12/11/23 0100   Infiltration? no 12/11/23 0100    PICC Comment CDI 12/04/23 1603   Number of days: 9      Blood Products Not applicable EBL 5   mL   Intake/Output Date 12/11/23 0700 - 12/12/23 0659   Shift 2451-7111 7939-9609 4971-8668 24 Hour Total   INTAKE   I.V. 500   500   Shift Total(mL/kg) 500(5.73)   500(5.73)   OUTPUT   Blood 5   5   Shift Total(mL/kg) 5(0.06)   5(0.06)   Weight (kg) 87.3 87.3 87.3 87.3      Drains / Jones Negative Pressure Wound Therapy Ankle Right;Lateral;Medial (Active)   Wound Type Surgical 12/11/23 0845   Unit Type Ultra 12/08/23 1500   Dressing Pieces Removed (# of Each Type) 2 12/11/23 0845   Dressing Pieces Applied (# of Each Type) Silver foam 12/11/23 0845   Cycle Continuous 12/10/23 2240   Target Pressure (mmHg) 125 12/10/23 2240   Drainage Color/Characteristics Serosanguineous 12/11/23 0845   Cannister changed? No 12/10/23 2240   Output (ml) 0 ml 12/09/23 0043   Number of days: 14      Time of void PreOp Time of Void Prior to Procedure: 0600 (12/11/23 0650)    PostOp Voided (mL): 1450 mL (uses commode shift total) (12/11/23 0619)  Urine Occurrence: 1 (12/08/23 1051)    Diapered? No   Bladder Scan      mL (12/10/23 1200)  tolerating sips     Vitals    B/P: 133/67  T: 98.1  F (36.7  C)    Temp src: Oral  P:  Pulse: 84 (12/11/23 0906)          R: 19  O2:  SpO2: 100 %    O2 Device: None (Room air) (12/11/23 0906)    Oxygen Delivery: 2 LPM (12/11/23 0842)         Family/support present No family here   Patient belongings     Patient transported on cart   DC meds/scripts (obs/outpt) Not applicable   Inpatient Pain Meds Released? Yes       Special needs/considerations None   Tasks needing completion None       Tenisha Ayala, RN  ASCOM [id number]

## 2023-12-11 NOTE — CARE PLAN
"VS: /72 (BP Location: Left arm)   Pulse 87   Temp 98.1  F (36.7  C) (Oral)   Resp 16   Ht 1.778 m (5' 10\")   Wt 87.3 kg (192 lb 7.4 oz)   SpO2 96%   BMI 27.62 kg/m      Orientation Pt is Alert and Oriented x 4   Mobility/ Activity Pt independent in his room. Uses stand/ pivot transfer to the commode. Uses wheelchair at baseline when at home.    Bowel/Bladder: Pt continent of both bowel and bladder. Uses commode to void.    IV /LDA PICC placed in right upper arm.    Diet Regular diet.    Skin / Wounds / Dressings: Wounds on R foot. Wound vac in place. During I&D one of the two wounds on the R foot was closed. Vac is draining from one wound now.    Pain Pt denies pain.    Equipment: Wheelchair in room.    Circulation/ Sensation Pt states numbness in Lower extremities at baseline. Cannot feel beneath the knees.    PRNS N/A   Plan Continue to monitor   Additional info:      "

## 2023-12-11 NOTE — PLAN OF CARE
"/77 (BP Location: Left arm, Patient Position: Semi-Gonsalves's, Cuff Size: Adult Regular)   Pulse 89   Temp 98.5  F (36.9  C) (Oral)   Resp 18   Ht 1.778 m (5' 10\")   Wt 87.3 kg (192 lb 7.4 oz)   SpO2 99%   BMI 27.62 kg/m       Patient left for I&D surgery at 0630 this morning via wheelchair.  "

## 2023-12-11 NOTE — ANESTHESIA POSTPROCEDURE EVALUATION
Patient: Nehemiah Magallon    Procedure: Procedure(s):  Irrigation And Debridement Right Foot, Wound Vac exchange, partial closure       Anesthesia Type:  General    Note:  Disposition: Inpatient   Postop Pain Control: Uneventful            Sign Out: Well controlled pain   PONV: No   Neuro/Psych: Uneventful            Sign Out: Acceptable/Baseline neuro status   Airway/Respiratory: Uneventful            Sign Out: Acceptable/Baseline resp. status   CV/Hemodynamics: Uneventful            Sign Out: Acceptable CV status; No obvious hypovolemia; No obvious fluid overload   Other NRE: NONE   DID A NON-ROUTINE EVENT OCCUR? YES    Event details/Postop Comments:  Patient had a reaction to white cloth tape that we used to secure LMA. Also has swollen left upper lip- likely from LMA insertion.           Last vitals:  Vitals Value Taken Time   /67 12/11/23 0900   Temp     Pulse 84 12/11/23 0905   Resp 19 12/11/23 0905   SpO2 100 % 12/11/23 0905   Vitals shown include unfiled device data.    Electronically Signed By: CHARLENE MEHTA MD  December 11, 2023  9:06 AM

## 2023-12-11 NOTE — ANESTHESIA PREPROCEDURE EVALUATION
Anesthesia Pre-Procedure Evaluation    Patient: Nehemiah Magallon   MRN: [id number] : 1965        Procedure : Procedure(s):  Irrigation And Debridement Right Foot, Wound Vac exchange and possible antibiotic bead placement          Past Medical History:   Diagnosis Date    Chronic pain syndrome 10/24/2014    Diabetes mellitus, type 2 (H)     Diabetic Charcot foot (H)     Diabetic retinopathy associated with diabetes mellitus due to underlying condition (H)     Diverticulitis of colon     Gastroesophageal reflux disease     Hepatitis C 2017    treated    History of skin cancer     Hypertension     Hypothyroidism     Legally blind     Midfoot collapse of right lower extremity     Migraine     NAFLD (nonalcoholic fatty liver disease)     Nephrolithiasis     Neuropathy     MARGARITO (obstructive sleep apnea)     Rib pain 10/24/2014    S/P colectomy 10/14/2014    Thyroid cancer (H) 10/14/2014      Past Surgical History:   Procedure Laterality Date    ARTHRODESIS FOOT Right 2022    Procedure: Right midfoot/talonavicular osteotomy and reduction of deformity Right midfoot/talonavicular arthrodesis, achilles lengthening;  Surgeon: Bryon Starr MD;  Location: [facility]    CHOLECYSTECTOMY      COLECTOMY      @ 6 years ago, sigmoid    COLONOSCOPY  2016    FOOT SURGERY Left     IRRIGATION AND DEBRIDEMENT FOOT, COMBINED Right 2023    Procedure: IRRIGATION AND DEBRIDEMENT, FOOT, RIGHT, Placement of Wound Vac and Antibiotic Beads.;  Surgeon: Bryon Starr MD;  [facility]: [facility]    IRRIGATION AND DEBRIDEMENT FOOT, COMBINED Right 2023    Procedure: Debridement and Irrigation of Right Ankle and Foot;  Surgeon: Bryon Starr MD;  [facility]    IRRIGATION AND DEBRIDEMENT FOOT, COMBINED Right 2023    Procedure: IRRIGATION AND DEBRIDEMENT, RIGHT FOOT, WOUND VAC APPLICATION;  Surgeon: Bryon Starr MD;  [facility]    IRRIGATION AND DEBRIDEMENT FOOT, COMBINED Right 2023    Procedure: Irrigation And  Debridement Right Foot, Wound vac placement and antibiotic bead placement right ankle and foot;  Surgeon: Bryon Starr MD;  [facility]    IRRIGATION AND DEBRIDEMENT FOOT, COMBINED Right 12/4/2023    Procedure: Irrigation and debridement right foot and ankle;  Surgeon: Bryon Starr MD;  [facility]    IRRIGATION AND DEBRIDEMENT LOWER EXTREMITY, COMBINED Right 12/7/2023    Procedure: IRRIGATION AND DEBRIDEMENT Right Foot, Wound Vac Placement,;  Surgeon: Bryon Starr MD;  [facility]    LENGTHEN TENDON ACHILLES Right 11/17/2022    Procedure: LENGTHENING, TENDON, ACHILLES;  Surgeon: Bryon Starr MD;  [facility]    REMOVE HARDWARE FOOT Right 11/2/2023    Procedure: Remove Hardware Right Foot;  Surgeon: Bryon Starr MD;  [facility]      Allergies   Allergen Reactions    Doxycycline Rash     Very extensive full body rash lasting for several months. Given at same time as Rifampin.    Rifampin Rash     Very extensive full body rash lasting for several months. Given at the same time as doxycycline.    Atorvastatin      Other reaction(s): Hyperglycemia  pt also lists simvastatin?    Celebrex [Celecoxib] Other (See Comments)     [facility]      Social History     Tobacco Use    Smoking status: Never    Smokeless tobacco: Never   Substance Use Topics    Alcohol use: Not Currently     Comment: rarely      Wt Readings from Last 1 Encounters:   12/05/23 87.3 kg (192 lb 7.4 oz)        Anesthesia Evaluation   Pt has had prior anesthetic.     No history of anesthetic complications       ROS/MED HX  ENT/Pulmonary:     (+) sleep apnea, doesn't use CPAP,         allergic rhinitis,                         (-) asthma, COPD and recent URI   Neurologic:     (+)    peripheral neuropathy, - bilateral lower extremities knees down, severe. migraines,                       (-) no seizures, no CVA and no TIA   Cardiovascular:     (+)  hypertension (improved with weight loss)- -   -  - -                                  Previous cardiac testing   Echo: Date: 3/7/2019 Results:  Summary:  1.  Normal left ventricular size and function (visual EF 55 to 60%, biplane EF 60%).  2.  Moderate concentric left ventricular hypertrophy.  3.  LV diastolic function is indeterminate.  Stress Test:  Date: Results:    ECG Reviewed:  Date: 7/28/2021 Results:  NSR  Cath:  Date: Results:   (-) dyslipidemia   METS/Exercise Tolerance:  Comment: METS limited due to severe neuropathy. Will use forearm crutch or manual wheelchair primarily. Denies chest pain/pressure, INFANTE, orthopnea, dizziness, palpitations, edema.     Hematologic:     (+)      anemia,       (-) history of blood clots and history of blood transfusion   Musculoskeletal: Comment: Charcot foot  Chronic osteomyelitis  (+)  arthritis,             GI/Hepatic: Comment: History of diverticulitis s/p colectomy  History of cholecystectomy    (+) GERD, Asymptomatic on medication,   hepatitis resolved      hepatitis type C, liver disease (NAFLD),       Renal/Genitourinary:     (+) renal disease,  Pt does not require dialysis,           Endo:     (+)  type II DM, Last HgA1c: 6.3,  Not using insulin, - not using insulin pump.  not previously admitted for DM/DKA. Diabetic complications: neuropathy. thyroid problem, hypothyroidism,           Psychiatric/Substance Use:     (+) psychiatric history other (comment) (Insomnia)       Infectious Disease:  - neg infectious disease ROS     Malignancy:   (+) Malignancy, History of Skin and Other.Skin CA Remission status post Surgery.  Other CA Thyroid cancer Remission status post Surgery.    Other:      (+)  , H/O Chronic Pain,, other significant disability Blind         Physical Exam    Airway  airway exam normal      Mallampati: II   TM distance: > 3 FB   Neck ROM: full   Mouth opening: > 3 cm    Respiratory Devices and Support         Dental       (+) Minor Abnormalities - some fillings, tiny chips      Cardiovascular   cardiovascular exam  "normal          Pulmonary   pulmonary exam normal              OUTSIDE LABS:  CBC:   Lab Results   Component Value Date    WBC 8.3 12/10/2023    WBC 7.6 12/08/2023    HGB 9.0 (L) 12/10/2023    HGB 8.8 (L) 12/08/2023    HCT 29.5 (L) 12/10/2023    HCT 29.9 (L) 12/08/2023     12/10/2023     12/08/2023     BMP:   Lab Results   Component Value Date     12/10/2023     12/09/2023    POTASSIUM 4.5 12/10/2023    POTASSIUM 4.2 12/09/2023    CHLORIDE 104 12/10/2023    CHLORIDE 103 12/09/2023    CO2 29 12/10/2023    CO2 27 12/09/2023    BUN 24.5 (H) 12/10/2023    BUN 20.9 12/09/2023    CR 0.96 12/10/2023    CR 0.92 12/09/2023     (H) 12/10/2023     (H) 12/10/2023     COAGS:   Lab Results   Component Value Date    PTT 31 12/04/2023    INR 1.05 12/10/2023     POC: No results found for: \"BGM\", \"HCG\", \"HCGS\"  HEPATIC:   Lab Results   Component Value Date    ALBUMIN 3.2 (L) 12/06/2023    PROTTOTAL 6.7 12/06/2023    ALT <5 12/06/2023    AST 13 12/06/2023    ALKPHOS 71 12/06/2023    BILITOTAL <0.2 12/06/2023     OTHER:   Lab Results   Component Value Date    LACT 0.8 11/24/2023    A1C 7.3 (H) 10/30/2023    ALICE 9.1 12/10/2023    MAG 2.0 01/21/2023    TSH 1.80 01/21/2023    CRP <2.9 02/06/2023    SED 71 (H) 11/24/2023       Anesthesia Plan    ASA Status:  3    NPO Status:  NPO Appropriate    Anesthesia Type: General.     - Airway: LMA   Induction: Intravenous.   Maintenance: Balanced.   Techniques and Equipment:     - Lines/Monitors: BIS     Consents    Anesthesia Plan(s) and associated risks, benefits, and realistic alternatives discussed. Questions answered and patient/representative(s) expressed understanding.     - Discussed:     - Discussed with:  Patient      - Extended Intubation/Ventilatory Support Discussed: No.      - Patient is DNR/DNI Status: No     Use of blood products discussed: No .     Postoperative Care    Pain management: IV analgesics, Oral pain medications, Multi-modal " analgesia.   PONV prophylaxis: Ondansetron (or other 5HT-3), Dexamethasone or Solumedrol     Comments:           H&P reviewed: Unable to attach VIRTUAL H&P to encounter due to EHR limitations. Appropriate H&P reviewed. The physical exam performed by anesthesia during this surgical encounter serves as the physical portion of that virtual H&P.  Any significant changes noted within this preop evaluation.         CHARLENE MEHTA MD    I have reviewed the pertinent notes and labs in the chart from the past 30 days and (re)examined the patient.  Any updates or changes from those notes are reflected in this note.

## 2023-12-11 NOTE — PLAN OF CARE
Goal Outcome Evaluation:    Plan of Care Reviewed With: patient    Overall Patient Progress: improvingOverall Patient Progress: improving    Outcome Evaluation: Antibitoics continued, patient is aware of I&D surgery tomorrow and is NPO at midnight. Afebrile,no new acute changes.     A&Ox4.Has baseline numbness/tingling BLE.   Up to bedside commode, ind. Tolerating well. LBM: 12/8  Dressing R/foot C/D/I, wound vac running at 125 mmHg.   Continue with POC.

## 2023-12-11 NOTE — OP NOTE
DATE OF SURGERY:  12/11/2023.     PREOPERATIVE DIAGNOSIS:  Right fot/ankle surgical site infection.     POSTOPERATIVE DIAGNOSIS:  Right foot/ankle surgical site infection.     PROCEDURE:  Irrigation and debridement of right foot and ankle, VAC dressing exchange, delayed primary closure of anterolateral right ankle wound.     PRIMARY SURGEON:  Bryon Starr MD.     ASSISTANT SURGEONS:  Joesph Smith MD.     ANESTHESIA:  General laryngeal mask airway.     COMPLICATIONS:  None apparent intraoperatively or immediately postoperatively.     IMPLANTED DEVICES:  None.     SIGNIFICANT FINDINGS:  No visible purulence.  Increased granulation tissue at both wounds, particularly circumferentially in the anterolateral right ankle wound and the distal 4 cm of the medial right foot wound.  Posterior tibial tendon still exposed, though partially covered by granulation tissue.  Healthy appearing skin margins circumferentially at both wounds.  Small amount of serosanguinous fluid.  No new beads placed.  Anterolateral ankle wound was fully approximated without undue tension and closed in delayed primary fashion.    Wound #1 (medial ankle/foot):  Technique:  Scrubbing.  Instruments:  Curet.  Nature of debrided tissue:  Small amounts of friable, loose, granulation tissue, serosanguinous fluid, bone, and deep fibrinous exudate.  Appearance of wound after:  Down to healthy, bleeding tissue/bone.  Area of debridement:  11 cm x 3 cm x 4 cm deep.  Depth of debridement:  Down to and including bone (ankle joint and medial midfoot bony defect).  Wound #2 (anterolateral ankle):  Technique: Scrubbing.  Instruments: Curet.  Nature of debrided tissue:  Small amounts of friable, loose, granulation tissue, serosanguinous fluid, and deep fibrinous exudate.  Appearance of wound after:  Down to healthy, bleeding tissue/bone.  Area of debridement:  4 cm  x 0.5 cm x 2 cm deep.  Depth of debridement:  Down to and including bone (distal tibia & fibula, ankle  joint).     Small amounts of friable, loose, granulation tissue, serosanguinous fluid, bone, and deep fibrinous exudate (mainly from medial wound) were debrided with a curet in both wounds.      SPECIMENS SENT:  None.     DRAINS:  One silver VAC sponge and tube connected to patient's existing hospital DME VAC machine at -125 mm continuous suction.     ESTIMATED BLOOD LOSS:  Approximately 5 mL.        INDICATIONS:                          This very pleasant 58-year-old gentleman with diabetes and peripheral neuropathy developed a surgical site infection of the right foot / ankle after right midfoot Charcot reconstruction and underwent operative debridements 01/21/2023, 11/02/2023, 11/08/2023, 11/27/2023, 12/04/2023, and 12/07/2023.  He has been on a 6 week antifungal course for C. albicans infection which grew from the 11/02/2023 operative cultures, and also cefazolin for the pansensitive S. aureus which grew from the 11/27/2023 operative cultures.  The patient returns to the OR for planned repeat debridement and irrigation and VAC dressing exchange.  The diagnosis, nature of the recommended procedure, the risks, benefits, and alternatives, and the postoperative plan were all discussed with the patient in layman's terms.  No guarantees were expressed or implied as to outcome. The likely need for additional procedures, as well as the possibility of future amputation, was discussed.  The patient had all questions he had at the time answered, verbalized understanding, and verbalized the wish to proceed with the planned procedure.  Written informed consent was obtained.     DESCRIPTION OF PROCEDURE:             The patient, Nehemiah Magallon, was met in the preoperative area where the correct surgical site was identified and marked by the primary surgeon.  All questions were answered.  The patient was then brought to the operating room, where he was carefully transferred to the operating room table in the supine position with  all bony prominences well padded and a safety strap across the waist.  The anesthesia team successfully induced general anesthesia and placed a laryngeal mask airway.  Venous thromboembolic prophylaxis was performed with a sequential compression device on the left lower extremity.  The patient's two prior silver VAC sponges, clear dressings, and tubes were removed.  The right lower extremity was then prepped with Betadine scrub and paint and draped free in the usual sterile fashion.  Prior to proceeding with the operation, a timeout was held per hospital policy correctly identifying the patient, procedure to be performed, and operative site including laterality.  All in the room agreed.     The two open wounds on the right foot/ankle were carefully inspected, both the smaller anterolateral ankle wound and the longer medial foot/posteromedial ankle wound, and carefully explored in all directions.  There was no malodor or purulence.  There was serosanguinous fluid present in both wounds, with a minimal amount in the smaller anterolateral wound.  Additional antibiotic beads did not appear to be required.  There were circumferential healthy skin margins at both wounds and increased interim growth of granulation tissue, particularly in the anterolateral wound and the distal 4 cm of the medial wound.  The medial wound did still have PTT exposed proximally, though it was partially covered by granulation tissue.  The medial wound did communicate directly with both the posterior right ankle joint and the medial midfoot bony defect.  No new sinus tracts or pockets of purulence were noted.  The dead space in the anterolateral ankle wound appeared diminished and the anterolateral ankle wound appeared appropriate for delayed primary closure.  All surfaces of both wounds were carefully and thoroughly scrubbed with a curet, including the ankle joint as well as the medial midfoot bony defect, and debrided to healthy, bleeding  tissues and bone.  Small amounts of friable, loose, granulation tissue, serosanguinous fluid, bone, and deep fibrinous exudate (mainly from medial wound) were debrided with a curet in both wounds.      Both wounds, the anterolateral right ankle wound and the larger posteromedial right ankle and medial foot wound, were thoroughly irrigated with a total of 6 L of normal saline using cystoscopy tubing.  This included the bony defect in the medial midfoot and the ankle joint.  Care was taken to avoid injury to the posterior neurovascular bundle.  All ankle joint debridement was performed deep (anterior) to the posterior tibial tendon which was exposed in the wound.  The area around the neurovascular bundle was gently irrigated as well.    There was no vascular injury. Meticulous hemostasis was achieved.  The anterolateral right ankle wound was now able to be fully approximated in delayed primary fashion with interrupted 3-0 nylon sutures.  A looser closure was performed to allow for any needed subsequent drainage.  The skin was carefully cleansed with sterile saline and dried, and a single silver VAC sponge was placed into the medial right ankle/foot wound.  Portions of the sponge were pedicled to insert into the medial midfoot bony defect and the posterior ankle joint.  The sponge was covered with a clear VAC dressing, the VAC suction tube was attached to the patient's existing hospital DME VAC machine, and -125 mmHg continuous pressure suction was applied with an excellent seal.  Sterile dressings were applied to the anterolateral right ankle wound.  Sterile ABDs, sterile waffled cast padding, and ace wrap were applied.     All surgical counts were reported as correct at the end of the case. The patient was taken to recovery room in stable condition.     I was present and scrubbed in for the entire case from start to finish.        Bryon Starr MD

## 2023-12-12 NOTE — PROGRESS NOTES
"CLINICAL NUTRITION SERVICES - REASSESSMENT NOTE     Nutrition Prescription    RECOMMENDATIONS FOR MDs/PROVIDERS TO ORDER:  - Per pt, his last Vitamin D levels (via [facility]) were above desired levels but he continues to take it as ordered here as he hasn't gotten to talk with his [facility] about it. I can't access this info from the [facility]. If you're able to view this could you confirm whether it was elevated within recent 6 months? If so, could decrease Vitamin D dose from 50 to 25 mcg. OF course, if very high should stop Vitamin D supplementation until Vitamin D drifts back down to normal limits. If no labs noted within past 6 mo to 1 year recommend checking Vitamin D today. He also noted his Vitamin B12 levels were high and was dosed high (2000 mcg) in the past as he states he was told that his levels were very low--\"like zero\". High Vitamin B12 isn't harmful but could decrease dose to 1000 mcg to maintain stable levels.     Malnutrition Status:    Moderate malnutrition in the context of chronic illness    Recommendations already ordered by Registered Dietitian (RD):  Discontinue Vitamin C supplementation per discussion w/ pt d/t pill Rx.  Continue Ensure Max Protein q day  Collaborate with other providers--re: above.     Future/Additional Recommendations:  - Follow for continued adequate intake.  Monitor for future info on Vitamin D levels and need to adjust dosing.   - Follow POC w/ re: wound.      Met with pt. He notes menu fatigue with same choices on menu.  He reports eating a lot more than at home but stated that he doesn't always eat all that he orders because of dislike of the food or having it arrive cold, etc.     EVALUATION OF THE PROGRESS TOWARD GOALS   Diet: Regular with Chocolate Ensure Max Protein with dinner.  Intake: Limited documentation of po intake although usually noted to be good.  Although pt usually only ate 1 meal/day at home w/ decreased appetite w/ gastroparesis and use of Oxempic, " he has usually been ordering 3 meals per day.  Usually providing > 3000 kcals and usually > 150 g protein/day.  If eating 2/3 of all meals, then this should meet his estimated needs.  Total CHO sent on trays was usually > 400 g/ day. Documented carb intake was usually 75% or more of that sent with meals      If eating 2/3 of all meals, then this should meet his estimated needs.     NEW FINDINGS   -Wt: Updated weight requested today.  23 88.9 kg (195 lb 14.4 oz)    23 1255 87.3 kg (192 lb 7.4 oz) Standing scale   23 0755 87.6 kg (193 lb 2 oz) Bed scale   23 0113 87.2 kg (192 lb 3.9 oz) Bed scale     Weights PTA  11/10/23 88.7 kg (195 lb 9.6 oz)   10/30/23 86.2 kg (190 lb)   23 98.9 kg (218 lb)   02/10/23 96.2 kg (212 lb)   23 95.3 kg (210 lb)   22 100.2 kg (221 lb)   22 95.3 kg (210 lb)     Weight assessment: Wt stable since admission and for over past month.  Significant wt loss of 11% in the past 6 months (all within 5 months). 8% wt loss within the past year (no older weights within past year beyond 23)--not significant.     -Labs: blood sugars 130-224 over past 36 hr.    -GI: Last documented BM      -Skin: Per op note : I& D of right foot and ankle (area 11 cm x 3 cm x 4 cm deep),  VAC dressing exchange, delayed primary closure of anterolateral right ankle wound.  Increased granulation tissue at both wounds also per ortho op note.    -Meds and vit/mineral supplements: IV ancef, 2000 mcg Vitamin B12 q day, diflucan (+ surgical culture), folic acid, culturell 2x/d, levothyroxine, Linzess, thera-vit-min q day, protonix EC, miralax, Vitamin C 500 mg, Vitamin D3 50 mcg  DM Rx: Medium scale correction novolog ac and hs, 1 unit novolo g CHO w/ each meal, 18 units NPH BID, metformin XR 1000 mg 2x/d.     MALNUTRITION  % Intake: No decreased intake noted--improved since admission  % Weight Loss: > 10% in 6 months (severe)  Subcutaneous Fat Loss: Upper arm:   mild  Muscle Loss: Upper arm (bicep, tricep):  mild and Posterior calf:  mild (w/ some related to neuropathy/past foot injury).   Fluid Accumulation/Edema: None noted  Malnutrition Diagnosis: Moderate malnutrition in the context of chronic illness    Previous Goals   Patient to consume % of nutritionally adequate meal trays TID, or the equivalent with supplements/snacks   Evaluation: Met    Previous Nutrition Diagnosis  Inadequate oral intake PTA related to decreased appetite w/ Ozempic and pt's desire to maintain weight loss as evidenced by pt report of eating only one meal/day with meat and side dish.    Evaluation: Improving    CURRENT NUTRITION DIAGNOSIS  Increased nutrient needs for protein and micronutrients (Vitamin C, Zn) related to wounds as evidenced by estimated needs and standard guidelines for wound healing nutrients.       INTERVENTIONS  Implementation  Continue current ONS.  Discontinue Vitamin C per discussion w/ pt  Collaborate with other providers.     Goals  Patient to consume % of nutritionally adequate meal trays TID, or the equivalent with supplements/snacks.    Monitoring/Evaluation  Progress toward goals will be monitored and evaluated per protocol.    Cindy Harrell) Constantino RD, LD   6B and 8A Med/surg (M-F) Pager: [phone]  Weekend/holiday pager: [phone]

## 2023-12-12 NOTE — PROGRESS NOTES
[facility]    Medicine Progress Note - Hospitalist Service, GOLD TEAM 18    Date of Admission:  11/23/2023    Assessment & Plan   58 year old male with a pmh of T2DM with severe peripheral neuropathy, papillary thyroid malignancy in remission s/p thyroidectomy, HLD, and right Charcot foot reconstruction in 1/2022 complicated by post operative wound infection progressing to osteomyelitis. Patient was admitted to medical floor. Bcx growing G + cocci. S/p multiple I&D most recently 12/4 and 12/7, per op note no purulence and all beads were removed. Continuing antibiotics per prior to procedure.     #Right foot osteomyelitis  #Sepsis-resloved  #Candida albicans infection  #Right Charcot foot reconstruction 1/2022  - Initially had Charcot right foot reconstruction in 1/2022. Patient was later hospitalized and I&D in 1/2023 had intra operative cultures growing Staphylococcus epidermis (oxacillin resistant) and Staphylococcus caprae.  During most recent admission in 11/2023 I & D cultures grew Candida albicans and he was discharged on 6 weeks of fluconazole.  - Continue on broad spectrum ABX. ID consult. Continue trending WBC. Follow-up cultures.   - WOC consult.   - Orthopedic surgery following the patient. S/p irrigation and debridement of right foot and ankle, VAC dressing application, and antibiotic bead placement.      - Status post I&D on 12/4, 12/7  - Planned repeat I&D Today 12/11     ID following the patient.   Recs:(12/4)  Can narrow empiric zosyn to IV cefazolin 2g q8hr to better target presumed MSSA (ordered)  Can continue empiric IV vancomycin (dosed by pharmacy) while awaiting final Staph aureus susceptibilities  Follow up pending blood cultures   Please check daily peripheral blood cultures (including today) until negative x72 hrs (ordered)  Check TTE to evaluate for vegetations (ordered)--> unremarkable  Agree with ortho consult for surgical management for  "source control - please send deep tissue cultures if I&D is performed to help further guide antibiotics     Bcx ngtd. TTE was negative, defer SHIVANI to ID.       #T2DM  #Severe peripheral neuropathy  - Hyperglycemia noted.    - Will continue outpatient gabapentin  - Continue on sliding scale insulin, increase carb count 1:12. Continue on metformin.   - Serum glucose appears suboptimally controlled  - Increase insulin NPH to 18 units subcutaneous twice daily  - As an outpatient he is on jardiance, metformin, and Ozempic     #Papillary thyroid malignancy in remission s/p thyroidectomy with associated hypothyroidism  - Continue on PTA levothyroxine     #Nausea  #Vomiting (resolved)  #CKD, stage 2  - Monitor renal function. Renal function stable.      #HLD  - PTA pravastatin     #MARGARITO  - Patient refuses CPAP due to claustrophobia  - Currently scheduled for inspire in December     #GERD  - Continue PTA omeprazole     #Chronic anemia  - Iron panel ordered and consistent with anemia of chronic disease.   - No need for transfusion at this time. Continue monitoring HGB.      #Hyponatremia   - Likely attributable to diminished appetite  - Continue to monitor  - Nutrition consult           Diet: Snacks/Supplements Adult: Ensure Max Protein (bariatric); With Meals  Regular Diet Adult    DVT Prophylaxis: Pneumatic Compression Devices  Jones Catheter: Not present  Lines: PRESENT      PICC 12/02/23 Single Lumen Right Basilic antibiotics-Site Assessment: WDL      Cardiac Monitoring: None  Code Status: Full Code      Clinically Significant Risk Factors              # Hypoalbuminemia: Lowest albumin = 3.2 g/dL at 12/6/2023  6:54 AM, will monitor as appropriate           # DMII: A1C = 7.3 % (Ref range: <5.7 %) within past 6 months   # Overweight: Estimated body mass index is 27.62 kg/m  as calculated from the following:    Height as of this encounter: 1.778 m (5' 10\").    Weight as of this encounter: 87.3 kg (192 lb 7.4 oz).   # Moderate " Malnutrition: based on nutrition assessment      # Financial/Environmental Concerns: none         Disposition Plan          Patient will be returning to the OR for additional surgical interventions.    Gerson Harry MD  Hospitalist Service, GOLD TEAM 18  M [facility]  Securely message with [employer] (more info)  Text page via [facility] Paging/Directory   See signed in provider for up to date coverage information  ______________________________________________________________________    Interval History   Status post wound vac change on Monday. He typically gets wound vac changes twice weekly.   Patient was frustrated regarding consistent carbohydrate diet.  Diet transition to regular diet.  Patient had irrigation debridement 12/11/23  Per orthopedic surgery documentation, patient will be returning to the OR in the future.    Physical Exam   Vital Signs: Temp: 97.8  F (36.6  C) Temp src: Oral BP: (!) 159/96 Pulse: 91   Resp: 16 SpO2: 99 % O2 Device: None (Room air)    Weight: 192 lbs 7.39 oz    GENERAL: Alert and oriented x 3; no acute distress; well-nourished.  HEENT: Normocephalic; atraumatic; PERRLA; MMM.  CV: RRR; normal S1, S2; no rubs, murmurs, or gallops.  RESP: Lung fields clear to aucultation B/L; no wheezing or crepitations.  GI: Abdomen is soft, nontender, nondistended; no organomegaly; normal bowel sounds.  : Deferred genital examination.   MSK: Right lower extremity wrapped with 1 drain; red show on the left foot.  DERM: Skin is intact; no rash, lesions, or skin breakdown.  NEURO: No focal deficits appreciated; strength & sensorium are grossly intact.  PSYCH: No active hallucinations; affect, insight appear within normal limits.    Medical Decision Making       45 MINUTES SPENT BY ME on the date of service doing chart review, history, exam, documentation & further activities per the note.      Data     I have personally reviewed the following data over the past 24  hrs:    N/A  \   N/A   / N/A     136 102 20.1 /  201 (H)   4.3 25 0.82 \     Procal: N/A CRP: 5.45 (H) Lactic Acid: N/A         Imaging results reviewed over the past 24 hrs:   No results found for this or any previous visit (from the past 24 hour(s)).

## 2023-12-12 NOTE — PLAN OF CARE
Goal Outcome Evaluation: Pain, Safety    Plan of Care Reviewed With: patient  Overall Patient Progress: improving    VS WNL. Denied pain. CMS intact except for baseline neuropathy on BLE R/T his diabetes. Dressing c/d/I. WV functioning well at 125 mmHg with very little amount of serosanguinous drainage. Had been transferring independently to chair and commode with NWB status on the RLE. Encouraged to keep the R foot elevated on pillows.

## 2023-12-12 NOTE — CARE PLAN
"VS: BP (!) 159/96 (BP Location: Left arm)   Pulse 91   Temp 97.8  F (36.6  C) (Oral)   Resp 16   Ht 1.778 m (5' 10\")   Wt 87.3 kg (192 lb 7.4 oz)   SpO2 99%   BMI 27.62 kg/m      Orientation Pt is Alert and Oriented x 4   Mobility/ Activity Pt stand/pivots independently to commode.    Bowel/Bladder: Pt continent of both bowel and bladder and uses bedside commode.   IV /LDA PICC line in R upper arm.    Diet Regular Diet.Tolerating well. ACHS Blood sugar check and meal carb coverage.    Gastrointestinal Bowel sounds present. WDL.   Skin / Wounds / Dressings: Wound on Right foot. Wound Vac in place and draining.   Pain Pt denies pain.    Equipment: Uses wheelchair at baseline   Circulation/ Sensation Numbness present bilaterally in Lower extremities. Pulses present.    PRNS N/A   Plan Plan for additional I&Ds.    Additional info: Pt. Showered today.      "

## 2023-12-13 NOTE — PROGRESS NOTES
[facility]    Medicine Progress Note - Hospitalist Service, GOLD TEAM 18    Date of Admission:  11/23/2023    Assessment & Plan   58 year old male with a pmh of T2DM with severe peripheral neuropathy, papillary thyroid malignancy in remission s/p thyroidectomy, HLD, and right Charcot foot reconstruction in 1/2022 complicated by post operative wound infection progressing to osteomyelitis. Patient was admitted to medical floor. Bcx growing G + cocci. S/p multiple I&D most recently 12/4 and 12/7, per op note no purulence and all beads were removed. Continuing antibiotics per prior to procedure.     #Right foot osteomyelitis  #Sepsis-resloved  #Candida albicans infection  #Right Charcot foot reconstruction 1/2022  - Initially had Charcot right foot reconstruction in 1/2022. Patient was later hospitalized and I&D in 1/2023 had intra operative cultures growing Staphylococcus epidermis (oxacillin resistant) and Staphylococcus caprae.  During most recent admission in 11/2023 I & D cultures grew Candida albicans and he was discharged on 6 weeks of fluconazole.  - Continue on broad spectrum ABX. ID consult. Continue trending WBC. Follow-up cultures.   - WOC consult.   - Orthopedic surgery following the patient. S/p irrigation and debridement of right foot and ankle, VAC dressing application, and antibiotic bead placement.      - Status post I&D on 12/4, 12/7  - Planned repeat I&D Today 12/11     ID following the patient.   Recs:(12/4)  Can narrow empiric zosyn to IV cefazolin 2g q8hr to better target presumed MSSA (ordered)  Can continue empiric IV vancomycin (dosed by pharmacy) while awaiting final Staph aureus susceptibilities  Follow up pending blood cultures   Please check daily peripheral blood cultures (including today) until negative x72 hrs (ordered)  Check TTE to evaluate for vegetations (ordered)--> unremarkable  Agree with ortho consult for surgical management for  "source control - please send deep tissue cultures if I&D is performed to help further guide antibiotics     Bcx ngtd. TTE was negative, defer SHIVANI to ID.       #T2DM  #Severe peripheral neuropathy  - Hyperglycemia noted.    - Will continue outpatient gabapentin  - Continue on sliding scale insulin, increase carb count 1:12. Continue on metformin.   - Serum glucose appears suboptimally controlled  - Increase insulin NPH to 18 units subcutaneous twice daily  - As an outpatient he is on jardiance, metformin, and Ozempic     #Papillary thyroid malignancy in remission s/p thyroidectomy with associated hypothyroidism  - Continue on PTA levothyroxine     #Nausea  #Vomiting (resolved)  #CKD, stage 2  - Monitor renal function. Renal function stable.      #HLD  - PTA pravastatin     #MARGARITO  - Patient refuses CPAP due to claustrophobia  - Currently scheduled for inspire in December     #GERD  - Continue PTA omeprazole     #Chronic anemia  - Iron panel ordered and consistent with anemia of chronic disease.   - No need for transfusion at this time. Continue monitoring HGB.      #Hyponatremia   - Likely attributable to diminished appetite  - Continue to monitor  - Nutrition consult           Diet: Snacks/Supplements Adult: Ensure Max Protein (bariatric); With Meals  Advance Diet as Tolerated: Regular Diet Adult    DVT Prophylaxis: Pneumatic Compression Devices  Jones Catheter: Not present  Lines: PRESENT             Cardiac Monitoring: None  Code Status: Full Code      Clinically Significant Risk Factors              # Hypoalbuminemia: Lowest albumin = 3.2 g/dL at 12/6/2023  6:54 AM, will monitor as appropriate           # DMII: A1C = 7.3 % (Ref range: <5.7 %) within past 6 months   # Overweight: Estimated body mass index is 28.11 kg/m  as calculated from the following:    Height as of this encounter: 1.778 m (5' 10\").    Weight as of this encounter: 88.9 kg (195 lb 14.4 oz).   # Moderate Malnutrition: based on nutrition assessment  "     # Financial/Environmental Concerns: none         Disposition Plan          Patient will be returning to the OR for additional surgical interventions.    Gerson Harry MD  Hospitalist Service, GOLD TEAM 18  M [facility]  Securely message with "[employer] (more info)  Text page via [employer] Paging/Directory   See signed in provider for up to date coverage information  ______________________________________________________________________    Interval History   RD concerned about malnutrition and vitamin levels. Patient states he wants to follow his intermittent fasting to keep his weight at PTA level and that he lost weight intentionally.   Status post wound vac change on Monday. He typically gets wound vac changes twice weekly.   Patient was frustrated regarding consistent carbohydrate diet.  Diet transition to regular diet.  Patient had irrigation debridement 12/11/23  Per orthopedic surgery documentation, patient will be returning to the OR in the future.    Physical Exam   Vital Signs: Temp: 98.6  F (37  C) Temp src: Oral BP: 136/73 Pulse: 95   Resp: 16 SpO2: 97 % O2 Device: None (Room air)    Weight: 195 lbs 14.4 oz    GENERAL: Alert and oriented x 3; no acute distress; well-nourished.  HEENT: Normocephalic; atraumatic; PERRLA; MMM.  CV: RRR; normal S1, S2; no rubs, murmurs, or gallops.  RESP: Lung fields clear to aucultation B/L; no wheezing or crepitations.  GI: Abdomen is soft, nontender, nondistended; no organomegaly; normal bowel sounds.  : Deferred genital examination.   MSK: Right lower extremity wrapped with 1 drain; red show on the left foot.  DERM: Skin is intact; no rash, lesions, or skin breakdown.  NEURO: No focal deficits appreciated; strength & sensorium are grossly intact.  PSYCH: No active hallucinations; affect, insight appear within normal limits.    Medical Decision Making       45 MINUTES SPENT BY ME on the date of service doing chart review, history,  exam, documentation & further activities per the note.      Data     I have personally reviewed the following data over the past 24 hrs:    N/A  \   N/A   / N/A     139 103 23.4 (H) /  179 (H)   4.4 26 0.89 \     Procal: N/A CRP: 3.00 Lactic Acid: N/A         Imaging results reviewed over the past 24 hrs:   No results found for this or any previous visit (from the past 24 hour(s)).

## 2023-12-13 NOTE — PROGRESS NOTES
"Orthopedic Surgery Progress Note: 12/13/2023    Subjective:   No acute events overnight.     Objective:   BP (P) 121/69 (BP Location: Left arm)   Pulse (P) 83   Temp (P) 98.5  F (36.9  C) (Oral)   Resp 16   Ht 1.778 m (5' 10\")   Wt 88.9 kg (195 lb 14.4 oz)   SpO2 97%   BMI 28.11 kg/m    No intake/output data recorded.  General: NAD. Resting comfortably in bed.  Respiratory: Nonlabored breathing  Musculoskeletal:  RLE: Dressing c/d/I. Vac in place, holding suction. Minimal output in cannister. No sensation in foot (baseline for patient)     Laboratory Data:  Lab Results   Component Value Date    WBC 7.7 12/11/2023    HGB 8.8 (L) 12/11/2023     12/11/2023    INR 1.05 12/10/2023       Tissue cultures from OR 11/27 MSSA and candida    Assessment & Plan:   Nehemiah Magallon is a 58 year old male with a history significant for with T2DM with severe peripheral neuropathy with past right Charcot foot reconstruction in Jan-2022 complicated by post-operative wound infection with multiple recent I&D of the right foot with subsequent wound vac exchanges with positive cultures for candida with Dr. Starr on 11/02/23 and 11/08/23 admitted for sepsis and concern for worsening RLE osteomyelitis.      Plan for Today:  - NPO at midnight for OR tomorrow a.m.  - PT/OT  - ABX      Medicine primary  Admit:  Return to [facility], medicine primary with Orthopaedic and Infectious Disease consultations.  Diet:  NPO midnight   Antibiotics:  Continue current antibiotic regimen of cefazolin and fluconazole as directed by Infectious Disease.  Pain management:  Multimodal. Wean from IV to oral medications.  Weightbearing status:  Strict nonweightbearing on the right lower extremity.  Use appropriate assistive gait devices and assistance (nursing/PT) for ambulation.  Activity:  May be up ad pancho for ADLs, PT, diagnostic tests, etc., but encourage elevation of the right above the level of the heart.  Float heels off of the bed.  Up with assistance " until independent.  PT/OT:  Gait training, transfers, ADLs.  Labs:  Per primary team.  Trend acute phase reactants.  Xrays:  None at this time.  Immobilization:  None at this time.  Dressings: Keep right foot dressings clean, dry, and intact.  Maintain VAC machine at 125 mm Hg continuous.  DVT prophylaxis:  Deferred to primary team.  Suggest at least mechanical SCDs.  Consultations:  PT, OT, Medicine, Orthopaedics, Infectious Disease.  Disposition:  Pending at this time.  Will likely need continuing ongoing operative debridements and VAC changes next procedure 12/14.     Orthopedic surgery staff for this patient is Dr. Starr    ------------------------------------------------------------------------------------------    Respectfully,    Joesph Smith MD  Orthopedic Surgery PGY4

## 2023-12-13 NOTE — PLAN OF CARE
PGoal Outcome Evaluation:      Plan of Care Reviewed With: patient    Overall Patient Progress: improvingOverall Patient Progress: improving     Pt is alert and oriented x4, on room air, able to make needs known. Pt is scheduled for another I & D tomorrow, will be NPO after midnight tonight. Denies pain, PICC intact to right upper arm, denies SOB, wound vac intact iv abx infused, pt is NPO after midnoc for surgery tomorrow. Disposition TBD, cont plan of care

## 2023-12-13 NOTE — PROGRESS NOTES
VS:     Vital signs:  Temp: (P) 98.5  F (36.9  C) Temp src: (P) Oral BP: (P) 121/69 Pulse: (P) 83   Resp: 16 SpO2: 97 % O2 Device: None (Room air)       Output:       Continent of bowel and bladder,uses commode  LBM is 12/12/2023   Activity:       Independent with cares and ambulation in his room  and non weight bearing on the right lower extremities       Skin/Wounds/dressings  Wound on right foot, wound vac in place and patent     Pain:       Denied pain when asked   CMS:       Denies numbness and tingling in all extremities but verbalizes .      Dressing:       Dressing to wound is intact    Diet:       Pt is on a regular diet and appetite was good this shift.       LDA:       PICC is patent in the right and arm.      Equipment:     Wound VAC on and is patent    Plan:     Will continue plan of care   Additional Info:       none .

## 2023-12-14 NOTE — TELEPHONE ENCOUNTER
[facility] Call Center    Phone Message    May a detailed message be left on voicemail: yes     Reason for Call: Other: Raul Nehemiah's wound vac order has run out and they need a new one for him please fax to [phone] . Any questions call Myrna VICK    Action Taken: Other: CSC    Travel Screening: Not Applicable

## 2023-12-14 NOTE — PROGRESS NOTES
[facility]    Medicine Progress Note - Hospitalist Service, GOLD TEAM 18    Date of Admission:  11/23/2023    Assessment & Plan   58 year old male with a pmh of T2DM with severe peripheral neuropathy, papillary thyroid malignancy in remission s/p thyroidectomy, HLD, and right Charcot foot reconstruction in 1/2022 complicated by post operative wound infection progressing to osteomyelitis. Patient was admitted to medical floor. Bcx growing G + cocci. S/p multiple I&D most recently 12/4 and 12/7, per op note no purulence and all beads were removed. Continuing antibiotics per prior to procedure.     #Right foot osteomyelitis  #Sepsis-resloved  #Candida albicans infection  #Right Charcot foot reconstruction 1/2022  - Initially had Charcot right foot reconstruction in 1/2022. Patient was later hospitalized and I&D in 1/2023 had intra operative cultures growing Staphylococcus epidermis (oxacillin resistant) and Staphylococcus caprae.  During most recent admission in 11/2023 I & D cultures grew Candida albicans and he was discharged on 6 weeks of fluconazole.  - Continue on broad spectrum ABX. ID consult. Continue trending WBC. Follow-up cultures.   - WOC consult.   - Orthopedic surgery following the patient. S/p irrigation and debridement of right foot and ankle, VAC dressing application, and antibiotic bead placement.      - Status post I&D on 12/4, 12/7  - Planned repeat I&D Today 12/11     ID following the patient.   Recs:(12/4)  Can narrow empiric zosyn to IV cefazolin 2g q8hr to better target presumed MSSA (ordered)  Can continue empiric IV vancomycin (dosed by pharmacy) while awaiting final Staph aureus susceptibilities  Follow up pending blood cultures   Please check daily peripheral blood cultures (including today) until negative x72 hrs (ordered)  Check TTE to evaluate for vegetations (ordered)--> unremarkable  Agree with ortho consult for surgical management for  "source control - please send deep tissue cultures if I&D is performed to help further guide antibiotics     Bcx ngtd. TTE was negative, defer SHIVANI to ID.       #T2DM  #Severe peripheral neuropathy  - Hyperglycemia noted.    - Will continue outpatient gabapentin  - Continue on sliding scale insulin, increase carb count 1:12. Continue on metformin.   - Serum glucose appears suboptimally controlled  - Increase insulin NPH to 18 units subcutaneous twice daily  - As an outpatient he is on jardiance, metformin, and Ozempic     #Papillary thyroid malignancy in remission s/p thyroidectomy with associated hypothyroidism  - Continue on PTA levothyroxine     #Nausea  #Vomiting (resolved)  #CKD, stage 2  - Monitor renal function. Renal function stable.      #HLD  - PTA pravastatin     #MARGARITO  - Patient refuses CPAP due to claustrophobia  - Currently scheduled for inspire in December     #GERD  - Continue PTA omeprazole     #Chronic anemia  - Iron panel ordered and consistent with anemia of chronic disease.   - No need for transfusion at this time. Continue monitoring HGB.      #Hyponatremia   - Likely attributable to diminished appetite  - Continue to monitor  - Nutrition consult           Diet: Snacks/Supplements Adult: Ensure Max Protein (bariatric); With Meals  Advance Diet as Tolerated: Regular Diet Adult    DVT Prophylaxis: Pneumatic Compression Devices  Jones Catheter: Not present  Lines: PRESENT      PICC 12/02/23 Single Lumen Right Basilic antibiotics-Site Assessment: WDL      Cardiac Monitoring: None  Code Status: Full Code      Clinically Significant Risk Factors              # Hypoalbuminemia: Lowest albumin = 3.2 g/dL at 12/6/2023  6:54 AM, will monitor as appropriate           # DMII: A1C = 7.3 % (Ref range: <5.7 %) within past 6 months   # Overweight: Estimated body mass index is 28.11 kg/m  as calculated from the following:    Height as of this encounter: 1.778 m (5' 10\").    Weight as of this encounter: 88.9 kg " (195 lb 14.4 oz).   # Moderate Malnutrition: based on nutrition assessment      # Financial/Environmental Concerns: none         Disposition Plan          Patient will be returning to the OR for additional surgical interventions.    Gerson Harry MD  Hospitalist Service, GOLD TEAM 18  [facility]  Securely message with Linki (more info)  Text page via [facility] Paging/Directory   See signed in provider for up to date coverage information  ______________________________________________________________________    Interval History   RD concerned about malnutrition and vitamin levels. Patient states he wants to follow his intermittent fasting to keep his weight at PTA level and that he lost weight intentionally. Electrolytes and vitamin levels within normal limits  Status post wound vac change on Monday. He typically gets wound vac changes twice weekly.   Patient was frustrated regarding consistent carbohydrate diet.  Diet transition to regular diet.  Patient had irrigation debridement 12/11/23  Per orthopedic surgery documentation, patient will be returning to the OR in the future.    Physical Exam   Vital Signs: Temp: 98.9  F (37.2  C) Temp src: Oral BP: (!) 146/81 Pulse: 95   Resp: 16 SpO2: 98 % O2 Device: None (Room air)    Weight: 195 lbs 14.4 oz    GENERAL: Alert and oriented x 3; no acute distress; well-nourished.  HEENT: Normocephalic; atraumatic; PERRLA; MMM.  CV: RRR; normal S1, S2; no rubs, murmurs, or gallops.  RESP: Lung fields clear to aucultation B/L; no wheezing or crepitations.  GI: Abdomen is soft, nontender, nondistended; no organomegaly; normal bowel sounds.  : Deferred genital examination.   MSK: Right lower extremity wrapped with 1 drain; red show on the left foot.  DERM: Skin is intact; no rash, lesions, or skin breakdown.  NEURO: No focal deficits appreciated; strength & sensorium are grossly intact.  PSYCH: No active hallucinations; affect, insight  appear within normal limits.    Medical Decision Making       45 MINUTES SPENT BY ME on the date of service doing chart review, history, exam, documentation & further activities per the note.      Data     I have personally reviewed the following data over the past 24 hrs:    N/A  \   N/A   / N/A     139 104 22.6 /  160 (H)   4.5 25 0.92 \     Procal: N/A CRP: 3.66 Lactic Acid: N/A         Imaging results reviewed over the past 24 hrs:   No results found for this or any previous visit (from the past 24 hour(s)).

## 2023-12-14 NOTE — TELEPHONE ENCOUNTER
RN called [employer] supplier to let her know that the patient is in the hospital and they can hold the order for now as patient's disposition and wound care plan is to be determined based on his hospital admission.    Brandie Mayo RN

## 2023-12-14 NOTE — PROGRESS NOTES
"Care Management Follow Up    Length of Stay (days): 21    Expected Discharge Date:  TBD     Concerns to be Addressed: discharge planning     Patient plan of care discussed at interdisciplinary rounds: Yes    Anticipated Discharge Disposition: Home Care (open to [facility] prior to admission for wound vac dressing changes), Home Infusion     Anticipated Discharge Services: None  Anticipated Discharge DME:  wound vac    Patient/family educated on Medicare website which has current facility and service quality ratings: no  Education Provided on the Discharge Plan: yes   Patient/Family in Agreement with the Plan: yes    Referrals Placed by CM/SW: External Care Coordination; [facility]  Private pay costs discussed: Not applicable    Additional Information:    Relevant Contacts:  [facility] coordinator for pt  Ph: [phone]   ___________________________________________     Regarding details that can currently be added to auth form, Dr Adorno provided specifics in her note, date of service 11/29/23:  \"1. Current plan is for cefazolin 2g IV Q8H for 6 weeks from most recent surgery (11/30/23-1/11/24).   2. Dr. Nirali Argueta will be following patient's labs after discharge (fax number [phone]) and has appointment set up with patient for 12/18/23.\"    Require discharge date/start of home therapy date to complete form for [facility] to authorize home infusion services from [facility].  In the event the ID plan changes, auth form will need to be modified to reflect that.    I liaison and this writer have been following pt's case - he has returned to OR a number of times, and another OR visit is planned for today, per notes.    Updated [facility] liaison.        Care mgmt will continue to follow.    EMMANUELLE Hand  [facility]  Units 8M/S & 10 ICU  Pager: [phone]  Phone: [phone]    "

## 2023-12-14 NOTE — PLAN OF CARE
Nehemiah Magallon is a 58 year old male with a pmh of T2DM with severe peripheral neuropathy, papillary thyroid malignancy in remission s/p thyroidectomy, HLD, and right Charcot foot reconstruction complicated by post operative wound infection progressing to osteomyelitis. Patient is here due to growing G + cocci. Has had multiple I&D. He is A/O x4, denies pain, SOB, has numbness and tingling. R diet but NPO since midnight.Takes his pills whole with thin liquid. He is a BS check, IND in his room, transfers self to wheelchair and bedside commode.Has a wound vac that is infusing. Pt is having another I&D this morning @ 0730. Pt seems to be sleeping during rounds, continue POC.

## 2023-12-14 NOTE — ANESTHESIA PREPROCEDURE EVALUATION
Anesthesia Pre-Procedure Evaluation    Patient: Nehemiah Magallon   MRN: [id number] : 1965        Procedure : Procedure(s):  Irrigation and debridement foot, combined          Past Medical History:   Diagnosis Date    Chronic pain syndrome 10/24/2014    Diabetes mellitus, type 2 (H)     Diabetic Charcot foot (H)     Diabetic retinopathy associated with diabetes mellitus due to underlying condition (H)     Diverticulitis of colon     Gastroesophageal reflux disease     Hepatitis C 2017    treated    History of skin cancer     Hypertension     Hypothyroidism     Legally blind     Midfoot collapse of right lower extremity     Migraine     NAFLD (nonalcoholic fatty liver disease)     Nephrolithiasis     Neuropathy     MARGARITO (obstructive sleep apnea)     Rib pain 10/24/2014    S/P colectomy 10/14/2014    Thyroid cancer (H) 10/14/2014      Past Surgical History:   Procedure Laterality Date    ARTHRODESIS FOOT Right 2022    Procedure: Right midfoot/talonavicular osteotomy and reduction of deformity Right midfoot/talonavicular arthrodesis, achilles lengthening;  Surgeon: Bryon Starr MD;  Location: [facility] OR    CHOLECYSTECTOMY      COLECTOMY      @ 6 years ago, sigmoid    COLONOSCOPY      FOOT SURGERY Left     IRRIGATION AND DEBRIDEMENT FOOT, COMBINED Right 2023    Procedure: IRRIGATION AND DEBRIDEMENT, FOOT, RIGHT, Placement of Wound Vac and Antibiotic Beads.;  Surgeon: Bryon Starr MD;  [facility] OR    IRRIGATION AND DEBRIDEMENT FOOT, COMBINED Right 2023    Procedure: Debridement and Irrigation of Right Ankle and Foot;  Surgeon: Bryon Starr MD;  [facility]    IRRIGATION AND DEBRIDEMENT FOOT, COMBINED Right 2023    Procedure: IRRIGATION AND DEBRIDEMENT, RIGHT FOOT, WOUND VAC APPLICATION;  Surgeon: Bryon Starr MD;  [facility]    IRRIGATION AND DEBRIDEMENT FOOT, COMBINED Right 2023    Procedure: Irrigation And Debridement Right Foot, Wound vac placement and  antibiotic bead placement right ankle and foot;  Surgeon: Bryon Starr MD;  [facility]    IRRIGATION AND DEBRIDEMENT FOOT, COMBINED Right 12/4/2023    Procedure: Irrigation and debridement right foot and ankle;  Surgeon: Bryon Starr MD;  [facility]    IRRIGATION AND DEBRIDEMENT FOOT, COMBINED Right 12/11/2023    Procedure: Irrigation And Debridement Right Foot, Wound Vac exchange, partial closure;  Surgeon: Bryon Starr MD;  [facility]    IRRIGATION AND DEBRIDEMENT LOWER EXTREMITY, COMBINED Right 12/7/2023    Procedure: IRRIGATION AND DEBRIDEMENT Right Foot, Wound Vac Placement,;  Surgeon: Bryon Starr MD;  [facility]    LENGTHEN TENDON ACHILLES Right 11/17/2022    Procedure: LENGTHENING, TENDON, ACHILLES;  Surgeon: Bryon Starr MD;  [facility]    REMOVE HARDWARE FOOT Right 11/2/2023    Procedure: Remove Hardware Right Foot;  Surgeon: Bryon Starr MD;  [facility]      Allergies   Allergen Reactions    Doxycycline Rash     Very extensive full body rash lasting for several months. Given at same time as Rifampin.    Rifampin Rash     Very extensive full body rash lasting for several months. Given at the same time as doxycycline.    Atorvastatin      Other reaction(s): Hyperglycemia  pt also lists simvastatin?    Celebrex [Celecoxib] Other (See Comments)     Dehydration per [facility] records      Social History     Tobacco Use    Smoking status: Never    Smokeless tobacco: Never   Substance Use Topics    Alcohol use: Not Currently     Comment: rarely      Wt Readings from Last 1 Encounters:   12/12/23 88.9 kg (195 lb 14.4 oz)        Anesthesia Evaluation   Pt has had prior anesthetic.     No history of anesthetic complications       ROS/MED HX  ENT/Pulmonary:     (+) sleep apnea, doesn't use CPAP,         allergic rhinitis,                         (-) asthma, COPD and recent URI   Neurologic:     (+)    peripheral neuropathy, - bilateral lower extremities knees down, severe.  migraines,                       (-) no seizures, no CVA and no TIA   Cardiovascular:     (+)  hypertension (improved with weight loss)- -   -  - -                                 Previous cardiac testing   Echo: Date: 3/7/2019 Results:  Summary:  1.  Normal left ventricular size and function (visual EF 55 to 60%, biplane EF 60%).  2.  Moderate concentric left ventricular hypertrophy.  3.  LV diastolic function is indeterminate.  Stress Test:  Date: Results:    ECG Reviewed:  Date: 7/28/2021 Results:  NSR  Cath:  Date: Results:   (-) dyslipidemia   METS/Exercise Tolerance:  Comment: METS limited due to severe neuropathy. Will use forearm crutch or manual wheelchair primarily. Denies chest pain/pressure, INFANTE, orthopnea, dizziness, palpitations, edema.     Hematologic:     (+)      anemia,       (-) history of blood clots and history of blood transfusion   Musculoskeletal: Comment: Charcot foot  Chronic osteomyelitis  (+)  arthritis,             GI/Hepatic: Comment: History of diverticulitis s/p colectomy  History of cholecystectomy    (+) GERD, Asymptomatic on medication,   hepatitis resolved      hepatitis type C, liver disease (NAFLD),       Renal/Genitourinary:     (+) renal disease,  Pt does not require dialysis,           Endo:     (+)  type II DM, Last HgA1c: 6.3,  Not using insulin, - not using insulin pump.  not previously admitted for DM/DKA. Diabetic complications: neuropathy. thyroid problem, hypothyroidism,           Psychiatric/Substance Use:     (+) psychiatric history other (comment) (Insomnia)       Infectious Disease:  - neg infectious disease ROS     Malignancy:   (+) Malignancy, History of Skin and Other.Skin CA Remission status post Surgery.  Other CA Thyroid cancer Remission status post Surgery.    Other:      (+)  , H/O Chronic Pain,, other significant disability Blind         Physical Exam    Airway  airway exam normal      Mallampati: II   TM distance: > 3 FB   Neck ROM: full   Mouth opening:  "> 3 cm    Respiratory Devices and Support         Dental       (+) Minor Abnormalities - some fillings, tiny chips      Cardiovascular   cardiovascular exam normal          Pulmonary   pulmonary exam normal            Other findings: Placement Date: 11/02/23; Placement Time: 1440 (created via procedure documentation); Mask Ventilation: 2; Induction Type: Intravenous; Ease of Intubation: Easy; Technique: Video laryngoscopy; ETT Type: Single, Oral; Tube Size: 7.5 mm; VL Blade Size: Glide scope 4; Grade View: 1; Adjucts: Stylet; Placement Person: CRNA; Attempts: 1; Depth: 23 cm    OUTSIDE LABS:  CBC:   Lab Results   Component Value Date    WBC 7.7 12/11/2023    WBC 8.3 12/10/2023    HGB 8.8 (L) 12/11/2023    HGB 9.0 (L) 12/10/2023    HCT 29.0 (L) 12/11/2023    HCT 29.5 (L) 12/10/2023     12/11/2023     12/10/2023     BMP:   Lab Results   Component Value Date     12/14/2023     12/13/2023    POTASSIUM 4.5 12/14/2023    POTASSIUM 4.4 12/13/2023    CHLORIDE 104 12/14/2023    CHLORIDE 103 12/13/2023    CO2 25 12/14/2023    CO2 26 12/13/2023    BUN 22.6 12/14/2023    BUN 23.4 (H) 12/13/2023    CR 0.92 12/14/2023    CR 0.89 12/13/2023     (H) 12/14/2023     (H) 12/14/2023     COAGS:   Lab Results   Component Value Date    PTT 31 12/04/2023    INR 1.05 12/10/2023     POC: No results found for: \"BGM\", \"HCG\", \"HCGS\"  HEPATIC:   Lab Results   Component Value Date    ALBUMIN 3.2 (L) 12/06/2023    PROTTOTAL 6.7 12/06/2023    ALT <5 12/06/2023    AST 13 12/06/2023    ALKPHOS 71 12/06/2023    BILITOTAL <0.2 12/06/2023     OTHER:   Lab Results   Component Value Date    LACT 0.8 11/24/2023    A1C 7.3 (H) 10/30/2023    ALICE 8.7 12/14/2023    MAG 2.0 01/21/2023    TSH 1.80 01/21/2023    CRP <2.9 02/06/2023    SED 71 (H) 11/24/2023       Anesthesia Plan    ASA Status:  3    NPO Status:  NPO Appropriate    Anesthesia Type: General.     - Airway: LMA   Induction: Intravenous.   Maintenance: Balanced. "   Techniques and Equipment:     - Lines/Monitors: BIS     Consents    Anesthesia Plan(s) and associated risks, benefits, and realistic alternatives discussed. Questions answered and patient/representative(s) expressed understanding.     - Discussed:     - Discussed with:  Patient      - Extended Intubation/Ventilatory Support Discussed: No.      - Patient is DNR/DNI Status: No     Use of blood products discussed: No .     Postoperative Care    Pain management: IV analgesics, Oral pain medications, Multi-modal analgesia.   PONV prophylaxis: Ondansetron (or other 5HT-3), Dexamethasone or Solumedrol, Background Propofol Infusion     Comments:           H&P reviewed: Unable to attach VIRTUAL H&P to encounter due to EHR limitations. Appropriate H&P reviewed. The physical exam performed by anesthesia during this surgical encounter serves as the physical portion of that virtual H&P.  Any significant changes noted within this preop evaluation.         Hernan Beckham MD    I have reviewed the pertinent notes and labs in the chart from the past 30 days and (re)examined the patient.  Any updates or changes from those notes are reflected in this note.

## 2023-12-14 NOTE — PROGRESS NOTES
Brief orthopedic update:    Will plan for I&D of right ankle/foot later this afternoon. Please keep patient NPO for now.     Joesph Smith MD  Orthopedic Surgery PGY4

## 2023-12-15 NOTE — PROGRESS NOTES
Report received from OR. per OR RN report patient irrigation and debridement of right foot completed, VSS on room air denies pain. Coming back soon.

## 2023-12-15 NOTE — PLAN OF CARE
"Goal Outcome Evaluation:  Problem: Infection  Goal: Absence of Infection Signs and Symptoms  Outcome: Progressing     Problem: Pain Acute  Goal: Optimal Pain Control and Function  Outcome: Progressing  Intervention: Prevent or Manage Pain  Recent Flowsheet Documentation  Taken 12/14/2023 2243 by Ander Rodriguez RN  Medication Review/Management: medications reviewed  Intervention: Optimize Psychosocial Wellbeing  Recent Flowsheet Documentation  Taken 12/14/2023 2243 by Ander Rodriguez RN  Supportive Measures: active listening utilized     Problem: Adult Inpatient Plan of Care  Goal: Plan of Care Review  Description: The Plan of Care Review/Shift note should be completed every shift.  The Outcome Evaluation is a brief statement about your assessment that the patient is improving, declining, or no change.  This information will be displayed automatically on your shift  note.  Outcome: Progressing  Goal: Patient-Specific Goal (Individualized)  Description: You can add care plan individualizations to a care plan. Examples of Individualization might be:  \"Parent requests to be called daily at 9am for status\", \"I have a hard time hearing out of my right ear\", or \"Do not touch me to wake me up as it startles  me\".  Outcome: Progressing  Goal: Absence of Hospital-Acquired Illness or Injury  Outcome: Progressing  Intervention: Identify and Manage Fall Risk  Recent Flowsheet Documentation  Taken 12/14/2023 2243 by Ander Rodriguez RN  Safety Promotion/Fall Prevention:   safety round/check completed   room near nurse's station   clutter free environment maintained   increased rounding and observation   increase visualization of patient   lighting adjusted   mobility aid in reach  Intervention: Prevent Skin Injury  Recent Flowsheet Documentation  Taken 12/14/2023 2243 by Ander Rodriguez RN  Body Position: position changed independently  Intervention: Prevent Infection  Recent Flowsheet " "Documentation  Taken 12/14/2023 2245 by Ander Rodriguez, RN  Infection Prevention:   rest/sleep promoted   hand hygiene promoted  Goal: Optimal Comfort and Wellbeing  Outcome: Progressing  Goal: Readiness for Transition of Care  Outcome: Progressing   Blood pressure 109/57, pulse 94, temperature 98.6  F (37  C), temperature source Oral, resp. rate 16, height 1.778 m (5' 10\"), weight 88.9 kg (195 lb 14.4 oz), SpO2 97%.  Pt A&OX4 numbness BUE and BLE no tingling and no tenderness reported. Denied pain. Wound vacc machine is working on pt's RLE. Pt is on RA LS clear bilateral no SOB noted. Pt can transfer independently to bed side commode, Pt's scheduled medications administered. Call light within his reach and pt can express his need properly.                         "

## 2023-12-15 NOTE — ANESTHESIA POSTPROCEDURE EVALUATION
Patient: Nehemiah Magallon    Procedure: Procedure(s):  Irrigation and debridement right foot with wound vac exchange       Anesthesia Type:  General    Note:  Disposition: Inpatient   Postop Pain Control: Uneventful            Sign Out: Well controlled pain   PONV: No   Neuro/Psych: Uneventful            Sign Out: Acceptable/Baseline neuro status   Airway/Respiratory: Uneventful            Sign Out: Acceptable/Baseline resp. status   CV/Hemodynamics: Uneventful            Sign Out: Acceptable CV status; No obvious hypovolemia; No obvious fluid overload   Other NRE:    DID A NON-ROUTINE EVENT OCCUR?            Last vitals:  Vitals Value Taken Time   /57 12/14/23 1845   Temp 37  C (98.6  F) 12/14/23 1845   Pulse 94 12/14/23 1845   Resp 16 12/14/23 1845   SpO2 98 % 12/14/23 1846   Vitals shown include unfiled device data.    Electronically Signed By: Hernan Beckham MD  December 14, 2023  7:25 PM

## 2023-12-15 NOTE — PROGRESS NOTES
End of shift Summary: See flowsheet for VS and detail assessments.     Assessment: A&O x4, VSS on room air, denies chest pain and SOB.     Output: Patient is continent of B&B, void spontaneously without problem, last BM on 12/13/23 per patient report.     Activity:Independent in the room using bedside commode.     Skin: Visible skin intact . Wound dressing on RLE intact, wilder and clean.     Pain: denies pain      Neuro/CMS: A&O x4, denies numbness and tingling.      Dressings/Drains: R LE dressing intact wound vac draining serosanguineous in color.      IV: R upper arm PICC SL.    Additional info: Patient went to OR at 1550 for irrigation and debridement of R foot.     Plan: Continue current plan of care.

## 2023-12-15 NOTE — OR NURSING
Pt awake eating and drinking without nausea - vitals stable - pt denies pain - report given pt transported to floor on cart -

## 2023-12-15 NOTE — OR NURSING
PACU to Inpatient Nursing Handoff    Patient Nehemiah Magallon is a 58 year old male who speaks English.   Procedure Procedure(s):  Irrigation and debridement right foot with wound vac exchange   Surgeon(s) Primary: Bryon Starr MD     Allergies   Allergen Reactions    Doxycycline Rash     Very extensive full body rash lasting for several months. Given at same time as Rifampin.    Rifampin Rash     Very extensive full body rash lasting for several months. Given at the same time as doxycycline.    Atorvastatin      Other reaction(s): Hyperglycemia  pt also lists simvastatin?    Celebrex [Celecoxib] Other (See Comments)     [facility]       Isolation  [unfilled]     Past Medical History   has a past medical history of Chronic pain syndrome (10/24/2014), Diabetes mellitus, type 2 (H), Diabetic Charcot foot (H), Diabetic retinopathy associated with diabetes mellitus due to underlying condition (H), Diverticulitis of colon, Gastroesophageal reflux disease, Hepatitis C (2017), History of skin cancer, Hypertension, Hypothyroidism, Legally blind, Midfoot collapse of right lower extremity, Migraine, NAFLD (nonalcoholic fatty liver disease), Nephrolithiasis, Neuropathy, MARGARITO (obstructive sleep apnea), Rib pain (10/24/2014), S/P colectomy (10/14/2014), and Thyroid cancer (H) (10/14/2014).    Anesthesia General   Dermatome Level     Preop Meds Not applicable   Nerve block Not applicable   Intraop Meds midazolam 1 mg/mL (mg)   Total dose: 2 mg  Date/Time Rate/Dose/Volume Action Route Admin User Audit    12/14/23 1709 2 mg Given Intravenous Kal Álvarez APRN CRNA     fentaNYL 50 mcg/mL (mcg)   Total dose: 100 mcg  Date/Time Rate/Dose/Volume Action Route Admin User Audit    12/14/23 1716 50 mcg Given Intravenous Kal Álvarez APRN CRNA     1746 50 mcg Given Intravenous Kal Álvarez APRN CRNA     lidocaine 2% (mg)   Total dose: 100 mg  Date/Time Rate/Dose/Volume Action Route Admin  User Audit    12/14/23 1716 100 mg Given Intravenous Hernan Beckham MD     ePHEDrine 5 mg/mL in NS (mg)   Total dose: 15 mg  Date/Time Rate/Dose/Volume Action Route Admin User Audit    12/14/23 1732 5 mg Given Intravenous Kal Álvarez APRN CRNA     1742 10 mg Given Intravenous Kal Álvarez APRN CRNA     phenylephrine (MILIND-SYNEPHRINE) injection (mcg)   Total dose: 400 mcg  Date/Time Rate/Dose/Volume Action Route Admin User Audit    12/14/23 1721 200 mcg New Bag Intravenous Hernan Beckham MD     1732 100 mcg Bolus Intravenous Kal Álvarez APRN CRNA     1741 100 mcg Bolus Intravenous Kal Álvarez APRN CRNA     dexamethasone (DECADRON) 4 mg/mL (mg)   Total dose: 4 mg  Date/Time Rate/Dose/Volume Action Route Admin User Audit    12/14/23 1723 4 mg Given Intravenous Kal Álvarez APRN CRNA     ondansetron 2 mg/mL (mg)   Total dose: 4 mg  Date/Time Rate/Dose/Volume Action Route Admin User Audit    12/14/23 1748 4 mg Given Intravenous Kal Álvarez APRN CRNA     ceFAZolin (ANCEF) IV solution 2g/20 mL syringe (g)   Total dose: 2 g  Date/Time Rate/Dose/Volume Action Route Admin User Audit    12/14/23 1709 2 g (over 3 min) Given Intravenous Kal Álvarez APRN CRNA edited    propofol (DIPRIVAN) 10 mg/mL (mg)   Total dose: 200 mg  Date/Time Rate/Dose/Volume Action Route Admin User Audit    12/14/23 1716 200 mg Given Intravenous Hernan Beckham MD     phenylephrine 0.1 mg/mL infusion (mcg/kg/min) (mcg/kg/min)   Total dose: 0.18 mg Dosing weight: 88.9  Date/Time Rate/Dose/Volume Action Route Admin User Audit    12/14/23 1748 0.2 mcg/kg/min - 10.668 mL/hr New Bag Intravenous Kal Álvarez APRN CRNA     1758  Stopped Intravenous Preeti, Christopher Emerson, APRN CRNA     LR (mL)   Total volume: 400 mL    Date/Time Rate/Dose/Volume Action Route Admin User Audit    12/14/23 3838  New Bag Intravenous Kal Álvarez APRN CRNA      1820 400 mL Stopped Intravenous Kal Álvarez APRN CRNA        Local Meds No   Antibiotics cefazolin (Ancef) - last given at 1709     Pain Patient Currently in Pain: denies   PACU meds  Not applicable   PCA / epidural No   Capnography     Telemetry ECG Rhythm: Normal sinus rhythm   Inpatient Telemetry Monitor Ordered? Yes        Labs Glucose Lab Results   Component Value Date     12/14/2023     12/14/2023     02/06/2023       Hgb Lab Results   Component Value Date    HGB 8.8 12/11/2023       INR Lab Results   Component Value Date    INR 1.05 12/10/2023      PACU Imaging Not applicable     Wound/Incision Negative Pressure Wound Therapy Ankle Right;Lateral;Medial (Active)   Wound Type Surgical 12/14/23 0900   Unit Type ulta 12/14/23 0400   Dressing Pieces Removed (# of Each Type) 1;Silver foam 12/14/23 1748   Dressing Pieces Applied (# of Each Type) 1;Silver foam 12/14/23 1758   Cycle Continuous 12/14/23 1758   Target Pressure (mmHg) 125 12/14/23 1758   Drainage Color/Characteristics Serosanguineous 12/14/23 0900   Cannister changed? Yes 12/14/23 1758   Output (ml) 0 ml 12/13/23 0500   Number of days: 17       Incision/Surgical Site 12/07/23 Right;Lateral Ankle (Active)   Incision Assessment WDL 12/14/23 1758   Dressing Vacuum based 12/14/23 0900   Trina-Incision Assessment UTV 12/14/23 0900   Closure Approximated;Sutures 12/14/23 1758   Incision Drainage Amount UTV 12/14/23 1624   Drainage Description UTV 12/14/23 1624   Incision Care Therapy - negative pressure 12/14/23 1624   Dressing Intervention Clean, dry, intact;New dressing applied 12/14/23 1758   Number of days: 7       Incision/Surgical Site 12/07/23 Medial;Right Ankle (Active)   Incision Assessment WDL except 12/14/23 1758   Dressing Vacuum based 12/14/23 1758   Tirna-Incision Assessment UTV 12/14/23 0900   Closure DOMINGO 12/14/23 1624   Incision Drainage Amount UTV 12/14/23 1624   Incision Care Therapy - negative pressure  12/14/23 1758   Dressing Intervention Clean, dry, intact;New dressing applied 12/14/23 1758   Number of days: 7      CMS        Equipment Not applicable   Other LDA       IV Access PICC 12/02/23 Single Lumen Right Basilic antibiotics (Active)   Site Assessment WDL 12/14/23 0400   External Cath Length (cm) 3 cm 12/11/23 1529   Extremity Circumference (cm) 31 cm 12/02/23 1211   Dressing Chlorhexidine disk;Gauze;Transparent 12/14/23 0400   Dressing Status clean;dry;intact 12/14/23 0400   Dressing Intervention dressing changed 12/11/23 1529   Dressing Change Due 12/18/23 12/11/23 1529   Line Necessity Yes, meets criteria 12/14/23 0400   Status saline locked 12/14/23 0400   Cap Change Due 12/14/23 12/11/23 1529   Purple - Intervention Flushed 12/14/23 0400   Phlebitis Scale 0-->no symptoms 12/14/23 0400   Infiltration? no 12/14/23 0400   PICC Comment no s/s of VAD complication 12/11/23 1529   Number of days: 12      Blood Products Not applicable EBL 5 mL   Intake/Output Date 12/14/23 0700 - 12/15/23 0659   Shift 2550-8800 5313-8769 9251-4724 24 Hour Total   INTAKE   I.V.  400  400   Shift Total(mL/kg)  400(4.5)  400(4.5)   OUTPUT   Blood  5  5   Shift Total(mL/kg)  5(0.06)  5(0.06)   Weight (kg) 88.86 88.86 88.86 88.86      Drains / Jones Negative Pressure Wound Therapy Ankle Right;Lateral;Medial (Active)   Wound Type Surgical 12/14/23 0900   Unit Type ulta 12/14/23 0400   Dressing Pieces Removed (# of Each Type) 1;Silver foam 12/14/23 1748   Dressing Pieces Applied (# of Each Type) 1;Silver foam 12/14/23 1758   Cycle Continuous 12/14/23 1758   Target Pressure (mmHg) 125 12/14/23 1758   Drainage Color/Characteristics Serosanguineous 12/14/23 0900   Cannister changed? Yes 12/14/23 1758   Output (ml) 0 ml 12/13/23 0500   Number of days: 17      Time of void PreOp Time of Void Prior to Procedure: 0600 (12/14/23 0610)    PostOp Voided (mL): 1450 mL (uses commode shift total) (12/11/23 0619)  Urine Occurrence: 2 (12/14/23  0400)    Diapered? No   Bladder Scan      mL (12/14/23 0400)  tolerating sips     Vitals    B/P: 104/64  T: 98.8  F (37.1  C)    Temp src: Oral  P:  Pulse: 94 (12/14/23 1818)          R: 19  O2:  SpO2: 100 %        Family/support present none   Patient belongings     Patient transported on cart   DC meds/scripts (obs/outpt) Not applicable   Inpatient Pain Meds Released? Yes       Special needs/considerations None   Tasks needing completion None       Trena Caraballo, RN  ASCOM [id number]

## 2023-12-15 NOTE — H&P
[facility] ICU H&P  12/15/2023    Date of Hospital Admission: 11/23/23  Date of ICU Admission: December 15, 2023   Reason for Critical Care Admission: Cardiac arrest  Date of Service (when I saw the patient): 12/15/2023    ASSESSMENT: Nehemiah Magallon is a 58 year old male with PMH DM2 with severe peripheral neuropathy, papillary thyroid malignancy in remission s/p thyroidectomy, and right charcot foot reconstruction in January 2022 complicated by post operative wound infection that progressed to osteomyelitis. The patient was admitted to the ICU following cardiac arrest. A code blue was called and I arrived at the scene along with my attending Dr. Samuel. The patient was found down without a pulse in the bathroom where he was reportedly taking a shower. CPR had already been initiated by the time of our arrival. First CPR pause found the patient to be in asystole. CPR was continued and KRYSTEN was eventually applied. 4 rounds of CPR were continued where he received 2 mg of epinephrine. The patient was moved with a patient board to a bed and was brought back to his room with KRYSTEN device continuing compressions the entire time. The patient was then found to have a rhythm and CPR was stopped. He was started on norepinephrine. He shortly thereafter jon'ed down and went into PEA. One more round of CPR was done and again obtained ROSC. Epinephrine drip was also started. He was transferred to the [facility] ICU at this time.     Shortly after arrival to the ICU, the patient's bedside ultrasound was concerning for severe RV dysfunction with mild LV dysfunction. Troponins 74. His vent setting were a Vt 500, RR 16, FiO2 35%, and PEEP 7 with O2 saturations 100%. Given the minimal vent setting and lack of hypoxia, PE was lower on the differential. EKG showed new bundle branch block and so plans were made to transfer the patient to the [place] MICU with a cardiac ICU consultation as ACS vs arrhythmia was the leading differential.  Shortly thereafter, the patient's CT PE came back that revealed massive bilateral pulmonary embolisms. PERT was immediately activated and plans were arranged for the patient to go to the MICU emergently to receive IR intervention.    Given that the patient was in the [facility] ICU for only 4 hours and requiring bedside critical care during most of the course, in depth plans by systems was unable to be performed. Critical problems were addressed.    # Pulmonary Embolism   # Obstructive shock  - PERT was immediately activated.  - High intensity heparin drip started  - Patient transferred to [facility] for IR intervention    # Cardiac Arrest  # ROSC  # Hypotension  - Norepinephrine at 2.2  - Epinephrine at 0.15  - STAT ECHO   - Global and regional left ventricular function is normal with an EF of 60-65%.  - Flattened septum is consistent with right ventricular pressure overload.    - Global right ventricular function is severely reduced.  - Severe right ventricular dilation is present.  - Dunlap`s sign is present.  - Dilation of the inferior vena cava is present with abnormal respiratory variation in diameter.  - EKG concerning for new bundle branch block   - Cardiology consult was placed  - Left femoral arterial line placed upon arrival to the ICU  - Troponin 74 on initial    # Mechanical Ventilation  # Hypoxic and hypercapnic respiratory failure  Vent Mode: -- (sprvc/ps)  Resp Rate (Set): 16 breaths/min  Tidal Volume (Set, mL): 500 mL  PEEP (cm H2O): 7 cmH2O  Pressure Support (cm H2O): 10 cmH2O  Resp: 17  - chest xray with rib fractures otherwise no acute concerns    # C/F traumatic head injury  Given that the patient was found down in the bathroom after a shower, fall with traumatic head injury was considered.   - CT head negative for acute abnormality    #Lactic Acidosis  - Lactic acid initially was 19, down to 13 prior to transfer.        Disposition: Emergent transfer to [facility] for IR  intervention    Patient seen and findings/plan discussed with medical ICU staff, Dr. Samuel.    I spent a total of 180 minutes (excluding procedure time) personally providing and directing critical care services at the bedside and on the critical care unit for Nehemiah Brown PA-C on 12/15/2023 at 5:58 PM

## 2023-12-15 NOTE — PROGRESS NOTES
Called by radiology regarding findings of extensive bilateral pulmonary emboli with evidence of right heart strain.  Immediately made attempt to activate PERT team.  Systems operation center called back informing me PERT had already been activated.  Please reach out if there are any other immediate clinical concerns.

## 2023-12-15 NOTE — PROGRESS NOTES
[facility]    Medicine Progress Note - Hospitalist Service, GOLD TEAM 18    Date of Admission:  11/23/2023    Assessment & Plan   58 year old male with a pmh of T2DM with severe peripheral neuropathy, papillary thyroid malignancy in remission s/p thyroidectomy, HLD, and right Charcot foot reconstruction in 1/2022 complicated by post operative wound infection progressing to osteomyelitis. Patient was admitted to medical floor. Bcx growing G + cocci. S/p multiple I&D most recently 12/4 and 12/7, per op note no purulence and all beads were removed. Continuing antibiotics per prior to procedure.     #Right foot osteomyelitis  #Sepsis-resloved  #Candida albicans infection  #Right Charcot foot reconstruction 1/2022  - Initially had Charcot right foot reconstruction in 1/2022. Patient was later hospitalized and I&D in 1/2023 had intra operative cultures growing Staphylococcus epidermis (oxacillin resistant) and Staphylococcus caprae.  During most recent admission in 11/2023 I & D cultures grew Candida albicans and he was discharged on 6 weeks of fluconazole.  - Continue on broad spectrum ABX. ID consult. Continue trending WBC. Follow-up cultures.   - WOC consult.   - Orthopedic surgery following the patient. S/p irrigation and debridement of right foot and ankle, VAC dressing application, and antibiotic bead placement.      - Status post I&D on 12/4, 12/7  - Planned repeat I&D Today 12/11     ID following the patient.   Recs:(12/4)  Can narrow empiric zosyn to IV cefazolin 2g q8hr to better target presumed MSSA (ordered)  Can continue empiric IV vancomycin (dosed by pharmacy) while awaiting final Staph aureus susceptibilities  Follow up pending blood cultures   Please check daily peripheral blood cultures (including today) until negative x72 hrs (ordered)  Check TTE to evaluate for vegetations (ordered)--> unremarkable  Agree with ortho consult for surgical management for  source control - please send deep tissue cultures if I&D is performed to help further guide antibiotics     Bcx ngtd. TTE was negative, defer SHIVANI to ID.       #T2DM  #Severe peripheral neuropathy  - Hyperglycemia noted.    - Will continue outpatient gabapentin  - Continue on sliding scale insulin, increase carb count 1:12. Continue on metformin.   - Serum glucose appears suboptimally controlled  - Increase insulin NPH to 18 units subcutaneous twice daily  - As an outpatient he is on jardiance, metformin, and Ozempic     #Papillary thyroid malignancy in remission s/p thyroidectomy with associated hypothyroidism  - Continue on PTA levothyroxine     #Nausea  #Vomiting (resolved)  #CKD, stage 2  - Monitor renal function. Renal function stable.      #HLD  - PTA pravastatin     #MARGARITO  - Patient refuses CPAP due to claustrophobia  - Currently scheduled for inspire in December     #GERD  - Continue PTA omeprazole     #Chronic anemia  - Iron panel ordered and consistent with anemia of chronic disease.   - No need for transfusion at this time. Continue monitoring HGB.      #Hyponatremia   - Likely attributable to diminished appetite  - Continue to monitor  - Nutrition consult     Addendum: 12/15/23    Patient had an unresponsive state today on the floor.  Patient was given the usual medications in the morning there was not any reaction to the medications per the report I got from the nurse.  Patient was in the bathroom to take a shower.  He was not complaining of chest pain nor was he complaining of any shortness of breath at the time.  The patient Technician who was helping the patient check on the patient and he was fine.    Was in the bathroom for about 20 minutes or so and it took him longer to come out of the bathroom and therefore the nurse assistant checked on him and he was found on the floor unresponsive.  Patient's nurse arrived quickly and started CPR.  Patient was in PEA arrest.  CODE BLUE was called and the code  "was run.  He was in hyperglycemic state.  Patient also had normal electrolytes and vital signs and did not have any complaints.  Return of spontaneous circulation was achieved after the code and patient was on norepinephrine and epinephrine drip.  Intubated and on mechanical ventilator.  Ultrasound done in ICU by the critical care physician showed right ventricular dysfunction that is suspicious for acute MI.    Care is transferred to the intensive care postcardiac arrest and admission to ICU.          Diet: Snacks/Supplements Adult: Ensure Max Protein (bariatric); With Meals  Regular Diet Adult    DVT Prophylaxis: Pneumatic Compression Devices  Jones Catheter: Not present  Lines: PRESENT      PICC 12/02/23 Single Lumen Right Basilic antibiotics-Site Assessment: WDL      Cardiac Monitoring: None  Code Status: Full Code      Clinically Significant Risk Factors              # Hypoalbuminemia: Lowest albumin = 3.2 g/dL at 12/6/2023  6:54 AM, will monitor as appropriate           # DMII: A1C = 7.3 % (Ref range: <5.7 %) within past 6 months   # Overweight: Estimated body mass index is 28.11 kg/m  as calculated from the following:    Height as of this encounter: 1.778 m (5' 10\").    Weight as of this encounter: 88.9 kg (195 lb 14.4 oz).   # Moderate Malnutrition: based on nutrition assessment      # Financial/Environmental Concerns: none         Disposition Plan          Patient will be returning to the OR for additional surgical interventions.    Gerson Harry MD  Hospitalist Service, GOLD TEAM 18  [facility]  Securely message with [employer] (more info)  Text page via [facility] Paging/Directory   See signed in provider for up to date coverage information  ______________________________________________________________________    Interval History   RD concerned about malnutrition and vitamin levels. Patient states he wants to follow his intermittent fasting to keep his weight at PTA " level and that he lost weight intentionally. Electrolytes and vitamin levels within normal limits  Status post wound vac change on Monday. He typically gets wound vac changes twice weekly.   Patient was frustrated regarding consistent carbohydrate diet.  Diet transition to regular diet.  Patient had irrigation debridement 12/11/23  Per orthopedic surgery documentation, patient will be returning to the OR in the future.    Physical Exam   Vital Signs: Temp: 98.6  F (37  C) Temp src: Oral BP: 109/57 Pulse: 94   Resp: 16 SpO2: 97 % O2 Device: None (Room air)    Weight: 195 lbs 14.4 oz    GENERAL: Alert and oriented x 3; no acute distress; well-nourished.  HEENT: Normocephalic; atraumatic; PERRLA; MMM.  CV: RRR; normal S1, S2; no rubs, murmurs, or gallops.  RESP: Lung fields clear to aucultation B/L; no wheezing or crepitations.  GI: Abdomen is soft, nontender, nondistended; no organomegaly; normal bowel sounds.  : Deferred genital examination.   MSK: Right lower extremity wrapped with 1 drain; red show on the left foot.  DERM: Skin is intact; no rash, lesions, or skin breakdown.  NEURO: No focal deficits appreciated; strength & sensorium are grossly intact.  PSYCH: No active hallucinations; affect, insight appear within normal limits.    Medical Decision Making       45 MINUTES SPENT BY ME on the date of service doing chart review, history, exam, documentation & further activities per the note.      Data     I have personally reviewed the following data over the past 24 hrs:    N/A  \   N/A   / N/A     136 100 19.8 /  190 (H)   4.9 23 0.99 \     Procal: N/A CRP: 6.37 (H) Lactic Acid: N/A         Imaging results reviewed over the past 24 hrs:   No results found for this or any previous visit (from the past 24 hour(s)).

## 2023-12-15 NOTE — PROGRESS NOTES
RT responded to Code Blue. CPR ongoing and ambu mask. Pt intubated and tube secured 24@lip. Color change noted with equal chest rise. Labs and Xray pending. Pt transported to ICU and placed on Vent per MD orders. Will continue to follow

## 2023-12-15 NOTE — BRIEF OP NOTE
[facility]    Brief Operative Note    Pre-operative diagnosis: Osteomyelitis of ankle or foot, right, acute (H) [M86.171]  Post-operative diagnosis Same as pre-operative diagnosis    Procedure: Irrigation and debridement right foot with wound vac exchange, Right - Foot    Surgeon: Surgeon(s) and Role:     * Bryon Starr MD - Primary  Anesthesia: General   Estimated Blood Loss: 5 mL    Drains: One VAC dressing and tube.  Specimens: * No specimens in log *  Findings:   No grossly visible purulence seen. Anterolateral right ankle wound previously closed remained well approximated without drainage. Medial foot/ankle wound 9 cm length x 3 cm wide x 5 cm deep at deepest point in medial midfoot bony defect.  Smaller amount of exposed PTT .  Complications: None.  Implants: * No implants in log *        I called the patient's spouse, Jan, immediately postoperatively at [phone]. Findings and plan discussed. All questions answered.

## 2023-12-15 NOTE — ANESTHESIA CARE TRANSFER NOTE
Patient: Nehemiah Magallon    Procedure: Procedure(s):  Irrigation and debridement right foot with wound vac exchange       Diagnosis: Osteomyelitis of ankle or foot, right, acute (H) [M86.171]  Diagnosis Additional Information: No value filed.    Anesthesia Type:   General     Note:    Oropharynx: oropharynx clear of all foreign objects  Level of Consciousness: awake  Oxygen Supplementation: nasal cannula  Level of Supplemental Oxygen (L/min / FiO2): 3 LPM  Independent Airway: airway patency satisfactory and stable  Dentition: dentition unchanged  Vital Signs Stable: post-procedure vital signs reviewed and stable  Report to RN Given: handoff report given  Patient transferred to: PACU    Handoff Report: Identifed the Patient, Identified the Reponsible Provider, Reviewed the pertinent medical history, Discussed the surgical course, Reviewed Intra-OP anesthesia mangement and issues during anesthesia, Set expectations for post-procedure period and Allowed opportunity for questions and acknowledgement of understanding      Vitals:  Vitals Value Taken Time   /64 12/14/23 1818   Temp 37.1  C (98.8  F) 12/14/23 1818   Pulse 94 12/14/23 1818   Resp 19 12/14/23 1818   SpO2 100 % 12/14/23 1818       Electronically Signed By: CARLITO Galeas CRNA  December 14, 2023  6:19 PM

## 2023-12-15 NOTE — PLAN OF CARE
Goal Outcome Evaluation:           Overall Patient Progress: improvingOverall Patient Progress: improving    Outcome Evaluation: Pt is alert and oriented x 4, denies SOB, chest pain, no headache/dizziness, nausea/vomiting. Reports baseline numbness in  upper and lower exremities denies pain this shift. Refused CHG wipes. No change this shift, call light is in reach Pt is able to make needs known. POC ongoing

## 2023-12-15 NOTE — PROGRESS NOTES
PULMONARY EMBOLISM RESPONSE TEAM CONSULT     Called by the [facility] regarding Nehemiah Magallon for consideration of therapy for bilateral PE.       Nehemiah Magallon is a 58 year old M w/ h/o DMII Charcot foot multiple I&D. Unwitnessed CA in the hospital, rosc, intubated not hypoxic. CT PE large bilateral PE with rv strain.  TTE with Mcconells sign.  Trop 74.    Allergies   Allergen Reactions    Doxycycline Rash     Very extensive full body rash lasting for several months. Given at same time as Rifampin.    Rifampin Rash     Very extensive full body rash lasting for several months. Given at the same time as doxycycline.    Atorvastatin      Other reaction(s): Hyperglycemia  pt also lists simvastatin?    Celebrex [Celecoxib] Other (See Comments)     [facility]     Past Medical History:   Diagnosis Date    Chronic pain syndrome 10/24/2014    Diabetes mellitus, type 2 (H)     Diabetic Charcot foot (H)     Diabetic retinopathy associated with diabetes mellitus due to underlying condition (H)     Diverticulitis of colon     Gastroesophageal reflux disease     Hepatitis C 2017    treated    History of skin cancer     Hypertension     Hypothyroidism     Legally blind     Midfoot collapse of right lower extremity     Migraine     NAFLD (nonalcoholic fatty liver disease)     Nephrolithiasis     Neuropathy     MARGARITO (obstructive sleep apnea)     Rib pain 10/24/2014    S/P colectomy 10/14/2014    Thyroid cancer (H) 10/14/2014     Past Surgical History:   Procedure Laterality Date    ARTHRODESIS FOOT Right 11/17/2022    Procedure: Right midfoot/talonavicular osteotomy and reduction of deformity Right midfoot/talonavicular arthrodesis, achilles lengthening;  Surgeon: Bryon Starr MD;  [facility]: [facility]    CHOLECYSTECTOMY  1986    COLECTOMY      @ 6 years ago, sigmoid    COLONOSCOPY  2016    FOOT SURGERY Left 2021    IRRIGATION AND DEBRIDEMENT FOOT, COMBINED Right 1/21/2023    Procedure: IRRIGATION AND DEBRIDEMENT, FOOT,  RIGHT, Placement of Wound Vac and Antibiotic Beads.;  Surgeon: Bryon Starr MD;  [facility]    IRRIGATION AND DEBRIDEMENT FOOT, COMBINED Right 11/2/2023    Procedure: Debridement and Irrigation of Right Ankle and Foot;  Surgeon: Bryon Starr MD;  [facility]    IRRIGATION AND DEBRIDEMENT FOOT, COMBINED Right 11/8/2023    Procedure: IRRIGATION AND DEBRIDEMENT, RIGHT FOOT, WOUND VAC APPLICATION;  Surgeon: Bryon Starr MD;  [facility]    IRRIGATION AND DEBRIDEMENT FOOT, COMBINED Right 11/27/2023    Procedure: Irrigation And Debridement Right Foot, Wound vac placement and antibiotic bead placement right ankle and foot;  Surgeon: Bryon Starr MD;  [facility]    IRRIGATION AND DEBRIDEMENT FOOT, COMBINED Right 12/4/2023    Procedure: Irrigation and debridement right foot and ankle;  Surgeon: Bryon Starr MD;  [facility]    IRRIGATION AND DEBRIDEMENT FOOT, COMBINED Right 12/11/2023    Procedure: Irrigation And Debridement Right Foot, Wound Vac exchange, partial closure;  Surgeon: Bryon Starr MD;  [facility]    IRRIGATION AND DEBRIDEMENT FOOT, COMBINED Right 12/14/2023    Procedure: Irrigation and debridement right foot with wound vac exchange;  Surgeon: Bryon Starr MD;  [facility]    IRRIGATION AND DEBRIDEMENT LOWER EXTREMITY, COMBINED Right 12/7/2023    Procedure: IRRIGATION AND DEBRIDEMENT Right Foot, Wound Vac Placement,;  Surgeon: Bryon Starr MD;  [facility]    LENGTHEN TENDON ACHILLES Right 11/17/2022    Procedure: LENGTHENING, TENDON, ACHILLES;  Surgeon: Bryon Starr MD;  [facility]    REMOVE HARDWARE FOOT Right 11/2/2023    Procedure: Remove Hardware Right Foot;  Surgeon: Bryon Starr MD;  [facility]       Medications Prior to Admission   Medication Sig Dispense Refill Last Dose    acetaminophen (TYLENOL) 325 MG tablet Take 2 tablets (650 mg) by mouth every 4 hours as needed for other (For optimal non-opioid multimodal pain management to improve  pain control.)   Unknown    acetaminophen (TYLENOL) 500 MG tablet Take 1,000 mg by mouth 3 times daily   11/22/2023    ammonium lactate (AMLACTIN) 12 % external cream Apply topically 2 times daily as needed for dry skin   Past Week    augmented betamethasone dipropionate (DIPROLENE AF) 0.05 % external cream Apply topically 2 times daily   Past Week    cetirizine (ZYRTEC) 10 MG tablet Take 10 mg by mouth every morning   11/22/2023    cyanocobalamin (VITAMIN B-12) 1000 MCG tablet Take 2,000 mcg by mouth daily   11/22/2023 at am    empagliflozin (JARDIANCE) 25 MG TABS tablet Take 25 mg by mouth every morning   11/22/2023    fluconazole (DIFLUCAN) 200 MG tablet Take 2 tablets (400 mg) by mouth daily 60 tablet 1 11/22/2023 at am    fluticasone (FLONASE) 50 MCG/ACT nasal spray Spray 2 sprays into both nostrils 2 times daily   11/22/2023    folic acid (FOLVITE) 1 MG tablet Take 1 mg by mouth every morning   11/22/2023    gabapentin (NEURONTIN) 600 MG tablet Take 1 tablet by mouth 3 times daily   11/22/2023 at am    insulin  UNIT/ML vial Inject 10 Units Subcutaneous 2 times daily   11/22/2023 at am    lactobacillus rhamnosus, GG, (CULTURELL) capsule Take 1 capsule by mouth 2 times daily 60 capsule 0 11/22/2023    levothyroxine (SYNTHROID/LEVOTHROID) 150 MCG tablet Take 150 mcg by mouth every morning   11/22/2023    lidocaine (LIDODERM) 5 % patch Apply up to 3 patches to painful area at once for up to 12 h within a 24 h period.  Remove after 12 hours. (Patient taking differently: Apply up to 3 patches to painful area at once for up to 12 h within a 24 h period.  Remove after 12 hours. L foot.) 90 patch 0 11/22/2023    linaclotide (LINZESS) 145 MCG capsule Take 290 mcg by mouth every morning   11/22/2023 at am    MENTHOL-METHYL SALICYLATE EX Externally apply topically 4 times daily as needed (pain)   Past Week    metFORMIN (GLUCOPHAGE XR) 500 MG 24 hr tablet Take 1,000 mg by mouth 2 times daily (with meals)    "11/22/2023 at am    neomycin-bacitracin-polymyxin (NEOSPORIN) 5-400-5000 ointment Apply topically daily   Past Week    nitroGLYcerin (NITRO-BID) 2 % OINT ointment Place 1 inch onto the skin 2 times daily apply to ear.       omeprazole (PRILOSEC) 20 MG DR capsule Take 20 mg by mouth every morning   11/22/2023 at am    pravastatin (PRAVACHOL) 40 MG tablet Take 40 mg by mouth every evening   11/22/2023    selenium sulfide (SELSUN) 2.5 % external lotion Apply topically every other day apply to scalp   Past Week    Semaglutide (OZEMPIC, 1 MG/DOSE, SC) Inject 2 mg Subcutaneous once a week Sunday's   Past Month    topiramate (TOPAMAX) 100 MG tablet Take 150 mg by mouth every morning   11/22/2023    Vitamin D3 (CHOLECALCIFEROL) 25 mcg (1000 units) tablet Take 50 mcg by mouth every morning   11/22/2023    White Petrolatum-Mineral Oil ([facility] LUBRICATING EYE/OVERNIGHT) OINT Apply 0.5 inches to eye at bedtime   Past Week    zolpidem ER (AMBIEN CR) 12.5 MG CR tablet Take 12.5 mg by mouth nightly as needed for sleep   Past Week    Gauze Pads & Dressings ([facility] ISLAND DRESSING) 4\"X8\" PADS 1 Units daily 45 each 1     senna-docusate (SENOKOT-S/PERICOLACE) 8.6-50 MG tablet Take 1 tablet by mouth 2 times daily 60 tablet 0     sodium fluoride dental gel (PREVIDENT) 1.1 % GEL topical gel BRUSH SMALL AMOUNT MOUTH EVERY MORNING AND AT BEDTIME ON TOOTHBRUSH, BRUSH FOR 2 MINUTES.          No current outpatient medications on file.       Family History   Problem Relation Age of Onset    Diabetes Mother     Cancer Mother     Diabetes Father     Heart Disease Father     Anesthesia Reaction No family hx of     Clotting Disorder No family hx of     Glaucoma No family hx of     Macular Degeneration No family hx of      Social History     Socioeconomic History    Marital status:      Spouse name: Not on file    Number of children: Not on file    Years of education: Not on file    Highest education level: Not on file   Occupational History " "   Not on file   Tobacco Use    Smoking status: Never    Smokeless tobacco: Never   Substance and Sexual Activity    Alcohol use: Not Currently     Comment: rarely    Drug use: No    Sexual activity: Not Currently     Partners: Female   Other Topics Concern    Not on file   Social History Narrative    Not on file     Social Determinants of Health     Financial Resource Strain: Not on file   Food Insecurity: Not on file   Transportation Needs: Not on file   Physical Activity: Not on file   Stress: Not on file   Social Connections: Not on file   Interpersonal Safety: Not on file   Housing Stability: Not on file        Review Of Systems  The comprehensive 12 point review of systems was negative other than the concerns noted above.    Objective:   Vital signs: /86 (BP Location: Left arm)   Pulse 89   Temp (!) (P) 95.5  F (35.3  C)   Resp 16   Ht 1.778 m (5' 10\")   Wt 88.9 kg (195 lb 14.4 oz)   SpO2 98%   BMI 28.11 kg/m       Intake/Output Summary (Last 24 hours) at 12/15/2023 1551  Last data filed at 12/15/2023 1000  Gross per 24 hour   Intake 1243 ml   Output 5 ml   Net 1238 ml     Wt Readings from Last 1 Encounters:   12/12/23 88.9 kg (195 lb 14.4 oz)     General: intubated  HEENT: NC/AT, PERRLA  Neck: supple, carotid pulses present w/o bruits  Chest/Lungs: CTA b/l  Cardiovascular: RRR, +S1/S2, no m/r/g  Abdomen: Soft, NT/ND, no hepatosplenomegaly  Extremities: No cyanosis, clubbing, or edema; pulses intact distally    LABS:  CMP  Recent Labs   Lab 12/15/23  1353 12/15/23  1331 12/15/23  1234 12/15/23  1230 12/15/23  1211 12/15/23  0810 12/15/23  0746 12/14/23  0910 12/14/23  0612 12/13/23  0736 12/13/23  0721   NA  --   --  136  --  139  --  136  --  139  --  139   POTASSIUM  --   --  4.4  --  4.5  --  4.9  --  4.5  --  4.4   CHLORIDE  --   --  93*  --   --   --  100  --  104  --  103   CO2  --   --  11*  --   --   --  23  --  25  --  26   ANIONGAP  --   --  32*  --   --   --  13  --  10  --  10   GLC " "284* 513* 543* 342* 550*   < > 209*   < > 122*   < > 114*   BUN  --   --  20.3  --   --   --  19.8  --  22.6  --  23.4*   CR  --   --  1.26*  --   --   --  0.99  --  0.92  --  0.89   GFRESTIMATED  --   --  66  --   --   --  88  --  >90  --  >90   ALICE  --   --  9.6  --   --   --  8.8  --  8.7  --  8.7   MAG  --   --  3.1*  --   --   --   --   --   --   --   --    PHOS  --   --  11.2*  --   --   --   --   --   --   --   --    PROTTOTAL  --   --  6.5  6.5  --   --   --   --   --   --   --   --    ALBUMIN  --   --  3.4*  3.4*  --   --   --   --   --   --   --   --    BILITOTAL  --   --  <0.2  <0.2  --   --   --   --   --   --   --   --    ALKPHOS  --   --  93  93  --   --   --   --   --   --   --   --    AST  --   --  256*  256*  --   --   --   --   --   --   --   --    ALT  --   --  103*  103*  --   --   --   --   --   --   --   --     < > = values in this interval not displayed.     CBC  Recent Labs   Lab 12/15/23  1234 12/15/23  1211 12/11/23  0709 12/10/23  0800   WBC 9.9  --  7.7 8.3   RBC 3.87*  --  3.72* 3.79*   HGB 9.2* 9.7* 8.8* 9.0*   HCT 35.5*  --  29.0* 29.5*   MCV 92  --  78 78   MCH 23.8*  --  23.7* 23.7*   MCHC 25.9*  --  30.3* 30.5*   RDW 17.1*  --  16.3* 16.1*     --  317 337     INR  Recent Labs   Lab 12/15/23  1234 12/10/23  0800   INR 1.70* 1.05     Arterial Blood Gas  Recent Labs   Lab 12/15/23  1331 12/15/23  1211   PH 6.77*  --    PCO2 79*  --    PO2 269*  --    HCO3 11*  --    O2PER 65 21.0     LipidsNo lab results found in last 7 days.  TSHNo lab results found in last 7 days.  [id number] lab results found in last 7 days.  TroponinInvalid input(s): \"TROP\"    ASSESSMENT/PLAN   1. Risk stratification:   RV:LV ratio by CT: >1  RV enlargement and dysfunction by echo: Severe, with McConells sign  LE Doppler findings: pending - ordered for you  Biomarker data: Tpn 74, NT-proBNP pending - ordered for you  /86 (BP Location: Left arm)   Pulse 89   Temp (!) (P) 95.5  F (35.3  C)   Resp " "16   Ht 1.778 m (5' 10\")   Wt 88.9 kg (195 lb 14.4 oz)   SpO2 98%   BMI 28.11 kg/m      Classification of PE: massive arrested --> stablized now submassive      2. Treatment Recommendations:   Thrombectomy with IR  Management in the MICU  Likely provoked from prolonged hospitalization with out full theraputic ac     PE Response Team Colleagues present for the discussion included Interventional Radiology, Critical Care, and Cardiology.     Total critical care time spent 55 minutes, of which >50% was spent in face-to-face patient evaluation, reviewing data with patient, and coordination of care.      David Beverly MD  Critical Care Cardiology    December 15, 2023    "

## 2023-12-15 NOTE — PROVIDER NOTIFICATION
"    Interventional Radiology Service Note    Nehemiah Magallon is a 58 year old male with a PMHx of T2DM with severe peripheral neuropathy, right Charcot foot, HLD, papillary thyroid cancer in remission s/p thyroidectomy, and right Charcot foot reconstruction in 2022 c/b infection and osteomyelitis. Patient admitted to medical floor with right foot wound s/p I&D on 12/4 and 12/7. Patient was doing well post-op when he was found unresponsive in the floor of his bathroom today and CPR was started, as patient was in PEA. Code called and had ROSC after the code, and he was started on norepinephrine and epinephrine drip. He was intubated and on mechanical ventilation. US done by ICU team showed RV dysfunction. Troponin 74. BNP pending. Satting 98% on 40% FiO2. BP of 135/86 on pressures. Non-tachycardic or tachypneic. CT showed extensive bilateral segmental central pulmonary emboli. Findings consistent with massive PE with clot that is able to be intervened upon.    After discussion on PERT call, patient is being transferred from [facility] to [facility] ICU for IR thrombectomy tonight. Patient is currently on heparin drip, intubated, and still on pressors, however is currently stable per [facility] ICU team. IR will be in contact with admitting team as to when patient can be brought down for thrombectomy.    Labs WNL for procedure. Hgb and Platelets are WNL. INR of 1.7 on heparin drip.    Consent will be done prior to procedure.      Case discussed with Dr. Mora.      Vitals:   /86 (BP Location: Left arm)   Pulse 89   Temp (!) (P) 95.5  F (35.3  C)   Resp 16   Ht 1.778 m (5' 10\")   Wt 88.9 kg (195 lb 14.4 oz)   SpO2 98%   BMI 28.11 kg/m      Pertinent Labs:     Lab Results   Component Value Date    WBC 9.9 12/15/2023    WBC 7.7 12/11/2023    WBC 8.3 12/10/2023       Lab Results   Component Value Date    HGB 9.2 12/15/2023    HGB 9.7 12/15/2023    HGB 8.8 12/11/2023    HGB 9.0 12/10/2023       Lab Results "   Component Value Date     12/15/2023     12/11/2023     12/10/2023       Lab Results   Component Value Date    INR 1.70 (H) 12/15/2023    PTT 49 (H) 12/15/2023       Lab Results   Component Value Date    POTASSIUM 4.4 12/15/2023    POTASSIUM 4.5 12/15/2023    POTASSIUM 4.6 02/06/2023        Tim Hill MD  Interventional Radiology  Pager: [phone]

## 2023-12-15 NOTE — PROGRESS NOTES
"Orthopedic Surgery Progress Note: 12/15/2023    Subjective:   No acute events overnight.     Objective:   /86 (BP Location: Left arm)   Pulse 89   Temp 98.5  F (36.9  C) (Oral)   Resp 16   Ht 1.778 m (5' 10\")   Wt 88.9 kg (195 lb 14.4 oz)   SpO2 98%   BMI 28.11 kg/m    No intake/output data recorded.  General: NAD. Resting comfortably in bed.  Respiratory: Nonlabored breathing  Musculoskeletal:  RLE: Dressing c/d/I. Vac in place, holding suction. Minimal output in cannister. No sensation in foot (baseline for patient)     Laboratory Data:  Lab Results   Component Value Date    WBC 7.7 12/11/2023    HGB 8.8 (L) 12/11/2023     12/11/2023    INR 1.05 12/10/2023       Tissue cultures from OR 11/27 MSSA and candida    Assessment & Plan:   Nehemiah Magallon is a 58 year old male with a history significant for with T2DM with severe peripheral neuropathy with past right Charcot foot reconstruction in Jan-2022 complicated by post-operative wound infection with multiple recent I&D of the right foot with subsequent wound vac exchanges with positive cultures for candida with Dr. Starr on 11/02/23 and 11/08/23 admitted for sepsis and concern for worsening RLE osteomyelitis. now s/p multiple return trips to the OR for I&D and vac changes. Plan for I&D and vac changes until wounds can be closed. Next return to OR will be 12/18     Plan for Today:  - PT/OT  - ABX      Medicine primary  Admit:  Return to [facility], medicine primary with Orthopaedic and Infectious Disease consultations.  Diet:  ADAT  Antibiotics:  Continue current antibiotic regimen of cefazolin and fluconazole as directed by Infectious Disease.  Pain management:  Multimodal. Wean from IV to oral medications.  Weightbearing status:  Strict nonweightbearing on the right lower extremity.  Use appropriate assistive gait devices and assistance (nursing/PT) for ambulation.  Activity:  May be up ad pancho for ADLs, PT, diagnostic tests, etc., but encourage elevation " of the right above the level of the heart.  Float heels off of the bed.  Up with assistance until independent.  PT/OT:  Gait training, transfers, ADLs.  Labs:  Per primary team.  Trend acute phase reactants.  Xrays:  None at this time.  Immobilization:  None at this time.  Dressings: Keep right foot dressings clean, dry, and intact.  Maintain VAC machine at 125 mm Hg continuous.  DVT prophylaxis:  Deferred to primary team.  Suggest at least mechanical SCDs.  Consultations:  PT, OT, Medicine, Orthopaedics, Infectious Disease.  Disposition:  Pending at this time.  Will likely need continuing ongoing operative debridements and VAC changes next procedure 12/18.     Orthopedic surgery staff for this patient is Dr. Starr    ------------------------------------------------------------------------------------------    Respectfully,    Joesph Smith MD  Orthopedic Surgery PGY4

## 2023-12-16 NOTE — PLAN OF CARE
Code Blue was called. I evaluated the patient at the bedside for potential ECPR. ROSC was obtained.     If patient rearrests, this patient is only eligible for ECPR if he demonstrates purposeful movement prior to arrest.   The patient was discussed with Dr. Fuller.      Communicated directly with MICU staff.     Kris Laura   Cardiology Fellow

## 2023-12-16 NOTE — IR NOTE
Patient Name: Nehemiah Magallon  Medical Record Number: [id number]  Today's Date: 12/15/2023    Procedure: pulmonary angiogram with thrombectomy  Proceduralist: Dr. Bautista    Procedure Start: 1835  Procedure end: 1940  Sedation medications administered: none      Report given to: 4C RN    Other Notes: Pt arrived to IR room 1 from . Consent reviewed. Pt denies any questions or concerns regarding procedure. Pt positioned supine and monitored per protocol. BP rapidly dropping at 1910, SBP 30, CPR started, code called, procedure stopped. Refer to Code documentation for meds.  ROSC code team stabilizng pt. RFV sheath removed.  To MICU with stop at CT.

## 2023-12-16 NOTE — PROGRESS NOTES
MEDICAL ICU PROGRESS NOTE  12/15/2023      Date of Service (when I saw the patient): 12/15/2023    ASSESSMENT:   Nehemiah Magallon is a 58-year-old male with PMH DM2 with severe peripheral neuropathy, papillary thyroid malignancy in remission s/p thyroidectomy, and right charcot foot reconstruction in January 2022 complicated by post operative wound infection that progressed to osteomyelitis. The patient was admitted to the ICU following multiple PEA cardiac arrest and found to have bilateral pulmonary emboli. The patient was transferred to [place] for IR thrombectomy c/b PEA arrest with ROSC. On exam, patient is not responsive despite no sedation, and pupils are dilated and fixed. He is being admitted to the ICU for close neurological, cardiovascular, mechanical ventilation, and pressors.        PLAN:    Neuro:  #Pain and sedation   #Concern for  hypoxic ischemic brain injury, fixed dilated pupils  No traumatic injury on CT head. Gray-white matter differentiation of caudate and thalami is blurred.   - Ketamine gtt for pain/discomfort   - Neurocrit consult       Pulmonary:  # Pulmonary Embolism, massive, bilateral with RV strain s/p partial IR thrombectomy   # Obstructive Shock  # Acute Hypoxic Hypercapnic Respiratory Failure   Patient in PEA arrest with ROSC. POCUS with RV dysfunction, CT PE with massive bilateral pulmonary emboli.  PERT immediately activated and was transferred to IR on [place] for thrombectomy. Large thombi were removed with improvement in hemodynamics. Patient coded in IR. Procedure was aborted.   - Heparin gtt held due to concern for bleed (see below)  - CXR   - abg prn     Vent Mode: -- (sprvc/ps)  Resp Rate (Set): 16 breaths/min  Tidal Volume (Set, mL): 500 mL  PEEP (cm H2O): 7 cmH2O  Pressure Support (cm H2O): 10 cmH2O  Resp: 17      Cardiovascular:  # Multiple cardiac arrests, PEA, Rosc  # Hypotension (Likely multifactorial: obstructive, hypovolemic, septic, cardiogenic)  # Elevated  troponin 2/2 cardiac arrest  Patient was found down. Had cardiac arrest X 2 at [facility], found to have bilateral massive PE, transferred to [place] for thrombectomy c/b another PEA arrest.  - Norepinephrine, epinephrine, vasopressin, angiotensin II gtt   - Phenylephrine in line if needed   - IVF   - check oxyhemoglobin and CVP  - stat Echo ordered     #Lactic Acidosis  Lactic acid initially was 19, decreased to 13, now increasing after 3rd code   - serial lactates       GI/Nutrition:  NPO   - IV PPI    Elevated LFTs  , ->200s. Likely 2/2 shocked liver  - repeat CMP in AM      Renal/Fluids/Electrolytes:  # SHALINI, likely pre-renal   Baseline 0.8-1.Creatinine 0.99 12/15 am -> 1.26 prior to transfer, now 1.77  - CMP  - Monitor urine output closely     # AG Metabolic acidosis  - check BHB  - s/p 3amps bicarb, continue gtt    - serial CMP      Endocrine:  #Hyperglycemia   #T2DM  #Severe peripheral neuropathy  -  sliding scale insulin, hypoglycemia protocol   - Was on NPH 18 U BID, MSSI, Carb count 1:12  -- Hold metformin    - As an outpatient: Jardiance, metformin, and Ozempic     #Papillary thyroid malignancy in remission s/p thyroidectomy with associated hypothyroidism  - Continue on PTA levothyroxine    ID:  # Leukocytosis  # Hx Charcot foot s/p reconstruction c/b post op wound infection and Osteomyelitis  # MSSA + Candida foot infection s/p multiple I&D  Hx right Charcot foot reconstruction in Jan-2022 complicated by post-operative wound infection with multiple recent I&D of the right foot with subsequent wound vac exchanges with positive cultures for candida, admitted for sepsis and concern for worsening RLE osteomyelitis. now s/p multiple return trips to the OR for I&D and vac changes.   - Cefazolin broadened to Vanc/Cefepime   - Fluconazole changed to Micafungin due to QTC prolongation  - repeat Blood cultures  - Blood culture from this AM NGTD    Hematology:    # Anemia   # Thrombocytopenia   Hgb  "8.2 --> 7.0 during code at [facility]  Plt 242 --> 135 during code   - Serial CBC   - holding heparin gtt     # Coagulopathy   APTT >200s, Fibrinogen <61, INR 1.7 -->->6.12  - IV vitamin K 10mg X 1  - serial coags  - hold heparin gtt       Musculoskeletal:  # Hx charcot foot  - see above     Skin:  # Bruising noted in abdomen and back after getting to the ICU   Concerning in the setting of coagulopathy.   - continue to monitor     General Cares/Prophylaxis:    DVT Prophylaxis: none, concern for bleeding  GI Prophylaxis: PPI  Restraints: none  Family Communication: Wife and son updated at beside   Code Status: Patient arrived to the MICU as a full code. After discussion with patient's wife and son, they elected to switch to DNR.     Lines/tubes/drains:  - Double lumen RIJ  - Single lumen PICC, right basilic   - PIV X 2   - IO left anterior extremity  - urinary catheter   - ETT       Disposition:  - Medical ICU     Patient seen and findings/plan discussed with medical ICU staff, Dr. Yu.      Clinically Significant Risk Factors           # Hypercalcemia: Highest iCa = 5.3 mg/dL in last 2 days, will monitor as appropriate   # Anion Gap Metabolic Acidosis: Highest Anion Gap = 32 mmol/L in last 2 days, will monitor and treat as appropriate  # Hypoalbuminemia: Lowest albumin = 3.2 g/dL at 12/6/2023  6:54 AM, will monitor as appropriate  # Coagulation Defect: INR = 1.70 (Ref range: 0.85 - 1.15) and/or PTT = 49 Seconds (Ref range: 22 - 38 Seconds), will monitor for bleeding          # DMII: A1C = 7.3 % (Ref range: <5.7 %) within past 6 months   # Overweight: Estimated body mass index is 28.11 kg/m  as calculated from the following:    Height as of this encounter: 1.778 m (5' 10\").    Weight as of this encounter: 88.9 kg (195 lb 14.4 oz).   # Moderate Malnutrition: based on nutrition assessment    # Financial/Environmental Concerns: none                  ====================================  INTERVAL HISTORY:   Nehemiah Magallon is a " 58-year-old male with PMH DM2 with severe peripheral neuropathy, papillary thyroid malignancy in remission s/p thyroidectomy, and right charcot foot reconstruction in January 2022 complicated by post operative wound infection that progressed to osteomyelitis.    [place] [facility] [place] after patient was found down without a pulse in the bathroom where he was reportedly taking a shower. He was found to be in asystole. He received 4 rounds of CPR, 2mg Epinephrine, and ROSC was achieved. NE was added as well. He subsequently went into PEA arrest requiring one round of CPR. Epinephrine drip was also added. Patient was subsequenty found to have bilateral PE with right heart strain. PERT was consulted and he was transferred to [place] for IR thrombectomy. He was started on bicarb and heparin gtt. Large clots were removed with improvement of his hemodynamics in the IR suit, however patient subsequently went into another PEA arrest. He received Epinephrine, calcium, magnesium 3 amps of bicarb, one L fluid bolus, 2upRBC  (hgb decreased from 9.2 to 6.8), angiotensin II was added as a 4th pressor. He also received ketamine and fentanyl. ROSC was achieved after one round of CPR. Internal jugular and IO were placed in IR, CT head was obtained and patient was transferred to the MICU for further management.     OBJECTIVE:   1. VITAL SIGNS:   Temp:  [95.5  F (35.3  C)-98.8  F (37.1  C)] 95.5  F (35.3  C)  Pulse:  [] 114  Resp:  [11-20] 17  BP: ()/(41-86) 88/68  MAP:  [37 mmHg-79 mmHg] 72 mmHg  Arterial Line BP: ()/(31-66) 111/60  SpO2:  [87 %-100 %] 100 %  Vent Mode: -- (sprvc/ps)  Resp Rate (Set): 16 breaths/min  Tidal Volume (Set, mL): 500 mL  PEEP (cm H2O): 7 cmH2O  Pressure Support (cm H2O): 10 cmH2O  Resp: 17    2. INTAKE/ OUTPUT:   I/O last 3 completed shifts:  In: 1243 [P.O.:840; I.V.:403]  Out: 105 [Urine:100; Blood:5]    3. PHYSICAL EXAMINATION:  General:  Intubated, not responsive  HEENT: ETT in place, Fixed dilated pupils, RIJ in place   Neuro: Not following commands (only on ketamine, no other sedative)  Pulm/Resp: Clear breath sounds bilaterally without rhonchi, crackles or wheeze, intermittent agonal breathing   CV: RRR, Extremities are cold and clammy, pulses are faint but present   Abdomen: Soft, non-distended, non-tende  : sierra catheter in place, no urine output   Incisions/Skin: bruising in abdominal folds   Extremity: RLE bandaged, LLE normal appearing    4. LABS:   Arterial Blood Gases   Recent Labs   Lab 12/15/23  1650 12/15/23  1331   PH 6.86* 6.77*   PCO2 61* 79*   PO2 198* 269*   HCO3 11* 11*     Complete Blood Count   Recent Labs   Lab 12/15/23  1234 12/15/23  1211 12/11/23  0709 12/10/23  0800   WBC 9.9  --  7.7 8.3   HGB 9.2* 9.7* 8.8* 9.0*     --  317 337     Basic Metabolic Panel  Recent Labs   Lab 12/15/23  1558 12/15/23  1353 12/15/23  1331 12/15/23  1234 12/15/23  1230 12/15/23  1211 12/15/23  0810 12/15/23  0746 12/14/23  0910 12/14/23  0612 12/13/23  0736 12/13/23  0721   NA  --   --   --  136  --  139  --  136  --  139  --  139   POTASSIUM  --   --   --  4.4  --  4.5  --  4.9  --  4.5  --  4.4   CHLORIDE  --   --   --  93*  --   --   --  100  --  104  --  103   CO2  --   --   --  11*  --   --   --  23  --  25  --  26   BUN  --   --   --  20.3  --   --   --  19.8  --  22.6  --  23.4*   CR  --   --   --  1.26*  --   --   --  0.99  --  0.92  --  0.89   * 284* 513* 543*   < > 550*   < > 209*   < > 122*   < > 114*    < > = values in this interval not displayed.     Liver Function Tests  Recent Labs   Lab 12/15/23  1234 12/10/23  0800   *  256*  --    *  103*  --    ALKPHOS 93  93  --    BILITOTAL <0.2  <0.2  --    ALBUMIN 3.4*  3.4*  --    INR 1.70* 1.05     Coagulation Profile  Recent Labs   Lab 12/15/23  1234 12/10/23  0800   INR 1.70* 1.05   PTT 49*  --        5. RADIOLOGY:   Recent Results (from the past 24  hour(s))   XR Chest Port 1 View    Narrative    Exam: XR CHEST PORT 1 VIEW, 12/15/2023 1:47 PM    Indication: Post arrest; check OG position    Comparison: 3/17/2015 chest CT    Findings:   Endotracheal tube tip at the upper to midthoracic trachea. Enteric  tube sidehole projects over the stomach. Right upper extremity PICC  tip at the mid to low SVC. The cardiomediastinal silhouette and  pulmonary vasculature are within normal limits. No appreciable pleural  effusion or pneumothorax. Low lung volumes with streaky perihilar  opacities.      Impression    Impression:   1. Endotracheal tube tip at the upper to midthoracic trachea, 6.4 cm  above the kristi.  2. Enteric tube sidehole in the stomach.  3. Low lung volumes with perihilar atelectasis.    MARY CONTRERAS DO         SYSTEM ID:  [id number]   Echo Limited   Result Value    LVEF  60-65%    Narrative    [id number]  [id number]  [id number]  [id number]^SILVIANO^RUTH ANN^YESI     [facility]  Echocardiography Laboratory  [address] [place] [id number]     Name: DYLLAN KING  MRN: [id number]  : 1965  Study Date: 12/15/2023 01:25 PM  Age: 58 yrs  Gender: Male  Patient Location: [facility]  Reason For Study: Cardiac Arrest  Ordering Physician: RUTH ANN SHORE  Performed By: Nathan Garibay RDCS     BSA: 2.1 m2  Height: 70 in  Weight: 195 lb  ______________________________________________________________________________  Procedure  Limited Portable Echo Adult.  ______________________________________________________________________________  Interpretation Summary  Global and regional left ventricular function is normal with an EF of 60-65%.  Flattened septum is consistent with right ventricular pressure overload.     Global right ventricular function is severely reduced.  Severe right ventricular dilation is present.  Dunlap`s sign is present.  Dilation of the inferior vena cava is present with abnormal respiratory  variation  in diameter.     This study was compared with the study from 11/26/2023 .  RV findings are new. Recommend to rule out PE.  ______________________________________________________________________________  Left Ventricle  Left ventricular size is normal. Global and regional left ventricular function  is normal with an EF of 60-65%. Mild concentric wall thickening consistent  with left ventricular hypertrophy is present. Flattened septum is consistent  with right ventricular pressure overload.     Right Ventricle  Severe right ventricular dilation is present. Global right ventricular  function is severely reduced.     Aortic Valve  Aortic valve sclerosis is present.     Tricuspid Valve  The peak velocity of the tricuspid regurgitant jet is not obtainable.  Pulmonary artery systolic pressure cannot be assessed.     Vessels  Dilation of the inferior vena cava is present with abnormal respiratory  variation in diameter.     Pericardium  No pericardial effusion is present.     Compared to Previous Study  This study was compared with the study from 11/26/2023 . RV findings are new.  Recommend to rule out PE.     ______________________________________________________________________________  Report approved by: Deirdre SHANKAR 12/15/2023 02:23 PM     ______________________________________________________________________________      CT Head w/o Contrast    Narrative    CT HEAD W/O CONTRAST 12/15/2023 3:10 PM    History: Post arrest, unknown cause and possible fall   ICD-10:    Comparison: Outside facial CT 12/15/2020    Technique: Using multidetector thin collimation helical acquisition  technique, axial, coronal and sagittal CT images from the skull base  to the vertex were obtained without intravenous contrast.   (topogram) image(s) also obtained and reviewed.    Findings: There is no intracranial hemorrhage, mass effect, or midline  shift. Gray/white matter differentiation in both cerebral hemispheres  is preserved,  however is less robust than what is usually expected.  The basal ganglia are more hypoattenuating than would be expected.  Ventricles are proportionate to the cerebral sulci. The basal cisterns  are clear.    The bony calvaria and the bones of the skull base are normal. The  visualized portions of the paranasal sinuses and mastoid air cells are  clear.      Impression    Impression:    1. No acute traumatic injury.   2. Gray-white matter differentiation is present, but less robust than  what would usually be expected and is indeterminate for diffuse  hypoxic ischemic injury.  3. Gray-white matter differentiation of the caudate nuclei and thalami  is blurred/indistinct and may be seen in the setting of diffuse  hypoxic ischemic injury.    I have personally reviewed the examination and initial interpretation  and I agree with the findings.    DYLLAN MITCHELL MD         SYSTEM ID:  [id number]   CT Chest (PE) Abdomen Pelvis w Contrast   Result Value    Radiologist flags Acute pulmonary emboli with right heart strain (Urgent)    Narrative    EXAMINATION: CT CHEST PE ABDOMEN PELVIS W CONTRAST, 12/15/2023 3:11 PM    INDICATION: PE rule out, intra abdominal process leading to cardiac  arrest    COMPARISON STUDY: CT 3/17/2015, 10/22/2014    TECHNIQUE: CT scan of the chest, abdomen and pelvis was performed on  multidetector CT scanner using volumetric acquisition technique and  images were reconstructed in multiple planes with variable thickness  and reviewed on dedicated workstations.     CONTRAST: 95mL Isovue 370.   Without oral contrast.    CT scan radiation dose is optimized to minimum requisite dose using  automated dose modulation techniques.    FINDINGS:    Chest:   Contrast bolus is: adequate.  Exam is positive for acute pulmonary  embolism. Extensive acute pulmonary emboli bilaterally, centrally to  the level of the right and left main pulmonary arteries.      The largest right ventricle transaxial diameter is  (measured from  endocardium to endocardium): 4.6 cm   The largest left ventricle transaxial diameter is (measured from  endocardium to endocardium): 2.5 cm  RV/LV ratio is: 1.9 (if ratio greater than 1.1 then sign is suspicious  for right heart strain)  Reflux of contrast into the IVC? Yes  Paradoxical bowing of the interventricular septum to the left? Yes  Pericardial effusion?: Small    The visualized thyroid is unremarkable. The heart is normal in size.  Small pericardial effusion. Normal caliber ascending aorta. No  thoracic lymphadenopathy. The esophagus is unremarkable.    Endotracheal tube terminates in the midthoracic trachea. Layering  secretions in the trachea and mainstem bronchi. No pleural effusion or  pneumothorax. Patchy ground glass consolidative opacities in the left  lower lobe.    Abdomen and Pelvis:    Liver: Periportal edema with diffuse edematous appearance of the  liver. No mass. No intrahepatic biliary ductal dilation.    Biliary System: Normal gallbladder. No extrahepatic biliary ductal  dilation.    Pancreas: No mass or pancreatic ductal dilation.    Adrenal glands: No mass or nodules    Spleen: Enlarged measuring 14.4 cm in length.    Kidneys: No suspicious mass, obstructing calculus or hydronephrosis.    Gastrointestinal tract: Multiple mildly fluid distended loops of small  bowel throughout the abdomen with increased mucosal enhancement. This  may indicate enteritis. Differential of hypoperfusion. No focal  transition point to suggest bowel obstruction. Large amount of stool  throughout the colon.    Mesentery/peritoneum/retroperitoneum: No mass. No free fluid or air.    Lymph nodes: No significant lymphadenopathy.    Vasculature: Patent major abdominal vasculature.    Pelvis: Urinary bladder is decompressed with Jones catheter in place.    Osseous structures: No aggressive or acute osseous lesion.   Questionable nondisplaced anterior rib fractures bilaterally. No  displaced rib  fracture.      Soft tissues: Within normal limits.      Impression    IMPRESSION:   1. Extensive bilateral acute pulmonary emboli, present centrally and  to the level of the right and left main pulmonary arteries. There is  evidence for right heart strain including RV/LV ratio of 1.9 with  paradoxical bowing of the interventricular septum to the left and  reflux of contrast into the IVC.  2. Patchy opacities in the left lower lobe, which may represent  atelectasis, infection, or possible pulmonary infarct.  3. Periportal edema with diffuse low-attenuation of the liver, likely  secondary to passive hepatic congestion.  4. Fluid distended loops of small bowel with submucosal enhancement,  suggestive of hypoperfusion complex versus enteritis. No evidence of  bowel obstruction.    [Urgent Result: Acute pulmonary emboli with right heart strain]    Finding was identified on 12/15/2023 3:21 PM.     Aroldo David DO was contacted by Dr. Ingram at 12/15/2023 3:39 PM and  verbalized understanding of the urgent finding.     I have personally reviewed the examination and initial interpretation  and I agree with the findings.    JULIO DAVIDSON MD         SYSTEM ID:  [id number]

## 2023-12-16 NOTE — DISCHARGE SUMMARY
"[facility]  Discharge Summary - Medicine & Pediatrics       Date of Admission:  2023  Date of Discharge:  Patient  on 23  Discharging Provider: Dr. Nicki Yu   Discharge Service: Hospitalist Service, GOLD TEAM 18    Discharge Diagnoses   PEA arrest   Acute extensive pulmonary embolic with right heart strain   Hypotension, multifactorial   Acute hypoxic respiratory failure   Lactic acidosis   Anion gap metabolic acidosis   Elevated LFTs  SHALINI   Hyperglycemia   Leukocytosis   Anemia  Thrombocytopenia   Coagulopathy       Clinically Significant Risk Factors     # DMII: A1C = 7.3 % (Ref range: <5.7 %) within past 6 months  # Overweight: Estimated body mass index is 28.11 kg/m  as calculated from the following:    Height as of this encounter: 1.778 m (5' 10\").    Weight as of this encounter: 88.9 kg (195 lb 14.4 oz).  # Moderate Malnutrition: based on nutrition assessment      Follow-ups Needed After Discharge     Unresulted Labs Ordered in the Past 30 Days of this Admission       Date and Time Order Name Status Description    12/15/2023  7:39 PM Prepare red blood cells (unit) Preliminary     12/15/2023  7:39 PM Prepare red blood cells (unit) Preliminary     2023 12:37 PM Vitamin B6 In process     2023  8:35 AM Fungal or Yeast Culture Routine Preliminary     2023  8:35 AM Fungal or Yeast Culture Routine Preliminary           Discharge Disposition   Patient  during this admission  Condition at discharge:     Hospital Course   Nehemiah Magallon is a 58-year-old male with PMH DM2 with severe peripheral neuropathy, papillary thyroid malignancy in remission s/p thyroidectomy, and right charcot foot reconstruction in 2022 complicated by post operative wound infection that progressed to osteomyelitis. The patient was admitted to the ICU on [place] following multiple PEA cardiac arrest and found to have bilateral pulmonary emboli. " The patient was transferred to [facility] for IR thrombectomy c/b PEA arrest with ROSC. On exam, patient was not responsive despite no sedation, and pupils were dilated and fixed. He was admitted to the ICU close neurological, cardiovascular, mechanical ventilation, and pressors.     In the ICU, patient continued to escalate on pressor needs, maxed out on 5 pressors. Despite escalating pressor needs, multiple IVF boluses, bicarb drip and boluses, high dose steroids, his blood pressure and acidosis did not improve. He continued to remain unresponsive on Ketamine alone, pupils remained fixed and dilated, he was not producing urine, his extremities appeared more mottled with difficulty to obtain pulses, and his abdomen became more distended with worsening lactic acidosis concerning for intraabdominal pathology. Given the high pressor need and low maps, unable to obtain abdominal CT safely. Patient's wife and son were updated at the beside who changed his code status to DNR and subsequently elected to pursue comfort cares ~2am 12/16. Patient was pronounced dead ~2:54am.     Consultations This Hospital Stay   PHARMACY TO DOSE [facility]ANCO  PHYSICAL THERAPY ADULT IP CONSULT  OCCUPATIONAL THERAPY ADULT IP CONSULT  WOUND OSTOMY CONTINENCE NURSE  IP CONSULT  ORTHOPAEDIC SURGERY ADULT/PEDS IP CONSULT  INFECTIOUS DISEASE [facility] ADULT IP CONSULT  CARE MANAGEMENT / SOCIAL WORK IP CONSULT  NURSING TO CONSULT FOR VASCULAR ACCESS CARE IP CONSULT  NUTRITION SERVICES ADULT IP CONSULT  NURSING TO CONSULT FOR VASCULAR ACCESS CARE IP CONSULT  VASCULAR ACCESS ADULT IP CONSULT  VASCULAR ACCESS ADULT IP CONSULT  CARDIOLOGY GENERAL ADULT IP CONSULT  PHARMACY IP CONSULT  NEUROLOGY CRITICAL CARE ADULT IP CONSULT  PHARMACY TO DOSE [facility]  SPIRITUAL HEALTH SERVICES IP CONSULT  RESPIRATORY CARE IP CONSULT  SPIRITUAL HEALTH SERVICES IP CONSULT    Code Status   No CPR- Do NOT Intubate       The patient was discussed with Dr. Gigi Davis,  MD [facility] [address] [place] [id number]  Phone: [phone]  ______________________________________________________________________    Physical Exam   Vital Signs: Temp: 98.2  F (36.8  C) Temp src: Oral BP: (!) 92/17 Pulse: 100   Resp: (!) 7 SpO2: 100 % O2 Device: Mechanical Ventilator    Weight: 195 lbs 14.4 oz    Unresponsive to noxious stimuli, Spontaneous respirations absent, Breath sounds absent, Carotid pulse absent, Heart sounds absent, and Pupillary light reflex absent       Primary Care Physician   Ochoa Echevarria    Discharge Orders       Significant Results and Procedures   Most Recent 3 CBC's:  Recent Labs   Lab Test 12/15/23  2319 12/15/23  2038 12/15/23  1940   WBC 29.4* 29.0* 28.8*   HGB 8.8* 9.5* 7.0*   MCV 82 85 85   * 154 135*     Most Recent 3 BMP's:  Recent Labs   Lab Test 12/16/23  0025 12/15/23  2319 12/15/23  2216 12/15/23  2038 12/15/23  2032 12/15/23  1930 12/15/23  1331 12/15/23  1234   NA  --  150*  --  145  --  144  --  136   POTASSIUM  --  4.3  --  4.7  --  3.7  --  4.4   CHLORIDE  --  104  --  101  --   --   --  93*   CO2  --  10*  --  11*  --   --   --  11*   BUN  --  27.1*  --  25.9*  --   --   --  20.3   CR  --  1.79*  --  1.77*  --   --   --  1.26*   ANIONGAP  --  36*  --  33*  --   --   --  32*   ALICE  --  8.9  --  9.9  --   --   --  9.6   * 211* 235* 314*   < > 254*   < > 543*    < > = values in this interval not displayed.     Most Recent 2 LFT's:  Recent Labs   Lab Test 12/15/23  2319 12/15/23  2038 12/15/23  1234 12/06/23  0654   AST  --   --  256*  256* 13   * 246* 103*  103* <5   ALKPHOS 189* 171* 93  93 71   BILITOTAL 0.5 0.4 <0.2  <0.2 <0.2     Most Recent 3 INR's:  Recent Labs   Lab Test 12/15/23  2319 12/15/23  2038 12/15/23  1938   INR 8.71* 6.12* 5.00*     Most Recent INR's and Anticoagulation Dosing History:  Anticoagulation Dose History  More data exists         Latest Ref Rng &  Units 11/8/2023 11/24/2023 11/27/2023 11/29/2023 12/4/2023 12/10/2023 12/15/2023   Recent Dosing and Labs   INR 0.85 - 1.15 1.09  1.29  1.19  1.10  1.09  1.05  8.71  6.12  5.00  1.70      Most Recent 3 Creatinines:  Recent Labs   Lab Test 12/15/23  2319 12/15/23  2038 12/15/23  1234   CR 1.79* 1.77* 1.26*     Most Recent 3 Hemoglobins:  Recent Labs   Lab Test 12/15/23  2319 12/15/23  2038 12/15/23  1940   HGB 8.8* 9.5* 7.0*     Most Recent 3 Troponin's:No lab results found.  Most Recent 3 BNP's:  Recent Labs   Lab Test 12/15/23  1234   NTBNPI 608     Most Recent D-dimer:No lab results found.  Most Recent Cholesterol Panel:No lab results found.  7-Day Micro Results       No results found for the last 168 hours.          Most Recent TSH and T4:  Recent Labs   Lab Test 01/21/23  1559   TSH 1.80     Most Recent Hemoglobin A1c:  Recent Labs   Lab Test 10/30/23  0912   A1C 7.3*     Most Recent 6 glucoses:  Recent Labs   Lab Test 12/16/23  0025 12/15/23  2319 12/15/23  2216 12/15/23  2038 12/15/23  2032 12/15/23  1930   * 211* 235* 314* 243* 254*     Most Recent Urinalysis:  Recent Labs   Lab Test 11/23/23  1908   COLOR Light Yellow   APPEARANCE Slightly Cloudy*   URINEGLC >=1000*   URINEBILI Negative   URINEKETONE 10*   SG 1.034   UBLD Negative   URINEPH 5.0   PROTEIN 50*   NITRITE Negative   LEUKEST Negative   RBCU 2   WBCU 5     Most Recent ABG:  Recent Labs   Lab Test 12/16/23  0025   PH 7.17*   PO2 456*   PCO2 28*   HCO3 10*   MICHEL -16.9*     Most Recent ESR & CRP:  Recent Labs   Lab Test 12/15/23  0746 11/25/23  0559 11/24/23  1042 02/13/23  1425 02/06/23  1430   SED  --   --  71*   < >  --    CRP  --   --   --   --  <2.9   CRPI 6.37*   < >  --    < >  --     < > = values in this interval not displayed.     Most Recent Anemia Panel:  Recent Labs   Lab Test 12/15/23  2319 12/15/23  1211 12/13/23  1313 12/13/23  0721 11/27/23  0721 11/25/23  0755   WBC 29.4*   < >  --   --    < >  --    HGB 8.8*   < >  --   --     < >  --    HCT 29.0*   < >  --   --    < >  --    MCV 82   < >  --   --    < >  --    *   < >  --   --    < >  --    IRON  --   --   --   --   --  10*   IRONSAT  --   --   --   --   --  5*   FEB  --   --   --   --   --  213*   JUANA  --   --   --   --   --  182   B12  --   --   --  532  --   --    FOLIC  --   --  >40.0*  --   --   --     < > = values in this interval not displayed.     Most Recent CPK:No lab results found.,   Results for orders placed or performed during the hospital encounter of 11/23/23   XR Foot Right 3 Views    Narrative    EXAM: XR ANKLE RIGHT G/E 3 VIEWS, XR TIBIA AND FIBULA RIGHT 2 VIEWS, XR FOOT RIGHT G/E 3 VIEWS  LOCATION: [facility]  DATE: 11/23/2023    INDICATION: Right foot infection, fever, sepsis.  COMPARISON: None.      Impression    IMPRESSION: There is some periosteal new bone formation along the lateral margin of the distal fibular metaphysis which could reflect underlying osteomyelitis- MRI would be helpful in further evaluation. No fracture. Chronic periosteal new bone formation   along the medial margin of the distal fibular shaft. Extensive neuropathic changes throughout the midfoot including lateral dislocation of the 2nd through 5th metatarsals. Mild degenerative changes at the 1st MTP joint. Moderate-sized plantar calcaneal   spur.   XR Ankle Right 3 Views    Narrative    EXAM: XR ANKLE RIGHT G/E 3 VIEWS, XR TIBIA AND FIBULA RIGHT 2 VIEWS, XR FOOT RIGHT G/E 3 VIEWS  LOCATION: [facility]  DATE: 11/23/2023    INDICATION: Right foot infection, fever, sepsis.  COMPARISON: None.      Impression    IMPRESSION: There is some periosteal new bone formation along the lateral margin of the distal fibular metaphysis which could reflect underlying osteomyelitis- MRI would be helpful in further evaluation. No fracture. Chronic periosteal new bone formation   along the medial margin of the  distal fibular shaft. Extensive neuropathic changes throughout the midfoot including lateral dislocation of the 2nd through 5th metatarsals. Mild degenerative changes at the 1st MTP joint. Moderate-sized plantar calcaneal   spur.   XR Tibia & Fibula Right 2 Views    Narrative    EXAM: XR ANKLE RIGHT G/E 3 VIEWS, XR TIBIA AND FIBULA RIGHT 2 VIEWS, XR FOOT RIGHT G/E 3 VIEWS  LOCATION: [facility]  DATE: 11/23/2023    INDICATION: Right foot infection, fever, sepsis.  COMPARISON: None.      Impression    IMPRESSION: There is some periosteal new bone formation along the lateral margin of the distal fibular metaphysis which could reflect underlying osteomyelitis- MRI would be helpful in further evaluation. No fracture. Chronic periosteal new bone formation   along the medial margin of the distal fibular shaft. Extensive neuropathic changes throughout the midfoot including lateral dislocation of the 2nd through 5th metatarsals. Mild degenerative changes at the 1st MTP joint. Moderate-sized plantar calcaneal   spur.   MR Ankle Right w/o & w Contrast    Narrative    EXAM: MR ANKLE RIGHT W/O AND W CONTRAST  LOCATION: [facility]  DATE: 11/25/2023    INDICATION: History of osteomyelitis, status post multiple I and D, concern for new infection or worsening osteo.  COMPARISON: 10/25/2023 CT, radiograph dated 11/23/2023.  TECHNIQUE: Routine. Additional postgadolinium T1 sequences were obtained.  IV CONTRAST: 8.7 ml Gadavist.    FINDINGS:   Extensive enhancement, destructive change and marrow replacement involving the distal tibia, talus as well as portions of the calcaneus, navicula. There is fragmentation and fracturing of the navicula and the calcaneus is not well identified due to prior   surgery and/or destructive change. Considerable bony fragmentation and erosive change within the ankle, hindfoot and midfoot is again seen.     There is  a conglomeration of joint fluid/synovitis within the tibiotalar joint, hindfoot/subtalar joint as well as the midtarsal/midfoot region. There is a rim-enhancing fluid collection along the posterior aspect of the ankle and distal tibia tracking   deep to the flexor hallucis longus tendon measuring approximately 3.4 x 1.4 x 5.5 cm which probably communicates with the posterior aspect of the tibiotalar joint and subtalar recess given the location. There is adjacent medial flexor tenosynovitis.    There is a small amount of complex fluid along the medial aspect of the hindfoot near the fragmented and destroyed talonavicular joint.     Additionally, there is a soft tissue ulceration versus surgical defect along the anterolateral aspect of the ankle with peripherally enhancing granulation tissue versus infection.    There is evidence of prior surgery and fusion across the first TMT joint and first metatarsal with subsequent hardware removal.    Redundancy of the Achilles tendon may be related to prior injury and laxity although this is nonspecific. There is partial fatty atrophy of the intrinsic muscles of the foot and ankle.      Impression    IMPRESSION:  1.  Severe and extensive Charcot arthropathy with cortical destructive change, fragmentation and fracturing most pronounced in the midtarsal region. There is extensive erosive change throughout the ankle, hindfoot and midfoot.    2.  Associated versus superimposed on this severe Charcot arthropathy, is extensive bone marrow edema, enhancement and marrow replacement throughout the ankle, hindfoot and portions of the midfoot. There is also conglomeration of joint fluid/synovitis   within the tibiotalar joint, hindfoot/subtalar joint as well as the midtarsal/midfoot region. While this may reflect severe reactive/inflammatory changes from Charcot arthropathy, superimposed septic arthritis and osteomyelitis could cause this   appearance as well.    3.  Large rim-enhancing  fluid collection measuring up to 5.5 cm along the posterior aspect of the distal tibia/ankle is concerning for abscess until proved otherwise.    4.  Deep soft tissue ulceration versus surgical defect along the anterolateral aspect of the ankle with peripherally enhancing granulation tissue versus infection.    5.  Additional soft tissue ulceration versus surgical defect along the medial aspect of the hindfoot where there is a small amount of irregular rim-enhancing fluid which may reflect inflammation or small abscess in the distorted area of the talonavicular   joint.      NOTE: ABNORMAL REPORT    THE DICTATION ABOVE DESCRIBES AN ABNORMALITY FOR WHICH FOLLOW-UP IS NEEDED.    XR Chest Port 1 View    Narrative    Exam: XR CHEST PORT 1 VIEW, 12/15/2023 1:47 PM    Indication: Post arrest; check OG position    Comparison: 3/17/2015 chest CT    Findings:   Endotracheal tube tip at the upper to midthoracic trachea. Enteric  tube sidehole projects over the stomach. Right upper extremity PICC  tip at the mid to low SVC. The cardiomediastinal silhouette and  pulmonary vasculature are within normal limits. No appreciable pleural  effusion or pneumothorax. Low lung volumes with streaky perihilar  opacities.      Impression    Impression:   1. Endotracheal tube tip at the upper to midthoracic trachea, 6.4 cm  above the kristi.  2. Enteric tube sidehole in the stomach.  3. Low lung volumes with perihilar atelectasis.    MARY CONTRERAS DO         SYSTEM ID:  [id number]   CT Head w/o Contrast    Narrative    CT HEAD W/O CONTRAST 12/15/2023 3:10 PM    History: Post arrest, unknown cause and possible fall   ICD-10:    Comparison: Outside facial CT 12/15/2020    Technique: Using multidetector thin collimation helical acquisition  technique, axial, coronal and sagittal CT images from the skull base  to the vertex were obtained without intravenous contrast.   (topogram) image(s) also obtained and reviewed.    Findings: There is no  intracranial hemorrhage, mass effect, or midline  shift. Gray/white matter differentiation in both cerebral hemispheres  is preserved, however is less robust than what is usually expected.  The basal ganglia are more hypoattenuating than would be expected.  Ventricles are proportionate to the cerebral sulci. The basal cisterns  are clear.    The bony calvaria and the bones of the skull base are normal. The  visualized portions of the paranasal sinuses and mastoid air cells are  clear.      Impression    Impression:    1. No acute traumatic injury.   2. Gray-white matter differentiation is present, but less robust than  what would usually be expected and is indeterminate for diffuse  hypoxic ischemic injury.  3. Gray-white matter differentiation of the caudate nuclei and thalami  is blurred/indistinct and may be seen in the setting of diffuse  hypoxic ischemic injury.    I have personally reviewed the examination and initial interpretation  and I agree with the findings.    DYLLAN MITCHELL MD         SYSTEM ID:  [id number]   CT Chest (PE) Abdomen Pelvis w Contrast     Value    Radiologist flags Acute pulmonary emboli with right heart strain (Urgent)    Narrative    EXAMINATION: CT CHEST PE ABDOMEN PELVIS W CONTRAST, 12/15/2023 3:11 PM    INDICATION: PE rule out, intra abdominal process leading to cardiac  arrest    COMPARISON STUDY: CT 3/17/2015, 10/22/2014    TECHNIQUE: CT scan of the chest, abdomen and pelvis was performed on  multidetector CT scanner using volumetric acquisition technique and  images were reconstructed in multiple planes with variable thickness  and reviewed on dedicated workstations.     CONTRAST: 95mL Isovue 370.   Without oral contrast.    CT scan radiation dose is optimized to minimum requisite dose using  automated dose modulation techniques.    FINDINGS:    Chest:   Contrast bolus is: adequate.  Exam is positive for acute pulmonary  embolism. Extensive acute pulmonary emboli bilaterally,  centrally to  the level of the right and left main pulmonary arteries.      The largest right ventricle transaxial diameter is (measured from  endocardium to endocardium): 4.6 cm   The largest left ventricle transaxial diameter is (measured from  endocardium to endocardium): 2.5 cm  RV/LV ratio is: 1.9 (if ratio greater than 1.1 then sign is suspicious  for right heart strain)  Reflux of contrast into the IVC? Yes  Paradoxical bowing of the interventricular septum to the left? Yes  Pericardial effusion?: Small    The visualized thyroid is unremarkable. The heart is normal in size.  Small pericardial effusion. Normal caliber ascending aorta. No  thoracic lymphadenopathy. The esophagus is unremarkable.    Endotracheal tube terminates in the midthoracic trachea. Layering  secretions in the trachea and mainstem bronchi. No pleural effusion or  pneumothorax. Patchy ground glass consolidative opacities in the left  lower lobe.    Abdomen and Pelvis:    Liver: Periportal edema with diffuse edematous appearance of the  liver. No mass. No intrahepatic biliary ductal dilation.    Biliary System: Normal gallbladder. No extrahepatic biliary ductal  dilation.    Pancreas: No mass or pancreatic ductal dilation.    Adrenal glands: No mass or nodules    Spleen: Enlarged measuring 14.4 cm in length.    Kidneys: No suspicious mass, obstructing calculus or hydronephrosis.    Gastrointestinal tract: Multiple mildly fluid distended loops of small  bowel throughout the abdomen with increased mucosal enhancement. This  may indicate enteritis. Differential of hypoperfusion. No focal  transition point to suggest bowel obstruction. Large amount of stool  throughout the colon.    Mesentery/peritoneum/retroperitoneum: No mass. No free fluid or air.    Lymph nodes: No significant lymphadenopathy.    Vasculature: Patent major abdominal vasculature.    Pelvis: Urinary bladder is decompressed with Jones catheter in place.    Osseous structures: No  aggressive or acute osseous lesion.   Questionable nondisplaced anterior rib fractures bilaterally. No  displaced rib fracture.      Soft tissues: Within normal limits.      Impression    IMPRESSION:   1. Extensive bilateral acute pulmonary emboli, present centrally and  to the level of the right and left main pulmonary arteries. There is  evidence for right heart strain including RV/LV ratio of 1.9 with  paradoxical bowing of the interventricular septum to the left and  reflux of contrast into the IVC.  2. Patchy opacities in the left lower lobe, which may represent  atelectasis, infection, or possible pulmonary infarct.  3. Periportal edema with diffuse low-attenuation of the liver, likely  secondary to passive hepatic congestion.  4. Fluid distended loops of small bowel with submucosal enhancement,  suggestive of hypoperfusion complex versus enteritis. No evidence of  bowel obstruction.    [Urgent Result: Acute pulmonary emboli with right heart strain]    Finding was identified on 12/15/2023 3:21 PM.     Aroldo David DO was contacted by Dr. Ingram at 12/15/2023 3:39 PM and  verbalized understanding of the urgent finding.     I have personally reviewed the examination and initial interpretation  and I agree with the findings.    JULIO DAVIDSON MD         SYSTEM ID:  [id number]   CT Head w/o Contrast    Narrative    EXAM: CT HEAD W/O CONTRAST  12/15/2023 8:25 PM     HISTORY:  bilateral PE, PEA arrest X 2, bilateral fixed dilated pupils        COMPARISON:  CT head same day    TECHNIQUE: Using multidetector thin collimation helical acquisition  technique, axial, coronal and sagittal CT images from the skull base  to the vertex were obtained without intravenous contrast.   (topogram) image(s) also obtained and reviewed.    FINDINGS:  No acute intracranial hemorrhage, mass effect, or midline shift.  Gray-white matter differentiation is preserved, however it is somewhat  less distinct then will be normally  expected. Basal ganglia and  thalami are more hyperattenuating than would be expected, consistent  with retained iodine. Ventricles are proportionate to the cerebral  sulci. Clear basal cisterns.    There are hyper intense vessels from previous iodine administration.  The thalami are hyperdense which is secondary to contrast as evidenced  for trauma noncontrast and iodine mapping.    The bony calvaria and the bones of the skull base are normal. The  visualized portions of the paranasal sinuses and mastoid air cells are  clear. Grossly normal orbits.       Impression    IMPRESSION: Retained contrast in the basal ganglia and thalami  consistent with severe short duration anoxic injury.     I have personally reviewed the examination and initial interpretation  and I agree with the findings.    ISAEL FRAGA MD         SYSTEM ID:  [id number]   XR Chest Port 1 View    Impression    RESIDENT PRELIMINARY INTERPRETATION  Impression:   1. Stable low lung volumes with perihilar atelectasis. No  pneumothorax.  2. Endotracheal tube with tip in midthoracic trachea.  3. New right IJ central catheter tip in the high right atrium.  4. Temperature probe in stable position and likely in the upper  thoracic trachea. Consider repositioning..   Echo Complete     Value    LVEF  55-60%    Narrative    [id number]  [id number]  [id number]  2011^CELSO^RONNY^T     [facility]  Echocardiography Laboratory  [address] [place] [address]     Name: DYLLAN KING  MRN: [id number]  : 1965  Study Date: 2023 09:43 AM  Age: 58 yrs  Gender: Male  Patient Location: [facility]  Reason For Study: Other, Please Specify in Comments  Ordering Physician: RONNY HOUSTON  Performed By: Janice Suazo     BSA: 2.1 m2  Height: 70 in  Weight: 192 lb  HR: 90  BP: 129/71 mmHg  ______________________________________________________________________________  Procedure  Echocardiogram with two-dimensional,  color and spectral Doppler performed.  ______________________________________________________________________________  Interpretation Summary  Global and regional left ventricular function is normal with an EF of 55-60%.  Right ventricular function, chamber size, wall motion, and thickness are  normal.  No significant valvular abnormalities present.  Previous study not available for comparison.  ______________________________________________________________________________  Left Ventricle  Left ventricular size is normal. Global and regional left ventricular function  is normal with an EF of 55-60%. Thickening of the anterobasal septum is  present. Left ventricular diastolic function is indeterminate.     Right Ventricle  Right ventricular function, chamber size, wall motion, and thickness are  normal.     Atria  Both atria appear normal.     Mitral Valve  Mild mitral annular calcification is present. Trace mitral insufficiency is  present.     Aortic Valve  Aortic valve sclerosis is present.     Tricuspid Valve  The tricuspid valve is normal. Trace tricuspid insufficiency is present.     Pulmonic Valve  The valve leaflets are not well visualized. On Doppler interrogation, there is  no significant stenosis or regurgitation.     Vessels  The aorta root is normal. The inferior vena cava was normal in size with  preserved respiratory variability. IVC diameter <2.1 cm collapsing >50% with  sniff suggests a normal RA pressure of 3 mmHg.     Pericardium  No pericardial effusion is present.     Miscellaneous  No significant valvular abnormalities present.     Compared to Previous Study  Previous study not available for comparison.  ______________________________________________________________________________  MMode/2D Measurements & Calculations  IVSd: 1.6 cm  LVIDd: 3.7 cm  LVIDs: 2.5 cm  LVPWd: 1.4 cm  FS: 32.2 %  LV mass(C)d: 200.9 grams  LV mass(C)dI: 97.9 grams/m2  Ao root diam: 3.5 cm  LA dimension: 3.1 cm  asc  Aorta Diam: 3.3 cm  LA/Ao: 0.89  LVOT diam: 2.2 cm  LVOT area: 3.8 cm2  Ao root diam index Ht(cm/m): 2.0  Ao root diam index BSA (cm/m2): 1.7  Asc Ao diam index BSA (cm/m2): 1.6  Asc Ao diam index Ht(cm/m): 1.9  LA Volume (BP): 56.3 ml     LA Volume Index (BP): 27.5 ml/m2  RV Base: 4.0 cm  RWT: 0.74  TAPSE: 2.4 cm     Doppler Measurements & Calculations  MV E max jesse: 86.1 cm/sec  MV A max jesse: 122.0 cm/sec  MV E/A: 0.71  MV max P.7 mmHg  MV mean P.0 mmHg  MV V2 VTI: 23.6 cm  MVA(VTI): 3.6 cm2  MV dec time: 0.33 sec  Ao V2 max: 147.0 cm/sec  Ao max P.0 mmHg  Ao V2 mean: 105.0 cm/sec  Ao mean P.0 mmHg  Ao V2 VTI: 28.4 cm  CESAR(I,D): 3.0 cm2  CESAR(V,D): 3.0 cm2  LV V1 max P.5 mmHg  LV V1 max: 117.0 cm/sec  LV V1 VTI: 22.5 cm     SV(LVOT): 85.5 ml  SI(LVOT): 41.7 ml/m2  PA V2 max: 76.0 cm/sec  PA max P.3 mmHg  PA acc time: 0.08 sec  AV Jesse Ratio (DI): 0.80  CESAR Index (cm2/m2): 1.5  E/E' av.7  Lateral E/e': 12.2  Medial E/e': 15.1  RV S Jesse: 17.7 cm/sec     ______________________________________________________________________________  Report approved by: Deirdre SHANKAR 2023 01:55 PM         Echo Limited     Value    LVEF  60-65%    Narrative    [id number]  [id number]  [id number]  [id number]^SILVIANO^RUTH ANN^YESI     [facility],[place]  Echocardiography Laboratory  [address] [place] [id number]     Name: DYLLAN KING  MRN: [id number]  : 1965  Study Date: 12/15/2023 01:25 PM  Age: 58 yrs  Gender: Male  Patient Location: [facility]  Reason For Study: Cardiac Arrest  Ordering Physician: RUTH ANN SHORE  Performed By: Nathan Garibay RDCS     BSA: 2.1 m2  Height: 70 in  Weight: 195 lb  ______________________________________________________________________________  Procedure  Limited Portable Echo Adult.  ______________________________________________________________________________  Interpretation Summary  Global and regional left ventricular  function is normal with an EF of 60-65%.  Flattened septum is consistent with right ventricular pressure overload.     Global right ventricular function is severely reduced.  Severe right ventricular dilation is present.  Dunlap`s sign is present.  Dilation of the inferior vena cava is present with abnormal respiratory  variation in diameter.     This study was compared with the study from 11/26/2023 .  RV findings are new. Recommend to rule out PE.  ______________________________________________________________________________  Left Ventricle  Left ventricular size is normal. Global and regional left ventricular function  is normal with an EF of 60-65%. Mild concentric wall thickening consistent  with left ventricular hypertrophy is present. Flattened septum is consistent  with right ventricular pressure overload.     Right Ventricle  Severe right ventricular dilation is present. Global right ventricular  function is severely reduced.     Aortic Valve  Aortic valve sclerosis is present.     Tricuspid Valve  The peak velocity of the tricuspid regurgitant jet is not obtainable.  Pulmonary artery systolic pressure cannot be assessed.     Vessels  Dilation of the inferior vena cava is present with abnormal respiratory  variation in diameter.     Pericardium  No pericardial effusion is present.     Compared to Previous Study  This study was compared with the study from 11/26/2023 . RV findings are new.  Recommend to rule out PE.     ______________________________________________________________________________  Report approved by: Deirdre SHANKAR 12/15/2023 02:23 PM     ______________________________________________________________________________          Discharge Medications   Current Discharge Medication List        CONTINUE these medications which have NOT CHANGED    Details   !! acetaminophen (TYLENOL) 325 MG tablet Take 2 tablets (650 mg) by mouth every 4 hours as needed for other (For optimal non-opioid  multimodal pain management to improve pain control.)    Associated Diagnoses: Wound infection      !! acetaminophen (TYLENOL) 500 MG tablet Take 1,000 mg by mouth 3 times daily      ammonium lactate (AMLACTIN) 12 % external cream Apply topically 2 times daily as needed for dry skin      augmented betamethasone dipropionate (DIPROLENE AF) 0.05 % external cream Apply topically 2 times daily      cetirizine (ZYRTEC) 10 MG tablet Take 10 mg by mouth every morning      cyanocobalamin (VITAMIN B-12) 1000 MCG tablet Take 2,000 mcg by mouth daily      empagliflozin (JARDIANCE) 25 MG TABS tablet Take 25 mg by mouth every morning      fluconazole (DIFLUCAN) 200 MG tablet Take 2 tablets (400 mg) by mouth daily  Qty: 60 tablet, Refills: 1    Associated Diagnoses: Wound infection      fluticasone (FLONASE) 50 MCG/ACT nasal spray Spray 2 sprays into both nostrils 2 times daily      folic acid (FOLVITE) 1 MG tablet Take 1 mg by mouth every morning      gabapentin (NEURONTIN) 600 MG tablet Take 1 tablet by mouth 3 times daily      insulin  UNIT/ML vial Inject 10 Units Subcutaneous 2 times daily      lactobacillus rhamnosus, GG, (CULTURELL) capsule Take 1 capsule by mouth 2 times daily  Qty: 60 capsule, Refills: 0    Associated Diagnoses: Wound infection      levothyroxine (SYNTHROID/LEVOTHROID) 150 MCG tablet Take 150 mcg by mouth every morning      lidocaine (LIDODERM) 5 % patch Apply up to 3 patches to painful area at once for up to 12 h within a 24 h period.  Remove after 12 hours.  Qty: 90 patch, Refills: 0    Associated Diagnoses: Chronic pain syndrome      linaclotide (LINZESS) 145 MCG capsule Take 290 mcg by mouth every morning      MENTHOL-METHYL SALICYLATE EX Externally apply topically 4 times daily as needed (pain)      metFORMIN (GLUCOPHAGE XR) 500 MG 24 hr tablet Take 1,000 mg by mouth 2 times daily (with meals)      neomycin-bacitracin-polymyxin (NEOSPORIN) 5-400-5000 ointment Apply topically daily     "  nitroGLYcerin (NITRO-BID) 2 % OINT ointment Place 1 inch onto the skin 2 times daily apply to ear.      omeprazole (PRILOSEC) 20 MG DR capsule Take 20 mg by mouth every morning      pravastatin (PRAVACHOL) 40 MG tablet Take 40 mg by mouth every evening      selenium sulfide (SELSUN) 2.5 % external lotion Apply topically every other day apply to scalp      Semaglutide (OZEMPIC, 1 MG/DOSE, SC) Inject 2 mg Subcutaneous once a week Sunday's      topiramate (TOPAMAX) 100 MG tablet Take 150 mg by mouth every morning      Vitamin D3 (CHOLECALCIFEROL) 25 mcg (1000 units) tablet Take 50 mcg by mouth every morning      White Petrolatum-Mineral Oil ([employer] LUBRICATING EYE/OVERNIGHT) OINT Apply 0.5 inches to eye at bedtime      zolpidem ER (AMBIEN CR) 12.5 MG CR tablet Take 12.5 mg by mouth nightly as needed for sleep      Gauze Pads & Dressings ([facility] DRESSING) 4\"X8\" PADS 1 Units daily  Qty: 45 each, Refills: 1    Comments: Note to [facility] pharmacy: please mail out to the   Associated Diagnoses: Necrosis of surgical wound (H)      senna-docusate (SENOKOT-S/PERICOLACE) 8.6-50 MG tablet Take 1 tablet by mouth 2 times daily  Qty: 60 tablet, Refills: 0    Associated Diagnoses: Wound infection      sodium fluoride dental gel (PREVIDENT) 1.1 % GEL topical gel BRUSH SMALL AMOUNT MOUTH EVERY MORNING AND AT BEDTIME ON TOOTHBRUSH, BRUSH FOR 2 MINUTES.       !! - Potential duplicate medications found. Please discuss with provider.        Allergies   Allergies   Allergen Reactions    Doxycycline Rash     Very extensive full body rash lasting for several months. Given at same time as Rifampin.    Rifampin Rash     Very extensive full body rash lasting for several months. Given at the same time as doxycycline.    Atorvastatin      Other reaction(s): Hyperglycemia  pt also lists simvastatin?    Celebrex [Celecoxib] Other (See Comments)     De[facility] per [facility]     "

## 2023-12-16 NOTE — CONSULTS
"Neurocritical Care Consultation    Reason for critical care admission: Cardiac Arrest  Admitting Team: CICU  Date of Service:  12/16/2023  Date of Admission:  11/23/2023  Hospital Day: 24    Assessment/Plan  58M with PMHx of T2DM with severe peripheral neuropathy, Right Charcot foot, HLD, papillary thyroid cancer in remission s/p thyroidectomy, and Right Charcot foot reconstruction c/b osteomyelitis (1/2022). Patient admitted 11/23 for sepsis 2/2 Right foot osteomyeltis s/p multiple I&D & vac changes (12/4, 12/7). Hospital course c/b PEA arrest w/ROSC x2 on 12/15/2023. Initial CTH and exam c/w severe anoxic brain injury. EEG pending.      Neuro  #Post-arrest anoxic brain injury  #Hypoxic encephalopathy 2/2 above  Post arrest day: 0  Length of downtime: 1st = unknown; 2nd = 0 min  -Witnessed arrest: No for 1st; Yes for 2nd  -ROSC: Yes, rhythm: Accelerated junctional rhytm  -Goal normothermia   -Current temp: 95.5     Analgesics & sedation  Prior sedation: None  Current sedation: Katamine gtt started per family request due to tearing    Exam findings   -Cough: No  -Gag: No  -Pupils: NPI = 0 & >6mm diameter bilaterally x3    Neuroimaging  -Day 1 CTH shows: \"Retained contrast in the basal ganglia and thalami  consistent with severe short duration anoxic injury.\"  -Consider repeating CTH on day 3     Neurophysiology  -start/continue vEEG with [facility]  -vEEG shows: pending    Biomarkers  -Neuron-specific Enolase (NSE) results: pending   -S 100B protein: pending     Recommendations  -Avoid hypotonic solutions as they may worsen cerebral edema   -Avoid nitroprusside or nitroglycerin as they may also worsen cerbral edema  -Advise slow correction of hypernatremia if needed  -When safe to do so, please limit use of CNS acting medications such as benzodiazepines, opiates, anticholinergics, which will permit a more accurate neurological assessment  -NCC will continue to follow, please call [phone] with any questions    Clinically " "Significant Risk Factors           # Hypercalcemia: Highest iCa = 8.2 mg/dL in last 2 days, will monitor as appropriate   # Anion Gap Metabolic Acidosis: Highest Anion Gap = 33 mmol/L in last 2 days, will monitor and treat as appropriate  # Hypoalbuminemia: Lowest albumin = 2.6 g/dL at 12/15/2023  8:38 PM, will monitor as appropriate    # Coagulation Defect: INR = 8.71 (Ref range: 0.85 - 1.15) and/or PTT = 233 Seconds (Ref range: 22 - 38 Seconds), will monitor for bleeding   # Acute Kidney Injury, unspecified: based on a >150% or 0.3 mg/dL increase in last creatinine compared to past 90 day average, will monitor renal function        # DMII: A1C = 7.3 % (Ref range: <5.7 %) within past 6 months   # Overweight: Estimated body mass index is 28.11 kg/m  as calculated from the following:    Height as of this encounter: 1.778 m (5' 10\").    Weight as of this encounter: 88.9 kg (195 lb 14.4 oz).   # Moderate Malnutrition: based on nutrition assessment      # Financial/Environmental Concerns: none          This patient was discussed with the [facility] fellow, Dr. Price. The [facility] attending faculty is Dr. Gauthier.    Neptali Beyer DO  Neurology Resident PGY3  12/16/2023  [facility] ASCOM: *[id number]      History of Present Illness:  58M with PMHx of T2DM with severe peripheral neuropathy, Right Charcot foot, HLD, papillary thyroid cancer in remission s/p thyroidectomy, and Right Charcot foot reconstruction c/b osteomyelitis (1/2022). Patient admitted 11/23 for sepsis 2/2 Right foot osteomyeltis s/p multiple I&D & vac changes (12/4, 12/7).     On 12/15, Code Blue called when patient found unresponsive on floor for unknown downtime 2/2 PEA arrest w/ ROSC s/p intubation ~1330. CTA revealed massive PE with clot for which he was transferred from [facility] to [place] ICU for IR thrombectomy. On arrival to [facility], pupils were reportedly already fixed and dilated bilaterally. During pulmonary thrombectomy, 2nd Code Blue was called for another " PEA arrest w/ROSC ~1930. Thrombectomy was aborted and patient transferred to ICU for stabilization. NCC consulted for post-arrest neuroprognostication.    Allergies   Allergen Reactions    Doxycycline Rash     Very extensive full body rash lasting for several months. Given at same time as Rifampin.    Rifampin Rash     Very extensive full body rash lasting for several months. Given at the same time as doxycycline.    Atorvastatin      Other reaction(s): Hyperglycemia  pt also lists simvastatin?    Celebrex [Celecoxib] Other (See Comments)     [facility] records       Current Medications:   ceFEPIme  2 g Intravenous Q12H    [Held by provider] cetirizine  10 mg Oral QAM    chlorhexidine  15 mL Mouth/Throat Q12H    [Held by provider] cyanocobalamin  2,000 mcg Oral QAM    diclofenac  4 g Topical QPM    [Held by provider] folic acid  1 mg Oral QAM    hydrocortisone sodium succinate PF  100 mg Intravenous Q8H    [Held by provider] lactobacillus rhamnosus (GG)  1 capsule Oral BID    [Held by provider] levothyroxine  150 mcg Oral QAM    lidocaine  3 patch Transdermal Q24H    [Held by provider] linaclotide  290 mcg Oral QAM    micafungin  100 mg Intravenous Q24H    [Held by provider] multivitamin w/minerals  1 tablet Oral Daily    pantoprazole  40 mg Per Feeding Tube QAM AC    Or    pantoprazole  40 mg Intravenous QAM AC    phytonadione  10 mg Intravenous Once    polyethylene glycol  17 g Oral Daily    [Held by provider] pravastatin  40 mg Oral QPM    sodium chloride (PF)  3 mL Intracatheter Q8H    thiamine  500 mg Intravenous Daily    [Held by provider] topiramate  150 mg Oral QAM    vancomycin  25 mg/kg Intravenous Once    vancomycin place mckinney - receiving intermittent dosing  1 each Does not apply See Admin Instructions    [Held by provider] Vitamin D3  50 mcg Oral QAM       PRN Medications:  acetaminophen **OR** acetaminophen, calcium carbonate, carboxymethylcellulose PF, glucose **OR** dextrose **OR**  "glucagon, naloxone **OR** naloxone **OR** naloxone **OR** naloxone, ondansetron **OR** ondansetron, senna-docusate **OR** senna-docusate, sodium chloride (PF)    Infusions:   angiotensin II (GIAPREZA) ADULT infusion 2.5mg/250 mL NS 60 ng/kg/min (12/15/23 2220)    EPINEPHrine 0.25 mcg/kg/min (12/15/23 2218)    [Held by provider] heparin Stopped (12/15/23 2000)    ketamine 5 mg/hr (12/15/23 2305)    lactated ringers 125 mL/hr at 12/15/23 2341    norepinephrine 1 mcg/kg/min (12/15/23 2340)    phenylephrine 6 mcg/kg/min (12/15/23 2351)    sodium bicarbonate 75 mL/hr (12/15/23 2127)    vasopressin 4 Units/hr (12/15/23 2219)       Physical Examination:  Vitals: BP (!) 92/17   Pulse 108   Temp (!) 94.8  F (34.9  C)   Resp 23   Ht 1.778 m (5' 10\")   Wt 88.9 kg (195 lb 14.4 oz)   SpO2 100%   BMI 28.11 kg/m    General: Adult male patient, lying in bed, critically-ill  HEENT: Normocephalic, atraumatic, no icterus, oral cavity/oropharynx pink and moist  Cardiac: RRR, s1/s2 auscultated without murmur   Pulm: on mechanical ventilation; occasional agonal breathing movements observed  Abdomen: Soft, non-distended, bowel sounds present  Extremities: Warm, no edema, distal pulses +2, well perfused  Skin: No rash or lesion  Neuro:  Mental status: Non-responsive to all stimuli. Ketamine gtt for family comfort.  Cranial nerves: Pupil, corneal, cough & gag reflexes absent   Motor: Normal bulk and tone. No abnormal movements. No movement to noxious stimuli x4.  Sensory: No movement to noxious stimuli x4.  Coordination: DOMINGO, deferred.  Gait: DOMINGO, deferred.    Labs and Imaging:    Lab Results   Component Value Date    PH 7.13 (LL) 12/15/2023    PO2 450 (H) 12/15/2023    PCO2 35 12/15/2023    HCO3 12 (L) 12/15/2023    MICHEL -16.3 (L) 12/15/2023     Lab Results   Component Value Date    NTBNPI 608 12/15/2023     Lab Results   Component Value Date    WBC 29.4 (H) 12/15/2023    HGB 8.8 (L) 12/15/2023    HCT 29.0 (L) 12/15/2023    MCV 82 " "12/15/2023     (L) 12/15/2023     No results found for: \"DD\", \"DIMER\"  Lab Results   Component Value Date    AST  12/15/2023      Comment:      Unsatisfactory specimen - hemolyzed    Reference intervals for this test were updated on 6/12/2023 to more accurately reflect our healthy population. There may be differences in the flagging of prior results with similar values performed with this method. Interpretation of those prior results can be made in the context of the updated reference intervals.     (H) 12/15/2023    ALKPHOS 171 (H) 12/15/2023    BILITOTAL 0.4 12/15/2023     Lab Results   Component Value Date    INR 8.71 (HH) 12/15/2023     No results found for: \"TROPONIN\", \"TROPI\", \"TROPR\", \"TROPN\"    All relevant imaging and laboratory values personally reviewed.   "

## 2023-12-16 NOTE — PLAN OF CARE
Shift: 12/15/2023 1230 - 1700  Neuro: unresponsive, 6mm pupils. CPOT 0.   CV: tele ST. Map goal > 65, currrently on levo 2.2mcg/kg/min and epi 0.2mcg/kg/min.  PU: 7.5 ETT @ 25cm lips on SIMV vent settings, 40% 16 500 7. LS dim.   : sierra placed at bedside, bloody urine output - MD aware.  Skin: R foot wound vac dressing, CDI. L shin scab.  Lines: L PIV x2. RUE PICC, lumen x1. L femoral artline.   Other info: head CT neg, chest CT - see results. transferred to [address] [place] [address] for IR intervention, report given to RN, all belongings sent with patient.         Plan of Care Reviewed With: patient

## 2023-12-16 NOTE — PROGRESS NOTES
Paged by primary service for c/f cardiogenic shock given escalating pressor requirements.     This provider was present at code blue earlier in the evening, at which time bedside POCUS revealed hyperdynamic EF, no pericardial effusion.     Presented to bedside to perform echo. Parasternal long, shortaxis, apical three chamber and subcostal views were reviewed in person with MICU resident. All views showed hyperdynamic EF, underfilled LV, no pericardial effusion.     Asked primary team to obtain the following:     CVP --> 5   MVO2 --> 95    The above findings are more suggestive of distributive shock as opposed to cardiogenic shock.     Communicated directly with MICU resident.     Kris Laura   Cardiology Fellow  This note was created using Dragon dictation software, so please excuse any mistakes and incorrect syntax and semantics.

## 2023-12-16 NOTE — CODE/RAPID RESPONSE
CODE BLUE note.    CODE BLUE was activated around 1914 for this patient in the interventional radiology suite, we arrived around 1917 and CPR was ongoing , up to the interventional radiologist and bedside nurse they were performing embolectomy for pulmonary embolism and the patient had most likely PEA during the procedure, chest compressions were performed for total of 4 minutes till ROSC achieved, patient was already intubated, total of 1 mg epinephrine/4 ampule's  of sodium bicarb total 200 meq/2 mg calcium chloride/1 L saline/patient was already maximized on 3 pressors and we added fourth pressor angiotensin/2 mg magnesium/1 unit PRBC.    Right IJ central venous line placed by the interventional radiologist, POCUS showed good heart contractility with preserved EF no pericardial effusion, IVC diameter around 2 cm with reasonable collapsibility initial , i-STAT during the CPR showed pH of 7, bicarb 13, hemoglobin 6.8 normal sodium and potassium, glucose 250, twelve-lead EKG showed normal sinus rhythm with nonspecific ST and Q wave changes, right femoral sheath was removed by IR prior to shift the patient to CT head, after CT head patient was transferred to the MICU for further management.       Attending MICU dr Yu attended and was bedside during the entire code    Derrick Campbell MD  Surgical critical care fellow

## 2023-12-16 NOTE — PROGRESS NOTES
Physician Attestation   I, Mirella Yu MD, saw this patient with the resident and agree with the resident's findings and plan of care as documented in the note.      I personally reviewed vital signs, medications, relevant images, and labs    Nehemiah Magallon is a 57 yo M with medica history significant for DMT2, papillary thyroid malignancy s/p thyroidectomy, peripheral neuropathy, R charcot foot reconstruction complicated by recurrent infection who was initially admitted to [facility] on 11/23/23 with R foot pain, fevers, hcills and FTH osteomyelitis. He has undergone multiple I&Ds of the R foot (most recent 12/14) with wound vac exchange. On 12/15 he was found down in his bathroom with asystolic cardiac arrest and he received ~ 20 minutes of CPR before ROSC was achieved. He had an additional PEA arrest requiring 1 round of CPR before ROSC was achieved with addition of NE and epinephrine. He was found to have b/l PEs with R heart strain and transferred from [facility] ICU to the IR suite for thrombectomy after PERT consultation. Unfortunately, after a large amount of thrombus was removed from b/l PAs, he had an additional PEA arrest (see code note for full documentation). At this time, pH was notably 7.03, and Hb was down to 6.8 from 9.2. He received epinephrine, CaCl, Mg, HCO3 amps x 3, 1 L IVF and his pressors (vasopressin, NE, epi) were uptitrated with addition of angiotensin II. 2 U pRBC were emergently requested and given. Heparin was held due to concern for possible bleed and ACT > 400. On exam he was non-responsive with dilated, fixed pupils. He received 1x dose of fentanyl, and ketamine but no other sedating medications. Additional attempts to remove thrombus were aborted and head CT was obtained prior to transfer to MICU.     Plan:   Neuro: concern for anoxic injury after 3 separate cardiac arrest episodes.  Most recent head CT with preserved gray-white differentiation of the L less distinct than would be  expected along with retained contrast.  Will consult neuro crit and minimize sedating medications.    Cardiac: Shock, likely multifactorial including obstructive, cardiogenic, concern for vasoplegic, hemorrhagic.  Given IVF and 2 units PRBC.  Will monitor with MAP goal greater than 65.  Continue current pressors and wean as able.  Lactic acid elevated.  Will maintain normothermia.  Start stress dose steroids.  Stat echo overnight.    Pulmonary: He is mechanically ventilated with good oxygenation.  Bilateral PEs status post partial thrombectomy.  IR would consider additional thrombectomy pending clinical course.  Chest x-ray to verify ET tube placement.  Consider increasing hypoventilation if acidosis worsens.    GI: He has bruising on his abdomen of unclear etiology.  Some concern for possible intra-abdominal bleed and we will monitor.  No bloody stool output at this time.  Start PPI.    Heme: Concern for acute blood loss anemia.  2 units PRBCs given and we will monitor hemoglobin.  Hold anticoagulation at this time until stability.  Consider scanning extremities to determine clot burden.    ID: Send blood cultures and start on empiric broad coverage antibiotics.  Last positive blood culture 11/23, neg 11/26.  He is also on fluconazole given Candida from tissue wound. Wound also with MSSA, most recent I&D note with no visibly purulent drainage noted. He has been on cefazolin and ID was following. .     Endo: Sliding scale insulin for hyperglycemia.  BG's per ICU protocol.    Dispo: Nehemiah remains critically ill at this time with profound shock requiring 4 pressors with concern for anoxic brain injury.  Family, Wiliam, his wife, and son were updated at bedside.  I discussed my concern for brain injury with them and reviewed his more recent clinical course along with concern that he could worsen despite all of our interventions.  I highly recommended a change in CODE STATUS to DNR given the very low likelihood of  meaningful recovery especially if his heart were to stop again.  ECMO potential reviewed with cardiac fellow and her staff earlier and he would not be a candidate with no signs of purposeful movement.  Family continues to process and state that he would not want prolonged mechanical support or to be kept alive without meaningful recovery. Spiritual support also offered but declined at this time.       The patient was seen and examined with the resident/fellow physician.  We have discussed the patient in detail and I agree with the findings, assessment, and plan as documented when this note was cosigned on this day. The plan was formulated in conjunction with pharmacy, ICU nurses, and respiratory therapist. I have evaluated all laboratory values and imaging studies for the past 24 hours. I have reviewed all the consults that have been ordered and are active for this patient.       Critical Care Time: 75 mins (excluding procedures)    Mirella Yu MD  Date of Service (when I saw the patient): 12/15/23

## 2023-12-16 NOTE — PROGRESS NOTES
Pt was admitted from [facility], intubated with 7.5 ETT, secured 25@lip, placed on full vent settings:  Vent Mode: CMV , RR 26, Vt 500, PEEP 7, FiO2 100%.  RT will continue to monitor pt.      Vera Quiñones RT

## 2023-12-16 NOTE — DEATH PRONOUNCEMENT
MD DEATH PRONOUNCEMENT    Called to pronounce Nehemiah Magallon dead.    Physical Exam: Unresponsive to noxious stimuli, Spontaneous respirations absent, Breath sounds absent, Carotid pulse absent, Heart sounds absent, and Pupillary light reflex absent    Patient was pronounced dead at 2:54 AM, 2023.    Preliminary Cause of Death: Pulmonary Embolism     Principal Problem:   Extensive bilateral pulmonary emboli  Right septic foot arthritis with osteomylitis     Infectious disease present?: NO    Communicable disease present? (examples: HIV, chicken pox, TB, Ebola, CJD) :  NO    Multi-drug resistant organism present? (example: MRSA): NO    Please consider an autopsy if any of the following exist:  NO Unexpected or unexplained death during or following any dental, medical, or surgical diagnostic treatment procedures.   NO Death of mother at or up to seven days after delivery.     NO All  and pediatric deaths.     NO Death where the cause is sufficiently obscure to delay completion of the death certificate.   NO Deaths in which autopsy would confirm a suspected illness/condition that would affect surviving family members or recipients of transplanted organs.     The following deaths must be reported to the 's Office:  NO A death that may be due entirely or in part to any factors other than natural disease (recent surgery, recent trauma, suspected abuse/neglect).   NO A death that may be an accident, suicide, or homicide.     NO Any sudden, unexpected death in which there is no prior history of significant heart disease or any other condition associated with sudden death.   NO A death under suspicious, unusual, or unexpected circumstances.    NO Any death which is apparently due to natural causes but in which the  does not have a personal physician familiar with the patient s medical history, social, or environmental situation or the circumstances of the terminal event.   NO Any death  apparently due to Sudden Infant Death Syndrome.     NO Deaths that occur during, in association with, or as consequences of a diagnostic, therapeutic, or anesthetic procedure.   NO Any death in which a fracture of a major bone has occurred within the past (6) six months.   NO A death of persons note seen by their physician within 120 days of demise.     NO Any death in which the  was an inmate of a public institution or was in the custody of Law Enforcement personnel.   NO  All unexpected deaths of children   NO Solid organ donors   NO Unidentified bodies   Unknown by family  Deaths of persons whose bodies are to be cremated or otherwise disposed of so that the bodies will later be unavailable for examination;   NO Deaths unattended by a physician outside of a licensed healthcare facility or licensed residential hospice program   NO Deaths occurring within 24 hours of arrival to a health care facility if death is unexpected.    NO Deaths associated with the decedent s employment.   NO Deaths attributed to acts of terrorism.   NO Any death in which there is uncertainty as to whether it is a medical examiner s care should be discussed with the medical investigator.        Body disposition: Autopsy was discussed with family member:  Spouse and Son in person.  Permission for autopsy was declined.  Not a candidate for organ donation

## 2023-12-16 NOTE — PLAN OF CARE
ICU End of Shift Summary. See flowsheets for vital signs and detailed assessment.    Changes this shift: Code Blue activated in IR @ 1914. ROSC achieved in ~14minutes, patient previously intubated from code on [facility]. IR procedure ended early due to hemodynamic status and arrest. R internal jugular triple lumen placed for central IV access in IR suite. After transferring back up to , pt continues on norepi, epi, vaso, angiotensinII, with the addition of phenylephrine added around 2230. Pt continues to be hemodynamically unstable with variations in BP with any movement and continuous MAP <65 maxed on 5 pressors. RASS -5 prior to starting ketamine gtt. Pupils dilated and fixed. No extremity response to pain. Hypothermic on arrival from IR, bare hugger applied. Anuric. Lactic 25. ABG 7.17/28/456/10 on 100% CMV, PEEP 7. MICU team notified provider of all critical labs.    Wife, Wiliam, and family at bedside and updated throughout. Family decided to switch to comfort cares @ 0140.     ETT removed and gtts stopped @ 0230.     Patient pronounced dead @ 0244. Patient belongings sent home with family.     Patient sent to [facility] with security @ 0508.

## 2023-12-16 NOTE — PHARMACY-VANCOMYCIN DOSING SERVICE
Pharmacy Vancomycin Initial Note  Date of Service December 15, 2023  Patient's  1965  58 year old, male    Indication: Sepsis    Current estimated CrCl = Estimated Creatinine Clearance: 50.5 mL/min (A) (based on SCr of 1.77 mg/dL (H)).    Creatinine for last 3 days  2023:  7:21 AM Creatinine 0.89 mg/dL  2023:  6:12 AM Creatinine 0.92 mg/dL  12/15/2023:  7:46 AM Creatinine 0.99 mg/dL; 12:34 PM Creatinine 1.26 mg/dL;  8:38 PM Creatinine 1.77 mg/dL    Recent Vancomycin Level(s) for last 3 days  No results found for requested labs within last 3 days.      Vancomycin IV Administrations (past 72 hours)        No vancomycin orders with administrations in past 72 hours.                    Nephrotoxins and other renal medications (From now, onward)      Start     Dose/Rate Route Frequency Ordered Stop    12/15/23 2214  vancomycin place mckinney - receiving intermittent dosing         1 each Does not apply SEE ADMIN INSTRUCTIONS 12/15/23 2214      12/15/23 2130  vancomycin (VANCOCIN) 2,250 mg in sodium chloride 0.9 % 500 mL intermittent infusion         25 mg/kg × 88.9 kg  over 120 Minutes Intravenous ONCE 12/15/23 2112      12/15/23 2130  vasopressin 1 unit/mL MAX Conc (PITRESSIN) infusion         0.5-4 Units/hr  0.5-4 mL/hr  Intravenous CONTINUOUS 12/15/23 2128      12/15/23 1400  norepinephrine (LEVOPHED) 16 mg in  mL infusion MAX CONC CENTRAL LINE         0.01-1 mcg/kg/min × 89 kg (Dosing Weight)  0.8-83.4 mL/hr  Intravenous CONTINUOUS 12/15/23 1356              Contrast Orders - past 72 hours (72h ago, onward)      Start     Dose/Rate Route Frequency Stop    12/15/23 2030  iodixanol (VISIPAQUE 320) injection 100 mL         100 mL Intravenous ONCE 12/15/23 2016    12/15/23 1430  iopamidol (ISOVUE-370) solution 100 mL         100 mL Intravenous ONCE 12/15/23 1447          Plan:  Start vancomycin 2250 mg iv x 1, then intermittent dosing based on levels (scr has doubled over 2 days)  Vancomycin  monitoring method: Trough (Method 2 = manual dose calculation)  Vancomycin therapeutic monitoring goal: 15-20 mg/L  Pharmacy will check vancomycin levels as appropriate in 1-3 Days.    Serum creatinine levels will be ordered daily for the first week of therapy and at least twice weekly for subsequent weeks.      Brooke Osman, PharmD

## 2023-12-16 NOTE — PROGRESS NOTES
Nehemiah was admitted to the MICU from IR after mechanical thrombectomy was complicated by PEA arrest. Since being in the MICU, patient has continued to escalate on pressor needs, now maxed out on 5 pressors. Despite escalating pressor needs, multiple IVF boluses, bicarb drip and boluses, high dose steroids, his blood pressure and acidosis did not improve. He continued to remain unresponsive on Ketamine alone, pupils remained fixed and dilated, he was not producing urine, his extremities appeared more mottled with difficulty to obtain pulses, and his abdomen became more distended with worsening lactic acidosis concerning for intraabdominal pathology. Given the high pressor need and low maps, unable to obtain abdominal CT safely. Patient's wife and son were updated at the beside and they elected to pursue comfort cares. Orders have been placed.     Garcia Marx PGY4

## 2023-12-17 NOTE — OP NOTE
DATE OF SURGERY:  12/14/2023.     PREOPERATIVE DIAGNOSIS:  Right fot/ankle surgical site infection.     POSTOPERATIVE DIAGNOSIS:  Right foot/ankle surgical site infection.     PROCEDURE:  Irrigation and debridement of right foot and ankle, VAC dressing exchange, delayed primary closure of anterolateral right ankle wound.     PRIMARY SURGEON:  Bryon Starr MD.     ASSISTANT SURGEONS:  Vitaliy Santos MD; Diana Jacobsen MD.     ANESTHESIA:  General laryngeal mask airway.     COMPLICATIONS:  None apparent intraoperatively or immediately postoperatively.     IMPLANTED DEVICES:  None.     SIGNIFICANT FINDINGS:  Anterolateral ankle wound which was approximated at the prior surgery remained fully apposed without drainage and with viable skin margins.  The medial wound had no visible purulence, with increased granulation tissue, apparent further secondary healing and neoepithelialization of the distal aspect,. portions of posterior tibial tendon still exposed, though now increasingly partially covered by granulation tissue, and healthy appearing circumferential skin margins.  Small amount of serosanguinous fluid.  No new beads placed.    Technique:  Scrubbing.  Instruments:  Curet.  Nature of debrided tissue:  Small amounts of serosanguinous fluid and deep fibrinous exudate.  Appearance of wound after:  Down to healthy, bleeding tissue/bone.  Area of debridement:  9 cm proximodistal x 3 cm anteroposterior x 5 cm deep (at the midfoot bony defect).  Depth of debridement:  Down to and including bone (ankle joint and medial midfoot bony defect).      SPECIMENS SENT:  None.     DRAINS:  One silver VAC sponge and tube connected to patient's existing hospital DME VAC machine at -125 mm continuous suction.     ESTIMATED BLOOD LOSS:  Approximately 5 mL.        INDICATIONS:                          This very pleasant 58-year-old gentleman is well known to me and has a history of diabetes mellitus with peripheral neuropathy, as  well as a right midfoot Charcot.  This underwent reconstruction complicated by infection treated with multiple operative debridements 01/21/2023, 11/02/2023, 11/08/2023, 11/27/2023, 12/04/2023, 12/07/2023, and 12/11/2023.  He has been treated for C. Albicans and pansensitive S. aureus from operative cultures.  The patient returns to the OR for planned repeat debridement and irrigation and VAC dressing exchange.  The diagnosis, nature of the recommended procedure, the risks, benefits, and alternatives, and the postoperative plan were all discussed with the patient in layman's terms.  No guarantees were expressed or implied as to outcome. The likely need for additional procedures, as well as the possibility of future amputation, was discussed.  The patient had all questions he had at the time answered, verbalized understanding, and verbalized the wish to proceed with the planned procedure.  Written informed consent was obtained.     DESCRIPTION OF PROCEDURE:             The patient, Nehemiah Magallon, was met in the preoperative area where the correct surgical site was identified and marked by the primary surgeon.  All questions were answered.  The patient was then brought to the operating room, where he was carefully transferred to the operating room table in the supine position with all bony prominences well padded and a safety strap across the waist.  The anesthesia team successfully induced general anesthesia and placed a laryngeal mask airway.  Venous thromboembolic prophylaxis was performed with a sequential compression device on the left lower extremity.  The patient's prior silver VAC sponge, clear dressing, and tube were removed.  Ancef was administered IV preoperatively.  The right lower extremity was then prepped with Betadine scrub and paint and draped free in the usual sterile fashion.  Prior to proceeding with the operation, a timeout was held per hospital policy correctly identifying the patient, procedure  to be performed, and operative site including laterality.  All in the room agreed.     The open medial wound on the right foot/ankle and the closed anterolateral right ankle wound were both carefully inspected.  The anterolateral wound remained well apposed with viable margins and no expressible drainage through the loose closure.  There was no malodor or purulence.  The medial wound was thoroughly explored in all directions.  There was no malodor or purulence.  Skin margins were circumferentially viable. There was a small amount of serosanguinous fluid present.  Antibiotic beads did not appear to be required.  There was continued increased interim growth of granulation tissue, particularly in the distal aspect of the wound along the medial first metatarsal with increased interim neoepithelialization, as well as long the posterior tibial tendon, though it did still have some exposed tendon.  The medial wound did communicate directly with both the posterior right ankle joint and the medial midfoot bony defect.  No new sinus tracts or pockets of purulence were noted.   The wound was carefully and thoroughly scrubbed with a curet, including the ankle joint as well as the medial midfoot bony defect, and debrided to healthy, bleeding tissues and bone.  Small amounts of deep fibrinous exudate and serosanguinous fluid were debrided with a curet.  The wound was then thoroughly irrigated with a total of 3 L of normal saline using cystoscopy tubing.  This included the bony defect in the medial midfoot and the ankle joint.     There was no evidence for any vascular injury.  Meticulous hemostasis was achieved.  The skin was carefully cleansed with sterile saline and dried, and a single silver VAC sponge was placed into the medial right ankle/foot wound.  Portions of the sponge were pedicled to insert into the medial midfoot bony defect and the posterior ankle joint.  The sponge was covered with a clear VAC dressing, the VAC  suction tube was attached to the patient's existing hospital DME VAC machine, and -125 mmHg continuous pressure suction was applied with an excellent seal.  Sterile dressings were reapplied to the anterolateral right ankle wound.  Soft cast padding and an ace wrap were applied with care to keep the VAC tubing off skin and well padded.  All surgical counts were reported as correct at the end of the case. The patient was taken to recovery room in stable condition.  I was present and scrubbed in for the entire case from start to finish.        Bryon Starr MD

## 2023-12-21 ENCOUNTER — DOCUMENTATION ONLY (OUTPATIENT)
Dept: ORTHOPEDICS | Facility: CLINIC | Age: 58
End: 2023-12-21

## 2023-12-21 NOTE — PROGRESS NOTES
Chart opened to cancel further appointments with Dr. Bryon Starr. Error Encounter    Brandie Mayo RN

## 2023-12-25 LAB
BACTERIA ASPIRATE CULT: ABNORMAL
BACTERIA TISS BX CULT: ABNORMAL

## 2024-01-02 ENCOUNTER — DOCUMENTATION ONLY (OUTPATIENT)
Dept: ORTHOPEDICS | Facility: CLINIC | Age: 59
End: 2024-01-02

## 2024-01-02 NOTE — PROGRESS NOTES
Received Completed forms Yes   Faxed Forms Faxed To: [facility]  Fax Number: [phone]   Sent to HIM (Date) 1/2/24

## 2024-01-05 ENCOUNTER — MEDICAL CORRESPONDENCE (OUTPATIENT)
Dept: HEALTH INFORMATION MANAGEMENT | Facility: CLINIC | Age: 59
End: 2024-01-05

## (undated) DEVICE — DRAPE SPLIT SHEET 77X108 REINFORCED 29436

## (undated) DEVICE — PREP POVIDONE-IODINE 7.5% SCRUB 4OZ BOTTLE MDS093945

## (undated) DEVICE — PREP CHLORAPREP 26ML TINTED HI-LITE ORANGE 930815

## (undated) DEVICE — PREP POVIDONE-IODINE 10% SOLUTION 4OZ BOTTLE MDS093944

## (undated) DEVICE — LINEN ORTHO PACK 5446

## (undated) DEVICE — DRAPE C-ARMOR 5 SIDED 5523

## (undated) DEVICE — CANISTER WOUND VAC W/GEL 500ML M8275063/5

## (undated) DEVICE — DRAPE STOCKINETTE IMPERVIOUS 12" 1587

## (undated) DEVICE — BNDG ELASTIC 4" DBL LENGTH UNSTERILE 6611-14

## (undated) DEVICE — GLOVE BIOGEL PI SZ 8.0 40880

## (undated) DEVICE — SPECIMEN CONTAINER 5OZ STERILE 2600SA

## (undated) DEVICE — GOWN XLG DISP 9545

## (undated) DEVICE — IMPLANTABLE DEVICE: Type: IMPLANTABLE DEVICE | Site: ANKLE | Status: NON-FUNCTIONAL

## (undated) DEVICE — TUBING SMOKE EVAC SUCTION SLEEVE 0703-005-065

## (undated) DEVICE — GLOVE BIOGEL PI MICRO INDICATOR UNDERGLOVE SZ 8.0 48980

## (undated) DEVICE — PACK LOWER EXTREMITY RIVERSIDE SOP32LEFSX

## (undated) DEVICE — ESU GROUND PAD ADULT W/CORD E7507

## (undated) DEVICE — DRSG WOUND VAC SPONGE MED BLACK M8275052/5

## (undated) DEVICE — SOL NACL 0.9% IRRIG 1000ML BOTTLE 2F7124

## (undated) DEVICE — DRSG STERI STRIP 1/2X4" R1547

## (undated) DEVICE — Device

## (undated) DEVICE — TRAY PREP DRY SKIN SCRUB 067

## (undated) DEVICE — CATH TRAY FOLEY SURESTEP 16FR W/URINE MTR STATLK LF A303416A

## (undated) DEVICE — SOL WATER IRRIG 1000ML BOTTLE 2F7114

## (undated) DEVICE — DRSG ABDOMINAL 07 1/2X8" 7197D

## (undated) DEVICE — SOL NACL 0.9% IRRIG 3000ML BAG 2B7477

## (undated) DEVICE — CONNECTOR WOUND VAC Y M6275066/5

## (undated) DEVICE — SUCTION TIP YANKAUER STR K87

## (undated) DEVICE — SU PROLENE 2-0 FS 18" 8685H

## (undated) DEVICE — SU FIBERWIRE 2 38" 2 STRAND  AR-7201

## (undated) DEVICE — KIT CULTURE TRANSPORT SYS A.C.T. II DUAL ANEROBE R124022

## (undated) DEVICE — SPONGE LAP 18X18" X8435

## (undated) DEVICE — DRSG WOUND VAC GRANUFOAM MED SILVER M8275096/5

## (undated) DEVICE — DRSG DRAIN 4X4" 7086

## (undated) DEVICE — BONE CEMENT KIT BOWL AND SPATULA STRK 6201-3-410

## (undated) DEVICE — SU VICRYL 0 CT 36" J358H

## (undated) DEVICE — TUBING IRRIG CYSTO/BLADDER SET 81" LF 2C4040

## (undated) DEVICE — SUCTION MANIFOLD NEPTUNE 2 SYS 4 PORT 0702-020-000

## (undated) DEVICE — VAC DRESSING MD GRAN U FOAM SILVER

## (undated) DEVICE — PACKING IODOFORM STRIP 1/4" 7831

## (undated) DEVICE — STRAP KNEE/BODY 31143004

## (undated) DEVICE — CANISTER WOUND VAC W/GEL 1000ML M8275093/5

## (undated) DEVICE — SYR 30ML LL W/O NDL 302832

## (undated) DEVICE — DRSG WOUND VAC SENSATRAC PAD AND DRAPE SM M8275068/5

## (undated) DEVICE — TOURNIQUET CUFF 34" REPRO BROWN 60-7070-106

## (undated) DEVICE — SU FIBERWIRE 2 38"  AR-7200

## (undated) DEVICE — ADH LIQUID MASTISOL TOPICAL VIAL 2-3ML 0523-48

## (undated) DEVICE — DRSG GAUZE 4X8" NON21842

## (undated) DEVICE — 2.5 DRILL BIT

## (undated) DEVICE — PACK UNIVERSAL SPLIT 29131

## (undated) DEVICE — CONTAINER SPECIMEN 4OZ STERILE 17099

## (undated) DEVICE — BLADE KNIFE SURG 10 371110

## (undated) DEVICE — SU ETHILON 3-0 PS-1 18" 1663H

## (undated) DEVICE — DRAPE C-ARM W/STRAPS 42X72" 07-CA104

## (undated) DEVICE — DRAPE IOBAN INCISE 23X17" 6650EZ

## (undated) DEVICE — PREP SKIN SCRUB TRAY 4461A

## (undated) DEVICE — CAST PADDING 4" STERILE 9044S

## (undated) DEVICE — ESU PENCIL W/SMOKE EVAC NEPTUNE STRYKER 0703-046-000

## (undated) DEVICE — SU VICRYL 2-0 CT-2 27" UND J269H

## (undated) DEVICE — SOL ADH LIQUID BENZOIN SWAB 0.6ML C1544

## (undated) DEVICE — TOURNIQUET CUFF 30" REPRO BLUE 60-7070-105

## (undated) DEVICE — PREP POVIDONE IODINE SOLUTION 10% 4OZ BOTTLE 29906-004

## (undated) DEVICE — SYR 10ML FINGER CONTROL W/O NDL 309695

## (undated) RX ORDER — VANCOMYCIN HYDROCHLORIDE 1 G/20ML
INJECTION, POWDER, LYOPHILIZED, FOR SOLUTION INTRAVENOUS
Status: DISPENSED
Start: 2023-01-01

## (undated) RX ORDER — FENTANYL CITRATE 50 UG/ML
INJECTION, SOLUTION INTRAMUSCULAR; INTRAVENOUS
Status: DISPENSED
Start: 2023-01-01

## (undated) RX ORDER — ACETAMINOPHEN 325 MG/1
TABLET ORAL
Status: DISPENSED
Start: 2022-11-17

## (undated) RX ORDER — PROPOFOL 10 MG/ML
INJECTION, EMULSION INTRAVENOUS
Status: DISPENSED
Start: 2023-01-01

## (undated) RX ORDER — CEFAZOLIN SODIUM/WATER 2 G/20 ML
SYRINGE (ML) INTRAVENOUS
Status: DISPENSED
Start: 2023-01-01

## (undated) RX ORDER — DEXAMETHASONE SODIUM PHOSPHATE 4 MG/ML
INJECTION, SOLUTION INTRA-ARTICULAR; INTRALESIONAL; INTRAMUSCULAR; INTRAVENOUS; SOFT TISSUE
Status: DISPENSED
Start: 2023-01-01

## (undated) RX ORDER — HYDROMORPHONE HYDROCHLORIDE 1 MG/ML
INJECTION, SOLUTION INTRAMUSCULAR; INTRAVENOUS; SUBCUTANEOUS
Status: DISPENSED
Start: 2022-11-17

## (undated) RX ORDER — ACETAMINOPHEN 325 MG/1
TABLET ORAL
Status: DISPENSED
Start: 2023-01-01

## (undated) RX ORDER — ONDANSETRON 2 MG/ML
INJECTION INTRAMUSCULAR; INTRAVENOUS
Status: DISPENSED
Start: 2023-01-01

## (undated) RX ORDER — CEFAZOLIN SODIUM 1 G/3ML
INJECTION, POWDER, FOR SOLUTION INTRAMUSCULAR; INTRAVENOUS
Status: DISPENSED
Start: 2022-11-17

## (undated) RX ORDER — SODIUM CHLORIDE, SODIUM LACTATE, POTASSIUM CHLORIDE, CALCIUM CHLORIDE 600; 310; 30; 20 MG/100ML; MG/100ML; MG/100ML; MG/100ML
INJECTION, SOLUTION INTRAVENOUS
Status: DISPENSED
Start: 2023-01-01

## (undated) RX ORDER — VANCOMYCIN HYDROCHLORIDE 500 MG/10ML
INJECTION, POWDER, LYOPHILIZED, FOR SOLUTION INTRAVENOUS
Status: DISPENSED
Start: 2023-01-01

## (undated) RX ORDER — FENTANYL CITRATE-0.9 % NACL/PF 10 MCG/ML
PLASTIC BAG, INJECTION (ML) INTRAVENOUS
Status: DISPENSED
Start: 2023-01-01

## (undated) RX ORDER — FENTANYL CITRATE-0.9 % NACL/PF 10 MCG/ML
PLASTIC BAG, INJECTION (ML) INTRAVENOUS
Status: DISPENSED
Start: 2022-11-17

## (undated) RX ORDER — CEFAZOLIN SODIUM/WATER 2 G/20 ML
SYRINGE (ML) INTRAVENOUS
Status: DISPENSED
Start: 2022-11-17

## (undated) RX ORDER — HEPARIN SODIUM 200 [USP'U]/100ML
INJECTION, SOLUTION INTRAVENOUS
Status: DISPENSED
Start: 2023-01-01

## (undated) RX ORDER — GLYCOPYRROLATE 0.2 MG/ML
INJECTION INTRAMUSCULAR; INTRAVENOUS
Status: DISPENSED
Start: 2023-01-01

## (undated) RX ORDER — EPHEDRINE SULFATE 50 MG/ML
INJECTION, SOLUTION INTRAMUSCULAR; INTRAVENOUS; SUBCUTANEOUS
Status: DISPENSED
Start: 2023-01-01

## (undated) RX ORDER — FENTANYL CITRATE 50 UG/ML
INJECTION, SOLUTION INTRAMUSCULAR; INTRAVENOUS
Status: DISPENSED
Start: 2022-11-17

## (undated) RX ORDER — HYDROMORPHONE HYDROCHLORIDE 1 MG/ML
INJECTION, SOLUTION INTRAMUSCULAR; INTRAVENOUS; SUBCUTANEOUS
Status: DISPENSED
Start: 2023-01-01

## (undated) RX ORDER — HEPARIN SODIUM 1000 [USP'U]/ML
INJECTION, SOLUTION INTRAVENOUS; SUBCUTANEOUS
Status: DISPENSED
Start: 2023-01-01

## (undated) RX ORDER — LIDOCAINE HYDROCHLORIDE 10 MG/ML
INJECTION, SOLUTION EPIDURAL; INFILTRATION; INTRACAUDAL; PERINEURAL
Status: DISPENSED
Start: 2023-01-01

## (undated) RX ORDER — ALBUTEROL SULFATE 90 UG/1
AEROSOL, METERED RESPIRATORY (INHALATION)
Status: DISPENSED
Start: 2023-01-01